# Patient Record
Sex: FEMALE | Race: WHITE | NOT HISPANIC OR LATINO | Employment: FULL TIME | URBAN - NONMETROPOLITAN AREA
[De-identification: names, ages, dates, MRNs, and addresses within clinical notes are randomized per-mention and may not be internally consistent; named-entity substitution may affect disease eponyms.]

---

## 2020-08-10 ENCOUNTER — TRANSCRIBE ORDERS (OUTPATIENT)
Dept: ADMINISTRATIVE | Facility: HOSPITAL | Age: 65
End: 2020-08-10

## 2020-08-10 DIAGNOSIS — Z01.818 PRE-OPERATIVE CLEARANCE: Primary | ICD-10-CM

## 2020-08-12 ENCOUNTER — LAB (OUTPATIENT)
Dept: LAB | Facility: HOSPITAL | Age: 65
End: 2020-08-12

## 2020-08-12 DIAGNOSIS — Z01.818 PRE-OPERATIVE CLEARANCE: ICD-10-CM

## 2020-08-12 PROCEDURE — U0002 COVID-19 LAB TEST NON-CDC: HCPCS

## 2020-08-12 PROCEDURE — C9803 HOPD COVID-19 SPEC COLLECT: HCPCS

## 2020-08-12 PROCEDURE — U0004 COV-19 TEST NON-CDC HGH THRU: HCPCS

## 2020-08-13 LAB
REF LAB TEST METHOD: NORMAL
SARS-COV-2 RNA RESP QL NAA+PROBE: NOT DETECTED

## 2020-08-14 ENCOUNTER — LAB REQUISITION (OUTPATIENT)
Dept: LAB | Facility: HOSPITAL | Age: 65
End: 2020-08-14

## 2020-08-14 DIAGNOSIS — R22.31 LOCALIZED SWELLING, MASS AND LUMP, RIGHT UPPER LIMB: ICD-10-CM

## 2020-08-14 PROCEDURE — 88307 TISSUE EXAM BY PATHOLOGIST: CPT | Performed by: PLASTIC SURGERY

## 2020-08-14 PROCEDURE — 89060 EXAM SYNOVIAL FLUID CRYSTALS: CPT | Performed by: PLASTIC SURGERY

## 2020-08-19 LAB
CYTO UR: NORMAL
LAB AP CASE REPORT: NORMAL
LAB AP CLINICAL INFORMATION: NORMAL
PATH REPORT.FINAL DX SPEC: NORMAL
PATH REPORT.GROSS SPEC: NORMAL

## 2020-09-03 ENCOUNTER — OFFICE VISIT (OUTPATIENT)
Dept: FAMILY MEDICINE CLINIC | Facility: CLINIC | Age: 65
End: 2020-09-03

## 2020-09-03 VITALS
DIASTOLIC BLOOD PRESSURE: 60 MMHG | SYSTOLIC BLOOD PRESSURE: 100 MMHG | WEIGHT: 184 LBS | HEIGHT: 67 IN | TEMPERATURE: 98.7 F | HEART RATE: 53 BPM | BODY MASS INDEX: 28.88 KG/M2 | OXYGEN SATURATION: 97 %

## 2020-09-03 DIAGNOSIS — I10 ESSENTIAL HYPERTENSION: ICD-10-CM

## 2020-09-03 DIAGNOSIS — D22.9 CHANGE IN SKIN MOLE: ICD-10-CM

## 2020-09-03 DIAGNOSIS — N93.9 VAGINAL BLEEDING: ICD-10-CM

## 2020-09-03 DIAGNOSIS — L30.4 INTERTRIGO: ICD-10-CM

## 2020-09-03 DIAGNOSIS — R31.0 GROSS HEMATURIA: ICD-10-CM

## 2020-09-03 DIAGNOSIS — C50.919 MALIGNANT NEOPLASM OF FEMALE BREAST, UNSPECIFIED ESTROGEN RECEPTOR STATUS, UNSPECIFIED LATERALITY, UNSPECIFIED SITE OF BREAST (HCC): ICD-10-CM

## 2020-09-03 DIAGNOSIS — R41.82 ALTERED MENTAL STATUS, UNSPECIFIED ALTERED MENTAL STATUS TYPE: Primary | ICD-10-CM

## 2020-09-03 DIAGNOSIS — G47.09 OTHER INSOMNIA: ICD-10-CM

## 2020-09-03 DIAGNOSIS — E78.2 MIXED HYPERLIPIDEMIA: ICD-10-CM

## 2020-09-03 LAB
BILIRUB BLD-MCNC: NEGATIVE MG/DL
CLARITY, POC: CLEAR
COLOR UR: YELLOW
GLUCOSE UR STRIP-MCNC: NEGATIVE MG/DL
KETONES UR QL: NEGATIVE
LEUKOCYTE EST, POC: NEGATIVE
NITRITE UR-MCNC: NEGATIVE MG/ML
PH UR: 5.5 [PH] (ref 5–8)
PROT UR STRIP-MCNC: ABNORMAL MG/DL
RBC # UR STRIP: ABNORMAL /UL
SP GR UR: 1.03 (ref 1–1.03)
UROBILINOGEN UR QL: NORMAL

## 2020-09-03 PROCEDURE — 81003 URINALYSIS AUTO W/O SCOPE: CPT | Performed by: FAMILY MEDICINE

## 2020-09-03 PROCEDURE — 99214 OFFICE O/P EST MOD 30 MIN: CPT | Performed by: FAMILY MEDICINE

## 2020-09-03 PROCEDURE — 93000 ELECTROCARDIOGRAM COMPLETE: CPT | Performed by: FAMILY MEDICINE

## 2020-09-03 RX ORDER — NYSTATIN 100000 [USP'U]/G
POWDER TOPICAL 4 TIMES DAILY
Qty: 60 G | Refills: 2 | Status: SHIPPED | OUTPATIENT
Start: 2020-09-03 | End: 2020-09-24

## 2020-09-03 RX ORDER — GABAPENTIN 100 MG/1
100 CAPSULE ORAL DAILY PRN
Status: ON HOLD | COMMUNITY
End: 2021-12-12

## 2020-09-03 RX ORDER — FLUTICASONE PROPIONATE 50 MCG
2 SPRAY, SUSPENSION (ML) NASAL DAILY
COMMUNITY
End: 2020-09-24

## 2020-09-03 RX ORDER — ATENOLOL 50 MG/1
50 TABLET ORAL 2 TIMES DAILY
COMMUNITY
End: 2020-09-03 | Stop reason: SDUPTHER

## 2020-09-03 RX ORDER — EXEMESTANE 25 MG/1
25 TABLET ORAL DAILY
COMMUNITY
End: 2020-09-03 | Stop reason: SDUPTHER

## 2020-09-03 RX ORDER — MENTHOL AND ZINC OXIDE .44; 20.625 G/100G; G/100G
OINTMENT TOPICAL AS NEEDED
Qty: 113 G | Refills: 2 | Status: SHIPPED | OUTPATIENT
Start: 2020-09-03 | End: 2020-09-24

## 2020-09-03 RX ORDER — MIRTAZAPINE 15 MG/1
15 TABLET, FILM COATED ORAL NIGHTLY
Qty: 30 TABLET | Refills: 2 | Status: SHIPPED | OUTPATIENT
Start: 2020-09-03 | End: 2020-09-24

## 2020-09-03 RX ORDER — EXEMESTANE 25 MG/1
25 TABLET ORAL DAILY
Qty: 90 TABLET | Refills: 1 | Status: SHIPPED | OUTPATIENT
Start: 2020-09-03 | End: 2020-11-04

## 2020-09-03 RX ORDER — ATENOLOL 50 MG/1
50 TABLET ORAL 2 TIMES DAILY
Qty: 180 TABLET | Refills: 1 | Status: SHIPPED | OUTPATIENT
Start: 2020-09-03 | End: 2021-02-18 | Stop reason: SDUPTHER

## 2020-09-03 RX ORDER — ATORVASTATIN CALCIUM 80 MG/1
80 TABLET, FILM COATED ORAL NIGHTLY
Qty: 90 TABLET | Refills: 1 | Status: SHIPPED | OUTPATIENT
Start: 2020-09-03 | End: 2021-02-18 | Stop reason: SDUPTHER

## 2020-09-03 RX ORDER — BACLOFEN 20 MG/1
20 TABLET ORAL NIGHTLY PRN
COMMUNITY
End: 2020-09-24

## 2020-09-03 RX ORDER — ATORVASTATIN CALCIUM 80 MG/1
80 TABLET, FILM COATED ORAL NIGHTLY
COMMUNITY
Start: 2020-07-22 | End: 2020-09-03 | Stop reason: SDUPTHER

## 2020-09-03 RX ORDER — TRAZODONE HYDROCHLORIDE 50 MG/1
100 TABLET ORAL NIGHTLY
COMMUNITY
End: 2020-12-08

## 2020-09-15 DIAGNOSIS — N95.0 PMB (POSTMENOPAUSAL BLEEDING): Primary | ICD-10-CM

## 2020-09-16 ENCOUNTER — LAB (OUTPATIENT)
Dept: FAMILY MEDICINE CLINIC | Facility: CLINIC | Age: 65
End: 2020-09-16

## 2020-09-16 DIAGNOSIS — R41.82 ALTERED MENTAL STATUS, UNSPECIFIED ALTERED MENTAL STATUS TYPE: ICD-10-CM

## 2020-09-16 DIAGNOSIS — C50.919 MALIGNANT NEOPLASM OF FEMALE BREAST, UNSPECIFIED ESTROGEN RECEPTOR STATUS, UNSPECIFIED LATERALITY, UNSPECIFIED SITE OF BREAST (HCC): ICD-10-CM

## 2020-09-16 DIAGNOSIS — I10 ESSENTIAL HYPERTENSION: ICD-10-CM

## 2020-09-16 DIAGNOSIS — E78.2 MIXED HYPERLIPIDEMIA: ICD-10-CM

## 2020-09-16 PROCEDURE — 85025 COMPLETE CBC W/AUTO DIFF WBC: CPT | Performed by: FAMILY MEDICINE

## 2020-09-16 PROCEDURE — 80053 COMPREHEN METABOLIC PANEL: CPT | Performed by: FAMILY MEDICINE

## 2020-09-16 PROCEDURE — 83735 ASSAY OF MAGNESIUM: CPT | Performed by: FAMILY MEDICINE

## 2020-09-16 PROCEDURE — 84439 ASSAY OF FREE THYROXINE: CPT | Performed by: FAMILY MEDICINE

## 2020-09-16 PROCEDURE — 82607 VITAMIN B-12: CPT | Performed by: FAMILY MEDICINE

## 2020-09-16 PROCEDURE — 84443 ASSAY THYROID STIM HORMONE: CPT | Performed by: FAMILY MEDICINE

## 2020-09-16 PROCEDURE — 80061 LIPID PANEL: CPT | Performed by: FAMILY MEDICINE

## 2020-09-17 LAB
ALBUMIN SERPL-MCNC: 4.4 G/DL (ref 3.5–5.2)
ALBUMIN/GLOB SERPL: 2 G/DL
ALP SERPL-CCNC: 87 U/L (ref 39–117)
ALT SERPL W P-5'-P-CCNC: 70 U/L (ref 1–33)
ANION GAP SERPL CALCULATED.3IONS-SCNC: 12 MMOL/L (ref 5–15)
AST SERPL-CCNC: 47 U/L (ref 1–32)
BASOPHILS # BLD AUTO: 0.06 10*3/MM3 (ref 0–0.2)
BASOPHILS NFR BLD AUTO: 1.1 % (ref 0–1.5)
BILIRUB SERPL-MCNC: 0.7 MG/DL (ref 0–1.2)
BUN SERPL-MCNC: 20 MG/DL (ref 8–23)
BUN/CREAT SERPL: 30.8 (ref 7–25)
CALCIUM SPEC-SCNC: 9.5 MG/DL (ref 8.6–10.5)
CHLORIDE SERPL-SCNC: 104 MMOL/L (ref 98–107)
CHOLEST SERPL-MCNC: 123 MG/DL (ref 0–200)
CO2 SERPL-SCNC: 27 MMOL/L (ref 22–29)
CREAT SERPL-MCNC: 0.65 MG/DL (ref 0.57–1)
DEPRECATED RDW RBC AUTO: 46 FL (ref 37–54)
EOSINOPHIL # BLD AUTO: 0.11 10*3/MM3 (ref 0–0.4)
EOSINOPHIL NFR BLD AUTO: 2 % (ref 0.3–6.2)
ERYTHROCYTE [DISTWIDTH] IN BLOOD BY AUTOMATED COUNT: 13.2 % (ref 12.3–15.4)
GFR SERPL CREATININE-BSD FRML MDRD: 92 ML/MIN/1.73
GLOBULIN UR ELPH-MCNC: 2.2 GM/DL
GLUCOSE SERPL-MCNC: 92 MG/DL (ref 65–99)
HCT VFR BLD AUTO: 43.4 % (ref 34–46.6)
HDLC SERPL-MCNC: 50 MG/DL (ref 40–60)
HGB BLD-MCNC: 13.6 G/DL (ref 12–15.9)
IMM GRANULOCYTES # BLD AUTO: 0 10*3/MM3 (ref 0–0.05)
IMM GRANULOCYTES NFR BLD AUTO: 0 % (ref 0–0.5)
LDLC SERPL CALC-MCNC: 60 MG/DL (ref 0–100)
LDLC/HDLC SERPL: 1.21 {RATIO}
LYMPHOCYTES # BLD AUTO: 2.34 10*3/MM3 (ref 0.7–3.1)
LYMPHOCYTES NFR BLD AUTO: 42.3 % (ref 19.6–45.3)
MAGNESIUM SERPL-MCNC: 2.1 MG/DL (ref 1.6–2.4)
MCH RBC QN AUTO: 29.1 PG (ref 26.6–33)
MCHC RBC AUTO-ENTMCNC: 31.3 G/DL (ref 31.5–35.7)
MCV RBC AUTO: 92.9 FL (ref 79–97)
MONOCYTES # BLD AUTO: 0.45 10*3/MM3 (ref 0.1–0.9)
MONOCYTES NFR BLD AUTO: 8.1 % (ref 5–12)
NEUTROPHILS NFR BLD AUTO: 2.57 10*3/MM3 (ref 1.7–7)
NEUTROPHILS NFR BLD AUTO: 46.5 % (ref 42.7–76)
NRBC BLD AUTO-RTO: 0 /100 WBC (ref 0–0.2)
PLATELET # BLD AUTO: 188 10*3/MM3 (ref 140–450)
PMV BLD AUTO: 11.7 FL (ref 6–12)
POTASSIUM SERPL-SCNC: 4 MMOL/L (ref 3.5–5.2)
PROT SERPL-MCNC: 6.6 G/DL (ref 6–8.5)
RBC # BLD AUTO: 4.67 10*6/MM3 (ref 3.77–5.28)
SODIUM SERPL-SCNC: 143 MMOL/L (ref 136–145)
T4 FREE SERPL-MCNC: 1.31 NG/DL (ref 0.93–1.7)
TRIGL SERPL-MCNC: 63 MG/DL (ref 0–150)
TSH SERPL DL<=0.05 MIU/L-ACNC: 1.9 UIU/ML (ref 0.27–4.2)
VIT B12 BLD-MCNC: 597 PG/ML (ref 211–946)
VLDLC SERPL-MCNC: 12.6 MG/DL (ref 5–40)
WBC # BLD AUTO: 5.53 10*3/MM3 (ref 3.4–10.8)
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
## 2020-09-24 ENCOUNTER — OFFICE VISIT (OUTPATIENT)
Dept: OBSTETRICS AND GYNECOLOGY | Facility: CLINIC | Age: 65
End: 2020-09-24

## 2020-09-24 VITALS
WEIGHT: 180.4 LBS | HEIGHT: 67 IN | BODY MASS INDEX: 28.31 KG/M2 | TEMPERATURE: 97.3 F | SYSTOLIC BLOOD PRESSURE: 138 MMHG | DIASTOLIC BLOOD PRESSURE: 78 MMHG

## 2020-09-24 DIAGNOSIS — Z78.0 MENOPAUSE: ICD-10-CM

## 2020-09-24 DIAGNOSIS — N95.2 VAGINAL ATROPHY: ICD-10-CM

## 2020-09-24 DIAGNOSIS — Z85.3 PERSONAL HISTORY OF BREAST CANCER: Primary | ICD-10-CM

## 2020-09-24 PROCEDURE — 99203 OFFICE O/P NEW LOW 30 MIN: CPT | Performed by: OBSTETRICS & GYNECOLOGY

## 2020-09-24 RX ORDER — ESTRADIOL 10 UG/1
INSERT VAGINAL
Qty: 24 TABLET | Refills: 1 | Status: SHIPPED | OUTPATIENT
Start: 2020-09-24 | End: 2020-11-04

## 2020-09-24 NOTE — PROGRESS NOTES
"   Chief Complaint   Patient presents with   • Vaginal Bleeding     post-menopausal bleeding       Yadira Burgos is a 64 y.o. year old  presenting to be seen for a new Gynecologic visit with me because of a recent history of bright red vaginal spotting.  This patient has been menopausal since 31 years of age.  She has not taken estrogen/progestin therapy for over 35 years.  She does have complaints of vaginal dryness.  She has had a right breast lumpectomy with negative sentinel nodes.  She had postoperative radiation therapy.  She currently takes Aromasin, 25 mg daily and has done so for 5 years.  She has a personal history of a melanoma excised from her back.  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  She states that her last Pap test was negative.  She denies bowel symptoms.  She has a history of microscopic hematuria.    Social History     Substance and Sexual Activity   Sexual Activity Not Currently   • Birth control/protection: Post-menopausal     SCREENING TESTS  No Mammogram results  Year 2012   Age                         PAP         \"Neg\"                HPV high risk                         Mammogram                         ALLIE score                         Breast MRI                         Lipids                         Vitamin D                         Colonoscopy                         DEXA  Frax (hip/any)                         Ovarian Screen                             No Additional Complaints Reported    The following portions of the patient's history were reviewed and updated as appropriate:vital signs and   She  has a past medical history of Back pain, Cancer (CMS/HCC), Elbow fracture, Foot fracture, Headache, Hyperlipidemia, Hypertension, and Sinusitis.  She does not have any pertinent problems on file.  She  has a past " "surgical history that includes Breast surgery; Elbow fusion; Rhinoplasty; and Hand surgery.  Her family history includes Alcohol abuse in her paternal grandmother; Cancer in her paternal aunt; Hypertension in her sister, sister, and sister; Other in her mother and sister; Stroke in her maternal grandmother; Ulcers in her father.  She  reports that she has never smoked. She has never used smokeless tobacco. She reports previous alcohol use. She reports that she does not use drugs.  Current Outpatient Medications   Medication Sig Dispense Refill   • atenolol (TENORMIN) 50 MG tablet Take 1 tablet by mouth 2 (two) times a day. 180 tablet 1   • atorvastatin (LIPITOR) 80 MG tablet Take 1 tablet by mouth Every Night. 90 tablet 1   • estradiol (VAGIFEM) 10 MCG tablet vaginal tablet Insert one tablet intravaginally, twice a week 24 tablet 1   • exemestane (AROMASIN) 25 MG chemo tablet Take 1 tablet by mouth Daily. 90 tablet 1   • gabapentin (NEURONTIN) 100 MG capsule Take 100 mg by mouth Daily As Needed.     • traZODone (DESYREL) 50 MG tablet Take 100 mg by mouth Every Night.       No current facility-administered medications for this visit.      She has No Known Allergies..        Review of Systems  A review of systems was done.  She denies cough, fever, shortness of breath, and loss of her sense of taste or smell.  Constitutional: negative for fever, chills, activity change, appetite change, fatigue and unexpected weight change.  Respiratory: negative  Cardiovascular: negative  Gastrointestinal: negative  Genitourinary:positive for hematuria  Musculoskeletal:negative  Behavioral/Psych: negative          /78   Temp 97.3 °F (36.3 °C)   Ht 170.2 cm (67\")   Wt 81.8 kg (180 lb 6.4 oz)   Breastfeeding No   BMI 28.25 kg/m²     Physical Exam    General:  alert; cooperative; well developed; well nourished   Skin:  No suspicious lesions seen   Thyroid: normal to inspection and palpation   Lungs:  clear to auscultation " bilaterally   Heart:  regular rate and rhythm, S1, S2 normal, no murmur, click, rub or gallop   Breasts:  Not performed.  By request   Abdomen: soft, non-tender; no masses  no umbilical or inguinal hernias are present  no hepato-splenomegaly   Pelvis: Clinical staff was present for exam  External genitalia:  normal appearance of the external genitalia including Bartholin's and [place]'s glands.  Vaginal:  atrophic mucosal changes are present; Severe  Cervix:  normal appearance.  Uterus:  normal size, shape and consistency. anteverted;  Adnexa:  non palpable bilaterally.  Rectal:  anus visually normal appearing. recto-vaginal exam unremarkable and confirms findings;     Lab Review   No data reviewed    Imaging   No data reviewed    Medical counseling was given to patient for the following topics: diagnostic results, instructions for management, risk factor reductions, impressions, risks and benefits of treatment options and importance of treatment compliance . Total time of the encounter was 30 minute(s) and 20 minute(s)  was spent in face to face counseling.  I have reviewed the patient's clinical history of breast cancer, melanoma, and carcinoma in situ the cervix with her.  I have referred artur with her the lack of estrogen/progestin therapy for over 35 years.  I have advised the patient that she has severe vaginal atrophy that has resulted in vaginal and cervical spotting.  There is no evidence of bleeding coming from the uterus.  I have counseled the patient that her pelvic ultrasound reveals a very small anteverted uterus with an endometrial stripe of 2.7 mm.  I have advised her that her ovaries cannot be visualized.  I have counseled her that there is minimal risk of endometrial cancer based on this ultrasound.  I have discussed options of therapy with the patient including the use of Replens, but have advised her that her atrophy is so severe that she will require some form of estrogen therapy locally.  I have  advised her that I would not give her systemic estrogen/progestin therapy due to her history of an estrogen receptor breast cancer.  I have advised her to use low-dose Yuvafem vaginal tablets, 10 mcg intravaginally twice a week.  I have instructed her to contact me should she have any side effects on this medication.  I have instructed her to discuss this with her new oncologist.  I have indicated that without estrogen therapy she is going to continue to have vaginal spotting.          ASSESSMENT  Problems Addressed this Visit        Genitourinary    Menopause    Vaginal atrophy    Relevant Medications    estradiol (VAGIFEM) 10 MCG tablet vaginal tablet      Other Visit Diagnoses     Personal history of breast cancer    -  Primary      Diagnoses       Codes Comments    Personal history of breast cancer    -  Primary ICD-10-CM: Z85.3  ICD-9-CM: V10.3     Menopause     ICD-10-CM: Z78.0  ICD-9-CM: 627.2     Vaginal atrophy     ICD-10-CM: N95.2  ICD-9-CM: 627.3            Substance History:    reports that she has never smoked. She has never used smokeless tobacco.    reports previous alcohol use.    reports no history of drug use.    Substance use counseling is not indicated based on patient history.      PLAN    Medications prescribed this encounter:    New Medications Ordered This Visit   Medications   • estradiol (VAGIFEM) 10 MCG tablet vaginal tablet     Sig: Insert one tablet intravaginally, twice a week     Dispense:  24 tablet     Refill:  1   · Continue monthly self breast assessment and annual breast imaging  · Pelvic ultrasound read  · Oncology follow-up  · Follow up: 3 month(s) to reevaluate the severe vaginal atrophy and spotting  · Calcium, 600 mg/ Vit. D, 400 IU daily     *Please note that portions of this documentation may have been completed with a voice recognition program.  Efforts were made to edit this dictation, but occasional words may have been mistranscribed.     This note was electronically  signed.    PETER Real MD  September 24, 2020  15:04 EDT

## 2020-09-28 ENCOUNTER — TELEPHONE (OUTPATIENT)
Dept: FAMILY MEDICINE CLINIC | Facility: CLINIC | Age: 65
End: 2020-09-28

## 2020-09-28 NOTE — TELEPHONE ENCOUNTER
----- Message from Dilcia Perdo MD sent at 9/27/2020 11:41 PM EDT -----  Please call Yadira and let her know that all labs are okay except for her liver enzymes. They are slightly elevated. Increase fluids, fruits, and vegetables. We will monitor. Thanks.    Patient notified and verbally understands.

## 2020-10-12 ENCOUNTER — CONSULT (OUTPATIENT)
Dept: ONCOLOGY | Facility: CLINIC | Age: 65
End: 2020-10-12

## 2020-10-12 VITALS
DIASTOLIC BLOOD PRESSURE: 81 MMHG | HEART RATE: 57 BPM | WEIGHT: 177.2 LBS | TEMPERATURE: 98.2 F | HEIGHT: 67 IN | BODY MASS INDEX: 27.81 KG/M2 | SYSTOLIC BLOOD PRESSURE: 123 MMHG | OXYGEN SATURATION: 93 %

## 2020-10-12 DIAGNOSIS — Z85.820 HISTORY OF MALIGNANT MELANOMA OF SKIN: ICD-10-CM

## 2020-10-12 DIAGNOSIS — Z85.41 HISTORY OF CERVICAL CANCER: ICD-10-CM

## 2020-10-12 DIAGNOSIS — M81.0 POSTMENOPAUSAL OSTEOPOROSIS: ICD-10-CM

## 2020-10-12 DIAGNOSIS — R79.89 ELEVATED LFTS: ICD-10-CM

## 2020-10-12 DIAGNOSIS — Z17.0 MALIGNANT NEOPLASM OF RIGHT BREAST IN FEMALE, ESTROGEN RECEPTOR POSITIVE, UNSPECIFIED SITE OF BREAST (HCC): Primary | ICD-10-CM

## 2020-10-12 DIAGNOSIS — Z12.11 ENCOUNTER FOR SCREENING COLONOSCOPY: ICD-10-CM

## 2020-10-12 DIAGNOSIS — R10.31 RIGHT LOWER QUADRANT ABDOMINAL PAIN: ICD-10-CM

## 2020-10-12 DIAGNOSIS — C50.911 MALIGNANT NEOPLASM OF RIGHT BREAST IN FEMALE, ESTROGEN RECEPTOR POSITIVE, UNSPECIFIED SITE OF BREAST (HCC): Primary | ICD-10-CM

## 2020-10-12 PROCEDURE — 90471 IMMUNIZATION ADMIN: CPT | Performed by: INTERNAL MEDICINE

## 2020-10-12 PROCEDURE — 99205 OFFICE O/P NEW HI 60 MIN: CPT | Performed by: INTERNAL MEDICINE

## 2020-10-12 PROCEDURE — 90694 VACC AIIV4 NO PRSRV 0.5ML IM: CPT | Performed by: INTERNAL MEDICINE

## 2020-10-12 NOTE — PROGRESS NOTES
"NAME: Yadira Burgos    : 1955    DATE OF CONSULTATION:  10/12/2020    REASON FOR REFERRAL: History of breast cancer    REFERRING PHYSICIAN:  Dilcia Pedro MD    CHIEF COMPLAINT:  Transfer of care for breast cancer.       HISTORY OF PRESENT ILLNESS:   Yadira Burgos is a very pleasant 64 y.o. female who is being seen today at the request of Dilcia Pedro MD for evaluation and treatment of breast cancer. She was living in [place] in 2015 when her dog brought attention to and \"found\" an abnormal lump in her right breast.  She was treated by Dr. Steve Isbell and underwent R lumpectomy with SLNBx on 10-22-15 as below.  She then had what sounds like accelerated partial breast irradiation.  She was then started on Arimidex which was later switched to Exemestane for a better side effect profile.  She took this for about 5 years, stopping a couple of weeks ago.  She stopped taking Exemestane because she developed some vaginal bleeding and hematuria.  This was evaluated by her PCP and gynecologist and felt to be due to vaginal atrophy.  She was prescribed a hormone pill which she hasn't yet started and was referred here to discuss things further.      Aside from bleeding which has been uncomfortable and distressing for her, Ms. Onur Burgos has generally been well.  The last year has been hard on her emotionally with the loss of her  and then her dog, her home and her insurance, but she is coping well and has good family support in [place].  She says she gained weight with her breast cancer treatment and gained weight when her  .  She has been working now to lose weight and has lost 20 lbs over the last 2 months with dieting.  She denies chest pain or shortness of breath.  She complains of some RLQ cramping pain and isn't sure what is causing that.  She denies difficulty moving her bowels.  No blood in her stool that she is aware of.  She has had vaginal bleeding and " hematuria as above.          PAST MEDICAL HISTORY:  Past Medical History:   Diagnosis Date   • Back pain    • Breast cancer (CMS/HCC)        • Cancer (CMS/HCC)     breast, cervical, melanoma   • Cervical cancer (CMS/HCC)     Late teens   • Elbow fracture    • Foot fracture    • Headache    • Hyperlipidemia    • Hypertension    • Melanoma (CMS/HCC)     46 years old   • Osteoarthritis    • Sinusitis    Cervical cancer:  Diagnosed in .  Treated by repeated local intervention and ultimately received cryotherapy at [facility] with resolution.    History of Malignant Melanoma on her back  - treated by Dermatologist Catrachito Amaro in [place], [place] with what sounds like wide local excision.     PAST SURGICAL HISTORY:  Past Surgical History:   Procedure Laterality Date   • BREAST SURGERY      Secondary to cancer   • ELBOW FUSION      left   • FOOT SURGERY      Broken foot   • HAND SURGERY  2020    right   • RHINOPLASTY     Excision of Malignant Melanoma from Back     FAMILY HISTORY:  Family History   Problem Relation Age of Onset   • Other Mother         blood clots   • Hypertension Mother    • Ulcers Father    • Hypertension Sister    • Other Sister         Multiple Sclerosis   • Cancer Paternal Aunt    • Stroke Maternal Grandmother    • Alcohol abuse Paternal Grandmother    • Hypertension Sister    • Multiple sclerosis Sister    • Hypertension Sister    • Coronary artery disease Paternal Grandfather    Many paternal aunts  with malignancy of various types. Many cousins on that side of the family have had cancer as well.  One  of colon cancer in her 30s.  One had a brain tumor.    SOCIAL HISTORY:  Social History     Socioeconomic History   • Marital status:      Spouse name: Not on file   • Number of children: Not on file   • Years of education: Not on file   • Highest education level: Not on file   Tobacco Use   • Smoking status: Never Smoker   • Smokeless tobacco: Never Used      Substance and Sexual Activity   • Alcohol use: Not Currently   • Drug use: Never   • Sexual activity: Not Currently     Birth control/protection: Post-menopausal     Lost her  to pancreatic cancer in 2019.  After this she lost her home, lost her insurance, her dog  and so she returned to [place] where her mother and 3 sisters live.    REVIEW OF SYSTEMS:   A comprehensive 14 point review of systems was performed.  Significant findings as mentioned above.  All other systems reviewed and are negative.      MEDICATIONS:  The current medication list was reviewed in the EMR    Current Outpatient Medications:   •  atenolol (TENORMIN) 50 MG tablet, Take 1 tablet by mouth 2 (two) times a day., Disp: 180 tablet, Rfl: 1  •  atorvastatin (LIPITOR) 80 MG tablet, Take 1 tablet by mouth Every Night., Disp: 90 tablet, Rfl: 1  •  estradiol (VAGIFEM) 10 MCG tablet vaginal tablet, Insert one tablet intravaginally, twice a week, Disp: 24 tablet, Rfl: 1  •  exemestane (AROMASIN) 25 MG chemo tablet, Take 1 tablet by mouth Daily., Disp: 90 tablet, Rfl: 1  •  gabapentin (NEURONTIN) 100 MG capsule, Take 100 mg by mouth Daily As Needed., Disp: , Rfl:   •  traZODone (DESYREL) 50 MG tablet, Take 100 mg by mouth Every Night., Disp: , Rfl:     ALLERGIES:  No Known Allergies    PHYSICAL EXAM:  Vitals:    10/12/20 1310   BP: 123/81   Pulse: 57   Temp: 98.2 °F (36.8 °C)   SpO2: 93%     Pain Score    10/12/20 1310   PainSc: 0-No pain          General:  Awake, alert and oriented, in no distress  HEENT:  Pupils are equal, round and reactive to light and accommodation, Extra-ocular movements full, Oropharyx clear, mucous membranes moist  Neck:  No JVD, thyromegaly or lymphadenopathy  CV:  Regular rate and rhythm, no murmurs, rubs or gallops  Resp:  Lungs are clear to auscultation bilaterally  Breast:  S/p R lumpectomy. There is a somewhat firm area at the site of surgery but there are no palpable masses.  No R axillary LAD.  Left breast is  without mass lesions.  No L axillary adenopathy.  Abd:  Soft, non-tender, non-distended, bowel sounds present, no organomegaly or masses  Ext:  No clubbing, cyanosis or edema  Lymph:  No cervical, supraclavicular, axillary, inguinal or femoral adenopathy  Neuro:  MS as above, CN II-XII intact, grossly non-focal exam  Skin:  S/p excision of melanoma from back remotely.  There are multiple actinic keratoses and seborrheic keratoses over her skin.      PATHOLOGY:  10-22-15            ENDOSCOPY:        IMAGING:  US Non-ob Transvaginal 09/24/2020         RECENT LABS:  Lab Results   Component Value Date    WBC 5.53 09/16/2020    HGB 13.6 09/16/2020    HCT 43.4 09/16/2020    MCV 92.9 09/16/2020    RDW 13.2 09/16/2020     09/16/2020    NEUTRORELPCT 46.5 09/16/2020    LYMPHORELPCT 42.3 09/16/2020    MONORELPCT 8.1 09/16/2020    EOSRELPCT 2.0 09/16/2020    BASORELPCT 1.1 09/16/2020    NEUTROABS 2.57 09/16/2020    LYMPHSABS 2.34 09/16/2020       Lab Results   Component Value Date     09/16/2020    K 4.0 09/16/2020    CO2 27.0 09/16/2020     09/16/2020    BUN 20 09/16/2020    CREATININE 0.65 09/16/2020    EGFRIFNONA 92 09/16/2020    GLUCOSE 92 09/16/2020    CALCIUM 9.5 09/16/2020    ALKPHOS 87 09/16/2020    AST 47 (H) 09/16/2020    ALT 70 (H) 09/16/2020    BILITOT 0.7 09/16/2020    ALBUMIN 4.40 09/16/2020    PROTEINTOT 6.6 09/16/2020    MG 2.1 09/16/2020         ASSESSMENT & PLAN:  Yadira Burgos is a very pleasant 64 y.o. female with a history of cervical cancer, breast cancer and malignant melanoma.    1.  History of Breast Cancer:  -  Ms. Onur Burgos had Stage IA ([id number]) moderately differentiated invasive ductal carcinoma of the right breast diagnosed in October 2015.  -  She underwent R lympectomy and SLNBx performed by Dr. Steve Isbell 10-22-15 and then received adjuvant radiation in [place].  -  After radiation, she was started on Arimidex which was then switched to Exemestane.  She has done  well on Exemestane for almost 5 years now.  -  Unfortunately, she recently developed vaginal bleeding and hematuria related to vaginal atrophy related to hormonal blockade.  -  We discussed the relative risks and benefits of a longer course of hormonal therapy (I.e beyond 5 years).  Given early stage disease and significant side effects, I have recommended she discontinue Exemestane.  -  She reports last mammogram was done in January 2020 at the [facility].  Will obtain report.  Next mammogram will be due in January 2021.      2.  Vaginal Bleeding / Hematuria related to atrophy from hormonal blockade:  -  I suggested that Ms. Onur Burgos discontinue Exemestane but I advised against oral hormone replacement which I believe would be too risky for her.  -  Suggested trial of Hyalogyn or Replens.  If this is ineffective, could try a topical hormonal vaginal suppository.    3.  H/o Colon polyps:  -  Reports last c-scope was about 4 years ago.  Given h/o polyps, RLQ discomfort and family h/o colon cancer, will refer to GI for c-scope.    4.  H/o cervical cancer treated with cryotherapy at [facility] in 1975:  -  Recently saw gynecologistm /, /seymour /gamaliel.  She says she had exam including pap smear and then pelvic U/S which was unrevealing.    5.  H/o malignant Melanoma on her Back:  -  S/p resection performed by Dr. Catrachito Amaro of Dermatology in [place] in 2004.  -  Recommended she see a dermatologist twice a year for full skin exam.  She was seen by the [facility] in [place] last week and has f/u in 3 months.    6.  Elevated LFTs:  -  Could be related to weight chagnes up and down / fatty liver infiltration.  -  Given her h/o multiple malignancies, as well as RLQ discomfort will check CT A/P.      7. ACO / DWAYNE/Other  Quality measures  -  Yadira Burgos did not receive 2020 flu vaccine.  Will give this today.  -  Yadira Burgos reports a pain score of 0.  Given her pain assessment as noted,  treatment options were discussed and the following options were decided upon as a follow-up plan to address the patient's pain: No intervention needed at this time..  -  Current outpatient and discharge medications have been reconciled for the patient.  Reviewed by: Loreto Stone MD    8.  F/u:  -  DEXA scan, CT A/P, GI referral for colonoscopy ordered today  -  Will d/c Exemestane and give trial of Hyalogyn  -  Return to see me after this is done.    This note was scribed for Loerto Stone MD by Alba Felton RN.    I, Loreto Stone MD, personally performed the services described in this documentation as scribed by the above named individual in my presence, and it is both accurate and complete.  10/12/2020       I spent 60 minutes with Yadira Burgos today.  In the office today, more than 50% of this time was spent face-to-face with her  in counseling / coordination of care, reviewing her medical history and counseling on the current treatment plan.  All questions were answered to her satisfaction      Electronically Signed by: Loreto Stone MD      CC:     MD Manav Cotto MD Adalberto Roman Castellanos, MD

## 2020-10-22 ENCOUNTER — TELEPHONE (OUTPATIENT)
Dept: SURGERY | Facility: CLINIC | Age: 65
End: 2020-10-22

## 2020-10-22 ENCOUNTER — TELEPHONE (OUTPATIENT)
Dept: GASTROENTEROLOGY | Facility: CLINIC | Age: 65
End: 2020-10-22

## 2020-10-22 NOTE — TELEPHONE ENCOUNTER
I called Yadira about a referral that  had received from . When Yadira answered the phone I told her who I was and what I was calling about. She proceeds to hang the phone up on me.     I have mailed out a letter for her to contact our office to make an appointment.         DL 10/22/20 12:19pm.

## 2020-10-22 NOTE — TELEPHONE ENCOUNTER
Can you please put a case request in for patient to have a screening colonoscopy? She is scheduled to have it on 12-7-2020. She needs the goyltely prep sent to her as well.    Thank you

## 2020-10-23 ENCOUNTER — HOSPITAL ENCOUNTER (OUTPATIENT)
Facility: HOSPITAL | Age: 65
Setting detail: HOSPITAL OUTPATIENT SURGERY
End: 2020-10-23
Attending: INTERNAL MEDICINE | Admitting: INTERNAL MEDICINE

## 2020-10-23 DIAGNOSIS — Z12.12 ENCOUNTER FOR COLORECTAL CANCER SCREENING: Primary | ICD-10-CM

## 2020-10-23 DIAGNOSIS — Z12.11 ENCOUNTER FOR COLORECTAL CANCER SCREENING: Primary | ICD-10-CM

## 2020-10-26 ENCOUNTER — APPOINTMENT (OUTPATIENT)
Dept: CT IMAGING | Facility: HOSPITAL | Age: 65
End: 2020-10-26

## 2020-11-04 ENCOUNTER — OFFICE VISIT (OUTPATIENT)
Dept: SURGERY | Facility: CLINIC | Age: 65
End: 2020-11-04

## 2020-11-04 VITALS
HEART RATE: 65 BPM | WEIGHT: 180 LBS | SYSTOLIC BLOOD PRESSURE: 132 MMHG | DIASTOLIC BLOOD PRESSURE: 75 MMHG | BODY MASS INDEX: 28.25 KG/M2 | HEIGHT: 67 IN

## 2020-11-04 DIAGNOSIS — Z12.12 ENCOUNTER FOR COLORECTAL CANCER SCREENING: Primary | ICD-10-CM

## 2020-11-04 DIAGNOSIS — Z12.11 ENCOUNTER FOR COLORECTAL CANCER SCREENING: Primary | ICD-10-CM

## 2020-11-04 DIAGNOSIS — Z01.818 PREOP TESTING: Primary | ICD-10-CM

## 2020-11-04 PROCEDURE — S0285 CNSLT BEFORE SCREEN COLONOSC: HCPCS | Performed by: SURGERY

## 2020-11-04 RX ORDER — SODIUM, POTASSIUM,MAG SULFATES 17.5-3.13G
1 SOLUTION, RECONSTITUTED, ORAL ORAL EVERY 12 HOURS
Qty: 1 BOTTLE | Refills: 0 | Status: SHIPPED | OUTPATIENT
Start: 2020-11-04 | End: 2021-09-16

## 2020-11-04 NOTE — PROGRESS NOTES
Subjective   Yadira Burgos is a 64 y.o. female is being seen for consultation today at the request of Dilcia Pedro MD    Yadira Burgos is a 64 y.o. female With a history of breast cancer presenting for screening colonoscopy.  She has a history of polyps with colonoscopy 5 years ago.  The patient has no change in bowel habits or stool caliber but does have occasional fecal incontinence.  The patient has a cousin who  in her late 40s or early 50s of colon cancer.  No unintentional weight loss.      Past Medical History:   Diagnosis Date   • Back pain    • Basal cell carcinoma (BCC) in situ of skin     Dr. Mohr - Derm   • Breast cancer (CMS/HCC)        • Cancer (CMS/HCC)     breast, cervical, melanoma   • Cervical cancer (CMS/HCC)     Late teens   • Elbow fracture    • Foot fracture    • Headache    • Hyperlipidemia    • Hypertension    • Melanoma (CMS/HCC)     46 years old   • Osteoarthritis    • Sinusitis        Family History   Problem Relation Age of Onset   • Other Mother         blood clots   • Hypertension Mother    • Ulcers Father    • Hypertension Sister    • Other Sister         Multiple Sclerosis   • Cancer Paternal Aunt    • Stroke Maternal Grandmother    • Alcohol abuse Paternal Grandmother    • Hypertension Sister    • Multiple sclerosis Sister    • Hypertension Sister    • Coronary artery disease Paternal Grandfather        Social History     Socioeconomic History   • Marital status:      Spouse name: Not on file   • Number of children: Not on file   • Years of education: Not on file   • Highest education level: Not on file   Tobacco Use   • Smoking status: Never Smoker   • Smokeless tobacco: Never Used   Substance and Sexual Activity   • Alcohol use: Not Currently   • Drug use: Never   • Sexual activity: Not Currently     Birth control/protection: Post-menopausal       Past Surgical History:   Procedure Laterality Date   • BREAST SURGERY      Secondary to cancer  "  • ELBOW FUSION      left   • FOOT SURGERY  2014    Broken foot   • HAND SURGERY  08/2020    right   • RHINOPLASTY  2002       Review of Systems   Constitutional: Negative for activity change, appetite change, chills and fever.   HENT: Negative for sore throat and trouble swallowing.    Eyes: Negative for visual disturbance.   Respiratory: Negative for cough and shortness of breath.    Cardiovascular: Negative for chest pain and palpitations.   Gastrointestinal: Negative for abdominal distention, abdominal pain, blood in stool, constipation, diarrhea, nausea and vomiting.   Endocrine: Negative for cold intolerance and heat intolerance.   Genitourinary: Negative for dysuria.   Musculoskeletal: Negative for joint swelling.   Skin: Negative for color change, rash and wound.   Allergic/Immunologic: Negative for immunocompromised state.   Neurological: Negative for dizziness, seizures, weakness and headaches.   Hematological: Negative for adenopathy. Does not bruise/bleed easily.   Psychiatric/Behavioral: Negative for agitation and confusion.         /75   Pulse 65   Ht 170.2 cm (67.01\")   Wt 81.6 kg (180 lb)   BMI 28.19 kg/m²   Objective   Physical Exam  Constitutional:       Appearance: She is well-developed.   HENT:      Head: Normocephalic and atraumatic.   Eyes:      Conjunctiva/sclera: Conjunctivae normal.      Pupils: Pupils are equal, round, and reactive to light.   Neck:      Musculoskeletal: Neck supple.      Thyroid: No thyromegaly.      Vascular: No JVD.      Trachea: No tracheal deviation.   Cardiovascular:      Rate and Rhythm: Normal rate and regular rhythm.      Heart sounds: No murmur. No friction rub. No gallop.    Pulmonary:      Effort: Pulmonary effort is normal.      Breath sounds: Normal breath sounds.   Abdominal:      General: There is no distension.      Palpations: Abdomen is soft. There is no hepatomegaly or splenomegaly.      Tenderness: There is no abdominal tenderness.      " Hernia: No hernia is present.   Musculoskeletal: Normal range of motion.         General: No deformity.   Skin:     General: Skin is warm and dry.   Neurological:      Mental Status: She is alert and oriented to person, place, and time.               Assessment   Diagnoses and all orders for this visit:    1. Encounter for colorectal cancer screening (Primary)  -     Case Request; Standing  -     Case Request    Other orders  -     Follow anesthesia standing orders.  -     Provide NPO Instructions to Patient; Future  -     Chlorhexidine Skin Prep; Future  -     Follow anesthesia standing orders.; Standing  -     Verify NPO Status; Standing  -     Obtain informed consent; Standing  -     SCD (sequential compression device)- to be placed on patient in Pre-op; Standing  -     Verify / Perform Chlorhexidine Skin Prep; Standing  -     Verify / Perform Chlorhexidine Skin Prep if Indicated (If Not Already Completed); Standing  -     sodium-potassium-magnesium sulfates (Suprep Bowel Prep Kit) 17.5-3.13-1.6 GM/177ML solution oral solution; Take 1 bottle by mouth Every 12 (Twelve) Hours.  Dispense: 1 bottle; Refill: 0      Yadira Burgos is a 64 y.o. female with need for screening colonoscopy.  Risk and benefits of been discussed with the patient and she will proceed.    Patient's Body mass index is 28.19 kg/m². BMI is above normal parameters. Recommendations include: educational material.

## 2020-11-13 ENCOUNTER — OFFICE VISIT (OUTPATIENT)
Dept: FAMILY MEDICINE CLINIC | Facility: CLINIC | Age: 65
End: 2020-11-13

## 2020-11-13 VITALS
HEART RATE: 62 BPM | DIASTOLIC BLOOD PRESSURE: 60 MMHG | TEMPERATURE: 97.1 F | WEIGHT: 180.8 LBS | SYSTOLIC BLOOD PRESSURE: 104 MMHG | BODY MASS INDEX: 28.38 KG/M2 | OXYGEN SATURATION: 97 % | HEIGHT: 67 IN

## 2020-11-13 DIAGNOSIS — H69.81 DYSFUNCTION OF EUSTACHIAN TUBE, RIGHT: Primary | ICD-10-CM

## 2020-11-13 DIAGNOSIS — I10 ESSENTIAL HYPERTENSION: ICD-10-CM

## 2020-11-13 DIAGNOSIS — C50.919 MALIGNANT NEOPLASM OF FEMALE BREAST, UNSPECIFIED ESTROGEN RECEPTOR STATUS, UNSPECIFIED LATERALITY, UNSPECIFIED SITE OF BREAST (HCC): ICD-10-CM

## 2020-11-13 PROCEDURE — 99214 OFFICE O/P EST MOD 30 MIN: CPT | Performed by: FAMILY MEDICINE

## 2020-11-13 RX ORDER — PSEUDOEPHEDRINE HCL 120 MG/1
120 TABLET, FILM COATED, EXTENDED RELEASE ORAL EVERY 12 HOURS
Qty: 60 TABLET | Refills: 1 | Status: SHIPPED | OUTPATIENT
Start: 2020-11-13 | End: 2021-03-09

## 2020-11-16 ENCOUNTER — EPISODE CHANGES (OUTPATIENT)
Dept: CASE MANAGEMENT | Facility: OTHER | Age: 65
End: 2020-11-16

## 2020-11-16 ENCOUNTER — PATIENT OUTREACH (OUTPATIENT)
Dept: CASE MANAGEMENT | Facility: OTHER | Age: 65
End: 2020-11-16
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
## 2020-12-02 ENCOUNTER — APPOINTMENT (OUTPATIENT)
Dept: CT IMAGING | Facility: HOSPITAL | Age: 65
End: 2020-12-02

## 2020-12-02 DIAGNOSIS — G47.09 OTHER INSOMNIA: ICD-10-CM

## 2020-12-05 RX ORDER — MIRTAZAPINE 15 MG/1
15 TABLET, FILM COATED ORAL NIGHTLY
Qty: 30 TABLET | Refills: 2 | OUTPATIENT
Start: 2020-12-05

## 2020-12-07 ENCOUNTER — LAB (OUTPATIENT)
Dept: LAB | Facility: HOSPITAL | Age: 65
End: 2020-12-07

## 2020-12-07 DIAGNOSIS — Z01.818 PREOP TESTING: ICD-10-CM

## 2020-12-07 PROCEDURE — U0004 COV-19 TEST NON-CDC HGH THRU: HCPCS

## 2020-12-07 PROCEDURE — C9803 HOPD COVID-19 SPEC COLLECT: HCPCS

## 2020-12-08 LAB — SARS-COV-2 RNA RESP QL NAA+PROBE: NOT DETECTED

## 2020-12-09 ENCOUNTER — HOSPITAL ENCOUNTER (OUTPATIENT)
Facility: HOSPITAL | Age: 65
Setting detail: HOSPITAL OUTPATIENT SURGERY
Discharge: HOME | End: 2020-12-09
Attending: SURGERY | Admitting: SURGERY

## 2020-12-09 ENCOUNTER — ANESTHESIA (OUTPATIENT)
Dept: PERIOP | Facility: HOSPITAL | Age: 65
End: 2020-12-09

## 2020-12-09 ENCOUNTER — ANESTHESIA EVENT (OUTPATIENT)
Dept: PERIOP | Facility: HOSPITAL | Age: 65
End: 2020-12-09

## 2020-12-09 VITALS
HEIGHT: 67 IN | OXYGEN SATURATION: 100 % | TEMPERATURE: 97.2 F | DIASTOLIC BLOOD PRESSURE: 63 MMHG | WEIGHT: 175 LBS | RESPIRATION RATE: 18 BRPM | SYSTOLIC BLOOD PRESSURE: 104 MMHG | BODY MASS INDEX: 27.47 KG/M2 | HEART RATE: 60 BPM

## 2020-12-09 DIAGNOSIS — Z12.11 ENCOUNTER FOR COLORECTAL CANCER SCREENING: ICD-10-CM

## 2020-12-09 DIAGNOSIS — Z12.12 ENCOUNTER FOR COLORECTAL CANCER SCREENING: ICD-10-CM

## 2020-12-09 PROCEDURE — S0260 H&P FOR SURGERY: HCPCS | Performed by: SURGERY

## 2020-12-09 PROCEDURE — 25010000002 PROPOFOL 10 MG/ML EMULSION: Performed by: NURSE ANESTHETIST, CERTIFIED REGISTERED

## 2020-12-09 PROCEDURE — 45380 COLONOSCOPY AND BIOPSY: CPT | Performed by: SURGERY

## 2020-12-09 PROCEDURE — 25010000002 MIDAZOLAM PER 1 MG: Performed by: NURSE ANESTHETIST, CERTIFIED REGISTERED

## 2020-12-09 RX ORDER — ONDANSETRON 2 MG/ML
4 INJECTION INTRAMUSCULAR; INTRAVENOUS AS NEEDED
Status: DISCONTINUED | OUTPATIENT
Start: 2020-12-09 | End: 2020-12-09 | Stop reason: HOSPADM

## 2020-12-09 RX ORDER — SODIUM CHLORIDE 0.9 % (FLUSH) 0.9 %
10 SYRINGE (ML) INJECTION AS NEEDED
Status: DISCONTINUED | OUTPATIENT
Start: 2020-12-09 | End: 2020-12-09 | Stop reason: HOSPADM

## 2020-12-09 RX ORDER — SODIUM CHLORIDE, SODIUM LACTATE, POTASSIUM CHLORIDE, CALCIUM CHLORIDE 600; 310; 30; 20 MG/100ML; MG/100ML; MG/100ML; MG/100ML
100 INJECTION, SOLUTION INTRAVENOUS ONCE AS NEEDED
Status: DISCONTINUED | OUTPATIENT
Start: 2020-12-09 | End: 2020-12-09 | Stop reason: HOSPADM

## 2020-12-09 RX ORDER — GLYCOPYRROLATE 0.2 MG/ML
INJECTION INTRAMUSCULAR; INTRAVENOUS AS NEEDED
Status: DISCONTINUED | OUTPATIENT
Start: 2020-12-09 | End: 2020-12-09 | Stop reason: SURG

## 2020-12-09 RX ORDER — MIDAZOLAM HYDROCHLORIDE 1 MG/ML
INJECTION INTRAMUSCULAR; INTRAVENOUS AS NEEDED
Status: DISCONTINUED | OUTPATIENT
Start: 2020-12-09 | End: 2020-12-09 | Stop reason: SURG

## 2020-12-09 RX ORDER — DROPERIDOL 2.5 MG/ML
0.62 INJECTION, SOLUTION INTRAMUSCULAR; INTRAVENOUS ONCE AS NEEDED
Status: DISCONTINUED | OUTPATIENT
Start: 2020-12-09 | End: 2020-12-09 | Stop reason: HOSPADM

## 2020-12-09 RX ORDER — SODIUM CHLORIDE, SODIUM LACTATE, POTASSIUM CHLORIDE, CALCIUM CHLORIDE 600; 310; 30; 20 MG/100ML; MG/100ML; MG/100ML; MG/100ML
125 INJECTION, SOLUTION INTRAVENOUS ONCE
Status: COMPLETED | OUTPATIENT
Start: 2020-12-09 | End: 2020-12-09

## 2020-12-09 RX ORDER — FENTANYL CITRATE 50 UG/ML
50 INJECTION, SOLUTION INTRAMUSCULAR; INTRAVENOUS
Status: DISCONTINUED | OUTPATIENT
Start: 2020-12-09 | End: 2020-12-09 | Stop reason: HOSPADM

## 2020-12-09 RX ORDER — PROPOFOL 10 MG/ML
VIAL (ML) INTRAVENOUS AS NEEDED
Status: DISCONTINUED | OUTPATIENT
Start: 2020-12-09 | End: 2020-12-09 | Stop reason: SURG

## 2020-12-09 RX ORDER — IPRATROPIUM BROMIDE AND ALBUTEROL SULFATE 2.5; .5 MG/3ML; MG/3ML
3 SOLUTION RESPIRATORY (INHALATION) ONCE AS NEEDED
Status: DISCONTINUED | OUTPATIENT
Start: 2020-12-09 | End: 2020-12-09 | Stop reason: HOSPADM

## 2020-12-09 RX ORDER — KETOROLAC TROMETHAMINE 30 MG/ML
30 INJECTION, SOLUTION INTRAMUSCULAR; INTRAVENOUS EVERY 6 HOURS PRN
Status: DISCONTINUED | OUTPATIENT
Start: 2020-12-09 | End: 2020-12-09 | Stop reason: HOSPADM

## 2020-12-09 RX ORDER — SODIUM CHLORIDE 0.9 % (FLUSH) 0.9 %
10 SYRINGE (ML) INJECTION EVERY 12 HOURS SCHEDULED
Status: DISCONTINUED | OUTPATIENT
Start: 2020-12-09 | End: 2020-12-09 | Stop reason: HOSPADM

## 2020-12-09 RX ORDER — MIDAZOLAM HYDROCHLORIDE 1 MG/ML
1 INJECTION INTRAMUSCULAR; INTRAVENOUS
Status: DISCONTINUED | OUTPATIENT
Start: 2020-12-09 | End: 2020-12-09 | Stop reason: HOSPADM

## 2020-12-09 RX ADMIN — PROPOFOL 100 MG: 10 INJECTION, EMULSION INTRAVENOUS at 09:47

## 2020-12-09 RX ADMIN — PROPOFOL 100 MG: 10 INJECTION, EMULSION INTRAVENOUS at 09:35

## 2020-12-09 RX ADMIN — PROPOFOL 100 MG: 10 INJECTION, EMULSION INTRAVENOUS at 09:41

## 2020-12-09 RX ADMIN — MIDAZOLAM HYDROCHLORIDE 2 MG: 1 INJECTION, SOLUTION INTRAMUSCULAR; INTRAVENOUS at 09:31

## 2020-12-09 RX ADMIN — GLYCOPYRROLATE 0.2 MG: 0.2 INJECTION, SOLUTION INTRAMUSCULAR; INTRAVENOUS at 09:41

## 2020-12-09 RX ADMIN — SODIUM CHLORIDE, POTASSIUM CHLORIDE, SODIUM LACTATE AND CALCIUM CHLORIDE: 600; 310; 30; 20 INJECTION, SOLUTION INTRAVENOUS at 09:31

## 2020-12-09 NOTE — H&P
Subjective      Yadira Burgos is a 64 y.o. female is being seen for consultation today at the request of Dilcia Pedro MD     Yadira Burgos is a 64 y.o. female With a history of breast cancer presenting for screening colonoscopy.  She has a history of polyps with colonoscopy 5 years ago.  The patient has no change in bowel habits or stool caliber but does have occasional fecal incontinence.  The patient has a cousin who  in her late 40s or early 50s of colon cancer.  No unintentional weight loss.        Medical History        Past Medical History:   Diagnosis Date   • Back pain     • Basal cell carcinoma (BCC) in situ of skin      Dr. Mohr - Derm   • Breast cancer (CMS/HCC)          • Cancer (CMS/HCC)       breast, cervical, melanoma   • Cervical cancer (CMS/HCC)      Late teens   • Elbow fracture     • Foot fracture     • Headache     • Hyperlipidemia     • Hypertension     • Melanoma (CMS/HCC)       46 years old   • Osteoarthritis     • Sinusitis                   Family History   Problem Relation Age of Onset   • Other Mother           blood clots   • Hypertension Mother     • Ulcers Father     • Hypertension Sister     • Other Sister           Multiple Sclerosis   • Cancer Paternal Aunt     • Stroke Maternal Grandmother     • Alcohol abuse Paternal Grandmother     • Hypertension Sister     • Multiple sclerosis Sister     • Hypertension Sister     • Coronary artery disease Paternal Grandfather           Social History   Social History            Socioeconomic History   • Marital status:        Spouse name: Not on file   • Number of children: Not on file   • Years of education: Not on file   • Highest education level: Not on file   Tobacco Use   • Smoking status: Never Smoker   • Smokeless tobacco: Never Used   Substance and Sexual Activity   • Alcohol use: Not Currently   • Drug use: Never   • Sexual activity: Not Currently       Birth control/protection: Post-menopausal     "       Surgical History         Past Surgical History:   Procedure Laterality Date   • BREAST SURGERY   2015     Secondary to cancer   • ELBOW FUSION         left   • FOOT SURGERY   2014     Broken foot   • HAND SURGERY   08/2020     right   • RHINOPLASTY   2002           Review of Systems   Constitutional: Negative for activity change, appetite change, chills and fever.   HENT: Negative for sore throat and trouble swallowing.    Eyes: Negative for visual disturbance.   Respiratory: Negative for cough and shortness of breath.    Cardiovascular: Negative for chest pain and palpitations.   Gastrointestinal: Negative for abdominal distention, abdominal pain, blood in stool, constipation, diarrhea, nausea and vomiting.   Endocrine: Negative for cold intolerance and heat intolerance.   Genitourinary: Negative for dysuria.   Musculoskeletal: Negative for joint swelling.   Skin: Negative for color change, rash and wound.   Allergic/Immunologic: Negative for immunocompromised state.   Neurological: Negative for dizziness, seizures, weakness and headaches.   Hematological: Negative for adenopathy. Does not bruise/bleed easily.   Psychiatric/Behavioral: Negative for agitation and confusion.            /75   Pulse 65   Ht 170.2 cm (67.01\")   Wt 81.6 kg (180 lb)   BMI 28.19 kg/m²      Objective      Physical Exam  Constitutional:       Appearance: She is well-developed.   HENT:      Head: Normocephalic and atraumatic.   Eyes:      Conjunctiva/sclera: Conjunctivae normal.      Pupils: Pupils are equal, round, and reactive to light.   Neck:      Musculoskeletal: Neck supple.      Thyroid: No thyromegaly.      Vascular: No JVD.      Trachea: No tracheal deviation.   Cardiovascular:      Rate and Rhythm: Normal rate and regular rhythm.      Heart sounds: No murmur. No friction rub. No gallop.    Pulmonary:      Effort: Pulmonary effort is normal.      Breath sounds: Normal breath sounds.   Abdominal:      General: There " is no distension.      Palpations: Abdomen is soft. There is no hepatomegaly or splenomegaly.      Tenderness: There is no abdominal tenderness.      Hernia: No hernia is present.   Musculoskeletal: Normal range of motion.         General: No deformity.   Skin:     General: Skin is warm and dry.   Neurological:      Mental Status: She is alert and oriented to person, place, and time.                      Assessment      Diagnoses and all orders for this visit:     1. Encounter for colorectal cancer screening (Primary)  -     Case Request; Standing  -     Case Request     Other orders  -     Follow anesthesia standing orders.  -     Provide NPO Instructions to Patient; Future  -     Chlorhexidine Skin Prep; Future  -     Follow anesthesia standing orders.; Standing  -     Verify NPO Status; Standing  -     Obtain informed consent; Standing  -     SCD (sequential compression device)- to be placed on patient in Pre-op; Standing  -     Verify / Perform Chlorhexidine Skin Prep; Standing  -     Verify / Perform Chlorhexidine Skin Prep if Indicated (If Not Already Completed); Standing  -     sodium-potassium-magnesium sulfates (Suprep Bowel Prep Kit) 17.5-3.13-1.6 GM/177ML solution oral solution; Take 1 bottle by mouth Every 12 (Twelve) Hours.  Dispense: 1 bottle; Refill: 0        Yadira Burgos is a 64 y.o. female with need for screening colonoscopy.  Risk and benefits of been discussed with the patient and she will proceed.     Patient's Body mass index is 28.19 kg/m². BMI is above normal parameters. Recommendations include: educational material.

## 2020-12-09 NOTE — ANESTHESIA PREPROCEDURE EVALUATION
Anesthesia Evaluation     no history of anesthetic complications:  NPO Solid Status: > 8 hours  NPO Liquid Status: > 8 hours           Airway   Mallampati: II  TM distance: >3 FB  Neck ROM: full  No difficulty expected  Dental    (+) partials    Pulmonary - normal exam   Cardiovascular - normal exam    (+) hypertension, hyperlipidemia,       Neuro/Psych  (+) headaches,     GI/Hepatic/Renal/Endo      Musculoskeletal     (+) back pain,   Abdominal  - normal exam   Substance History      OB/GYN          Other                        Anesthesia Plan    ASA 2     general     intravenous induction     Anesthetic plan, all risks, benefits, and alternatives have been provided, discussed and informed consent has been obtained with: patient.

## 2020-12-09 NOTE — ANESTHESIA POSTPROCEDURE EVALUATION
Patient: Yadira Burgos    Procedure Summary     Date: 12/09/20 Room / Location: [facility] OR [address] [place] [address] COR OR    Anesthesia Start: 0931 Anesthesia Stop: 0952    Procedure: COLONOSCOPY (N/A ) Diagnosis:       Encounter for colorectal cancer screening      (Encounter for colorectal cancer screening [Z12.11, Z12.12])    Surgeon: Mohsen Maldonado MD Provider: Riccardo King MD    Anesthesia Type: general ASA Status: 2          Anesthesia Type: general    Vitals  Vitals Value Taken Time   /64 12/09/20 1008   Temp 97.2 °F (36.2 °C) 12/09/20 0953   Pulse 71 12/09/20 1008   Resp 18 12/09/20 1008   SpO2 97 % 12/09/20 1008           Post Anesthesia Care and Evaluation    Patient location during evaluation: PHASE II  Patient participation: complete - patient participated  Level of consciousness: awake and alert  Pain score: 0  Pain management: adequate  Airway patency: patent  Anesthetic complications: No anesthetic complications    Cardiovascular status: acceptable  Respiratory status: acceptable  Hydration status: acceptable

## 2020-12-09 NOTE — OP NOTE
COLONOSCOPY  Procedure Note    Yadira Burgos  12/9/2020    Pre-op Diagnosis:   Encounter for colorectal cancer screening [Z12.11, Z12.12]    Post-op Diagnosis:   Multiple hyperplastic rectal polyp    Indications: Colon cancer screening    Procedure(s):  COLONOSCOPY WITH BIOPSY    Anesthesia: Choice    Staff:   Rajat: Maria E Carreon RN; Dee Aguilar RN  Endo Technician: Catrachito Soto    Findings: 8 hyperplastic appearing polyps of the distal rectum, diverticulosis moderately throughout the sigmoid colon    Operative Procedure: The patient was taken to the operating suite and placed in left lateral decubitus position.  Bilateral sequential compression devices were in place and IV anesthesia was administered.  Timeout procedure was performed.  Digital rectal examination was negative.  The colonoscope was inserted and advanced to the cecum as evidenced by the ileocecal valve and appendiceal orifice.  The bowel prep was excellent.  The colonoscope was slowly removed and the entirety of the colonic mucosa was evaluated.  Colonoscopic findings included colon polyps and diverticular disease.  There was moderate to severe sigmoid colonic diverticulosis without diverticulitis.  In the distal rectum there were 8 small hyperplastic polyps but no polyps were noted throughout the remainder of the colon.  Biopsy forceps were used to remove all of the small hyperplastic polyps..  The colonoscope was then removed and the patient was awakened from anesthesia and taken recovery.  They tolerated the procedure well.      Estimated Blood Loss: minimal    Specimens: Rectal polyp                  Drains: none    Grafts or Implants: none    Complications: None    Recommendations: screening colonoscopy in 2 years pending pathology    Mohsen Maldonado MD     Date: 12/9/2020  Time: 10:04 EST

## 2020-12-10 LAB
LAB AP CASE REPORT: NORMAL
PATH REPORT.FINAL DX SPEC: NORMAL

## 2020-12-14 ENCOUNTER — TELEPHONE (OUTPATIENT)
Dept: FAMILY MEDICINE CLINIC | Facility: CLINIC | Age: 65
End: 2020-12-14

## 2020-12-14 NOTE — TELEPHONE ENCOUNTER
----- Message from Dilcia Pedro MD sent at 12/13/2020 11:49 PM EST -----  This was scanned under the wrong person (actually, they made a new chart for her). It's for Yadira Thorne. Please merge charts. Thanks.

## 2020-12-15 ENCOUNTER — HOSPITAL ENCOUNTER (OUTPATIENT)
Dept: CT IMAGING | Facility: HOSPITAL | Age: 65
Discharge: HOME | End: 2020-12-15

## 2020-12-15 DIAGNOSIS — Z17.0 MALIGNANT NEOPLASM OF RIGHT BREAST IN FEMALE, ESTROGEN RECEPTOR POSITIVE, UNSPECIFIED SITE OF BREAST (HCC): ICD-10-CM

## 2020-12-15 DIAGNOSIS — R79.89 ELEVATED LFTS: ICD-10-CM

## 2020-12-15 DIAGNOSIS — C50.911 MALIGNANT NEOPLASM OF RIGHT BREAST IN FEMALE, ESTROGEN RECEPTOR POSITIVE, UNSPECIFIED SITE OF BREAST (HCC): ICD-10-CM

## 2020-12-15 LAB — CREAT BLDA-MCNC: 0.7 MG/DL (ref 0.6–1.3)

## 2020-12-15 PROCEDURE — 71260 CT THORAX DX C+: CPT | Performed by: RADIOLOGY

## 2020-12-15 PROCEDURE — 25010000002 IOPAMIDOL 61 % SOLUTION: Performed by: INTERNAL MEDICINE

## 2020-12-15 PROCEDURE — 74177 CT ABD & PELVIS W/CONTRAST: CPT

## 2020-12-15 PROCEDURE — 82565 ASSAY OF CREATININE: CPT

## 2020-12-15 PROCEDURE — 71260 CT THORAX DX C+: CPT

## 2020-12-15 PROCEDURE — 74177 CT ABD & PELVIS W/CONTRAST: CPT | Performed by: RADIOLOGY

## 2020-12-15 RX ADMIN — IOPAMIDOL 100 ML: 612 INJECTION, SOLUTION INTRAVENOUS at 14:33

## 2020-12-21 NOTE — PROGRESS NOTES
"Yadira Burgos     VITALS: Blood pressure 104/60, pulse 62, temperature 97.1 °F (36.2 °C), temperature source Temporal, height 170.2 cm (67.01\"), weight 82 kg (180 lb 12.8 oz), SpO2 97 %, not currently breastfeeding.    Subjective  Chief Complaint:   Chief Complaint   Patient presents with   • Results        History of Present Illness:  Patient is a 65 y.o.  female with medical conditions significant for hypertension and hyperlipidemia who presents to clinic secondary to medical followup.  Patient is complaining of a 2-week history of right ear pain.  She denies any fevers or chills.  She denies any sinus congestion, rhinorrhea, coughing, shortness of breath.  No one else at home with similar symptoms.  She has not tried anything over-the-counter.    Patient has chronic conditions including hypertension and breast cancer.  These chronic conditions are stable and unchanged.  Medications associated with these chronic conditions are not giving her any side effects.    No complaints about any of the medications.    The following portions of the patient's history were reviewed and updated as appropriate: allergies, current medications, past family history, past medical history, past social history, past surgical history and problem list.    Past Medical History  Past Medical History:   Diagnosis Date   • Back pain    • Basal cell carcinoma (BCC) in situ of skin 2020    Dr. Mohr - Derm   • Breast cancer (CMS/[facility])     2015   • Cancer (CMS/HCC)     breast, cervical, melanoma   • Cervical cancer (CMS/HCC) 1976    Late teens   • Elbow fracture    • Elevated cholesterol    • Foot fracture    • Headache    • Hyperlipidemia    • Hypertension    • Melanoma (CMS/HCC)     46 years old   • Sinusitis        Review of Systems   Respiratory: Negative for shortness of breath and wheezing.    Cardiovascular: Negative for chest pain and palpitations.       Surgical History  Past Surgical History:   Procedure Laterality Date   • BREAST " SURGERY  2015    Secondary to cancer   • COLONOSCOPY N/A 12/9/2020    Procedure: COLONOSCOPY;  Surgeon: Mohsen Maldonado MD;  Location: [facility];  Service: Gastroenterology;  Laterality: N/A;   • ELBOW FUSION      left   • FOOT SURGERY  2014    Broken foot   • HAND SURGERY  08/2020    right   • RHINOPLASTY  2002       Family History  Family History   Problem Relation Age of Onset   • Other Mother         blood clots   • Hypertension Mother    • Ulcers Father    • Hypertension Sister    • Other Sister         Multiple Sclerosis   • Cancer Paternal Aunt    • Stroke Maternal Grandmother    • Alcohol abuse Paternal Grandmother    • Hypertension Sister    • Multiple sclerosis Sister    • Hypertension Sister    • Coronary artery disease Paternal Grandfather        Social History  Social History     Socioeconomic History   • Marital status:      Spouse name: Not on file   • Number of children: Not on file   • Years of education: Not on file   • Highest education level: Not on file   Tobacco Use   • Smoking status: Never Smoker   • Smokeless tobacco: Never Used   Substance and Sexual Activity   • Alcohol use: Not Currently   • Drug use: Never   • Sexual activity: Not Currently     Birth control/protection: Post-menopausal       Objective  Physical Exam    Gen: Patient in NAD. Pleasant and answers appropriately. A&Ox3.    Skin: Warm and dry with normal turgor. No purpura, rashes, or unusual pigmentation noted. Hair is normal in appearance and distribution.    HEENT: NC/AT. No lesions noted. Conjunctiva clear, sclera nonicteric. PERRL. EOMI without nystagmus or strabismus. Fundi appear benign. No hemorrhages or exudates of eyes. Auditory canals are patent bilaterally without lesions.  Left TM intact,  nonerythematous, nonbulging without lesions.  Right TM intact, nonerythematous, slightly bulging without lesions.  Nasal mucosa erythematous, and nonedematous. Frontal and maxillary sinuses are nontender. O/P  erythematous and moist without exudate.    Neck: Supple without lymph nodes palpated. FROM.     Lungs: Slightly decreased B/L without rales, rhonchi, crackles, or wheezes.    Heart: RRR. S1 and S2 normal. No S3 or S4. No MRGT.    Abd: Soft, nontender,nondistended. (+)BSx4 quadrants.     Extrem: No CC.  Trace edema bilateral lower extremities.  Radial pulses 2+/4 and equal B/L. FROMx4. No bone, joint, or muscle tenderness noted.    Neuro: No focal motor/sensory deficits.    Procedures    Assessment/Plan  Yadira Burgos is a 65 y.o. here for medical followup.    Diagnoses and all orders for this visit:    1. Dysfunction of Eustachian tube, right (Primary)  -     pseudoephedrine (SUDAFED) 120 MG 12 hr tablet; Take 1 tablet by mouth Every 12 (Twelve) Hours.  Dispense: 60 tablet; Refill: 1  We will start patient on Sudafed 120 mg orally twice a day.    2. Malignant neoplasm of female breast, unspecified estrogen receptor status, unspecified laterality, unspecified site of breast (CMS/HCC)  -     Ambulatory Referral to Case Management Gaps in Care    3. Essential hypertension  Within normal limits today.  Patient currently on atenolol 50 mg orally twice a day.      Patient's Body mass index is 28.31 kg/m². BMI is above normal parameters. Recommendations include: exercise counseling and nutrition counseling.     Findings and plans discussed with patient who verbalizes understanding and agreement. Will followup with patient once results are in. Patient to followup at clinic PRN or in three months for further medical followup.    Dilcia Pedro MD    EMR Dragon/Transcription Disclaimer:  Much of this encounter note is an electronic transcription/translation of spoken language to printed text.  The electronic translation of spoken language may permit erroneous, or at times, nonsensical words or phrases to be inadvertently transcribed.  Although I have reviewed the note for such errors, some may still exist.

## 2021-01-06 ENCOUNTER — OFFICE VISIT (OUTPATIENT)
Dept: SURGERY | Facility: CLINIC | Age: 66
End: 2021-01-06

## 2021-01-06 VITALS — WEIGHT: 175 LBS | HEIGHT: 67 IN | BODY MASS INDEX: 27.47 KG/M2

## 2021-01-06 DIAGNOSIS — Z12.11 ENCOUNTER FOR COLORECTAL CANCER SCREENING: Primary | ICD-10-CM

## 2021-01-06 DIAGNOSIS — Z12.12 ENCOUNTER FOR COLORECTAL CANCER SCREENING: Primary | ICD-10-CM

## 2021-01-06 PROCEDURE — 99212 OFFICE O/P EST SF 10 MIN: CPT | Performed by: SURGERY

## 2021-01-07 NOTE — PROGRESS NOTES
Subjective   Yadira Burgos is a 65 y.o. female  is here today for follow-up.         Yadira Burgos is a 65 y.o. female here for follow up after colonoscopy.  Patient with 8 hyperplastic polyps.  No other findings.  No complaints.  Exam benign.        Assessment     Diagnoses and all orders for this visit:    1. Encounter for colorectal cancer screening (Primary)      Yadira Burgos is a 65 y.o. female with multiple hyperplastic polyps.  Due to number of polyps she will need 5 year surveillance colonoscopy.

## 2021-01-13 ENCOUNTER — HOSPITAL ENCOUNTER (OUTPATIENT)
Dept: MAMMOGRAPHY | Facility: HOSPITAL | Age: 66
Discharge: HOME | End: 2021-01-13
Admitting: INTERNAL MEDICINE

## 2021-01-13 DIAGNOSIS — C50.911 MALIGNANT NEOPLASM OF RIGHT BREAST IN FEMALE, ESTROGEN RECEPTOR POSITIVE, UNSPECIFIED SITE OF BREAST (HCC): ICD-10-CM

## 2021-01-13 DIAGNOSIS — Z17.0 MALIGNANT NEOPLASM OF RIGHT BREAST IN FEMALE, ESTROGEN RECEPTOR POSITIVE, UNSPECIFIED SITE OF BREAST (HCC): ICD-10-CM

## 2021-01-13 PROCEDURE — 77062 BREAST TOMOSYNTHESIS BI: CPT | Performed by: RADIOLOGY

## 2021-01-13 PROCEDURE — 77066 DX MAMMO INCL CAD BI: CPT | Performed by: RADIOLOGY

## 2021-01-13 PROCEDURE — 77066 DX MAMMO INCL CAD BI: CPT

## 2021-01-13 PROCEDURE — G0279 TOMOSYNTHESIS, MAMMO: HCPCS

## 2021-01-15 ENCOUNTER — LAB (OUTPATIENT)
Dept: ONCOLOGY | Facility: CLINIC | Age: 66
End: 2021-01-15

## 2021-01-15 VITALS
DIASTOLIC BLOOD PRESSURE: 54 MMHG | RESPIRATION RATE: 18 BRPM | SYSTOLIC BLOOD PRESSURE: 104 MMHG | TEMPERATURE: 97.1 F | HEART RATE: 60 BPM | OXYGEN SATURATION: 98 %

## 2021-01-15 DIAGNOSIS — Z17.0 MALIGNANT NEOPLASM OF RIGHT BREAST IN FEMALE, ESTROGEN RECEPTOR POSITIVE, UNSPECIFIED SITE OF BREAST (HCC): Primary | ICD-10-CM

## 2021-01-15 DIAGNOSIS — C50.911 MALIGNANT NEOPLASM OF RIGHT BREAST IN FEMALE, ESTROGEN RECEPTOR POSITIVE, UNSPECIFIED SITE OF BREAST (HCC): Primary | ICD-10-CM

## 2021-01-15 LAB
ALBUMIN SERPL-MCNC: 4.52 G/DL (ref 3.5–5.2)
ALBUMIN/GLOB SERPL: 1.7 G/DL
ALP SERPL-CCNC: 97 U/L (ref 39–117)
ALT SERPL W P-5'-P-CCNC: 58 U/L (ref 1–33)
ANION GAP SERPL CALCULATED.3IONS-SCNC: 10.4 MMOL/L (ref 5–15)
AST SERPL-CCNC: 39 U/L (ref 1–32)
BASOPHILS # BLD AUTO: 0.04 10*3/MM3 (ref 0–0.2)
BASOPHILS NFR BLD AUTO: 0.7 % (ref 0–1.5)
BILIRUB SERPL-MCNC: 0.9 MG/DL (ref 0–1.2)
BUN SERPL-MCNC: 14 MG/DL (ref 8–23)
BUN/CREAT SERPL: 19.4 (ref 7–25)
CALCIUM SPEC-SCNC: 10.1 MG/DL (ref 8.6–10.5)
CHLORIDE SERPL-SCNC: 99 MMOL/L (ref 98–107)
CO2 SERPL-SCNC: 26.6 MMOL/L (ref 22–29)
CREAT SERPL-MCNC: 0.72 MG/DL (ref 0.57–1)
DEPRECATED RDW RBC AUTO: 44.2 FL (ref 37–54)
EOSINOPHIL # BLD AUTO: 0.11 10*3/MM3 (ref 0–0.4)
EOSINOPHIL NFR BLD AUTO: 1.9 % (ref 0.3–6.2)
ERYTHROCYTE [DISTWIDTH] IN BLOOD BY AUTOMATED COUNT: 13 % (ref 12.3–15.4)
GFR SERPL CREATININE-BSD FRML MDRD: 82 ML/MIN/1.73
GLOBULIN UR ELPH-MCNC: 2.7 GM/DL
GLUCOSE SERPL-MCNC: 98 MG/DL (ref 65–99)
HCT VFR BLD AUTO: 47.2 % (ref 34–46.6)
HGB BLD-MCNC: 14.9 G/DL (ref 12–15.9)
IMM GRANULOCYTES # BLD AUTO: 0.01 10*3/MM3 (ref 0–0.05)
IMM GRANULOCYTES NFR BLD AUTO: 0.2 % (ref 0–0.5)
LYMPHOCYTES # BLD AUTO: 2.18 10*3/MM3 (ref 0.7–3.1)
LYMPHOCYTES NFR BLD AUTO: 38.1 % (ref 19.6–45.3)
MCH RBC QN AUTO: 29.4 PG (ref 26.6–33)
MCHC RBC AUTO-ENTMCNC: 31.6 G/DL (ref 31.5–35.7)
MCV RBC AUTO: 93.1 FL (ref 79–97)
MONOCYTES # BLD AUTO: 0.53 10*3/MM3 (ref 0.1–0.9)
MONOCYTES NFR BLD AUTO: 9.3 % (ref 5–12)
NEUTROPHILS NFR BLD AUTO: 2.85 10*3/MM3 (ref 1.7–7)
NEUTROPHILS NFR BLD AUTO: 49.8 % (ref 42.7–76)
NRBC BLD AUTO-RTO: 0 /100 WBC (ref 0–0.2)
PLATELET # BLD AUTO: 193 10*3/MM3 (ref 140–450)
PMV BLD AUTO: 10.4 FL (ref 6–12)
POTASSIUM SERPL-SCNC: 4.1 MMOL/L (ref 3.5–5.2)
PROT SERPL-MCNC: 7.2 G/DL (ref 6–8.5)
RBC # BLD AUTO: 5.07 10*6/MM3 (ref 3.77–5.28)
SODIUM SERPL-SCNC: 136 MMOL/L (ref 136–145)
WBC # BLD AUTO: 5.72 10*3/MM3 (ref 3.4–10.8)

## 2021-01-15 PROCEDURE — 36415 COLL VENOUS BLD VENIPUNCTURE: CPT

## 2021-01-15 PROCEDURE — 80053 COMPREHEN METABOLIC PANEL: CPT | Performed by: INTERNAL MEDICINE

## 2021-01-15 PROCEDURE — 85025 COMPLETE CBC W/AUTO DIFF WBC: CPT | Performed by: INTERNAL MEDICINE

## 2021-01-18 ENCOUNTER — OFFICE VISIT (OUTPATIENT)
Dept: ONCOLOGY | Facility: CLINIC | Age: 66
End: 2021-01-18

## 2021-01-18 VITALS
DIASTOLIC BLOOD PRESSURE: 62 MMHG | RESPIRATION RATE: 18 BRPM | TEMPERATURE: 97.3 F | WEIGHT: 177.4 LBS | SYSTOLIC BLOOD PRESSURE: 97 MMHG | OXYGEN SATURATION: 91 % | HEART RATE: 59 BPM | BODY MASS INDEX: 27.78 KG/M2

## 2021-01-18 DIAGNOSIS — C50.911 MALIGNANT NEOPLASM OF RIGHT BREAST IN FEMALE, ESTROGEN RECEPTOR POSITIVE, UNSPECIFIED SITE OF BREAST (HCC): Primary | ICD-10-CM

## 2021-01-18 DIAGNOSIS — Z85.820 HISTORY OF MALIGNANT MELANOMA OF SKIN: ICD-10-CM

## 2021-01-18 DIAGNOSIS — R79.89 ELEVATED LFTS: ICD-10-CM

## 2021-01-18 DIAGNOSIS — Z17.0 MALIGNANT NEOPLASM OF RIGHT BREAST IN FEMALE, ESTROGEN RECEPTOR POSITIVE, UNSPECIFIED SITE OF BREAST (HCC): Primary | ICD-10-CM

## 2021-01-18 DIAGNOSIS — Z85.41 HISTORY OF CERVICAL CANCER: ICD-10-CM

## 2021-01-18 PROCEDURE — 99214 OFFICE O/P EST MOD 30 MIN: CPT | Performed by: INTERNAL MEDICINE

## 2021-01-18 NOTE — PROGRESS NOTES
"NAME: Yadira Burgos    : 1955    DATE:  2021    DIAGNOSIS:   1.  Stage IA ([id number]) moderately differentiated invasive ductal carcinoma of the right breast diagnosed in 2015.    2.  H/o malignant Melanoma on her Back    3.  H/o cervical cancer treated with cryotherapy at [facility] in     CHIEF COMPLAINT:  Follow up of breast cancer and recent imaging    HISTORY OF PRESENT ILLNESS:   Yadira Burgos is a very pleasant 65 y.o. female who was referred by Dr. Dilcia Pedro for evaluation and treatment of breast cancer. She was living in [place] in 2015 when her dog brought attention to and \"found\" an abnormal lump in her right breast.  She was treated by Dr. Steve Isbell and underwent R lumpectomy with SLNBx on 10-22-15 as below.  She then had what sounds like accelerated partial breast irradiation.  She was then started on Arimidex which was later switched to Exemestane for a better side effect profile.  She took this for about 5 years, stopping a couple of weeks ago.  She stopped taking Exemestane because she developed some vaginal bleeding and hematuria.  This was evaluated by her PCP and gynecologist and felt to be due to vaginal atrophy.  She was prescribed a hormone pill which she hasn't yet started and was referred here to discuss things further.      Aside from bleeding which has been uncomfortable and distressing for her, Ms. Onur Burgos has generally been well.  The last year has been hard on her emotionally with the loss of her  and then her dog, her home and her insurance, but she is coping well and has good family support in [place].  She says she gained weight with her breast cancer treatment and gained weight when her  .  She has been working now to lose weight and has lost 20 lbs over the last 2 months with dieting.  She denies chest pain or shortness of breath.  She complains of some RLQ cramping pain and isn't sure what is causing that.  She " denies difficulty moving her bowels.  No blood in her stool that she is aware of.  She has had vaginal bleeding and hematuria as above.        INTERVAL HISTORY:  Ms. Burgos is here today for follow up of breast cancer.  Since her last visit, she has been well.  She continues to f/u closely with Dermatology for q 3 monthly exams and says she had a non-melanoma skin cancer removed from her R leg at her last visit.  She had Mammogram and CT imaging and is anxious to discuss the results.  She decided to put off the DEXA scan because there were too many tests and she was missing work too much.  She is working to lose weight and has been doing a 21day fruit/vegetable diet and has lost 24 lbs.  She had colonoscopy with discovery of 8 small hyperplastic rectal polyps.       PAST MEDICAL HISTORY:  Past Medical History:   Diagnosis Date   • Back pain    • Basal cell carcinoma (BCC) in situ of skin 2020    Dr. Mohr - Derm   • Breast cancer (CMS/HCC)     2015   • Cancer (CMS/HCC)     breast, cervical, melanoma   • Cervical cancer (CMS/HCC) 1976    Diagnosed in 1975.  Treated by repeated local intervention and ultimately received cryotherapy at [facility] with resolution.   • Elbow fracture    • Elevated cholesterol    • Foot fracture    • Headache    • Hyperlipidemia    • Hypertension    • Melanoma (CMS/HCC)     on her back 2004 - treated by Dermatologist Catrachito Amaro in [place], [place] with what sounds like wide local excision.    • Sinusitis        PAST SURGICAL HISTORY:  Past Surgical History:   Procedure Laterality Date   • BREAST LUMPECTOMY Right 2015   • BREAST SURGERY  2015    Secondary to cancer   • COLONOSCOPY N/A 12/9/2020    Procedure: COLONOSCOPY;  Surgeon: Mohsen Maldonado MD;  Location: [facility];  Service: Gastroenterology;  Laterality: N/A;   • ELBOW FUSION      left   • FOOT SURGERY  2014    Broken foot   • HAND SURGERY  08/2020    right   • RHINOPLASTY  2002   • SKIN CANCER EXCISION      malignant melanoma from  back        FAMILY HISTORY:  Family History   Problem Relation Age of Onset   • Other Mother         blood clots   • Hypertension Mother    • Ulcers Father    • Hypertension Sister    • Other Sister         Multiple Sclerosis   • Cancer Paternal Aunt    • Stroke Maternal Grandmother    • Alcohol abuse Paternal Grandmother    • Hypertension Sister    • Multiple sclerosis Sister    • Hypertension Sister    • Coronary artery disease Paternal Grandfather    • Cancer Maternal Aunt 30        colon   • Breast cancer Neg Hx    Many paternal aunts  with malignancy of various types. Many cousins on that side of the family have had cancer as well.  One  of colon cancer in her 30s.  One had a brain tumor.    SOCIAL HISTORY:  Social History     Socioeconomic History   • Marital status:      Spouse name: Not on file   • Number of children: Not on file   • Years of education: Not on file   • Highest education level: Not on file   Tobacco Use   • Smoking status: Never Smoker   • Smokeless tobacco: Never Used   Substance and Sexual Activity   • Alcohol use: Not Currently   • Drug use: Never   • Sexual activity: Not Currently     Birth control/protection: Post-menopausal   Social History Narrative    Lost her  to pancreatic cancer in 2019.  After this she lost her home, lost her insurance, her dog  and so she returned to [place] where her mother and 3 sisters live.         REVIEW OF SYSTEMS:   A comprehensive 14 point review of systems was performed.  Significant findings as mentioned above.  All other systems reviewed and are negative.      MEDICATIONS:  The current medication list was reviewed in the EMR    Current Outpatient Medications:   •  atenolol (TENORMIN) 50 MG tablet, Take 1 tablet by mouth 2 (two) times a day., Disp: 180 tablet, Rfl: 1  •  atorvastatin (LIPITOR) 80 MG tablet, Take 1 tablet by mouth Every Night., Disp: 90 tablet, Rfl: 1  •  gabapentin (NEURONTIN) 100 MG capsule, Take 100 mg by  mouth Daily As Needed., Disp: , Rfl:   •  pseudoephedrine (SUDAFED) 120 MG 12 hr tablet, Take 1 tablet by mouth Every 12 (Twelve) Hours., Disp: 60 tablet, Rfl: 1  •  sodium-potassium-magnesium sulfates (Suprep Bowel Prep Kit) 17.5-3.13-1.6 GM/177ML solution oral solution, Take 1 bottle by mouth Every 12 (Twelve) Hours., Disp: 1 bottle, Rfl: 0    ALLERGIES:  No Known Allergies    PHYSICAL EXAM:  Vitals:    01/18/21 1601   BP: 97/62   Pulse: 59   Resp: 18   Temp: 97.3 °F (36.3 °C)   SpO2: 91%     Pain Score    01/18/21 1601   PainSc: 0-No pain     ECOG score: 0     General:  Awake, alert and oriented, in no distress  HEENT:  Pupils are equal, round and reactive to light and accommodation, Extra-ocular movements full, Oropharyx clear, mucous membranes moist  Neck:  No JVD, thyromegaly or lymphadenopathy  CV:  Regular rate and rhythm, no murmurs, rubs or gallops  Resp:  Lungs are clear to auscultation bilaterally, no wheezes  Breast:  S/p R lumpectomy. There is a somewhat firm area at the site of surgery but there are no palpable masses.  No R axillary LAD.  Left breast is without mass lesions.  No L axillary adenopathy.  Abd:  Soft, non-tender, non-distended, bowel sounds present, no organomegaly or masses  Ext:  No clubbing, cyanosis or edema  Lymph:  No cervical, supraclavicular, axillary, inguinal or femoral adenopathy  Neuro:  MS as above, CN II-XII intact, grossly non-focal exam          PATHOLOGY:  10-22-15            ENDOSCOPY:  Colonoscopy 12-09-20     Findings: 8 hyperplastic appearing polyps of the distal rectum, diverticulosis moderately throughout the sigmoid colon      IMAGING:  CTCAP 12-15-20  On the lung windows there are several calcified  granulomas in the lungs. No noncalcified pulmonary parenchymal lung  nodules were identified. On the soft tissue windows there were no  enlarged lymph nodes in the supraclavicular or axillary regions. No  mediastinal or hilar lymphadenopathy was seen. The heart was  not  enlarged. There were no pericardial effusions.     IMPRESSION:  No CT findings of metastatic disease in the chest. There are  calcified granulomas in the lungs.     CT FINDINGS: The liver and spleen were normal in size and shape. The  gallbladder contains several stones. The wall was not thickened. The  bile ducts in the liver are not dilated. There is nothing seen to  suggest metastatic disease in the liver. The pancreas shows no evidence  of mass or inflammation. No adrenal or renal lesions are demonstrated.  The aorta is normal in caliber. There were no enlarged lymph nodes in  the retroperitoneum or in the mesentery. The bowel shows no evidence of  obstruction. In the pelvis there were no masses or fluid collections.  There were no ventral hernias.     IMPRESSION:  No CT findings of metastatic disease in the abdomen or  pelvis. This study does demonstrate several stones in the lumen of the  gallbladder.         Bilateral diagnostic mammogram 01-31-21  FINDINGS: There are scattered areas of fibroglandular density.     The bilateral fibroglandular pattern does not appear significantly  changed compared to the exam from 2020. This includes postoperative  changes in the right 12:00 region. Mild skin thickening is noted  involving the right breast which is likely treatment related. A few  bilateral scattered calcifications are noted.     IMPRESSION:  Incomplete examination as comparison with older outside  prior mammograms is needed.        BI-RADS CATEGORY:   0, INCOMPLETE:  Need prior mammograms for comparison        RECOMMENDATION:  We will attempt to obtain older outside prior  mammograms for comparison.    RECENT LABS:  Lab Results   Component Value Date    WBC 5.72 01/15/2021    HGB 14.9 01/15/2021    HCT 47.2 (H) 01/15/2021    MCV 93.1 01/15/2021    RDW 13.0 01/15/2021     01/15/2021    NEUTRORELPCT 49.8 01/15/2021    LYMPHORELPCT 38.1 01/15/2021    MONORELPCT 9.3 01/15/2021    EOSRELPCT 1.9  01/15/2021    BASORELPCT 0.7 01/15/2021    NEUTROABS 2.85 01/15/2021    LYMPHSABS 2.18 01/15/2021       Lab Results   Component Value Date     01/15/2021    K 4.1 01/15/2021    CO2 26.6 01/15/2021    CL 99 01/15/2021    BUN 14 01/15/2021    CREATININE 0.72 01/15/2021    EGFRIFNONA 82 01/15/2021    GLUCOSE 98 01/15/2021    CALCIUM 10.1 01/15/2021    ALKPHOS 97 01/15/2021    AST 39 (H) 01/15/2021    ALT 58 (H) 01/15/2021    BILITOT 0.9 01/15/2021    ALBUMIN 4.52 01/15/2021    PROTEINTOT 7.2 01/15/2021    MG 2.1 09/16/2020     Lab Results   Component Value Date    TSH 1.900 09/16/2020     Lab Results   Component Value Date    [id number] 09/16/2020         ASSESSMENT & PLAN:  Yadira Burgos is a very pleasant 65 y.o. female with a history of cervical cancer, breast cancer and malignant melanoma.    1.  History of Breast Cancer:  -  Ms. Onur Burgos had Stage IA ([id number]) moderately differentiated invasive ductal carcinoma of the right breast diagnosed in October 2015.  -  She underwent R lympectomy and SLNBx performed by Dr. Steve Isbell 10-22-15 and then received adjuvant radiation in [place].  -  After radiation, she was started on Arimidex which was then switched to Exemestane.  She took Exemestane for almost 5 years. Unfortunately, she developed vaginal bleeding and hematuria related to vaginal atrophy related to hormonal blockade.  Given this, stopped Exemestane.  She is doing much better in this regard since stopping hormonal therapy.  -  She had mammogram done which showed no obvious concern, but the breast radiologist is getting older films for comparison prior to formal reading.  -  Exam today is benign.  -  Will repeat mammogram after 1 year.    2.  Vaginal Bleeding / Hematuria related to atrophy from hormonal blockade:  -  I suggested that Ms. Onur Burgos discontinue Exemestane and she has done much better since stopping.  . -  Suggested trial of Hyalogyn or Replens, but she hasn't needed to  use it yet as her symptoms improved.    3.  H/o Colon polyps:  -  Dr. Maldonado performed c-scope 12-09-20 with discovery of 8 hyperplastic polyps in the distal rectum as well as diverticulosis.  He recommended repeat c-scope after 5 years..    4.  H/o cervical cancer treated with cryotherapy at [facility] in [facility]:  -  Recently saw gynecologist Dr. Manav Real.  She says she had exam including pap smear and then pelvic U/S which was unrevealing.    5.  H/o malignant Melanoma on her Back:  -  S/p resection performed by Dr. Catrachito Amaro of Dermatology in [place] in 2004.  -  Recommended she see a dermatologist twice a year for full skin exam.  She is being seen by the group in [place] q 3 months.  She reports resection of non-melanoma skin cancer from her R thigh at her last visit.    6.  Elevated LFTs:  -  Suspected to be related to weight chagnes up and down / fatty liver infiltration.   -  Given her h/o multiple malignancies, as well as RLQ discomfort I ordered CT A/P which showed no cause for concern.  LFTs are somewhat improved.  She has been on a fruit/vegetable diet and has lost 24 lbs.    7. ACO / DWAYNE/Other  Quality measures  -  Yadira Burgos received 2020 flu vaccine.     -  Yadira Burgos reports a pain score of 0.  Given her pain assessment as noted, treatment options were discussed and the following options were decided upon as a follow-up plan to address the patient's pain: No intervention needed at this time..  -  Current outpatient and discharge medications have been reconciled for the patient.  Reviewed by: Loreto Stone MD     8.  F/u:  -  DEXA scan, Mammogram prior to her return in 1 year along with CBCD, CMP, TSH  -  Continued close f/u with Dermatology.    This note was scribed for Loreto Stone MD by Alba Felton RN.    I, Loreto Stone MD, personally performed the services described in this documentation as scribed by the above named individual in my presence, and it is  both accurate and complete.  01/18/2021       I spent 25 minutes with Yadira Burgos today.  In the office today, more than 50% of this time was spent face-to-face with her  in counseling / coordination of care, reviewing her medical history and counseling on the current treatment plan.  All questions were answered to her satisfaction      Electronically Signed by: Loreto Stone MD      CC:     MD Manav Cotto MD Aaron House, MD

## 2021-01-19 DIAGNOSIS — M81.0 POSTMENOPAUSAL OSTEOPOROSIS: Primary | ICD-10-CM

## 2021-02-17 ENCOUNTER — TELEPHONE (OUTPATIENT)
Dept: FAMILY MEDICINE CLINIC | Facility: CLINIC | Age: 66
End: 2021-02-17

## 2021-02-17 NOTE — TELEPHONE ENCOUNTER
DELETE AFTER REVIEWING: Telephone encounter to be sent to the clinical pool.  If patient has less than a 3 day supply left, send the encounter HIGH Priority.    Caller: Onur BurgosYadira    Relationship: Self    Best call back number:     [phone] (H)      Medication needed:      atenolol (TENORMIN) 50 MG tablet  50 mg, 2 times daily     atorvastatin (LIPITOR) 80 MG tablet  80 mg, Nightly     When do you need the refill by:    BY END OF MONTH (February)    What details did the patient provide when requesting the medication:     THESE MEDICATIONS NEED TO BE REORDERED BY PHYSICIAN    Does the patient have less than a 3 day supply:  [] Yes  [x] No    What is the patient's preferred pharmacy: [id number] - [place], [place] - [address] AT [facility] OF [address] - [phone]  - [phone] FX   DR KERR

## 2021-02-18 DIAGNOSIS — I10 ESSENTIAL HYPERTENSION: ICD-10-CM

## 2021-02-18 DIAGNOSIS — E78.2 MIXED HYPERLIPIDEMIA: ICD-10-CM

## 2021-02-18 RX ORDER — ATENOLOL 50 MG/1
50 TABLET ORAL 2 TIMES DAILY
Qty: 180 TABLET | Refills: 1 | Status: SHIPPED | OUTPATIENT
Start: 2021-02-18 | End: 2021-09-16 | Stop reason: SDUPTHER

## 2021-02-18 RX ORDER — ATORVASTATIN CALCIUM 80 MG/1
80 TABLET, FILM COATED ORAL NIGHTLY
Qty: 90 TABLET | Refills: 1 | Status: SHIPPED | OUTPATIENT
Start: 2021-02-18 | End: 2021-09-03

## 2021-02-22 DIAGNOSIS — Z23 IMMUNIZATION DUE: ICD-10-CM

## 2021-03-07 DIAGNOSIS — H69.81 DYSFUNCTION OF EUSTACHIAN TUBE, RIGHT: ICD-10-CM

## 2021-03-09 RX ORDER — PSEUDOEPHEDRINE HYDROCHLORIDE 120 MG/1
TABLET, FILM COATED, EXTENDED RELEASE ORAL
Qty: 60 TABLET | Refills: 0 | Status: ON HOLD | OUTPATIENT
Start: 2021-03-09 | End: 2021-12-12

## 2021-03-12 ENCOUNTER — IMMUNIZATION (OUTPATIENT)
Dept: VACCINE CLINIC | Facility: HOSPITAL | Age: 66
End: 2021-03-12

## 2021-03-12 DIAGNOSIS — Z23 IMMUNIZATION DUE: ICD-10-CM

## 2021-03-12 PROCEDURE — 0001A: CPT | Performed by: INTERNAL MEDICINE

## 2021-03-12 PROCEDURE — 91300 HC SARSCOV02 VAC 30MCG/0.3ML IM: CPT | Performed by: INTERNAL MEDICINE

## 2021-03-16 ENCOUNTER — OFFICE VISIT (OUTPATIENT)
Dept: FAMILY MEDICINE CLINIC | Facility: CLINIC | Age: 66
End: 2021-03-16

## 2021-03-16 VITALS
OXYGEN SATURATION: 97 % | HEIGHT: 67 IN | BODY MASS INDEX: 29.6 KG/M2 | HEART RATE: 86 BPM | TEMPERATURE: 97.7 F | DIASTOLIC BLOOD PRESSURE: 68 MMHG | WEIGHT: 188.6 LBS | SYSTOLIC BLOOD PRESSURE: 112 MMHG

## 2021-03-16 DIAGNOSIS — E78.2 MIXED HYPERLIPIDEMIA: ICD-10-CM

## 2021-03-16 DIAGNOSIS — H69.81 DYSFUNCTION OF EUSTACHIAN TUBE, RIGHT: Primary | ICD-10-CM

## 2021-03-16 DIAGNOSIS — I10 ESSENTIAL HYPERTENSION: ICD-10-CM

## 2021-03-16 DIAGNOSIS — G47.09 OTHER INSOMNIA: ICD-10-CM

## 2021-03-16 PROCEDURE — 99214 OFFICE O/P EST MOD 30 MIN: CPT | Performed by: FAMILY MEDICINE

## 2021-03-16 RX ORDER — TRAZODONE HYDROCHLORIDE 50 MG/1
50 TABLET ORAL NIGHTLY
Status: ON HOLD | COMMUNITY
End: 2021-12-12

## 2021-03-16 RX ORDER — BACLOFEN 20 MG/1
20 TABLET ORAL NIGHTLY
COMMUNITY
End: 2021-09-16 | Stop reason: SDUPTHER

## 2021-03-17 RX ORDER — FLUTICASONE PROPIONATE 50 MCG
2 SPRAY, SUSPENSION (ML) NASAL DAILY
Qty: 16 G | Refills: 5 | Status: ON HOLD | OUTPATIENT
Start: 2021-03-17 | End: 2021-12-12

## 2021-04-02 ENCOUNTER — IMMUNIZATION (OUTPATIENT)
Dept: VACCINE CLINIC | Facility: HOSPITAL | Age: 66
End: 2021-04-02

## 2021-04-02 PROCEDURE — 91300 HC SARSCOV02 VAC 30MCG/0.3ML IM: CPT | Performed by: INTERNAL MEDICINE

## 2021-04-02 PROCEDURE — 0002A: CPT | Performed by: INTERNAL MEDICINE

## 2021-09-03 DIAGNOSIS — E78.2 MIXED HYPERLIPIDEMIA: ICD-10-CM

## 2021-09-03 RX ORDER — ATORVASTATIN CALCIUM 80 MG/1
80 TABLET, FILM COATED ORAL NIGHTLY
Qty: 90 TABLET | Refills: 0 | Status: SHIPPED | OUTPATIENT
Start: 2021-09-03 | End: 2021-09-16 | Stop reason: SDUPTHER

## 2021-09-16 ENCOUNTER — OFFICE VISIT (OUTPATIENT)
Dept: FAMILY MEDICINE CLINIC | Facility: CLINIC | Age: 66
End: 2021-09-16

## 2021-09-16 VITALS
SYSTOLIC BLOOD PRESSURE: 110 MMHG | DIASTOLIC BLOOD PRESSURE: 64 MMHG | TEMPERATURE: 96.9 F | OXYGEN SATURATION: 97 % | HEART RATE: 64 BPM | BODY MASS INDEX: 29.38 KG/M2 | WEIGHT: 187.2 LBS | HEIGHT: 67 IN

## 2021-09-16 DIAGNOSIS — E78.2 MIXED HYPERLIPIDEMIA: ICD-10-CM

## 2021-09-16 DIAGNOSIS — J32.9 SINUSITIS, UNSPECIFIED CHRONICITY, UNSPECIFIED LOCATION: Primary | ICD-10-CM

## 2021-09-16 DIAGNOSIS — I10 ESSENTIAL HYPERTENSION: ICD-10-CM

## 2021-09-16 LAB
ALBUMIN SERPL-MCNC: 4.3 G/DL (ref 3.5–5.2)
ALBUMIN/GLOB SERPL: 1.7 G/DL
ALP SERPL-CCNC: 85 U/L (ref 39–117)
ALT SERPL W P-5'-P-CCNC: 60 U/L (ref 1–33)
ANION GAP SERPL CALCULATED.3IONS-SCNC: 9.9 MMOL/L (ref 5–15)
AST SERPL-CCNC: 46 U/L (ref 1–32)
BASOPHILS # BLD AUTO: 0.05 10*3/MM3 (ref 0–0.2)
BASOPHILS NFR BLD AUTO: 0.8 % (ref 0–1.5)
BILIRUB SERPL-MCNC: 0.6 MG/DL (ref 0–1.2)
BUN SERPL-MCNC: 25 MG/DL (ref 8–23)
BUN/CREAT SERPL: 35.2 (ref 7–25)
CALCIUM SPEC-SCNC: 9.6 MG/DL (ref 8.6–10.5)
CHLORIDE SERPL-SCNC: 104 MMOL/L (ref 98–107)
CO2 SERPL-SCNC: 28.1 MMOL/L (ref 22–29)
CREAT SERPL-MCNC: 0.71 MG/DL (ref 0.57–1)
DEPRECATED RDW RBC AUTO: 46.5 FL (ref 37–54)
EOSINOPHIL # BLD AUTO: 0.1 10*3/MM3 (ref 0–0.4)
EOSINOPHIL NFR BLD AUTO: 1.7 % (ref 0.3–6.2)
ERYTHROCYTE [DISTWIDTH] IN BLOOD BY AUTOMATED COUNT: 13.8 % (ref 12.3–15.4)
GFR SERPL CREATININE-BSD FRML MDRD: 83 ML/MIN/1.73
GLOBULIN UR ELPH-MCNC: 2.5 GM/DL
GLUCOSE SERPL-MCNC: 109 MG/DL (ref 65–99)
HCT VFR BLD AUTO: 44.3 % (ref 34–46.6)
HGB BLD-MCNC: 13.9 G/DL (ref 12–15.9)
IMM GRANULOCYTES # BLD AUTO: 0.01 10*3/MM3 (ref 0–0.05)
IMM GRANULOCYTES NFR BLD AUTO: 0.2 % (ref 0–0.5)
LYMPHOCYTES # BLD AUTO: 2.51 10*3/MM3 (ref 0.7–3.1)
LYMPHOCYTES NFR BLD AUTO: 41.6 % (ref 19.6–45.3)
MCH RBC QN AUTO: 28.7 PG (ref 26.6–33)
MCHC RBC AUTO-ENTMCNC: 31.4 G/DL (ref 31.5–35.7)
MCV RBC AUTO: 91.3 FL (ref 79–97)
MONOCYTES # BLD AUTO: 0.59 10*3/MM3 (ref 0.1–0.9)
MONOCYTES NFR BLD AUTO: 9.8 % (ref 5–12)
NEUTROPHILS NFR BLD AUTO: 2.77 10*3/MM3 (ref 1.7–7)
NEUTROPHILS NFR BLD AUTO: 45.9 % (ref 42.7–76)
NRBC BLD AUTO-RTO: 0 /100 WBC (ref 0–0.2)
PLATELET # BLD AUTO: 192 10*3/MM3 (ref 140–450)
PMV BLD AUTO: 11 FL (ref 6–12)
POTASSIUM SERPL-SCNC: 4.7 MMOL/L (ref 3.5–5.2)
PROT SERPL-MCNC: 6.8 G/DL (ref 6–8.5)
RBC # BLD AUTO: 4.85 10*6/MM3 (ref 3.77–5.28)
SODIUM SERPL-SCNC: 142 MMOL/L (ref 136–145)
WBC # BLD AUTO: 6.03 10*3/MM3 (ref 3.4–10.8)

## 2021-09-16 PROCEDURE — 99214 OFFICE O/P EST MOD 30 MIN: CPT | Performed by: FAMILY MEDICINE

## 2021-09-16 PROCEDURE — 80053 COMPREHEN METABOLIC PANEL: CPT | Performed by: FAMILY MEDICINE

## 2021-09-16 PROCEDURE — 36415 COLL VENOUS BLD VENIPUNCTURE: CPT | Performed by: FAMILY MEDICINE

## 2021-09-16 PROCEDURE — 85025 COMPLETE CBC W/AUTO DIFF WBC: CPT | Performed by: FAMILY MEDICINE

## 2021-09-16 RX ORDER — ATENOLOL 50 MG/1
50 TABLET ORAL 2 TIMES DAILY
Qty: 180 TABLET | Refills: 1 | Status: SHIPPED | OUTPATIENT
Start: 2021-09-16 | End: 2021-12-22 | Stop reason: HOSPADM

## 2021-09-16 RX ORDER — BACLOFEN 20 MG/1
20 TABLET ORAL NIGHTLY
Qty: 30 TABLET | Refills: 5 | Status: ON HOLD | OUTPATIENT
Start: 2021-09-16 | End: 2021-12-12

## 2021-09-16 RX ORDER — ATORVASTATIN CALCIUM 80 MG/1
80 TABLET, FILM COATED ORAL NIGHTLY
Qty: 90 TABLET | Refills: 1 | Status: SHIPPED | OUTPATIENT
Start: 2021-09-16 | End: 2021-12-22 | Stop reason: HOSPADM

## 2021-09-17 PROCEDURE — U0004 COV-19 TEST NON-CDC HGH THRU: HCPCS | Performed by: FAMILY MEDICINE

## 2021-09-18 LAB — SARS-COV-2 RNA PNL SPEC NAA+PROBE: NOT DETECTED

## 2021-09-20 ENCOUNTER — TELEPHONE (OUTPATIENT)
Dept: FAMILY MEDICINE CLINIC | Facility: CLINIC | Age: 66
End: 2021-09-20

## 2021-09-20 NOTE — TELEPHONE ENCOUNTER
----- Message from Dilcia Pedro MD sent at 9/19/2021 11:41 PM EDT -----  Please call Yadira and let her know that COVID testing is negative. If symptoms continue or worsen, need to consider retesting. Thanks.    All other labs stable. Liver enzymes still elevated, but stable. Will continue to monitor.     Patient notified.

## 2021-10-04 NOTE — PROGRESS NOTES
"Yadira Burgos     VITALS: Blood pressure 110/64, pulse 64, temperature 96.9 °F (36.1 °C), height 170.2 cm (67.01\"), weight 84.9 kg (187 lb 3.2 oz), SpO2 97 %, not currently breastfeeding.    Subjective  Chief Complaint  Sinusitis    Subjective          History of Present Illness:  Patient is a 65 y.o.  female with medical conditions significant for allergic rhinitis who presents to clinic secondary to medical followup.  She is having sinus issues.  She complains of a sinus congestion and clear rhinorrhea.  She denies any fevers, chills, ear pain, coughing, shortness of breath.    No complaints about any of the medications.    The following portions of the patient's history were reviewed and updated as appropriate: allergies, current medications, past family history, past medical history, past social history, past surgical history and problem list.    Past Medical History  Past Medical History:   Diagnosis Date   • Back pain    • Basal cell carcinoma (BCC) in situ of skin 2020    Dr. Mohr - Derm   • Breast cancer (CMS/HCC)     2015   • Cancer (CMS/HCC)     breast, cervical, melanoma   • Cervical cancer (CMS/HCC) 1976    Diagnosed in 1975.  Treated by repeated local intervention and ultimately received cryotherapy at [facility] with resolution.   • Elbow fracture    • Elevated cholesterol    • Foot fracture    • Headache    • Hyperlipidemia    • Hypertension    • Melanoma (CMS/HCC)     on her back 2004 - treated by Dermatologist Catrachito Amaro in [place], [place] with what sounds like wide local excision.    • Sinusitis        Surgical History  Past Surgical History:   Procedure Laterality Date   • BREAST LUMPECTOMY Right 2015   • BREAST SURGERY  2015    Secondary to cancer   • COLONOSCOPY N/A 12/9/2020    Procedure: COLONOSCOPY;  Surgeon: Mohsen Maldonado MD;  Location: [facility];  Service: Gastroenterology;  Laterality: N/A;   • ELBOW FUSION      left   • FOOT SURGERY  2014    Broken foot   • HAND SURGERY  08/2020    " "right   • RHINOPLASTY  2002   • SKIN CANCER EXCISION      malignant melanoma from back 2004       Family History  Family History   Problem Relation Age of Onset   • Other Mother         blood clots   • Hypertension Mother    • Ulcers Father    • Hypertension Sister    • Other Sister         Multiple Sclerosis   • Cancer Paternal Aunt    • Stroke Maternal Grandmother    • Alcohol abuse Paternal Grandmother    • Hypertension Sister    • Multiple sclerosis Sister    • Hypertension Sister    • Coronary artery disease Paternal Grandfather    • Cancer Maternal Aunt 30        colon   • Breast cancer Neg Hx        Social History  Social History     Socioeconomic History   • Marital status:      Spouse name: Not on file   • Number of children: Not on file   • Years of education: Not on file   • Highest education level: Not on file   Tobacco Use   • Smoking status: Never Smoker   • Smokeless tobacco: Never Used   Vaping Use   • Vaping Use: Never used   Substance and Sexual Activity   • Alcohol use: Not Currently   • Drug use: Never   • Sexual activity: Not Currently     Birth control/protection: Post-menopausal       Objective   Vital Signs:   /64 (BP Location: Right arm, Patient Position: Sitting, Cuff Size: Adult)   Pulse 64   Temp 96.9 °F (36.1 °C)   Ht 170.2 cm (67.01\")   Wt 84.9 kg (187 lb 3.2 oz)   SpO2 97%   BMI 29.31 kg/m²     Physical Exam     Gen: Patient in NAD. Pleasant and answers appropriately. A&Ox3.    Skin: Warm and dry with normal turgor. No purpura, rashes, or unusual pigmentation noted. Hair is normal in appearance and distribution.    HEENT: NC/AT. No lesions noted. Conjunctiva clear, sclera nonicteric. PERRL. EOMI without nystagmus or strabismus. Fundi appear benign. No hemorrhages or exudates of eyes. Auditory canals are patent bilaterally without lesions. TMs intact,  nonerythematous, nonbulging without lesions. Nasal mucosa erythematous, and nonedematous. Frontal and maxillary " sinuses are nontender. O/P erythematous and moist without exudate.    Neck: Supple without lymph nodes palpated. FROM.     Lungs: CTA B/L without rales, rhonchi, crackles, or wheezes.    Heart: RRR. S1 and S2 normal. No S3 or S4. No MRGT.    Abd: Soft, nontender,nondistended. (+)BSx4 quadrants.     Extrem: No CCE. Radial pulses 2+/4 and equal B/L. FROMx4. No bone, joint, or muscle tenderness noted.    Neuro: No focal motor/sensory deficits.    Procedures       Assessment and Plan    Yadira Burgos is a 65 y.o. here for medical followup.    Problem List Items Addressed This Visit        Cardiac and Vasculature    Hypertension    Relevant Medications    atenolol (TENORMIN) 50 MG tablet    Hyperlipidemia    Relevant Medications    atorvastatin (LIPITOR) 80 MG tablet    Other Relevant Orders    CBC Auto Differential (Completed)    Comprehensive Metabolic Panel (Completed)    Lipid Panel      Other Visit Diagnoses     Sinusitis, unspecified chronicity, unspecified location    -  Primary    Relevant Orders    COVID-19,APTIMA PANTHER(AMAURY),[facility], NP/OP SWAB IN UTM/VTM/SALINE TRANSPORT MEDIA,24 HR TAT - Swab, Nasal Cavity    COVID PRE-OP / PRE-PROCEDURE SCREENING ORDER (NO ISOLATION) - Swab, Nasal Cavity (Completed)            Patient's Body mass index is 29.31 kg/m². indicating that she is overweight (BMI 25-29.9). Obesity-related health conditions include the following: dyslipidemias. Obesity is unchanged. BMI is is above average; BMI management plan is completed. We discussed portion control and increasing exercise..         Follow Up   Return in about 3 months (around 12/16/2021).  Findings and plans discussed with patient who verbalizes understanding and agreement. Will followup with patient once results are in. Patient was given instructions and counseling regarding her condition or for health maintenance advice. Please see specific information pulled into the AVS if appropriate.       Dilcia Pedro MD

## 2021-10-11 ENCOUNTER — LAB (OUTPATIENT)
Dept: FAMILY MEDICINE CLINIC | Facility: CLINIC | Age: 66
End: 2021-10-11

## 2021-10-11 PROCEDURE — 80061 LIPID PANEL: CPT | Performed by: FAMILY MEDICINE

## 2021-10-11 PROCEDURE — 36415 COLL VENOUS BLD VENIPUNCTURE: CPT | Performed by: FAMILY MEDICINE

## 2021-10-12 LAB
CHOLEST SERPL-MCNC: 140 MG/DL (ref 0–200)
HDLC SERPL-MCNC: 62 MG/DL (ref 40–60)
LDLC SERPL CALC-MCNC: 67 MG/DL (ref 0–100)
LDLC/HDLC SERPL: 1.1 {RATIO}
TRIGL SERPL-MCNC: 49 MG/DL (ref 0–150)
VLDLC SERPL-MCNC: 11 MG/DL (ref 5–40)

## 2021-10-18 ENCOUNTER — TELEPHONE (OUTPATIENT)
Dept: FAMILY MEDICINE CLINIC | Facility: CLINIC | Age: 66
End: 2021-10-18

## 2021-10-18 NOTE — TELEPHONE ENCOUNTER
----- Message from Dilcia Pedro MD sent at 10/17/2021 11:16 PM EDT -----  Labs stable. Okay to either call or send letter to patient. Thanks.    Letter mailed.

## 2021-10-20 ENCOUNTER — OFFICE VISIT (OUTPATIENT)
Dept: FAMILY MEDICINE CLINIC | Facility: CLINIC | Age: 66
End: 2021-10-20

## 2021-10-20 VITALS
DIASTOLIC BLOOD PRESSURE: 64 MMHG | WEIGHT: 195.6 LBS | HEIGHT: 67 IN | TEMPERATURE: 97.1 F | HEART RATE: 65 BPM | BODY MASS INDEX: 30.7 KG/M2 | OXYGEN SATURATION: 95 % | SYSTOLIC BLOOD PRESSURE: 114 MMHG

## 2021-10-20 DIAGNOSIS — Z12.4 CERVICAL CANCER SCREENING: ICD-10-CM

## 2021-10-20 DIAGNOSIS — Z01.419 ENCOUNTER FOR GYNECOLOGICAL EXAMINATION: Primary | ICD-10-CM

## 2021-10-20 PROCEDURE — 99396 PREV VISIT EST AGE 40-64: CPT | Performed by: FAMILY MEDICINE

## 2021-10-20 NOTE — PROGRESS NOTES
"Chief Complaint  Gynecologic Exam    Subjective   Yadira Burgos is a 65 y.o. female here for a routine exam.  Current complaints:   Chief Complaint   Patient presents with   • Gynecologic Exam   .  Personal health questionnaire reviewed: not asked.     Gynecologic History  No LMP recorded. Patient is postmenopausal.  Contraception: post menopausal status  Last Pap: . Results were: normal  Last mammogram: . Results were: history of breast cancer.     Obstetric History  OB History    Para Term  AB Living   0 0 0 0 0 0   SAB IAB Ectopic Molar Multiple Live Births   0 0 0 0 0 0       The following portions of the patient's history were reviewed and updated as appropriate: allergies, current medications, past family history, past medical history, past social history, past surgical history and problem list.    Review of Systems  Pertinent items are noted in HPI.    Objective   /64 (BP Location: Right arm, Patient Position: Sitting, Cuff Size: Adult)   Pulse 65   Temp 97.1 °F (36.2 °C)   Ht 170.2 cm (67\")   Wt 88.7 kg (195 lb 9.6 oz)   SpO2 95%   BMI 30.64 kg/m²     General Appearance:    Alert, cooperative, no distress, appears stated age   Head:    Normocephalic, without obvious abnormality, atraumatic   Eyes:    PERRL, conjunctiva/corneas clear, EOM's intact, fundi     benign, both eyes   Ears:    Normal TM's and external ear canals, both ears   Nose:   Nares normal, septum midline, mucosa normal, no drainage    or sinus tenderness   Throat:   Lips, mucosa, and tongue normal; teeth and gums normal   Neck:   Supple, symmetrical, trachea midline, no adenopathy;     thyroid:  no enlargement/tenderness/nodules; no carotid    bruit or JVD   Back:     Symmetric, no curvature, ROM normal, no CVA tenderness   Lungs:     Clear to auscultation bilaterally, respirations unlabored   Chest Wall:    No tenderness or deformity    Heart:    Regular rate and rhythm, S1 and S2 normal, no murmur, rub  " " or gallop   Breast Exam:    No tenderness, masses, or nipple abnormality   Abdomen:     Soft, non-tender, bowel sounds active all four quadrants,     no masses, no organomegaly   Genitalia:    Normal female without lesion, discharge or tenderness   Rectal:    Normal tone, no masses or tenderness; guaiac negative stool   Extremities:   Extremities normal, atraumatic, no cyanosis or edema   Pulses:   2+ and symmetric all extremities   Skin:   Skin color, texture, turgor normal, no rashes or lesions   Lymph nodes:   Cervical, supraclavicular, and axillary nodes normal   Neurologic:   CNII-XII intact, normal strength, sensation and reflexes     throughout       Assessment/Plan   Healthy female exam.                 Subjective          Yadira Burgos presents to [facility]  History of Present Illness    Objective   Vital Signs:   /64 (BP Location: Right arm, Patient Position: Sitting, Cuff Size: Adult)   Pulse 65   Temp 97.1 °F (36.2 °C)   Ht 170.2 cm (67\")   Wt 88.7 kg (195 lb 9.6 oz)   SpO2 95%   BMI 30.64 kg/m²     Physical Exam  Exam conducted with a chaperone present.   Constitutional:       General: She is not in acute distress.     Appearance: Normal appearance. She is well-developed and well-groomed. She is not ill-appearing, toxic-appearing or diaphoretic.   HENT:      Head: Normocephalic.      Nose: Nose normal. No congestion or rhinorrhea.      Mouth/Throat:      Mouth: Mucous membranes are moist.      Pharynx: Oropharynx is clear. No oropharyngeal exudate or posterior oropharyngeal erythema.   Eyes:      General: Lids are normal.         Right eye: No discharge.         Left eye: No discharge.      Extraocular Movements: Extraocular movements intact.      Pupils: Pupils are equal, round, and reactive to light.   Neck:      Vascular: No carotid bruit.   Cardiovascular:      Rate and Rhythm: Normal rate and regular rhythm.      Pulses: Normal pulses.      Heart " sounds: No murmur heard.  No friction rub. No gallop.    Pulmonary:      Effort: Pulmonary effort is normal. No respiratory distress.      Breath sounds: No stridor. No wheezing, rhonchi or rales.   Chest:      Chest wall: No tenderness.   Abdominal:      General: Bowel sounds are normal. There is no distension.      Palpations: Abdomen is soft. There is no mass.      Tenderness: There is no abdominal tenderness. There is no right CVA tenderness, left CVA tenderness, guarding or rebound.      Hernia: No hernia is present.   Genitourinary:     General: Normal vulva.      Exam position: Supine.      Labia:         Left: No rash, tenderness or lesion.       Urethra: No prolapse.      Vagina: No vaginal discharge.      Rectum: Normal.   Musculoskeletal:         General: No swelling or tenderness. Normal range of motion.      Cervical back: Normal range of motion and neck supple. No rigidity or tenderness.      Right lower leg: No edema.      Left lower leg: No edema.   Lymphadenopathy:      Cervical: No cervical adenopathy.   Skin:     General: Skin is warm.      Capillary Refill: Capillary refill takes less than 2 seconds.      Coloration: Skin is not jaundiced.      Findings: No bruising, erythema or rash.   Neurological:      General: No focal deficit present.      Mental Status: She is alert and oriented to person, place, and time.      Motor: Motor function is intact. No weakness.      Coordination: Coordination is intact.      Gait: Gait is intact. Gait normal.   Psychiatric:         Attention and Perception: Attention normal.         Mood and Affect: Mood normal.         Speech: Speech normal.         Behavior: Behavior normal.         Cognition and Memory: Cognition normal.         Judgment: Judgment normal.        Social History     Tobacco Use   • Smoking status: Never Smoker   • Smokeless tobacco: Never Used   Substance Use Topics   • Alcohol use: Not Currently      Past Medical History:   • Back pain   •  Basal cell carcinoma (BCC) in situ of skin    Dr. Mohr - Derm   • Breast cancer (HCC)    2015   • Cancer (HCC)    breast, cervical, melanoma   • Cervical cancer (HCC)    Diagnosed in 1975.  Treated by repeated local intervention and ultimately received cryotherapy at [facility] with resolution.   • Elbow fracture   • Elevated cholesterol   • Foot fracture   • Headache   • Hyperlipidemia   • Hypertension   • Melanoma (HCC)    on her back 2004 - treated by Dermatologist Catrachito Amaro in [place], [place] with what sounds like wide local excision.    • Sinusitis      Current Outpatient Medications on File Prior to Visit   Medication Sig   • atenolol (TENORMIN) 50 MG tablet Take 1 tablet by mouth 2 (two) times a day.   • atorvastatin (LIPITOR) 80 MG tablet Take 1 tablet by mouth Every Night.   • baclofen (LIORESAL) 20 MG tablet Take 1 tablet by mouth Every Night.   • fluticasone (FLONASE) 50 MCG/ACT nasal spray 2 sprays into the nostril(s) as directed by provider Daily.   • gabapentin (NEURONTIN) 100 MG capsule Take 100 mg by mouth Daily As Needed.   • SudoGest 12 Hour 120 MG 12 hr tablet TAKE 1 TABLET BY MOUTH EVERY 12 HOURS   • traZODone (DESYREL) 50 MG tablet Take 50 mg by mouth Every Night.     No current facility-administered medications on file prior to visit.      Result Review :     Common labs    Common Labsle 1/15/21 1/15/21 9/16/21 9/16/21 10/11/21    1623 1623 1630 1630    Glucose  98  109 (A)    BUN  14  25 (A)    Creatinine  0.72  0.71    eGFR Non African Am  82  83    Sodium  136  142    Potassium  4.1  4.7    Chloride  99  104    Calcium  10.1  9.6    Albumin  4.52  4.30    Total Bilirubin  0.9  0.6    Alkaline Phosphatase  97  85    AST (SGOT)  39 (A)  46 (A)    ALT (SGPT)  58 (A)  60 (A)    WBC 5.72  6.03     Hemoglobin 14.9  13.9     Hematocrit 47.2 (A)  44.3     Platelets 193  192     Total Cholesterol     140   Triglycerides     49   HDL Cholesterol     62 (A)   LDL Cholesterol      67   (A) Abnormal value                      Assessment and Plan    Diagnoses and all orders for this visit:    1. Encounter for gynecological examination (Primary)  -     Liquid-based Pap Smear, Screening    2. Cervical cancer screening  -     Cancel: Liquid-based Pap Smear, Screening; Future  -     Liquid-based Pap Smear, Screening; Future  -     Cancel: Liquid-based Pap Smear, Screening  -     Liquid-based Pap Smear, Screening        Follow Up   Return in about 3 months (around 1/20/2022), or if symptoms worsen or fail to improve.  Patient was given instructions and counseling regarding her condition or for health maintenance advice. Please see specific information pulled into the AVS if appropriate.     Yadira Burgos  reports that she has never smoked. She has never used smokeless tobacco.. I have educated her on the risk of diseases from using tobacco products such as cancer, COPD and heart disease.

## 2021-10-26 ENCOUNTER — TELEPHONE (OUTPATIENT)
Dept: FAMILY MEDICINE CLINIC | Facility: CLINIC | Age: 66
End: 2021-10-26

## 2021-10-26 LAB
CONV .: NORMAL
CYTOLOGIST CVX/VAG CYTO: NORMAL
CYTOLOGY CVX/VAG DOC CYTO: NORMAL
CYTOLOGY CVX/VAG DOC THIN PREP: NORMAL
DX ICD CODE: NORMAL
HIV 1 & 2 AB SER-IMP: NORMAL
OTHER STN SPEC: NORMAL
STAT OF ADQ CVX/VAG CYTO-IMP: NORMAL

## 2021-10-26 NOTE — TELEPHONE ENCOUNTER
----- Message from TENNILLE Mclaughlin sent at 10/26/2021  2:30 PM EDT -----  Please send a letter letting the patient know labs are normal. Thank you        Patient notified.

## 2021-12-10 ENCOUNTER — OFFICE VISIT (OUTPATIENT)
Dept: FAMILY MEDICINE CLINIC | Facility: CLINIC | Age: 66
End: 2021-12-10

## 2021-12-10 VITALS
DIASTOLIC BLOOD PRESSURE: 62 MMHG | WEIGHT: 195 LBS | OXYGEN SATURATION: 99 % | TEMPERATURE: 99.3 F | SYSTOLIC BLOOD PRESSURE: 120 MMHG | HEART RATE: 102 BPM | BODY MASS INDEX: 30.54 KG/M2

## 2021-12-10 DIAGNOSIS — R19.7 DIARRHEA, UNSPECIFIED TYPE: ICD-10-CM

## 2021-12-10 DIAGNOSIS — R25.1 SHAKING: Primary | ICD-10-CM

## 2021-12-10 DIAGNOSIS — R73.01 IMPAIRED FASTING GLUCOSE: ICD-10-CM

## 2021-12-10 PROCEDURE — 85025 COMPLETE CBC W/AUTO DIFF WBC: CPT | Performed by: FAMILY MEDICINE

## 2021-12-10 PROCEDURE — 36415 COLL VENOUS BLD VENIPUNCTURE: CPT | Performed by: FAMILY MEDICINE

## 2021-12-10 PROCEDURE — 99214 OFFICE O/P EST MOD 30 MIN: CPT | Performed by: FAMILY MEDICINE

## 2021-12-10 PROCEDURE — 85007 BL SMEAR W/DIFF WBC COUNT: CPT | Performed by: FAMILY MEDICINE

## 2021-12-10 PROCEDURE — 83735 ASSAY OF MAGNESIUM: CPT | Performed by: FAMILY MEDICINE

## 2021-12-10 PROCEDURE — 80053 COMPREHEN METABOLIC PANEL: CPT | Performed by: FAMILY MEDICINE

## 2021-12-10 RX ORDER — L.ACID,PARA/B.BIFIDUM/S.THERM 8B CELL
1 CAPSULE ORAL DAILY
Qty: 30 EACH | Refills: 2 | Status: ON HOLD | OUTPATIENT
Start: 2021-12-10 | End: 2021-12-12

## 2021-12-10 RX ORDER — DIPHENHYDRAMINE HYDROCHLORIDE 25 MG/1
1 CAPSULE, LIQUID FILLED ORAL
Qty: 1 EACH | Refills: 0 | Status: ON HOLD | OUTPATIENT
Start: 2021-12-10 | End: 2021-12-12

## 2021-12-10 RX ORDER — LOPERAMIDE HYDROCHLORIDE 2 MG/1
2 TABLET ORAL 4 TIMES DAILY PRN
Qty: 32 TABLET | Refills: 0 | Status: ON HOLD | OUTPATIENT
Start: 2021-12-10 | End: 2021-12-12

## 2021-12-10 NOTE — PROGRESS NOTES
Chief Complaint  Shaking    Subjective          Yadira Burgos presents to [facility]  Shaking  This is a new problem. The current episode started in the past 7 days (States has been having since tuesday on and off. ). The problem occurs intermittently. The problem has been waxing and waning. Pertinent negatives include no fever or headaches. Associated symptoms comments: States she had a sinus infection recently and she has been on antibiotics. States she has had diarrhea anytimes she eats. States she has not been eating anything the past couple day and has been drinking a lot of water. States she did eat a piece of chicken last night and she had diarrhea afterwards. She has not taken anything for the diarrhea she has just been trying not to eat much of anything. States she has never had a problem with her blood sugar before.   . Treatments tried: Will have her check her blood sugar twice daily for 7 days, as well as when she has a shaking episodes. As well as instructed to eat something with sugar and protein every couple of hours.        Objective   Vital Signs:   /62 (BP Location: Right arm, Patient Position: Sitting, Cuff Size: Adult)   Pulse 102   Temp 99.3 °F (37.4 °C)   Wt 88.5 kg (195 lb)   SpO2 99%   BMI 30.54 kg/m²     Physical Exam  Constitutional:       General: She is not in acute distress.     Appearance: Normal appearance. She is well-developed and well-groomed. She is not ill-appearing, toxic-appearing or diaphoretic.   HENT:      Head: Normocephalic.      Right Ear: Tympanic membrane, ear canal and external ear normal.      Left Ear: Tympanic membrane, ear canal and external ear normal.      Nose: Nose normal. No congestion or rhinorrhea.      Mouth/Throat:      Mouth: Mucous membranes are moist.      Pharynx: Oropharynx is clear. No oropharyngeal exudate or posterior oropharyngeal erythema.   Eyes:      General: Lids are normal.         Right eye: No  discharge.         Left eye: No discharge.      Extraocular Movements: Extraocular movements intact.      Pupils: Pupils are equal, round, and reactive to light.   Neck:      Vascular: No carotid bruit.   Cardiovascular:      Rate and Rhythm: Normal rate and regular rhythm.      Pulses: Normal pulses.      Heart sounds: Normal heart sounds. No murmur heard.  No friction rub. No gallop.    Pulmonary:      Effort: Pulmonary effort is normal. No respiratory distress.      Breath sounds: Normal breath sounds. No stridor. No wheezing, rhonchi or rales.   Chest:      Chest wall: No tenderness.   Abdominal:      General: Bowel sounds are normal. There is no distension.      Palpations: Abdomen is soft. There is no mass.      Tenderness: There is no abdominal tenderness. There is no right CVA tenderness, left CVA tenderness, guarding or rebound.      Hernia: No hernia is present.   Musculoskeletal:         General: No swelling or tenderness. Normal range of motion.      Cervical back: Normal range of motion and neck supple. No rigidity or tenderness.      Right lower leg: No edema.      Left lower leg: No edema.   Lymphadenopathy:      Cervical: No cervical adenopathy.   Skin:     General: Skin is warm.      Capillary Refill: Capillary refill takes less than 2 seconds.      Coloration: Skin is not jaundiced.      Findings: No bruising, erythema or rash.   Neurological:      General: No focal deficit present.      Mental Status: She is alert and oriented to person, place, and time.      Motor: Motor function is intact. No weakness.      Coordination: Coordination is intact.      Gait: Gait is intact. Gait normal.   Psychiatric:         Attention and Perception: Attention normal.         Mood and Affect: Mood normal.         Speech: Speech normal.         Behavior: Behavior normal.         Cognition and Memory: Cognition normal.         Judgment: Judgment normal.        Social History     Tobacco Use   • Smoking status: Never  Smoker   • Smokeless tobacco: Never Used   Substance Use Topics   • Alcohol use: Not Currently      Past Medical History:   • Back pain   • Basal cell carcinoma (BCC) in situ of skin    Dr. Mohr - Derm   • Breast cancer (HCC)    2015   • Cancer (HCC)    breast, cervical, melanoma   • Cervical cancer (HCC)    Diagnosed in 1975.  Treated by repeated local intervention and ultimately received cryotherapy at [facility] with resolution.   • Elbow fracture   • Elevated cholesterol   • Foot fracture   • Headache   • Hyperlipidemia   • Hypertension   • Melanoma (HCC)    on her back 2004 - treated by Dermatologist Catrachito Amaro in [place], [place] with what sounds like wide local excision.    • Sinusitis      Current Outpatient Medications on File Prior to Visit   Medication Sig   • atenolol (TENORMIN) 50 MG tablet Take 1 tablet by mouth 2 (two) times a day.   • atorvastatin (LIPITOR) 80 MG tablet Take 1 tablet by mouth Every Night.   • baclofen (LIORESAL) 20 MG tablet Take 1 tablet by mouth Every Night.   • fluticasone (FLONASE) 50 MCG/ACT nasal spray 2 sprays into the nostril(s) as directed by provider Daily.   • gabapentin (NEURONTIN) 100 MG capsule Take 100 mg by mouth Daily As Needed.   • SudoGest 12 Hour 120 MG 12 hr tablet TAKE 1 TABLET BY MOUTH EVERY 12 HOURS   • traZODone (DESYREL) 50 MG tablet Take 50 mg by mouth Every Night.     No current facility-administered medications on file prior to visit.      Result Review :                 Assessment and Plan    Diagnoses and all orders for this visit:    1. Shaking (Primary)  -     Magnesium; Future  -     Magnesium    2. Impaired fasting glucose  -     CBC Auto Differential; Future  -     Comprehensive Metabolic Panel; Future  -     Blood Glucose Monitoring Suppl (Blood Glucose Monitor System) w/Device kit; 1 each 2 (Two) Times a Day Before Meals for 30 days.  Dispense: 1 each; Refill: 0  -     CBC Auto Differential  -     Comprehensive Metabolic Panel    3. Diarrhea,  unspecified type  -     loperamide (Imodium A-D) 2 MG tablet; Take 1 tablet by mouth 4 (Four) Times a Day As Needed for Diarrhea for up to 7 days.  Dispense: 32 tablet; Refill: 0  -     lactobacillus acidophilus (RISAQUAD) capsule capsule; Take 1 capsule by mouth Daily.  Dispense: 30 each; Refill: 2        Follow Up   Return in 1 week (on 12/17/2021), or labs today.  Patient was given instructions and counseling regarding her condition or for health maintenance advice. Please see specific information pulled into the AVS if appropriate.     Yadira Burgos  reports that she has never smoked. She has never used smokeless tobacco.. I have educated her on the risk of diseases from using tobacco products such as cancer, COPD and heart disease.

## 2021-12-11 LAB
ALBUMIN SERPL-MCNC: 3.3 G/DL (ref 3.5–5.2)
ALBUMIN/GLOB SERPL: 1.3 G/DL
ALP SERPL-CCNC: 154 U/L (ref 39–117)
ALT SERPL W P-5'-P-CCNC: 20 U/L (ref 1–33)
ANION GAP SERPL CALCULATED.3IONS-SCNC: 15.7 MMOL/L (ref 5–15)
AST SERPL-CCNC: 26 U/L (ref 1–32)
BILIRUB SERPL-MCNC: 1.1 MG/DL (ref 0–1.2)
BUN SERPL-MCNC: 20 MG/DL (ref 8–23)
BUN/CREAT SERPL: 24.1 (ref 7–25)
BURR CELLS BLD QL SMEAR: ABNORMAL
CALCIUM SPEC-SCNC: 8.7 MG/DL (ref 8.6–10.5)
CHLORIDE SERPL-SCNC: 101 MMOL/L (ref 98–107)
CO2 SERPL-SCNC: 20.3 MMOL/L (ref 22–29)
CREAT SERPL-MCNC: 0.83 MG/DL (ref 0.57–1)
DEPRECATED RDW RBC AUTO: 39.7 FL (ref 37–54)
ERYTHROCYTE [DISTWIDTH] IN BLOOD BY AUTOMATED COUNT: 12.7 % (ref 12.3–15.4)
GFR SERPL CREATININE-BSD FRML MDRD: 69 ML/MIN/1.73
GLOBULIN UR ELPH-MCNC: 2.5 GM/DL
GLUCOSE SERPL-MCNC: 113 MG/DL (ref 65–99)
HCT VFR BLD AUTO: 38.7 % (ref 34–46.6)
HGB BLD-MCNC: 13.2 G/DL (ref 12–15.9)
LYMPHOCYTES # BLD MANUAL: 0.39 10*3/MM3 (ref 0.7–3.1)
LYMPHOCYTES NFR BLD MANUAL: 8.1 % (ref 5–12)
MAGNESIUM SERPL-MCNC: 1.6 MG/DL (ref 1.6–2.4)
MCH RBC QN AUTO: 29.3 PG (ref 26.6–33)
MCHC RBC AUTO-ENTMCNC: 34.1 G/DL (ref 31.5–35.7)
MCV RBC AUTO: 86 FL (ref 79–97)
MONOCYTES # BLD: 1.05 10*3/MM3 (ref 0.1–0.9)
NEUTROPHILS # BLD AUTO: 11.54 10*3/MM3 (ref 1.7–7)
NEUTROPHILS NFR BLD MANUAL: 88.9 % (ref 42.7–76)
NRBC BLD AUTO-RTO: 0 /100 WBC (ref 0–0.2)
PLAT MORPH BLD: NORMAL
PLATELET # BLD AUTO: 144 10*3/MM3 (ref 140–450)
PMV BLD AUTO: 12 FL (ref 6–12)
POIKILOCYTOSIS BLD QL SMEAR: ABNORMAL
POTASSIUM SERPL-SCNC: 3.9 MMOL/L (ref 3.5–5.2)
PROT SERPL-MCNC: 5.8 G/DL (ref 6–8.5)
RBC # BLD AUTO: 4.5 10*6/MM3 (ref 3.77–5.28)
SODIUM SERPL-SCNC: 137 MMOL/L (ref 136–145)
VARIANT LYMPHS NFR BLD MANUAL: 3 % (ref 19.6–45.3)
WBC MORPH BLD: NORMAL
WBC NRBC COR # BLD: 12.98 10*3/MM3 (ref 3.4–10.8)

## 2021-12-12 ENCOUNTER — APPOINTMENT (OUTPATIENT)
Dept: ULTRASOUND IMAGING | Facility: HOSPITAL | Age: 66
End: 2021-12-12

## 2021-12-12 ENCOUNTER — APPOINTMENT (OUTPATIENT)
Dept: GENERAL RADIOLOGY | Facility: HOSPITAL | Age: 66
End: 2021-12-12

## 2021-12-12 ENCOUNTER — APPOINTMENT (OUTPATIENT)
Dept: CT IMAGING | Facility: HOSPITAL | Age: 66
End: 2021-12-12

## 2021-12-12 ENCOUNTER — HOSPITAL ENCOUNTER (INPATIENT)
Facility: HOSPITAL | Age: 66
LOS: 10 days | Discharge: HOME | End: 2021-12-22
Attending: STUDENT IN AN ORGANIZED HEALTH CARE EDUCATION/TRAINING PROGRAM | Admitting: HOSPITALIST

## 2021-12-12 DIAGNOSIS — Z85.3 HISTORY OF BREAST CANCER: ICD-10-CM

## 2021-12-12 DIAGNOSIS — R16.0 LIVER MASS, RIGHT LOBE: ICD-10-CM

## 2021-12-12 DIAGNOSIS — I95.9 HYPOTENSION, UNSPECIFIED HYPOTENSION TYPE: Primary | ICD-10-CM

## 2021-12-12 DIAGNOSIS — K76.9 LIVER LESION: ICD-10-CM

## 2021-12-12 DIAGNOSIS — J18.9 PNEUMONIA OF RIGHT LOWER LOBE DUE TO INFECTIOUS ORGANISM: ICD-10-CM

## 2021-12-12 PROBLEM — A41.9 SEPSIS DUE TO PNEUMONIA: Status: ACTIVE | Noted: 2021-12-12

## 2021-12-12 LAB
ALBUMIN SERPL-MCNC: 3.04 G/DL (ref 3.5–5.2)
ALBUMIN/GLOB SERPL: 0.9 G/DL
ALP SERPL-CCNC: 152 U/L (ref 39–117)
ALT SERPL W P-5'-P-CCNC: 22 U/L (ref 1–33)
ANION GAP SERPL CALCULATED.3IONS-SCNC: 17.2 MMOL/L (ref 5–15)
AST SERPL-CCNC: 33 U/L (ref 1–32)
BASOPHILS # BLD AUTO: 0.04 10*3/MM3 (ref 0–0.2)
BASOPHILS NFR BLD AUTO: 0.3 % (ref 0–1.5)
BILIRUB SERPL-MCNC: 1.7 MG/DL (ref 0–1.2)
BUN SERPL-MCNC: 18 MG/DL (ref 8–23)
BUN/CREAT SERPL: 22.2 (ref 7–25)
CALCIUM SPEC-SCNC: 9.1 MG/DL (ref 8.6–10.5)
CHLORIDE SERPL-SCNC: 100 MMOL/L (ref 98–107)
CO2 SERPL-SCNC: 18.8 MMOL/L (ref 22–29)
CREAT SERPL-MCNC: 0.81 MG/DL (ref 0.57–1)
CRP SERPL-MCNC: 29.64 MG/DL (ref 0–0.5)
D-LACTATE SERPL-SCNC: 1.2 MMOL/L (ref 0.5–2)
DEPRECATED RDW RBC AUTO: 41.7 FL (ref 37–54)
EOSINOPHIL # BLD AUTO: 0.07 10*3/MM3 (ref 0–0.4)
EOSINOPHIL NFR BLD AUTO: 0.5 % (ref 0.3–6.2)
ERYTHROCYTE [DISTWIDTH] IN BLOOD BY AUTOMATED COUNT: 13.2 % (ref 12.3–15.4)
ERYTHROCYTE [SEDIMENTATION RATE] IN BLOOD: 80 MM/HR (ref 0–30)
FLUAV SUBTYP SPEC NAA+PROBE: NOT DETECTED
FLUBV RNA ISLT QL NAA+PROBE: NOT DETECTED
GFR SERPL CREATININE-BSD FRML MDRD: 71 ML/MIN/1.73
GLOBULIN UR ELPH-MCNC: 3.5 GM/DL
GLUCOSE BLDC GLUCOMTR-MCNC: 135 MG/DL (ref 70–130)
GLUCOSE SERPL-MCNC: 140 MG/DL (ref 65–99)
HBA1C MFR BLD: 6.2 % (ref 4.8–5.6)
HCT VFR BLD AUTO: 38.9 % (ref 34–46.6)
HGB BLD-MCNC: 12.8 G/DL (ref 12–15.9)
HOLD SPECIMEN: NORMAL
HOLD SPECIMEN: NORMAL
IMM GRANULOCYTES # BLD AUTO: 0.14 10*3/MM3 (ref 0–0.05)
IMM GRANULOCYTES NFR BLD AUTO: 0.9 % (ref 0–0.5)
LYMPHOCYTES # BLD AUTO: 0.81 10*3/MM3 (ref 0.7–3.1)
LYMPHOCYTES NFR BLD AUTO: 5.3 % (ref 19.6–45.3)
MCH RBC QN AUTO: 28.1 PG (ref 26.6–33)
MCHC RBC AUTO-ENTMCNC: 32.9 G/DL (ref 31.5–35.7)
MCV RBC AUTO: 85.5 FL (ref 79–97)
MONOCYTES # BLD AUTO: 1.76 10*3/MM3 (ref 0.1–0.9)
MONOCYTES NFR BLD AUTO: 11.5 % (ref 5–12)
NEUTROPHILS NFR BLD AUTO: 12.44 10*3/MM3 (ref 1.7–7)
NEUTROPHILS NFR BLD AUTO: 81.5 % (ref 42.7–76)
NRBC BLD AUTO-RTO: 0 /100 WBC (ref 0–0.2)
PLATELET # BLD AUTO: 130 10*3/MM3 (ref 140–450)
PMV BLD AUTO: 11.5 FL (ref 6–12)
POTASSIUM SERPL-SCNC: 3.5 MMOL/L (ref 3.5–5.2)
PROT SERPL-MCNC: 6.5 G/DL (ref 6–8.5)
QT INTERVAL: 368 MS
QTC INTERVAL: 450 MS
RBC # BLD AUTO: 4.55 10*6/MM3 (ref 3.77–5.28)
SARS-COV-2 RNA PNL SPEC NAA+PROBE: NOT DETECTED
SODIUM SERPL-SCNC: 136 MMOL/L (ref 136–145)
TROPONIN T SERPL-MCNC: <0.01 NG/ML (ref 0–0.03)
WBC NRBC COR # BLD: 15.26 10*3/MM3 (ref 3.4–10.8)
WHOLE BLOOD HOLD SPECIMEN: NORMAL
WHOLE BLOOD HOLD SPECIMEN: NORMAL

## 2021-12-12 PROCEDURE — 99223 1ST HOSP IP/OBS HIGH 75: CPT | Performed by: HOSPITALIST

## 2021-12-12 PROCEDURE — 86140 C-REACTIVE PROTEIN: CPT | Performed by: HOSPITALIST

## 2021-12-12 PROCEDURE — 86140 C-REACTIVE PROTEIN: CPT | Performed by: PHYSICIAN ASSISTANT

## 2021-12-12 PROCEDURE — 36415 COLL VENOUS BLD VENIPUNCTURE: CPT

## 2021-12-12 PROCEDURE — 25010000002 PIPERACILLIN SOD-TAZOBACTAM PER 1 G: Performed by: PHYSICIAN ASSISTANT

## 2021-12-12 PROCEDURE — 80074 ACUTE HEPATITIS PANEL: CPT | Performed by: HOSPITALIST

## 2021-12-12 PROCEDURE — 70450 CT HEAD/BRAIN W/O DYE: CPT

## 2021-12-12 PROCEDURE — 71045 X-RAY EXAM CHEST 1 VIEW: CPT

## 2021-12-12 PROCEDURE — 85610 PROTHROMBIN TIME: CPT | Performed by: HOSPITALIST

## 2021-12-12 PROCEDURE — 74176 CT ABD & PELVIS W/O CONTRAST: CPT

## 2021-12-12 PROCEDURE — 85652 RBC SED RATE AUTOMATED: CPT | Performed by: PHYSICIAN ASSISTANT

## 2021-12-12 PROCEDURE — 83036 HEMOGLOBIN GLYCOSYLATED A1C: CPT | Performed by: HOSPITALIST

## 2021-12-12 PROCEDURE — 93005 ELECTROCARDIOGRAM TRACING: CPT | Performed by: PHYSICIAN ASSISTANT

## 2021-12-12 PROCEDURE — 85730 THROMBOPLASTIN TIME PARTIAL: CPT | Performed by: HOSPITALIST

## 2021-12-12 PROCEDURE — 87150 DNA/RNA AMPLIFIED PROBE: CPT | Performed by: PHYSICIAN ASSISTANT

## 2021-12-12 PROCEDURE — 25010000002 VANCOMYCIN 5 G RECONSTITUTED SOLUTION: Performed by: PHYSICIAN ASSISTANT

## 2021-12-12 PROCEDURE — 80053 COMPREHEN METABOLIC PANEL: CPT | Performed by: HOSPITALIST

## 2021-12-12 PROCEDURE — 87077 CULTURE AEROBIC IDENTIFY: CPT | Performed by: PHYSICIAN ASSISTANT

## 2021-12-12 PROCEDURE — 82962 GLUCOSE BLOOD TEST: CPT

## 2021-12-12 PROCEDURE — 87636 SARSCOV2 & INF A&B AMP PRB: CPT | Performed by: PHYSICIAN ASSISTANT

## 2021-12-12 PROCEDURE — 99284 EMERGENCY DEPT VISIT MOD MDM: CPT

## 2021-12-12 PROCEDURE — 80053 COMPREHEN METABOLIC PANEL: CPT | Performed by: PHYSICIAN ASSISTANT

## 2021-12-12 PROCEDURE — 83605 ASSAY OF LACTIC ACID: CPT | Performed by: PHYSICIAN ASSISTANT

## 2021-12-12 PROCEDURE — 85025 COMPLETE CBC W/AUTO DIFF WBC: CPT | Performed by: HOSPITALIST

## 2021-12-12 PROCEDURE — 25010000002 CEFTRIAXONE PER 250 MG: Performed by: PHYSICIAN ASSISTANT

## 2021-12-12 PROCEDURE — 76700 US EXAM ABDOM COMPLETE: CPT

## 2021-12-12 PROCEDURE — 87186 SC STD MICRODIL/AGAR DIL: CPT | Performed by: PHYSICIAN ASSISTANT

## 2021-12-12 PROCEDURE — 87040 BLOOD CULTURE FOR BACTERIA: CPT | Performed by: PHYSICIAN ASSISTANT

## 2021-12-12 PROCEDURE — 82105 ALPHA-FETOPROTEIN SERUM: CPT | Performed by: INTERNAL MEDICINE

## 2021-12-12 PROCEDURE — 71250 CT THORAX DX C-: CPT

## 2021-12-12 PROCEDURE — 85007 BL SMEAR W/DIFF WBC COUNT: CPT | Performed by: HOSPITALIST

## 2021-12-12 PROCEDURE — 84484 ASSAY OF TROPONIN QUANT: CPT | Performed by: PHYSICIAN ASSISTANT

## 2021-12-12 PROCEDURE — 85025 COMPLETE CBC W/AUTO DIFF WBC: CPT | Performed by: PHYSICIAN ASSISTANT

## 2021-12-12 RX ORDER — NITROGLYCERIN 0.4 MG/1
0.4 TABLET SUBLINGUAL
Status: DISCONTINUED | OUTPATIENT
Start: 2021-12-12 | End: 2021-12-22 | Stop reason: HOSPADM

## 2021-12-12 RX ORDER — HEPARIN SODIUM 5000 [USP'U]/ML
5000 INJECTION, SOLUTION INTRAVENOUS; SUBCUTANEOUS EVERY 12 HOURS SCHEDULED
Status: DISCONTINUED | OUTPATIENT
Start: 2021-12-12 | End: 2021-12-22 | Stop reason: HOSPADM

## 2021-12-12 RX ORDER — SODIUM CHLORIDE 0.9 % (FLUSH) 0.9 %
10 SYRINGE (ML) INJECTION AS NEEDED
Status: DISCONTINUED | OUTPATIENT
Start: 2021-12-12 | End: 2021-12-22 | Stop reason: HOSPADM

## 2021-12-12 RX ORDER — SODIUM CHLORIDE 0.9 % (FLUSH) 0.9 %
10 SYRINGE (ML) INJECTION EVERY 12 HOURS SCHEDULED
Status: DISCONTINUED | OUTPATIENT
Start: 2021-12-12 | End: 2021-12-22 | Stop reason: HOSPADM

## 2021-12-12 RX ORDER — SODIUM CHLORIDE 9 MG/ML
100 INJECTION, SOLUTION INTRAVENOUS CONTINUOUS
Status: DISCONTINUED | OUTPATIENT
Start: 2021-12-12 | End: 2021-12-16

## 2021-12-12 RX ORDER — METRONIDAZOLE 250 MG/1
500 TABLET ORAL EVERY 8 HOURS SCHEDULED
Status: DISCONTINUED | OUTPATIENT
Start: 2021-12-12 | End: 2021-12-22 | Stop reason: HOSPADM

## 2021-12-12 RX ADMIN — CEFTRIAXONE 1 G: 1 INJECTION, POWDER, FOR SOLUTION INTRAMUSCULAR; INTRAVENOUS at 21:27

## 2021-12-12 RX ADMIN — PIPERACILLIN SODIUM AND TAZOBACTAM SODIUM 3.38 G: 3; .375 INJECTION, POWDER, LYOPHILIZED, FOR SOLUTION INTRAVENOUS at 17:45

## 2021-12-12 RX ADMIN — VANCOMYCIN HYDROCHLORIDE 1750 MG: 5 INJECTION, POWDER, LYOPHILIZED, FOR SOLUTION INTRAVENOUS at 18:43

## 2021-12-12 RX ADMIN — SODIUM CHLORIDE 2517 ML: 9 INJECTION, SOLUTION INTRAVENOUS at 17:01

## 2021-12-12 RX ADMIN — SODIUM CHLORIDE 1000 ML: 9 INJECTION, SOLUTION INTRAVENOUS at 19:50

## 2021-12-12 NOTE — ED PROVIDER NOTES
Subjective   66-year-old female who presents to the emergency department chief complaint generalized fatigue, weakness, shortness of breath, chills, body aches, dizziness worsening over the last 4 days.  Patient does states she has had episodic diarrhea over the last 4 days.  She denies any fever, vomiting, chest pain..  Patient does states she has had diffuse body aches.      History provided by:  Patient   used: No    Weakness - Generalized  Severity:  Moderate  Onset quality:  Gradual  Duration:  2 days  Timing:  Intermittent  Progression:  Worsening  Chronicity:  New  Context: not alcohol use, not allergies, not change in medication, not dehydration, not drug use, not increased activity, not pinched nerve, not recent infection, not stress and not urinary tract infection    Relieved by:  Nothing  Worsened by:  Nothing  Ineffective treatments:  None tried  Associated symptoms: nausea    Associated symptoms: no abdominal pain, no anorexia, no aphasia, no arthralgias, no ataxia, no chest pain, no cough, no difficulty walking, no dizziness, no drooling, no dysuria, no falls, no foul-smelling urine, no frequency, no headaches, no hematochezia, no near-syncope, no seizures, no sensory-motor deficit, no shortness of breath, no stroke symptoms, no syncope, no urgency and no vomiting    Risk factors: no anemia, no congestive heart failure, no coronary artery disease, no diabetes, no excessive menstruation, no family hx of stroke, no neurologic disease, no new medications and no recent stressors        Review of Systems   Constitutional: Positive for chills.   HENT: Negative.  Negative for drooling, hearing loss, mouth sores, nosebleeds and postnasal drip.    Eyes: Negative.  Negative for pain and itching.   Respiratory: Negative.  Negative for cough and shortness of breath.    Cardiovascular: Negative.  Negative for chest pain, syncope and near-syncope.   Gastrointestinal: Positive for nausea. Negative  for abdominal distention, abdominal pain, anal bleeding, anorexia, blood in stool, hematochezia and vomiting.   Endocrine: Negative.  Negative for heat intolerance, polydipsia and polyphagia.   Genitourinary: Negative.  Negative for dysuria, enuresis, frequency, genital sores, hematuria and urgency.   Musculoskeletal: Negative.  Negative for arthralgias, back pain, falls, gait problem and joint swelling.   Skin: Negative.  Negative for color change, pallor and wound.   Neurological: Positive for weakness. Negative for dizziness, seizures and headaches.   Hematological: Negative.    Psychiatric/Behavioral: Negative.  Negative for confusion, decreased concentration and hallucinations. The patient is not nervous/anxious and is not hyperactive.    All other systems reviewed and are negative.      Past Medical History:   Diagnosis Date   • Back pain    • Basal cell carcinoma (BCC) in situ of skin 2020    Dr. Mohr - Derm   • Breast cancer (HCC)     2015   • Cancer (HCC)     breast, cervical, melanoma   • Cervical cancer (HCC) 1976    Diagnosed in 1975.  Treated by repeated local intervention and ultimately received cryotherapy at [facility] with resolution.   • Elbow fracture    • Elevated cholesterol    • Foot fracture    • Headache    • Hyperlipidemia    • Hypertension    • Melanoma (HCC)     on her back 2004 - treated by Dermatologist Catrachito Amaro in [place], [place] with what sounds like wide local excision.    • Sinusitis        No Known Allergies    Past Surgical History:   Procedure Laterality Date   • BREAST LUMPECTOMY Right 2015   • BREAST SURGERY  2015    Secondary to cancer   • COLONOSCOPY N/A 12/9/2020    Procedure: COLONOSCOPY;  Surgeon: Mohsen Maldonado MD;  Location: [facility];  Service: Gastroenterology;  Laterality: N/A;   • ELBOW FUSION      left   • FOOT SURGERY  2014    Broken foot   • HAND SURGERY  08/2020    right   • RHINOPLASTY  2002   • SKIN CANCER EXCISION      malignant melanoma from back 2004        Family History   Problem Relation Age of Onset   • Other Mother         blood clots   • Hypertension Mother    • Ulcers Father    • Hypertension Sister    • Other Sister         Multiple Sclerosis   • Cancer Paternal Aunt    • Stroke Maternal Grandmother    • Alcohol abuse Paternal Grandmother    • Hypertension Sister    • Multiple sclerosis Sister    • Hypertension Sister    • Coronary artery disease Paternal Grandfather    • Cancer Maternal Aunt 30        colon   • Breast cancer Neg Hx        Social History     Socioeconomic History   • Marital status:    Tobacco Use   • Smoking status: Never Smoker   • Smokeless tobacco: Never Used   Vaping Use   • Vaping Use: Never used   Substance and Sexual Activity   • Alcohol use: Not Currently   • Drug use: Never   • Sexual activity: Not Currently     Birth control/protection: Post-menopausal           Objective   Physical Exam  Vitals and nursing note reviewed.   Constitutional:       General: She is not in acute distress.     Appearance: Normal appearance. She is normal weight. She is not ill-appearing, toxic-appearing or diaphoretic.   HENT:      Head: Normocephalic and atraumatic.      Right Ear: Tympanic membrane, ear canal and external ear normal. There is no impacted cerumen.      Left Ear: Tympanic membrane, ear canal and external ear normal. There is no impacted cerumen.      Nose: Nose normal. No congestion or rhinorrhea.      Mouth/Throat:      Mouth: Mucous membranes are moist.      Pharynx: Oropharynx is clear. No oropharyngeal exudate or posterior oropharyngeal erythema.   Eyes:      General: No scleral icterus.        Right eye: No discharge.         Left eye: No discharge.      Extraocular Movements: Extraocular movements intact.      Conjunctiva/sclera: Conjunctivae normal.      Pupils: Pupils are equal, round, and reactive to light.   Neck:      Vascular: No carotid bruit.   Cardiovascular:      Rate and Rhythm: Normal rate and regular rhythm.       Pulses: Normal pulses.      Heart sounds: No murmur heard.  No friction rub. No gallop.    Pulmonary:      Effort: Pulmonary effort is normal. No respiratory distress.      Breath sounds: Normal breath sounds. No stridor. No wheezing, rhonchi or rales.   Chest:      Chest wall: No tenderness.   Abdominal:      General: Abdomen is flat. Bowel sounds are normal. There is no distension.      Palpations: Abdomen is soft. There is no mass.      Tenderness: There is no abdominal tenderness. There is no right CVA tenderness, left CVA tenderness, guarding or rebound.      Hernia: No hernia is present.   Musculoskeletal:         General: No swelling, tenderness, deformity or signs of injury. Normal range of motion.      Cervical back: Normal range of motion and neck supple. No rigidity or tenderness.      Right lower leg: No edema.      Left lower leg: No edema.   Lymphadenopathy:      Cervical: No cervical adenopathy.   Skin:     General: Skin is warm and dry.      Capillary Refill: Capillary refill takes less than 2 seconds.      Coloration: Skin is not jaundiced or pale.      Findings: No bruising, erythema, lesion or rash.   Neurological:      General: No focal deficit present.      Mental Status: She is alert and oriented to person, place, and time. Mental status is at baseline.      Cranial Nerves: No cranial nerve deficit.      Sensory: No sensory deficit.      Motor: No weakness.      Coordination: Coordination normal.      Gait: Gait normal.      Deep Tendon Reflexes: Reflexes normal.   Psychiatric:         Mood and Affect: Mood normal.         Behavior: Behavior normal.         Thought Content: Thought content normal.         Judgment: Judgment normal.         Procedures           ED Course  ED Course as of 12/12/21 1945   Sun Dec 12, 2021   1645 EKG at 1643 hrs. NSR 90 bpm, , QRS 86, QTc 450, regular axis, uneven baseline complicating read however no clear ST deviation consistent with acute ischemia.  []   1759   IMPRESSION:  Patchy infiltrates right lung []   1858 Constellation of findings with significant consolidation in the right lower lobe, enlarged right axillary lymph node, indeterminate bilateral lung nodules and ill-defined hepatic lesion concerning for underlying malignancy in this patient .     []   1900 IMPRESSION:  Constellation of findings with significant consolidation in the right lower lobe, enlarged right axillary lymph node, indeterminate bilateral lung nodules and ill-defined hepatic lesion concerning for underlying malignancy in this patient .  []   1911 Call placed to hospitalist for possible admit.  []   1923 IMPRESSION:  No acute intracranial process []   1924 Discussed care with Dr. Nguyen.  Await patient blood pressure before decision.    []   1945 Endorsed to Dr. Dimas.  []      ED Course User Index  [] Fermin Walters PA-C  [] Lul Lamas MD                                                 [facility]    Final diagnoses:   Hypotension, unspecified hypotension type   Pneumonia of right lower lobe due to infectious organism   Liver mass, right lobe       ED Disposition  ED Disposition     ED Disposition Condition Comment    Decision to Admit  Level of Care: Telemetry [5]   Diagnosis: Sepsis due to pneumonia (HCC) [3307857]   Admitting Physician: JAYLON NGUYEN [[id number]]   Attending Physician: JAYLON NGUYEN [[id number]]   Certification: I Certify That Inpatient Hospital Services Are Medically Necessary For Greater Than 2 Midnights            No follow-up provider specified.       Medication List      No changes were made to your prescriptions during this visit.          Fermin Walters PA-C  12/12/21 1945

## 2021-12-12 NOTE — ED NOTES
MEDICAL SCREENING:    Reason for Visit: Generalized weakness    Patient initially seen in triage.  The patient was advised further evaluation and diagnostic testing will be needed, some of the treatment and testing will be initiated in the lobby in order to begin the process.  The patient will be returned to the waiting area for the time being and possibly be re-assessed by a subsequent ED provider.  The patient will be brought back to the treatment area in as timely manner as possible.         Bozena Walters PA-C  12/12/21 4810

## 2021-12-13 ENCOUNTER — TELEPHONE (OUTPATIENT)
Dept: FAMILY MEDICINE CLINIC | Facility: CLINIC | Age: 66
End: 2021-12-13

## 2021-12-13 ENCOUNTER — APPOINTMENT (OUTPATIENT)
Dept: CARDIOLOGY | Facility: HOSPITAL | Age: 66
End: 2021-12-13

## 2021-12-13 LAB
ALBUMIN SERPL-MCNC: 2.57 G/DL (ref 3.5–5.2)
ALBUMIN/GLOB SERPL: 0.8 G/DL
ALP SERPL-CCNC: 129 U/L (ref 39–117)
ALT SERPL W P-5'-P-CCNC: 19 U/L (ref 1–33)
ANION GAP SERPL CALCULATED.3IONS-SCNC: 9.5 MMOL/L (ref 5–15)
APTT PPP: 35.4 SECONDS (ref 25.5–35.4)
AST SERPL-CCNC: 32 U/L (ref 1–32)
BACTERIA BLD CULT: ABNORMAL
BACTERIA UR QL AUTO: ABNORMAL /HPF
BH CV ECHO MEAS - % IVS THICK: 18.5 %
BH CV ECHO MEAS - % LVPW THICK: 1.1 %
BH CV ECHO MEAS - ACS: 2.5 CM
BH CV ECHO MEAS - AO MAX PG: 10.9 MMHG
BH CV ECHO MEAS - AO MEAN PG: 6 MMHG
BH CV ECHO MEAS - AO ROOT AREA (BSA CORRECTED): 1.8
BH CV ECHO MEAS - AO ROOT AREA: 10.2 CM^2
BH CV ECHO MEAS - AO ROOT DIAM: 3.6 CM
BH CV ECHO MEAS - AO V2 MAX: 165 CM/SEC
BH CV ECHO MEAS - AO V2 MEAN: 113 CM/SEC
BH CV ECHO MEAS - AO V2 VTI: 34.5 CM
BH CV ECHO MEAS - BSA(HAYCOCK): 2.1 M^2
BH CV ECHO MEAS - BSA: 2 M^2
BH CV ECHO MEAS - BZI_BMI: 31 KILOGRAMS/M^2
BH CV ECHO MEAS - BZI_METRIC_HEIGHT: 170.2 CM
BH CV ECHO MEAS - BZI_METRIC_WEIGHT: 89.8 KG
BH CV ECHO MEAS - EDV(CUBED): 97 ML
BH CV ECHO MEAS - EDV(MOD-SP4): 89.2 ML
BH CV ECHO MEAS - EDV(TEICH): 97.1 ML
BH CV ECHO MEAS - EF(CUBED): 73.8 %
BH CV ECHO MEAS - EF(MOD-SP4): 63.9 %
BH CV ECHO MEAS - EF(TEICH): 65.7 %
BH CV ECHO MEAS - ESV(CUBED): 25.4 ML
BH CV ECHO MEAS - ESV(MOD-SP4): 32.2 ML
BH CV ECHO MEAS - ESV(TEICH): 33.3 ML
BH CV ECHO MEAS - FS: 36 %
BH CV ECHO MEAS - IVS/LVPW: 1
BH CV ECHO MEAS - IVSD: 1 CM
BH CV ECHO MEAS - IVSS: 1.2 CM
BH CV ECHO MEAS - LA DIMENSION: 2.8 CM
BH CV ECHO MEAS - LA/AO: 0.76
BH CV ECHO MEAS - LV DIASTOLIC VOL/BSA (35-75): 44.3 ML/M^2
BH CV ECHO MEAS - LV MASS(C)D: 157.8 GRAMS
BH CV ECHO MEAS - LV MASS(C)DI: 78.4 GRAMS/M^2
BH CV ECHO MEAS - LV MASS(C)S: 91.7 GRAMS
BH CV ECHO MEAS - LV MASS(C)SI: 45.6 GRAMS/M^2
BH CV ECHO MEAS - LV SYSTOLIC VOL/BSA (12-30): 16 ML/M^2
BH CV ECHO MEAS - LVIDD: 4.6 CM
BH CV ECHO MEAS - LVIDS: 2.9 CM
BH CV ECHO MEAS - LVLD AP4: 7.4 CM
BH CV ECHO MEAS - LVLS AP4: 5.3 CM
BH CV ECHO MEAS - LVOT AREA (M): 3.8 CM^2
BH CV ECHO MEAS - LVOT AREA: 3.8 CM^2
BH CV ECHO MEAS - LVOT DIAM: 2.2 CM
BH CV ECHO MEAS - LVPWD: 0.99 CM
BH CV ECHO MEAS - LVPWS: 1 CM
BH CV ECHO MEAS - MV A MAX VEL: 99.4 CM/SEC
BH CV ECHO MEAS - MV E MAX VEL: 69.4 CM/SEC
BH CV ECHO MEAS - MV E/A: 0.7
BH CV ECHO MEAS - PA ACC TIME: 0.06 SEC
BH CV ECHO MEAS - PA PR(ACCEL): 52 MMHG
BH CV ECHO MEAS - SI(AO): 174.4 ML/M^2
BH CV ECHO MEAS - SI(CUBED): 35.6 ML/M^2
BH CV ECHO MEAS - SI(MOD-SP4): 28.3 ML/M^2
BH CV ECHO MEAS - SI(TEICH): 31.7 ML/M^2
BH CV ECHO MEAS - SV(AO): 351.2 ML
BH CV ECHO MEAS - SV(CUBED): 71.6 ML
BH CV ECHO MEAS - SV(MOD-SP4): 57 ML
BH CV ECHO MEAS - SV(TEICH): 63.8 ML
BILIRUB SERPL-MCNC: 1 MG/DL (ref 0–1.2)
BILIRUB UR QL STRIP: NEGATIVE
BOTTLE TYPE: ABNORMAL
BUN SERPL-MCNC: 16 MG/DL (ref 8–23)
BUN/CREAT SERPL: 25.4 (ref 7–25)
BURR CELLS BLD QL SMEAR: ABNORMAL
CALCIUM SPEC-SCNC: 7.8 MG/DL (ref 8.6–10.5)
CHLORIDE SERPL-SCNC: 109 MMOL/L (ref 98–107)
CLARITY UR: ABNORMAL
CO2 SERPL-SCNC: 19.5 MMOL/L (ref 22–29)
COLOR UR: ABNORMAL
CREAT SERPL-MCNC: 0.63 MG/DL (ref 0.57–1)
CRP SERPL-MCNC: 25.18 MG/DL (ref 0–0.5)
DEPRECATED RDW RBC AUTO: 42.6 FL (ref 37–54)
ERYTHROCYTE [DISTWIDTH] IN BLOOD BY AUTOMATED COUNT: 13.4 % (ref 12.3–15.4)
GFR SERPL CREATININE-BSD FRML MDRD: 95 ML/MIN/1.73
GLOBULIN UR ELPH-MCNC: 3 GM/DL
GLUCOSE SERPL-MCNC: 128 MG/DL (ref 65–99)
GLUCOSE UR STRIP-MCNC: NEGATIVE MG/DL
HAV IGM SERPL QL IA: NORMAL
HBV CORE IGM SERPL QL IA: NORMAL
HBV SURFACE AG SERPL QL IA: NORMAL
HCT VFR BLD AUTO: 34.8 % (ref 34–46.6)
HCV AB SER DONR QL: NORMAL
HGB BLD-MCNC: 11.2 G/DL (ref 12–15.9)
HGB UR QL STRIP.AUTO: NEGATIVE
HYALINE CASTS UR QL AUTO: ABNORMAL /LPF
INR PPP: 1.09 (ref 0.9–1.1)
KETONES UR QL STRIP: ABNORMAL
L PNEUMO1 AG UR QL IA: NEGATIVE
LEUKOCYTE ESTERASE UR QL STRIP.AUTO: ABNORMAL
LYMPHOCYTES # BLD MANUAL: 2.91 10*3/MM3 (ref 0.7–3.1)
LYMPHOCYTES NFR BLD MANUAL: 8 % (ref 5–12)
MAXIMAL PREDICTED HEART RATE: 155 BPM
MCH RBC QN AUTO: 28.1 PG (ref 26.6–33)
MCHC RBC AUTO-ENTMCNC: 32.2 G/DL (ref 31.5–35.7)
MCV RBC AUTO: 87.2 FL (ref 79–97)
MONOCYTES # BLD: 1.11 10*3/MM3 (ref 0.1–0.9)
NEUTROPHILS # BLD AUTO: 9.85 10*3/MM3 (ref 1.7–7)
NEUTROPHILS NFR BLD MANUAL: 71 % (ref 42.7–76)
NITRITE UR QL STRIP: NEGATIVE
PH UR STRIP.AUTO: 6 [PH] (ref 5–8)
PLAT MORPH BLD: NORMAL
PLATELET # BLD AUTO: 118 10*3/MM3 (ref 140–450)
PMV BLD AUTO: 11.3 FL (ref 6–12)
POTASSIUM SERPL-SCNC: 3.4 MMOL/L (ref 3.5–5.2)
PROT SERPL-MCNC: 5.6 G/DL (ref 6–8.5)
PROT UR QL STRIP: ABNORMAL
PROTHROMBIN TIME: 14.5 SECONDS (ref 12.8–14.5)
RBC # BLD AUTO: 3.99 10*6/MM3 (ref 3.77–5.28)
RBC # UR STRIP: ABNORMAL /HPF
REF LAB TEST METHOD: ABNORMAL
SCAN SLIDE: NORMAL
SODIUM SERPL-SCNC: 138 MMOL/L (ref 136–145)
SP GR UR STRIP: 1.02 (ref 1–1.03)
SQUAMOUS #/AREA URNS HPF: ABNORMAL /HPF
STRESS TARGET HR: 132 BPM
UROBILINOGEN UR QL STRIP: ABNORMAL
VARIANT LYMPHS NFR BLD MANUAL: 21 % (ref 19.6–45.3)
WBC # UR STRIP: ABNORMAL /HPF
WBC NRBC COR # BLD: 13.87 10*3/MM3 (ref 3.4–10.8)

## 2021-12-13 PROCEDURE — 87899 AGENT NOS ASSAY W/OPTIC: CPT | Performed by: HOSPITALIST

## 2021-12-13 PROCEDURE — 25010000002 CEFTRIAXONE PER 250 MG: Performed by: INTERNAL MEDICINE

## 2021-12-13 PROCEDURE — 93306 TTE W/DOPPLER COMPLETE: CPT | Performed by: INTERNAL MEDICINE

## 2021-12-13 PROCEDURE — 93306 TTE W/DOPPLER COMPLETE: CPT

## 2021-12-13 PROCEDURE — 81001 URINALYSIS AUTO W/SCOPE: CPT | Performed by: HOSPITALIST

## 2021-12-13 PROCEDURE — 94799 UNLISTED PULMONARY SVC/PX: CPT

## 2021-12-13 PROCEDURE — 99233 SBSQ HOSP IP/OBS HIGH 50: CPT | Performed by: INTERNAL MEDICINE

## 2021-12-13 PROCEDURE — 25010000002 HEPARIN (PORCINE) PER 1000 UNITS: Performed by: HOSPITALIST

## 2021-12-13 RX ORDER — ACETAMINOPHEN 325 MG/1
650 TABLET ORAL EVERY 6 HOURS PRN
Status: DISCONTINUED | OUTPATIENT
Start: 2021-12-13 | End: 2021-12-22 | Stop reason: HOSPADM

## 2021-12-13 RX ORDER — ATORVASTATIN CALCIUM 40 MG/1
80 TABLET, FILM COATED ORAL NIGHTLY
Status: CANCELLED | OUTPATIENT
Start: 2021-12-13

## 2021-12-13 RX ORDER — ATENOLOL 50 MG/1
50 TABLET ORAL 2 TIMES DAILY
Status: CANCELLED | OUTPATIENT
Start: 2021-12-13

## 2021-12-13 RX ADMIN — METRONIDAZOLE 500 MG: 250 TABLET ORAL at 05:29

## 2021-12-13 RX ADMIN — ACETAMINOPHEN 650 MG: 325 TABLET ORAL at 15:03

## 2021-12-13 RX ADMIN — HEPARIN SODIUM 5000 UNITS: 5000 INJECTION INTRAVENOUS; SUBCUTANEOUS at 00:07

## 2021-12-13 RX ADMIN — HEPARIN SODIUM 5000 UNITS: 5000 INJECTION INTRAVENOUS; SUBCUTANEOUS at 21:00

## 2021-12-13 RX ADMIN — METRONIDAZOLE 500 MG: 250 TABLET ORAL at 15:03

## 2021-12-13 RX ADMIN — DOXYCYCLINE 100 MG: 100 INJECTION, POWDER, LYOPHILIZED, FOR SOLUTION INTRAVENOUS at 00:04

## 2021-12-13 RX ADMIN — METRONIDAZOLE 500 MG: 250 TABLET ORAL at 20:59

## 2021-12-13 RX ADMIN — SODIUM CHLORIDE 100 ML/HR: 9 INJECTION, SOLUTION INTRAVENOUS at 00:06

## 2021-12-13 RX ADMIN — HEPARIN SODIUM 5000 UNITS: 5000 INJECTION INTRAVENOUS; SUBCUTANEOUS at 08:26

## 2021-12-13 RX ADMIN — SODIUM CHLORIDE 2 G: 9 INJECTION, SOLUTION INTRAVENOUS at 21:01

## 2021-12-13 RX ADMIN — SODIUM CHLORIDE 100 ML/HR: 9 INJECTION, SOLUTION INTRAVENOUS at 11:50

## 2021-12-13 RX ADMIN — SODIUM CHLORIDE, PRESERVATIVE FREE 10 ML: 5 INJECTION INTRAVENOUS at 08:26

## 2021-12-13 RX ADMIN — SODIUM CHLORIDE, PRESERVATIVE FREE 10 ML: 5 INJECTION INTRAVENOUS at 21:00

## 2021-12-13 RX ADMIN — ACETAMINOPHEN 650 MG: 325 TABLET ORAL at 21:02

## 2021-12-13 RX ADMIN — DOXYCYCLINE 100 MG: 100 INJECTION, POWDER, LYOPHILIZED, FOR SOLUTION INTRAVENOUS at 11:49

## 2021-12-13 NOTE — CASE MANAGEMENT/SOCIAL WORK
Discharge Planning Assessment   [place]     Patient Name: Yadira Burgos  MRN: [id number]  Today's Date: 12/13/2021    Admit Date: 12/12/2021     Discharge Needs Assessment     Row Name 12/13/21 1318       Living Environment    Lives With parent(s); sibling(s)    Name(s) of Who Lives With Patient lives with Mother Silvia & a sister.    Primary Care Provided by self    Family Caregiver if Needed sibling(s)    Family Caregiver Names Uma Han-sister    Quality of Family Relationships supportive    Able to Return to Prior Arrangements yes       Resource/Environmental Concerns    Transportation Concerns car, none       Transition Planning    Transportation Anticipated car, drives self; family or friend will provide       Discharge Needs Assessment    Readmission Within the Last 30 Days no previous admission in last 30 days    Equipment Currently Used at Home none    Concerns to be Addressed no discharge needs identified; denies needs/concerns at this time    Equipment Needed After Discharge none               Discharge Plan     Row Name 12/13/21 1325       Plan    Plan CM spoke with pt via phone. Pt is very kind to speak with. She lives at home with her mother, Silvia & a sister. Pt reports 2 other sisters live nearby if assistance is needed.  She is indep at baseline. Pt is employed fulltime with [employer]-works from home.She follows with Dr. Dilcia Pedro-PCP, Pt has Medicare AB and chris any issues with med copays. She utilizes no DME or HH services and reports she has been fairly healthy. Pt plans to return back home to her family at [place]. [place], to provide transportaton. CM will follow & assist as needed.    Patient/Family in Agreement with Plan yes    Row Name 12/13/21 1309       Plan    Plan Comments Admitted with Severe Sepsis 2/2 Pna, Cst Pulm.- poss. Post-obstructive process ( Mult. pulm.nodules/hepatic lesion) concern for mets.dz. Afeb., sats 97% on ra, Javier/doxy/Flagyl IV, NS@100cc/hr, obtain TTE, Reg  diet, Bun 16/cr. 0.63, CRP 25 (29), wbc 13.8, BC(P)              Continued Care and Services - Admitted Since 12/12/2021    Coordination has not been started for this encounter.          Demographic Summary     Row Name 12/13/21 1315       General Information    Admission Type inpatient    General Information Comments Dilcia Pedro-PCP               Functional Status     Row Name 12/13/21 1316       Functional Status    Functional Status Comments indep at baseline       Functional Status, IADL    IADL Comments indep at baseline       Employment/    Current or Previous Occupation other (see comments)    Employment/ Comments Employed fulltime with [employer]- works from home               Psychosocial    No documentation.                Abuse/Neglect    No documentation.                Legal    No documentation.                Substance Abuse    No documentation.                Patient Forms    No documentation.                   Bozena Hdz RN

## 2021-12-13 NOTE — H&P
"Hospitalist History and Physical        Patient Identification  Name: Yadira Burgos  Age/Sex: 66 y.o. female  :  1955        MRN: [id number]  Visit Number: [id number]  Admit Date: 2021   PCP: Dilcia Pedro MD        Chief complaint generalized weakness, couldn't get out of bed, disoriented    History of Present Illness:  Patient is a 66 y.o. female with history of right breast cancer 2015 s/p lumpectomy and radiation, multiple skin cancers (basal cell, melanoma) s/p resection years ago, cervical cancer in , HLD, HTN, and \"borderline\" diabetes, who presented with complaints of severe generalized weakness. She states that earlier this week around Tuesday she completed a course of amoxicillin for treatment of sinusitis. She felt as though she had recovered from the sinusitis, but the next day she developing uncontrollable shaking which lasted several hours. This recurred the next few days. She tried to get into an urgent treatment center on Thursday and was told she had a fever but that it was too late for her to be seen and that she would need to go to the ED for further evaluation. She went to her PCP on Friday who said her glucose levels were high and she was given instructions on how to get her levels down through diet. Starting Saturday, she developed worsening generalized weakness to the point that she could not get out of bed this morning. She reports being disoriented as well. She was brought to the ED where initial evaluation revealed she was hypotensive. BP improved with IV fluid resuscitation. She states with fluids she became more alert and aware of other symptoms, including shortness of breath. Upon further questioning, she had been coughing some over the last few days and had even coughed up some sputum at one point but did not examine it. She reported some mild generalized abdominal soreness but no specific tenderness. She denies any significant weight loss over the last few " "few months, only the 8 lb that she lost in the last week since being sick. She denies any chest pain; she denies nausea and vomiting; she does report some diarrhea after eating what she thinks was a \"bad piece of chicken\" on Thursday and diarrhea persisted until this morning when she took some anti-diarrheal medication.     Review of Systems  Review of Systems   Constitutional: Positive for activity change, fatigue and fever. Negative for appetite change, chills, diaphoresis and unexpected weight change.   HENT: Negative for congestion, postnasal drip, rhinorrhea, sinus pressure, sinus pain and sore throat.    Eyes: Negative for photophobia, pain, discharge, redness, itching and visual disturbance.   Respiratory: Positive for cough and shortness of breath. Negative for wheezing.    Cardiovascular: Negative for chest pain, palpitations and leg swelling.   Gastrointestinal: Positive for abdominal pain (very minimal generalized discomfort) and diarrhea. Negative for abdominal distention, constipation, nausea and vomiting.   Endocrine: Negative for cold intolerance, heat intolerance, polydipsia, polyphagia and polyuria.   Genitourinary: Negative for difficulty urinating, dysuria, flank pain, frequency and hematuria.   Musculoskeletal: Negative for arthralgias, back pain, joint swelling, myalgias, neck pain and neck stiffness.   Skin: Negative for color change, pallor, rash and wound.   Neurological: Positive for tremors, weakness and light-headedness. Negative for dizziness, seizures, syncope, numbness and headaches.   Hematological: Negative for adenopathy. Does not bruise/bleed easily.   Psychiatric/Behavioral: Positive for confusion. Negative for agitation and behavioral problems. The patient is not nervous/anxious.        History  Past Medical History:   Diagnosis Date   • Back pain    • Basal cell carcinoma (BCC) in situ of skin 2020    Dr. Mohr - Derm   • Breast cancer (HCC)     2015   • Cancer (HCC)     breast, " cervical, melanoma   • Cervical cancer (HCC) 1976    Diagnosed in 1975.  Treated by repeated local intervention and ultimately received cryotherapy at [facility] with resolution.   • Elbow fracture    • Elevated cholesterol    • Foot fracture    • Headache    • Hyperlipidemia    • Hypertension    • Melanoma (HCC)     on her back 2004 - treated by Dermatologist Catrachito Amaro in [place], [place] with what sounds like wide local excision.    • Sinusitis      Past Surgical History:   Procedure Laterality Date   • BREAST LUMPECTOMY Right 2015   • BREAST SURGERY  2015    Secondary to cancer   • COLONOSCOPY N/A 12/9/2020    Procedure: COLONOSCOPY;  Surgeon: Mohsen Maldonado MD;  Location: [facility];  Service: Gastroenterology;  Laterality: N/A;   • ELBOW FUSION      left   • FOOT SURGERY  2014    Broken foot   • HAND SURGERY  08/2020    right   • RHINOPLASTY  2002   • SKIN CANCER EXCISION      malignant melanoma from back 2004     Family History   Problem Relation Age of Onset   • Other Mother         blood clots   • Hypertension Mother    • Ulcers Father    • Hypertension Sister    • Other Sister         Multiple Sclerosis   • Cancer Paternal Aunt    • Stroke Maternal Grandmother    • Alcohol abuse Paternal Grandmother    • Hypertension Sister    • Multiple sclerosis Sister    • Hypertension Sister    • Coronary artery disease Paternal Grandfather    • Cancer Maternal Aunt 30        colon   • Breast cancer Neg Hx      Social History     Tobacco Use   • Smoking status: Never Smoker   • Smokeless tobacco: Never Used   Vaping Use   • Vaping Use: Never used   Substance Use Topics   • Alcohol use: Not Currently   • Drug use: Never     Medications Prior to Admission   Medication Sig Dispense Refill Last Dose   • atenolol (TENORMIN) 50 MG tablet Take 1 tablet by mouth 2 (two) times a day. 180 tablet 1    • atorvastatin (LIPITOR) 80 MG tablet Take 1 tablet by mouth Every Night. 90 tablet 1    • baclofen (LIORESAL) 20 MG tablet Take 1  tablet by mouth Every Night. 30 tablet 5    • Blood Glucose Monitoring Suppl (Blood Glucose Monitor System) w/Device kit 1 each 2 (Two) Times a Day Before Meals for 30 days. 1 each 0    • fluticasone (FLONASE) 50 MCG/ACT nasal spray 2 sprays into the nostril(s) as directed by provider Daily. 16 g 5    • gabapentin (NEURONTIN) 100 MG capsule Take 100 mg by mouth Daily As Needed.      • lactobacillus acidophilus (RISAQUAD) capsule capsule Take 1 capsule by mouth Daily. 30 each 2    • loperamide (Imodium A-D) 2 MG tablet Take 1 tablet by mouth 4 (Four) Times a Day As Needed for Diarrhea for up to 7 days. 32 tablet 0    • SudoGest 12 Hour 120 MG 12 hr tablet TAKE 1 TABLET BY MOUTH EVERY 12 HOURS 60 tablet 0    • traZODone (DESYREL) 50 MG tablet Take 50 mg by mouth Every Night.        Allergies:  Patient has no known allergies.    Objective     Vital Signs  Temp:  [97.1 °F (36.2 °C)-97.5 °F (36.4 °C)] 97.5 °F (36.4 °C)  Heart Rate:  [75-98] 75  Resp:  [18] 18  BP: ()/(50-66) 104/63  Body mass index is 31.09 kg/m².    Physical Exam:  Physical Exam  Constitutional:       General: She is not in acute distress.     Appearance: She is ill-appearing.   HENT:      Head: Normocephalic and atraumatic.      Right Ear: External ear normal.      Left Ear: External ear normal.      Nose: Nose normal.      Mouth/Throat:      Mouth: Mucous membranes are dry.      Pharynx: Oropharynx is clear.   Eyes:      Extraocular Movements: Extraocular movements intact.      Conjunctiva/sclera: Conjunctivae normal.      Pupils: Pupils are equal, round, and reactive to light.   Cardiovascular:      Rate and Rhythm: Normal rate and regular rhythm.      Pulses: Normal pulses.      Heart sounds: No murmur heard.      Pulmonary:      Effort: Pulmonary effort is normal.      Comments: Diminished breath sounds right middle and lower lung zones  Abdominal:      General: Abdomen is flat. Bowel sounds are normal. There is no distension.       Palpations: Abdomen is soft.      Tenderness: There is no abdominal tenderness.   Musculoskeletal:         General: No tenderness. Normal range of motion.      Cervical back: Normal range of motion and neck supple. No tenderness.      Right lower leg: No edema.      Left lower leg: No edema.   Lymphadenopathy:      Cervical: No cervical adenopathy.   Skin:     General: Skin is warm and dry.      Capillary Refill: Capillary refill takes less than 2 seconds.      Coloration: Skin is not jaundiced.      Findings: No bruising.   Neurological:      General: No focal deficit present.      Mental Status: She is oriented to person, place, and time.   Psychiatric:         Mood and Affect: Mood normal.         Behavior: Behavior normal.         Thought Content: Thought content normal.         Judgment: Judgment normal.           Results Review:       Lab Results:  Results from last 7 days   Lab Units 12/12/21  1639 12/10/21  1510   WBC 10*3/mm3 15.26* 12.98*   HEMOGLOBIN g/dL 12.8 13.2   PLATELETS 10*3/mm3 130* 144     Results from last 7 days   Lab Units 12/12/21  1639   CRP mg/dL 29.64*     Results from last 7 days   Lab Units 12/12/21  1639 12/10/21  1510   SODIUM mmol/L 136 137   POTASSIUM mmol/L 3.5 3.9   CHLORIDE mmol/L 100 101   CO2 mmol/L 18.8* 20.3*   BUN mg/dL 18 20   CREATININE mg/dL 0.81 0.83   CALCIUM mg/dL 9.1 8.7   GLUCOSE mg/dL 140* 113*     Results from last 7 days   Lab Units 12/10/21  1510   MAGNESIUM mg/dL 1.6     Hemoglobin A1C   Date Value Ref Range Status   12/12/2021 6.20 (H) 4.80 - 5.60 % Final     Results from last 7 days   Lab Units 12/12/21  1639 12/10/21  1510   BILIRUBIN mg/dL 1.7* 1.1   ALK PHOS U/L 152* 154*   AST (SGOT) U/L 33* 26   ALT (SGPT) U/L 22 20     Results from last 7 days   Lab Units 12/12/21  1639   TROPONIN T ng/mL <0.010                   I have reviewed the patient's laboratory results.    Imaging:  Imaging Results (Last 72 Hours)     Procedure Component Value Units Date/Time     US Abdomen Complete [[id number]] Collected: 12/12/21 2053     Updated: 12/12/21 2055    Narrative:      PROCEDURE: US Abdomen Complete    COMPARISON: CT abdomen and pelvis same day    INDICATIONS: abdominal pain; Hypotension, unspecified; Pneumonia, unspecified organism; Hepatomegaly, not elsewhere classified elevated white count abnormal CT      TECHNIQUE:  Transverse and longitudinal images of the abdomen were obtained using real-time grayscale and color ultrasound.    FINDINGS:  The visualized portions of the pancreas, IVC and aorta appear normal.        The liver is moderately coarsened in echotexture. Ill-defined hypodensity in the liver measuring 3 x 3.2 x 2.6 cm corresponds to recent CT. Unclear if this is a malignant lesion. Abscesses within the differential but prior CT was performed without any  contrast.  The common bile duct is not dilated.. Gallbladder wall is thickened at 3.4 mm with trace pericholecystic fluid and subtle hyperemia. Gallstones and shadowing present.    The kidneys are normal in size and echogenicity. There is no   hydronephrosis or focal solid lesion.  The spleen is normal in size and echotexture.      Impression:      1. Nonspecific gallbladder wall thickening and questionable pericholecystic fluid. Gallstones are better seen on CT than ultrasound.  2.  Heterogeneous liver with lesion in the liver as seen on CT. Ultrasound is not helpful in determining etiology. This could be an abscess but given the other findings on CT,  malignancy is not excluded.         Signer Name: Ximena Delgado MD   Signed: 12/12/2021 8:53 PM   Workstation Name: [facility]    [facility]    CT Abdomen Pelvis Without Contrast [[id number]] Collected: 12/12/21 1844     Updated: 12/12/21 1846    Narrative:      PROCEDURE: CT Abdomen Pelvis WO, CT Chest WO    INDICATIONS: diarrhea    COMPARISON: 12/15/2020      Difficulty eating/elevated wbc/diarrhea      TECHNIQUE: CT of the chest, abdomen  and pelvis are performed without oral or IV contrast. Multiplanar reformations were performed.  No oral contrast utilized    Radiation dose reduction techniques included automated exposure control or exposure modulation based on body size.   Count of known CT and cardiac nuc med studies performed in previous 12 months: 3.         FINDINGS: Lack of IV contrast hinders parenchymal assessment of the mediastinum, liver, spleen, pancreas, adrenal glands and kidneys.       Significant infiltrate seen in the right lower lobe with interstitial component. There are also lung nodules present in the right lung. Index nodule in the right lung base medially is 5 mm maximal diameter. Other smaller nodules seen throughout the right  lung. Tiny 3 mm lingular nodule seen in series 3 image 30. A benign calcified granuloma seen in the left lower lobe but a noncalcified indeterminant  5 mm left lower lobe nodule seen, series 3 image 42.    Nonenlarged mediastinal adenopathy seen. However there is an enlarged right axillary 1.8 x 2.6 cm lymph node partially seen.    Worrisome hepatic metastasis or primary hepatic lesion seen measuring 4.4 x 4.1 cm in liver segment 8. Margins are ill-defined. Abscess could have a similar appearance. Suggestion of subtle gallstones. Nonobstructing 2 mm right renal calculus seen. No  obstructive uropathy. Gaseous distention of the esophagus and hiatal hernia present consistent with GERD. No enteric contrast but no gross evidence of bowel obstruction.   There is no gross free air, free fluid or focal inflammatory change.  Uterus and  adnexa are not enlarged. Osseous structures are grossly intact.      Impression:      Constellation of findings with significant consolidation in the right lower lobe, enlarged right axillary lymph node, indeterminate bilateral lung nodules and ill-defined hepatic lesion concerning for underlying malignancy in this patient .     Signer Name: Ximena Delgado MD   Signed:  12/12/2021 6:44 PM   Workstation Name: [facility]    [facility]    CT Chest Without Contrast Diagnostic [[id number]] Collected: 12/12/21 1844     Updated: 12/12/21 1846    Narrative:      PROCEDURE: CT Abdomen Pelvis WO, CT Chest WO    INDICATIONS: diarrhea    COMPARISON: 12/15/2020      Difficulty eating/elevated wbc/diarrhea      TECHNIQUE: CT of the chest, abdomen and pelvis are performed without oral or IV contrast. Multiplanar reformations were performed.  No oral contrast utilized    Radiation dose reduction techniques included automated exposure control or exposure modulation based on body size.   Count of known CT and cardiac nuc med studies performed in previous 12 months: 3.         FINDINGS: Lack of IV contrast hinders parenchymal assessment of the mediastinum, liver, spleen, pancreas, adrenal glands and kidneys.       Significant infiltrate seen in the right lower lobe with interstitial component. There are also lung nodules present in the right lung. Index nodule in the right lung base medially is 5 mm maximal diameter. Other smaller nodules seen throughout the right  lung. Tiny 3 mm lingular nodule seen in series 3 image 30. A benign calcified granuloma seen in the left lower lobe but a noncalcified indeterminant  5 mm left lower lobe nodule seen, series 3 image 42.    Nonenlarged mediastinal adenopathy seen. However there is an enlarged right axillary 1.8 x 2.6 cm lymph node partially seen.    Worrisome hepatic metastasis or primary hepatic lesion seen measuring 4.4 x 4.1 cm in liver segment 8. Margins are ill-defined. Abscess could have a similar appearance. Suggestion of subtle gallstones. Nonobstructing 2 mm right renal calculus seen. No  obstructive uropathy. Gaseous distention of the esophagus and hiatal hernia present consistent with GERD. No enteric contrast but no gross evidence of bowel obstruction.   There is no gross free air, free fluid or focal inflammatory  change.  Uterus and  adnexa are not enlarged. Osseous structures are grossly intact.      Impression:      Constellation of findings with significant consolidation in the right lower lobe, enlarged right axillary lymph node, indeterminate bilateral lung nodules and ill-defined hepatic lesion concerning for underlying malignancy in this patient .     Signer Name: Ximena Delgado MD   Signed: 12/12/2021 6:44 PM   Workstation Name: [facility]    Radiology [facility]    CT Head Without Contrast [[id number]] Collected: 12/12/21 1807     Updated: 12/12/21 1809    Narrative:      PROCEDURE: CT Head WO    COMPARISON: No relevant comparison or correlation studies available at time of dictation.      INDICATIONS: generalized weakness    Radiation dose reduction techniques included automated exposure control or exposure modulation based on body size.   Count of known CT and cardiac nuc med studies performed in previous 12 months: 2.          FINDINGS:  CT examination of the brain is performed without IV contrast. There is no evidence of acute intracranial hemorrhage, mass effect or midline shift. No intra-axial or extra-axial fluid collections. The ventricular system is normal.  The  calvarium is intact.         Impression:      No acute intracranial process.             Signer Name: Ximena Delgado MD   Signed: 12/12/2021 6:07 PM   [facility]    Radiology [facility]    XR Chest 1 View [[id number]] Collected: 12/12/21 1736     Updated: 12/12/21 1738    Narrative:      PROCEDURE: CR Chest 1 Vw    COMPARISON:  CT chest 12/15/2020     INDICATIONS: Acute emergency room presentation with pain and weakness    TECHNIQUE: Single AP  view of the chest    FINDINGS:  Cardiomediastinal silhouette is within normal limits given projection. Elevated right hemidiaphragm with patchy infiltrates in the right lung. No lobar collapse. Left lung relatively clear. Osseous structures are intact.       Impression:      Patchy infiltrates right lung         Signer Name: Ximena Delgado MD   Signed: 12/12/2021 5:36 PM   Workstation Name: [facility]    [facility] [place]          I have personally reviewed the patient's radiologic imaging.        EKG:   Normal sinus rhythm, HR 90, QTc 450  Nonspecific ST abnormality  Abnormal ECG  No previous ECGs available  Confirmed by Joan Perales ([id number]) on 12/12/2021 7:19:07 PM    I have personally reviewed the patient's EKG.        Assessment/Plan     - Severe sepsis, present on admission with tachycardia HR>90, leukocytosis, significant CRP elevation, and hypotension that responded to sepsis IV fluid bolus, felt to be secondary to right lower lobe pneumonia. Infiltrate is dense and in light of pulmonary nodules and hepatic lesion, this raises concern for possible post-obstructive process. Will treat with IV rocephin and doxycycline and PO flagyl (IV on backorder). Continue to trend WBC, CRP. Follow up blood cultures collected in ED. Send respiratory PCR, urine legionella and urine strep pneumo antigen for further investigation. Will consult pulmonology for consideration of bronchoscopy or guidance regarding further workup in light of multiple lung nodules, axillary lymphadenopathy and liver lesion.  - Constellation of multiple lung nodules with dense RLL consolidation, right axillary lymphadenopathy, and large liver lesion, concerning for malignancy with metastatic disease: again, will discuss with pulmonology regarding best course of action moving forward diagnostically.   - Mild hyperglycemia: at least partially reactive in nature. A1c 6.2 indicative of pre-diabetes. Monitor glucose though daily lab draws for now.   - Mild thrombocytopenia: monitor for now. If platelets drop significantly moving forward, will need to d/c heparin.    - Mild alk phos elevation, LFT elevation, hyperbilirubinemia: no evidence of biliary obstruction on CT or US of abdomen.  Possible subtle gallstones, questionable pericholecystic fluid, but no RUQ pain or tenderness on exam. Suspect these mild lab elevations are related to hepatic lesion noted above. Continue to monitor for now. Check PTT, INR. Screen for viral hepatitis.   - Hypertension: hold home BP meds for now given hypotension at time of presentation. Continue to monitor BP closely for now.    DVT Prophylaxis: SQ heparin    Estimated Length of Stay >2 midnights    I discussed the patient's findings, assessment and plan with the patient and her RN Magdy who was present during my interview and exam on [facility].    * patient is high risk due to severe sepsis, right lower lobe pneumonia, concern for metastatic disease    Gordon Nguyen MD  12/12/21  23:14 EST

## 2021-12-13 NOTE — TELEPHONE ENCOUNTER
----- Message from TENNILLE Mclaughlin sent at 12/13/2021  9:29 AM EST -----  How is she feeling today? Is she still having diarrhea?      Mail box is full at this time.      Still unavailable.    Patient is hospitalized @ present.

## 2021-12-13 NOTE — ED NOTES
Patient alert and verbal. Denies any complaints or needs at this time. Patient updated on plan of care and reason for wait.Family remains at bedside. DUANE. FARHAD.     Yuki Pompa RN  12/12/21 0966

## 2021-12-13 NOTE — PLAN OF CARE
Goal Outcome Evaluation: No change    Pt currently lying quietly in bed with no complaints at this time. Pt has reported generalized weakness intermittently throughout the day. V/S stable with no signs of respiratory distress present. Will continue to monitor pt and follow plan of care.

## 2021-12-13 NOTE — PROGRESS NOTES
[facility] HOSPITALIST PROGRESS NOTE     Patient Identification:  Name:  Yadira Burgos  Age:  66 y.o.  Sex:  female  :  1955  MRN:  [id number]  Visit Number:  [id number]  ROOM: [address] [place] [address]     Primary Care Provider:  Dilcia Pedro MD    Length of stay in inpatient status:  1    Subjective     Chief Compliant:    Chief Complaint   Patient presents with   • Weakness - Generalized   • Chills   • Dizziness   • Nausea       History of Presenting Illness:    Patient denies any new complaints. She reports anxiety, headaches, and diarrhea she attributes to stress being in the hospital. She reports she has headaches at home and takes Advil. Patient was getting echo on my evaluation.     ROS:  Otherwise 10 point ROS negative other than documented above in HPI.     Objective     Current Hospital Meds:cefTRIAXone, 2 g, Intravenous, Q24H  doxycycline, 100 mg, Intravenous, Q12H  heparin (porcine), 5,000 Units, Subcutaneous, Q12H  [START ON 2021] influenza vaccine, 0.5 mL, Intramuscular, Once  metroNIDAZOLE, 500 mg, Oral, Q8H  sodium chloride, 10 mL, Intravenous, Q12H    sodium chloride, 100 mL/hr, Last Rate: 100 mL/hr (21 1150)        Current Antimicrobial Therapy:  Anti-Infectives (From admission, onward)    Ordered     Dose/Rate Route Frequency Start Stop    21 1242  cefTRIAXone (ROCEPHIN) 2 g in sodium chloride 0.9 % 100 mL IVPB-VTB        Ordering Provider: Duc Schwartz MD    2 g  200 mL/hr over 30 Minutes Intravenous Every 24 Hours 21 2100 21 222  doxycycline (VIBRAMYCIN) 100 mg in sodium chloride 0.9 % 100 mL IVPB-VTB        Ordering Provider: Gordon Nguyen MD    100 mg  over 60 Minutes Intravenous Every 12 Hours 21 0000 21 2359    21 213  metroNIDAZOLE (FLAGYL) tablet 500 mg        Ordering Provider: Gordon Nguyen MD    500 mg Oral Every 8 Hours Scheduled 21 2200 21 21521 1900   "cefTRIAXone (ROCEPHIN) 1 g in sodium chloride 0.9 % 100 mL IVPB-VTB        Ordering Provider: Fermin Walters PA-C    1 g  200 mL/hr over 30 Minutes Intravenous Once 12/12/21 1902 12/12/21 2157 12/12/21 1720  piperacillin-tazobactam (ZOSYN) IVPB 3.375 g in 100 mL NS VTB        Ordering Provider: Fermin Walters PA-C    3.375 g  over 4 Hours Intravenous Once 12/12/21 1722 12/12/21 1847 12/12/21 1720  vancomycin 1750 mg/500 mL 0.9% NS IVPB ([facility])        Ordering Provider: Fermin Walters PA-C   \"And\" Linked Group Details    20 mg/kg × 83.9 kg Intravenous Once 12/12/21 1722 12/12/21 2120        Current Diuretic Therapy:  Diuretics (From admission, onward)    None        ----------------------------------------------------------------------------------------------------------------------  Vital Signs:  Temp:  [97.1 °F (36.2 °C)-98 °F (36.7 °C)] 98 °F (36.7 °C)  Heart Rate:  [70-98] 80  Resp:  [18] 18  BP: ()/(50-76) 97/59  SpO2:  [92 %-99 %] 97 %  on   ;   Device (Oxygen Therapy): room air  Body mass index is 31.09 kg/m².    Wt Readings from Last 3 Encounters:   12/13/21 90 kg (198 lb 8 oz)   12/10/21 88.5 kg (195 lb)   10/20/21 88.7 kg (195 lb 9.6 oz)     Intake & Output (last 3 days)       12/10 0701 12/11 0700 12/11 0701 12/12 0700 12/12 0701 12/13 0700 12/13 0701 12/14 0700    P.O.   120 600    IV Piggyback   600     Total Intake(mL/kg)   720 (8) 600 (6.7)    Net   +720 +600            Urine Unmeasured Occurrence    2 x    Stool Unmeasured Occurrence    2 x        Diet Regular  ----------------------------------------------------------------------------------------------------------------------  Physical exam:  Constitutional:  Well-developed and well-nourished.  No respiratory distress.      HENT:  Head:  Normocephalic and atraumatic.  Mouth:  Moist mucous membranes.    Eyes:  Conjunctivae and EOM are normal. No scleral icterus.    Neck:  Neck supple.  No JVD present.  "   Cardiovascular:  Normal rate, regular rhythm and normal heart sounds with no murmur.  Pulmonary/Chest:  No respiratory distress, no wheezes, no crackles, with normal breath sounds and good air movement.  Abdominal:  Soft.  Bowel sounds are normal.  No distension and no tenderness.   Musculoskeletal:  No edema, no tenderness, and no deformity.  No red or swollen joints anywhere.    Neurological:  Alert and oriented to person, place, and time.  No cranial nerve deficit.  No tongue deviation.  No facial droop.  No slurred speech.   Skin:  Skin is warm and dry. No rash noted. No pallor.   Peripheral vascular:  Pulses in all 4 extremities with no clubbing, no cyanosis, no edema.  ----------------------------------------------------------------------------------------------------------------------  Tele:    ----------------------------------------------------------------------------------------------------------------------  Results from last 7 days   Lab Units 12/12/21  2351 12/12/21  1648 12/12/21  1639 12/10/21  1510   CRP mg/dL 25.18*  --  29.64*  --    LACTATE mmol/L  --  1.2  --   --    WBC 10*3/mm3 13.87*  --  15.26* 12.98*   HEMOGLOBIN g/dL 11.2*  --  12.8 13.2   HEMATOCRIT % 34.8  --  38.9 38.7   MCV fL 87.2  --  85.5 86.0   MCHC g/dL 32.2  --  32.9 34.1   PLATELETS 10*3/mm3 118*  --  130* 144   INR  1.09  --   --   --          Results from last 7 days   Lab Units 12/12/21  2351 12/12/21  1639 12/10/21  1510   SODIUM mmol/L 138 136 137   POTASSIUM mmol/L 3.4* 3.5 3.9   MAGNESIUM mg/dL  --   --  1.6   CHLORIDE mmol/L 109* 100 101   CO2 mmol/L 19.5* 18.8* 20.3*   BUN mg/dL 16 18 20   CREATININE mg/dL 0.63 0.81 0.83   EGFR IF NONAFRICN AM mL/min/1.73 95 71 69   CALCIUM mg/dL 7.8* 9.1 8.7   GLUCOSE mg/dL 128* 140* 113*   ALBUMIN g/dL 2.57* 3.04* 3.30*   BILIRUBIN mg/dL 1.0 1.7* 1.1   ALK PHOS U/L 129* 152* 154*   AST (SGOT) U/L 32 33* 26   ALT (SGPT) U/L 19 22 20   Estimated Creatinine Clearance: 80.8 mL/min (by  C-G formula based on SCr of 0.63 mg/dL).  No results found for: AMMONIA  Results from last 7 days   Lab Units 12/12/21  1639   TROPONIN T ng/mL <0.010             Hemoglobin A1C   Date/Time Value Ref Range Status   12/12/2021 1639 6.20 (H) 4.80 - 5.60 % Final     Glucose   Date/Time Value Ref Range Status   12/12/2021 1703 135 (H) 70 - 130 mg/dL Final     Comment:     Meter: [id number] : [id number] Vani Mirza     Lab Results   Component Value Date    TSH 1.900 09/16/2020    FREET4 1.31 09/16/2020     No results found for: PREGTESTUR, PREGSERUM, HCG, HCGQUANT  Pain Management Panel    There is no flowsheet data to display.       Brief Urine Lab Results     None        Blood Culture   Date Value Ref Range Status   12/12/2021 Abnormal Stain (C)  Preliminary     No results found for: URINECX  No results found for: WOUNDCX  No results found for: STOOLCX  No results found for: RESPCX  No results found for: AFBCX  Results from last 7 days   Lab Units 12/12/21  2351 12/12/21  1648 12/12/21  1639   LACTATE mmol/L  --  1.2  --    SED RATE mm/hr  --   --  80*   CRP mg/dL 25.18*  --  29.64*       I have personally looked at the labs and they are summarized above.  ----------------------------------------------------------------------------------------------------------------------  Detailed radiology reports for the last 24 hours:    Imaging Results (Last 24 Hours)     Procedure Component Value Units Date/Time    US Abdomen Complete [[id number]] Collected: 12/12/21 2053     Updated: 12/12/21 2055    Narrative:      PROCEDURE: US Abdomen Complete    COMPARISON: CT abdomen and pelvis same day    INDICATIONS: abdominal pain; Hypotension, unspecified; Pneumonia, unspecified organism; Hepatomegaly, not elsewhere classified elevated white count abnormal CT      TECHNIQUE:  Transverse and longitudinal images of the abdomen were obtained using real-time grayscale and color ultrasound.    FINDINGS:  The visualized portions of the  pancreas, IVC and aorta appear normal.        The liver is moderately coarsened in echotexture. Ill-defined hypodensity in the liver measuring 3 x 3.2 x 2.6 cm corresponds to recent CT. Unclear if this is a malignant lesion. Abscesses within the differential but prior CT was performed without any  contrast.  The common bile duct is not dilated.. Gallbladder wall is thickened at 3.4 mm with trace pericholecystic fluid and subtle hyperemia. Gallstones and shadowing present.    The kidneys are normal in size and echogenicity. There is no   hydronephrosis or focal solid lesion.  The spleen is normal in size and echotexture.      Impression:      1. Nonspecific gallbladder wall thickening and questionable pericholecystic fluid. Gallstones are better seen on CT than ultrasound.  2.  Heterogeneous liver with lesion in the liver as seen on CT. Ultrasound is not helpful in determining etiology. This could be an abscess but given the other findings on CT,  malignancy is not excluded.         Signer Name: Ximena Delgado MD   Signed: 12/12/2021 8:53 PM   Workstation Name: [facility]    [facility]    CT Abdomen Pelvis Without Contrast [[id number]] Collected: 12/12/21 1844     Updated: 12/12/21 1846    Narrative:      PROCEDURE: CT Abdomen Pelvis WO, CT Chest WO    INDICATIONS: diarrhea    COMPARISON: 12/15/2020      Difficulty eating/elevated wbc/diarrhea      TECHNIQUE: CT of the chest, abdomen and pelvis are performed without oral or IV contrast. Multiplanar reformations were performed.  No oral contrast utilized    Radiation dose reduction techniques included automated exposure control or exposure modulation based on body size.   Count of known CT and cardiac nuc med studies performed in previous 12 months: 3.         FINDINGS: Lack of IV contrast hinders parenchymal assessment of the mediastinum, liver, spleen, pancreas, adrenal glands and kidneys.       Significant infiltrate seen in the right  lower lobe with interstitial component. There are also lung nodules present in the right lung. Index nodule in the right lung base medially is 5 mm maximal diameter. Other smaller nodules seen throughout the right  lung. Tiny 3 mm lingular nodule seen in series 3 image 30. A benign calcified granuloma seen in the left lower lobe but a noncalcified indeterminant  5 mm left lower lobe nodule seen, series 3 image 42.    Nonenlarged mediastinal adenopathy seen. However there is an enlarged right axillary 1.8 x 2.6 cm lymph node partially seen.    Worrisome hepatic metastasis or primary hepatic lesion seen measuring 4.4 x 4.1 cm in liver segment 8. Margins are ill-defined. Abscess could have a similar appearance. Suggestion of subtle gallstones. Nonobstructing 2 mm right renal calculus seen. No  obstructive uropathy. Gaseous distention of the esophagus and hiatal hernia present consistent with GERD. No enteric contrast but no gross evidence of bowel obstruction.   There is no gross free air, free fluid or focal inflammatory change.  Uterus and  adnexa are not enlarged. Osseous structures are grossly intact.      Impression:      Constellation of findings with significant consolidation in the right lower lobe, enlarged right axillary lymph node, indeterminate bilateral lung nodules and ill-defined hepatic lesion concerning for underlying malignancy in this patient .     Signer Name: Ximena Delgado MD   Signed: 12/12/2021 6:44 PM   Workstation Name: [facility]    [facility]    CT Chest Without Contrast Diagnostic [[id number]] Collected: 12/12/21 1844     Updated: 12/12/21 1846    Narrative:      PROCEDURE: CT Abdomen Pelvis WO, CT Chest WO    INDICATIONS: diarrhea    COMPARISON: 12/15/2020      Difficulty eating/elevated wbc/diarrhea      TECHNIQUE: CT of the chest, abdomen and pelvis are performed without oral or IV contrast. Multiplanar reformations were performed.  No oral contrast utilized     Radiation dose reduction techniques included automated exposure control or exposure modulation based on body size.   Count of known CT and cardiac nuc med studies performed in previous 12 months: 3.         FINDINGS: Lack of IV contrast hinders parenchymal assessment of the mediastinum, liver, spleen, pancreas, adrenal glands and kidneys.       Significant infiltrate seen in the right lower lobe with interstitial component. There are also lung nodules present in the right lung. Index nodule in the right lung base medially is 5 mm maximal diameter. Other smaller nodules seen throughout the right  lung. Tiny 3 mm lingular nodule seen in series 3 image 30. A benign calcified granuloma seen in the left lower lobe but a noncalcified indeterminant  5 mm left lower lobe nodule seen, series 3 image 42.    Nonenlarged mediastinal adenopathy seen. However there is an enlarged right axillary 1.8 x 2.6 cm lymph node partially seen.    Worrisome hepatic metastasis or primary hepatic lesion seen measuring 4.4 x 4.1 cm in liver segment 8. Margins are ill-defined. Abscess could have a similar appearance. Suggestion of subtle gallstones. Nonobstructing 2 mm right renal calculus seen. No  obstructive uropathy. Gaseous distention of the esophagus and hiatal hernia present consistent with GERD. No enteric contrast but no gross evidence of bowel obstruction.   There is no gross free air, free fluid or focal inflammatory change.  Uterus and  adnexa are not enlarged. Osseous structures are grossly intact.      Impression:      Constellation of findings with significant consolidation in the right lower lobe, enlarged right axillary lymph node, indeterminate bilateral lung nodules and ill-defined hepatic lesion concerning for underlying malignancy in this patient .     Signer Name: Ximena Delgado MD   Signed: 12/12/2021 6:44 PM   Workstation Name: [facility]    [facility]    CT Head Without Contrast [[id number]]  Collected: 12/12/21 1807     Updated: 12/12/21 1809    Narrative:      PROCEDURE: CT Head WO    COMPARISON: No relevant comparison or correlation studies available at time of dictation.      INDICATIONS: generalized weakness    Radiation dose reduction techniques included automated exposure control or exposure modulation based on body size.   Count of known CT and cardiac nuc med studies performed in previous 12 months: 2.          FINDINGS:  CT examination of the brain is performed without IV contrast. There is no evidence of acute intracranial hemorrhage, mass effect or midline shift. No intra-axial or extra-axial fluid collections. The ventricular system is normal.  The  calvarium is intact.         Impression:      No acute intracranial process.             Signer Name: Ximena Delgado MD   Signed: 12/12/2021 6:07 PM   Workstation Name: [employer]    Radiology [facility]    XR Chest 1 View [[id number]] Collected: 12/12/21 1736     Updated: 12/12/21 1738    Narrative:      PROCEDURE: CR Chest 1 Vw    COMPARISON:  CT chest 12/15/2020     INDICATIONS: Acute emergency room presentation with pain and weakness    TECHNIQUE: Single AP  view of the chest    FINDINGS:  Cardiomediastinal silhouette is within normal limits given projection. Elevated right hemidiaphragm with patchy infiltrates in the right lung. No lobar collapse. Left lung relatively clear. Osseous structures are intact.      Impression:      Patchy infiltrates right lung         Signer Name: Ximena Delgado MD   Signed: 12/12/2021 5:36 PM   Workstation Name: [email or web address]    Radiology [facility]        Assessment & Plan    #Sepsis 2/2 RLL pneumonia   #Klebsiella pneumonia bacteremia  - CT chest revealed RLL consolidation   - 1/2 blood cultures on admission revealing Klebsiella pneumonia on BCID, awaiting culture results.    - Will continue ceftriaxone, increase to 2 grams daily at pharmacy recommendation.   - Continue doxy for  now but suspect we will be able to deescalate soon given culture data.   - Will repeat blood cultures in AM    #Lung nodules   #Liver lesion   #R axillary lymphadenopathy   - Concerning for malignancy. Cannot rule out that liver lesion is abscess in setting of bacteremia.   - Pulmonology consulted for potential bronch. If yield is thought to be low, will discuss best area for tissue diagnosis with radiology.   - Will get total AFP level.     #Elevated alk phos  #Nonspecific gallbladder wall thickening and questionable pericholecystic fluid  - Nonspecific findings. Biliribuin and other LFTs wnl.     #HTN  - On hypotensive side, will hold any antihypertensives at this time.     #Hx of cervical cancer  #Hx of breast cancer   #Hx of melanoma   - Has never been tested for BRCA per report   - Patient reports she is in remission     F: PO  E: Replace as needed   N Regular     Code status: Full     Dispo: Pending clinical improvement         VTE Prophylaxis:   Mechanical Order History:     None      Pharmalogical Order History:      Ordered     Dose Route Frequency Stop    12/12/21 4770  heparin (porcine) 5000 UNIT/ML injection 5,000 Units         5,000 Units SC Every 12 Hours Scheduled --                  Duc Schwartz MD  [facility]  12/13/21  15:20 EST

## 2021-12-13 NOTE — PLAN OF CARE
Pt reports she's feeling better and no longer feels dehydrated and groggy. No chest pain but patient does report SOA especially w/ exertion. No other s/s of distress noted att.

## 2021-12-14 LAB
ALPHA-FETOPROTEIN: 4.47 NG/ML (ref 0–8.3)
FERRITIN SERPL-MCNC: 617.3 NG/ML (ref 13–150)
IRON 24H UR-MRATE: 38 MCG/DL (ref 37–145)
IRON SATN MFR SERPL: 25 % (ref 20–50)
S PNEUM AG SPEC QL LA: NEGATIVE
TIBC SERPL-MCNC: 153 MCG/DL (ref 298–536)
TRANSFERRIN SERPL-MCNC: 103 MG/DL (ref 200–360)

## 2021-12-14 PROCEDURE — 86304 IMMUNOASSAY TUMOR CA 125: CPT | Performed by: NURSE PRACTITIONER

## 2021-12-14 PROCEDURE — 82728 ASSAY OF FERRITIN: CPT | Performed by: NURSE PRACTITIONER

## 2021-12-14 PROCEDURE — 82607 VITAMIN B-12: CPT | Performed by: NURSE PRACTITIONER

## 2021-12-14 PROCEDURE — 82378 CARCINOEMBRYONIC ANTIGEN: CPT | Performed by: NURSE PRACTITIONER

## 2021-12-14 PROCEDURE — 25010000002 HEPARIN (PORCINE) PER 1000 UNITS: Performed by: HOSPITALIST

## 2021-12-14 PROCEDURE — 86300 IMMUNOASSAY TUMOR CA 15-3: CPT | Performed by: NURSE PRACTITIONER

## 2021-12-14 PROCEDURE — 82105 ALPHA-FETOPROTEIN SERUM: CPT | Performed by: NURSE PRACTITIONER

## 2021-12-14 PROCEDURE — 82746 ASSAY OF FOLIC ACID SERUM: CPT | Performed by: NURSE PRACTITIONER

## 2021-12-14 PROCEDURE — 84466 ASSAY OF TRANSFERRIN: CPT | Performed by: NURSE PRACTITIONER

## 2021-12-14 PROCEDURE — 25010000002 CEFTRIAXONE PER 250 MG: Performed by: INTERNAL MEDICINE

## 2021-12-14 PROCEDURE — 94799 UNLISTED PULMONARY SVC/PX: CPT

## 2021-12-14 PROCEDURE — 99232 SBSQ HOSP IP/OBS MODERATE 35: CPT | Performed by: INTERNAL MEDICINE

## 2021-12-14 PROCEDURE — 87040 BLOOD CULTURE FOR BACTERIA: CPT | Performed by: INTERNAL MEDICINE

## 2021-12-14 PROCEDURE — 99223 1ST HOSP IP/OBS HIGH 75: CPT | Performed by: NURSE PRACTITIONER

## 2021-12-14 PROCEDURE — 83540 ASSAY OF IRON: CPT | Performed by: NURSE PRACTITIONER

## 2021-12-14 PROCEDURE — 99223 1ST HOSP IP/OBS HIGH 75: CPT | Performed by: INTERNAL MEDICINE

## 2021-12-14 RX ORDER — POTASSIUM CHLORIDE 750 MG/1
40 CAPSULE, EXTENDED RELEASE ORAL AS NEEDED
Status: DISCONTINUED | OUTPATIENT
Start: 2021-12-14 | End: 2021-12-22 | Stop reason: HOSPADM

## 2021-12-14 RX ORDER — POTASSIUM CHLORIDE 1.5 G/1.77G
40 POWDER, FOR SOLUTION ORAL AS NEEDED
Status: DISCONTINUED | OUTPATIENT
Start: 2021-12-14 | End: 2021-12-22 | Stop reason: HOSPADM

## 2021-12-14 RX ORDER — MAGNESIUM SULFATE HEPTAHYDRATE 40 MG/ML
2 INJECTION, SOLUTION INTRAVENOUS AS NEEDED
Status: DISCONTINUED | OUTPATIENT
Start: 2021-12-14 | End: 2021-12-22 | Stop reason: HOSPADM

## 2021-12-14 RX ORDER — MAGNESIUM SULFATE HEPTAHYDRATE 40 MG/ML
4 INJECTION, SOLUTION INTRAVENOUS AS NEEDED
Status: DISCONTINUED | OUTPATIENT
Start: 2021-12-14 | End: 2021-12-22 | Stop reason: HOSPADM

## 2021-12-14 RX ADMIN — METRONIDAZOLE 500 MG: 250 TABLET ORAL at 14:26

## 2021-12-14 RX ADMIN — ACETAMINOPHEN 650 MG: 325 TABLET ORAL at 20:47

## 2021-12-14 RX ADMIN — HEPARIN SODIUM 5000 UNITS: 5000 INJECTION INTRAVENOUS; SUBCUTANEOUS at 09:56

## 2021-12-14 RX ADMIN — DOXYCYCLINE 100 MG: 100 INJECTION, POWDER, LYOPHILIZED, FOR SOLUTION INTRAVENOUS at 00:26

## 2021-12-14 RX ADMIN — SODIUM CHLORIDE, PRESERVATIVE FREE 10 ML: 5 INJECTION INTRAVENOUS at 20:47

## 2021-12-14 RX ADMIN — SODIUM CHLORIDE, PRESERVATIVE FREE 10 ML: 5 INJECTION INTRAVENOUS at 09:56

## 2021-12-14 RX ADMIN — SODIUM CHLORIDE 2 G: 9 INJECTION, SOLUTION INTRAVENOUS at 20:46

## 2021-12-14 RX ADMIN — HEPARIN SODIUM 5000 UNITS: 5000 INJECTION INTRAVENOUS; SUBCUTANEOUS at 20:47

## 2021-12-14 RX ADMIN — METRONIDAZOLE 500 MG: 250 TABLET ORAL at 05:42

## 2021-12-14 RX ADMIN — METRONIDAZOLE 500 MG: 250 TABLET ORAL at 20:47

## 2021-12-14 NOTE — PLAN OF CARE
Goal Outcome Evaluation:   Pt resting in bed at this time with no complaints or requests.  VSS at this time.  Will continue to follow plan of care.

## 2021-12-14 NOTE — CONSULTS
Name:  Yadira Burgos  :  1955    DATE OF ADMISSION  2021    DATE OF CONSULT  2021     REFERRING PHYSICIAN  Dr. Schwartz    PRIMARY CARE PHYSICIAN  Dilcia Pedro MD    REASON FOR CONSULT  Concern for metastatic disease     CHIEF COMPLAINT:  Chief Complaint   Patient presents with   • Weakness - Generalized   • Chills   • Dizziness   • Nausea       HISTORY OF PRESENT ILLNESS:   Yadira Burgos is a 66 y.o. female, known to us, being followed by Dr. Stone, with history of malignant melanoma, breast and cervical cancer,  who is being seen in consultation at the request of Dr. Schwartz given concern for possible metastatic disease. In regards to Ms. Burgos's oncology history, please see Dr. Stone' most recent follow up note from 21 for full details. She has history of cervical cancer treated with cryotherapy in . In , she was diagnosed with malignant melanoma on her back and underwent resection in . In , She was diagnosed with early, stage 1A right breast cancer. She underwent R lumpectomy and SLNBx performed by Dr. Steve Isbell 10-22-15 and then received adjuvant radiation in [place].She was started on Arimidex which was then switched to Exemestane.  She took Exemestane for almost 5 years. Unfortunately, she developed vaginal bleeding and hematuria related to vaginal atrophy related to hormonal blockade and Exemestane was discontinued. She had repeat mammogram in 2021 which was without cause for concern and repeat mammogram was recommended in 2022. She had colonoscopy done in 2020 with Dr. Maldonado and had 8 hyperplastic polyps in the distal rectum as well as diverticulosis.  Repeat c-scope was recommended after 5 years. She has continued to follow with dermatology q6 months and as needed with group in [place].  Patient reports she had been doing overall well until  and started having increased fatigue, weakness and congestion. She  "reports being evaluated by urgent care and treated with course of antibiotics for sinus infection without much improvement. She reports having intermittent episodes of chilling and shivering all over and eventually presented to [facility] ED for further evaluation. She was admitted with severe sepsis secondary to pneumonia. CT imaging was done showing significant consolidation in the RLL, enlarged right axillary LN, bilateral lung nodules and ill defined hepatic lesion concerning for metastatic disease. CT head was unremarkable. At present, patient continues to complain of fatigue and weakness. She reports poor appetite since ~ Thanksgiving and a ten pound weight loss. She has shortness of breath on exertion and dry cough. She reports doing self breast exams and denies any palpable mass/lesions. She says her right breast has been sore since her surgery which is not new. She has continued to follow closely with dermatology every 6 months, most recently ~ two weeks ago and had a lesion removed from her face but says she was not called with results. She reports having several \"wart\" lesions on her chest which started popping up ~2 years ago but her dermatology has not been concerned. She denies any other complaints at this time.     PAST MEDICAL HISTORY  Past Medical History:   Diagnosis Date   • Back pain    • Basal cell carcinoma (BCC) in situ of skin 2020    Dr. Mohr - Derm   • Breast cancer (HCC)     2015   • Cancer (HCC)     breast, cervical, melanoma   • Cervical cancer (HCC) 1976    Diagnosed in 1975.  Treated by repeated local intervention and ultimately received cryotherapy at [facility] with resolution.   • Elbow fracture    • Elevated cholesterol    • Foot fracture    • Headache    • Hyperlipidemia    • Hypertension    • Melanoma (HCC)     on her back 2004 - treated by Dermatologist Catrachito Amaro in [place], [place] with what sounds like wide local excision.    • Sinusitis        PAST SURGICAL HISTORY  Past Surgical " History:   Procedure Laterality Date   • BREAST LUMPECTOMY Right 2015   • BREAST SURGERY  2015    Secondary to cancer   • COLONOSCOPY N/A 12/9/2020    Procedure: COLONOSCOPY;  Surgeon: Mohsen Maldonado MD;  Location: [facility];  Service: Gastroenterology;  Laterality: N/A;   • ELBOW FUSION      left   • FOOT SURGERY  2014    Broken foot   • HAND SURGERY  08/2020    right   • RHINOPLASTY  2002   • SKIN CANCER EXCISION      malignant melanoma from back 2004       SOCIAL HISTORY  Social History     Socioeconomic History   • Marital status:    Tobacco Use   • Smoking status: Never Smoker   • Smokeless tobacco: Never Used   Vaping Use   • Vaping Use: Never used   Substance and Sexual Activity   • Alcohol use: Not Currently   • Drug use: Never   • Sexual activity: Not Currently     Birth control/protection: Post-menopausal       FAMILY HISTORY  Family History   Problem Relation Age of Onset   • Other Mother         blood clots   • Hypertension Mother    • Ulcers Father    • Hypertension Sister    • Other Sister         Multiple Sclerosis   • Cancer Paternal Aunt    • Stroke Maternal Grandmother    • Alcohol abuse Paternal Grandmother    • Hypertension Sister    • Multiple sclerosis Sister    • Hypertension Sister    • Coronary artery disease Paternal Grandfather    • Cancer Maternal Aunt 30        colon   • Breast cancer Neg Hx        ALLERGIES  No Known Allergies    INPATIENT MEDICATIONS  Current Facility-Administered Medications   Medication Dose Route Frequency Provider Last Rate Last Admin   • acetaminophen (TYLENOL) tablet 650 mg  650 mg Oral Q6H PRN Duc Schwartz MD   650 mg at 12/13/21 2102   • cefTRIAXone (ROCEPHIN) 2 g in sodium chloride 0.9 % 100 mL IVPB-VTB  2 g Intravenous Q24H Duc Schwartz  mL/hr at 12/13/21 2101 2 g at 12/13/21 2101   • heparin (porcine) 5000 UNIT/ML injection 5,000 Units  5,000 Units Subcutaneous Q12H Gordon Nguyen MD   5,000 Units at 12/14/21 0956   •  "influenza vac split quad (FLUZONE,FLUARIX,AFLURIA,FLULAVAL) injection 0.5 mL  0.5 mL Intramuscular Once Gordon Nguyen MD       • Magnesium Sulfate 2 gram Bolus, followed by 8 gram infusion (total Mg dose 10 grams)- Mg less than or equal to 1mg/dL  2 g Intravenous PRN Duc Schwartz MD        Or   • Magnesium Sulfate 2 gram / 50mL Infusion (GIVE X 3 BAGS TO EQUAL 6GM TOTAL DOSE) - Mg 1.1 - 1.5 mg/dl  2 g Intravenous PRN Duc Schwartz MD        Or   • Magnesium Sulfate 4 gram infusion- Mg 1.6-1.9 mg/dL  4 g Intravenous PRN Duc Schwartz MD       • metroNIDAZOLE (FLAGYL) tablet 500 mg  500 mg Oral Q8H Gordon Nguyen MD   500 mg at 12/14/21 1426   • nitroglycerin (NITROSTAT) SL tablet 0.4 mg  0.4 mg Sublingual Q5 Min PRN Gordon Nguyen MD       • potassium chloride (KLOR-CON) packet 40 mEq  40 mEq Oral PRN Duc Schwartz MD       • potassium chloride (MICRO-K) CR capsule 40 mEq  40 mEq Oral PRN Duc Schwartz MD       • sodium chloride 0.9 % flush 10 mL  10 mL Intravenous Q12H Gordon Nguyen MD   10 mL at 12/14/21 0956   • sodium chloride 0.9 % flush 10 mL  10 mL Intravenous PRN Gordon Nguyen MD       • sodium chloride 0.9 % infusion  100 mL/hr Intravenous Continuous Gordon Nguyen  mL/hr at 12/13/21 1150 100 mL/hr at 12/13/21 1150       Review of Systems  A comprehensive 14 point review of systems was performed.  Significant findings as mentioned above.  All other systems reviewed and are negative.     Physical Exam   Vital Signs: /63 (BP Location: Right arm, Patient Position: Lying)   Pulse 96   Temp 97.8 °F (36.6 °C) (Oral)   Resp 18   Ht 170.2 cm (67\")   Wt 91.8 kg (202 lb 4.8 oz)   SpO2 98%   BMI 31.68 kg/m²   General:  Awake, alert and oriented, in no distress  HEENT:  Pupils are equal, round and reactive to light and accommodation, Extra-ocular movements full, Oropharyx clear, mucous membranes moist  Neck:  No JVD, thyromegaly or " lymphadenopathy  CV:  Regular rate and rhythm, no murmurs, rubs or gallops  Resp:  Lungs are clear to auscultation bilaterally  Breast:  S/p R lumpectomy. No palpable masses.  No R axillary LAD.  Left breast is without mass lesions.  No L axillary adenopathy.  Abd:  Soft, non-tender, non-distended, bowel sounds present, no organomegaly or masses  Ext:  No clubbing, cyanosis or edema  Neuro:  MS as above,  grossly non-focal exam  Skin: several seborrheic warts noted on chest and abdomen otw no other concerning lesions seen.     IMAGING:  Adult Transthoracic Echo Complete w/ Color, Spectral and Contrast if necessary per protocol    Result Date: 12/13/2021  · Normal left ventricular cavity size and wall thickness noted. All left ventricular wall segments contract normally. · Left ventricular ejection fraction appears to be 61 - 65%. · The aortic valve is structurally normal with no regurgitation or stenosis present. · The mitral valve is structurally normal with no significant stenosis present. Mild mitral valve regurgitation is present. · There is no evidence of pericardial effusion.      CT Abdomen Pelvis Without Contrast    Result Date: 12/12/2021  PROCEDURE: CT Abdomen Pelvis WO, CT Chest WO INDICATIONS: diarrhea COMPARISON: 12/15/2020 Difficulty eating/elevated wbc/diarrhea TECHNIQUE: CT of the chest, abdomen and pelvis are performed without oral or IV contrast. Multiplanar reformations were performed.  No oral contrast utilized  Radiation dose reduction techniques included automated exposure control or exposure modulation based on body size. Count of known CT and cardiac nuc med studies performed in previous 12 months: 3. FINDINGS: Lack of IV contrast hinders parenchymal assessment of the mediastinum, liver, spleen, pancreas, adrenal glands and kidneys. Significant infiltrate seen in the right lower lobe with interstitial component. There are also lung nodules present in the right lung. Index nodule in the right  lung base medially is 5 mm maximal diameter. Other smaller nodules seen throughout the right lung. Tiny 3 mm lingular nodule seen in series 3 image 30. A benign calcified granuloma seen in the left lower lobe but a noncalcified indeterminant  5 mm left lower lobe nodule seen, series 3 image 42. Nonenlarged mediastinal adenopathy seen. However there is an enlarged right axillary 1.8 x 2.6 cm lymph node partially seen. Worrisome hepatic metastasis or primary hepatic lesion seen measuring 4.4 x 4.1 cm in liver segment 8. Margins are ill-defined. Abscess could have a similar appearance. Suggestion of subtle gallstones. Nonobstructing 2 mm right renal calculus seen. No obstructive uropathy. Gaseous distention of the esophagus and hiatal hernia present consistent with GERD. No enteric contrast but no gross evidence of bowel obstruction.   There is no gross free air, free fluid or focal inflammatory change.  Uterus and adnexa are not enlarged. Osseous structures are grossly intact.     Constellation of findings with significant consolidation in the right lower lobe, enlarged right axillary lymph node, indeterminate bilateral lung nodules and ill-defined hepatic lesion concerning for underlying malignancy in this patient . Signer Name: Ximena Delgado MD  Signed: 12/12/2021 6:44 PM  Workstation Name: [facility]  [facility]    CT Head Without Contrast    Result Date: 12/12/2021  PROCEDURE: CT Head WO COMPARISON: No relevant comparison or correlation studies available at time of dictation.  INDICATIONS: generalized weakness Radiation dose reduction techniques included automated exposure control or exposure modulation based on body size.  Count of known CT and cardiac nuc med studies performed in previous 12 months: 2.  FINDINGS:  CT examination of the brain is performed without IV contrast. There is no evidence of acute intracranial hemorrhage, mass effect or midline shift. No intra-axial or  extra-axial fluid collections. The ventricular system is normal.  The calvarium is intact.     No acute intracranial process.    Signer Name: Ximena Delgado MD  Signed: 12/12/2021 6:07 PM  Workstation Name: [facility]-  [facility]    CT Chest Without Contrast Diagnostic    Result Date: 12/12/2021  PROCEDURE: CT Abdomen Pelvis WO, CT Chest WO INDICATIONS: diarrhea COMPARISON: 12/15/2020 Difficulty eating/elevated wbc/diarrhea TECHNIQUE: CT of the chest, abdomen and pelvis are performed without oral or IV contrast. Multiplanar reformations were performed.  No oral contrast utilized  Radiation dose reduction techniques included automated exposure control or exposure modulation based on body size. Count of known CT and cardiac nuc med studies performed in previous 12 months: 3. FINDINGS: Lack of IV contrast hinders parenchymal assessment of the mediastinum, liver, spleen, pancreas, adrenal glands and kidneys. Significant infiltrate seen in the right lower lobe with interstitial component. There are also lung nodules present in the right lung. Index nodule in the right lung base medially is 5 mm maximal diameter. Other smaller nodules seen throughout the right lung. Tiny 3 mm lingular nodule seen in series 3 image 30. A benign calcified granuloma seen in the left lower lobe but a noncalcified indeterminant  5 mm left lower lobe nodule seen, series 3 image 42. Nonenlarged mediastinal adenopathy seen. However there is an enlarged right axillary 1.8 x 2.6 cm lymph node partially seen. Worrisome hepatic metastasis or primary hepatic lesion seen measuring 4.4 x 4.1 cm in liver segment 8. Margins are ill-defined. Abscess could have a similar appearance. Suggestion of subtle gallstones. Nonobstructing 2 mm right renal calculus seen. No obstructive uropathy. Gaseous distention of the esophagus and hiatal hernia present consistent with GERD. No enteric contrast but no gross evidence of bowel obstruction.    There is no gross free air, free fluid or focal inflammatory change.  Uterus and adnexa are not enlarged. Osseous structures are grossly intact.     Constellation of findings with significant consolidation in the right lower lobe, enlarged right axillary lymph node, indeterminate bilateral lung nodules and ill-defined hepatic lesion concerning for underlying malignancy in this patient . Signer Name: Ximena Delgado MD  Signed: 12/12/2021 6:44 PM  Workstation Name: [facility]  [facility]    US Abdomen Complete    Result Date: 12/12/2021  PROCEDURE: US Abdomen Complete COMPARISON: CT abdomen and pelvis same day INDICATIONS: abdominal pain; Hypotension, unspecified; Pneumonia, unspecified organism; Hepatomegaly, not elsewhere classified elevated white count abnormal CT TECHNIQUE:  Transverse and longitudinal images of the abdomen were obtained using real-time grayscale and color ultrasound. FINDINGS:  The visualized portions of the pancreas, IVC and aorta appear normal.    The liver is moderately coarsened in echotexture. Ill-defined hypodensity in the liver measuring 3 x 3.2 x 2.6 cm corresponds to recent CT. Unclear if this is a malignant lesion. Abscesses within the differential but prior CT was performed without any contrast. The common bile duct is not dilated.. Gallbladder wall is thickened at 3.4 mm with trace pericholecystic fluid and subtle hyperemia. Gallstones and shadowing present. The kidneys are normal in size and echogenicity. There is no   hydronephrosis or focal solid lesion. The spleen is normal in size and echotexture.     1. Nonspecific gallbladder wall thickening and questionable pericholecystic fluid. Gallstones are better seen on CT than ultrasound. 2. Heterogeneous liver with lesion in the liver as seen on CT. Ultrasound is not helpful in determining etiology. This could be an abscess but given the other findings on CT,  malignancy is not excluded.  Signer Name: Ximena  Prachi Delgado MD  Signed: 12/12/2021 8:53 PM  Workstation Name: [facility]  [facility]    XR Chest 1 View    Result Date: 12/12/2021  PROCEDURE: CR Chest 1 Vw COMPARISON:  CT chest 12/15/2020 INDICATIONS: Acute emergency room presentation with pain and weakness TECHNIQUE: Single AP  view of the chest FINDINGS:  Cardiomediastinal silhouette is within normal limits given projection. Elevated right hemidiaphragm with patchy infiltrates in the right lung. No lobar collapse. Left lung relatively clear. Osseous structures are intact.     Patchy infiltrates right lung  Signer Name: Ximena Delgado MD  Signed: 12/12/2021 5:36 PM  Workstation Name: [facility]  [facility]      RECENT LABS:  Lab Results   Component Value Date    WBC 13.87 (H) 12/12/2021    HGB 11.2 (L) 12/12/2021    HCT 34.8 12/12/2021    MCV 87.2 12/12/2021    RDW 13.4 12/12/2021     (L) 12/12/2021    NEUTRORELPCT 81.5 (H) 12/12/2021    LYMPHORELPCT 5.3 (L) 12/12/2021    MONORELPCT 11.5 12/12/2021    EOSRELPCT 0.5 12/12/2021    BASORELPCT 0.3 12/12/2021    NEUTROABS 9.85 (H) 12/12/2021    LYMPHSABS 0.81 12/12/2021       Lab Results   Component Value Date     12/12/2021    K 3.4 (L) 12/12/2021    CO2 19.5 (L) 12/12/2021     (H) 12/12/2021    BUN 16 12/12/2021    CREATININE 0.63 12/12/2021    EGFRIFNONA 95 12/12/2021    GLUCOSE 128 (H) 12/12/2021    CALCIUM 7.8 (L) 12/12/2021    ALKPHOS 129 (H) 12/12/2021    AST 32 12/12/2021    ALT 19 12/12/2021    BILITOT 1.0 12/12/2021    ALBUMIN 2.57 (L) 12/12/2021    PROTEINTOT 5.6 (L) 12/12/2021    MG 1.6 12/10/2021       ASSESSMENT & PLAN:  Yadira Burgos is a very pleasant 66 y.o. female with    1.  Bilateral lung nodules, right axillary LN and hepatic lesion concerning for metastatic disease:  -Please see Dr. Stone' most recent follow up note from 1/18/21 and HPI above for full details in regards to her oncology history (cervical, breast and  malignant melanoma).   -CT CAP without contrast was done and shows significant consolidation in the RLL, enlarged right axillary LN, bilateral lung nodules and ill defined hepatic lesion concerning for metastatic disease. CT head was unremarkable.    -She had repeat mammogram in January 2021 which was without cause for concern and repeat mammogram was recommended in January 2022. She had colonoscopy done in December 2020 with Dr. Maldonado and had 8 hyperplastic polyps in the distal rectum as well as diverticulosis.  Repeat c-scope recommended after 5 years. She has continued to follow closely with dermatology every 6 months, most recently ~ two weeks ago and had a lesion removed from her face but says she was not called with results.  -Discussed repeat CT imaging with patient today and she understands we will need to obtain biopsy for definitive diagnosis/further management. Would recommended CT guided liver biopsy.   - In the meantime, will obtain repeat CT CAP to be done with contrast as well as bone scan. Will obtain CA 15-3, CA 27-29,  AFP, CEA, and . She will also need repeat mammogram which will likely have to be done as an outpatient.  Will follow along with you. Based on findings, will discuss further management.     2. Anemia  3. Thrombocytopenia:  -Will obtain B12, folate, Iron panel and ferritin.       The patient was in agreement with the plan and all questions were answered to her satisfaction.     Thank you so much for the consultation and allowing us to participate in the care of Ms. Burgos. Please do not hesitate to contact Hem/Onc with any questions or concerns.       Electronically Signed by: TENNILLE Gallegos , December 14, 2021 14:59 EST

## 2021-12-14 NOTE — CONSULTS
Group: [facility]         CONSULT NOTE    Patient Identification:  Yadira Burgos  66 y.o.  female  1955  [id number]            Requesting physician:  Dr Schwartz    Reason for Consultation: Lung lung nodule    CC: Altered mental status, weakness    History of Present Illness:  66-year-old female who presents with weakness and disorientation.  Apparently a week ago she was treated for sinusitis with antibiotics.  She presented with some shaking chills and some weakness and confusion.  She had gone to her primary care physician on Friday and given instructions to modify her diet due to elevated blood sugar.  Subsequently she became weak and disoriented as well as hypotensive.  She also reported some diarrhea on presentation.  Today she tells me she feels fine.  She does not have any shortness of breath, cough or fever.  We were asked to evaluate her for abnormal CT chest.  She does have a medical history of cervical cancer at the age of 19, followed by a melanoma which was excised with negative margins in her 40s and then breast cancer 6 or 7 years ago for which she had lumpectomy and took oral hormonal treatment.    Review of Systems   Constitutional: Positive for chills, diaphoresis and fatigue. Negative for fever.   HENT: Negative for ear discharge and sore throat.    Eyes: Negative for pain and visual disturbance.   Respiratory: Negative for cough and shortness of breath.    Cardiovascular: Negative for chest pain and leg swelling.   Gastrointestinal: Positive for diarrhea. Negative for abdominal pain.   Endocrine: Negative for cold intolerance and polyuria.   Genitourinary: Negative for dysuria and hematuria.   Musculoskeletal: Negative for joint swelling and myalgias.   Skin: Negative for rash and wound.   Neurological: Positive for weakness. Negative for speech difficulty and numbness.   Hematological: Negative for adenopathy. Does not bruise/bleed easily.   Psychiatric/Behavioral:  Negative for agitation and confusion.       Past Medical History:  Past Medical History:   Diagnosis Date   • Back pain    • Basal cell carcinoma (BCC) in situ of skin 2020    Dr. Mohr - Derm   • Breast cancer (HCC)     2015   • Cancer (HCC)     breast, cervical, melanoma   • Cervical cancer (HCC) 1976    Diagnosed in 1975.  Treated by repeated local intervention and ultimately received cryotherapy at [facility] with resolution.   • Elbow fracture    • Elevated cholesterol    • Foot fracture    • Headache    • Hyperlipidemia    • Hypertension    • Melanoma (HCC)     on her back 2004 - treated by Dermatologist Catrachito Amaro in [place], [place] with what sounds like wide local excision.    • Sinusitis        Past Surgical History:  Past Surgical History:   Procedure Laterality Date   • BREAST LUMPECTOMY Right 2015   • BREAST SURGERY  2015    Secondary to cancer   • COLONOSCOPY N/A 12/9/2020    Procedure: COLONOSCOPY;  Surgeon: Mohsen Maldonado MD;  Location: [facility];  Service: Gastroenterology;  Laterality: N/A;   • ELBOW FUSION      left   • FOOT SURGERY  2014    Broken foot   • HAND SURGERY  08/2020    right   • RHINOPLASTY  2002   • SKIN CANCER EXCISION      malignant melanoma from back 2004        Home Meds:  Medications Prior to Admission   Medication Sig Dispense Refill Last Dose   • atenolol (TENORMIN) 50 MG tablet Take 1 tablet by mouth 2 (two) times a day. 180 tablet 1 12/11/2021 at Unknown time   • atorvastatin (LIPITOR) 80 MG tablet Take 1 tablet by mouth Every Night. 90 tablet 1 12/11/2021 at Unknown time       Allergies:  No Known Allergies    Social History:   Social History     Socioeconomic History   • Marital status:    Tobacco Use   • Smoking status: Never Smoker   • Smokeless tobacco: Never Used   Vaping Use   • Vaping Use: Never used   Substance and Sexual Activity   • Alcohol use: Not Currently   • Drug use: Never   • Sexual activity: Not Currently     Birth control/protection:  "Post-menopausal       Family History:  Family History   Problem Relation Age of Onset   • Other Mother         blood clots   • Hypertension Mother    • Ulcers Father    • Hypertension Sister    • Other Sister         Multiple Sclerosis   • Cancer Paternal Aunt    • Stroke Maternal Grandmother    • Alcohol abuse Paternal Grandmother    • Hypertension Sister    • Multiple sclerosis Sister    • Hypertension Sister    • Coronary artery disease Paternal Grandfather    • Cancer Maternal Aunt 30        colon   • Breast cancer Neg Hx        Physical Exam:  /63 (BP Location: Right arm, Patient Position: Lying)   Pulse 96   Temp 97.8 °F (36.6 °C) (Oral)   Resp 18   Ht 170.2 cm (67\")   Wt 91.8 kg (202 lb 4.8 oz)   SpO2 98%   BMI 31.68 kg/m²  Body mass index is 31.68 kg/m². 98% 91.8 kg (202 lb 4.8 oz)  Physical Exam  Constitutional:       Appearance: Normal appearance.   HENT:      Right Ear: External ear normal.      Left Ear: External ear normal.      Nose: Nose normal.   Eyes:      Conjunctiva/sclera: Conjunctivae normal.      Pupils: Pupils are equal, round, and reactive to light.   Neck:      Thyroid: No thyromegaly.      Vascular: No JVD.      Trachea: No tracheal deviation.   Cardiovascular:      Rate and Rhythm: Normal rate and regular rhythm.      Heart sounds: Normal heart sounds. No murmur heard.      Pulmonary:      Effort: Pulmonary effort is normal.      Comments: Some coarse crackles over the right base.  The rest of the lungs are clear bilaterally.  No use of accessory muscles  Abdominal:      Palpations: Abdomen is soft.      Tenderness: There is no abdominal tenderness. There is no rebound.      Comments: Cannot palpate liver or spleen enlargement   Musculoskeletal:         General: No deformity. Normal range of motion.      Cervical back: Neck supple. No rigidity.   Skin:     General: Skin is warm.      Findings: No rash.      Comments: No palpable nodules   Neurological:      General: No focal " deficit present.      Mental Status: She is alert and oriented to person, place, and time.      Cranial Nerves: No cranial nerve deficit.      Motor: No weakness.   Psychiatric:         Mood and Affect: Mood normal.         Thought Content: Thought content normal.         LABS:  COVID19   Date Value Ref Range Status   12/12/2021 Not Detected Not Detected - Ref. Range Final       Lab Results   Component Value Date    CALCIUM 7.8 (L) 12/12/2021     Results from last 7 days   Lab Units 12/12/21  2351 12/12/21  1639 12/12/21  1639 12/10/21  1510 12/10/21  1510   MAGNESIUM mg/dL  --   --   --   --  1.6   SODIUM mmol/L 138  --  136  --  137   POTASSIUM mmol/L 3.4*  --  3.5  --  3.9   CHLORIDE mmol/L 109*  --  100  --  101   CO2 mmol/L 19.5*  --  18.8*  --  20.3*   BUN mg/dL 16  --  18  --  20   CREATININE mg/dL 0.63  --  0.81  --  0.83   GLUCOSE mg/dL 128*   < > 140*   < > 113*   CALCIUM mg/dL 7.8*  --  9.1  --  8.7   WBC 10*3/mm3 13.87*  --  15.26*  --  12.98*   HEMOGLOBIN g/dL 11.2*  --  12.8  --  13.2   PLATELETS 10*3/mm3 118*  --  130*  --  144   ALT (SGPT) U/L 19  --  22  --  20   AST (SGOT) U/L 32  --  33*  --  26    < > = values in this interval not displayed.     Lab Results   Component Value Date    TROPONINT <0.010 12/12/2021     Results from last 7 days   Lab Units 12/12/21  1639   TROPONIN T ng/mL <0.010     Results from last 7 days   Lab Units 12/12/21  1740 12/12/21  1648   BLOODCX  No growth at 24 hours Gram Negative Bacilli*   BCIDPCR   --  Klebsiella pneumoniae group. Identification by BCID2 PCR.*     Results from last 7 days   Lab Units 12/12/21  1648   LACTATE mmol/L 1.2         Results from last 7 days   Lab Units 12/12/21  1648   INFLUENZA B PCR  Not Detected     Results from last 7 days   Lab Units 12/12/21  2351   INR  1.09     Results from last 7 days   Lab Units 12/12/21  1740 12/12/21  1648   BLOODCX  No growth at 24 hours Gram Negative Bacilli*   BCIDPCR   --  Klebsiella pneumoniae group.  Identification by BCID2 PCR.*     Lab Results   Component Value Date    TSH 1.900 09/16/2020     Estimated Creatinine Clearance: 81.6 mL/min (by C-G formula based on SCr of 0.63 mg/dL).  Results from last 7 days   Lab Units 12/13/21  1833   NITRITE UA  Negative   WBC UA /HPF 31-50*   BACTERIA UA /HPF 1+*   SQUAM EPITHEL UA /HPF 3-6*        Imaging: I personally visualized the images of CT of the chest showing right lower lobe consolidation, right axillary adenopathy, scattered pulmonary nodules that are subcentimeter.  She does have a lesion in the liver.      Assessment:  Right lower lobe pneumonia  Pulmonary nodules  Liver lesion  History of breast cancer and melanoma and cervical cancer      Recommendations:  She has blood culture positive for Klebsiella.  I would recommend IV Rocephin.  I would recommend a total of 7 to 10 days depending on recommendations by infectious diseases.  The lung nodules are too small for biopsy.  She says she has been followed for 2 years by Dr. Stone, her oncologist for these nodules but I cannot find a CT scan that dates back earlier than December 2020.  I reviewed her office note dated January 18, 2021 but there is no mention of lung nodules.  Currently, the most accessible and minimally invasive areas for tissue biopsy would be the right axillary lymph node or the liver lesion.  I do not think bronchoscopy is indicated at this time, and CT-guided lung biopsy would be difficult due to the small nature of her multiple lung nodules.  I will follow along with you.      Chan Leach MD  12/14/2021  14:45 EST      Much of this encounter note is an electronic transcription/translation of spoken language to printed text using Dragon Software.

## 2021-12-14 NOTE — NURSING NOTE
"Pt had flu shot due today and requested that it not be given to her until right before D/C. I held med and called pharmacy; asked them to reschedule medication and they told me to \"leave it hanging out\" and \"administer before D/C\".   "

## 2021-12-14 NOTE — PLAN OF CARE
Goal Outcome Evaluation: Improving    Pt currently lying quietly in bed, watching television. Denies any complaints at this time. V/S stable with no signs of respiratory distress present. Will continue to monitor pt and follow plan of care.

## 2021-12-14 NOTE — CONSULTS
INFECTIOUS DISEASE CONSULTATION REPORT        Patient Identification:  Name:  Yadira Burgos  Age:  66 y.o.  Sex:  female  :  1955  MRN:  [id number]   Visit Number:  [id number]  Primary Care Physician:  Dilcia Pedro MD       LOS: 2 days        Subjective       Subjective     History of present illness:      Thank you Dr. Schwartz for allowing us to participate in the care of your patient.  As you well know, Ms. Yadira Burgos is a 66 y.o. female with past medical history significant for right breast cancer in 2015 status post lumpectomy and radiation, multiple skin cancers basal cell and melanoma status post resection, cervical cancer , hyperlipidemia, hypertension, borderline diabetes, who presented to [facility] Emergency Department on 2021 for generalized weakness.  WBC 13.87.  CRP from 2021 25.18.  Strep pneumo antigen negative.  Legionella negative.  Urinalysis from 2021 +, culture currently in progress.  Blood cultures from 2021 1 out of 2 sets positive for Klebsiella pneumoniae.  Repeat blood cultures from 2021 currently in process.  COVID-19 and influenza PCR negative.  Lactic acid normal.  Chest x-ray from 2021 reports patchy infiltrates in the right lung.  CT of the head 2021 unremarkable.  CT of the chest, abdomen and pelvis reports significant consolidation in the right lower lobe, enlarged right axillary lymph node, indeterminate bilateral lung nodules and ill-defined hepatic lesion concerning for underlying malignancy.  Ultrasound of the abdomen from 2021 reports nonspecific gallbladder wall thickening and questionable pericholecystic fluid,, gallstones, liver lesion that is ill-defined that could represent abscess but malignancy is not excluded.      Infectious Disease consultation was requested for antimicrobial  management.      ---------------------------------------------------------------------------------------------------------------------     Review Of Systems:    Constitutional: no fever, chills and night sweats. No appetite change or unexpected weight change. No fatigue.  Eyes: no eye drainage, itching or redness.  HEENT: no mouth sores, dysphagia or nose bleed.  Respiratory: Positive shortness of breath, cough and production of sputum.  Cardiovascular: no chest pain, no palpitations, no orthopnea.  Gastrointestinal: Positive nausea, vomiting and diarrhea. Positive RUQ abdominal pain, no hematemesis or rectal bleeding.  Genitourinary: no dysuria or polyuria.  Hematologic/lymphatic: no lymph node abnormalities, no easy bruising or easy bleeding.  Musculoskeletal: no muscle or joint pain.  Skin: No rash and no itching.  Neurological: no loss of consciousness, no seizure, no headache.  Behavioral/Psych: no depression or suicidal ideation.  Endocrine: no hot flashes.  Immunologic: negative.    ---------------------------------------------------------------------------------------------------------------------     Past Medical History    Past Medical History:   Diagnosis Date    Back pain     Basal cell carcinoma (BCC) in situ of skin 2020    Dr. Mohr - [place]    Breast cancer (HCC)     2015    Cancer (HCC)     breast, cervical, melanoma    Cervical cancer (HCC) 1976    Diagnosed in 1975.  Treated by repeated local intervention and ultimately received cryotherapy at [facility] with resolution.    Elbow fracture     Elevated cholesterol     Foot fracture     Headache     Hyperlipidemia     Hypertension     Melanoma (HCC)     on her back 2004 - treated by Dermatologist Catrachito Amaro in [place], [place] with what sounds like wide local excision.     Sinusitis        Past Surgical History    Past Surgical History:   Procedure Laterality Date    BREAST LUMPECTOMY Right 2015    BREAST SURGERY  2015    Secondary to cancer    COLONOSCOPY  N/A 12/9/2020    Procedure: COLONOSCOPY;  Surgeon: Mohsen Maldonado MD;  Location: [facility];  Service: Gastroenterology;  Laterality: N/A;    ELBOW FUSION      left    FOOT SURGERY  2014    Broken foot    HAND SURGERY  08/2020    right    RHINOPLASTY  2002    SKIN CANCER EXCISION      malignant melanoma from back 2004       Family History    Family History   Problem Relation Age of Onset    Other Mother         blood clots    Hypertension Mother     Ulcers Father     Hypertension Sister     Other Sister         Multiple Sclerosis    Cancer Paternal Aunt     Stroke Maternal Grandmother     Alcohol abuse Paternal Grandmother     Hypertension Sister     Multiple sclerosis Sister     Hypertension Sister     Coronary artery disease Paternal Grandfather     Cancer Maternal Aunt 30        colon    Breast cancer Neg Hx        Social History    Social History     Tobacco Use    Smoking status: Never Smoker    Smokeless tobacco: Never Used   Vaping Use    Vaping Use: Never used   Substance Use Topics    Alcohol use: Not Currently    Drug use: Never       Allergies    Patient has no known allergies.  ---------------------------------------------------------------------------------------------------------------------     Home Medications:    Prior to Admission Medications       Prescriptions Last Dose Informant Patient Reported? Taking?    atenolol (TENORMIN) 50 MG tablet 12/11/2021 Self No Yes    Take 1 tablet by mouth 2 (two) times a day.    atorvastatin (LIPITOR) 80 MG tablet 12/11/2021 Self No Yes    Take 1 tablet by mouth Every Night.          ---------------------------------------------------------------------------------------------------------------------    Objective       Objective     Hospital Scheduled Meds:  cefTRIAXone, 2 g, Intravenous, Q24H  heparin (porcine), 5,000 Units, Subcutaneous, Q12H  influenza vaccine, 0.5 mL, Intramuscular, Once  metroNIDAZOLE, 500 mg, Oral, Q8H  sodium chloride, 10 mL,  Intravenous, Q12H      sodium chloride, 100 mL/hr, Last Rate: 100 mL/hr (12/13/21 1150)      ---------------------------------------------------------------------------------------------------------------------   Vital Signs:  Temp:  [98 °F (36.7 °C)-98.6 °F (37 °C)] 98.6 °F (37 °C)  Heart Rate:  [89-96] 91  Resp:  [18-20] 18  BP: ()/(55-67) 105/65  Mean Arterial Pressure (Non-Invasive) for the past 24 hrs (Last 3 readings):   Noninvasive MAP (mmHg)   12/14/21 1051 80   12/14/21 0633 83   12/14/21 0238 68     SpO2 Percentage    12/14/21 0238 12/14/21 0633 12/14/21 1051   SpO2: 97% 96% 98%     SpO2:  [96 %-98 %] 98 %  on   ;   Device (Oxygen Therapy): room air    Body mass index is 31.68 kg/m².  Wt Readings from Last 3 Encounters:   12/14/21 91.8 kg (202 lb 4.8 oz)   12/10/21 88.5 kg (195 lb)   10/20/21 88.7 kg (195 lb 9.6 oz)     ---------------------------------------------------------------------------------------------------------------------     Physical Exam:    Constitutional:  Well-developed and well-nourished.  No respiratory distress.      HENT:  Head: Normocephalic and atraumatic.  Mouth:  Moist mucous membranes.    Eyes:  Conjunctivae and EOM are normal.  No scleral icterus.  Neck:  Neck supple.  No JVD present.    Cardiovascular:  Normal rate, regular rhythm and normal heart sounds with no murmur. No edema.  Pulmonary/Chest: Diminished lung sounds in the bases.  Abdominal:  Soft.  Bowel sounds are normal.  Positive distention and tenderness.  Musculoskeletal:  No edema, no tenderness, and no deformity.  No swelling or redness of joints.  Neurological:  Alert and oriented to person, place, and time.  No facial droop.  No slurred speech.   Skin:  Skin is warm and dry.  No rash noted.  No pallor.   Psychiatric:  Normal mood and affect.  Behavior is normal.    ---------------------------------------------------------------------------------------------------------------------    Results from last 7  days   Lab Units 12/12/21  1639   TROPONIN T ng/mL <0.010           Results from last 7 days   Lab Units 12/12/21  2351 12/12/21  1648 12/12/21  1639 12/10/21  1510   CRP mg/dL 25.18*  --  29.64*  --    LACTATE mmol/L  --  1.2  --   --    WBC 10*3/mm3 13.87*  --  15.26* 12.98*   HEMOGLOBIN g/dL 11.2*  --  12.8 13.2   HEMATOCRIT % 34.8  --  38.9 38.7   MCV fL 87.2  --  85.5 86.0   MCHC g/dL 32.2  --  32.9 34.1   PLATELETS 10*3/mm3 118*  --  130* 144   INR  1.09  --   --   --      Results from last 7 days   Lab Units 12/12/21  2351 12/12/21  1639 12/10/21  1510   SODIUM mmol/L 138 136 137   POTASSIUM mmol/L 3.4* 3.5 3.9   MAGNESIUM mg/dL  --   --  1.6   CHLORIDE mmol/L 109* 100 101   CO2 mmol/L 19.5* 18.8* 20.3*   BUN mg/dL 16 18 20   CREATININE mg/dL 0.63 0.81 0.83   EGFR IF NONAFRICN AM mL/min/1.73 95 71 69   CALCIUM mg/dL 7.8* 9.1 8.7   GLUCOSE mg/dL 128* 140* 113*   ALBUMIN g/dL 2.57* 3.04* 3.30*   BILIRUBIN mg/dL 1.0 1.7* 1.1   ALK PHOS U/L 129* 152* 154*   AST (SGOT) U/L 32 33* 26   ALT (SGPT) U/L 19 22 20   Estimated Creatinine Clearance: 81.6 mL/min (by C-G formula based on SCr of 0.63 mg/dL).  No results found for: AMMONIA    Hemoglobin A1C   Date/Time Value Ref Range Status   12/12/2021 1639 6.20 (H) 4.80 - 5.60 % Final     Glucose   Date/Time Value Ref Range Status   12/12/2021 1703 135 (H) 70 - 130 mg/dL Final     Comment:     Meter: [id number] : [id number] Vani Mirza     Lab Results   Component Value Date    HGBA1C 6.20 (H) 12/12/2021     Lab Results   Component Value Date    TSH 1.900 09/16/2020    FREET4 1.31 09/16/2020       Blood Culture   Date Value Ref Range Status   12/12/2021 No growth at 24 hours  Preliminary   12/12/2021 Gram Negative Bacilli (C)  Preliminary     No results found for: URINECX  No results found for: WOUNDCX  No results found for: STOOLCX  No results found for: RESPCX  Pain Management Panel    There is no flowsheet data to display.       I have personally reviewed the  above laboratory results.   ---------------------------------------------------------------------------------------------------------------------  Imaging Results (Last 7 Days)       Procedure Component Value Units Date/Time    US Abdomen Complete [[id number]] Collected: 12/12/21 2053     Updated: 12/12/21 2055    Narrative:      PROCEDURE: US Abdomen Complete    COMPARISON: CT abdomen and pelvis same day    INDICATIONS: abdominal pain; Hypotension, unspecified; Pneumonia, unspecified organism; Hepatomegaly, not elsewhere classified elevated white count abnormal CT      TECHNIQUE:  Transverse and longitudinal images of the abdomen were obtained using real-time grayscale and color ultrasound.    FINDINGS:  The visualized portions of the pancreas, IVC and aorta appear normal.        The liver is moderately coarsened in echotexture. Ill-defined hypodensity in the liver measuring 3 x 3.2 x 2.6 cm corresponds to recent CT. Unclear if this is a malignant lesion. Abscesses within the differential but prior CT was performed without any  contrast.  The common bile duct is not dilated.. Gallbladder wall is thickened at 3.4 mm with trace pericholecystic fluid and subtle hyperemia. Gallstones and shadowing present.    The kidneys are normal in size and echogenicity. There is no   hydronephrosis or focal solid lesion.  The spleen is normal in size and echotexture.      Impression:      1. Nonspecific gallbladder wall thickening and questionable pericholecystic fluid. Gallstones are better seen on CT than ultrasound.  2.  Heterogeneous liver with lesion in the liver as seen on CT. Ultrasound is not helpful in determining etiology. This could be an abscess but given the other findings on CT,  malignancy is not excluded.         Signer Name: Ximena Delgado MD   Signed: 12/12/2021 8:53 PM   Workstation Name: [facility]    [facility]    CT Abdomen Pelvis Without Contrast [[id number]] Collected: 12/12/21 8097      Updated: 12/12/21 1846    Narrative:      PROCEDURE: CT Abdomen Pelvis WO, CT Chest WO    INDICATIONS: diarrhea    COMPARISON: 12/15/2020      Difficulty eating/elevated wbc/diarrhea      TECHNIQUE: CT of the chest, abdomen and pelvis are performed without oral or IV contrast. Multiplanar reformations were performed.  No oral contrast utilized    Radiation dose reduction techniques included automated exposure control or exposure modulation based on body size.   Count of known CT and cardiac nuc med studies performed in previous 12 months: 3.         FINDINGS: Lack of IV contrast hinders parenchymal assessment of the mediastinum, liver, spleen, pancreas, adrenal glands and kidneys.       Significant infiltrate seen in the right lower lobe with interstitial component. There are also lung nodules present in the right lung. Index nodule in the right lung base medially is 5 mm maximal diameter. Other smaller nodules seen throughout the right  lung. Tiny 3 mm lingular nodule seen in series 3 image 30. A benign calcified granuloma seen in the left lower lobe but a noncalcified indeterminant  5 mm left lower lobe nodule seen, series 3 image 42.    Nonenlarged mediastinal adenopathy seen. However there is an enlarged right axillary 1.8 x 2.6 cm lymph node partially seen.    Worrisome hepatic metastasis or primary hepatic lesion seen measuring 4.4 x 4.1 cm in liver segment 8. Margins are ill-defined. Abscess could have a similar appearance. Suggestion of subtle gallstones. Nonobstructing 2 mm right renal calculus seen. No  obstructive uropathy. Gaseous distention of the esophagus and hiatal hernia present consistent with GERD. No enteric contrast but no gross evidence of bowel obstruction.   There is no gross free air, free fluid or focal inflammatory change.  Uterus and  adnexa are not enlarged. Osseous structures are grossly intact.      Impression:      Constellation of findings with significant consolidation in the  right lower lobe, enlarged right axillary lymph node, indeterminate bilateral lung nodules and ill-defined hepatic lesion concerning for underlying malignancy in this patient .     Signer Name: Ximena Delgado MD   Signed: 12/12/2021 6:44 PM   Workstation Name: [email or web address]    [facility]    CT Chest Without Contrast Diagnostic [[id number]] Collected: 12/12/21 1844     Updated: 12/12/21 1846    Narrative:      PROCEDURE: CT Abdomen Pelvis WO, CT Chest WO    INDICATIONS: diarrhea    COMPARISON: 12/15/2020      Difficulty eating/elevated wbc/diarrhea      TECHNIQUE: CT of the chest, abdomen and pelvis are performed without oral or IV contrast. Multiplanar reformations were performed.  No oral contrast utilized    Radiation dose reduction techniques included automated exposure control or exposure modulation based on body size.   Count of known CT and cardiac nuc med studies performed in previous 12 months: 3.         FINDINGS: Lack of IV contrast hinders parenchymal assessment of the mediastinum, liver, spleen, pancreas, adrenal glands and kidneys.       Significant infiltrate seen in the right lower lobe with interstitial component. There are also lung nodules present in the right lung. Index nodule in the right lung base medially is 5 mm maximal diameter. Other smaller nodules seen throughout the right  lung. Tiny 3 mm lingular nodule seen in series 3 image 30. A benign calcified granuloma seen in the left lower lobe but a noncalcified indeterminant  5 mm left lower lobe nodule seen, series 3 image 42.    Nonenlarged mediastinal adenopathy seen. However there is an enlarged right axillary 1.8 x 2.6 cm lymph node partially seen.    Worrisome hepatic metastasis or primary hepatic lesion seen measuring 4.4 x 4.1 cm in liver segment 8. Margins are ill-defined. Abscess could have a similar appearance. Suggestion of subtle gallstones. Nonobstructing 2 mm right renal calculus seen. No  obstructive  uropathy. Gaseous distention of the esophagus and hiatal hernia present consistent with GERD. No enteric contrast but no gross evidence of bowel obstruction.   There is no gross free air, free fluid or focal inflammatory change.  Uterus and  adnexa are not enlarged. Osseous structures are grossly intact.      Impression:      Constellation of findings with significant consolidation in the right lower lobe, enlarged right axillary lymph node, indeterminate bilateral lung nodules and ill-defined hepatic lesion concerning for underlying malignancy in this patient .     Signer Name: Ximena Delgado MD   Signed: 12/12/2021 6:44 PM   Workstation Name: [facility]    CT Head Without Contrast [[id number]] Collected: 12/12/21 1807     Updated: 12/12/21 1809    Narrative:      PROCEDURE: CT Head WO    COMPARISON: No relevant comparison or correlation studies available at time of dictation.      INDICATIONS: generalized weakness    Radiation dose reduction techniques included automated exposure control or exposure modulation based on body size.   Count of known CT and cardiac nuc med studies performed in previous 12 months: 2.          FINDINGS:  CT examination of the brain is performed without IV contrast. There is no evidence of acute intracranial hemorrhage, mass effect or midline shift. No intra-axial or extra-axial fluid collections. The ventricular system is normal.  The  calvarium is intact.         Impression:      No acute intracranial process.             Signer Name: Ximena Delgado MD   Signed: 12/12/2021 6:07 PM   Workstation [facility]: [email or web address]    XR Chest 1 View [[id number]] Collected: 12/12/21 1736     Updated: 12/12/21 1738    Narrative:      PROCEDURE: CR Chest 1 Vw    COMPARISON:  CT chest 12/15/2020     INDICATIONS: Acute emergency room presentation with pain and weakness    TECHNIQUE: Single AP  view of the chest    FINDINGS:   Cardiomediastinal silhouette is within normal limits given projection. Elevated right hemidiaphragm with patchy infiltrates in the right lung. No lobar collapse. Left lung relatively clear. Osseous structures are intact.      Impression:      Patchy infiltrates right lung         Signer Name: Ximena Delgado MD   Signed: 12/12/2021 5:36 PM   Workstation Name: [email or web address]    [facility] [place]          I have personally reviewed the above radiology results.   ---------------------------------------------------------------------------------------------------------------------      Assessment & Plan        Assessment/Plan       ASSESSMENT:    1.  Klebsiella bacteremia  2.  Cholecystitis  3.  Pneumonia versus malignancy    PLAN:    Patient presents with generalized weakness.  WBC 13.87.  CRP from 12/12/2021 25.18.  Strep pneumo antigen negative.  Legionella negative.  Urinalysis from 12/13/2021 +, culture currently in progress.  Blood cultures from 12/12/2021 1 out of 2 sets positive for Klebsiella pneumoniae.  Repeat blood cultures from 12/14/2021 currently in process.  COVID-19 and influenza PCR negative.  Lactic acid normal.  Chest x-ray from 12/12/2021 reports patchy infiltrates in the right lung.  CT of the head 12/12/2021 unremarkable.  CT of the chest, abdomen and pelvis reports significant consolidation in the right lower lobe, enlarged right axillary lymph node, indeterminate bilateral lung nodules and ill-defined hepatic lesion concerning for underlying malignancy.  Ultrasound of the abdomen from 12/12/2021 reports nonspecific gallbladder wall thickening and questionable pericholecystic fluid,, gallstones, liver lesion that is ill-defined that could represent abscess but malignancy is not excluded.    Recommend hematology/oncology evaluation and biopsy of lesions noted on CT scan.    Bacteremia could be secondary to cholecystitis or pneumonia.  For now recommend to continue Rocephin and  Flagyl.  We will continue to follow closely and adjust antibiotic therapy as needed.    Again, thank you Dr. Schwartz for allowing us to participate in the care of your patient and please feel free to call for any questions you may have.        Code Status:     Code Status and Medical Interventions:   Ordered at: 12/12/21 1939     Level Of Support Discussed With:    Patient     Code Status (Patient has no pulse and is not breathing):    CPR (Attempt to Resuscitate)     Medical Interventions (Patient has pulse or is breathing):    Full Support         Sara Schneider, [place]  12/14/21  12:27 EST
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
## 2021-12-15 ENCOUNTER — APPOINTMENT (OUTPATIENT)
Dept: CT IMAGING | Facility: HOSPITAL | Age: 66
End: 2021-12-15

## 2021-12-15 ENCOUNTER — APPOINTMENT (OUTPATIENT)
Dept: NUCLEAR MEDICINE | Facility: HOSPITAL | Age: 66
End: 2021-12-15

## 2021-12-15 LAB
ALPHA-FETOPROTEIN: 3.44 NG/ML (ref 0–8.3)
BACTERIA SPEC AEROBE CULT: ABNORMAL
BACTERIA UR QL AUTO: ABNORMAL /HPF
BILIRUB UR QL STRIP: NEGATIVE
CANCER AG125 SERPL QL: 42.9 U/ML (ref 0–38.1)
CANCER AG15-3 SERPL-ACNC: 14.7 U/ML
CEA SERPL-MCNC: 3.39 NG/ML
CLARITY UR: CLEAR
COLOR UR: YELLOW
FOLATE SERPL-MCNC: 14.4 NG/ML (ref 4.78–24.2)
GLUCOSE UR STRIP-MCNC: NEGATIVE MG/DL
GRAM STN SPEC: ABNORMAL
GRAM STN SPEC: ABNORMAL
HGB UR QL STRIP.AUTO: NEGATIVE
HYALINE CASTS UR QL AUTO: ABNORMAL /LPF
ISOLATED FROM: ABNORMAL
KETONES UR QL STRIP: ABNORMAL
LEUKOCYTE ESTERASE UR QL STRIP.AUTO: ABNORMAL
NITRITE UR QL STRIP: NEGATIVE
PH UR STRIP.AUTO: 6 [PH] (ref 5–8)
PROT UR QL STRIP: NEGATIVE
RBC # UR STRIP: ABNORMAL /HPF
REF LAB TEST METHOD: ABNORMAL
SP GR UR STRIP: 1.01 (ref 1–1.03)
SQUAMOUS #/AREA URNS HPF: ABNORMAL /HPF
UROBILINOGEN UR QL STRIP: ABNORMAL
VIT B12 BLD-MCNC: 1233 PG/ML (ref 211–946)
WBC # UR STRIP: ABNORMAL /HPF

## 2021-12-15 PROCEDURE — 25010000002 HEPARIN (PORCINE) PER 1000 UNITS: Performed by: HOSPITALIST

## 2021-12-15 PROCEDURE — 99232 SBSQ HOSP IP/OBS MODERATE 35: CPT | Performed by: INTERNAL MEDICINE

## 2021-12-15 PROCEDURE — 87086 URINE CULTURE/COLONY COUNT: CPT | Performed by: NURSE PRACTITIONER

## 2021-12-15 PROCEDURE — 0 IOVERSOL 68 % SOLUTION: Performed by: INTERNAL MEDICINE

## 2021-12-15 PROCEDURE — 25010000002 CEFTRIAXONE PER 250 MG: Performed by: INTERNAL MEDICINE

## 2021-12-15 PROCEDURE — 78306 BONE IMAGING WHOLE BODY: CPT | Performed by: RADIOLOGY

## 2021-12-15 PROCEDURE — 0 TECHNETIUM OXIDRONATE KIT: Performed by: INTERNAL MEDICINE

## 2021-12-15 PROCEDURE — 78306 BONE IMAGING WHOLE BODY: CPT

## 2021-12-15 PROCEDURE — 74177 CT ABD & PELVIS W/CONTRAST: CPT

## 2021-12-15 PROCEDURE — 71260 CT THORAX DX C+: CPT

## 2021-12-15 PROCEDURE — 74177 CT ABD & PELVIS W/CONTRAST: CPT | Performed by: RADIOLOGY

## 2021-12-15 PROCEDURE — A9561 TC99M OXIDRONATE: HCPCS | Performed by: INTERNAL MEDICINE

## 2021-12-15 PROCEDURE — 81001 URINALYSIS AUTO W/SCOPE: CPT | Performed by: NURSE PRACTITIONER

## 2021-12-15 PROCEDURE — 71260 CT THORAX DX C+: CPT | Performed by: RADIOLOGY

## 2021-12-15 RX ADMIN — TECHNETIUM TC 99M OXIDRONATE 1 DOSE: 3.15 INJECTION, POWDER, LYOPHILIZED, FOR SOLUTION INTRAVENOUS at 09:43

## 2021-12-15 RX ADMIN — METRONIDAZOLE 500 MG: 250 TABLET ORAL at 05:25

## 2021-12-15 RX ADMIN — SODIUM CHLORIDE 100 ML/HR: 9 INJECTION, SOLUTION INTRAVENOUS at 17:49

## 2021-12-15 RX ADMIN — IOVERSOL 70 ML: 678 INJECTION INTRA-ARTERIAL; INTRAVENOUS at 11:36

## 2021-12-15 RX ADMIN — METRONIDAZOLE 500 MG: 250 TABLET ORAL at 13:29

## 2021-12-15 RX ADMIN — SODIUM CHLORIDE 2 G: 9 INJECTION, SOLUTION INTRAVENOUS at 20:21

## 2021-12-15 RX ADMIN — SODIUM CHLORIDE, PRESERVATIVE FREE 10 ML: 5 INJECTION INTRAVENOUS at 11:55

## 2021-12-15 RX ADMIN — HEPARIN SODIUM 5000 UNITS: 5000 INJECTION INTRAVENOUS; SUBCUTANEOUS at 11:56

## 2021-12-15 RX ADMIN — SODIUM CHLORIDE 100 ML/HR: 9 INJECTION, SOLUTION INTRAVENOUS at 05:38

## 2021-12-15 RX ADMIN — METRONIDAZOLE 500 MG: 250 TABLET ORAL at 20:22

## 2021-12-15 RX ADMIN — SODIUM CHLORIDE, PRESERVATIVE FREE 10 ML: 5 INJECTION INTRAVENOUS at 20:22

## 2021-12-15 NOTE — PLAN OF CARE
Goal Outcome Evaluation:  Patient resting in bed watching television. No complaints. Vital signs stable. Will continue to monitor and follow plan of care.

## 2021-12-15 NOTE — PROGRESS NOTES
"    [facility] HOSPITALIST PROGRESS NOTE     Patient Identification:  Name:  Yadira Burgos  Age:  66 y.o.  Sex:  female  :  1955  MRN:  [id number]  Visit Number:  [id number]  ROOM: [address] [place] [address]     Primary Care Provider:  Dilcia Pedro MD    Length of stay in inpatient status:  3    Subjective     Chief Compliant:    Chief Complaint   Patient presents with   • Weakness - Generalized   • Chills   • Dizziness   • Nausea       History of Presenting Illness:    Patient reports feeling well with no new complaints. Denies any new complaints. She does report for the last week she has had abdominal pain after eating. She reports she feels \"queezy\".     ROS:  Otherwise 10 point ROS negative other than documented above in HPI.     Objective     Current Hospital Meds:cefTRIAXone, 2 g, Intravenous, Q24H  heparin (porcine), 5,000 Units, Subcutaneous, Q12H  influenza vaccine, 0.5 mL, Intramuscular, Once  metroNIDAZOLE, 500 mg, Oral, Q8H  sodium chloride, 10 mL, Intravenous, Q12H    sodium chloride, 100 mL/hr, Last Rate: 100 mL/hr (12/15/21 1749)        Current Antimicrobial Therapy:  Anti-Infectives (From admission, onward)    Ordered     Dose/Rate Route Frequency Start Stop    21 1242  cefTRIAXone (ROCEPHIN) 2 g in sodium chloride 0.9 % 100 mL IVPB-VTB        Ordering Provider: Duc Schwartz MD    2 g  200 mL/hr over 30 Minutes Intravenous Every 24 Hours 21 2100 21 2059    21 2136  metroNIDAZOLE (FLAGYL) tablet 500 mg        Ordering Provider: Gordon Nguyen MD    500 mg Oral Every 8 Hours Scheduled 21 2200 21 2159    21 1900  cefTRIAXone (ROCEPHIN) 1 g in sodium chloride 0.9 % 100 mL IVPB-VTB        Ordering Provider: Fermin Walters PA-C    1 g  200 mL/hr over 30 Minutes Intravenous Once 21 1902 21 2157    21 1720  piperacillin-tazobactam (ZOSYN) IVPB 3.375 g in 100 mL NS VTB        Ordering Provider: Fermin Walters PA-C " "   3.375 g  over 4 Hours Intravenous Once 12/12/21 1722 12/12/21 1847    12/12/21 1720  vancomycin 1750 mg/500 mL 0.9% NS IVPB ([facility])        Ordering Provider: Fermin Walters PA-C   \"And\" Linked Group Details    20 mg/kg × 83.9 kg Intravenous Once 12/12/21 1722 12/12/21 2120        Current Diuretic Therapy:  Diuretics (From admission, onward)    None        ----------------------------------------------------------------------------------------------------------------------  Vital Signs:  Temp:  [97.7 °F (36.5 °C)-99 °F (37.2 °C)] 97.8 °F (36.6 °C)  Heart Rate:  [88-97] 93  Resp:  [20-22] 20  BP: (108-125)/(60-70) 117/67  SpO2:  [96 %-98 %] 96 %  on   ;   Device (Oxygen Therapy): room air  Body mass index is 31.64 kg/m².    Wt Readings from Last 3 Encounters:   12/15/21 91.6 kg (202 lb)   12/10/21 88.5 kg (195 lb)   10/20/21 88.7 kg (195 lb 9.6 oz)     Intake & Output (last 3 days)       12/12 0701 12/13 0700 12/13 0701 12/14 0700 12/14 0701  12/15 0700 12/15 0701 12/16 0700    P.O. 120 1010 510 200    I.V. (mL/kg)  1200 (13.1)      IV Piggyback 600 200      Total Intake(mL/kg) 720 (8) 2410 (26.3) 510 (5.6) 200 (2.2)    Urine (mL/kg/hr)  150 (0.1) 600 (0.3) 700 (0.7)    Stool    0    Total Output  150 600 700    Net +720 +2260 -90 -500            Urine Unmeasured Occurrence  3 x  1 x    Stool Unmeasured Occurrence  2 x  1 x        Diet Regular  ----------------------------------------------------------------------------------------------------------------------  Physical exam:  Constitutional:  Well-developed and well-nourished.  No respiratory distress.      HENT:  Head:  Normocephalic and atraumatic.  Mouth:  Moist mucous membranes.    Eyes:  Conjunctivae and EOM are normal. No scleral icterus.    Neck:  Neck supple.  No JVD present.    Cardiovascular:  Normal rate, regular rhythm and normal heart sounds with no murmur.  Pulmonary/Chest:  No respiratory distress, no wheezes, no crackles, with normal " breath sounds and good air movement.  Abdominal:  Mild tenderness in all quadrants.   Musculoskeletal:  No edema, no tenderness, and no deformity.  No red or swollen joints anywhere.    Neurological:  Alert and oriented to person, place, and time.  No cranial nerve deficit.  No tongue deviation.  No facial droop.  No slurred speech.   Skin:  Skin is warm and dry. No rash noted. No pallor.   Peripheral vascular:  Pulses in all 4 extremities with no clubbing, no cyanosis, no edema.  ----------------------------------------------------------------------------------------------------------------------  Tele:    ----------------------------------------------------------------------------------------------------------------------  Results from last 7 days   Lab Units 12/12/21  2351 12/12/21  1648 12/12/21  1639 12/10/21  1510   CRP mg/dL 25.18*  --  29.64*  --    LACTATE mmol/L  --  1.2  --   --    WBC 10*3/mm3 13.87*  --  15.26* 12.98*   HEMOGLOBIN g/dL 11.2*  --  12.8 13.2   HEMATOCRIT % 34.8  --  38.9 38.7   MCV fL 87.2  --  85.5 86.0   MCHC g/dL 32.2  --  32.9 34.1   PLATELETS 10*3/mm3 118*  --  130* 144   INR  1.09  --   --   --          Results from last 7 days   Lab Units 12/12/21  2351 12/12/21  1639 12/10/21  1510   SODIUM mmol/L 138 136 137   POTASSIUM mmol/L 3.4* 3.5 3.9   MAGNESIUM mg/dL  --   --  1.6   CHLORIDE mmol/L 109* 100 101   CO2 mmol/L 19.5* 18.8* 20.3*   BUN mg/dL 16 18 20   CREATININE mg/dL 0.63 0.81 0.83   EGFR IF NONAFRICN AM mL/min/1.73 95 71 69   CALCIUM mg/dL 7.8* 9.1 8.7   GLUCOSE mg/dL 128* 140* 113*   ALBUMIN g/dL 2.57* 3.04* 3.30*   BILIRUBIN mg/dL 1.0 1.7* 1.1   ALK PHOS U/L 129* 152* 154*   AST (SGOT) U/L 32 33* 26   ALT (SGPT) U/L 19 22 20   Estimated Creatinine Clearance: 81.5 mL/min (by C-G formula based on SCr of 0.63 mg/dL).  No results found for: AMMONIA  Results from last 7 days   Lab Units 12/12/21  1639   TROPONIN T ng/mL <0.010             No results found for: HGBA1C,  POCGLU  Lab Results   Component Value Date    TSH 1.900 09/16/2020    FREET4 1.31 09/16/2020     No results found for: PREGTESTUR, PREGSERUM, HCG, HCGQUANT  Pain Management Panel    There is no flowsheet data to display.       Brief Urine Lab Results  (Last result in the past 365 days)      Color   Clarity   Blood   Leuk Est   Nitrite   Protein   CREAT   Urine HCG        12/15/21 0837 Yellow   Clear   Negative   Small (1+)   Negative   Negative               Blood Culture   Date Value Ref Range Status   12/14/2021 No growth at 24 hours  Preliminary   12/14/2021 No growth at 24 hours  Preliminary   12/12/2021 No growth at 3 days  Preliminary   12/12/2021 Klebsiella pneumoniae ssp pneumoniae (C)  Final     No results found for: URINECX  No results found for: WOUNDCX  No results found for: STOOLCX  No results found for: RESPCX  No results found for: AFBCX  Results from last 7 days   Lab Units 12/12/21  2351 12/12/21  1648 12/12/21  1639   LACTATE mmol/L  --  1.2  --    SED RATE mm/hr  --   --  80*   CRP mg/dL 25.18*  --  29.64*       I have personally looked at the labs and they are summarized above.  ----------------------------------------------------------------------------------------------------------------------  Detailed radiology reports for the last 24 hours:    Imaging Results (Last 24 Hours)     Procedure Component Value Units Date/Time    NM Bone Scan Whole Body [[id number]] Collected: 12/15/21 1437     Updated: 12/15/21 1439    Narrative:      EXAM:    NM Bone Scan     EXAM DATE:    12/15/2021 9:40 AM     CLINICAL HISTORY:    lung nodules, liver lesion-history of breast, cervical cancer,  melanoma; I95.9-Hypotension, unspecified; J18.9-Pneumonia, unspecified  organism; R16.0-Hepatomegaly, not elsewhere classified     TECHNIQUE:    Anterior and posterior images of the whole body were obtained  following the intravenous administration of Tc99m MDP.     COMPARISON:    No relevant prior studies available.      FINDINGS:    SKULL/FACIAL BONES:  No focal increased or decreased uptake.    SPINE:  Unremarkable.  No abnormal increased or decreased uptake.    RIBS:  Unremarkable.  No abnormal uptake.    LONG BONES:  Unremarkable.  No abnormal uptake.    PELVIS:  Unremarkable.  No focal increased or decreased uptake.    JOINTS:  Unremarkable.  No focal increased or decreased uptake.    SOFT TISSUES:  Unremarkable.    RENAL/BLADDER:  Within normal limits.       Impression:        Normal bone scan.     This report was finalized on 12/15/2021 2:37 PM by Dr. Cristian Robison MD.       CT Abdomen Pelvis With Contrast [[id number]] Collected: 12/15/21 1223     Updated: 12/15/21 1227    Narrative:      EXAM:    CT Abdomen and Pelvis With Intravenous Contrast     EXAM DATE:    12/15/2021 11:23 AM     CLINICAL HISTORY:    lung nodules, liver lesion-history of breast, cervical cancer,  melanoma; I95.9-Hypotension, unspecified; J18.9-Pneumonia, unspecified  organism; R16.0-Hepatomegaly, not elsewhere classified     TECHNIQUE:    Axial computed tomography images of the abdomen and pelvis with  intravenous contrast.  Sagittal and coronal reformatted images were  created and reviewed.  This CT exam was performed using one or more of  the following dose reduction techniques:  automated exposure control,  adjustment of the mA and/or kV according to patient size, and/or use of  iterative reconstruction technique.     COMPARISON:    12/12/2021     FINDINGS:    LUNG BASES:  Unremarkable.  No mass.  No consolidation.      ABDOMEN:    LIVER:  Right lobe of liver lesion is again identified now measuring  about 4.7 cm and was about 4.7 cm.    GALLBLADDER AND BILE DUCTS:  Gallstones noted in the gallbladder.  No  ductal dilation.    PANCREAS:  Unremarkable.  No mass.  No ductal dilation.    SPLEEN:  Unremarkable.  No splenomegaly.    ADRENALS:  Unremarkable.  No mass.    KIDNEYS AND URETERS:  Unremarkable.  No solid mass.  No  hydronephrosis.    STOMACH  AND BOWEL:  Unremarkable.  No obstruction.  No mucosal  thickening.      PELVIS:    APPENDIX:  No findings to suggest acute appendicitis.    BLADDER:  Unremarkable.  No mass.    REPRODUCTIVE:  Unremarkable as visualized.      ABDOMEN and PELVIS:    INTRAPERITONEAL SPACE:  Unremarkable.  No free air.  No significant  fluid collection.    BONES/JOINTS:  No acute fracture.  No dislocation.    SOFT TISSUES:  Unremarkable.    VASCULATURE:  Unremarkable.  No abdominal aortic aneurysm.    LYMPH NODES:  Unremarkable.  No enlarged lymph nodes.       Impression:        Right lobe of liver lesion is again identified now measuring about 4.7  cm and was about 4.7 cm.     This report was finalized on 12/15/2021 12:25 PM by Dr. Cristian Robison MD.       CT Chest With Contrast Diagnostic [[id number]] Collected: 12/15/21 1221     Updated: 12/15/21 1225    Narrative:      EXAM:    CT Chest With Intravenous Contrast     EXAM DATE:    12/15/2021 11:22 AM     CLINICAL HISTORY:    lung nodules, liver lesion-history of breast, cervical cancer,  melanoma; I95.9-Hypotension, unspecified; J18.9-Pneumonia, unspecified  organism; R16.0-Hepatomegaly, not elsewhere classified     TECHNIQUE:    Axial computed tomography images of the chest with intravenous  contrast.  Sagittal and coronal reformatted images were created and  reviewed.  This CT exam was performed using one or more of the following  dose reduction techniques:  automated exposure control, adjustment of  the mA and/or kV according to patient size, and/or use of iterative  reconstruction technique.     COMPARISON:    12/12/2021     FINDINGS:    LUNGS:  Increased density of the now right lower lobe mixed  groundglass and more solid-appearing consolidation with internal  cavitary component identified. Tiny right and left lung pulmonary  nodules. Grossly stable.    PLEURAL SPACE:  Small bilateral pleural effusions.  No pneumothorax.    HEART:  Unremarkable.  No cardiomegaly.  No  significant pericardial  effusion.    BONES/JOINTS:  Unremarkable.  No acute fracture.  No dislocation.    SOFT TISSUES:  Unremarkable.    VASCULATURE:  Unremarkable.  No thoracic aortic aneurysm.    LYMPH NODES:  Unremarkable.  No enlarged lymph nodes.       Impression:      1.  Increased density of the now right lower lobe mixed groundglass and  more solid-appearing consolidation with internal cavitary component  identified. Tiny right and left lung pulmonary nodules. Grossly stable.  2.  Small bilateral pleural effusions.     This report was finalized on 12/15/2021 12:23 PM by Dr. Cristian Robison MD.           Assessment & Plan    #Sepsis 2/2 RLL pneumonia   #Klebsiella pneumonia bacteremia  - CT chest revealed RLL consolidation   - 1/2 blood cultures on admission revealing Klebsiella pneumonia on BCID, awaiting culture results.    - Will continue ceftriaxone, increase to 2 grams daily at pharmacy recommendation.   - ID consulted. Appreciate recs. Flagyl added.     #Lung nodules   #Liver lesion   #R axillary lymphadenopathy   - Concerning for malignancy. Cannot rule out that liver lesion is abscess in setting of bacteremia but this is less likely.   - Pulmonology consulted for potential bronch. If yield is thought to be low, will discuss best area for tissue diagnosis with radiology and oncology.   - Will get total AFP level.   - Oncology consulted. Other tumor markers sent. Recommended liver biopsy.   - Radiology contacted and plan on liver lesion biopsy tomorrow. If aspirate appears to be pus, they plan on placing drain.     #HTN  - On hypotensive side, will hold any antihypertensives at this time.     #Elevated alk phos  #Nonspecific gallbladder wall thickening and questionable pericholecystic fluid  - Nonspecific findings. Biliribuin and other LFTs wnl.   - Patient does not some generalized abdominal discomfort that is postprandial for the last week.   - Will get a HIDA scan in AM to further demonstrate  gallbladder function. I think bacteremia is less likely from gallbladder but specific pathogen could be found there.     #Hx of cervical cancer  #Hx of breast cancer   #Hx of melanoma   - Has never been tested for BRCA per her report   - Patient reports she is in remission     #Hypokalemia  - Replacement ordered     F: PO  E: Replace as needed   N Regular     Code status: Full     Dispo: Pending clinical improvement       VTE Prophylaxis:   Mechanical Order History:     None      Pharmalogical Order History:      Ordered     Dose Route Frequency Stop    12/12/21 3911  heparin (porcine) 5000 UNIT/ML injection 5,000 Units         5,000 Units SC Every 12 Hours Scheduled --                  Duc Schwartz MD  [facility]  12/15/21  18:24 EST

## 2021-12-15 NOTE — PROGRESS NOTES
Dr. MIAN Leach    [address]    12/15/2021    Patient ID:  Name:  Yadira Burgos  MRN:  [id number]  1955  66 y.o.  female            CC/Reason for visit: Bacteremia, pneumonia    Interval hx: Patient has no new complaints.  On room air.  Mild cough but no fever    ROS: No chest pain, abdominal pain    Vitals:  Vitals:    12/15/21 0438 12/15/21 0706 12/15/21 1043 12/15/21 1205   BP:  125/70 108/68    BP Location:  Right arm Right arm    Patient Position:  Lying Lying    Pulse:  88 97    Resp:  22 22    Temp:  98.6 °F (37 °C) 99 °F (37.2 °C) 97.7 °F (36.5 °C)   TempSrc:  Axillary Axillary Oral   SpO2:  98% 96%    Weight: 91.6 kg (202 lb)      Height:               Body mass index is 31.64 kg/m².    Intake/Output Summary (Last 24 hours) at 12/15/2021 1433  Last data filed at 12/15/2021 0901  Gross per 24 hour   Intake 390 ml   Output 1300 ml   Net -910 ml       Exam:  GEN:  No distress  Alert, oriented x 3.   LUNGS: Clear breath sounds bilat, no use of accessory muscles  CV:  Normal S1S2, without murmur, no edema  ABD:  Non tender, no enlarged liver or masses      Scheduled meds:  cefTRIAXone, 2 g, Intravenous, Q24H  heparin (porcine), 5,000 Units, Subcutaneous, Q12H  influenza vaccine, 0.5 mL, Intramuscular, Once  metroNIDAZOLE, 500 mg, Oral, Q8H  sodium chloride, 10 mL, Intravenous, Q12H      IV meds:                      sodium chloride, 100 mL/hr, Last Rate: 100 mL/hr (12/15/21 0538)        Data Review:   I reviewed the patient's medications and new clinical results.    COVID19   Date Value Ref Range Status   12/12/2021 Not Detected Not Detected - Ref. Range Final         Lab Results   Component Value Date    CALCIUM 7.8 (L) 12/12/2021    MG 1.6 12/10/2021    MG 2.1 09/16/2020     Results from last 7 days   Lab Units 12/12/21  2351 12/12/21  1639 12/10/21  1510   SODIUM mmol/L 138 136 137   POTASSIUM mmol/L 3.4* 3.5 3.9   CHLORIDE mmol/L 109* 100 101   CO2 mmol/L 19.5* 18.8* 20.3*   BUN  mg/dL 16 18 20   CREATININE mg/dL 0.63 0.81 0.83   CALCIUM mg/dL 7.8* 9.1 8.7   BILIRUBIN mg/dL 1.0 1.7* 1.1   ALK PHOS U/L 129* 152* 154*   ALT (SGPT) U/L 19 22 20   AST (SGOT) U/L 32 33* 26   GLUCOSE mg/dL 128* 140* 113*   WBC 10*3/mm3 13.87* 15.26* 12.98*   HEMOGLOBIN g/dL 11.2* 12.8 13.2   PLATELETS 10*3/mm3 118* 130* 144   INR  1.09  --   --      Results from last 7 days   Lab Units 12/14/21  0341 12/14/21  0340 12/12/21  1740 12/12/21  1648   BLOODCX  No growth at 24 hours No growth at 24 hours No growth at 2 days Klebsiella pneumoniae ssp pneumoniae*   BCIDPCR   --   --   --  Klebsiella pneumoniae group. Identification by BCID2 PCR.*           ASSESSMENT:     Sepsis due to pneumonia (HCC)  Klebsiella pneumonia  Right axillary adenopathy  Liver mass      PLAN:  Patient has no new complaints.  On room air, mild nonproductive cough.  Completed course of IV Rocephin for Klebsiella infection as per infectious disease recommendations.  Repeat CT of the chest in 4 to 6 weeks to ensure clearance of pneumonia.  Proceed with liver biopsy for tissue pathology in this person who has right axillary adenopathy and liver lesion, with a history of breast cancer.        Chan Leach MD  12/15/2021

## 2021-12-15 NOTE — PROGRESS NOTES
PROGRESS NOTE         Patient Identification:  Name:  Yadira Burgos  Age:  66 y.o.  Sex:  female  :  1955  MRN:  [id number]  Visit Number:  [id number]  Primary Care Provider:  Dilcia Pedro MD         LOS: 3 days       ----------------------------------------------------------------------------------------------------------------------  Subjective       Chief Complaints:    Weakness - Generalized, Chills, Dizziness, and Nausea        Interval History:      Patient resting in bed this morning.  On room air with no apparent distress.  Afebrile, continued diarrhea.  Reports mild nausea but no vomiting.  Urinalysis from 12/15/2021 slightly positive with 6-12 WBCs, culture currently in progress.  CA-125 elevated at 42.9.  Blood cultures from 2021 show no growth thus far.    Review of Systems:    Constitutional: no fever, chills and night sweats. No appetite change or unexpected weight change. No fatigue.  Eyes: no eye drainage, itching or redness.  HEENT: no mouth sores, dysphagia or nose bleed.  Respiratory: Positive shortness of breath, cough and production of sputum.  Cardiovascular: no chest pain, no palpitations, no orthopnea.  Gastrointestinal: Positive nausea, no vomiting. Positive diarrhea. Positive RUQ abdominal pain, no hematemesis or rectal bleeding.  Genitourinary: no dysuria or polyuria.  Hematologic/lymphatic: no lymph node abnormalities, no easy bruising or easy bleeding.  Musculoskeletal: no muscle or joint pain.  Skin: No rash and no itching.  Neurological: no loss of consciousness, no seizure, no headache.  Behavioral/Psych: no depression or suicidal ideation.  Endocrine: no hot flashes.  Immunologic: negative.    ----------------------------------------------------------------------------------------------------------------------      Objective       [facility] Meds:  cefTRIAXone, 2 g, Intravenous, Q24H  heparin (porcine), 5,000 Units, Subcutaneous,  Q12H  influenza vaccine, 0.5 mL, Intramuscular, Once  metroNIDAZOLE, 500 mg, Oral, Q8H  sodium chloride, 10 mL, Intravenous, Q12H      sodium chloride, 100 mL/hr, Last Rate: 100 mL/hr (12/15/21 0538)      ----------------------------------------------------------------------------------------------------------------------    Vital Signs:  Temp:  [97.7 °F (36.5 °C)-99 °F (37.2 °C)] 99 °F (37.2 °C)  Heart Rate:  [88-97] 97  Resp:  [18-22] 22  BP: (108-125)/(60-74) 108/68  Mean Arterial Pressure (Non-Invasive) for the past 24 hrs (Last 3 readings):   Noninvasive MAP (mmHg)   12/15/21 1043 97   12/15/21 0706 85   12/15/21 0300 79     SpO2 Percentage    12/15/21 0300 12/15/21 0706 12/15/21 1043   SpO2: 97% 98% 96%     SpO2:  [96 %-98 %] 96 %  on   ;   Device (Oxygen Therapy): room air    Body mass index is 31.64 kg/m².  Wt Readings from Last 3 Encounters:   12/15/21 91.6 kg (202 lb)   12/10/21 88.5 kg (195 lb)   10/20/21 88.7 kg (195 lb 9.6 oz)        Intake/Output Summary (Last 24 hours) at 12/15/2021 1204  Last data filed at 12/15/2021 0901  Gross per 24 hour   Intake 510 ml   Output 1300 ml   Net -790 ml     Diet Regular  ----------------------------------------------------------------------------------------------------------------------      Physical Exam:    Constitutional:  Well-developed and well-nourished.  No respiratory distress.      HENT:  Head: Normocephalic and atraumatic.  Mouth:  Moist mucous membranes.    Eyes:  Conjunctivae and EOM are normal.  No scleral icterus.  Neck:  Neck supple.  No JVD present.    Cardiovascular:  Normal rate, regular rhythm and normal heart sounds with no murmur. No edema.  Pulmonary/Chest: Diminished lung sounds in the bases.  Abdominal:  Soft.  Bowel sounds are normal.  Positive distention and tenderness.  Musculoskeletal:  No edema, no tenderness, and no deformity.  No swelling or redness of joints.  Neurological:  Alert and oriented to person, place, and time.  No facial  droop.  No slurred speech.   Skin:  Skin is warm and dry.  No rash noted.  No pallor.   Psychiatric:  Normal mood and affect.  Behavior is normal.    ----------------------------------------------------------------------------------------------------------------------  Results from last 7 days   Lab Units 12/12/21  1639   TROPONIN T ng/mL <0.010           Results from last 7 days   Lab Units 12/12/21  2351 12/12/21  1648 12/12/21  1639 12/10/21  1510   CRP mg/dL 25.18*  --  29.64*  --    LACTATE mmol/L  --  1.2  --   --    WBC 10*3/mm3 13.87*  --  15.26* 12.98*   HEMOGLOBIN g/dL 11.2*  --  12.8 13.2   HEMATOCRIT % 34.8  --  38.9 38.7   MCV fL 87.2  --  85.5 86.0   MCHC g/dL 32.2  --  32.9 34.1   PLATELETS 10*3/mm3 118*  --  130* 144   INR  1.09  --   --   --      Results from last 7 days   Lab Units 12/12/21 2351 12/12/21  1639 12/10/21  1510   SODIUM mmol/L 138 136 137   POTASSIUM mmol/L 3.4* 3.5 3.9   MAGNESIUM mg/dL  --   --  1.6   CHLORIDE mmol/L 109* 100 101   CO2 mmol/L 19.5* 18.8* 20.3*   BUN mg/dL 16 18 20   CREATININE mg/dL 0.63 0.81 0.83   EGFR IF NONAFRICN AM mL/min/1.73 95 71 69   CALCIUM mg/dL 7.8* 9.1 8.7   GLUCOSE mg/dL 128* 140* 113*   ALBUMIN g/dL 2.57* 3.04* 3.30*   BILIRUBIN mg/dL 1.0 1.7* 1.1   ALK PHOS U/L 129* 152* 154*   AST (SGOT) U/L 32 33* 26   ALT (SGPT) U/L 19 22 20   Estimated Creatinine Clearance: 81.5 mL/min (by C-G formula based on SCr of 0.63 mg/dL).  No results found for: AMMONIA    Hemoglobin A1C   Date/Time Value Ref Range Status   12/12/2021 1639 6.20 (H) 4.80 - 5.60 % Final     Glucose   Date/Time Value Ref Range Status   12/12/2021 1703 135 (H) 70 - 130 mg/dL Final     Comment:     Meter: [id number] : [id number] Vani Mirza     Lab Results   Component Value Date    HGBA1C 6.20 (H) 12/12/2021     Lab Results   Component Value Date    TSH 1.900 09/16/2020    FREET4 1.31 09/16/2020       Blood Culture   Date Value Ref Range Status   12/14/2021 No growth at 24 hours   Preliminary   12/14/2021 No growth at 24 hours  Preliminary   12/12/2021 No growth at 2 days  Preliminary   12/12/2021 Klebsiella pneumoniae ssp pneumoniae (C)  Final     No results found for: URINECX  No results found for: WOUNDCX  No results found for: STOOLCX  No results found for: RESPCX  Pain Management Panel    There is no flowsheet data to display.           ----------------------------------------------------------------------------------------------------------------------  Imaging Results (Last 24 Hours)       Procedure Component Value Units Date/Time    CT Abdomen Pelvis With Contrast [[id number]] Resulted: 12/15/21 1123     Updated: 12/15/21 1136    CT Chest With Contrast Diagnostic [[id number]] Resulted: 12/15/21 1123     Updated: 12/15/21 1136    NM Bone Scan Whole Body [[id number]] Resulted: 12/15/21 0940     Updated: 12/15/21 0940            ----------------------------------------------------------------------------------------------------------------------    Assessment/Plan       Assessment/Plan     ASSESSMENT:    1.  Klebsiella bacteremia  2.  Cholecystitis  3.  Pneumonia versus malignancy    PLAN:    Patient resting in bed this morning.  On room air with no apparent distress.  Afebrile, continued diarrhea.  Reports mild nausea but no vomiting.  Urinalysis from 12/15/2021 slightly positive with 6-12 WBCs, culture currently in progress.  CA-125 elevated at 42.9.  Blood cultures from 12/14/2021 show no growth thus far.    Strep pneumo antigen negative.  Legionella negative.  Blood cultures from 12/12/2021 1 out of 2 sets positive for Klebsiella pneumoniae.  COVID-19 and influenza PCR negative.  Lactic acid normal.      Chest x-ray from 12/12/2021 reports patchy infiltrates in the right lung.  CT of the head 12/12/2021 unremarkable.  CT of the chest, abdomen and pelvis reports significant consolidation in the right lower lobe, enlarged right axillary lymph node, indeterminate bilateral lung nodules and  ill-defined hepatic lesion concerning for underlying malignancy.  Ultrasound of the abdomen from 12/12/2021 reports nonspecific gallbladder wall thickening and questionable pericholecystic fluid,, gallstones, liver lesion that is ill-defined that could represent abscess but malignancy is not excluded.    Bacteremia could be secondary to cholecystitis or pneumonia.  For now recommend to continue Rocephin and Flagyl.  We will continue to follow closely and adjust antibiotic therapy as needed.    Code Status:   Code Status and Medical Interventions:   Ordered at: 12/12/21 1939     Level Of Support Discussed With:    Patient     Code Status (Patient has no pulse and is not breathing):    CPR (Attempt to Resuscitate)     Medical Interventions (Patient has pulse or is breathing):    Full Support       TENNILLE Hassan  12/15/21  12:04 EST

## 2021-12-15 NOTE — PLAN OF CARE
Goal Outcome Evaluation:   Pt resting in bed at this time.  No complaints or requests.  VSS at this time.  Will continue to follow plan of care.

## 2021-12-16 ENCOUNTER — APPOINTMENT (OUTPATIENT)
Dept: CT IMAGING | Facility: HOSPITAL | Age: 66
End: 2021-12-16

## 2021-12-16 LAB
ALBUMIN SERPL-MCNC: 1.89 G/DL (ref 3.5–5.2)
ALBUMIN/GLOB SERPL: 0.7 G/DL
ALP SERPL-CCNC: 90 U/L (ref 39–117)
ALT SERPL W P-5'-P-CCNC: 16 U/L (ref 1–33)
ANION GAP SERPL CALCULATED.3IONS-SCNC: 12.1 MMOL/L (ref 5–15)
APTT PPP: 33 SECONDS (ref 25.5–35.4)
AST SERPL-CCNC: 23 U/L (ref 1–32)
BACTERIA SPEC AEROBE CULT: NO GROWTH
BASOPHILS # BLD AUTO: 0.04 10*3/MM3 (ref 0–0.2)
BASOPHILS NFR BLD AUTO: 0.4 % (ref 0–1.5)
BILIRUB SERPL-MCNC: 0.4 MG/DL (ref 0–1.2)
BUN SERPL-MCNC: 5 MG/DL (ref 8–23)
BUN/CREAT SERPL: 11.9 (ref 7–25)
CALCIUM SPEC-SCNC: 7.9 MG/DL (ref 8.6–10.5)
CANCER AG27-29 SERPL-ACNC: 15.7 U/ML (ref 0–38.6)
CHLORIDE SERPL-SCNC: 109 MMOL/L (ref 98–107)
CO2 SERPL-SCNC: 19.9 MMOL/L (ref 22–29)
CREAT SERPL-MCNC: 0.42 MG/DL (ref 0.57–1)
DEPRECATED RDW RBC AUTO: 43.1 FL (ref 37–54)
EOSINOPHIL # BLD AUTO: 0.04 10*3/MM3 (ref 0–0.4)
EOSINOPHIL NFR BLD AUTO: 0.4 % (ref 0.3–6.2)
ERYTHROCYTE [DISTWIDTH] IN BLOOD BY AUTOMATED COUNT: 13.6 % (ref 12.3–15.4)
GFR SERPL CREATININE-BSD FRML MDRD: >150 ML/MIN/1.73
GLOBULIN UR ELPH-MCNC: 2.7 GM/DL
GLUCOSE SERPL-MCNC: 110 MG/DL (ref 65–99)
HCT VFR BLD AUTO: 31.7 % (ref 34–46.6)
HGB BLD-MCNC: 10.3 G/DL (ref 12–15.9)
IMM GRANULOCYTES # BLD AUTO: 0.07 10*3/MM3 (ref 0–0.05)
IMM GRANULOCYTES NFR BLD AUTO: 0.7 % (ref 0–0.5)
INR PPP: 1.09 (ref 0.9–1.1)
LYMPHOCYTES # BLD AUTO: 1.89 10*3/MM3 (ref 0.7–3.1)
LYMPHOCYTES NFR BLD AUTO: 19 % (ref 19.6–45.3)
MAGNESIUM SERPL-MCNC: 1.5 MG/DL (ref 1.6–2.4)
MCH RBC QN AUTO: 28.1 PG (ref 26.6–33)
MCHC RBC AUTO-ENTMCNC: 32.5 G/DL (ref 31.5–35.7)
MCV RBC AUTO: 86.4 FL (ref 79–97)
MONOCYTES # BLD AUTO: 0.92 10*3/MM3 (ref 0.1–0.9)
MONOCYTES NFR BLD AUTO: 9.2 % (ref 5–12)
NEUTROPHILS NFR BLD AUTO: 7 10*3/MM3 (ref 1.7–7)
NEUTROPHILS NFR BLD AUTO: 70.3 % (ref 42.7–76)
NRBC BLD AUTO-RTO: 0 /100 WBC (ref 0–0.2)
PLATELET # BLD AUTO: 213 10*3/MM3 (ref 140–450)
PMV BLD AUTO: 10.4 FL (ref 6–12)
POTASSIUM SERPL-SCNC: 2.8 MMOL/L (ref 3.5–5.2)
POTASSIUM SERPL-SCNC: 3.8 MMOL/L (ref 3.5–5.2)
PROT SERPL-MCNC: 4.6 G/DL (ref 6–8.5)
PROTHROMBIN TIME: 14.5 SECONDS (ref 12.8–14.5)
RBC # BLD AUTO: 3.67 10*6/MM3 (ref 3.77–5.28)
SODIUM SERPL-SCNC: 141 MMOL/L (ref 136–145)
WBC NRBC COR # BLD: 9.96 10*3/MM3 (ref 3.4–10.8)

## 2021-12-16 PROCEDURE — 85025 COMPLETE CBC W/AUTO DIFF WBC: CPT | Performed by: INTERNAL MEDICINE

## 2021-12-16 PROCEDURE — 85610 PROTHROMBIN TIME: CPT | Performed by: RADIOLOGY

## 2021-12-16 PROCEDURE — 88307 TISSUE EXAM BY PATHOLOGIST: CPT | Performed by: INTERNAL MEDICINE

## 2021-12-16 PROCEDURE — 87205 SMEAR GRAM STAIN: CPT | Performed by: INTERNAL MEDICINE

## 2021-12-16 PROCEDURE — 75989 ABSCESS DRAINAGE UNDER X-RAY: CPT

## 2021-12-16 PROCEDURE — 88173 CYTOPATH EVAL FNA REPORT: CPT | Performed by: INTERNAL MEDICINE

## 2021-12-16 PROCEDURE — 47000 NEEDLE BIOPSY OF LIVER PERQ: CPT | Performed by: RADIOLOGY

## 2021-12-16 PROCEDURE — 49406 IMAGE CATH FLUID PERI/RETRO: CPT

## 2021-12-16 PROCEDURE — 77012 CT SCAN FOR NEEDLE BIOPSY: CPT

## 2021-12-16 PROCEDURE — 0FB13ZX EXCISION OF RIGHT LOBE LIVER, PERCUTANEOUS APPROACH, DIAGNOSTIC: ICD-10-PCS | Performed by: RADIOLOGY

## 2021-12-16 PROCEDURE — 88313 SPECIAL STAINS GROUP 2: CPT | Performed by: INTERNAL MEDICINE

## 2021-12-16 PROCEDURE — 25010000002 MAGNESIUM SULFATE 2 GM/50ML SOLUTION: Performed by: INTERNAL MEDICINE

## 2021-12-16 PROCEDURE — 87077 CULTURE AEROBIC IDENTIFY: CPT | Performed by: INTERNAL MEDICINE

## 2021-12-16 PROCEDURE — 99232 SBSQ HOSP IP/OBS MODERATE 35: CPT | Performed by: INTERNAL MEDICINE

## 2021-12-16 PROCEDURE — 84132 ASSAY OF SERUM POTASSIUM: CPT | Performed by: INTERNAL MEDICINE

## 2021-12-16 PROCEDURE — 88305 TISSUE EXAM BY PATHOLOGIST: CPT | Performed by: INTERNAL MEDICINE

## 2021-12-16 PROCEDURE — 80053 COMPREHEN METABOLIC PANEL: CPT | Performed by: INTERNAL MEDICINE

## 2021-12-16 PROCEDURE — 87070 CULTURE OTHR SPECIMN AEROBIC: CPT | Performed by: INTERNAL MEDICINE

## 2021-12-16 PROCEDURE — 25010000002 HEPARIN (PORCINE) PER 1000 UNITS: Performed by: HOSPITALIST

## 2021-12-16 PROCEDURE — 85730 THROMBOPLASTIN TIME PARTIAL: CPT | Performed by: RADIOLOGY

## 2021-12-16 PROCEDURE — 87075 CULTR BACTERIA EXCEPT BLOOD: CPT | Performed by: INTERNAL MEDICINE

## 2021-12-16 PROCEDURE — 87186 SC STD MICRODIL/AGAR DIL: CPT | Performed by: INTERNAL MEDICINE

## 2021-12-16 PROCEDURE — 83735 ASSAY OF MAGNESIUM: CPT | Performed by: INTERNAL MEDICINE

## 2021-12-16 RX ORDER — OXYCODONE HYDROCHLORIDE 5 MG/1
5 TABLET ORAL ONCE
Status: COMPLETED | OUTPATIENT
Start: 2021-12-16 | End: 2021-12-16

## 2021-12-16 RX ORDER — POTASSIUM CHLORIDE 20 MEQ/1
40 TABLET, EXTENDED RELEASE ORAL EVERY 4 HOURS
Status: COMPLETED | OUTPATIENT
Start: 2021-12-16 | End: 2021-12-16

## 2021-12-16 RX ORDER — CEFDINIR 300 MG/1
300 CAPSULE ORAL EVERY 12 HOURS SCHEDULED
Status: DISCONTINUED | OUTPATIENT
Start: 2021-12-16 | End: 2021-12-21

## 2021-12-16 RX ORDER — MAGNESIUM SULFATE HEPTAHYDRATE 40 MG/ML
2 INJECTION, SOLUTION INTRAVENOUS
Status: COMPLETED | OUTPATIENT
Start: 2021-12-16 | End: 2021-12-16

## 2021-12-16 RX ADMIN — POTASSIUM CHLORIDE 40 MEQ: 20 TABLET, EXTENDED RELEASE ORAL at 14:30

## 2021-12-16 RX ADMIN — METRONIDAZOLE 500 MG: 250 TABLET ORAL at 04:38

## 2021-12-16 RX ADMIN — CEFDINIR 300 MG: 300 CAPSULE ORAL at 20:27

## 2021-12-16 RX ADMIN — MAGNESIUM SULFATE HEPTAHYDRATE 2 G: 40 INJECTION, SOLUTION INTRAVENOUS at 15:09

## 2021-12-16 RX ADMIN — METRONIDAZOLE 500 MG: 250 TABLET ORAL at 20:26

## 2021-12-16 RX ADMIN — SODIUM CHLORIDE 100 ML/HR: 9 INJECTION, SOLUTION INTRAVENOUS at 14:37

## 2021-12-16 RX ADMIN — METRONIDAZOLE 500 MG: 250 TABLET ORAL at 14:30

## 2021-12-16 RX ADMIN — MAGNESIUM SULFATE HEPTAHYDRATE 2 G: 40 INJECTION, SOLUTION INTRAVENOUS at 09:47

## 2021-12-16 RX ADMIN — CEFDINIR 300 MG: 300 CAPSULE ORAL at 14:30

## 2021-12-16 RX ADMIN — SODIUM CHLORIDE 100 ML/HR: 9 INJECTION, SOLUTION INTRAVENOUS at 04:34

## 2021-12-16 RX ADMIN — OXYCODONE 5 MG: 5 TABLET ORAL at 14:30

## 2021-12-16 RX ADMIN — POTASSIUM CHLORIDE 40 MEQ: 20 TABLET, EXTENDED RELEASE ORAL at 06:52

## 2021-12-16 RX ADMIN — SODIUM CHLORIDE, PRESERVATIVE FREE 10 ML: 5 INJECTION INTRAVENOUS at 20:27

## 2021-12-16 RX ADMIN — SODIUM CHLORIDE, PRESERVATIVE FREE 10 ML: 5 INJECTION INTRAVENOUS at 09:48

## 2021-12-16 RX ADMIN — MAGNESIUM SULFATE HEPTAHYDRATE 2 G: 40 INJECTION, SOLUTION INTRAVENOUS at 12:44

## 2021-12-16 RX ADMIN — HEPARIN SODIUM 5000 UNITS: 5000 INJECTION INTRAVENOUS; SUBCUTANEOUS at 20:27

## 2021-12-16 RX ADMIN — POTASSIUM CHLORIDE 40 MEQ: 20 TABLET, EXTENDED RELEASE ORAL at 16:34

## 2021-12-16 NOTE — PROGRESS NOTES
PROGRESS NOTE         Patient Identification:  Name:  Yadira Burgos  Age:  66 y.o.  Sex:  female  :  1955  MRN:  [id number]  Visit Number:  [id number]  Primary Care Provider:  Dilcia Pedro MD         LOS: 4 days       ----------------------------------------------------------------------------------------------------------------------  Subjective       Chief Complaints:    Weakness - Generalized, Chills, Dizziness, and Nausea        Interval History:      The patient is sitting up in bed this morning on room air in no apparent distress.  Reports that she is feeling much more like herself this morning.  Denies any nausea or vomiting but admits to mild diarrhea with 1 episode per day.  Admits to low back pain.  Afebrile.  Leukocytosis is resolved.  Urinalysis on 12/15/2021 was positive with 6-12 WBCs and urine culture is in process.  Nuclear medicine bone scan on 12/15/2021 was normal.  CT abdomen pelvis on 12/15/2021 showed right lobe of liver lesion is again identified now measuring about 4.7 cm and was about 4.7 cm.  CT chest on 12/15/2021 showed increased density at the now right lower lobe mixed groundglass and more solid-appearing consolidation with internal cavitary component identified.  Tiny right and left lung pulmonary nodules.  Grossly stable.  Small bilateral pleural effusions.  Blood cultures on 2021 are so far showing no growth.    Review of Systems:    Constitutional: no fever, chills and night sweats. No appetite change or unexpected weight change. No fatigue.  Eyes: no eye drainage, itching or redness.  HEENT: no mouth sores, dysphagia or nose bleed.  Respiratory: Shortness of breath, cough and production of sputum.  Cardiovascular: no chest pain, no palpitations, no orthopnea.  Gastrointestinal:  No nausea or vomiting.  No abdominal pain, no hematemesis or rectal bleeding.  Diarrhea.  Genitourinary: no dysuria or polyuria.  Hematologic/lymphatic: no lymph node  abnormalities, no easy bruising or easy bleeding.  Musculoskeletal: no muscle or joint pain.  Skin: No rash and no itching.  Neurological: no loss of consciousness, no seizure, no headache.  Behavioral/Psych: no depression or suicidal ideation.  Endocrine: no hot flashes.  Immunologic: negative.    ----------------------------------------------------------------------------------------------------------------------      Objective       [facility] Meds:  cefTRIAXone, 2 g, Intravenous, Q24H  heparin (porcine), 5,000 Units, Subcutaneous, Q12H  influenza vaccine, 0.5 mL, Intramuscular, Once  magnesium sulfate, 2 g, Intravenous, Q2H  metroNIDAZOLE, 500 mg, Oral, Q8H  potassium chloride, 40 mEq, Oral, Q4H  sodium chloride, 10 mL, Intravenous, Q12H      sodium chloride, 100 mL/hr, Last Rate: 100 mL/hr (12/16/21 0434)      ----------------------------------------------------------------------------------------------------------------------    Vital Signs:  Temp:  [97.7 °F (36.5 °C)-99 °F (37.2 °C)] 97.7 °F (36.5 °C)  Heart Rate:  [] 65  Resp:  [18-22] 18  BP: (104-117)/(60-68) 105/61  Mean Arterial Pressure (Non-Invasive) for the past 24 hrs (Last 3 readings):   Noninvasive MAP (mmHg)   12/16/21 0600 74   12/16/21 0241 76   12/15/21 1909 83     SpO2 Percentage    12/15/21 1909 12/16/21 0241 12/16/21 0600   SpO2: 97% 94% 94%     SpO2:  [94 %-97 %] 94 %  on   ;   Device (Oxygen Therapy): room air    Body mass index is 32.11 kg/m².  Wt Readings from Last 3 Encounters:   12/16/21 93 kg (205 lb)   12/10/21 88.5 kg (195 lb)   10/20/21 88.7 kg (195 lb 9.6 oz)        Intake/Output Summary (Last 24 hours) at 12/16/2021 0932  Last data filed at 12/15/2021 1909  Gross per 24 hour   Intake 500 ml   Output --   Net 500 ml     NPO Diet  ----------------------------------------------------------------------------------------------------------------------      Physical Exam:    Constitutional:  Well-developed and  well-nourished.  No respiratory distress.      HENT:  Head: Normocephalic and atraumatic.  Mouth:  Moist mucous membranes.    Eyes:  Conjunctivae and EOM are normal.  No scleral icterus.  Neck:  Neck supple.  No JVD present.    Cardiovascular:  Normal rate, regular rhythm and normal heart sounds with no murmur. No edema.  Pulmonary/Chest: Diminished lung sounds in the bases.  Abdominal:  Soft.  Bowel sounds are normal.  No distention or tenderness.  Musculoskeletal:  No edema, no tenderness, and no deformity.  No swelling or redness of joints.  Neurological:  Alert and oriented to person, place, and time.  No facial droop.  No slurred speech.   Skin:  Skin is warm and dry.  No rash noted.  No pallor.   Psychiatric:  Normal mood and affect.  Behavior is normal.    ----------------------------------------------------------------------------------------------------------------------  Results from last 7 days   Lab Units 12/12/21  1639   TROPONIN T ng/mL <0.010           Results from last 7 days   Lab Units 12/16/21  0430 12/12/21 2351 12/12/21  1648 12/12/21  1639   CRP mg/dL  --  25.18*  --  29.64*   LACTATE mmol/L  --   --  1.2  --    WBC 10*3/mm3 9.96 13.87*  --  15.26*   HEMOGLOBIN g/dL 10.3* 11.2*  --  12.8   HEMATOCRIT % 31.7* 34.8  --  38.9   MCV fL 86.4 87.2  --  85.5   MCHC g/dL 32.5 32.2  --  32.9   PLATELETS 10*3/mm3 213 118*  --  130*   INR   --  1.09  --   --      Results from last 7 days   Lab Units 12/16/21  0430 12/12/21 2351 12/12/21  1639 12/10/21  1510 12/10/21  1510   SODIUM mmol/L 141 138 136   < > 137   POTASSIUM mmol/L 2.8* 3.4* 3.5   < > 3.9   MAGNESIUM mg/dL 1.5*  --   --   --  1.6   CHLORIDE mmol/L 109* 109* 100   < > 101   CO2 mmol/L 19.9* 19.5* 18.8*   < > 20.3*   BUN mg/dL 5* 16 18   < > 20   CREATININE mg/dL 0.42* 0.63 0.81   < > 0.83   EGFR IF NONAFRICN AM mL/min/1.73 >150 95 71   < > 69   CALCIUM mg/dL 7.9* 7.8* 9.1   < > 8.7   GLUCOSE mg/dL 110* 128* 140*   < > 113*   ALBUMIN g/dL  1.89* 2.57* 3.04*   < > 3.30*   BILIRUBIN mg/dL 0.4 1.0 1.7*   < > 1.1   ALK PHOS U/L 90 129* 152*   < > 154*   AST (SGOT) U/L 23 32 33*   < > 26   ALT (SGPT) U/L 16 19 22   < > 20    < > = values in this interval not displayed.   Estimated Creatinine Clearance: 82.1 mL/min (A) (by C-G formula based on SCr of 0.42 mg/dL (L)).  No results found for: AMMONIA    No results found for: HGBA1C, POCGLU  Lab Results   Component Value Date    HGBA1C 6.20 (H) 12/12/2021     Lab Results   Component Value Date    TSH 1.900 09/16/2020    FREET4 1.31 09/16/2020       Blood Culture   Date Value Ref Range Status   12/14/2021 No growth at 24 hours  Preliminary   12/14/2021 No growth at 24 hours  Preliminary   12/12/2021 No growth at 2 days  Preliminary   12/12/2021 Klebsiella pneumoniae ssp pneumoniae (C)  Final     No results found for: URINECX  No results found for: WOUNDCX  No results found for: STOOLCX  No results found for: RESPCX  Pain Management Panel    There is no flowsheet data to display.           ----------------------------------------------------------------------------------------------------------------------  Imaging Results (Last 24 Hours)       Procedure Component Value Units Date/Time    NM Bone Scan Whole Body [[id number]] Collected: 12/15/21 1437     Updated: 12/15/21 1439    Narrative:      EXAM:    NM Bone Scan     EXAM DATE:    12/15/2021 9:40 AM     CLINICAL HISTORY:    lung nodules, liver lesion-history of breast, cervical cancer,  melanoma; I95.9-Hypotension, unspecified; J18.9-Pneumonia, unspecified  organism; R16.0-Hepatomegaly, not elsewhere classified     TECHNIQUE:    Anterior and posterior images of the whole body were obtained  following the intravenous administration of Tc99m MDP.     COMPARISON:    No relevant prior studies available.     FINDINGS:    SKULL/FACIAL BONES:  No focal increased or decreased uptake.    SPINE:  Unremarkable.  No abnormal increased or decreased uptake.    RIBS:   Unremarkable.  No abnormal uptake.    LONG BONES:  Unremarkable.  No abnormal uptake.    PELVIS:  Unremarkable.  No focal increased or decreased uptake.    JOINTS:  Unremarkable.  No focal increased or decreased uptake.    SOFT TISSUES:  Unremarkable.    RENAL/BLADDER:  Within normal limits.       Impression:        Normal bone scan.     This report was finalized on 12/15/2021 2:37 PM by Dr. Cristian Robison MD.       CT Abdomen Pelvis With Contrast [[id number]] Collected: 12/15/21 1223     Updated: 12/15/21 1227    Narrative:      EXAM:    CT Abdomen and Pelvis With Intravenous Contrast     EXAM DATE:    12/15/2021 11:23 AM     CLINICAL HISTORY:    lung nodules, liver lesion-history of breast, cervical cancer,  melanoma; I95.9-Hypotension, unspecified; J18.9-Pneumonia, unspecified  organism; R16.0-Hepatomegaly, not elsewhere classified     TECHNIQUE:    Axial computed tomography images of the abdomen and pelvis with  intravenous contrast.  Sagittal and coronal reformatted images were  created and reviewed.  This CT exam was performed using one or more of  the following dose reduction techniques:  automated exposure control,  adjustment of the mA and/or kV according to patient size, and/or use of  iterative reconstruction technique.     COMPARISON:    12/12/2021     FINDINGS:    LUNG BASES:  Unremarkable.  No mass.  No consolidation.      ABDOMEN:    LIVER:  Right lobe of liver lesion is again identified now measuring  about 4.7 cm and was about 4.7 cm.    GALLBLADDER AND BILE DUCTS:  Gallstones noted in the gallbladder.  No  ductal dilation.    PANCREAS:  Unremarkable.  No mass.  No ductal dilation.    SPLEEN:  Unremarkable.  No splenomegaly.    ADRENALS:  Unremarkable.  No mass.    KIDNEYS AND URETERS:  Unremarkable.  No solid mass.  No  hydronephrosis.    STOMACH AND BOWEL:  Unremarkable.  No obstruction.  No mucosal  thickening.      PELVIS:    APPENDIX:  No findings to suggest acute appendicitis.    BLADDER:   Unremarkable.  No mass.    REPRODUCTIVE:  Unremarkable as visualized.      ABDOMEN and PELVIS:    INTRAPERITONEAL SPACE:  Unremarkable.  No free air.  No significant  fluid collection.    BONES/JOINTS:  No acute fracture.  No dislocation.    SOFT TISSUES:  Unremarkable.    VASCULATURE:  Unremarkable.  No abdominal aortic aneurysm.    LYMPH NODES:  Unremarkable.  No enlarged lymph nodes.       Impression:        Right lobe of liver lesion is again identified now measuring about 4.7  cm and was about 4.7 cm.     This report was finalized on 12/15/2021 12:25 PM by Dr. Cristian Robison MD.       CT Chest With Contrast Diagnostic [[id number]] Collected: 12/15/21 1221     Updated: 12/15/21 1225    Narrative:      EXAM:    CT Chest With Intravenous Contrast     EXAM DATE:    12/15/2021 11:22 AM     CLINICAL HISTORY:    lung nodules, liver lesion-history of breast, cervical cancer,  melanoma; I95.9-Hypotension, unspecified; J18.9-Pneumonia, unspecified  organism; R16.0-Hepatomegaly, not elsewhere classified     TECHNIQUE:    Axial computed tomography images of the chest with intravenous  contrast.  Sagittal and coronal reformatted images were created and  reviewed.  This CT exam was performed using one or more of the following  dose reduction techniques:  automated exposure control, adjustment of  the mA and/or kV according to patient size, and/or use of iterative  reconstruction technique.     COMPARISON:    12/12/2021     FINDINGS:    LUNGS:  Increased density of the now right lower lobe mixed  groundglass and more solid-appearing consolidation with internal  cavitary component identified. Tiny right and left lung pulmonary  nodules. Grossly stable.    PLEURAL SPACE:  Small bilateral pleural effusions.  No pneumothorax.    HEART:  Unremarkable.  No cardiomegaly.  No significant pericardial  effusion.    BONES/JOINTS:  Unremarkable.  No acute fracture.  No dislocation.    SOFT TISSUES:  Unremarkable.    VASCULATURE:   Unremarkable.  No thoracic aortic aneurysm.    LYMPH NODES:  Unremarkable.  No enlarged lymph nodes.       Impression:      1.  Increased density of the now right lower lobe mixed groundglass and  more solid-appearing consolidation with internal cavitary component  identified. Tiny right and left lung pulmonary nodules. Grossly stable.  2.  Small bilateral pleural effusions.     This report was finalized on 12/15/2021 12:23 PM by Dr. Cristian Robison MD.               ----------------------------------------------------------------------------------------------------------------------    Assessment/Plan       Assessment/Plan     ASSESSMENT:    1.  Klebsiella bacteremia  2.  Cholecystitis  3.  Pneumonia versus malignancy    PLAN:    The patient is sitting up in bed this morning on room air in no apparent distress.  Reports that she is feeling much more like herself this morning.  Denies any nausea or vomiting but admits to mild diarrhea with 1 episode per day.  Admits to low back pain.  Afebrile.  Leukocytosis is resolved.  Urinalysis on 12/15/2021 was positive with 6-12 WBCs and urine culture is in process.  Nuclear medicine bone scan on 12/15/2021 was normal.  CT abdomen pelvis on 12/15/2021 showed right lobe of liver lesion is again identified now measuring about 4.7 cm and was about 4.7 cm.  CT chest on 12/15/2021 showed increased density at the now right lower lobe mixed groundglass and more solid-appearing consolidation with internal cavitary component identified.  Tiny right and left lung pulmonary nodules.  Grossly stable.  Small bilateral pleural effusions.  Blood cultures on 12/14/2021 are so far showing no growth.    Strep pneumo antigen negative. Legionella negative. Blood cultures from 12/12/2021 1 out of 2 sets positive for Klebsiella pneumoniae. COVID-19 and influenza PCR negative.     Chest x-ray from 12/12/2021 reports patchy infiltrates in the right lung. CT of the chest, abdomen and pelvis reports  significant consolidation in the right lower lobe, enlarged right axillary lymph node, indeterminate bilateral lung nodules and ill-defined hepatic lesion concerning for underlying malignancy. Ultrasound of the abdomen from 12/12/2021 reports nonspecific gallbladder wall thickening and questionable pericholecystic fluid, gallstones, liver lesion that is ill-defined that could represent abscess but malignancy is not excluded.    Bacteremia could be secondary to cholecystitis or pneumonia. Rocephin was deescalated to Omnicef today. Recommend to continue on both Omnicef and Flagyl through at least 12/22/2021.     Code Status:   Code Status and Medical Interventions:   Ordered at: 12/12/21 1939     Level Of Support Discussed With:    Patient     Code Status (Patient has no pulse and is not breathing):    CPR (Attempt to Resuscitate)     Medical Interventions (Patient has pulse or is breathing):    Full Support       FLORENCE Mota  12/16/21  09:32 EST

## 2021-12-16 NOTE — PLAN OF CARE
Goal Outcome Evaluation:               Pt resting in bed. No s/s acute distress noted. Pt had liver biopsy today. Dressing clean dry and intact. Will monitor.

## 2021-12-16 NOTE — PROGRESS NOTES
We are following along peripherally, please call with any questions.  Awaiting biopsy results of liver mass.  At this time not a candidate for lung biopsy.  We are treating her for Klebsiella pneumonia.  She will need a follow-up CT in 4 to 6 weeks to ensure clearance of the infiltrate in the right lower lobe and right middle lobe as well as decrease in her adenopathy.

## 2021-12-16 NOTE — CASE MANAGEMENT/SOCIAL WORK
Discharge Planning Assessment   [place]     Patient Name: Yadira Burgos  MRN: [id number]  Today's Date: 12/16/2021    Admit Date: 12/12/2021     Discharge Needs Assessment    No documentation.                Discharge Plan     Row Name 12/16/21 1057       Plan    Plan CM spoke with pt via phone. She reports some improvement. She is still undergoing workup.  She lives at home with her mother & sister and plans to return back home at [place]. Pt utilizes no DME or HH services. Family member to provide transportation at [place]. CM will cont. to follow & assist as needed.              Continued Care and Services - Admitted Since 12/12/2021    Coordination has not been started for this encounter.       Expected Discharge Date and Time     Expected Discharge Date Expected Discharge Time    Dec 16, 2021          Demographic Summary    No documentation.                Functional Status    No documentation.                Psychosocial    No documentation.                Abuse/Neglect    No documentation.                Legal    No documentation.                Substance Abuse    No documentation.                Patient Forms    No documentation.                   Bozena Hdz RN

## 2021-12-16 NOTE — NURSING NOTE
Procedure complete and patient tolerated well. Approximately 17ml purulent drainage removed. 4x4 gauze and occlusive dressing applied. Rolled sheet placed under patient at biopsy site to hold pressure.

## 2021-12-16 NOTE — PLAN OF CARE
Problem: Fluid Imbalance (Pneumonia)  Goal: Fluid Balance  Outcome: Ongoing, Progressing     Problem: Infection (Pneumonia)  Goal: Resolution of Infection Signs and Symptoms  Outcome: Ongoing, Progressing     Problem: Respiratory Compromise (Pneumonia)  Goal: Effective Oxygenation and Ventilation  Outcome: Ongoing, Progressing     Problem: Adult Inpatient Plan of Care  Goal: Absence of Hospital-Acquired Illness or Injury  Intervention: Identify and Manage Fall Risk  Recent Flowsheet Documentation  Taken 12/15/2021 2021 by Veena Romano RN  Safety Promotion/Fall Prevention: safety round/check completed  Intervention: Prevent Skin Injury  Recent Flowsheet Documentation  Taken 12/15/2021 2021 by Veena Romano RN  Skin Protection: adhesive use limited  Intervention: Prevent and Manage VTE (venous thromboembolism) Risk  Recent Flowsheet Documentation  Taken 12/15/2021 2021 by Veena Romano RN  VTE Prevention/Management: bleeding risk factor(s) identified  Goal: Optimal Comfort and Wellbeing  Intervention: Provide Person-Centered Care  Recent Flowsheet Documentation  Taken 12/15/2021 2021 by Veena Romano RN  Trust Relationship/Rapport: care explained   Goal Outcome Evaluation:

## 2021-12-17 ENCOUNTER — APPOINTMENT (OUTPATIENT)
Dept: NUCLEAR MEDICINE | Facility: HOSPITAL | Age: 66
End: 2021-12-17

## 2021-12-17 LAB
BACTERIA SPEC AEROBE CULT: NORMAL
BASOPHILS # BLD AUTO: 0.02 10*3/MM3 (ref 0–0.2)
BASOPHILS NFR BLD AUTO: 0.2 % (ref 0–1.5)
DEPRECATED RDW RBC AUTO: 44.7 FL (ref 37–54)
EOSINOPHIL # BLD AUTO: 0.05 10*3/MM3 (ref 0–0.4)
EOSINOPHIL NFR BLD AUTO: 0.6 % (ref 0.3–6.2)
ERYTHROCYTE [DISTWIDTH] IN BLOOD BY AUTOMATED COUNT: 14 % (ref 12.3–15.4)
HCT VFR BLD AUTO: 35 % (ref 34–46.6)
HGB BLD-MCNC: 11.4 G/DL (ref 12–15.9)
IMM GRANULOCYTES # BLD AUTO: 0.06 10*3/MM3 (ref 0–0.05)
IMM GRANULOCYTES NFR BLD AUTO: 0.7 % (ref 0–0.5)
LAB AP CASE REPORT: NORMAL
LAB AP CASE REPORT: NORMAL
LYMPHOCYTES # BLD AUTO: 1.51 10*3/MM3 (ref 0.7–3.1)
LYMPHOCYTES NFR BLD AUTO: 17.7 % (ref 19.6–45.3)
MAGNESIUM SERPL-MCNC: 2 MG/DL (ref 1.6–2.4)
MCH RBC QN AUTO: 28.4 PG (ref 26.6–33)
MCHC RBC AUTO-ENTMCNC: 32.6 G/DL (ref 31.5–35.7)
MCV RBC AUTO: 87.1 FL (ref 79–97)
MONOCYTES # BLD AUTO: 0.68 10*3/MM3 (ref 0.1–0.9)
MONOCYTES NFR BLD AUTO: 8 % (ref 5–12)
NEUTROPHILS NFR BLD AUTO: 6.23 10*3/MM3 (ref 1.7–7)
NEUTROPHILS NFR BLD AUTO: 72.8 % (ref 42.7–76)
NRBC BLD AUTO-RTO: 0 /100 WBC (ref 0–0.2)
PATH REPORT.FINAL DX SPEC: NORMAL
PATH REPORT.FINAL DX SPEC: NORMAL
PLATELET # BLD AUTO: 280 10*3/MM3 (ref 140–450)
PMV BLD AUTO: 9.9 FL (ref 6–12)
RBC # BLD AUTO: 4.02 10*6/MM3 (ref 3.77–5.28)
WBC NRBC COR # BLD: 8.55 10*3/MM3 (ref 3.4–10.8)

## 2021-12-17 PROCEDURE — 99233 SBSQ HOSP IP/OBS HIGH 50: CPT | Performed by: INTERNAL MEDICINE

## 2021-12-17 PROCEDURE — 83735 ASSAY OF MAGNESIUM: CPT | Performed by: INTERNAL MEDICINE

## 2021-12-17 PROCEDURE — 25010000002 MORPHINE PER 10 MG: Performed by: INTERNAL MEDICINE

## 2021-12-17 PROCEDURE — 0 TECHNETIUM TC 99M MEBROFENIN KIT: Performed by: INTERNAL MEDICINE

## 2021-12-17 PROCEDURE — 78226 HEPATOBILIARY SYSTEM IMAGING: CPT

## 2021-12-17 PROCEDURE — A9537 TC99M MEBROFENIN: HCPCS | Performed by: INTERNAL MEDICINE

## 2021-12-17 PROCEDURE — 25010000002 HEPARIN (PORCINE) PER 1000 UNITS: Performed by: HOSPITALIST

## 2021-12-17 PROCEDURE — 85025 COMPLETE CBC W/AUTO DIFF WBC: CPT | Performed by: INTERNAL MEDICINE

## 2021-12-17 RX ORDER — OXYCODONE HYDROCHLORIDE 5 MG/1
5 TABLET ORAL EVERY 8 HOURS PRN
Status: DISCONTINUED | OUTPATIENT
Start: 2021-12-17 | End: 2021-12-22 | Stop reason: HOSPADM

## 2021-12-17 RX ORDER — KIT FOR THE PREPARATION OF TECHNETIUM TC 99M MEBROFENIN 45 MG/10ML
1 INJECTION, POWDER, LYOPHILIZED, FOR SOLUTION INTRAVENOUS
Status: COMPLETED | OUTPATIENT
Start: 2021-12-17 | End: 2021-12-17

## 2021-12-17 RX ORDER — MORPHINE SULFATE 2 MG/ML
2 INJECTION, SOLUTION INTRAMUSCULAR; INTRAVENOUS ONCE
Status: COMPLETED | OUTPATIENT
Start: 2021-12-17 | End: 2021-12-17

## 2021-12-17 RX ADMIN — SODIUM CHLORIDE, PRESERVATIVE FREE 10 ML: 5 INJECTION INTRAVENOUS at 09:15

## 2021-12-17 RX ADMIN — ACETAMINOPHEN 650 MG: 325 TABLET ORAL at 00:34

## 2021-12-17 RX ADMIN — METRONIDAZOLE 500 MG: 250 TABLET ORAL at 19:53

## 2021-12-17 RX ADMIN — SODIUM CHLORIDE, PRESERVATIVE FREE 10 ML: 5 INJECTION INTRAVENOUS at 19:53

## 2021-12-17 RX ADMIN — MEBROFENIN 1 DOSE: 45 INJECTION, POWDER, LYOPHILIZED, FOR SOLUTION INTRAVENOUS at 16:09

## 2021-12-17 RX ADMIN — MEBROFENIN 1 DOSE: 45 INJECTION, POWDER, LYOPHILIZED, FOR SOLUTION INTRAVENOUS at 17:19

## 2021-12-17 RX ADMIN — CEFDINIR 300 MG: 300 CAPSULE ORAL at 19:51

## 2021-12-17 RX ADMIN — MORPHINE SULFATE 2 MG: 2 INJECTION, SOLUTION INTRAMUSCULAR; INTRAVENOUS at 15:57

## 2021-12-17 NOTE — PLAN OF CARE
"Goal Outcome Evaluation: Improving    Pt currently down for HIDA scan. Pt has complained of \"soreness\" to RUQ from yesterday's biopsy. V/S stable with no signs of respiratory distress present. Will continue to monitor pt and follow plan of care.                  "

## 2021-12-17 NOTE — PROGRESS NOTES
PROGRESS NOTE         Patient Identification:  Name:  Yadira Burgos  Age:  66 y.o.  Sex:  female  :  1955  MRN:  [id number]  Visit Number:  [id number]  Primary Care Provider:  Dilcia Pedro MD         LOS: 5 days       ----------------------------------------------------------------------------------------------------------------------  Subjective       Chief Complaints:    Weakness - Generalized, Chills, Dizziness, and Nausea        Interval History:      The patient is sitting up in bed this morning on room air in no apparent distress.  Denies any new complaints at this time.  Afebrile, no diarrhea today.  No new labs this morning.  Urine culture on 12/15/2021 finalized as no growth.  Blood cultures on 2021 are so far showing no growth.    Review of Systems:    Constitutional: no fever, chills and night sweats. No appetite change or unexpected weight change. No fatigue.  Eyes: no eye drainage, itching or redness.  HEENT: no mouth sores, dysphagia or nose bleed.  Respiratory: Shortness of breath, cough and production of sputum.  Cardiovascular: no chest pain, no palpitations, no orthopnea.  Gastrointestinal:  No diarrhea, nausea or vomiting.  No abdominal pain, no hematemesis or rectal bleeding.    Genitourinary: no dysuria or polyuria.  Hematologic/lymphatic: no lymph node abnormalities, no easy bruising or easy bleeding.  Musculoskeletal: no muscle or joint pain.  Skin: No rash and no itching.  Neurological: no loss of consciousness, no seizure, no headache.  Behavioral/Psych: no depression or suicidal ideation.  Endocrine: no hot flashes.  Immunologic: negative.    ----------------------------------------------------------------------------------------------------------------------      Objective       \[facility]\"" Meds:  cefdinir, 300 mg, Oral, Q12H  heparin (porcine), 5,000 Units, Subcutaneous, Q12H  influenza vaccine, 0.5 mL, Intramuscular, Once  metroNIDAZOLE, 500 mg,  Oral, Q8H  psyllium, 1 packet, Oral, Daily  sodium chloride, 10 mL, Intravenous, Q12H         ----------------------------------------------------------------------------------------------------------------------    Vital Signs:  Temp:  [97.8 °F (36.6 °C)-98.5 °F (36.9 °C)] 98.5 °F (36.9 °C)  Heart Rate:  [] 69  Resp:  [16-20] 18  BP: ()/(56-90) 103/57  Mean Arterial Pressure (Non-Invasive) for the past 24 hrs (Last 3 readings):   Noninvasive MAP (mmHg)   12/17/21 0600 69   12/17/21 0255 71   12/16/21 1846 71     SpO2 Percentage    12/16/21 1846 12/17/21 0255 12/17/21 0600   SpO2: 96% 94% 96%     SpO2:  [93 %-97 %] 96 %  on   ;   Device (Oxygen Therapy): room air    Body mass index is 31.64 kg/m².  Wt Readings from Last 3 Encounters:   12/17/21 91.6 kg (202 lb)   12/10/21 88.5 kg (195 lb)   10/20/21 88.7 kg (195 lb 9.6 oz)        Intake/Output Summary (Last 24 hours) at 12/17/2021 1113  Last data filed at 12/17/2021 0904  Gross per 24 hour   Intake 720 ml   Output --   Net 720 ml     NPO Diet  ----------------------------------------------------------------------------------------------------------------------      Physical Exam:    Constitutional:  Well-developed and well-nourished.  No respiratory distress.      HENT:  Head: Normocephalic and atraumatic.  Mouth:  Moist mucous membranes.    Eyes:  Conjunctivae and EOM are normal.  No scleral icterus.  Neck:  Neck supple.  No JVD present.    Cardiovascular:  Normal rate, regular rhythm and normal heart sounds with no murmur. No edema.  Pulmonary/Chest: Diminished lung sounds in the bases.  Abdominal:  Soft.  Bowel sounds are normal.  No distention or tenderness.  Musculoskeletal:  No edema, no tenderness, and no deformity.  No swelling or redness of joints.  Neurological:  Alert and oriented to person, place, and time.  No facial droop.  No slurred speech.   Skin:  Skin is warm and dry.  No rash noted.  No pallor.   Psychiatric:  Normal mood and affect.   Behavior is normal.    ----------------------------------------------------------------------------------------------------------------------  Results from last 7 days   Lab Units 12/12/21  1639   TROPONIN T ng/mL <0.010           Results from last 7 days   Lab Units 12/16/21  0838 12/16/21 0430 12/12/21 2351 12/12/21  1648 12/12/21  1639   CRP mg/dL  --   --  25.18*  --  29.64*   LACTATE mmol/L  --   --   --  1.2  --    WBC 10*3/mm3  --  9.96 13.87*  --  15.26*   HEMOGLOBIN g/dL  --  10.3* 11.2*  --  12.8   HEMATOCRIT %  --  31.7* 34.8  --  38.9   MCV fL  --  86.4 87.2  --  85.5   MCHC g/dL  --  32.5 32.2  --  32.9   PLATELETS 10*3/mm3  --  213 118*  --  130*   INR  1.09  --  1.09  --   --      Results from last 7 days   Lab Units 12/17/21  0457 12/16/21 2213 12/16/21 0430 12/12/21 2351 12/12/21  1639 12/12/21  1639 12/10/21  1510 12/10/21  1510   SODIUM mmol/L  --   --  141 138  --  136   < > 137   POTASSIUM mmol/L  --  3.8 2.8* 3.4*   < > 3.5   < > 3.9   MAGNESIUM mg/dL 2.0  --  1.5*  --   --   --   --  1.6   CHLORIDE mmol/L  --   --  109* 109*  --  100   < > 101   CO2 mmol/L  --   --  19.9* 19.5*  --  18.8*   < > 20.3*   BUN mg/dL  --   --  5* 16  --  18   < > 20   CREATININE mg/dL  --   --  0.42* 0.63  --  0.81   < > 0.83   EGFR IF NONAFRICN AM mL/min/1.73  --   --  >150 95  --  71   < > 69   CALCIUM mg/dL  --   --  7.9* 7.8*  --  9.1   < > 8.7   GLUCOSE mg/dL  --   --  110* 128*  --  140*   < > 113*   ALBUMIN g/dL  --   --  1.89* 2.57*  --  3.04*   < > 3.30*   BILIRUBIN mg/dL  --   --  0.4 1.0  --  1.7*   < > 1.1   ALK PHOS U/L  --   --  90 129*  --  152*   < > 154*   AST (SGOT) U/L  --   --  23 32  --  33*   < > 26   ALT (SGPT) U/L  --   --  16 19  --  22   < > 20    < > = values in this interval not displayed.   Estimated Creatinine Clearance: 81.5 mL/min (A) (by C-G formula based on SCr of 0.42 mg/dL (L)).  No results found for: AMMONIA    No results found for: HGBA1C, POCGLU  Lab Results   Component  Value Date    HGBA1C 6.20 (H) 12/12/2021     Lab Results   Component Value Date    TSH 1.900 09/16/2020    FREET4 1.31 09/16/2020       Blood Culture   Date Value Ref Range Status   12/14/2021 No growth at 24 hours  Preliminary   12/14/2021 No growth at 24 hours  Preliminary   12/12/2021 No growth at 2 days  Preliminary   12/12/2021 Klebsiella pneumoniae ssp pneumoniae (C)  Final     No results found for: URINECX  No results found for: WOUNDCX  No results found for: STOOLCX  No results found for: RESPCX  Pain Management Panel    There is no flowsheet data to display.           ----------------------------------------------------------------------------------------------------------------------  Imaging Results (Last 24 Hours)       Procedure Component Value Units Date/Time    CT Needle Biopsy Liver [[id number]] Collected: 12/16/21 1214    Specimen: Tissue Updated: 12/16/21 1224    Narrative:      EXAM:    IR Lung Biopsy, Computed Tomography-Guided     EXAM DATE:    12/16/2021 11:04 AM     CLINICAL HISTORY:    liver lesion; K76.9-Liver disease, unspecified     TECHNIQUE:    Computed Tomography-guided percutaneous lung biopsy.     COMPARISON:    No relevant prior studies available.     FINDINGS:    CONSENT:  The risks, benefits and alternatives to CT-guided lung  biopsy were explained to the patient who understood and elected to  proceed.  The risks discussed included but were not limited to  pneumothorax, bleeding, hemoptysis, infection and pain.  Written consent  was obtained.    TIME-OUT:  A time-out was performed immediately prior to the procedure  to confirm the patient's identity, procedure and site/side of procedure.    PRE-PROCEDURE:  Patient was prepped and draped in a sterile fashion.    PROCEDURE:  See below.      SPECIMEN:  Using an 18-gauge core biopsy needle, 2 samples were  obtained of the wall of a right lobe of liver 3 cm low-attenuation  lesion. Along with some solid tissue necrotic components were  obtained.  Aspiration attempt yielded about 25 mL of purulent fluid that was sent  for laboratory analysis. The lesion was too small to safely deploy a  locking pigtail drain at this time.    COMPLICATIONS:  None.    POST-PROCEDURE:  Patient was discharged to the post-procedure recovery  area.       Impression:        Using an 18-gauge core biopsy needle, 2 samples were obtained of the  wall of a right lobe of liver 3 cm low-attenuation lesion. Along with  some solid tissue necrotic components were obtained. Aspiration attempt  yielded about 25 mL of purulent fluid that was sent for laboratory  analysis. The lesion was too small to safely deploy a locking pigtail  drain at this time.     This report was finalized on 12/16/2021 12:17 PM by Dr. Cristian Robison MD.       CT Guided Abscess Drain Peritoneal [[id number]] Resulted: 12/16/21 1103     Updated: 12/16/21 1223            ----------------------------------------------------------------------------------------------------------------------    Assessment/Plan       Assessment/Plan     ASSESSMENT:    1.  Klebsiella bacteremia  2.  Cholecystitis  3.  Pneumonia versus malignancy    PLAN:    The patient is sitting up in bed this morning on room air in no apparent distress.  Denies any new complaints at this time.  Afebrile, no diarrhea today.  No new labs this morning.  Urine culture on 12/15/2021 finalized as no growth.  Blood cultures on 12/14/2021 are so far showing no growth.    Nuclear medicine bone scan on 12/15/2021 was normal.  CT abdomen pelvis on 12/15/2021 showed right lobe of liver lesion is again identified now measuring about 4.7 cm and was about 4.7 cm.  CT chest on 12/15/2021 showed increased density at the now right lower lobe mixed groundglass and more solid-appearing consolidation with internal cavitary component identified.  Tiny right and left lung pulmonary nodules.  Grossly stable.  Small bilateral pleural effusions.      Chest x-ray from  12/12/2021 reports patchy infiltrates in the right lung. CT of the chest, abdomen and pelvis reports significant consolidation in the right lower lobe, enlarged right axillary lymph node, indeterminate bilateral lung nodules and ill-defined hepatic lesion concerning for underlying malignancy. Ultrasound of the abdomen from 12/12/2021 reports nonspecific gallbladder wall thickening and questionable pericholecystic fluid, gallstones, liver lesion that is ill-defined that could represent abscess but malignancy is not excluded. Strep pneumo antigen negative. Legionella negative. Blood cultures from 12/12/2021 1 out of 2 sets positive for Klebsiella pneumoniae. COVID-19 and influenza PCR negative.     Bacteremia could be secondary to cholecystitis or pneumonia. Rocephin was deescalated to Omnicef today. Recommend to continue on both Omnicef and Flagyl through at least 12/22/2021.     Code Status:   Code Status and Medical Interventions:   Ordered at: 12/12/21 1939     Level Of Support Discussed With:    Patient     Code Status (Patient has no pulse and is not breathing):    CPR (Attempt to Resuscitate)     Medical Interventions (Patient has pulse or is breathing):    Full Support       FLORENCE Mota  12/17/21  11:13 EST

## 2021-12-17 NOTE — PROGRESS NOTES
Liver biopsy results are pending.  From the pulmonary standpoint, we recommend completing treatment with oral cephalosporins for her Klebsiella pneumonia and follow-up CT with her primary care physician in 4 to 6 weeks to ensure resolution of infiltrates and adenopathy.  We will be available if needed

## 2021-12-17 NOTE — PROGRESS NOTES
[facility] HOSPITALIST PROGRESS NOTE     Patient Identification:  Name:  Yadira Burgos  Age:  66 y.o.  Sex:  female  :  1955  MRN:  [id number]  Visit Number:  [id number]  ROOM: [address] [place] [address]     Primary Care Provider:  Dilcia Pedro MD    Length of stay in inpatient status:  4    Subjective     Chief Compliant:    Chief Complaint   Patient presents with   • Weakness - Generalized   • Chills   • Dizziness   • Nausea       History of Presenting Illness:    Patient reports feeling a little better each day. She reports 2 episodes of diarrhea today and diarrheas started when abx started.     ROS:  Otherwise 10 point ROS negative other than documented above in HPI.     Objective     Current Hospital Meds:cefdinir, 300 mg, Oral, Q12H  heparin (porcine), 5,000 Units, Subcutaneous, Q12H  influenza vaccine, 0.5 mL, Intramuscular, Once  metroNIDAZOLE, 500 mg, Oral, Q8H  psyllium, 1 packet, Oral, Daily  sodium chloride, 10 mL, Intravenous, Q12H         Current Antimicrobial Therapy:  Anti-Infectives (From admission, onward)    Ordered     Dose/Rate Route Frequency Start Stop    21 0959  cefdinir (OMNICEF) capsule 300 mg        Ordering Provider: Lupe Watson MD    300 mg Oral Every 12 Hours Scheduled 21 1100 21 0859    21 2136  metroNIDAZOLE (FLAGYL) tablet 500 mg        Ordering Provider: Lupe Watson MD    500 mg Oral Every 8 Hours Scheduled 21 2200 21 2359    21 1900  cefTRIAXone (ROCEPHIN) 1 g in sodium chloride 0.9 % 100 mL IVPB-VTB        Ordering Provider: Fermin Walters PA-C    1 g  200 mL/hr over 30 Minutes Intravenous Once 21 1902 21 2157    21 1720  piperacillin-tazobactam (ZOSYN) IVPB 3.375 g in 100 mL NS VTB        Ordering Provider: Fermin Walters PA-C    3.375 g  over 4 Hours Intravenous Once 21 1722 21 1847    21 1720  vancomycin 1750 mg/500 mL 0.9% NS IVPB ([facility])        Ordering  "Provider: Fermin Walters PA-C   \"And\" Linked Group Details    20 mg/kg × 83.9 kg Intravenous Once 12/12/21 1722 12/12/21 2120        Current Diuretic Therapy:  Diuretics (From admission, onward)    None        ----------------------------------------------------------------------------------------------------------------------  Vital Signs:  Temp:  [97.7 °F (36.5 °C)-98.3 °F (36.8 °C)] 97.9 °F (36.6 °C)  Heart Rate:  [] 106  Resp:  [16-20] 18  BP: ()/(56-90) 104/60  SpO2:  [93 %-97 %] 96 %  on   ;   Device (Oxygen Therapy): room air  Body mass index is 32.11 kg/m².    Wt Readings from Last 3 Encounters:   12/16/21 93 kg (205 lb)   12/10/21 88.5 kg (195 lb)   10/20/21 88.7 kg (195 lb 9.6 oz)     Intake & Output (last 3 days)       12/14 0701  12/15 0700 12/15 0701 12/16 0700 12/16 0701  12/17 0700    P.O. 510 500 720    I.V. (mL/kg)       IV Piggyback       Total Intake(mL/kg) 510 (5.6) 500 (5.4) 720 (7.7)    Urine (mL/kg/hr) 600 (0.3) 700 (0.3)     Stool  0     Total Output 600 700     Net -90 -200 +720           Urine Unmeasured Occurrence  2 x 3 x    Stool Unmeasured Occurrence  1 x         Diet Regular  NPO Diet  ----------------------------------------------------------------------------------------------------------------------  Physical exam:  Constitutional:  Well-developed and well-nourished.  No respiratory distress.      HENT:  Head:  Normocephalic and atraumatic.  Mouth:  Moist mucous membranes.    Eyes:  Conjunctivae and EOM are normal. No scleral icterus.    Neck:  Neck supple.  No JVD present.    Cardiovascular:  Normal rate, regular rhythm and normal heart sounds with no murmur.  Pulmonary/Chest:  No respiratory distress, no wheezes, no crackles, with normal breath sounds and good air movement.  Abdominal:  Soft.  Bowel sounds are normal.  No distension and no tenderness.   Musculoskeletal:  No edema, no tenderness, and no deformity.  No red or swollen joints anywhere.  "   Neurological:  Alert and oriented to person, place, and time.  No cranial nerve deficit.  No tongue deviation.  No facial droop.  No slurred speech.   Skin:  Skin is warm and dry. No rash noted. No pallor.   Peripheral vascular:  Pulses in all 4 extremities with no clubbing, no cyanosis, no edema.  ----------------------------------------------------------------------------------------------------------------------  Tele:    ----------------------------------------------------------------------------------------------------------------------  Results from last 7 days   Lab Units 12/16/21  0838 12/16/21  0430 12/12/21  2351 12/12/21  1648 12/12/21  1639   CRP mg/dL  --   --  25.18*  --  29.64*   LACTATE mmol/L  --   --   --  1.2  --    WBC 10*3/mm3  --  9.96 13.87*  --  15.26*   HEMOGLOBIN g/dL  --  10.3* 11.2*  --  12.8   HEMATOCRIT %  --  31.7* 34.8  --  38.9   MCV fL  --  86.4 87.2  --  85.5   MCHC g/dL  --  32.5 32.2  --  32.9   PLATELETS 10*3/mm3  --  213 118*  --  130*   INR  1.09  --  1.09  --   --          Results from last 7 days   Lab Units 12/16/21  0430 12/12/21  2351 12/12/21  1639 12/10/21  1510 12/10/21  1510   SODIUM mmol/L 141 138 136   < > 137   POTASSIUM mmol/L 2.8* 3.4* 3.5   < > 3.9   MAGNESIUM mg/dL 1.5*  --   --   --  1.6   CHLORIDE mmol/L 109* 109* 100   < > 101   CO2 mmol/L 19.9* 19.5* 18.8*   < > 20.3*   BUN mg/dL 5* 16 18   < > 20   CREATININE mg/dL 0.42* 0.63 0.81   < > 0.83   EGFR IF NONAFRICN AM mL/min/1.73 >150 95 71   < > 69   CALCIUM mg/dL 7.9* 7.8* 9.1   < > 8.7   GLUCOSE mg/dL 110* 128* 140*   < > 113*   ALBUMIN g/dL 1.89* 2.57* 3.04*   < > 3.30*   BILIRUBIN mg/dL 0.4 1.0 1.7*   < > 1.1   ALK PHOS U/L 90 129* 152*   < > 154*   AST (SGOT) U/L 23 32 33*   < > 26   ALT (SGPT) U/L 16 19 22   < > 20    < > = values in this interval not displayed.   Estimated Creatinine Clearance: 82.1 mL/min (A) (by C-G formula based on SCr of 0.42 mg/dL (L)).  No results found for: AMMONIA  Results  from last 7 days   Lab Units 12/12/21  1639   TROPONIN T ng/mL <0.010             No results found for: HGBA1C, POCGLU  Lab Results   Component Value Date    TSH 1.900 09/16/2020    FREET4 1.31 09/16/2020     No results found for: PREGTESTUR, PREGSERUM, HCG, HCGQUANT  Pain Management Panel    There is no flowsheet data to display.       Brief Urine Lab Results  (Last result in the past 365 days)      Color   Clarity   Blood   Leuk Est   Nitrite   Protein   CREAT   Urine HCG        12/15/21 0837 Yellow   Clear   Negative   Small (1+)   Negative   Negative               Blood Culture   Date Value Ref Range Status   12/14/2021 No growth at 2 days  Preliminary   12/14/2021 No growth at 2 days  Preliminary   12/12/2021 No growth at 4 days  Preliminary   12/12/2021 Klebsiella pneumoniae ssp pneumoniae (C)  Final     Urine Culture   Date Value Ref Range Status   12/15/2021 No growth  Final     No results found for: WOUNDCX  No results found for: STOOLCX  No results found for: RESPCX  No results found for: AFBCX  Results from last 7 days   Lab Units 12/12/21  2351 12/12/21  1648 12/12/21  1639   LACTATE mmol/L  --  1.2  --    SED RATE mm/hr  --   --  80*   CRP mg/dL 25.18*  --  29.64*       I have personally looked at the labs and they are summarized above.  ----------------------------------------------------------------------------------------------------------------------  Detailed radiology reports for the last 24 hours:    Imaging Results (Last 24 Hours)     Procedure Component Value Units Date/Time    CT Needle Biopsy Liver [[id number]] Collected: 12/16/21 1214    Specimen: Tissue Updated: 12/16/21 1224    Narrative:      EXAM:    IR Lung Biopsy, Computed Tomography-Guided     EXAM DATE:    12/16/2021 11:04 AM     CLINICAL HISTORY:    liver lesion; K76.9-Liver disease, unspecified     TECHNIQUE:    Computed Tomography-guided percutaneous lung biopsy.     COMPARISON:    No relevant prior studies available.      FINDINGS:    CONSENT:  The risks, benefits and alternatives to CT-guided lung  biopsy were explained to the patient who understood and elected to  proceed.  The risks discussed included but were not limited to  pneumothorax, bleeding, hemoptysis, infection and pain.  Written consent  was obtained.    TIME-OUT:  A time-out was performed immediately prior to the procedure  to confirm the patient's identity, procedure and site/side of procedure.    PRE-PROCEDURE:  Patient was prepped and draped in a sterile fashion.    PROCEDURE:  See below.      SPECIMEN:  Using an 18-gauge core biopsy needle, 2 samples were  obtained of the wall of a right lobe of liver 3 cm low-attenuation  lesion. Along with some solid tissue necrotic components were obtained.  Aspiration attempt yielded about 25 mL of purulent fluid that was sent  for laboratory analysis. The lesion was too small to safely deploy a  locking pigtail drain at this time.    COMPLICATIONS:  None.    POST-PROCEDURE:  Patient was discharged to the post-procedure recovery  area.       Impression:        Using an 18-gauge core biopsy needle, 2 samples were obtained of the  wall of a right lobe of liver 3 cm low-attenuation lesion. Along with  some solid tissue necrotic components were obtained. Aspiration attempt  yielded about 25 mL of purulent fluid that was sent for laboratory  analysis. The lesion was too small to safely deploy a locking pigtail  drain at this time.     This report was finalized on 12/16/2021 12:17 PM by Dr. Cristian Robison MD.       CT Guided Abscess Drain Peritoneal [[id number]] Resulted: 12/16/21 1103     Updated: 12/16/21 1223        Assessment & Plan    #Sepsis 2/2 RLL pneumonia   #Klebsiella pneumonia bacteremia  - CT chest revealed RLL consolidation   - 1/2 blood cultures on admission revealing Klebsiella pneumonia on BCID, awaiting culture results.     - ID consulted. Appreciate recs. Flagyl added. Deescalated to omnicef through 12/22.      #Lung nodules   #Liver lesion   #R axillary lymphadenopathy   - Concerning for malignancy. Cannot rule out that liver lesion is abscess in setting of bacteremia but this is less likely.   - Pulmonology consulted. Appreciate recs. Will need repeat CT chest in 4-6 weeks.   - Oncology consulted. Tumor markers sent. Recommended liver biopsy. That was obtained on 12/16. Purulent material also obtained and culture sent.     #HTN  - On hypotensive side, will hold any antihypertensives at this time.      #Elevated alk phos  #Nonspecific gallbladder wall thickening and questionable pericholecystic fluid  - Nonspecific findings. Biliribuin and other LFTs wnl.   - Patient does not some generalized abdominal discomfort that is postprandial for the last week.   - Will get a HIDA scan in AM to further demonstrate gallbladder function. I think bacteremia is less likely from gallbladder but specific pathogen could be found there.      #Hx of cervical cancer  #Hx of breast cancer   #Hx of melanoma   - Has never been tested for BRCA per her report   - Patient reports she is in remission      #Hypokalemia  - Replacement ordered      F: PO  E: Replace as needed   N Regular      Code status: Full      Dispo: Pending clinical improvement             VTE Prophylaxis:   Mechanical Order History:     None      Pharmalogical Order History:      Ordered     Dose Route Frequency Stop    12/12/21 0670  heparin (porcine) 5000 UNIT/ML injection 5,000 Units         5,000 Units SC Every 12 Hours Scheduled --                  Duc Schwartz MD  [facility] Hospitalist  12/16/21  19:02 EST

## 2021-12-18 ENCOUNTER — APPOINTMENT (OUTPATIENT)
Dept: CT IMAGING | Facility: HOSPITAL | Age: 66
End: 2021-12-18

## 2021-12-18 LAB
ALBUMIN SERPL-MCNC: 2.05 G/DL (ref 3.5–5.2)
ALBUMIN/GLOB SERPL: 0.7 G/DL
ALP SERPL-CCNC: 90 U/L (ref 39–117)
ALT SERPL W P-5'-P-CCNC: 18 U/L (ref 1–33)
ANION GAP SERPL CALCULATED.3IONS-SCNC: 9.2 MMOL/L (ref 5–15)
AST SERPL-CCNC: 33 U/L (ref 1–32)
BASOPHILS # BLD AUTO: 0.03 10*3/MM3 (ref 0–0.2)
BASOPHILS NFR BLD AUTO: 0.3 % (ref 0–1.5)
BILIRUB SERPL-MCNC: 0.3 MG/DL (ref 0–1.2)
BUN SERPL-MCNC: 8 MG/DL (ref 8–23)
BUN/CREAT SERPL: 19 (ref 7–25)
CALCIUM SPEC-SCNC: 8 MG/DL (ref 8.6–10.5)
CHLORIDE SERPL-SCNC: 109 MMOL/L (ref 98–107)
CO2 SERPL-SCNC: 20.8 MMOL/L (ref 22–29)
CREAT SERPL-MCNC: 0.42 MG/DL (ref 0.57–1)
DEPRECATED RDW RBC AUTO: 44.8 FL (ref 37–54)
EOSINOPHIL # BLD AUTO: 0.06 10*3/MM3 (ref 0–0.4)
EOSINOPHIL NFR BLD AUTO: 0.7 % (ref 0.3–6.2)
ERYTHROCYTE [DISTWIDTH] IN BLOOD BY AUTOMATED COUNT: 14 % (ref 12.3–15.4)
GFR SERPL CREATININE-BSD FRML MDRD: >150 ML/MIN/1.73
GLOBULIN UR ELPH-MCNC: 2.8 GM/DL
GLUCOSE SERPL-MCNC: 93 MG/DL (ref 65–99)
HCT VFR BLD AUTO: 32.1 % (ref 34–46.6)
HGB BLD-MCNC: 10.3 G/DL (ref 12–15.9)
IMM GRANULOCYTES # BLD AUTO: 0.09 10*3/MM3 (ref 0–0.05)
IMM GRANULOCYTES NFR BLD AUTO: 1 % (ref 0–0.5)
LYMPHOCYTES # BLD AUTO: 1.86 10*3/MM3 (ref 0.7–3.1)
LYMPHOCYTES NFR BLD AUTO: 20.7 % (ref 19.6–45.3)
MCH RBC QN AUTO: 28.1 PG (ref 26.6–33)
MCHC RBC AUTO-ENTMCNC: 32.1 G/DL (ref 31.5–35.7)
MCV RBC AUTO: 87.5 FL (ref 79–97)
MONOCYTES # BLD AUTO: 0.81 10*3/MM3 (ref 0.1–0.9)
MONOCYTES NFR BLD AUTO: 9 % (ref 5–12)
NEUTROPHILS NFR BLD AUTO: 6.12 10*3/MM3 (ref 1.7–7)
NEUTROPHILS NFR BLD AUTO: 68.3 % (ref 42.7–76)
NRBC BLD AUTO-RTO: 0 /100 WBC (ref 0–0.2)
PLATELET # BLD AUTO: 259 10*3/MM3 (ref 140–450)
PMV BLD AUTO: 9.8 FL (ref 6–12)
POTASSIUM SERPL-SCNC: 3.5 MMOL/L (ref 3.5–5.2)
POTASSIUM SERPL-SCNC: 3.8 MMOL/L (ref 3.5–5.2)
PROT SERPL-MCNC: 4.8 G/DL (ref 6–8.5)
RBC # BLD AUTO: 3.67 10*6/MM3 (ref 3.77–5.28)
SODIUM SERPL-SCNC: 139 MMOL/L (ref 136–145)
WBC NRBC COR # BLD: 8.97 10*3/MM3 (ref 3.4–10.8)

## 2021-12-18 PROCEDURE — 99232 SBSQ HOSP IP/OBS MODERATE 35: CPT | Performed by: INTERNAL MEDICINE

## 2021-12-18 PROCEDURE — 25010000002 HEPARIN (PORCINE) PER 1000 UNITS: Performed by: HOSPITALIST

## 2021-12-18 PROCEDURE — 80053 COMPREHEN METABOLIC PANEL: CPT | Performed by: INTERNAL MEDICINE

## 2021-12-18 PROCEDURE — 0 IOVERSOL 68 % SOLUTION: Performed by: INTERNAL MEDICINE

## 2021-12-18 PROCEDURE — 84132 ASSAY OF SERUM POTASSIUM: CPT | Performed by: INTERNAL MEDICINE

## 2021-12-18 PROCEDURE — 85025 COMPLETE CBC W/AUTO DIFF WBC: CPT | Performed by: INTERNAL MEDICINE

## 2021-12-18 PROCEDURE — 74160 CT ABDOMEN W/CONTRAST: CPT

## 2021-12-18 RX ORDER — POTASSIUM CHLORIDE 20 MEQ/1
40 TABLET, EXTENDED RELEASE ORAL EVERY 4 HOURS
Status: COMPLETED | OUTPATIENT
Start: 2021-12-18 | End: 2021-12-18

## 2021-12-18 RX ADMIN — POTASSIUM CHLORIDE 40 MEQ: 1500 TABLET, EXTENDED RELEASE ORAL at 04:31

## 2021-12-18 RX ADMIN — CEFDINIR 300 MG: 300 CAPSULE ORAL at 08:57

## 2021-12-18 RX ADMIN — SODIUM CHLORIDE, PRESERVATIVE FREE 10 ML: 5 INJECTION INTRAVENOUS at 20:38

## 2021-12-18 RX ADMIN — POTASSIUM CHLORIDE 40 MEQ: 1500 TABLET, EXTENDED RELEASE ORAL at 08:57

## 2021-12-18 RX ADMIN — SODIUM CHLORIDE, PRESERVATIVE FREE 10 ML: 5 INJECTION INTRAVENOUS at 08:58

## 2021-12-18 RX ADMIN — METRONIDAZOLE 500 MG: 250 TABLET ORAL at 13:01

## 2021-12-18 RX ADMIN — PSYLLIUM HUSK 1 PACKET: 3.4 POWDER ORAL at 08:58

## 2021-12-18 RX ADMIN — HEPARIN SODIUM 5000 UNITS: 5000 INJECTION INTRAVENOUS; SUBCUTANEOUS at 20:37

## 2021-12-18 RX ADMIN — IOVERSOL 70 ML: 678 INJECTION INTRA-ARTERIAL; INTRAVENOUS at 10:51

## 2021-12-18 RX ADMIN — HEPARIN SODIUM 5000 UNITS: 5000 INJECTION INTRAVENOUS; SUBCUTANEOUS at 13:00

## 2021-12-18 RX ADMIN — METRONIDAZOLE 500 MG: 250 TABLET ORAL at 20:37

## 2021-12-18 RX ADMIN — CEFDINIR 300 MG: 300 CAPSULE ORAL at 20:37

## 2021-12-18 RX ADMIN — METRONIDAZOLE 500 MG: 250 TABLET ORAL at 04:31

## 2021-12-18 NOTE — PROGRESS NOTES
PROGRESS NOTE         Patient Identification:  Name:  Yadira Burgos  Age:  66 y.o.  Sex:  female  :  1955  MRN:  [id number]  Visit Number:  [id number]  Primary Care Provider:  Dilcia Pedro MD         LOS: 6 days       ----------------------------------------------------------------------------------------------------------------------  Subjective       Chief Complaints:    Weakness - Generalized, Chills, Dizziness, and Nausea        Interval History:      The patient is sitting up in bed this morning on room air in no apparent distress.  Denies any new complaints at this time.  Underwent HIDA scan and CT abdomen pelvis, but both have yet to be read. Afebrile, no diarrhea today. WBC remains normal. Blood cultures on 2021 are so far showing no growth.  Liver fluid cultures on 2021 are so far showing no organisms.    Review of Systems:    Constitutional: no fever, chills and night sweats. No appetite change or unexpected weight change. No fatigue.  Eyes: no eye drainage, itching or redness.  HEENT: no mouth sores, dysphagia or nose bleed.  Respiratory: Shortness of breath, cough and production of sputum.  Cardiovascular: no chest pain, no palpitations, no orthopnea.  Gastrointestinal:  No diarrhea, nausea or vomiting.  No hematemesis or rectal bleeding.  Right upper quadrant abdominal pain.  Genitourinary: no dysuria or polyuria.  Hematologic/lymphatic: no lymph node abnormalities, no easy bruising or easy bleeding.  Musculoskeletal: no muscle or joint pain.  Skin: No rash and no itching.  Neurological: no loss of consciousness, no seizure, no headache.  Behavioral/Psych: no depression or suicidal ideation.  Endocrine: no hot flashes.  Immunologic: negative.    ----------------------------------------------------------------------------------------------------------------------      Objective       Current Hospital Meds:  barium, 450 mL, Oral, Once in imaging  cefdinir, 300 mg,  Oral, Q12H  heparin (porcine), 5,000 Units, Subcutaneous, Q12H  influenza vaccine, 0.5 mL, Intramuscular, Once  Ioversol, 100 mL, Intravenous, Once in imaging  metroNIDAZOLE, 500 mg, Oral, Q8H  psyllium, 1 packet, Oral, Daily  sodium chloride, 10 mL, Intravenous, Q12H         ----------------------------------------------------------------------------------------------------------------------    Vital Signs:  Temp:  [98 °F (36.7 °C)-98.8 °F (37.1 °C)] 98.2 °F (36.8 °C)  Heart Rate:  [] 102  Resp:  [18-20] 18  BP: (100-118)/(50-77) 108/77  Mean Arterial Pressure (Non-Invasive) for the past 24 hrs (Last 3 readings):   Noninvasive MAP (mmHg)   12/18/21 1028 91   12/18/21 0613 86   12/18/21 0306 78     SpO2 Percentage    12/18/21 0306 12/18/21 0613 12/18/21 1028   SpO2: 96% 96% 98%     SpO2:  [95 %-98 %] 98 %  on   ;   Device (Oxygen Therapy): room air    Body mass index is 31.95 kg/m².  Wt Readings from Last 3 Encounters:   12/18/21 92.5 kg (204 lb)   12/10/21 88.5 kg (195 lb)   10/20/21 88.7 kg (195 lb 9.6 oz)        Intake/Output Summary (Last 24 hours) at 12/18/2021 1052  Last data filed at 12/17/2021 1907  Gross per 24 hour   Intake 120 ml   Output --   Net 120 ml     Diet Regular  ----------------------------------------------------------------------------------------------------------------------      Physical Exam:    Constitutional:  Well-developed and well-nourished.  No respiratory distress.      HENT:  Head: Normocephalic and atraumatic.  Mouth:  Moist mucous membranes.    Eyes:  Conjunctivae and EOM are normal.  No scleral icterus.  Neck:  Neck supple.  No JVD present.    Cardiovascular:  Normal rate, regular rhythm and normal heart sounds with no murmur. No edema.  Pulmonary/Chest:  No respiratory distress, no wheezes, no crackles, with normal breath sounds and good air movement.  Abdominal:  Soft.  Bowel sounds are normal.  No distention or tenderness.  Musculoskeletal:  No edema, no tenderness, and  no deformity.  No swelling or redness of joints.  Neurological:  Alert and oriented to person, place, and time.  No facial droop.  No slurred speech.   Skin:  Skin is warm and dry.  No rash noted.  No pallor.   Psychiatric:  Normal mood and affect.  Behavior is normal.    ----------------------------------------------------------------------------------------------------------------------  Results from last 7 days   Lab Units 12/12/21  1639   TROPONIN T ng/mL <0.010           Results from last 7 days   Lab Units 12/18/21  0137 12/17/21  1409 12/16/21  0838 12/16/21  0430 12/12/21  2351 12/12/21  2351 12/12/21  1648 12/12/21  1639 12/12/21  1639   CRP mg/dL  --   --   --   --   --  25.18*  --   --  29.64*   LACTATE mmol/L  --   --   --   --   --   --  1.2  --   --    WBC 10*3/mm3 8.97 8.55  --  9.96   < > 13.87*  --    < > 15.26*   HEMOGLOBIN g/dL 10.3* 11.4*  --  10.3*   < > 11.2*  --    < > 12.8   HEMATOCRIT % 32.1* 35.0  --  31.7*   < > 34.8  --    < > 38.9   MCV fL 87.5 87.1  --  86.4   < > 87.2  --    < > 85.5   MCHC g/dL 32.1 32.6  --  32.5   < > 32.2  --    < > 32.9   PLATELETS 10*3/mm3 259 280  --  213   < > 118*  --    < > 130*   INR   --   --  1.09  --   --  1.09  --   --   --     < > = values in this interval not displayed.     Results from last 7 days   Lab Units 12/18/21  0137 12/17/21  0457 12/16/21 2213 12/16/21 0430 12/12/21 2351 12/12/21 2351   SODIUM mmol/L 139  --   --  141  --  138   POTASSIUM mmol/L 3.5  --  3.8 2.8*   < > 3.4*   MAGNESIUM mg/dL  --  2.0  --  1.5*  --   --    CHLORIDE mmol/L 109*  --   --  109*  --  109*   CO2 mmol/L 20.8*  --   --  19.9*  --  19.5*   BUN mg/dL 8  --   --  5*  --  16   CREATININE mg/dL 0.42*  --   --  0.42*  --  0.63   EGFR IF NONAFRICN AM mL/min/1.73 >150  --   --  >150  --  95   CALCIUM mg/dL 8.0*  --   --  7.9*  --  7.8*   GLUCOSE mg/dL 93  --   --  110*  --  128*   ALBUMIN g/dL 2.05*  --   --  1.89*  --  2.57*   BILIRUBIN mg/dL 0.3  --   --  0.4  --  1.0    ALK PHOS U/L 90  --   --  90  --  129*   AST (SGOT) U/L 33*  --   --  23  --  32   ALT (SGPT) U/L 18  --   --  16  --  19    < > = values in this interval not displayed.   Estimated Creatinine Clearance: 81.9 mL/min (A) (by C-G formula based on SCr of 0.42 mg/dL (L)).  No results found for: AMMONIA    No results found for: HGBA1C, POCGLU  Lab Results   Component Value Date    HGBA1C 6.20 (H) 12/12/2021     Lab Results   Component Value Date    TSH 1.900 09/16/2020    FREET4 1.31 09/16/2020       Blood Culture   Date Value Ref Range Status   12/14/2021 No growth at 24 hours  Preliminary   12/14/2021 No growth at 24 hours  Preliminary   12/12/2021 No growth at 2 days  Preliminary   12/12/2021 Klebsiella pneumoniae ssp pneumoniae (C)  Final     No results found for: URINECX  No results found for: WOUNDCX  No results found for: STOOLCX  No results found for: RESPCX  Pain Management Panel    There is no flowsheet data to display.           ----------------------------------------------------------------------------------------------------------------------  Imaging Results (Last 24 Hours)       Procedure Component Value Units Date/Time    CT Abdomen With Contrast [[id number]] Resulted: 12/18/21 1034     Updated: 12/18/21 1034    NM Hepatobiliary Without CCK [[id number]] Resulted: 12/17/21 1602     Updated: 12/17/21 7692            ----------------------------------------------------------------------------------------------------------------------    Assessment/Plan       Assessment/Plan     ASSESSMENT:    1.  Klebsiella bacteremia  2.  Cholecystitis  3.  Pneumonia versus malignancy    PLAN:    The patient is sitting up in bed this morning on room air in no apparent distress.  Denies any new complaints at this time.  Underwent HIDA scan and CT abdomen pelvis, but both have yet to be read. Afebrile, no diarrhea today. WBC remains normal. Blood cultures on 12/14/2021 are so far showing no growth.  Liver fluid cultures on  12/6/2021 are so far showing no organisms.    Nuclear medicine bone scan on 12/15/2021 was normal.  CT abdomen pelvis on 12/15/2021 showed right lobe of liver lesion is again identified now measuring about 4.7 cm and was about 4.7 cm.  CT chest on 12/15/2021 showed increased density at the now right lower lobe mixed groundglass and more solid-appearing consolidation with internal cavitary component identified.  Tiny right and left lung pulmonary nodules.  Grossly stable.  Small bilateral pleural effusions. Chest x-ray from 12/12/2021 reports patchy infiltrates in the right lung. CT of the chest, abdomen and pelvis reports significant consolidation in the right lower lobe, enlarged right axillary lymph node, indeterminate bilateral lung nodules and ill-defined hepatic lesion concerning for underlying malignancy. Ultrasound of the abdomen from 12/12/2021 reports nonspecific gallbladder wall thickening and questionable pericholecystic fluid, gallstones, liver lesion that is ill-defined that could represent abscess but malignancy is not excluded. Strep pneumo antigen negative. Legionella negative. Blood cultures from 12/12/2021 1 out of 2 sets positive for Klebsiella pneumoniae. COVID-19 and influenza PCR negative. Urine culture on 12/15/2021 finalized as no growth.      Bacteremia could be secondary to cholecystitis or pneumonia. Recommend to continue on both Omnicef and Flagyl through at least 12/22/2021.  ID will sign off at this time.  Please call back for any questions.    Code Status:   Code Status and Medical Interventions:   Ordered at: 12/12/21 1939     Level Of Support Discussed With:    Patient     Code Status (Patient has no pulse and is not breathing):    CPR (Attempt to Resuscitate)     Medical Interventions (Patient has pulse or is breathing):    Full Support       FLORENCE Mota  12/18/21  10:52 EST

## 2021-12-18 NOTE — PLAN OF CARE
Goal Outcome Evaluation:        Patient is sleeping at this time. Denies any pain. VS stable. No acute distress noted. Will continue to monitor and follow plan of care.

## 2021-12-18 NOTE — PROGRESS NOTES
[facility] HOSPITALIST PROGRESS NOTE     Patient Identification:  Name:  Yadira Burgos  Age:  66 y.o.  Sex:  female  :  1955  MRN:  [id number]  Visit Number:  [id number]  ROOM: [address] [place] [address]     Primary Care Provider:  Dilcia Pedro MD    Length of stay in inpatient status:  5    Subjective     Chief Compliant:    Chief Complaint   Patient presents with   • Weakness - Generalized   • Chills   • Dizziness   • Nausea       History of Presenting Illness:    Patient notes discomfort at biopsy site. Otherwise reporting feeling well. Denies any other complaint. Went for HIDA scan this evening.     ROS:  Otherwise 10 point ROS negative other than documented above in HPI.     Objective     Current Hospital Meds:cefdinir, 300 mg, Oral, Q12H  heparin (porcine), 5,000 Units, Subcutaneous, Q12H  influenza vaccine, 0.5 mL, Intramuscular, Once  metroNIDAZOLE, 500 mg, Oral, Q8H  psyllium, 1 packet, Oral, Daily  sodium chloride, 10 mL, Intravenous, Q12H         Current Antimicrobial Therapy:  Anti-Infectives (From admission, onward)    Ordered     Dose/Rate Route Frequency Start Stop    21 0959  cefdinir (OMNICEF) capsule 300 mg        Ordering Provider: Lupe Watson MD    300 mg Oral Every 12 Hours Scheduled 21 1100 21 0859    21 2136  metroNIDAZOLE (FLAGYL) tablet 500 mg        Ordering Provider: Lupe Watson MD    500 mg Oral Every 8 Hours Scheduled 21 2200 21 2359    21 1900  cefTRIAXone (ROCEPHIN) 1 g in sodium chloride 0.9 % 100 mL IVPB-VTB        Ordering Provider: Fermin Walters PA-C    1 g  200 mL/hr over 30 Minutes Intravenous Once 21 1902 21 2157    21 1720  piperacillin-tazobactam (ZOSYN) IVPB 3.375 g in 100 mL NS VTB        Ordering Provider: Fermin Walters PA-C    3.375 g  over 4 Hours Intravenous Once 21 1722 21 1847    21 1720  vancomycin 1750 mg/500 mL 0.9% NS IVPB ([facility])       "  Ordering Provider: Fermin Walters PA-C   \"And\" Linked Group Details    20 mg/kg × 83.9 kg Intravenous Once 12/12/21 1722 12/12/21 2120        Current Diuretic Therapy:  Diuretics (From admission, onward)    None        ----------------------------------------------------------------------------------------------------------------------  Vital Signs:  Temp:  [98 °F (36.7 °C)-98.8 °F (37.1 °C)] 98.6 °F (37 °C)  Heart Rate:  [69-99] 99  Resp:  [18-20] 20  BP: ()/(50-65) 100/50  SpO2:  [94 %-98 %] 96 %  on   ;   Device (Oxygen Therapy): room air  Body mass index is 31.64 kg/m².    Wt Readings from Last 3 Encounters:   12/17/21 91.6 kg (202 lb)   12/10/21 88.5 kg (195 lb)   10/20/21 88.7 kg (195 lb 9.6 oz)     Intake & Output (last 3 days)       12/15 0701 12/16 0700 12/16 0701 12/17 0700 12/17 0701  12/18 0700    P.O. 500 720 120    Total Intake(mL/kg) 500 (5.4) 720 (7.9) 120 (1.3)    Urine (mL/kg/hr) 700 (0.3)      Stool 0      Total Output 700      Net -200 +720 +120           Urine Unmeasured Occurrence 2 x 4 x     Stool Unmeasured Occurrence 1 x          Diet Regular  ----------------------------------------------------------------------------------------------------------------------  Physical exam:  Constitutional:  Well-developed and well-nourished.  No respiratory distress.      HENT:  Head:  Normocephalic and atraumatic.  Mouth:  Moist mucous membranes.    Eyes:  Conjunctivae and EOM are normal. No scleral icterus.    Neck:  Neck supple.  No JVD present.    Cardiovascular:  Normal rate, regular rhythm and normal heart sounds with no murmur.  Pulmonary/Chest:  No respiratory distress, no wheezes, no crackles, with normal breath sounds and good air movement.  Abdominal:  Soft.  Bowel sounds are normal.  No distension and no tenderness.   Musculoskeletal:  No edema, no tenderness, and no deformity.  No red or swollen joints anywhere.    Neurological:  Alert and oriented to person, place, and time. "  No cranial nerve deficit.  No tongue deviation.  No facial droop.  No slurred speech.   Skin:  Skin is warm and dry. No rash noted. No pallor.   Peripheral vascular:  Pulses in all 4 extremities with no clubbing, no cyanosis, no edema.  ----------------------------------------------------------------------------------------------------------------------  Tele:    ----------------------------------------------------------------------------------------------------------------------  Results from last 7 days   Lab Units 12/17/21  1409 12/16/21  0838 12/16/21  0430 12/12/21  2351 12/12/21  1648 12/12/21  1639 12/12/21  1639   CRP mg/dL  --   --   --  25.18*  --   --  29.64*   LACTATE mmol/L  --   --   --   --  1.2  --   --    WBC 10*3/mm3 8.55  --  9.96 13.87*  --    < > 15.26*   HEMOGLOBIN g/dL 11.4*  --  10.3* 11.2*  --    < > 12.8   HEMATOCRIT % 35.0  --  31.7* 34.8  --    < > 38.9   MCV fL 87.1  --  86.4 87.2  --    < > 85.5   MCHC g/dL 32.6  --  32.5 32.2  --    < > 32.9   PLATELETS 10*3/mm3 280  --  213 118*  --    < > 130*   INR   --  1.09  --  1.09  --   --   --     < > = values in this interval not displayed.         Results from last 7 days   Lab Units 12/17/21  0457 12/16/21  2213 12/16/21  0430 12/12/21  2351 12/12/21  1639 12/12/21  1639   SODIUM mmol/L  --   --  141 138  --  136   POTASSIUM mmol/L  --  3.8 2.8* 3.4*   < > 3.5   MAGNESIUM mg/dL 2.0  --  1.5*  --   --   --    CHLORIDE mmol/L  --   --  109* 109*  --  100   CO2 mmol/L  --   --  19.9* 19.5*  --  18.8*   BUN mg/dL  --   --  5* 16  --  18   CREATININE mg/dL  --   --  0.42* 0.63  --  0.81   EGFR IF NONAFRICN AM mL/min/1.73  --   --  >150 95  --  71   CALCIUM mg/dL  --   --  7.9* 7.8*  --  9.1   GLUCOSE mg/dL  --   --  110* 128*  --  140*   ALBUMIN g/dL  --   --  1.89* 2.57*  --  3.04*   BILIRUBIN mg/dL  --   --  0.4 1.0  --  1.7*   ALK PHOS U/L  --   --  90 129*  --  152*   AST (SGOT) U/L  --   --  23 32  --  33*   ALT (SGPT) U/L  --   --  16 19   --  22    < > = values in this interval not displayed.   Estimated Creatinine Clearance: 81.5 mL/min (A) (by C-G formula based on SCr of 0.42 mg/dL (L)).  No results found for: AMMONIA  Results from last 7 days   Lab Units 12/12/21  1639   TROPONIN T ng/mL <0.010             No results found for: HGBA1C, POCGLU  Lab Results   Component Value Date    TSH 1.900 09/16/2020    FREET4 1.31 09/16/2020     No results found for: PREGTESTUR, PREGSERUM, HCG, HCGQUANT  Pain Management Panel    There is no flowsheet data to display.       Brief Urine Lab Results  (Last result in the past 365 days)      Color   Clarity   Blood   Leuk Est   Nitrite   Protein   CREAT   Urine HCG        12/15/21 0837 Yellow   Clear   Negative   Small (1+)   Negative   Negative               Blood Culture   Date Value Ref Range Status   12/14/2021 No growth at 3 days  Preliminary   12/14/2021 No growth at 3 days  Preliminary   12/12/2021 No growth at 5 days  Final   12/12/2021 Klebsiella pneumoniae ssp pneumoniae (C)  Final     Urine Culture   Date Value Ref Range Status   12/15/2021 No growth  Final     No results found for: WOUNDCX  No results found for: STOOLCX  No results found for: RESPCX  No results found for: AFBCX  Results from last 7 days   Lab Units 12/12/21  2351 12/12/21  1648 12/12/21  1639   LACTATE mmol/L  --  1.2  --    SED RATE mm/hr  --   --  80*   CRP mg/dL 25.18*  --  29.64*       I have personally looked at the labs and they are summarized above.  ----------------------------------------------------------------------------------------------------------------------  Detailed radiology reports for the last 24 hours:    Imaging Results (Last 24 Hours)     Procedure Component Value Units Date/Time    NM Hepatobiliary Without CCK [[id number]] Resulted: 12/17/21 1602     Updated: 12/17/21 1752        Assessment & Plan    #Sepsis 2/2 RLL pneumonia   #Klebsiella pneumonia bacteremia  - CT chest revealed RLL consolidation   - 1/2 blood  cultures on admission revealing Klebsiella pneumonia on BCID, awaiting culture results.     - ID consulted. Appreciate recs. Flagyl added. Deescalated to omnicef through 12/22.     #Lung nodules   #Liver abscess vs. mass  #R axillary lymphadenopathy   - Concerning for malignancy. Cannot rule out that liver lesion is abscess in setting of bacteremia but this is less likely.   - Pulmonology consulted. Appreciate recs. Will need repeat CT chest in 4-6 weeks.   - Oncology consulted. Tumor markers sent and unremarkable. Liver bioipsy obtained on 12/16. Purulent material also obtained and culture sent. Will get CT abdomen/pelvis w/ IV constrast to reevaluate lesion after biopsy and drainage.      #HTN  - On hypotensive side, will hold any antihypertensives at this time.      #Elevated alk phos  #Nonspecific gallbladder wall thickening and questionable pericholecystic fluid  - Nonspecific findings. Biliribuin and other LFTs wnl.   - Patient does not some generalized abdominal discomfort that is postprandial for the last week.   - HIDA scan pending      #Hx of cervical cancer  #Hx of breast cancer   #Hx of melanoma   - Patient reports she is in remission   - Follows with Dr. Stone      #Hypokalemia  - Replacement ordered      F: PO  E: Replace as needed   N Regular      Code status: Full      Dispo: Pending clinical improvement          VTE Prophylaxis:   Mechanical Order History:     None      Pharmalogical Order History:      Ordered     Dose Route Frequency Stop    12/12/21 2807  heparin (porcine) 5000 UNIT/ML injection 5,000 Units         5,000 Units SC Every 12 Hours Scheduled --              Duc Schwartz MD  [facility]  12/17/21  20:41 EST

## 2021-12-18 NOTE — PLAN OF CARE
Goal Outcome Evaluation:               Pt sitting up in chair. No s/s acute distress noted. Will monitor.

## 2021-12-19 LAB
ANION GAP SERPL CALCULATED.3IONS-SCNC: 11.8 MMOL/L (ref 5–15)
BACTERIA FLD CULT: ABNORMAL
BACTERIA SPEC AEROBE CULT: NORMAL
BACTERIA SPEC AEROBE CULT: NORMAL
BASOPHILS # BLD AUTO: 0.04 10*3/MM3 (ref 0–0.2)
BASOPHILS NFR BLD AUTO: 0.4 % (ref 0–1.5)
BUN SERPL-MCNC: 7 MG/DL (ref 8–23)
BUN/CREAT SERPL: 14.6 (ref 7–25)
CALCIUM SPEC-SCNC: 8.2 MG/DL (ref 8.6–10.5)
CHLORIDE SERPL-SCNC: 108 MMOL/L (ref 98–107)
CO2 SERPL-SCNC: 21.2 MMOL/L (ref 22–29)
CREAT SERPL-MCNC: 0.48 MG/DL (ref 0.57–1)
DEPRECATED RDW RBC AUTO: 44.7 FL (ref 37–54)
EOSINOPHIL # BLD AUTO: 0.11 10*3/MM3 (ref 0–0.4)
EOSINOPHIL NFR BLD AUTO: 1.2 % (ref 0.3–6.2)
ERYTHROCYTE [DISTWIDTH] IN BLOOD BY AUTOMATED COUNT: 14.1 % (ref 12.3–15.4)
GFR SERPL CREATININE-BSD FRML MDRD: 130 ML/MIN/1.73
GLUCOSE SERPL-MCNC: 144 MG/DL (ref 65–99)
GRAM STN SPEC: ABNORMAL
GRAM STN SPEC: ABNORMAL
HCT VFR BLD AUTO: 33.5 % (ref 34–46.6)
HGB BLD-MCNC: 10.8 G/DL (ref 12–15.9)
IMM GRANULOCYTES # BLD AUTO: 0.11 10*3/MM3 (ref 0–0.05)
IMM GRANULOCYTES NFR BLD AUTO: 1.2 % (ref 0–0.5)
LYMPHOCYTES # BLD AUTO: 2.21 10*3/MM3 (ref 0.7–3.1)
LYMPHOCYTES NFR BLD AUTO: 23.6 % (ref 19.6–45.3)
MCH RBC QN AUTO: 28.3 PG (ref 26.6–33)
MCHC RBC AUTO-ENTMCNC: 32.2 G/DL (ref 31.5–35.7)
MCV RBC AUTO: 87.7 FL (ref 79–97)
MONOCYTES # BLD AUTO: 0.87 10*3/MM3 (ref 0.1–0.9)
MONOCYTES NFR BLD AUTO: 9.3 % (ref 5–12)
NEUTROPHILS NFR BLD AUTO: 6.04 10*3/MM3 (ref 1.7–7)
NEUTROPHILS NFR BLD AUTO: 64.3 % (ref 42.7–76)
NRBC BLD AUTO-RTO: 0 /100 WBC (ref 0–0.2)
PLATELET # BLD AUTO: 290 10*3/MM3 (ref 140–450)
PMV BLD AUTO: 10.2 FL (ref 6–12)
POTASSIUM SERPL-SCNC: 3.5 MMOL/L (ref 3.5–5.2)
RBC # BLD AUTO: 3.82 10*6/MM3 (ref 3.77–5.28)
SODIUM SERPL-SCNC: 141 MMOL/L (ref 136–145)
WBC NRBC COR # BLD: 9.38 10*3/MM3 (ref 3.4–10.8)

## 2021-12-19 PROCEDURE — 85025 COMPLETE CBC W/AUTO DIFF WBC: CPT | Performed by: INTERNAL MEDICINE

## 2021-12-19 PROCEDURE — 99233 SBSQ HOSP IP/OBS HIGH 50: CPT | Performed by: INTERNAL MEDICINE

## 2021-12-19 PROCEDURE — 80048 BASIC METABOLIC PNL TOTAL CA: CPT | Performed by: INTERNAL MEDICINE

## 2021-12-19 PROCEDURE — 25010000002 HEPARIN (PORCINE) PER 1000 UNITS: Performed by: HOSPITALIST

## 2021-12-19 RX ADMIN — SODIUM CHLORIDE, PRESERVATIVE FREE 10 ML: 5 INJECTION INTRAVENOUS at 20:39

## 2021-12-19 RX ADMIN — METRONIDAZOLE 500 MG: 250 TABLET ORAL at 20:39

## 2021-12-19 RX ADMIN — METRONIDAZOLE 500 MG: 250 TABLET ORAL at 05:50

## 2021-12-19 RX ADMIN — HEPARIN SODIUM 5000 UNITS: 5000 INJECTION INTRAVENOUS; SUBCUTANEOUS at 20:39

## 2021-12-19 RX ADMIN — HEPARIN SODIUM 5000 UNITS: 5000 INJECTION INTRAVENOUS; SUBCUTANEOUS at 09:42

## 2021-12-19 RX ADMIN — PSYLLIUM HUSK 1 PACKET: 3.4 POWDER ORAL at 09:42

## 2021-12-19 RX ADMIN — SODIUM CHLORIDE, PRESERVATIVE FREE 10 ML: 5 INJECTION INTRAVENOUS at 09:42

## 2021-12-19 RX ADMIN — CEFDINIR 300 MG: 300 CAPSULE ORAL at 09:42

## 2021-12-19 RX ADMIN — CEFDINIR 300 MG: 300 CAPSULE ORAL at 20:39

## 2021-12-19 RX ADMIN — METRONIDAZOLE 500 MG: 250 TABLET ORAL at 14:26

## 2021-12-19 NOTE — PROGRESS NOTES
[facility] HOSPITALIST PROGRESS NOTE     Patient Identification:  Name:  Yadira Burgos  Age:  66 y.o.  Sex:  female  :  1955  MRN:  [id number]  Visit Number:  [id number]  ROOM: [address] [place] [address]     Primary Care Provider:  Dilcia Pedro MD    Length of stay in inpatient status:  6    Subjective     Chief Compliant:    Chief Complaint   Patient presents with   • Weakness - Generalized   • Chills   • Dizziness   • Nausea       History of Presenting Illness:    Patient reports still having pain from biopsy spot but improved. Denies any other new complaints. Discussed potential dispositions with patient and she is agreeable to stay until biopsy returns.     ROS:  Otherwise 10 point ROS negative other than documented above in HPI.     Objective     Current Hospital Meds:cefdinir, 300 mg, Oral, Q12H  heparin (porcine), 5,000 Units, Subcutaneous, Q12H  influenza vaccine, 0.5 mL, Intramuscular, Once  metroNIDAZOLE, 500 mg, Oral, Q8H  psyllium, 1 packet, Oral, Daily  sodium chloride, 10 mL, Intravenous, Q12H         Current Antimicrobial Therapy:  Anti-Infectives (From admission, onward)    Ordered     Dose/Rate Route Frequency Start Stop    21 0959  cefdinir (OMNICEF) capsule 300 mg        Ordering Provider: Lupe Watson MD    300 mg Oral Every 12 Hours Scheduled 21 1100 21 0859    21 2136  metroNIDAZOLE (FLAGYL) tablet 500 mg        Ordering Provider: Lupe Watson MD    500 mg Oral Every 8 Hours Scheduled 21 2200 21 2359    21 1900  cefTRIAXone (ROCEPHIN) 1 g in sodium chloride 0.9 % 100 mL IVPB-VTB        Ordering Provider: Fermin Walters PA-C    1 g  200 mL/hr over 30 Minutes Intravenous Once 21 1902 21 2157    21 1720  piperacillin-tazobactam (ZOSYN) IVPB 3.375 g in 100 mL NS VTB        Ordering Provider: Fermin Walters PA-C    3.375 g  over 4 Hours Intravenous Once 21 1722 21 1847    21  "1720  vancomycin 1750 mg/500 mL 0.9% NS IVPB ([facility])        Ordering Provider: Fermin Walters PA-C   \"And\" Linked Group Details    20 mg/kg × 83.9 kg Intravenous Once 12/12/21 1722 12/12/21 2120        Current Diuretic Therapy:  Diuretics (From admission, onward)    None        ----------------------------------------------------------------------------------------------------------------------  Vital Signs:  Temp:  [97.5 °F (36.4 °C)-98.5 °F (36.9 °C)] 97.9 °F (36.6 °C)  Heart Rate:  [] 103  Resp:  [18] 18  BP: (101-118)/(59-77) 113/59  SpO2:  [96 %-98 %] 98 %  on   ;   Device (Oxygen Therapy): room air  Body mass index is 31.95 kg/m².    Wt Readings from Last 3 Encounters:   12/18/21 92.5 kg (204 lb)   12/10/21 88.5 kg (195 lb)   10/20/21 88.7 kg (195 lb 9.6 oz)     Intake & Output (last 3 days)       12/16 0701 12/17 0700 12/17 0701 12/18 0700 12/18 0701  12/19 0700    P.O. 720 120 0    Total Intake(mL/kg) 720 (7.9) 120 (1.3) 0 (0)    Urine (mL/kg/hr)       Stool       Total Output       Net +720 +120 0           Urine Unmeasured Occurrence 4 x 2 x     Stool Unmeasured Occurrence   1 x        Diet Regular  ----------------------------------------------------------------------------------------------------------------------  Physical exam:  Constitutional:  Well-developed and well-nourished.  No respiratory distress.      HENT:  Head:  Normocephalic and atraumatic.  Mouth:  Moist mucous membranes.    Eyes:  Conjunctivae and EOM are normal. No scleral icterus.    Neck:  Neck supple.  No JVD present.    Cardiovascular:  Normal rate, regular rhythm and normal heart sounds with no murmur.  Pulmonary/Chest:  No respiratory distress, no wheezes, no crackles, with normal breath sounds and good air movement.  Abdominal:  Soft.  Bowel sounds are normal.  No distension and no tenderness.   Musculoskeletal:  No edema, no tenderness, and no deformity.  No red or swollen joints anywhere.    Neurological:  Alert " and oriented to person, place, and time.  No cranial nerve deficit.  No tongue deviation.  No facial droop.  No slurred speech.   Skin:  Skin is warm and dry. No rash noted. No pallor. Biopsy site clean an intact with no redness or bruising.   Peripheral vascular:  Pulses in all 4 extremities with no clubbing, no cyanosis, no edema.  ----------------------------------------------------------------------------------------------------------------------  Tele:    ----------------------------------------------------------------------------------------------------------------------  Results from last 7 days   Lab Units 12/18/21  0137 12/17/21  1409 12/16/21  0838 12/16/21  0430 12/12/21  2351 12/12/21  2351 12/12/21  1648 12/12/21  1639 12/12/21  1639   CRP mg/dL  --   --   --   --   --  25.18*  --   --  29.64*   LACTATE mmol/L  --   --   --   --   --   --  1.2  --   --    WBC 10*3/mm3 8.97 8.55  --  9.96   < > 13.87*  --    < > 15.26*   HEMOGLOBIN g/dL 10.3* 11.4*  --  10.3*   < > 11.2*  --    < > 12.8   HEMATOCRIT % 32.1* 35.0  --  31.7*   < > 34.8  --    < > 38.9   MCV fL 87.5 87.1  --  86.4   < > 87.2  --    < > 85.5   MCHC g/dL 32.1 32.6  --  32.5   < > 32.2  --    < > 32.9   PLATELETS 10*3/mm3 259 280  --  213   < > 118*  --    < > 130*   INR   --   --  1.09  --   --  1.09  --   --   --     < > = values in this interval not displayed.         Results from last 7 days   Lab Units 12/18/21  1815 12/18/21  0137 12/17/21  0457 12/16/21  2213 12/16/21  0430 12/16/21  0430 12/12/21  2351 12/12/21  2351   SODIUM mmol/L  --  139  --   --   --  141  --  138   POTASSIUM mmol/L 3.8 3.5  --  3.8   < > 2.8*   < > 3.4*   MAGNESIUM mg/dL  --   --  2.0  --   --  1.5*  --   --    CHLORIDE mmol/L  --  109*  --   --   --  109*  --  109*   CO2 mmol/L  --  20.8*  --   --   --  19.9*  --  19.5*   BUN mg/dL  --  8  --   --   --  5*  --  16   CREATININE mg/dL  --  0.42*  --   --   --  0.42*  --  0.63   EGFR IF NONAFRICN AM mL/min/1.73  --   >150  --   --   --  >150  --  95   CALCIUM mg/dL  --  8.0*  --   --   --  7.9*  --  7.8*   GLUCOSE mg/dL  --  93  --   --   --  110*  --  128*   ALBUMIN g/dL  --  2.05*  --   --   --  1.89*  --  2.57*   BILIRUBIN mg/dL  --  0.3  --   --   --  0.4  --  1.0   ALK PHOS U/L  --  90  --   --   --  90  --  129*   AST (SGOT) U/L  --  33*  --   --   --  23  --  32   ALT (SGPT) U/L  --  18  --   --   --  16  --  19    < > = values in this interval not displayed.   Estimated Creatinine Clearance: 81.9 mL/min (A) (by C-G formula based on SCr of 0.42 mg/dL (L)).  No results found for: AMMONIA  Results from last 7 days   Lab Units 12/12/21  1639   TROPONIN T ng/mL <0.010             No results found for: HGBA1C, POCGLU  Lab Results   Component Value Date    TSH 1.900 09/16/2020    FREET4 1.31 09/16/2020     No results found for: PREGTESTUR, PREGSERUM, HCG, HCGQUANT  Pain Management Panel    There is no flowsheet data to display.       Brief Urine Lab Results  (Last result in the past 365 days)      Color   Clarity   Blood   Leuk Est   Nitrite   Protein   CREAT   Urine HCG        12/15/21 0837 Yellow   Clear   Negative   Small (1+)   Negative   Negative               Blood Culture   Date Value Ref Range Status   12/14/2021 No growth at 4 days  Preliminary   12/14/2021 No growth at 4 days  Preliminary   12/12/2021 No growth at 5 days  Final   12/12/2021 Klebsiella pneumoniae ssp pneumoniae (C)  Final     Urine Culture   Date Value Ref Range Status   12/15/2021 No growth  Final     No results found for: WOUNDCX  No results found for: STOOLCX  No results found for: RESPCX  No results found for: AFBCX  Results from last 7 days   Lab Units 12/12/21  2351 12/12/21  1648 12/12/21  1639   LACTATE mmol/L  --  1.2  --    SED RATE mm/hr  --   --  80*   CRP mg/dL 25.18*  --  29.64*       I have personally looked at the labs and they are summarized  above.  ----------------------------------------------------------------------------------------------------------------------  Detailed radiology reports for the last 24 hours:    Imaging Results (Last 24 Hours)     Procedure Component Value Units Date/Time    NM Hepatobiliary Without CCK [[id number]] Collected: 12/18/21 1529     Updated: 12/18/21 1531    Narrative:      NM Hepatobiliary Duct System    INDICATION:   Abdominal pain with prior liver biopsy 24 hours ago. Patient states infection around liver. Written orders states post cholecystectomy, however the patient indicates that the gallbladder has not been removed.    DOSE:  *  Standard dose mCi Tc99m labeled Choletec.  Gallbladder ejection fraction was calculated after the patient ingested a fatty meal.    COMPARISON:   CT abdomen and pelvis 12/18/2021    FINDINGS:   Or is prompt visualization of tracer in the bile ducts and a distended gallbladder at 20 minutes, and there is increasing gallbladder and small bowel activity at 40 minutes and 60 minutes. There is no evidence of cholecystitis or bile duct obstruction.    Normal gallbladder contraction is demonstrated during CCK infusion. Ejection fraction measures 82% % (normal gallbladder EF measures greater than 35% using this protocol).      Impression:      1. Normal hepatobiliary scan. No evidence of cholecystitis or bile duct obstruction. The gallbladder is distended  2. Normal gallbladder contraction with CCK. Ejection fraction measures 82%%.    Signer Name: Nicolasa Deras MD   Signed: 12/18/2021 3:29 PM   Workstation Name: [id number]    [facility]    CT Abdomen With Contrast [[id number]] Collected: 12/18/21 1206     Updated: 12/18/21 1208    Narrative:      CT Abdomen W    INDICATION:   Abdominal mass. Pneumonia.    TECHNIQUE:   CT of the abdomen with IV contrast. Coronal and sagittal reconstructions were obtained.  Radiation dose reduction techniques included automated  exposure control or exposure modulation based on body size. Count of known CT and cardiac nuc med studies  performed in previous 12 months: 6.     COMPARISON:   CT abdomen pelvis 12/14/2021, 12/12/2021, and 12/15/2020    FINDINGS:  Partially imaged thick-walled cavitary lesion in the right lower lobe with right basilar atelectasis/infiltrate and small right pleural effusion. There is also left basilar atelectasis/infiltrate and trace left pleural effusion. Multiple noncalcified  pulmonary nodules are scattered throughout the visualized lower lung fields, concerning for pulmonary metastatic disease.    Ill-defined right hepatic mass again noted. The spleen, pancreas, and both adrenal glands are within expected limits. Cholelithiasis. Punctate nonobstructing right intrarenal stone. Tiny left renal cyst. Kidneys are otherwise unremarkable without  hydronephrosis. Abdominal aorta normal in course and caliber without dissection. No pathologic retroperitoneal or mesenteric lymphadenopathy by size criteria. Visualized small bowel and colon are unremarkable. No bowel obstruction. No free fluid or free  air.    No aggressive osseous lesions.      Impression:        1. Ill-defined right hepatic mass again noted. This has been previously biopsied and correlation with pathology results is recommended.  2. Cholelithiasis.  3. Punctate nonobstructing right intrarenal stone.  4. No threshold abdominal lymphadenopathy or other suspicious abdominal masses.  5. Partially imaged thick-walled cavitary lesion in the right lower lobe. This may be of infectious or neoplastic etiology and continued follow-up is recommended.  6. Small bilateral pleural effusions with bibasilar atelectasis/infiltrate, worse on the right than left.  7. Numerous small noncalcified pulmonary nodules throughout the visualized lower lung fields, concerning for pulmonary metastatic disease.          Signer Name: Neal Bob MD   Signed: 12/18/2021 12:06 PM    Workstation Name: [id number]    [facility]        Assessment & Plan    #Sepsis 2/2 RLL pneumonia   #Klebsiella pneumonia bacteremia  #Liver Abccess vs. Mass   - CT chest revealed RLL consolidation   - 1/2 blood cultures on admission revealing Klebsiella pneumonia on BCID, awaiting culture results.     - CT abdomen/pelvis with lesion that could be mass or abscess per radiology in setting of purulent material being drained   - Klebsiella pneumonia could have originated in liver abscess (most common bacterial etiology) or pneumonia.   - ID consulted. Appreciate recs. Flagyl added. Deescalated to omnicef through 12/22.   - Will await finalization of liver culture as this will affect length of abx.     #Lung nodules   #Liver abscess vs. mass  #R axillary lymphadenopathy   - Concerning for malignancy. Cannot rule out that liver lesion is klebsiella pneumonia abscess in setting of bacteremia.   - Pulmonology consulted. Appreciate recs. Will need repeat CT chest in 4-6 weeks.   - Oncology consulted. Tumor markers sent and unremarkable. Liver bioipsy obtained on 12/16. Purulent material also obtained and culture sent. Area too small to place drain per radiology.  - CT abdomen/pelvis w/ IV contrast repeat obtained and ill defined liver mas again noted.      #HTN  - On hypotensive side, will hold any antihypertensives at this time.      #Elevated alk phos  #Nonspecific gallbladder wall thickening and questionable pericholecystic fluid  - Nonspecific findings. Biliribuin and other LFTs wnl.   - Patient does not some generalized abdominal discomfort that is postprandial for the last week.   - HIDA scan normal Suspect abdominal pain from above liver lesion.      #Hx of cervical cancer  #Hx of breast cancer   #Hx of melanoma   - Patient reports she is in remission   - Follows with Dr. Stone      #Hypokalemia  - Replacement ordered      F: PO  E: Replace as needed   N Regular      Code status: Full       Dispo: Pending clinical improvement           VTE Prophylaxis:   Mechanical Order History:     None      Pharmalogical Order History:      Ordered     Dose Route Frequency Stop    12/12/21 2085  heparin (porcine) 5000 UNIT/ML injection 5,000 Units         5,000 Units SC Every 12 Hours Scheduled --                  Duc Schwartz MD  [facility]  12/18/21  20:20 EST

## 2021-12-19 NOTE — PLAN OF CARE
Goal Outcome Evaluation:    Pt is resting in bed with no s/s of distress noted. Will continue with plan of care.

## 2021-12-19 NOTE — PLAN OF CARE
Goal Outcome Evaluation:  Pt pleasant, resting in bed. No complaints at this time. Will continue to follow plan of care.

## 2021-12-19 NOTE — PROGRESS NOTES
[facility] HOSPITALIST PROGRESS NOTE     Patient Identification:  Name:  Yadira Burgos  Age:  66 y.o.  Sex:  female  :  1955  MRN:  [id number]  Visit Number:  [id number]  ROOM: [address] [place] [address]     Primary Care Provider:  Dilcia Pedro MD    Length of stay in inpatient status:  7    Subjective     Chief Compliant:    Chief Complaint   Patient presents with   • Weakness - Generalized   • Chills   • Dizziness   • Nausea       History of Presenting Illness:    Patient reports feeling better. Reports biopsy site pain improved. Denies any new complaints. Patient notes she is hopeful to go home in next couple of days.     ROS:  Otherwise 10 point ROS negative other than documented above in HPI.     Objective     Current Hospital Meds:cefdinir, 300 mg, Oral, Q12H  heparin (porcine), 5,000 Units, Subcutaneous, Q12H  influenza vaccine, 0.5 mL, Intramuscular, Once  metroNIDAZOLE, 500 mg, Oral, Q8H  psyllium, 1 packet, Oral, Daily  sodium chloride, 10 mL, Intravenous, Q12H         Current Antimicrobial Therapy:  Anti-Infectives (From admission, onward)    Ordered     Dose/Rate Route Frequency Start Stop    21 0959  cefdinir (OMNICEF) capsule 300 mg        Ordering Provider: Lupe Watson MD    300 mg Oral Every 12 Hours Scheduled 21 1100 21 0859    21 2136  metroNIDAZOLE (FLAGYL) tablet 500 mg        Ordering Provider: Lupe Watson MD    500 mg Oral Every 8 Hours Scheduled 21 2200 21 2359    21 1900  cefTRIAXone (ROCEPHIN) 1 g in sodium chloride 0.9 % 100 mL IVPB-VTB        Ordering Provider: Fermin Walters PA-C    1 g  200 mL/hr over 30 Minutes Intravenous Once 21 1902 21 2157    21 1720  piperacillin-tazobactam (ZOSYN) IVPB 3.375 g in 100 mL NS VTB        Ordering Provider: Fermin Walters PA-C    3.375 g  over 4 Hours Intravenous Once 21 1722 21 1847    21 1720  vancomycin 1750 mg/500 mL 0.9% NS  "IVPB ([facility])        Ordering Provider: Fermin Walters PA-C   \"And\" Linked Group Details    20 mg/kg × 83.9 kg Intravenous Once 12/12/21 1722 12/12/21 2120        Current Diuretic Therapy:  Diuretics (From admission, onward)    None        ----------------------------------------------------------------------------------------------------------------------  Vital Signs:  Temp:  [97.8 °F (36.6 °C)-98.6 °F (37 °C)] 98 °F (36.7 °C)  Heart Rate:  [] 95  Resp:  [18-20] 20  BP: ()/(57-60) 95/60  SpO2:  [95 %-98 %] 96 %  on   ;   Device (Oxygen Therapy): room air  Body mass index is 30.74 kg/m².    Wt Readings from Last 3 Encounters:   12/19/21 89 kg (196 lb 4.8 oz)   12/10/21 88.5 kg (195 lb)   10/20/21 88.7 kg (195 lb 9.6 oz)     Intake & Output (last 3 days)       12/16 0701 12/17 0700 12/17 0701 12/18 0700 12/18 0701 12/19 0700 12/19 0701  12/20 0700    P.O. 720 120 960 100    Total Intake(mL/kg) 720 (7.9) 120 (1.3) 960 (10.8) 100 (1.1)    Urine (mL/kg/hr)        Stool        Total Output        Net +720 +120 +960 +100            Urine Unmeasured Occurrence 4 x 2 x 4 x     Stool Unmeasured Occurrence   4 x         Diet Regular  ----------------------------------------------------------------------------------------------------------------------  Physical exam:  Constitutional:  Well-developed and well-nourished.  No respiratory distress.      HENT:  Head:  Normocephalic and atraumatic.  Mouth:  Moist mucous membranes.    Eyes:  Conjunctivae and EOM are normal. No scleral icterus.    Neck:  Neck supple.  No JVD present.    Cardiovascular:  Normal rate, regular rhythm and normal heart sounds with no murmur.  Pulmonary/Chest:  No respiratory distress, no wheezes, no crackles, with normal breath sounds and good air movement.  Abdominal:  Soft.  Bowel sounds are normal.  No distension and no tenderness.   Musculoskeletal:  No edema, no tenderness, and no deformity.  No red or swollen joints anywhere.  "   Neurological:  Alert and oriented to person, place, and time.  No cranial nerve deficit.  No tongue deviation.  No facial droop.  No slurred speech.   Skin:  Skin is warm and dry. No rash noted. No pallor.   Peripheral vascular:  Pulses in all 4 extremities with no clubbing, no cyanosis, no edema.  ----------------------------------------------------------------------------------------------------------------------  Tele:    ----------------------------------------------------------------------------------------------------------------------  Results from last 7 days   Lab Units 12/19/21  0115 12/18/21  0137 12/17/21  1409 12/16/21  0838 12/16/21  0430 12/12/21  2351 12/12/21  1648 12/12/21  1639 12/12/21  1639   CRP mg/dL  --   --   --   --   --  25.18*  --   --  29.64*   LACTATE mmol/L  --   --   --   --   --   --  1.2  --   --    WBC 10*3/mm3 9.38 8.97 8.55  --    < > 13.87*  --    < > 15.26*   HEMOGLOBIN g/dL 10.8* 10.3* 11.4*  --    < > 11.2*  --    < > 12.8   HEMATOCRIT % 33.5* 32.1* 35.0  --    < > 34.8  --    < > 38.9   MCV fL 87.7 87.5 87.1  --    < > 87.2  --    < > 85.5   MCHC g/dL 32.2 32.1 32.6  --    < > 32.2  --    < > 32.9   PLATELETS 10*3/mm3 290 259 280  --    < > 118*  --    < > 130*   INR   --   --   --  1.09  --  1.09  --   --   --     < > = values in this interval not displayed.         Results from last 7 days   Lab Units 12/19/21  0115 12/18/21  1815 12/18/21  0137 12/17/21  0457 12/16/21  2213 12/16/21  0430 12/12/21  2351 12/12/21  2351   SODIUM mmol/L 141  --  139  --   --  141   < > 138   POTASSIUM mmol/L 3.5 3.8 3.5  --    < > 2.8*   < > 3.4*   MAGNESIUM mg/dL  --   --   --  2.0  --  1.5*  --   --    CHLORIDE mmol/L 108*  --  109*  --   --  109*   < > 109*   CO2 mmol/L 21.2*  --  20.8*  --   --  19.9*   < > 19.5*   BUN mg/dL 7*  --  8  --   --  5*   < > 16   CREATININE mg/dL 0.48*  --  0.42*  --   --  0.42*   < > 0.63   EGFR IF NONAFRICN AM mL/min/1.73 130  --  >150  --   --  >150   <  > 95   CALCIUM mg/dL 8.2*  --  8.0*  --   --  7.9*   < > 7.8*   GLUCOSE mg/dL 144*  --  93  --   --  110*   < > 128*   ALBUMIN g/dL  --   --  2.05*  --   --  1.89*  --  2.57*   BILIRUBIN mg/dL  --   --  0.3  --   --  0.4  --  1.0   ALK PHOS U/L  --   --  90  --   --  90  --  129*   AST (SGOT) U/L  --   --  33*  --   --  23  --  32   ALT (SGPT) U/L  --   --  18  --   --  16  --  19    < > = values in this interval not displayed.   Estimated Creatinine Clearance: 80.4 mL/min (A) (by C-G formula based on SCr of 0.48 mg/dL (L)).  No results found for: AMMONIA  Results from last 7 days   Lab Units 12/12/21  1639   TROPONIN T ng/mL <0.010             No results found for: HGBA1C, POCGLU  Lab Results   Component Value Date    TSH 1.900 09/16/2020    FREET4 1.31 09/16/2020     No results found for: PREGTESTUR, PREGSERUM, HCG, HCGQUANT  Pain Management Panel    There is no flowsheet data to display.       Brief Urine Lab Results  (Last result in the past 365 days)      Color   Clarity   Blood   Leuk Est   Nitrite   Protein   CREAT   Urine HCG        12/15/21 0837 Yellow   Clear   Negative   Small (1+)   Negative   Negative               Blood Culture   Date Value Ref Range Status   12/14/2021 No growth at 5 days  Final   12/14/2021 No growth at 5 days  Final   12/12/2021 No growth at 5 days  Final   12/12/2021 Klebsiella pneumoniae ssp pneumoniae (C)  Final     Urine Culture   Date Value Ref Range Status   12/15/2021 No growth  Final     No results found for: WOUNDCX  No results found for: STOOLCX  No results found for: RESPCX  No results found for: AFBCX  Results from last 7 days   Lab Units 12/12/21  2351 12/12/21  1648 12/12/21  1639   LACTATE mmol/L  --  1.2  --    SED RATE mm/hr  --   --  80*   CRP mg/dL 25.18*  --  29.64*       I have personally looked at the labs and they are summarized  above.  ----------------------------------------------------------------------------------------------------------------------  Detailed radiology reports for the last 24 hours:    Imaging Results (Last 24 Hours)     Procedure Component Value Units Date/Time    NM Hepatobiliary Without CCK [[id number]] Collected: 12/18/21 1529     Updated: 12/18/21 1531    Narrative:      NM Hepatobiliary Duct System    INDICATION:   Abdominal pain with prior liver biopsy 24 hours ago. Patient states infection around liver. Written orders states post cholecystectomy, however the patient indicates that the gallbladder has not been removed.    DOSE:  *  Standard dose mCi Tc99m labeled Choletec.  Gallbladder ejection fraction was calculated after the patient ingested a fatty meal.    COMPARISON:   CT abdomen and pelvis 12/18/2021    FINDINGS:   Or is prompt visualization of tracer in the bile ducts and a distended gallbladder at 20 minutes, and there is increasing gallbladder and small bowel activity at 40 minutes and 60 minutes. There is no evidence of cholecystitis or bile duct obstruction.    Normal gallbladder contraction is demonstrated during CCK infusion. Ejection fraction measures 82% % (normal gallbladder EF measures greater than 35% using this protocol).      Impression:      1. Normal hepatobiliary scan. No evidence of cholecystitis or bile duct obstruction. The gallbladder is distended  2. Normal gallbladder contraction with CCK. Ejection fraction measures 82%%.    Signer Name: Nicolasa Deras MD   Signed: 12/18/2021 3:29 PM   Workstation Name: [id number]    [facility]        Assessment & Plan    #Sepsis 2/2 RLL pneumonia   #Klebsiella pneumonia bacteremia  #Liver Abccess vs. Mass   - CT chest revealed RLL consolidation   - 1/2 blood cultures on admission revealing Klebsiella pneumonia on BCID, awaiting culture results.     - CT abdomen/pelvis with lesion that could be mass or abscess per  radiology in setting of purulent material being drained   - Klebsiella pneumonia could have originated in liver abscess (most common bacterial etiology) or pneumonia.   - ID consulted. Appreciate recs. Flagyl added. Deescalated to omnicef through 12/22 per ID.        #Lung nodules   #Liver abscess vs. mass  #R axillary lymphadenopathy   - Concerning for malignancy. Cannot rule out that liver lesion is klebsiella pneumonia abscess in setting of bacteremia.   - Pulmonology consulted. Appreciate recs. Will need repeat CT chest in 4-6 weeks.   - Oncology consulted. Tumor markers sent and unremarkable. Liver bioipsy obtained on 12/16. Purulent material also obtained and culture sent. Area too small to place drain per radiology.  - CT abdomen/pelvis w/ IV contrast repeat obtained and ill defined liver mas again noted.   - Will await finalization of liver culture as this will affect length of  May need a prolonged (4-6 week) course and repeat CT abdomen/pelvis at that time.  - If liver mass biopsy does not reveal malignancy can likely defer to outpatient oncology if it warranted to pursue further biopsies.      #HTN  - On hypotensive side, will hold any antihypertensives at this time.      #Elevated alk phos  #Nonspecific gallbladder wall thickening and questionable pericholecystic fluid  - Nonspecific findings. Biliribuin and other LFTs wnl.   - Patient does not some generalized abdominal discomfort that is postprandial for the last week.   - HIDA scan normal Suspect abdominal pain from above liver lesion.      #Hx of cervical cancer  #Hx of breast cancer   #Hx of melanoma   - Patient reports she is in remission   - Follows with Dr. Stone      #Hypokalemia  - Replacement ordered      F: PO  E: Replace as needed   N Regular      Code status: Full      Dispo: Pending clinical improvement        VTE Prophylaxis:   Mechanical Order History:     None      Pharmalogical Order History:      Ordered     Dose Route Frequency Stop     12/12/21 2225  heparin (porcine) 5000 UNIT/ML injection 5,000 Units         5,000 Units SC Every 12 Hours Scheduled --                  Duc Schwartz MD  [facility]  12/19/21  14:09 EST

## 2021-12-20 LAB
ANION GAP SERPL CALCULATED.3IONS-SCNC: 9.2 MMOL/L (ref 5–15)
BASOPHILS # BLD AUTO: 0.03 10*3/MM3 (ref 0–0.2)
BASOPHILS NFR BLD AUTO: 0.3 % (ref 0–1.5)
BUN SERPL-MCNC: 6 MG/DL (ref 8–23)
BUN/CREAT SERPL: 12 (ref 7–25)
CALCIUM SPEC-SCNC: 8.5 MG/DL (ref 8.6–10.5)
CHLORIDE SERPL-SCNC: 109 MMOL/L (ref 98–107)
CO2 SERPL-SCNC: 20.8 MMOL/L (ref 22–29)
CREAT SERPL-MCNC: 0.5 MG/DL (ref 0.57–1)
DEPRECATED RDW RBC AUTO: 46.4 FL (ref 37–54)
EOSINOPHIL # BLD AUTO: 0.09 10*3/MM3 (ref 0–0.4)
EOSINOPHIL NFR BLD AUTO: 1 % (ref 0.3–6.2)
ERYTHROCYTE [DISTWIDTH] IN BLOOD BY AUTOMATED COUNT: 14.5 % (ref 12.3–15.4)
GFR SERPL CREATININE-BSD FRML MDRD: 124 ML/MIN/1.73
GLUCOSE SERPL-MCNC: 97 MG/DL (ref 65–99)
HCT VFR BLD AUTO: 34 % (ref 34–46.6)
HGB BLD-MCNC: 10.7 G/DL (ref 12–15.9)
IMM GRANULOCYTES # BLD AUTO: 0.1 10*3/MM3 (ref 0–0.05)
IMM GRANULOCYTES NFR BLD AUTO: 1.2 % (ref 0–0.5)
LYMPHOCYTES # BLD AUTO: 2.32 10*3/MM3 (ref 0.7–3.1)
LYMPHOCYTES NFR BLD AUTO: 27 % (ref 19.6–45.3)
MCH RBC QN AUTO: 28 PG (ref 26.6–33)
MCHC RBC AUTO-ENTMCNC: 31.5 G/DL (ref 31.5–35.7)
MCV RBC AUTO: 89 FL (ref 79–97)
MONOCYTES # BLD AUTO: 0.72 10*3/MM3 (ref 0.1–0.9)
MONOCYTES NFR BLD AUTO: 8.4 % (ref 5–12)
NEUTROPHILS NFR BLD AUTO: 5.32 10*3/MM3 (ref 1.7–7)
NEUTROPHILS NFR BLD AUTO: 62.1 % (ref 42.7–76)
NRBC BLD AUTO-RTO: 0 /100 WBC (ref 0–0.2)
PLATELET # BLD AUTO: 298 10*3/MM3 (ref 140–450)
PMV BLD AUTO: 9.9 FL (ref 6–12)
POTASSIUM SERPL-SCNC: 3.9 MMOL/L (ref 3.5–5.2)
RBC # BLD AUTO: 3.82 10*6/MM3 (ref 3.77–5.28)
SODIUM SERPL-SCNC: 139 MMOL/L (ref 136–145)
WBC NRBC COR # BLD: 8.58 10*3/MM3 (ref 3.4–10.8)

## 2021-12-20 PROCEDURE — 25010000002 HEPARIN (PORCINE) PER 1000 UNITS: Performed by: HOSPITALIST

## 2021-12-20 PROCEDURE — 99232 SBSQ HOSP IP/OBS MODERATE 35: CPT | Performed by: INTERNAL MEDICINE

## 2021-12-20 PROCEDURE — 80048 BASIC METABOLIC PNL TOTAL CA: CPT | Performed by: INTERNAL MEDICINE

## 2021-12-20 PROCEDURE — 99231 SBSQ HOSP IP/OBS SF/LOW 25: CPT | Performed by: NURSE PRACTITIONER

## 2021-12-20 PROCEDURE — 85025 COMPLETE CBC W/AUTO DIFF WBC: CPT | Performed by: INTERNAL MEDICINE

## 2021-12-20 RX ADMIN — SODIUM CHLORIDE, PRESERVATIVE FREE 10 ML: 5 INJECTION INTRAVENOUS at 20:15

## 2021-12-20 RX ADMIN — METRONIDAZOLE 500 MG: 250 TABLET ORAL at 06:19

## 2021-12-20 RX ADMIN — CEFDINIR 300 MG: 300 CAPSULE ORAL at 09:11

## 2021-12-20 RX ADMIN — HEPARIN SODIUM 5000 UNITS: 5000 INJECTION INTRAVENOUS; SUBCUTANEOUS at 09:13

## 2021-12-20 RX ADMIN — METRONIDAZOLE 500 MG: 250 TABLET ORAL at 14:22

## 2021-12-20 RX ADMIN — CEFDINIR 300 MG: 300 CAPSULE ORAL at 20:15

## 2021-12-20 RX ADMIN — HEPARIN SODIUM 5000 UNITS: 5000 INJECTION INTRAVENOUS; SUBCUTANEOUS at 20:15

## 2021-12-20 RX ADMIN — METRONIDAZOLE 500 MG: 250 TABLET ORAL at 20:15

## 2021-12-20 RX ADMIN — SODIUM CHLORIDE, PRESERVATIVE FREE 10 ML: 5 INJECTION INTRAVENOUS at 09:12

## 2021-12-20 NOTE — PROGRESS NOTES
[facility] HOSPITALIST PROGRESS NOTE     Patient Identification:  Name:  Yadira Burgos  Age:  66 y.o.  Sex:  female  :  1955  MRN:  [id number]  Visit Number:  [id number]  ROOM: [address] [place] [address]     Primary Care Provider:  Dilcia Pedro MD    Length of stay in inpatient status:  8    Subjective     Chief Compliant:    Chief Complaint   Patient presents with   • Weakness - Generalized   • Chills   • Dizziness   • Nausea     History of Presenting Illness:    Patient remains ill but stable, reports her abd pain has improved,  some, reports she feels better today, denies any fevers or chills, on PO Abx though liver biopsy results c/w liver abscess now s/p drainage though have re-consulted ID for Abx duration recs now that likely liver abscess.    Objective     Current Hospital Meds:cefdinir, 300 mg, Oral, Q12H  heparin (porcine), 5,000 Units, Subcutaneous, Q12H  influenza vaccine, 0.5 mL, Intramuscular, Once  metroNIDAZOLE, 500 mg, Oral, Q8H  psyllium, 1 packet, Oral, Daily  sodium chloride, 10 mL, Intravenous, Q12H         Current Antimicrobial Therapy:  Anti-Infectives (From admission, onward)    Ordered     Dose/Rate Route Frequency Start Stop    21 0959  cefdinir (OMNICEF) capsule 300 mg        Ordering Provider: Lupe Watson MD    300 mg Oral Every 12 Hours Scheduled 21 1100 21 0859    21 2136  metroNIDAZOLE (FLAGYL) tablet 500 mg        Ordering Provider: Lupe Watson MD    500 mg Oral Every 8 Hours Scheduled 21 2200 21 2359    21 1900  cefTRIAXone (ROCEPHIN) 1 g in sodium chloride 0.9 % 100 mL IVPB-VTB        Ordering Provider: Fermin Walters PA-C    1 g  200 mL/hr over 30 Minutes Intravenous Once 21 1902 21 2157    21 1720  piperacillin-tazobactam (ZOSYN) IVPB 3.375 g in 100 mL NS VTB        Ordering Provider: Fermin Walters PA-C    3.375 g  over 4 Hours Intravenous Once 21 1722  "12/12/21 1847    12/12/21 1720  vancomycin 1750 mg/500 mL 0.9% NS IVPB ([facility])        Ordering Provider: Fermin Walters PA-C   \"And\" Linked Group Details    20 mg/kg × 83.9 kg Intravenous Once 12/12/21 1722 12/12/21 2120        Current Diuretic Therapy:  Diuretics (From admission, onward)    None        ----------------------------------------------------------------------------------------------------------------------  Vital Signs:  Temp:  [97.5 °F (36.4 °C)-98.3 °F (36.8 °C)] 97.5 °F (36.4 °C)  Heart Rate:  [] 100  Resp:  [16-20] 20  BP: (91-98)/(56-61) 91/60  SpO2:  [93 %-96 %] 95 %  on   ;   Device (Oxygen Therapy): room air  Body mass index is 30.74 kg/m².    Wt Readings from Last 3 Encounters:   12/19/21 89 kg (196 lb 4.8 oz)   12/10/21 88.5 kg (195 lb)   10/20/21 88.7 kg (195 lb 9.6 oz)     Intake & Output (last 3 days)       12/17 0701  12/18 0700 12/18 0701  12/19 0700 12/19 0701  12/20 0700 12/20 0701  12/21 0700    P.O. 120 960 870 330    Total Intake(mL/kg) 120 (1.3) 960 (10.8) 870 (9.8) 330 (3.7)    Net +120 +960 +870 +330            Urine Unmeasured Occurrence 2 x 4 x 3 x     Stool Unmeasured Occurrence  4 x 1 x         Diet Regular  ----------------------------------------------------------------------------------------------------------------------  Physical exam:  Constitutional:  Well-developed and well-nourished.  No acute distress.      HENT:  Head:  Normocephalic and atraumatic.  Mouth:  Moist mucous membranes.    Eyes:  Conjunctivae and EOM are normal. No scleral icterus.    Neck:  Neck supple.  No JVD present.    Cardiovascular:  Normal rate, regular rhythm and normal heart sounds with no murmur.  Pulmonary/Chest:  No respiratory distress, no wheezes, on room air  Abdominal:  Soft. No distension and mild tenderness.   Musculoskeletal:  No tenderness, and no deformity.  No red or swollen joints anywhere.    Neurological:  Alert and oriented to person, place, and time.  No cranial " nerve deficit.    Skin:  Skin is warm and dry. No rash noted. No pallor.   Peripheral vascular:  No clubbing, no cyanosis, no edema.  ----------------------------------------------------------------------------------------------------------------------  Results from last 7 days   Lab Units 12/20/21 0143 12/19/21 0115 12/18/21 0137 12/17/21  1409 12/16/21  0838   WBC 10*3/mm3 8.58 9.38 8.97   < >  --    HEMOGLOBIN g/dL 10.7* 10.8* 10.3*   < >  --    HEMATOCRIT % 34.0 33.5* 32.1*   < >  --    MCV fL 89.0 87.7 87.5   < >  --    MCHC g/dL 31.5 32.2 32.1   < >  --    PLATELETS 10*3/mm3 298 290 259   < >  --    INR   --   --   --   --  1.09    < > = values in this interval not displayed.         Results from last 7 days   Lab Units 12/20/21 0143 12/19/21 0115 12/18/21  1815 12/18/21  0137 12/18/21  0137 12/17/21  0457 12/16/21  2213 12/16/21  0430 12/16/21  0430   SODIUM mmol/L 139 141  --   --  139  --   --    < > 141   POTASSIUM mmol/L 3.9 3.5 3.8   < > 3.5  --    < >   < > 2.8*   MAGNESIUM mg/dL  --   --   --   --   --  2.0  --   --  1.5*   CHLORIDE mmol/L 109* 108*  --   --  109*  --   --    < > 109*   CO2 mmol/L 20.8* 21.2*  --   --  20.8*  --   --    < > 19.9*   BUN mg/dL 6* 7*  --   --  8  --   --    < > 5*   CREATININE mg/dL 0.50* 0.48*  --   --  0.42*  --   --    < > 0.42*   EGFR IF NONAFRICN AM mL/min/1.73 124 130  --   --  >150  --   --    < > >150   CALCIUM mg/dL 8.5* 8.2*  --   --  8.0*  --   --    < > 7.9*   GLUCOSE mg/dL 97 144*  --   --  93  --   --    < > 110*   ALBUMIN g/dL  --   --   --   --  2.05*  --   --   --  1.89*   BILIRUBIN mg/dL  --   --   --   --  0.3  --   --   --  0.4   ALK PHOS U/L  --   --   --   --  90  --   --   --  90   AST (SGOT) U/L  --   --   --   --  33*  --   --   --  23   ALT (SGPT) U/L  --   --   --   --  18  --   --   --  16    < > = values in this interval not displayed.   Estimated Creatinine Clearance: 80.4 mL/min (A) (by C-G formula based on SCr of 0.5 mg/dL (L)).  No  results found for: AMMONIA              No results found for: HGBA1C, POCGLU  Lab Results   Component Value Date    TSH 1.900 09/16/2020    FREET4 1.31 09/16/2020     No results found for: PREGTESTUR, PREGSERUM, HCG, HCGQUANT  Pain Management Panel    There is no flowsheet data to display.       Brief Urine Lab Results  (Last result in the past 365 days)      Color   Clarity   Blood   Leuk Est   Nitrite   Protein   CREAT   Urine HCG        12/15/21 0837 Yellow   Clear   Negative   Small (1+)   Negative   Negative               Blood Culture   Date Value Ref Range Status   12/14/2021 No growth at 5 days  Final   12/14/2021 No growth at 5 days  Final     Urine Culture   Date Value Ref Range Status   12/15/2021 No growth  Final     No results found for: WOUNDCX  No results found for: STOOLCX  No results found for: RESPCX  No results found for: AFBCX      I have personally looked at the labs and they are summarized above.  ----------------------------------------------------------------------------------------------------------------------  Detailed radiology reports for the last 24 hours:  Imaging Results (Last 24 Hours)     ** No results found for the last 24 hours. **        Assessment & Plan    #Sepsis 2/2 RLL pneumonia   #Klebsiella pneumonia bacteremia  #Liver Abccess   - CT chest revealed RLL consolidation   - 1/2 blood cultures on admission revealing Klebsiella pneumonia on BCID, repeat 12/14 NGTD  - CT abdomen/pelvis with lesion that could be mass or abscess per radiology in setting of purulent material being drained   - Liver Bx results c/w acute on chronic inflammation and necrotic debris c/w abscess, negative for malignant cells, culture growing Klebsiella sp as well  - ID consulted; Followed though signed off 12/18  - Continue Cefdinir & Flagyl as previously on though re-consulted ID as likely will need longer course of Abx w/ liver abscess     #Lung nodules  #R axillary lymphadenopathy   - Concerning for  malignancy. Pulm consulted and rec'd not a candidate for lung biopsy, rec'd repeat CT chest in 4-6 weeks.   - Oncology consulted. Tumor markers sent and unremarkable.   - CT abdomen/pelvis w/ IV contrast repeat obtained and ill defined liver lesion again noted.      #HTN  - On hypotensive side, will hold any antihypertensives at this time.      #Elevated alk phos  #Nonspecific gallbladder wall thickening and questionable pericholecystic fluid  - Nonspecific findings. Biliribuin and other LFTs wnl.   - Patient does not some generalized abdominal discomfort that is postprandial for the last week.   - HIDA scan normal Suspect abdominal pain from above liver lesion.      #Hx of cervical cancer  #Hx of breast cancer   #Hx of melanoma   - Patient reports she is in remission   - Follows with Dr. Stone      #Hypokalemia  - Replacement ordered      F: PO  E: Replace as needed   N Regular      Code status: Full      Dispo: Pending clinical improvement and plan for Abx.     VTE Prophylaxis:   Mechanical Order History:     None      Pharmalogical Order History:      Ordered     Dose Route Frequency Stop    12/12/21 9427  heparin (porcine) 5000 UNIT/ML injection 5,000 Units         5,000 Units SC Every 12 Hours Scheduled --              Alberto Sterling MD  [facility]  12/20/21  16:47 EST

## 2021-12-20 NOTE — PLAN OF CARE
Goal Outcome Evaluation:         Pt has had a non eventful day. Ambulated in [place] several times today with no complaints. Biopsy result came back that showed abscess and ID was consulted. Will continue to monitor and follow plan of care.

## 2021-12-20 NOTE — CASE MANAGEMENT/SOCIAL WORK
Discharge Planning Assessment  SHAQUILLE Hugo     Patient Name: Yadira Burgos  MRN: [id number]  Today's Date: 12/20/2021    Admit Date: 12/12/2021     Discharge Needs Assessment    No documentation.                Discharge Plan     Row Name 12/20/21 1232       Plan    Plan CM spoke with pt via phone on this date. No changes noted in dc plans. Pt reports she is feeling improvement with each day. She seems in good spirits. Pt lives at home wtih mother & sister and plans to return back home at dc. Pt utilizes no DME or HH services. Pt reports having supportive sisters that live nearby. Family member to provide transportation home at dc. CM will cont. to follow & assist as needed.              Continued Care and Services - Admitted Since 12/12/2021    Coordination has not been started for this encounter.       Expected Discharge Date and Time     Expected Discharge Date Expected Discharge Time    Dec 19, 2021          Demographic Summary    No documentation.                Functional Status    No documentation.                Psychosocial    No documentation.                Abuse/Neglect    No documentation.                Legal    No documentation.                Substance Abuse    No documentation.                Patient Forms    No documentation.                   Bozena Hdz RN

## 2021-12-20 NOTE — PROGRESS NOTES
Date:  12/20/21    CC: Follow up liver biopsy    HPI: Ms. Burgos is sitting up in bedside chair. She reports that she is feeling much better today and is ready to go home. She is no longer having weakness and her fatigue has improved. Denies any fever/chills. Denies any other specific complaints.    Review of Systems  A comprehensive 14 point review of systems was performed.  Significant findings as mentioned above.  All other systems reviewed and are negative.   Objective     Vital Signs  Vitals:    12/20/21 1354   BP: 91/60   Pulse: 100   Resp: 20   Temp: 97.5 °F (36.4 °C)   SpO2: 95%        Physical Exam:  General: Well developed, well nourished, alert and oriented x 3, in no acute distress.   Head: ATNC   Eyes: PERRL, No evidence of conjunctivitis.   Nose: No nasal discharge.   Mouth: Oral mucosal membranes moist. No oral ulceration or hemorrhages.   Neck: Neck supple. No thyromegaly. No JVD.   Lungs: Clear in all fields to A&P without rales, rhonchi or wheezing.   Heart: S1, S2. Regular rate and rhythm. No murmurs, rubs, or gallops.   Abdomen: Soft. Bowel sounds are normoactive. Nontender with palpation. No Hepatosplenomegaly can be appreciated.   Extremities: No clubbing, cyanosis or edema bilaterally.    Integumentary:Warm, dry, intact.  Neurologic: Grossly non-focal exam.     Labs / Studies:  Lab Results   Component Value Date    WBC 8.58 12/20/2021    HGB 10.7 (L) 12/20/2021    HCT 34.0 12/20/2021    MCV 89.0 12/20/2021    RDW 14.5 12/20/2021     12/20/2021    NEUTRORELPCT 62.1 12/20/2021    LYMPHORELPCT 27.0 12/20/2021    MONORELPCT 8.4 12/20/2021    EOSRELPCT 1.0 12/20/2021    BASORELPCT 0.3 12/20/2021    NEUTROABS 5.32 12/20/2021    LYMPHSABS 2.32 12/20/2021       Lab Results   Component Value Date     12/20/2021    K 3.9 12/20/2021    CO2 20.8 (L) 12/20/2021     (H) 12/20/2021    BUN 6 (L) 12/20/2021    CREATININE 0.50 (L) 12/20/2021    EGFRIFNONA 124 12/20/2021    GLUCOSE 97  12/20/2021    CALCIUM 8.5 (L) 12/20/2021    ALKPHOS 90 12/18/2021    AST 33 (H) 12/18/2021    ALT 18 12/18/2021    BILITOT 0.3 12/18/2021    ALBUMIN 2.05 (L) 12/18/2021    PROTEINTOT 4.8 (L) 12/18/2021    MG 2.0 12/17/2021         Lab Results   Component Value Date    FERRITIN 617.30 (H) 12/14/2021    IRON 38 12/14/2021    TIBC 153 (L) 12/14/2021    LABIRON 25 12/14/2021    [id number] 1,233 (H) 12/14/2021    FOLATE 14.40 12/14/2021       Imaging:  Adult Transthoracic Echo Complete w/ Color, Spectral and Contrast if necessary per protocol    Result Date: 12/13/2021  · Normal left ventricular cavity size and wall thickness noted. All left ventricular wall segments contract normally. · Left ventricular ejection fraction appears to be 61 - 65%. · The aortic valve is structurally normal with no regurgitation or stenosis present. · The mitral valve is structurally normal with no significant stenosis present. Mild mitral valve regurgitation is present. · There is no evidence of pericardial effusion.      CT Abdomen Pelvis Without Contrast    Result Date: 12/12/2021  PROCEDURE: CT Abdomen Pelvis WO, CT Chest WO INDICATIONS: diarrhea COMPARISON: 12/15/2020 Difficulty eating/elevated wbc/diarrhea TECHNIQUE: CT of the chest, abdomen and pelvis are performed without oral or IV contrast. Multiplanar reformations were performed.  No oral contrast utilized  Radiation dose reduction techniques included automated exposure control or exposure modulation based on body size. Count of known CT and cardiac nuc med studies performed in previous 12 months: 3. FINDINGS: Lack of IV contrast hinders parenchymal assessment of the mediastinum, liver, spleen, pancreas, adrenal glands and kidneys. Significant infiltrate seen in the right lower lobe with interstitial component. There are also lung nodules present in the right lung. Index nodule in the right lung base medially is 5 mm maximal diameter. Other smaller nodules seen throughout the  right lung. Tiny 3 mm lingular nodule seen in series 3 image 30. A benign calcified granuloma seen in the left lower lobe but a noncalcified indeterminant  5 mm left lower lobe nodule seen, series 3 image 42. Nonenlarged mediastinal adenopathy seen. However there is an enlarged right axillary 1.8 x 2.6 cm lymph node partially seen. Worrisome hepatic metastasis or primary hepatic lesion seen measuring 4.4 x 4.1 cm in liver segment 8. Margins are ill-defined. Abscess could have a similar appearance. Suggestion of subtle gallstones. Nonobstructing 2 mm right renal calculus seen. No obstructive uropathy. Gaseous distention of the esophagus and hiatal hernia present consistent with GERD. No enteric contrast but no gross evidence of bowel obstruction.   There is no gross free air, free fluid or focal inflammatory change.  Uterus and adnexa are not enlarged. Osseous structures are grossly intact.     Constellation of findings with significant consolidation in the right lower lobe, enlarged right axillary lymph node, indeterminate bilateral lung nodules and ill-defined hepatic lesion concerning for underlying malignancy in this patient . Signer Name: Ximena Delgado MD  Signed: 12/12/2021 6:44 PM  Workstation Name: [facility]  [facility]    CT Head Without Contrast    Result Date: 12/12/2021  PROCEDURE: CT Head WO COMPARISON: No relevant comparison or correlation studies available at time of dictation.  INDICATIONS: generalized weakness Radiation dose reduction techniques included automated exposure control or exposure modulation based on body size.  Count of known CT and cardiac nuc med studies performed in previous 12 months: 2.  FINDINGS:  CT examination of the brain is performed without IV contrast. There is no evidence of acute intracranial hemorrhage, mass effect or midline shift. No intra-axial or extra-axial fluid collections. The ventricular system is normal.  The calvarium is intact.      No acute intracranial process.    Signer Name: Ximena Delgado MD  Signed: 12/12/2021 6:07 PM  Workstation Name: [facility]-  [facility]    CT Chest Without Contrast Diagnostic    Result Date: 12/12/2021  PROCEDURE: CT Abdomen Pelvis WO, CT Chest WO INDICATIONS: diarrhea COMPARISON: 12/15/2020 Difficulty eating/elevated wbc/diarrhea TECHNIQUE: CT of the chest, abdomen and pelvis are performed without oral or IV contrast. Multiplanar reformations were performed.  No oral contrast utilized  Radiation dose reduction techniques included automated exposure control or exposure modulation based on body size. Count of known CT and cardiac nuc med studies performed in previous 12 months: 3. FINDINGS: Lack of IV contrast hinders parenchymal assessment of the mediastinum, liver, spleen, pancreas, adrenal glands and kidneys. Significant infiltrate seen in the right lower lobe with interstitial component. There are also lung nodules present in the right lung. Index nodule in the right lung base medially is 5 mm maximal diameter. Other smaller nodules seen throughout the right lung. Tiny 3 mm lingular nodule seen in series 3 image 30. A benign calcified granuloma seen in the left lower lobe but a noncalcified indeterminant  5 mm left lower lobe nodule seen, series 3 image 42. Nonenlarged mediastinal adenopathy seen. However there is an enlarged right axillary 1.8 x 2.6 cm lymph node partially seen. Worrisome hepatic metastasis or primary hepatic lesion seen measuring 4.4 x 4.1 cm in liver segment 8. Margins are ill-defined. Abscess could have a similar appearance. Suggestion of subtle gallstones. Nonobstructing 2 mm right renal calculus seen. No obstructive uropathy. Gaseous distention of the esophagus and hiatal hernia present consistent with GERD. No enteric contrast but no gross evidence of bowel obstruction.   There is no gross free air, free fluid or focal inflammatory change.  Uterus and adnexa  are not enlarged. Osseous structures are grossly intact.     Constellation of findings with significant consolidation in the right lower lobe, enlarged right axillary lymph node, indeterminate bilateral lung nodules and ill-defined hepatic lesion concerning for underlying malignancy in this patient . Signer Name: Ximena Delgado MD  Signed: 12/12/2021 6:44 PM  Workstation Name: [facility]-  [facility]    CT Chest With Contrast Diagnostic    Result Date: 12/15/2021  EXAM:   CT Chest With Intravenous Contrast  EXAM DATE:   12/15/2021 11:22 AM  CLINICAL HISTORY:   lung nodules, liver lesion-history of breast, cervical cancer, melanoma; I95.9-Hypotension, unspecified; J18.9-Pneumonia, unspecified organism; R16.0-Hepatomegaly, not elsewhere classified  TECHNIQUE:   Axial computed tomography images of the chest with intravenous contrast.  Sagittal and coronal reformatted images were created and reviewed.  This CT exam was performed using one or more of the following dose reduction techniques:  automated exposure control, adjustment of the mA and/or kV according to patient size, and/or use of iterative reconstruction technique.  COMPARISON:   12/12/2021  FINDINGS:   LUNGS:  Increased density of the now right lower lobe mixed groundglass and more solid-appearing consolidation with internal cavitary component identified. Tiny right and left lung pulmonary nodules. Grossly stable.   PLEURAL SPACE:  Small bilateral pleural effusions.  No pneumothorax.   HEART:  Unremarkable.  No cardiomegaly.  No significant pericardial effusion.   BONES/JOINTS:  Unremarkable.  No acute fracture.  No dislocation.   SOFT TISSUES:  Unremarkable.   VASCULATURE:  Unremarkable.  No thoracic aortic aneurysm.   LYMPH NODES:  Unremarkable.  No enlarged lymph nodes.      1.  Increased density of the now right lower lobe mixed groundglass and more solid-appearing consolidation with internal cavitary component identified. Tiny  right and left lung pulmonary nodules. Grossly stable. 2.  Small bilateral pleural effusions.  This report was finalized on 12/15/2021 12:23 PM by Dr. Cristian Robison MD.      CT Abdomen With Contrast    Result Date: 12/18/2021  CT Abdomen W INDICATION: Abdominal mass. Pneumonia. TECHNIQUE: CT of the abdomen with IV contrast. Coronal and sagittal reconstructions were obtained.  Radiation dose reduction techniques included automated exposure control or exposure modulation based on body size. Count of known CT and cardiac nuc med studies performed in previous 12 months: 6. COMPARISON: CT abdomen pelvis 12/14/2021, 12/12/2021, and 12/15/2020 FINDINGS: Partially imaged thick-walled cavitary lesion in the right lower lobe with right basilar atelectasis/infiltrate and small right pleural effusion. There is also left basilar atelectasis/infiltrate and trace left pleural effusion. Multiple noncalcified pulmonary nodules are scattered throughout the visualized lower lung fields, concerning for pulmonary metastatic disease. Ill-defined right hepatic mass again noted. The spleen, pancreas, and both adrenal glands are within expected limits. Cholelithiasis. Punctate nonobstructing right intrarenal stone. Tiny left renal cyst. Kidneys are otherwise unremarkable without hydronephrosis. Abdominal aorta normal in course and caliber without dissection. No pathologic retroperitoneal or mesenteric lymphadenopathy by size criteria. Visualized small bowel and colon are unremarkable. No bowel obstruction. No free fluid or free air. No aggressive osseous lesions.     1. Ill-defined right hepatic mass again noted. This has been previously biopsied and correlation with pathology results is recommended. 2. Cholelithiasis. 3. Punctate nonobstructing right intrarenal stone. 4. No threshold abdominal lymphadenopathy or other suspicious abdominal masses. 5. Partially imaged thick-walled cavitary lesion in the right lower lobe. This may be of  infectious or neoplastic etiology and continued follow-up is recommended. 6. Small bilateral pleural effusions with bibasilar atelectasis/infiltrate, worse on the right than left. 7. Numerous small noncalcified pulmonary nodules throughout the visualized lower lung fields, concerning for pulmonary metastatic disease. Signer Name: Neal Bob MD  Signed: 12/18/2021 12:06 PM  Workstation Name: [email or web address]-  [facility] Hepatobiliary Without CCK    Result Date: 12/18/2021  NM Hepatobiliary Duct System INDICATION: Abdominal pain with prior liver biopsy 24 hours ago. Patient states infection around liver. Written orders states post cholecystectomy, however the patient indicates that the gallbladder has not been removed. DOSE: *  Standard dose mCi Tc99m labeled Choletec. Gallbladder ejection fraction was calculated after the patient ingested a fatty meal. COMPARISON: CT abdomen and pelvis 12/18/2021 FINDINGS: Or is prompt visualization of tracer in the bile ducts and a distended gallbladder at 20 minutes, and there is increasing gallbladder and small bowel activity at 40 minutes and 60 minutes. There is no evidence of cholecystitis or bile duct obstruction. Normal gallbladder contraction is demonstrated during CCK infusion. Ejection fraction measures 82% % (normal gallbladder EF measures greater than 35% using this protocol).     1. Normal hepatobiliary scan. No evidence of cholecystitis or bile duct obstruction. The gallbladder is distended 2. Normal gallbladder contraction with CCK. Ejection fraction measures 82%%. Signer Name: Nicolasa Deras MD  Signed: 12/18/2021 3:29 PM  Workstation Name: [id number]  [facility] Bone Scan Whole Body    Result Date: 12/15/2021  EXAM:   NM Bone Scan  EXAM DATE:   12/15/2021 9:40 AM  CLINICAL HISTORY:   lung nodules, liver lesion-history of breast, cervical cancer, melanoma; I95.9-Hypotension, unspecified;  J18.9-Pneumonia, unspecified organism; R16.0-Hepatomegaly, not elsewhere classified  TECHNIQUE:   Anterior and posterior images of the whole body were obtained following the intravenous administration of Tc99m MDP.  COMPARISON:   No relevant prior studies available.  FINDINGS:   SKULL/FACIAL BONES:  No focal increased or decreased uptake.   SPINE:  Unremarkable.  No abnormal increased or decreased uptake.   RIBS:  Unremarkable.  No abnormal uptake.   LONG BONES:  Unremarkable.  No abnormal uptake.   PELVIS:  Unremarkable.  No focal increased or decreased uptake.   JOINTS:  Unremarkable.  No focal increased or decreased uptake.   SOFT TISSUES:  Unremarkable.   RENAL/BLADDER:  Within normal limits.        Normal bone scan.  This report was finalized on 12/15/2021 2:37 PM by Dr. Cristian Robison MD.      US Abdomen Complete    Result Date: 12/12/2021  PROCEDURE: US Abdomen Complete COMPARISON: CT abdomen and pelvis same day INDICATIONS: abdominal pain; Hypotension, unspecified; Pneumonia, unspecified organism; Hepatomegaly, not elsewhere classified elevated white count abnormal CT TECHNIQUE:  Transverse and longitudinal images of the abdomen were obtained using real-time grayscale and color ultrasound. FINDINGS:  The visualized portions of the pancreas, IVC and aorta appear normal.    The liver is moderately coarsened in echotexture. Ill-defined hypodensity in the liver measuring 3 x 3.2 x 2.6 cm corresponds to recent CT. Unclear if this is a malignant lesion. Abscesses within the differential but prior CT was performed without any contrast. The common bile duct is not dilated.. Gallbladder wall is thickened at 3.4 mm with trace pericholecystic fluid and subtle hyperemia. Gallstones and shadowing present. The kidneys are normal in size and echogenicity. There is no   hydronephrosis or focal solid lesion. The spleen is normal in size and echotexture.     1. Nonspecific gallbladder wall thickening and questionable  pericholecystic fluid. Gallstones are better seen on CT than ultrasound. 2. Heterogeneous liver with lesion in the liver as seen on CT. Ultrasound is not helpful in determining etiology. This could be an abscess but given the other findings on CT,  malignancy is not excluded.  Signer Name: Ximena Delgado MD  Signed: 12/12/2021 8:53 PM  Workstation Name: [facility]-  [facility]    CT Abdomen Pelvis With Contrast    Result Date: 12/15/2021  EXAM:   CT Abdomen and Pelvis With Intravenous Contrast  EXAM DATE:   12/15/2021 11:23 AM  CLINICAL HISTORY:   lung nodules, liver lesion-history of breast, cervical cancer, melanoma; I95.9-Hypotension, unspecified; J18.9-Pneumonia, unspecified organism; R16.0-Hepatomegaly, not elsewhere classified  TECHNIQUE:   Axial computed tomography images of the abdomen and pelvis with intravenous contrast.  Sagittal and coronal reformatted images were created and reviewed.  This CT exam was performed using one or more of the following dose reduction techniques:  automated exposure control, adjustment of the mA and/or kV according to patient size, and/or use of iterative reconstruction technique.  COMPARISON:   12/12/2021  FINDINGS:   LUNG BASES:  Unremarkable.  No mass.  No consolidation.   ABDOMEN:   LIVER:  Right lobe of liver lesion is again identified now measuring about 4.7 cm and was about 4.7 cm.   GALLBLADDER AND BILE DUCTS:  Gallstones noted in the gallbladder.  No ductal dilation.   PANCREAS:  Unremarkable.  No mass.  No ductal dilation.   SPLEEN:  Unremarkable.  No splenomegaly.   ADRENALS:  Unremarkable.  No mass.   KIDNEYS AND URETERS:  Unremarkable.  No solid mass.  No hydronephrosis.   STOMACH AND BOWEL:  Unremarkable.  No obstruction.  No mucosal thickening.   PELVIS:   APPENDIX:  No findings to suggest acute appendicitis.   BLADDER:  Unremarkable.  No mass.   REPRODUCTIVE:  Unremarkable as visualized.   ABDOMEN and PELVIS:   INTRAPERITONEAL SPACE:   Unremarkable.  No free air.  No significant fluid collection.   BONES/JOINTS:  No acute fracture.  No dislocation.   SOFT TISSUES:  Unremarkable.   VASCULATURE:  Unremarkable.  No abdominal aortic aneurysm.   LYMPH NODES:  Unremarkable.  No enlarged lymph nodes.        Right lobe of liver lesion is again identified now measuring about 4.7 cm and was about 4.7 cm.  This report was finalized on 12/15/2021 12:25 PM by Dr. Cristian Robison MD.      XR Chest 1 View    Result Date: 12/12/2021  PROCEDURE: CR Chest 1 Vw COMPARISON:  CT chest 12/15/2020 INDICATIONS: Acute emergency room presentation with pain and weakness TECHNIQUE: Single AP  view of the chest FINDINGS:  Cardiomediastinal silhouette is within normal limits given projection. Elevated right hemidiaphragm with patchy infiltrates in the right lung. No lobar collapse. Left lung relatively clear. Osseous structures are intact.     Patchy infiltrates right lung  Signer Name: Ximena Delgado MD  Signed: 12/12/2021 5:36 PM  Workstation Name: [facility]-  [facility] [place]    CT Needle Biopsy Liver    Result Date: 12/16/2021  EXAM:   IR Lung Biopsy, Computed Tomography-Guided  EXAM DATE:   12/16/2021 11:04 AM  CLINICAL HISTORY:   liver lesion; K76.9-Liver disease, unspecified  TECHNIQUE:   Computed Tomography-guided percutaneous lung biopsy.  COMPARISON:   No relevant prior studies available.  FINDINGS:   CONSENT:  The risks, benefits and alternatives to CT-guided lung biopsy were explained to the patient who understood and elected to proceed.  The risks discussed included but were not limited to pneumothorax, bleeding, hemoptysis, infection and pain.  Written consent was obtained.   TIME-OUT:  A time-out was performed immediately prior to the procedure to confirm the patient's identity, procedure and site/side of procedure.   PRE-PROCEDURE:  Patient was prepped and draped in a sterile fashion.   PROCEDURE:  See below.    SPECIMEN:  Using an 18-gauge  core biopsy needle, 2 samples were obtained of the wall of a right lobe of liver 3 cm low-attenuation lesion. Along with some solid tissue necrotic components were obtained. Aspiration attempt yielded about 25 mL of purulent fluid that was sent for laboratory analysis. The lesion was too small to safely deploy a locking pigtail drain at this time.   COMPLICATIONS:  None.   POST-PROCEDURE:  Patient was discharged to the post-procedure recovery area.        Using an 18-gauge core biopsy needle, 2 samples were obtained of the wall of a right lobe of liver 3 cm low-attenuation lesion. Along with some solid tissue necrotic components were obtained. Aspiration attempt yielded about 25 mL of purulent fluid that was sent for laboratory analysis. The lesion was too small to safely deploy a locking pigtail drain at this time.  This report was finalized on 12/16/2021 12:17 PM by Dr. Cristian Robison MD.        Assessment & Plan:  Yadira Burgos is a 66 y.o. year-old female with     1. Bilateral lung nodules, right axillary LN and hepatic lesion concerning for metastatic disease:  - Please see Dr. Stone' most recent follow up note from 1/18/21 and HPI above for full details in regards to her oncology history (cervical, breast and malignant melanoma).  - CT CAP without contrast was done and shows significant consolidation in the RLL, enlarged right axillary LN, bilateral lung nodules and ill defined hepatic lesion concerning for metastatic disease. CT head was unremarkable.   - She had repeat mammogram in January 2021 which was without cause for concern and repeat mammogram is recommended in January 2022. She had colonoscopy done in December 2020 with Dr. Maldonado and had 8 hyperplastic polyps in the distal rectum as well as diverticulosis.  Repeat c-scope recommended after 5 years. She has continued to follow closely with dermatology every 6 months, most recently ~ three weeks ago and had a lesion removed from her face but is not  aware of results.  - Obtained repeat CT CAP with contrast which showed increased density of right lower lobe mixed groundglass and more solid appearing consolidation with internal cavitary component, tiny right and left lung pulmonary nodules which are grossly stable, small bilateral pleural effusions and right lobe liver lesion again identified measuring 4.7 cm. Bone scan was normal.  - Also obtained CA 15-3, CA 27-29,  AFP and CEA which were normal.  was mildly elevated will repeat as an outpatient.   - She underwent CT guided liver biopsy on 12/16/2021. Pathology was negative for malignant cells and was most consistent with abscess. She is currently on Cefdinir and Flagyl and ID has been re-consulted for duration of antibiotics.   - She will also need repeat mammogram which is scheduled as outpatient for 01/13/2021.  - She will need outpatient follow up with Dr. Stone with repeat imaging prior to follow up. However, would allow patient adequate duration of antibiotic therapy before obtaining repeat imaging.      2. Anemia  3. Thrombocytopenia:  - Obtained additional labs including Iron panel and ferritin which is most consistent with anemia of chronic inflammation. B12 and folate were replete.   - Hg ~10.7 today which is stable. Platelets have normalized.         Electronically signed by: TENNILLE Anthony , December 20, 2021 16:14 EST

## 2021-12-21 LAB
BACTERIA SPEC ANAEROBE CULT: NORMAL
SARS-COV-2 RNA RESP QL NAA+PROBE: NOT DETECTED

## 2021-12-21 PROCEDURE — 25010000002 HEPARIN (PORCINE) PER 1000 UNITS: Performed by: HOSPITALIST

## 2021-12-21 PROCEDURE — 99222 1ST HOSP IP/OBS MODERATE 55: CPT | Performed by: INTERNAL MEDICINE

## 2021-12-21 PROCEDURE — U0005 INFEC AGEN DETEC AMPLI PROBE: HCPCS | Performed by: NURSE PRACTITIONER

## 2021-12-21 PROCEDURE — U0003 INFECTIOUS AGENT DETECTION BY NUCLEIC ACID (DNA OR RNA); SEVERE ACUTE RESPIRATORY SYNDROME CORONAVIRUS 2 (SARS-COV-2) (CORONAVIRUS DISEASE [COVID-19]), AMPLIFIED PROBE TECHNIQUE, MAKING USE OF HIGH THROUGHPUT TECHNOLOGIES AS DESCRIBED BY CMS-2020-01-R: HCPCS | Performed by: NURSE PRACTITIONER

## 2021-12-21 PROCEDURE — 25010000002 CEFTRIAXONE PER 250 MG: Performed by: INTERNAL MEDICINE

## 2021-12-21 PROCEDURE — 99233 SBSQ HOSP IP/OBS HIGH 50: CPT | Performed by: INTERNAL MEDICINE

## 2021-12-21 RX ADMIN — SODIUM CHLORIDE 2 G: 9 INJECTION, SOLUTION INTRAVENOUS at 21:46

## 2021-12-21 RX ADMIN — CEFDINIR 300 MG: 300 CAPSULE ORAL at 08:43

## 2021-12-21 RX ADMIN — HEPARIN SODIUM 5000 UNITS: 5000 INJECTION INTRAVENOUS; SUBCUTANEOUS at 21:46

## 2021-12-21 RX ADMIN — SODIUM CHLORIDE 2 G: 9 INJECTION, SOLUTION INTRAVENOUS at 11:56

## 2021-12-21 RX ADMIN — HEPARIN SODIUM 5000 UNITS: 5000 INJECTION INTRAVENOUS; SUBCUTANEOUS at 08:43

## 2021-12-21 RX ADMIN — SODIUM CHLORIDE, PRESERVATIVE FREE 10 ML: 5 INJECTION INTRAVENOUS at 21:47

## 2021-12-21 RX ADMIN — SODIUM CHLORIDE, PRESERVATIVE FREE 10 ML: 5 INJECTION INTRAVENOUS at 08:44

## 2021-12-21 RX ADMIN — METRONIDAZOLE 500 MG: 250 TABLET ORAL at 14:50

## 2021-12-21 RX ADMIN — METRONIDAZOLE 500 MG: 250 TABLET ORAL at 21:46

## 2021-12-21 RX ADMIN — METRONIDAZOLE 500 MG: 250 TABLET ORAL at 05:43

## 2021-12-21 NOTE — CONSULTS
Date of Admit: 12/12/2021  Date of Consult: 12/21/21  Provider, No Known        Sepsis due to pneumonia (HCC)      Assessment      1. Klebsiella pneumonia bacteremia with liver abscess, suspicious for endocarditis.  2. No known valvular disease.  3. Sepsis due to right lower lobe pneumonia, being managed by the hospitalist service.  4. Previous history of cervical cancer, breast cancer and melanoma, patient apparently is in remission.      Recommendations     I have discussed with her about the option of transesophageal echocardiographic study to rule out evidence of endocarditis.  I discussed the procedure, potential risks and benefits and alternatives.  She expressed understanding and is wanting to proceed.  We will schedule this for a.m.    Reason for consultation: Evaluation for JARRED to rule out endocarditis     Subjective       Subjective     History of Present Illness     Yadira Burgos is a 66 year old female with a past medical history significant for breast cancer 2015 s/p lumpectomy and radiation, essential hypertension, hyperlipidemia and history of melanoma. Patient presented to the ED with complaints of severe generalized weakness and chills. She states last week she was working at home when she got sudden onset chills and rigors. Also reports cough and nausea. Denies any chest pain or shortness of breath. On admission she was found to have severe sepsis secondary to right lower lobe pneumonia.  She was also found to have liver abscess. 1/2 blood cultures on admission showing Klebsiella pneumonia.  Echocardiogram showed normal LV function with mild mitral valve regurgitation and no evidence of vegetation.    Cardiac risk factors:hypertension    Last Echo: 12/13/2021  · Normal left ventricular cavity size and wall thickness noted. All left ventricular wall segments contract normally.  · Left ventricular ejection fraction appears to be 61 - 65%.  · The aortic valve is structurally normal with no  regurgitation or stenosis present.  · The mitral valve is structurally normal with no significant stenosis present. Mild mitral valve regurgitation is present.  · There is no evidence of pericardial effusion.     Past Medical History:   Diagnosis Date   • Back pain    • Basal cell carcinoma (BCC) in situ of skin 2020    Dr. Mohr - Derm   • Breast cancer (HCC)     2015   • Cancer (HCC)     breast, cervical, melanoma   • Cervical cancer (HCC) 1976    Diagnosed in 1975.  Treated by repeated local intervention and ultimately received cryotherapy at [facility] with resolution.   • Elbow fracture    • Elevated cholesterol    • Foot fracture    • Headache    • Hyperlipidemia    • Hypertension    • Melanoma (HCC)     on her back 2004 - treated by Dermatologist Catrahcito Amaro in [place], [place] with what sounds like wide local excision.    • Sinusitis      Past Surgical History:   Procedure Laterality Date   • BREAST LUMPECTOMY Right 2015   • BREAST SURGERY  2015    Secondary to cancer   • COLONOSCOPY N/A 12/9/2020    Procedure: COLONOSCOPY;  Surgeon: Mohsen Maldonado MD;  Location: [facility];  Service: Gastroenterology;  Laterality: N/A;   • ELBOW FUSION      left   • FOOT SURGERY  2014    Broken foot   • HAND SURGERY  08/2020    right   • RHINOPLASTY  2002   • SKIN CANCER EXCISION      malignant melanoma from back 2004     Family History   Problem Relation Age of Onset   • Other Mother         blood clots   • Hypertension Mother    • Ulcers Father    • Hypertension Sister    • Other Sister         Multiple Sclerosis   • Cancer Paternal Aunt    • Stroke Maternal Grandmother    • Alcohol abuse Paternal Grandmother    • Hypertension Sister    • Multiple sclerosis Sister    • Hypertension Sister    • Coronary artery disease Paternal Grandfather    • Cancer Maternal Aunt 30        colon   • Breast cancer Neg Hx      Social History     Tobacco Use   • Smoking status: Never Smoker   • Smokeless tobacco: Never Used   Vaping Use   •  Vaping Use: Never used   Substance Use Topics   • Alcohol use: Not Currently   • Drug use: Never     Medications Prior to Admission   Medication Sig Dispense Refill Last Dose   • atenolol (TENORMIN) 50 MG tablet Take 1 tablet by mouth 2 (two) times a day. 180 tablet 1 12/11/2021 at Unknown time   • atorvastatin (LIPITOR) 80 MG tablet Take 1 tablet by mouth Every Night. 90 tablet 1 12/11/2021 at Unknown time     Allergies:  Lactose intolerance (gi)    Review of Systems   Constitutional: Positive for chills and fever.   HENT: Negative for congestion and trouble swallowing.    Eyes: Negative for photophobia and visual disturbance.   Respiratory: Negative for chest tightness, shortness of breath and wheezing.    Cardiovascular: Negative for chest pain, palpitations and leg swelling.   Gastrointestinal: Positive for nausea. Negative for abdominal pain and vomiting.   Endocrine: Negative for polyphagia and polyuria.   Genitourinary: Negative for dysuria and hematuria.   Musculoskeletal: Negative for neck pain and neck stiffness.   Skin: Negative for rash and wound.   Allergic/Immunologic: Negative for food allergies and immunocompromised state.   Neurological: Positive for weakness. Negative for dizziness and syncope.   Hematological: Negative for adenopathy. Does not bruise/bleed easily.   Psychiatric/Behavioral: Negative for confusion. The patient is not nervous/anxious.        Objective       Objective      Vital Signs  Temp:  [97.5 °F (36.4 °C)-98.5 °F (36.9 °C)] 98.3 °F (36.8 °C)  Heart Rate:  [] 94  Resp:  [20] 20  BP: (91-99)/(58-60) 97/59  Vital Signs (last 72 hrs)       12/18 0700  12/19 0659 12/19 0700  12/20 0659 12/20 0700  12/21 0659 12/21 0700  12/21 1023   Most Recent      Temp (°F) 97.5 -  98.6    97.5 -  98.1    97.5 -  98.5       98.3 (36.8) 12/21 0500    Heart Rate 82 -  103    79 -  97    92 -  102       94 12/21 0500    Resp 18 -  20    16 -  20    16 -  20       20 12/21 0500    BP 94/58 -   115/72    94/59 -  98/61    91/60 -  99/59       97/59 12/21 0500    SpO2 (%) 95 -  98    93 -  96    92 -  96       92 12/21 0500        Body mass index is 30.42 kg/m².  Documented weights    12/12/21 1623 12/12/21 2218 12/13/21 0500 12/14/21 0442   Weight: 83.9 kg (185 lb) 90 kg (198 lb 8 oz) 90 kg (198 lb 8 oz) 91.8 kg (202 lb 4.8 oz)    12/15/21 0438 12/16/21 0500 12/17/21 0500 12/18/21 0613   Weight: 91.6 kg (202 lb) 93 kg (205 lb) 91.6 kg (202 lb) 92.5 kg (204 lb)    12/19/21 0500 12/21/21 0452   Weight: 89 kg (196 lb 4.8 oz) 88.1 kg (194 lb 3.2 oz)            Intake/Output Summary (Last 24 hours) at 12/21/2021 1023  Last data filed at 12/21/2021 0245  Gross per 24 hour   Intake 710 ml   Output 500 ml   Net 210 ml     Physical Exam  Constitutional:       General: She is not in acute distress.     Appearance: Normal appearance. She is well-developed and normal weight.   HENT:      Head: Normocephalic and atraumatic.   Eyes:      General: Lids are normal.      Conjunctiva/sclera: Conjunctivae normal.      Pupils: Pupils are equal, round, and reactive to light.   Neck:      Vascular: No carotid bruit or JVD.   Cardiovascular:      Rate and Rhythm: Normal rate and regular rhythm.      Pulses: Normal pulses.      Heart sounds: Normal heart sounds, S1 normal and S2 normal. No murmur heard.      Pulmonary:      Effort: Pulmonary effort is normal. No respiratory distress.      Breath sounds: Normal breath sounds. No wheezing.   Abdominal:      General: Bowel sounds are normal. There is no distension.      Palpations: Abdomen is soft. There is no hepatomegaly or splenomegaly.      Tenderness: There is no abdominal tenderness.   Musculoskeletal:         General: No swelling. Normal range of motion.      Cervical back: Normal range of motion and neck supple.      Right lower leg: No edema.      Left lower leg: No edema.   Skin:     General: Skin is warm and dry.      Coloration: Skin is not jaundiced.      Findings: No  rash.   Neurological:      General: No focal deficit present.      Mental Status: She is alert and oriented to person, place, and time. Mental status is at baseline.   Psychiatric:         Mood and Affect: Mood normal.         Speech: Speech normal.         Behavior: Behavior normal.         Thought Content: Thought content normal.         Judgment: Judgment normal.         Results review     Results Review:    I reviewed the patient's new clinical results.      Results from last 7 days   Lab Units 12/20/21  0143 12/19/21  0115 12/18/21  0137 12/17/21  1409 12/16/21  0430   WBC 10*3/mm3 8.58 9.38 8.97 8.55 9.96   HEMOGLOBIN g/dL 10.7* 10.8* 10.3* 11.4* 10.3*   PLATELETS 10*3/mm3 298 290 259 280 213     Results from last 7 days   Lab Units 12/20/21  0143 12/19/21  0115 12/18/21  1815 12/18/21  0137 12/16/21  2213 12/16/21  0430   SODIUM mmol/L 139 141  --  139  --  141   POTASSIUM mmol/L 3.9 3.5 3.8 3.5 3.8 2.8*   CHLORIDE mmol/L 109* 108*  --  109*  --  109*   CO2 mmol/L 20.8* 21.2*  --  20.8*  --  19.9*   BUN mg/dL 6* 7*  --  8  --  5*   CREATININE mg/dL 0.50* 0.48*  --  0.42*  --  0.42*   CALCIUM mg/dL 8.5* 8.2*  --  8.0*  --  7.9*   GLUCOSE mg/dL 97 144*  --  93  --  110*   ALT (SGPT) U/L  --   --   --  18  --  16   AST (SGOT) U/L  --   --   --  33*  --  23     Lab Results   Component Value Date    INR 1.09 12/16/2021    INR 1.09 12/12/2021     Lab Results   Component Value Date    MG 2.0 12/17/2021    MG 1.5 (L) 12/16/2021    MG 1.6 12/10/2021     Lab Results   Component Value Date    TSH 1.900 09/16/2020    TRIG 49 10/11/2021    HDL 62 (H) 10/11/2021    LDL 67 10/11/2021     ECG         ECG/EMG Results (last 24 hours)     ** No results found for the last 24 hours. **          Imaging Results (Last 72 Hours)     Procedure Component Value Units Date/Time    NM Hepatobiliary Without CCK [[id number]] Collected: 12/18/21 1529     Updated: 12/18/21 1531    Narrative:      NM Hepatobiliary Duct System    INDICATION:    Abdominal pain with prior liver biopsy 24 hours ago. Patient states infection around liver. Written orders states post cholecystectomy, however the patient indicates that the gallbladder has not been removed.    DOSE:  *  Standard dose mCi Tc99m labeled Choletec.  Gallbladder ejection fraction was calculated after the patient ingested a fatty meal.    COMPARISON:   CT abdomen and pelvis 12/18/2021    FINDINGS:   Or is prompt visualization of tracer in the bile ducts and a distended gallbladder at 20 minutes, and there is increasing gallbladder and small bowel activity at 40 minutes and 60 minutes. There is no evidence of cholecystitis or bile duct obstruction.    Normal gallbladder contraction is demonstrated during CCK infusion. Ejection fraction measures 82% % (normal gallbladder EF measures greater than 35% using this protocol).      Impression:      1. Normal hepatobiliary scan. No evidence of cholecystitis or bile duct obstruction. The gallbladder is distended  2. Normal gallbladder contraction with CCK. Ejection fraction measures 82%%.    Signer Name: Nicolasa Deras MD   Signed: 12/18/2021 3:29 PM   Workstation Name: [id number]    [facility]    CT Abdomen With Contrast [[id number]] Collected: 12/18/21 1206     Updated: 12/18/21 1208    Narrative:      CT Abdomen W    INDICATION:   Abdominal mass. Pneumonia.    TECHNIQUE:   CT of the abdomen with IV contrast. Coronal and sagittal reconstructions were obtained.  Radiation dose reduction techniques included automated exposure control or exposure modulation based on body size. Count of known CT and cardiac nuc med studies  performed in previous 12 months: 6.     COMPARISON:   CT abdomen pelvis 12/14/2021, 12/12/2021, and 12/15/2020    FINDINGS:  Partially imaged thick-walled cavitary lesion in the right lower lobe with right basilar atelectasis/infiltrate and small right pleural effusion. There is also left basilar  atelectasis/infiltrate and trace left pleural effusion. Multiple noncalcified  pulmonary nodules are scattered throughout the visualized lower lung fields, concerning for pulmonary metastatic disease.    Ill-defined right hepatic mass again noted. The spleen, pancreas, and both adrenal glands are within expected limits. Cholelithiasis. Punctate nonobstructing right intrarenal stone. Tiny left renal cyst. Kidneys are otherwise unremarkable without  hydronephrosis. Abdominal aorta normal in course and caliber without dissection. No pathologic retroperitoneal or mesenteric lymphadenopathy by size criteria. Visualized small bowel and colon are unremarkable. No bowel obstruction. No free fluid or free  air.    No aggressive osseous lesions.      Impression:        1. Ill-defined right hepatic mass again noted. This has been previously biopsied and correlation with pathology results is recommended.  2. Cholelithiasis.  3. Punctate nonobstructing right intrarenal stone.  4. No threshold abdominal lymphadenopathy or other suspicious abdominal masses.  5. Partially imaged thick-walled cavitary lesion in the right lower lobe. This may be of infectious or neoplastic etiology and continued follow-up is recommended.  6. Small bilateral pleural effusions with bibasilar atelectasis/infiltrate, worse on the right than left.  7. Numerous small noncalcified pulmonary nodules throughout the visualized lower lung fields, concerning for pulmonary metastatic disease.          Signer Name: Neal Bob MD   Signed: 12/18/2021 12:06 PM   Workstation Name: [id number]    [facility]          I have discussed my impression and recommendations with the patient and family.    Thank you very much for asking us to be involved in this patient's care.  We will follow along with you.      Electronically signed by TENNILLE Aden, 12/21/21, 10:23 AM EST.    Electronically signed by Kendall Rao MD, 12/21/21,  11:44 AM EST.      Please note that portions of this note were completed with a voice recognition program.

## 2021-12-21 NOTE — PLAN OF CARE
Goal Outcome Evaluation:   Pt resting in bed at this time. NPO after midnight for JARRED in AM. Spoke with radiology r/t biopsy, Radiology to discuss case with Gurmeet. Will cont to follow plan of care

## 2021-12-21 NOTE — PLAN OF CARE
Goal Outcome Evaluation:  Pt states she is very eager to go home. Pt has been pleasant with no complaints. Will continue to follow plan of care.

## 2021-12-21 NOTE — PROGRESS NOTES
PROGRESS NOTE         Patient Identification:  Name:  Yadira Burgos  Age:  66 y.o.  Sex:  female  :  1955  MRN:  [id number]  Visit Number:  [id number]  Primary Care Provider:  Dilcia Pedro MD         LOS: 9 days       ----------------------------------------------------------------------------------------------------------------------  Subjective       Chief Complaints:    Weakness - Generalized, Chills, Dizziness, and Nausea        Interval History:      Reconsult.  The patient is on room air today in no apparent distress.  Continues to have right upper quadrant pain but denies any diarrhea.  Reports resolution of cough and shortness of breath.  Afebrile, no diarrhea.  WBC on 2021 was normal. Liver fluid culture on 2021 finalized as Klebsiella pneumoniae.  Blood cultures cleared on 2021 and had remained negative since that time.  CT abdomen pelvis on 2021 showed ill-defined right hepatic mass again noted.  This has been previously biopsied and correlation with pathology results is recommended.  Cholelithiasis.  Punctate nonobstructing right intrarenal stone.  No threshold abdominal lymphadenopathy or other suspicious abdominal masses.  Partially imaged thick-walled cavitary lesion in the right lower lobe.  This may be of infectious or neoplastic etiology and continued follow-up is recommended.  Small bilateral pleural effusions with bibasilar atelectasis/infiltrate, worse on the right than left.  Numerous small noncalcified pulmonary nodules throughout the visualized lower lung fields, concerning for pulmonary metastatic disease.  HIDA scan on 2021 showed no evidence of cholecystitis or bile duct obstruction.  The gallbladder is distended.  Normal gallbladder contraction with CCK.    Review of Systems:    Constitutional: no fever, chills and night sweats. No appetite change or unexpected weight change. No fatigue.  Eyes: no eye drainage, itching or  redness.  HEENT: no mouth sores, dysphagia or nose bleed.  Respiratory:  No shortness of breath, cough, or production of sputum.  Cardiovascular: no chest pain, no palpitations, no orthopnea.  Gastrointestinal:  No diarrhea, nausea or vomiting.  No hematemesis or rectal bleeding.  Right upper quadrant abdominal pain.  Genitourinary: no dysuria or polyuria.  Hematologic/lymphatic: no lymph node abnormalities, no easy bruising or easy bleeding.  Musculoskeletal: no muscle or joint pain.  Skin: No rash and no itching.  Neurological: no loss of consciousness, no seizure, no headache.  Behavioral/Psych: no depression or suicidal ideation.  Endocrine: no hot flashes.  Immunologic: negative.    ----------------------------------------------------------------------------------------------------------------------      Objective       [facility] Meds:  cefdinir, 300 mg, Oral, Q12H  heparin (porcine), 5,000 Units, Subcutaneous, Q12H  influenza vaccine, 0.5 mL, Intramuscular, Once  metroNIDAZOLE, 500 mg, Oral, Q8H  psyllium, 1 packet, Oral, Daily  sodium chloride, 10 mL, Intravenous, Q12H         ----------------------------------------------------------------------------------------------------------------------    Vital Signs:  Temp:  [97.5 °F (36.4 °C)-98.5 °F (36.9 °C)] 98.3 °F (36.8 °C)  Heart Rate:  [] 94  Resp:  [16-20] 20  BP: (91-99)/(56-60) 97/59  Mean Arterial Pressure (Non-Invasive) for the past 24 hrs (Last 3 readings):   Noninvasive MAP (mmHg)   12/21/21 0500 75   12/21/21 0245 74   12/20/21 1839 70     SpO2 Percentage    12/20/21 1839 12/21/21 0245 12/21/21 0500   SpO2: 96% 95% 92%     SpO2:  [92 %-96 %] 92 %  on   ;   Device (Oxygen Therapy): room air    Body mass index is 30.42 kg/m².  Wt Readings from Last 3 Encounters:   12/21/21 88.1 kg (194 lb 3.2 oz)   12/10/21 88.5 kg (195 lb)   10/20/21 88.7 kg (195 lb 9.6 oz)        Intake/Output Summary (Last 24 hours) at 12/21/2021 0705  Last data filed at  12/21/2021 0245  Gross per 24 hour   Intake 810 ml   Output 500 ml   Net 310 ml     Diet Regular  ----------------------------------------------------------------------------------------------------------------------      Physical Exam:    Constitutional:  Well-developed and well-nourished.  No respiratory distress.      HENT:  Head: Normocephalic and atraumatic.  Mouth:  Moist mucous membranes.    Eyes:  Conjunctivae and EOM are normal.  No scleral icterus.  Neck:  Neck supple.  No JVD present.    Cardiovascular:  Normal rate, regular rhythm and normal heart sounds with no murmur. No edema.  Pulmonary/Chest:  No respiratory distress, no wheezes, no crackles, with normal breath sounds and good air movement.  Abdominal:  Soft.  Bowel sounds are normal.  No distention.  Tenderness to palpation of the right upper quadrant.  Musculoskeletal:  No edema, no tenderness, and no deformity.  No swelling or redness of joints.  Neurological:  Alert and oriented to person, place, and time.  No facial droop.  No slurred speech.   Skin:  Skin is warm and dry.  No rash noted.  No pallor.   Psychiatric:  Normal mood and affect.  Behavior is normal.    ----------------------------------------------------------------------------------------------------------------------            Results from last 7 days   Lab Units 12/20/21  0143 12/19/21  0115 12/18/21  0137 12/17/21  1409 12/16/21  0838   WBC 10*3/mm3 8.58 9.38 8.97   < >  --    HEMOGLOBIN g/dL 10.7* 10.8* 10.3*   < >  --    HEMATOCRIT % 34.0 33.5* 32.1*   < >  --    MCV fL 89.0 87.7 87.5   < >  --    MCHC g/dL 31.5 32.2 32.1   < >  --    PLATELETS 10*3/mm3 298 290 259   < >  --    INR   --   --   --   --  1.09    < > = values in this interval not displayed.     Results from last 7 days   Lab Units 12/20/21  0143 12/19/21  0115 12/18/21  1815 12/18/21  0137 12/18/21  0137 12/17/21  0457 12/16/21  2213 12/16/21  0430 12/16/21  0430   SODIUM mmol/L 139 141  --   --  139  --   --     < > 141   POTASSIUM mmol/L 3.9 3.5 3.8   < > 3.5  --    < >   < > 2.8*   MAGNESIUM mg/dL  --   --   --   --   --  2.0  --   --  1.5*   CHLORIDE mmol/L 109* 108*  --   --  109*  --   --    < > 109*   CO2 mmol/L 20.8* 21.2*  --   --  20.8*  --   --    < > 19.9*   BUN mg/dL 6* 7*  --   --  8  --   --    < > 5*   CREATININE mg/dL 0.50* 0.48*  --   --  0.42*  --   --    < > 0.42*   EGFR IF NONAFRICN AM mL/min/1.73 124 130  --   --  >150  --   --    < > >150   CALCIUM mg/dL 8.5* 8.2*  --   --  8.0*  --   --    < > 7.9*   GLUCOSE mg/dL 97 144*  --   --  93  --   --    < > 110*   ALBUMIN g/dL  --   --   --   --  2.05*  --   --   --  1.89*   BILIRUBIN mg/dL  --   --   --   --  0.3  --   --   --  0.4   ALK PHOS U/L  --   --   --   --  90  --   --   --  90   AST (SGOT) U/L  --   --   --   --  33*  --   --   --  23   ALT (SGPT) U/L  --   --   --   --  18  --   --   --  16    < > = values in this interval not displayed.   Estimated Creatinine Clearance: 79.9 mL/min (A) (by C-G formula based on SCr of 0.5 mg/dL (L)).  No results found for: AMMONIA    No results found for: HGBA1C, POCGLU  Lab Results   Component Value Date    HGBA1C 6.20 (H) 12/12/2021     Lab Results   Component Value Date    TSH 1.900 09/16/2020    FREET4 1.31 09/16/2020       Blood Culture   Date Value Ref Range Status   12/14/2021 No growth at 24 hours  Preliminary   12/14/2021 No growth at 24 hours  Preliminary   12/12/2021 No growth at 2 days  Preliminary   12/12/2021 Klebsiella pneumoniae ssp pneumoniae (C)  Final     No results found for: URINECX  No results found for: WOUNDCX  No results found for: STOOLCX  No results found for: RESPCX  Pain Management Panel    There is no flowsheet data to display.           ----------------------------------------------------------------------------------------------------------------------  Imaging Results (Last 24 Hours)       ** No results found for the last 24 hours. **             ----------------------------------------------------------------------------------------------------------------------    Assessment/Plan       Assessment/Plan     ASSESSMENT:    1.  Klebsiella bacteremia  2.  Liver abscess  3.  Concern for septic emboli    PLAN:    Reconsult.  The patient is on room air today in no apparent distress.  Continues to have right upper quadrant pain but denies any diarrhea.  Reports resolution of cough and shortness of breath.  Afebrile, no diarrhea.  WBC on 12/20/2021 was normal. Liver fluid culture on 12/16/2021 finalized as Klebsiella pneumoniae.  Blood cultures cleared on 12/14/2021 and had remained negative since that time.  CT abdomen pelvis on 12/18/2021 showed ill-defined right hepatic mass again noted.  This has been previously biopsied and correlation with pathology results is recommended.  Cholelithiasis.  Punctate nonobstructing right intrarenal stone.  No threshold abdominal lymphadenopathy or other suspicious abdominal masses.  Partially imaged thick-walled cavitary lesion in the right lower lobe.  This may be of infectious or neoplastic etiology and continued follow-up is recommended.  Small bilateral pleural effusions with bibasilar atelectasis/infiltrate, worse on the right than left.  Numerous small noncalcified pulmonary nodules throughout the visualized lower lung fields, concerning for pulmonary metastatic disease.  HIDA scan on 12/18/2021 showed no evidence of cholecystitis or bile duct obstruction.  The gallbladder is distended.  Normal gallbladder contraction with CCK.    Nuclear medicine bone scan on 12/15/2021 was normal.  CT abdomen pelvis on 12/15/2021 showed right lobe of liver lesion is again identified now measuring about 4.7 cm and was about 4.7 cm.  CT chest on 12/15/2021 showed increased density at the now right lower lobe mixed groundglass and more solid-appearing consolidation with internal cavitary component identified.  Tiny right and left  lung pulmonary nodules.  Grossly stable.  Small bilateral pleural effusions. Chest x-ray from 12/12/2021 reports patchy infiltrates in the right lung. CT of the chest, abdomen and pelvis reports significant consolidation in the right lower lobe, enlarged right axillary lymph node, indeterminate bilateral lung nodules and ill-defined hepatic lesion concerning for underlying malignancy. Ultrasound of the abdomen from 12/12/2021 reports nonspecific gallbladder wall thickening and questionable pericholecystic fluid, gallstones, liver lesion that is ill-defined that could represent abscess but malignancy is not excluded. Strep pneumo antigen negative. Legionella negative. Blood cultures from 12/12/2021 1 out of 2 sets positive for Klebsiella pneumoniae. COVID-19 and influenza PCR negative. Urine culture on 12/15/2021 finalized as no growth.      Discussed case with Dr. Sterling.  As there is some concern for septic emboli, recommend JARRED at this time.  If negative, Rocephin could be transitioned Omnicef.  For now, coverage was escalated to Rocephin 2 g IV every 12 hours to continue with Flagyl 500 mg p.o. every 8 hours.  Patient should continue on antibiotic course through at least 1/10/2022 with repeat CT chest and abdomen in 1 week.  We will continue to follow up on any repeat imaging and JARRED.    Code Status:   Code Status and Medical Interventions:   Ordered at: 12/12/21 1939     Level Of Support Discussed With:    Patient     Code Status (Patient has no pulse and is not breathing):    CPR (Attempt to Resuscitate)     Medical Interventions (Patient has pulse or is breathing):    Full Support     Scribed for Lupe Watson MD by FLORENCE Mota. 12/21/2021  12:18 EST    FLORENCE Mota  12/21/21  07:05 EST    Physician Attestation:    The documentation recorded by the scribe accurately reflects the service I personally performed and the decisions made by me.    Lupe Watson MD  12/22/21  07:36 EST

## 2021-12-21 NOTE — PROGRESS NOTES
[facility] HOSPITALIST PROGRESS NOTE     Patient Identification:  Name:  Yadira Burgos  Age:  66 y.o.  Sex:  female  :  1955  MRN:  [id number]  Visit Number:  [id number]  ROOM: [address] [place] [address]     Primary Care Provider:  Dilcia Pedro MD    Length of stay in inpatient status:  9    Subjective     Chief Compliant:    Chief Complaint   Patient presents with   • Weakness - Generalized   • Chills   • Dizziness   • Nausea     History of Presenting Illness:    Patient stable though now w/ abscess likely, ID re-consulted, Abx changed, now per our discussion c/f IE w/ cavitary lung lesion and pulm nodules, cards consulted and discussed JARRED w/ Dr. Rao, patient tearful at having to stay longer for further workup, pulm also consulted to re-consider bronch and biopsy though previously was not pursued due to suspected malignant nature, patient denies any fevers or chills.    Objective     Current Hospital Meds:cefTRIAXone, 2 g, Intravenous, Q12H  heparin (porcine), 5,000 Units, Subcutaneous, Q12H  influenza vaccine, 0.5 mL, Intramuscular, Once  metroNIDAZOLE, 500 mg, Oral, Q8H  psyllium, 1 packet, Oral, Daily  sodium chloride, 10 mL, Intravenous, Q12H         Current Antimicrobial Therapy:  Anti-Infectives (From admission, onward)    Ordered     Dose/Rate Route Frequency Start Stop    21 0901  cefTRIAXone (ROCEPHIN) 2 g in sodium chloride 0.9 % 100 mL IVPB-VTB        Ordering Provider: Lupe Watson MD    2 g  200 mL/hr over 30 Minutes Intravenous Every 12 Hours 21 0945 22 0959    21 2136  metroNIDAZOLE (FLAGYL) tablet 500 mg        Ordering Provider: Lupe Watson MD    500 mg Oral Every 8 Hours Scheduled 21 2200 21 2359    21 1900  cefTRIAXone (ROCEPHIN) 1 g in sodium chloride 0.9 % 100 mL IVPB-VTB        Ordering Provider: Fermin Walters PA-C    1 g  200 mL/hr over 30 Minutes Intravenous Once 21 1902 21 2157    21 1720  " piperacillin-tazobactam (ZOSYN) IVPB 3.375 g in 100 mL NS VTB        Ordering Provider: Fermin Walters PA-C    3.375 g  over 4 Hours Intravenous Once 12/12/21 1722 12/12/21 1847    12/12/21 1720  vancomycin 1750 mg/500 mL 0.9% NS IVPB ([facility])        Ordering Provider: Fermin Walters PA-C   \"And\" Linked Group Details    20 mg/kg × 83.9 kg Intravenous Once 12/12/21 1722 12/12/21 2120        Current Diuretic Therapy:  Diuretics (From admission, onward)    None        ----------------------------------------------------------------------------------------------------------------------  Vital Signs:  Temp:  [97.5 °F (36.4 °C)-98.5 °F (36.9 °C)] 97.7 °F (36.5 °C)  Heart Rate:  [] 91  Resp:  [20] 20  BP: (91-99)/(58-66) 98/66  SpO2:  [92 %-96 %] 95 %  on   ;   Device (Oxygen Therapy): room air  Body mass index is 30.42 kg/m².    Wt Readings from Last 3 Encounters:   12/21/21 88.1 kg (194 lb 3.2 oz)   12/10/21 88.5 kg (195 lb)   10/20/21 88.7 kg (195 lb 9.6 oz)     Intake & Output (last 3 days)       12/18 0701 12/19 0700 12/19 0701 12/20 0700 12/20 0701  12/21 0700 12/21 0701  12/22 0700    P.O. 960 870 810 360    Total Intake(mL/kg) 960 (10.8) 870 (9.8) 810 (9.2) 360 (4.1)    Urine (mL/kg/hr)   500 (0.2)     Total Output   500     Net +960 +870 +310 +360            Urine Unmeasured Occurrence 4 x 3 x 1 x     Stool Unmeasured Occurrence 4 x 1 x          Diet Regular  NPO Diet  ----------------------------------------------------------------------------------------------------------------------  Physical exam:  Constitutional:  Well-developed and well-nourished.  No acute distress.      HENT:  Head:  Normocephalic and atraumatic.  Mouth:  Moist mucous membranes.    Eyes:  Conjunctivae and EOM are normal. No scleral icterus.    Neck:  Neck supple.  No JVD present.    Cardiovascular:  Normal rate, regular rhythm and normal heart sounds with no murmur.  Pulmonary/Chest:  No respiratory distress, no " wheezes, on room air  Abdominal:  Soft. No distension and mild tenderness.   Musculoskeletal:  No tenderness, and no deformity.    Neurological:  Alert and oriented to person, place, and time.  No cranial nerve deficit.    Skin:  Skin is warm and dry. No rash noted. No pallor.   Peripheral vascular:  No clubbing, no cyanosis, no edema.  Psych: appropriate mood and affect though emotionally upset at new news  ----------------------------------------------------------------------------------------------------------------------  Results from last 7 days   Lab Units 12/20/21  0143 12/19/21  0115 12/18/21  0137 12/17/21  1409 12/16/21  0838   WBC 10*3/mm3 8.58 9.38 8.97   < >  --    HEMOGLOBIN g/dL 10.7* 10.8* 10.3*   < >  --    HEMATOCRIT % 34.0 33.5* 32.1*   < >  --    MCV fL 89.0 87.7 87.5   < >  --    MCHC g/dL 31.5 32.2 32.1   < >  --    PLATELETS 10*3/mm3 298 290 259   < >  --    INR   --   --   --   --  1.09    < > = values in this interval not displayed.         Results from last 7 days   Lab Units 12/20/21  0143 12/19/21  0115 12/18/21  1815 12/18/21  0137 12/18/21  0137 12/17/21  0457 12/16/21  2213 12/16/21  0430 12/16/21  0430   SODIUM mmol/L 139 141  --   --  139  --   --    < > 141   POTASSIUM mmol/L 3.9 3.5 3.8   < > 3.5  --    < >   < > 2.8*   MAGNESIUM mg/dL  --   --   --   --   --  2.0  --   --  1.5*   CHLORIDE mmol/L 109* 108*  --   --  109*  --   --    < > 109*   CO2 mmol/L 20.8* 21.2*  --   --  20.8*  --   --    < > 19.9*   BUN mg/dL 6* 7*  --   --  8  --   --    < > 5*   CREATININE mg/dL 0.50* 0.48*  --   --  0.42*  --   --    < > 0.42*   EGFR IF NONAFRICN AM mL/min/1.73 124 130  --   --  >150  --   --    < > >150   CALCIUM mg/dL 8.5* 8.2*  --   --  8.0*  --   --    < > 7.9*   GLUCOSE mg/dL 97 144*  --   --  93  --   --    < > 110*   ALBUMIN g/dL  --   --   --   --  2.05*  --   --   --  1.89*   BILIRUBIN mg/dL  --   --   --   --  0.3  --   --   --  0.4   ALK PHOS U/L  --   --   --   --  90  --   --    --  90   AST (SGOT) U/L  --   --   --   --  33*  --   --   --  23   ALT (SGPT) U/L  --   --   --   --  18  --   --   --  16    < > = values in this interval not displayed.   Estimated Creatinine Clearance: 79.9 mL/min (A) (by C-G formula based on SCr of 0.5 mg/dL (L)).  No results found for: AMMONIA              No results found for: HGBA1C, POCGLU  Lab Results   Component Value Date    TSH 1.900 09/16/2020    FREET4 1.31 09/16/2020     No results found for: PREGTESTUR, PREGSERUM, HCG, HCGQUANT  Pain Management Panel    There is no flowsheet data to display.       Brief Urine Lab Results  (Last result in the past 365 days)      Color   Clarity   Blood   Leuk Est   Nitrite   Protein   CREAT   Urine HCG        12/15/21 0837 Yellow   Clear   Negative   Small (1+)   Negative   Negative               No results found for: BLOODCX  Urine Culture   Date Value Ref Range Status   12/15/2021 No growth  Final     No results found for: WOUNDCX  No results found for: STOOLCX  No results found for: RESPCX  No results found for: AFBCX      I have personally looked at the labs and they are summarized above.  ----------------------------------------------------------------------------------------------------------------------  Detailed radiology reports for the last 24 hours:  Imaging Results (Last 24 Hours)     ** No results found for the last 24 hours. **        Assessment & Plan    #Sepsis 2/2 PNA, Bacterial, treating for Klebsiella c/b Liver Abscess, now c/f possible IE and rule out infectious cavity lung lesion  - CT Chest showed increased density RLL w/ mixed GGO's more solid appearing consolidation w/ internal cavitary component, R and L lung pulmonary nodules, small b/l pleural effusions  - CT Abd/Pelvis showed ill-defined hepatic mass, partially imaged thick-walled cavitary lesion RLL, small non-calcified pulm nodules throughout visualized lung fields  - 1/2 blood cultures on admission revealing Klebsiella pneumonia on  BCID, repeat 12/14 NGTD  - Liver Bx results c/w acute on chronic inflammation and necrotic debris c/w abscess, negative for malignant cells, culture growing Klebsiella sp as well  - ID consulted; Followed though signed off 12/18, re-consulted and now w/ c/f IE  - Cards consulted for JARRED  - Continue Flagyl, though now Abx changed to include Ceftraixone and likely will require longer course, likely need PICC line as well but await formal ID recs     #Lung nodules  #R axillary lymphadenopathy   - Concerning for malignancy. Pulm consulted and rec'd not a candidate for lung biopsy, rec'd repeat CT chest in 4-6 weeks though now give c/f infectious etiology above have re-consulted pulm for possible bronch.  - Oncology consulted; Tumor markers sent and unremarkable. Plan for outpatient f/u when infection resolved for repeat imaging.     #HTN  - On hypotensive side, will hold any antihypertensives at this time.      #Elevated alk phos  #Nonspecific gallbladder wall thickening and questionable pericholecystic fluid  - Nonspecific findings. Biliribuin and other LFTs wnl.   - Patient does not some generalized abdominal discomfort that is postprandial for the last week.   - HIDA scan normal Suspect abdominal pain from above liver lesion.      #Hx of cervical cancer  #Hx of breast cancer   #Hx of melanoma   - Patient reports she is in remission   - Follows with Dr. Stone      #Hypokalemia  - Replacement ordered      F: PO  E: Replace as needed   N Regular      Code status: Full      Dispo: Pending clinical improvement and plan for Abx.     VTE Prophylaxis:   Mechanical Order History:     None      Pharmalogical Order History:      Ordered     Dose Route Frequency Stop    12/12/21 8087  heparin (porcine) 5000 UNIT/ML injection 5,000 Units         5,000 Units SC Every 12 Hours Scheduled --              Alberto Sterling MD  [facility]  12/21/21  11:56 EST

## 2021-12-21 NOTE — CONSULTS
Reconsult.  Please refer to our most recent progress note on 12/21/2021.  Thank you.    Estefania Herndon PA-C  1218 EST  12/21/2021

## 2021-12-22 ENCOUNTER — ANESTHESIA (OUTPATIENT)
Dept: CARDIOLOGY | Facility: HOSPITAL | Age: 66
End: 2021-12-22

## 2021-12-22 ENCOUNTER — APPOINTMENT (OUTPATIENT)
Dept: CARDIOLOGY | Facility: HOSPITAL | Age: 66
End: 2021-12-22

## 2021-12-22 ENCOUNTER — READMISSION MANAGEMENT (OUTPATIENT)
Dept: CALL CENTER | Facility: HOSPITAL | Age: 66
End: 2021-12-22

## 2021-12-22 ENCOUNTER — ANESTHESIA EVENT (OUTPATIENT)
Dept: CARDIOLOGY | Facility: HOSPITAL | Age: 66
End: 2021-12-22

## 2021-12-22 VITALS
OXYGEN SATURATION: 97 % | HEIGHT: 67 IN | SYSTOLIC BLOOD PRESSURE: 100 MMHG | HEART RATE: 107 BPM | WEIGHT: 191.4 LBS | RESPIRATION RATE: 20 BRPM | DIASTOLIC BLOOD PRESSURE: 70 MMHG | BODY MASS INDEX: 30.04 KG/M2 | TEMPERATURE: 98.1 F

## 2021-12-22 LAB
ALBUMIN SERPL-MCNC: 2.5 G/DL (ref 3.5–5.2)
ALBUMIN/GLOB SERPL: 0.8 G/DL
ALP SERPL-CCNC: 82 U/L (ref 39–117)
ALT SERPL W P-5'-P-CCNC: 11 U/L (ref 1–33)
ANION GAP SERPL CALCULATED.3IONS-SCNC: 12.3 MMOL/L (ref 5–15)
AST SERPL-CCNC: 20 U/L (ref 1–32)
BASOPHILS # BLD AUTO: 0.06 10*3/MM3 (ref 0–0.2)
BASOPHILS NFR BLD AUTO: 0.8 % (ref 0–1.5)
BILIRUB SERPL-MCNC: 0.3 MG/DL (ref 0–1.2)
BUN SERPL-MCNC: 10 MG/DL (ref 8–23)
BUN/CREAT SERPL: 19.6 (ref 7–25)
BURR CELLS BLD QL SMEAR: NORMAL
CALCIUM SPEC-SCNC: 8.8 MG/DL (ref 8.6–10.5)
CHLORIDE SERPL-SCNC: 112 MMOL/L (ref 98–107)
CLUMPED PLATELETS: PRESENT
CO2 SERPL-SCNC: 17.7 MMOL/L (ref 22–29)
CREAT SERPL-MCNC: 0.51 MG/DL (ref 0.57–1)
DEPRECATED RDW RBC AUTO: 49.4 FL (ref 37–54)
EOSINOPHIL # BLD AUTO: 0.06 10*3/MM3 (ref 0–0.4)
EOSINOPHIL NFR BLD AUTO: 0.8 % (ref 0.3–6.2)
ERYTHROCYTE [DISTWIDTH] IN BLOOD BY AUTOMATED COUNT: 14.9 % (ref 12.3–15.4)
GFR SERPL CREATININE-BSD FRML MDRD: 121 ML/MIN/1.73
GLOBULIN UR ELPH-MCNC: 3.3 GM/DL
GLUCOSE SERPL-MCNC: 99 MG/DL (ref 65–99)
HCT VFR BLD AUTO: 38.1 % (ref 34–46.6)
HGB BLD-MCNC: 11.7 G/DL (ref 12–15.9)
HYPOCHROMIA BLD QL: NORMAL
IMM GRANULOCYTES # BLD AUTO: 0.03 10*3/MM3 (ref 0–0.05)
IMM GRANULOCYTES NFR BLD AUTO: 0.4 % (ref 0–0.5)
LYMPHOCYTES # BLD AUTO: 2.04 10*3/MM3 (ref 0.7–3.1)
LYMPHOCYTES NFR BLD AUTO: 25.9 % (ref 19.6–45.3)
MAXIMAL PREDICTED HEART RATE: 155 BPM
MCH RBC QN AUTO: 28.5 PG (ref 26.6–33)
MCHC RBC AUTO-ENTMCNC: 30.7 G/DL (ref 31.5–35.7)
MCV RBC AUTO: 92.7 FL (ref 79–97)
MONOCYTES # BLD AUTO: 0.6 10*3/MM3 (ref 0.1–0.9)
MONOCYTES NFR BLD AUTO: 7.6 % (ref 5–12)
NEUTROPHILS NFR BLD AUTO: 5.1 10*3/MM3 (ref 1.7–7)
NEUTROPHILS NFR BLD AUTO: 64.5 % (ref 42.7–76)
NRBC BLD AUTO-RTO: 0 /100 WBC (ref 0–0.2)
PLATELET # BLD AUTO: 237 10*3/MM3 (ref 140–450)
PMV BLD AUTO: 10.8 FL (ref 6–12)
POTASSIUM SERPL-SCNC: 4.6 MMOL/L (ref 3.5–5.2)
PROT SERPL-MCNC: 5.8 G/DL (ref 6–8.5)
RBC # BLD AUTO: 4.11 10*6/MM3 (ref 3.77–5.28)
SODIUM SERPL-SCNC: 142 MMOL/L (ref 136–145)
STRESS TARGET HR: 132 BPM
WBC NRBC COR # BLD: 7.89 10*3/MM3 (ref 3.4–10.8)

## 2021-12-22 PROCEDURE — 85025 COMPLETE CBC W/AUTO DIFF WBC: CPT | Performed by: INTERNAL MEDICINE

## 2021-12-22 PROCEDURE — 93312 ECHO TRANSESOPHAGEAL: CPT | Performed by: INTERNAL MEDICINE

## 2021-12-22 PROCEDURE — 93312 ECHO TRANSESOPHAGEAL: CPT

## 2021-12-22 PROCEDURE — 99233 SBSQ HOSP IP/OBS HIGH 50: CPT | Performed by: INTERNAL MEDICINE

## 2021-12-22 PROCEDURE — 85007 BL SMEAR W/DIFF WBC COUNT: CPT | Performed by: INTERNAL MEDICINE

## 2021-12-22 PROCEDURE — 93325 DOPPLER ECHO COLOR FLOW MAPG: CPT | Performed by: INTERNAL MEDICINE

## 2021-12-22 PROCEDURE — 25010000002 HEPARIN (PORCINE) PER 1000 UNITS: Performed by: HOSPITALIST

## 2021-12-22 PROCEDURE — 80053 COMPREHEN METABOLIC PANEL: CPT | Performed by: INTERNAL MEDICINE

## 2021-12-22 PROCEDURE — 99239 HOSP IP/OBS DSCHRG MGMT >30: CPT | Performed by: INTERNAL MEDICINE

## 2021-12-22 PROCEDURE — 25010000002 CEFTRIAXONE PER 250 MG: Performed by: INTERNAL MEDICINE

## 2021-12-22 PROCEDURE — 25010000002 PROPOFOL 10 MG/ML EMULSION: Performed by: NURSE ANESTHETIST, CERTIFIED REGISTERED

## 2021-12-22 PROCEDURE — 93325 DOPPLER ECHO COLOR FLOW MAPG: CPT

## 2021-12-22 RX ORDER — METRONIDAZOLE 500 MG/1
500 TABLET ORAL EVERY 8 HOURS SCHEDULED
Qty: 57 TABLET | Refills: 0 | Status: SHIPPED | OUTPATIENT
Start: 2021-12-22 | End: 2021-12-22

## 2021-12-22 RX ORDER — METRONIDAZOLE 500 MG/1
500 TABLET ORAL EVERY 8 HOURS SCHEDULED
Qty: 57 TABLET | Refills: 0 | Status: SHIPPED | OUTPATIENT
Start: 2021-12-22 | End: 2022-01-10

## 2021-12-22 RX ORDER — PROPOFOL 10 MG/ML
VIAL (ML) INTRAVENOUS AS NEEDED
Status: DISCONTINUED | OUTPATIENT
Start: 2021-12-22 | End: 2021-12-22 | Stop reason: SURG

## 2021-12-22 RX ORDER — CEFDINIR 300 MG/1
300 CAPSULE ORAL 2 TIMES DAILY
Qty: 38 CAPSULE | Refills: 0 | Status: SHIPPED | OUTPATIENT
Start: 2021-12-22 | End: 2022-01-10

## 2021-12-22 RX ORDER — CEFDINIR 300 MG/1
300 CAPSULE ORAL 2 TIMES DAILY
Qty: 38 CAPSULE | Refills: 0 | Status: SHIPPED | OUTPATIENT
Start: 2021-12-22 | End: 2021-12-22 | Stop reason: SDUPTHER

## 2021-12-22 RX ORDER — SODIUM CHLORIDE 9 MG/ML
INJECTION, SOLUTION INTRAVENOUS CONTINUOUS PRN
Status: DISCONTINUED | OUTPATIENT
Start: 2021-12-22 | End: 2021-12-22 | Stop reason: SURG

## 2021-12-22 RX ADMIN — PROPOFOL 50 MG: 10 INJECTION, EMULSION INTRAVENOUS at 09:07

## 2021-12-22 RX ADMIN — SODIUM CHLORIDE: 0.9 INJECTION, SOLUTION INTRAVENOUS at 09:05

## 2021-12-22 RX ADMIN — PROPOFOL 160 MCG/KG/MIN: 10 INJECTION, EMULSION INTRAVENOUS at 09:07

## 2021-12-22 RX ADMIN — SODIUM CHLORIDE 2 G: 9 INJECTION, SOLUTION INTRAVENOUS at 10:41

## 2021-12-22 RX ADMIN — HEPARIN SODIUM 5000 UNITS: 5000 INJECTION INTRAVENOUS; SUBCUTANEOUS at 10:41

## 2021-12-22 RX ADMIN — METRONIDAZOLE 500 MG: 250 TABLET ORAL at 05:15

## 2021-12-22 RX ADMIN — METRONIDAZOLE 500 MG: 250 TABLET ORAL at 15:12

## 2021-12-22 RX ADMIN — SODIUM CHLORIDE, PRESERVATIVE FREE 10 ML: 5 INJECTION INTRAVENOUS at 10:42

## 2021-12-22 NOTE — ANESTHESIA PREPROCEDURE EVALUATION
Anesthesia Evaluation     NPO Solid Status: N/A  NPO Liquid Status: > 8 hours           Airway   Mallampati: I  TM distance: >3 FB  Neck ROM: full  No difficulty expected  Dental - normal exam     Pulmonary - normal exam   (+) pneumonia improving ,   Cardiovascular - normal exam  Exercise tolerance: good (4-7 METS)    NYHA Classification: II  Rhythm: regular  Rate: normal    (+) hypertension well controlled, hyperlipidemia,       Neuro/Psych  GI/Hepatic/Renal/Endo      Musculoskeletal     (+) back pain,   Abdominal  - normal exam    Bowel sounds: normal.   Substance History      OB/GYN          Other      history of cancer remission                    Anesthesia Plan    ASA 3     general       Anesthetic plan, all risks, benefits, and alternatives have been provided, discussed and informed consent has been obtained with: patient.  Use of blood products discussed with patient .

## 2021-12-22 NOTE — CASE MANAGEMENT/SOCIAL WORK
Discharge Planning Assessment  SHAQUILLE Hugo     Patient Name: Yadira Burgos  MRN: [id number]  Today's Date: 12/22/2021    Admit Date: 12/12/2021     Discharge Needs Assessment    No documentation.                Discharge Plan     Row Name 12/22/21 1539       Plan    Final Note Orders to dc home on this date. No needs identified.    Row Name 12/22/21 1538       Plan    Plan Pt to be dc'd home on this date with oral abx. & recs for f/u appts.  She lives at home with her mother & sister and is returning home with family providing transportation. Pt denies any current needs.    Final Discharge Disposition Code 01 - home or self-care    Row Name 12/22/21 7487       Plan    Plan Comments ID on board. Underwent JARRED= neg. For vegetation.cont. Rocephin IV/flagyl po thru 1/10/22. Javier.IV could be transitioned to po Omnicef per ID recs.              Continued Care and Services - Admitted Since 12/12/2021    Coordination has not been started for this encounter.       Expected Discharge Date and Time     Expected Discharge Date Expected Discharge Time    Dec 22, 2021          Demographic Summary    No documentation.                Functional Status    No documentation.                Psychosocial    No documentation.                Abuse/Neglect    No documentation.                Legal    No documentation.                Substance Abuse    No documentation.                Patient Forms    No documentation.                   Bozena Hdz RN

## 2021-12-22 NOTE — PROGRESS NOTES
Yadira Burgos  [id number]  1955 12/22/2021    Referring Provider:   Dr. Sterling    Reason for Followup:   Liver lesion with enlarged axillary lymph node    Patient Care Team:  Dilcia Pedro MD as PCP - General (Family Medicine)  Loreto Stone MD as Consulting Physician (Oncology)  Manav Real MD (Inactive) as Consulting Physician (Gynecology)  Jack Mclaughlin MD as Consulting Physician (Gastroenterology)    Chief Complaint:  RUQ pain at biopsy    Subjective:    Patient reports that she is doing much better since admission. She denies of any significant complaints today and does report some pain at the site of liver biopsy.           Allergies:    Lactose intolerance (gi)        Outpatient Medications:  Medications Prior to Admission   Medication Sig Dispense Refill Last Dose   • atenolol (TENORMIN) 50 MG tablet Take 1 tablet by mouth 2 (two) times a day. 180 tablet 1 12/11/2021 at Unknown time   • atorvastatin (LIPITOR) 80 MG tablet Take 1 tablet by mouth Every Night. 90 tablet 1 12/11/2021 at Unknown time           Inpatient Medications:  Scheduled Meds:  cefTRIAXone, 2 g, Intravenous, Q12H  heparin (porcine), 5,000 Units, Subcutaneous, Q12H  influenza vaccine, 0.5 mL, Intramuscular, Once  metroNIDAZOLE, 500 mg, Oral, Q8H  psyllium, 1 packet, Oral, Daily  sodium chloride, 10 mL, Intravenous, Q12H            Continuous Infusions:       PRN Meds:  •  acetaminophen  •  magnesium sulfate **OR** magnesium sulfate **OR** magnesium sulfate  •  nitroglycerin  •  oxyCODONE  •  potassium chloride  •  potassium chloride  •  sodium chloride          Review of Systems:  Pertinent positives are listed as per history of present of illness, all other systems reviewed and are negative.          Physical Exam:  Vital Signs: These were reviewed and listed as per patient’s electronic medical chart  Vitals:    12/22/21 0652   BP: 94/56   Pulse: 98   Resp: 18   Temp: 97.7 °F (36.5 °C)   SpO2:  94%     General: Awake, alert and oriented, in no distress  HEENT: Head is atraumatic, normocephalic, extraocular movements full, oropharynx clear, no scleral icterus, pink moist mucous membranes  Neck: supple, no jvd, lymphadenopathy or masses  Cardiovascular: regular rate and rhythm without murmurs, rubs or gallops  Pulmonary: non-labored, clear to auscultation bilaterally, no wheezing  Abdomen: soft, non-tender, non-distended, normal active bowel sounds present, no organomegaly  Extremities: No clubbing, cyanosis or edema  Neurologic: Mental status as above, alert, awake and oriented, grossly non-focal exam  Skin: warm, dry, intact  MSK: able to move all extremities            Labs / Studies:  Lab Results (last 72 hours)     Procedure Component Value Units Date/Time    COVID PRE-OP / PRE-PROCEDURE SCREENING ORDER (NO ISOLATION) - Swab, Nasopharynx [[id number]]  (Normal) Collected: 12/21/21 1236    Specimen: Swab from Nasopharynx Updated: 12/21/21 1403    Narrative:      The following orders were created for panel order COVID PRE-OP / PRE-PROCEDURE SCREENING ORDER (NO ISOLATION) - Swab, Nasopharynx.  Procedure                               Abnormality         Status                     ---------                               -----------         ------                     COVID-19,CEPHEID/CHARISSE,CO...[[id number]]  Normal              Final result                 Please view results for these tests on the individual orders.    COVID-19,CEPHEID/CHARISSE,COR/BRAD/PAD/JEFF IN-HOUSE(OR EMERGENT/ADD-ON),NP SWAB IN TRANSPORT MEDIA 3-4 HR TAT, RT-PCR - Swab, Nasopharynx [[id number]]  (Normal) Collected: 12/21/21 1236    Specimen: Swab from Nasopharynx Updated: 12/21/21 1403     COVID19 Not Detected    Narrative:      Fact sheet for providers: https://www.fda.gov/media/577004/download     Fact sheet for patients: https://www.fda.gov/media/539304/download  Fact sheet for providers: https://www.fda.gov/media/908160/download     Fact  sheet for patients: https://www.fda.gov/media/230489/download    Anaerobic Culture - Swab, Liver [[id number]] Collected: 12/16/21 1210    Specimen: Swab from Liver Updated: 12/21/21 0615     Anaerobic Culture No anaerobes isolated at 5 days    Basic Metabolic Panel [[id number]]  (Abnormal) Collected: 12/20/21 0143    Specimen: Blood Updated: 12/20/21 0235     Glucose 97 mg/dL      BUN 6 mg/dL      Creatinine 0.50 mg/dL      Sodium 139 mmol/L      Potassium 3.9 mmol/L      Chloride 109 mmol/L      CO2 20.8 mmol/L      Calcium 8.5 mg/dL      eGFR Non African Amer 124 mL/min/1.73      BUN/Creatinine Ratio 12.0     Anion Gap 9.2 mmol/L     Narrative:      GFR Normal >60  Chronic Kidney Disease <60  Kidney Failure <15      CBC & Differential [[id number]]  (Abnormal) Collected: 12/20/21 0143    Specimen: Blood Updated: 12/20/21 0212    Narrative:      The following orders were created for panel order CBC & Differential.  Procedure                               Abnormality         Status                     ---------                               -----------         ------                     CBC Auto Differential[[id number]]        Abnormal            Final result                 Please view results for these tests on the individual orders.    CBC Auto Differential [[id number]]  (Abnormal) Collected: 12/20/21 0143    Specimen: Blood Updated: 12/20/21 0212     WBC 8.58 10*3/mm3      RBC 3.82 10*6/mm3      Hemoglobin 10.7 g/dL      Hematocrit 34.0 %      MCV 89.0 fL      MCH 28.0 pg      MCHC 31.5 g/dL      RDW 14.5 %      RDW-SD 46.4 fl      MPV 9.9 fL      Platelets 298 10*3/mm3      Neutrophil % 62.1 %      Lymphocyte % 27.0 %      Monocyte % 8.4 %      Eosinophil % 1.0 %      Basophil % 0.3 %      Immature Grans % 1.2 %      Neutrophils, Absolute 5.32 10*3/mm3      Lymphocytes, Absolute 2.32 10*3/mm3      Monocytes, Absolute 0.72 10*3/mm3      Eosinophils, Absolute 0.09 10*3/mm3      Basophils, Absolute 0.03 10*3/mm3       Immature Grans, Absolute 0.10 10*3/mm3      nRBC 0.0 /100 WBC     Body Fluid Culture - Body Fluid, Liver [[id number]]  (Abnormal)  (Susceptibility) Collected: 12/16/21 1210    Specimen: Body Fluid from Liver Updated: 12/19/21 1318     Body Fluid Culture Scant growth (1+) Klebsiella pneumoniae ssp pneumoniae     Gram Stain WBCs seen      No organisms seen    Susceptibility      Klebsiella pneumoniae ssp pneumoniae     KATE     Ampicillin Resistant     Ampicillin + Sulbactam Susceptible     Cefepime Susceptible     Ceftazidime Susceptible     Ceftriaxone Susceptible     Gentamicin Susceptible     Levofloxacin Susceptible     Piperacillin + Tazobactam Susceptible     Trimethoprim + Sulfamethoxazole Susceptible                  Linear View               Susceptibility Comments     Klebsiella pneumoniae ssp pneumoniae    Cefazolin sensitivity will not be reported for Enterobacteriaceae in non-urine isolates. If cefazolin is preferred, please call the microbiology lab to request an E-test.  With the exception of urinary-sourced infections, aminoglycosides should not be used as monotherapy.                     Imaging Results (Last 72 Hours)     ** No results found for the last 72 hours. **                Assessment & Plan:  Yadira Burgos is a 66 y.o. year-old female presenting with:     1. Bilateral lung nodules, right axillary LN and hepatic lesion concerning for metastatic disease:  - Please see Dr. Stone' most recent follow up note from 1/18/21 and HPI above for full details in regards to her oncology history (cervical, breast and malignant melanoma).  - CT CAP without contrast was done and shows significant consolidation in the RLL, enlarged right axillary LN, bilateral lung nodules and ill defined hepatic lesion concerning for metastatic disease. CT head was unremarkable.   - She had repeat mammogram in January 2021 which was without cause for concern and repeat mammogram is recommended in January 2022. She had  colonoscopy done in December 2020 with Dr. Maldonado and had 8 hyperplastic polyps in the distal rectum as well as diverticulosis.  Repeat c-scope recommended after 5 years. She has continued to follow closely with dermatology every 6 months, most recently ~ three weeks ago and had a lesion removed from her face but is not aware of results.  - Obtained repeat CT CAP with contrast which showed increased density of right lower lobe mixed groundglass and more solid appearing consolidation with internal cavitary component, tiny right and left lung pulmonary nodules which are grossly stable, small bilateral pleural effusions and right lobe liver lesion again identified measuring 4.7 cm. Bone scan was normal.  - Also obtained CA 15-3, CA 27-29,  AFP and CEA which were normal.  was mildly elevated will repeat as an outpatient.   - She underwent CT guided liver biopsy on 12/16/2021. Pathology was negative for malignant cells and was most consistent with abscess. She is currently on Cefdinir and Flagyl and ID has been re-consulted for duration of antibiotics.   - She will also need repeat mammogram which is scheduled as outpatient for 01/13/2021.  - She will need outpatient follow up with Dr. Stone with repeat imaging prior to follow up.   - Given her history of breast cancer, I remain concerned about the axillary lymph node. Discussed with radiology and they believe that she would benefit for outpatient referral to our breast center for further imaging and biopsy. She will be on antibiotics at least until 1/11/21. She already has mammogram scheduled for 1/13/21 will place biopsy order to be done after completion of antibiotics and will make a f/u appt with Dr. Stone thereafter with repeat imaging.      2. Anemia  3. Thrombocytopenia:  - Obtained additional labs including Iron panel and ferritin which is most consistent with anemia of chronic inflammation. B12 and folate were replete.   - Platelets have normalized.          Iona Oneal MD  12/22/21  08:18 EST

## 2021-12-22 NOTE — ANESTHESIA POSTPROCEDURE EVALUATION
Patient: Yadira Burgos    Procedure Summary     Date: 12/22/21 Room / Location: [facility] NONINVASIVE LAB    Anesthesia Start: 0905 Anesthesia Stop: 0917    Procedure: ADULT TRANSESOPHAGEAL ECHO (JARRED) W/ CONT IF NECESSARY PER PROTOCOL Diagnosis: (Endocarditis)    Scheduled Providers: Kendall Rao MD Provider: Riccardo King MD    Anesthesia Type: general ASA Status: 3          Anesthesia Type: general    Vitals  Vitals Value Taken Time   /64 12/22/21 1017   Temp 98.1 °F (36.7 °C) 12/22/21 1017   Pulse 84 12/22/21 1017   Resp 16 12/22/21 1017   SpO2 94 % 12/22/21 1017           Post Anesthesia Care and Evaluation    Patient location during evaluation: PHASE II  Patient participation: complete - patient participated  Level of consciousness: awake and alert  Pain score: 0  Pain management: adequate  Airway patency: patent  Anesthetic complications: No anesthetic complications    Cardiovascular status: acceptable  Respiratory status: acceptable  Hydration status: acceptable

## 2021-12-22 NOTE — CONSULTS
"    Referring Provider: dr talbot   Reason for Consultation: abnormal ct chest       Chief complaint sob      History of present illness:  Patient is a 66 y.o. female with history of right breast cancer 2015 s/p lumpectomy and radiation, multiple skin cancers (basal cell, melanoma) s/p resection years ago, cervical cancer in 1975, HLD, HTN, and \"borderline\" diabetes, who presented with complaints of severe generalized weakness. She states that earlier this week around Tuesday she completed a course of amoxicillin for treatment of sinusitis. She felt as though she had recovered from the sinusitis, but the next day she developing uncontrollable shaking which lasted several hours. This recurred the next few days. She tried to get into an urgent treatment center on Thursday and was told she had a fever but that it was too late for her to be seen and that she would need to go to the ED for further evaluation. She went to her PCP on Friday who said her glucose levels were high and she was given instructions on how to get her levels down through diet. Starting Saturday, she developed worsening generalized weakness to the point that she could not get out of bed this morning. She reports being disoriented as well. She was brought to the ED where initial evaluation revealed she was hypotensive. BP improved with IV fluid resuscitation. She states with fluids she became more alert and aware of other symptoms, including shortness of breath. Upon further questioning, she had been coughing some over the last few days and had even coughed up some sputum at one point but did not examine it. She reported some mild generalized abdominal soreness but no specific tenderness.     Pul consulted for abnormal ct chest   Images reviewed  Pathology - from liver biopsy reviewed.    Microbiology reviewed.  Review of Systems  Review of Systems -     Review of Systems - History obtained from chart review and the patient  General ROS: negative for " - chills, fatigue or fever  Psychological ROS: negative for - anxiety or depression  ENT ROS: negative for - headaches, visual changes or vocal changes  Respiratory ROS: positive for - cough and shortness of breath  Cardiovascular ROS: no chest pain or dyspnea on exertion  Gastrointestinal ROS: no abdominal pain, change in bowel habits, or black or bloody stools  Musculoskeletal ROS: negative for - joint pain, joint stiffness or joint swelling  Neurological ROS: no TIA or stroke symptoms  Heme- no bleeding  Skin- no bruises, no rash        History  Past Medical History:   Diagnosis Date   • Back pain    • Basal cell carcinoma (BCC) in situ of skin 2020    Dr. Mohr - Derm   • Breast cancer (HCC)     2015   • Cancer (HCC)     breast, cervical, melanoma   • Cervical cancer (HCC) 1976    Diagnosed in 1975.  Treated by repeated local intervention and ultimately received cryotherapy at [facility] with resolution.   • Elbow fracture    • Elevated cholesterol    • Foot fracture    • Headache    • Hyperlipidemia    • Hypertension    • Melanoma (HCC)     on her back 2004 - treated by Dermatologist Catrachito Amaro in [place], [place] with what sounds like wide local excision.    • Sinusitis    ,   Past Surgical History:   Procedure Laterality Date   • BREAST LUMPECTOMY Right 2015   • BREAST SURGERY  2015    Secondary to cancer   • COLONOSCOPY N/A 12/9/2020    Procedure: COLONOSCOPY;  Surgeon: Mohsen Maldonado MD;  Location: [facility];  Service: Gastroenterology;  Laterality: N/A;   • ELBOW FUSION      left   • FOOT SURGERY  2014    Broken foot   • HAND SURGERY  08/2020    right   • RHINOPLASTY  2002   • SKIN CANCER EXCISION      malignant melanoma from back 2004   ,   Family History   Problem Relation Age of Onset   • Other Mother         blood clots   • Hypertension Mother    • Ulcers Father    • Hypertension Sister    • Other Sister         Multiple Sclerosis   • Cancer Paternal Aunt    • Stroke Maternal Grandmother    • Alcohol  abuse Paternal Grandmother    • Hypertension Sister    • Multiple sclerosis Sister    • Hypertension Sister    • Coronary artery disease Paternal Grandfather    • Cancer Maternal Aunt 30        colon   • Breast cancer Neg Hx    ,   Social History     Tobacco Use   • Smoking status: Never Smoker   • Smokeless tobacco: Never Used   Vaping Use   • Vaping Use: Never used   Substance Use Topics   • Alcohol use: Not Currently   • Drug use: Never   ,   Medications Prior to Admission   Medication Sig Dispense Refill Last Dose   • atenolol (TENORMIN) 50 MG tablet Take 1 tablet by mouth 2 (two) times a day. 180 tablet 1 12/11/2021 at Unknown time   • atorvastatin (LIPITOR) 80 MG tablet Take 1 tablet by mouth Every Night. 90 tablet 1 12/11/2021 at Unknown time   , Scheduled Meds:  cefTRIAXone, 2 g, Intravenous, Q12H  heparin (porcine), 5,000 Units, Subcutaneous, Q12H  influenza vaccine, 0.5 mL, Intramuscular, Once  metroNIDAZOLE, 500 mg, Oral, Q8H  psyllium, 1 packet, Oral, Daily  sodium chloride, 10 mL, Intravenous, Q12H    , Continuous Infusions:    and Allergies:  Lactose intolerance (gi)    Objective     Vital Signs   Temp:  [97.6 °F (36.4 °C)-98.3 °F (36.8 °C)] 97.6 °F (36.4 °C)  Heart Rate:  [90-96] 96  Resp:  [18-20] 18  BP: ()/(57-66) 101/62    Physical Exam:             General- normal in appearance, not in any acute distress    HEENT- pupils equally reactive to light, normal in size, no scleral icterus    Neck-supple    Respiratory-respirations normal-on auscultation no wheezing no crackles,     Cardiovascular-  Normal S1 and S2. No S3, S4 or murmurs. No JVD, no carotid bruit and no edema, pulses normal bilaterally     GI-nontender nondistended bowel sounds positive    CNS-nonfocal    Musculoskeletal -no edema  Extremities- no obvious deformity noticed     Psychiatric-mood good, good eye contact, alert awake oriented  Skin- no visible rash                                                                    Results Review:    LABS:    Lab Results   Component Value Date    GLUCOSE 97 12/20/2021    BUN 6 (L) 12/20/2021    CREATININE 0.50 (L) 12/20/2021    EGFRIFNONA 124 12/20/2021    BCR 12.0 12/20/2021    CO2 20.8 (L) 12/20/2021    CALCIUM 8.5 (L) 12/20/2021    ALBUMIN 2.05 (L) 12/18/2021    AST 33 (H) 12/18/2021    ALT 18 12/18/2021    WBC 8.58 12/20/2021    HGB 10.7 (L) 12/20/2021    HCT 34.0 12/20/2021    MCV 89.0 12/20/2021     12/20/2021     12/20/2021    K 3.9 12/20/2021     (H) 12/20/2021    ANIONGAP 9.2 12/20/2021       Lab Results   Component Value Date    INR 1.09 12/16/2021    INR 1.09 12/12/2021    PROTIME 14.5 12/16/2021    PROTIME 14.5 12/12/2021       Results from last 7 days   Lab Units 12/16/21  0838   INR  1.09   APTT seconds 33.0          I reviewed the patient's new clinical results.  I reviewed the patient's new imaging results and agree with the interpretation.      Assessment/Plan     Abnormal CT chest-continue current treatment.  Repeat CT chest in 4 to 6 weeks if the changes are still present we will do bronchoscopy on the outpatient basis.    FINDINGS:    LUNGS:  Increased density of the now right lower lobe mixed  groundglass and more solid-appearing consolidation with internal  cavitary component identified. Tiny right and left lung pulmonary  nodules. Grossly stable.    PLEURAL SPACE:  Small bilateral pleural effusions.  No pneumothorax.    HEART:  Unremarkable.  No cardiomegaly.  No significant pericardial  effusion.    BONES/JOINTS:  Unremarkable.  No acute fracture.  No dislocation.    SOFT TISSUES:  Unremarkable.    VASCULATURE:  Unremarkable.  No thoracic aortic aneurysm.    LYMPH NODES:  Unremarkable.  No enlarged lymph nodes.     IMPRESSION:  1.  Increased density of the now right lower lobe mixed groundglass and  more solid-appearing consolidation with internal cavitary component  identified. Tiny right and left lung pulmonary nodules. Grossly stable.  2.   Small bilateral pleural effusions.           Echo-    Microbiology Results (last 10 days)     Procedure Component Value - Date/Time    COVID PRE-OP / PRE-PROCEDURE SCREENING ORDER (NO ISOLATION) - Swab, Nasopharynx [[id number]]  (Normal) Collected: 12/21/21 1236    Lab Status: Final result Specimen: Swab from Nasopharynx Updated: 12/21/21 1403    Narrative:      The following orders were created for panel order COVID PRE-OP / PRE-PROCEDURE SCREENING ORDER (NO ISOLATION) - Swab, Nasopharynx.  Procedure                               Abnormality         Status                     ---------                               -----------         ------                     COVID-19,CEPHEID/CHARISSE,CO...[[id number]]  Normal              Final result                 Please view results for these tests on the individual orders.    COVID-19,CEPHEID/CHARISSE,COR/BRAD/PAD/JEFF IN-HOUSE(OR EMERGENT/ADD-ON),NP SWAB IN TRANSPORT MEDIA 3-4 HR TAT, RT-PCR - Swab, Nasopharynx [[id number]]  (Normal) Collected: 12/21/21 1236    Lab Status: Final result Specimen: Swab from Nasopharynx Updated: 12/21/21 1403     COVID19 Not Detected    Narrative:      Fact sheet for providers: https://www.fda.gov/media/256485/download     Fact sheet for patients: https://www.fda.gov/media/523641/download  Fact sheet for providers: https://www.fda.gov/media/931013/download     Fact sheet for patients: https://www.fda.gov/media/355217/download    Body Fluid Culture - Body Fluid, Liver [[id number]]  (Abnormal)  (Susceptibility) Collected: 12/16/21 1210    Lab Status: Final result Specimen: Body Fluid from Liver Updated: 12/19/21 1318     Body Fluid Culture Scant growth (1+) Klebsiella pneumoniae ssp pneumoniae     Gram Stain WBCs seen      No organisms seen    Susceptibility      Klebsiella pneumoniae ssp pneumoniae     KATE     Ampicillin Resistant     Ampicillin + Sulbactam Susceptible     Cefepime Susceptible     Ceftazidime Susceptible     Ceftriaxone Susceptible      Gentamicin Susceptible     Levofloxacin Susceptible     Piperacillin + Tazobactam Susceptible     Trimethoprim + Sulfamethoxazole Susceptible                     Susceptibility Comments     Klebsiella pneumoniae ssp pneumoniae    Cefazolin sensitivity will not be reported for Enterobacteriaceae in non-urine isolates. If cefazolin is preferred, please call the microbiology lab to request an E-test.  With the exception of urinary-sourced infections, aminoglycosides should not be used as monotherapy.             Anaerobic Culture - Swab, Liver [[id number]] Collected: 12/16/21 1210    Lab Status: Final result Specimen: Swab from Liver Updated: 12/21/21 0615     Anaerobic Culture No anaerobes isolated at 5 days    Urine Culture - Urine, Urine, Random Void [[id number]]  (Normal) Collected: 12/15/21 0837    Lab Status: Final result Specimen: Urine, Random Void Updated: 12/16/21 1206     Urine Culture No growth    Blood Culture - Blood, Arm, Right [[id number]]  (Normal) Collected: 12/14/21 0341    Lab Status: Final result Specimen: Blood from Arm, Right Updated: 12/19/21 0400     Blood Culture No growth at 5 days    Blood Culture - Blood, Arm, Right [[id number]]  (Normal) Collected: 12/14/21 0340    Lab Status: Final result Specimen: Blood from Arm, Right Updated: 12/19/21 0400     Blood Culture No growth at 5 days    Legionella Antigen, Urine - Urine, Urine, Clean Catch [[id number]]  (Normal) Collected: 12/13/21 1833    Lab Status: Final result Specimen: Urine, Clean Catch Updated: 12/13/21 1902     LEGIONELLA ANTIGEN, URINE Negative    Narrative:      Presumptive negative for L. pneumophilia serogroup 1 antigen, suggesting no recent or current infection.    S. Pneumo Ag Urine or CSF - Urine, Urine, Clean Catch [[id number]]  (Normal) Collected: 12/13/21 1833    Lab Status: Final result Specimen: Urine, Clean Catch Updated: 12/14/21 0228     Strep Pneumo Ag Negative    Blood Culture - Blood, Arm, Left [[id number]]  (Normal)  Collected: 12/12/21 1740    Lab Status: Final result Specimen: Blood from Arm, Left Updated: 12/17/21 1745     Blood Culture No growth at 5 days    COVID-19 and FLU A/B PCR - Swab, Nasopharynx [[id number]]  (Normal) Collected: 12/12/21 1648    Lab Status: Final result Specimen: Swab from Nasopharynx Updated: 12/12/21 1732     COVID19 Not Detected     Influenza A PCR Not Detected     Influenza B PCR Not Detected    Narrative:      Fact sheet for providers: https://www.fda.gov/media/027473/download    Fact sheet for patients: https://www.fda.gov/media/522764/download    Test performed by PCR.    Blood Culture - Blood, Arm, Left [[id number]]  (Abnormal)  (Susceptibility) Collected: 12/12/21 1648    Lab Status: Final result Specimen: Blood from Arm, Left Updated: 12/15/21 0955     Blood Culture Klebsiella pneumoniae ssp pneumoniae     Isolated from Aerobic and Anaerobic Bottles     Gram Stain Anaerobic Bottle Gram negative bacilli      Aerobic Bottle Gram negative bacilli    Susceptibility      Klebsiella pneumoniae ssp pneumoniae     KATE     Ampicillin Resistant     Ampicillin + Sulbactam Susceptible     Cefepime Susceptible     Ceftazidime Susceptible     Ceftriaxone Susceptible     Gentamicin Susceptible     Levofloxacin Susceptible     Piperacillin + Tazobactam Susceptible     Trimethoprim + Sulfamethoxazole Susceptible                     Susceptibility Comments     Klebsiella pneumoniae ssp pneumoniae    Cefazolin sensitivity will not be reported for Enterobacteriaceae in non-urine isolates. If cefazolin is preferred, please call the microbiology lab to request an E-test.  With the exception of urinary-sourced infections, aminoglycosides should not be used as monotherapy.             Blood Culture ID, PCR - Blood, Arm, Left [[id number]]  (Abnormal) Collected: 12/12/21 1648    Lab Status: Final result Specimen: Blood from Arm, Left Updated: 12/13/21 1147     BCID, PCR Klebsiella pneumoniae group. Identification by  BCID2 PCR.     BOTTLE TYPE Anaerobic Bottle            Sepsis due to pneumonia (HCC)          Chris Her MD  12/21/21  21:24 EST

## 2021-12-22 NOTE — DISCHARGE SUMMARY
[facility] DISCHARGE SUMMARY    Patient Identification:  Name:  Yadira Burgos  Age:  66 y.o.  Sex:  female  :  1955  MRN:  [id number]  Visit Number:  [id number]    Date of Admission: 2021  Date of Discharge:  2021     PCP: Dilcia Pedro MD    DISCHARGE DIAGNOSIS  Sepsis - Resolved   Cavitary PNA, Bacterial, treating for Klebsiella - Improved  Klebsiella Liver Abscess - Improved  Klebsiella Bacteremia - Improved  IE ruled out  Lung nodules   R axillary lymphadenopathy   HTN  HLD  Elevated alk phos  Nonspecific gallbladder wall thickening and questionable pericholecystic fluid - Resolved   Hx of cervical cancer  Hx of breast cancer   Hx of melanoma   Hypokalemia - Resolved     CONSULTS   Infectious Disease  Pulmonary  Interventional Radiology  Cardiology  Hematology/Oncology    PROCEDURES PERFORMED  JARRED  Liver Biopsy    HOSPITAL COURSE  Patient is a 66 y.o. female presented to [facility] complaining of weakness.  Please see the admitting history and physical for further details.      #Sepsis 2/2 Cavitary PNA, Bacterial, treating for Klebsiella c/b Liver Abscess and Bacteremia, IE ruled out  Patient presented w/ hx multiple malignancies, in remission reportedly, noted to have severe generalized weakness, couldn't get out of bed, noted to be disoriented.  Patient afebrile on admission, met sepsis criteria w/ HR > 90, WBC count elevated, Bcx's eventually positive for Klebsiella sp though negative on repeat . CT Chest showed increased density RLL w/ mixed GGO's more solid appearing consolidation w/ internal cavitary component, R and L lung pulmonary nodules, small b/l pleural effusions. CT Abd/Pelvis showed ill-defined hepatic mass, partially imaged thick-walled cavitary lesion RLL, small non-calcified pulm nodules throughout visualized lung fields.  ID consulted and followed.  IR consulted and performed liver biopsy w/ notable acute on chronic  inflammation and necrotic debris c/w abscess, negative for malignant cells, culture growing Klebsiella sp as well.  Previous notes indicated not large enough to leave drain in.  ID c/f IE given nodules and cavitary lesion.  Cardiology consulted for JARRED which did not show any vegetations or valvular abnormalities.  ID has recommended PO Cefdinir and Flagyl through 1/10 and have Rx'd sufficient quantity. They have also recommended repeat Chest, Abd/Pelvis CT in 1 week from 12/21 and will have patient f/u w/ her PCP on Monday-Tuesday who can order.  Have provided patient w/ work excuse from admission through tomorrow.  Today she is ambulatory, feels well, no abdominal pain or tenderness, tolerating PO, no fevers or chills.  Patient is amenable to discharge home today.      #Lung nodules  Noted on CT imaging and given history this is concerning for malignancy. Pulm consulted, formal recs pending per Dr. Her but per our conversation, rec'd repeat CT chest in 4-6 weeks and pending persisting/interval changes will consider bronchoscopy as outpatient.     #R axillary lymphadenopathy   Noted on CT imaging and given hx this is concerning for malignancy.  Oncology consulted.  Checked Tumor markers which were relatively unremakralbe. Per Dr. Oneal plan to refer to Breast clinic for outpatient biopsy and Oncology reportedly to arrange.     #HTN/HLD  Has remained borderline hypotensive but asymptomatic.  Home antihypertensive held during admission, asymptomatic.  F/u PCP for BP check.  Also mild transaminitis and liver abscess, f/u CMP per PCP and consider resuming home statin if appropriate.      #Elevated alk phos  #Nonspecific gallbladder wall thickening and questionable pericholecystic fluid  Nonspecific findings. Biliribuin and other LFTs wnl. Patient noted some generalized abdominal discomfort that is postprandial for the last week.  HIDA scan normal and suspected abdominal pain from above liver lesion.      #Hx of  cervical cancer  #Hx of breast cancer   #Hx of melanoma   Patient reports she is in remission; F/u outpatient Dr. Stone, to be scheduled per Oncology per Dr. Oneal     #Hypokalemia - Resolved   Replaced per ordered     VITAL SIGNS:  Temp:  [97.6 °F (36.4 °C)-98.1 °F (36.7 °C)] 98.1 °F (36.7 °C)  Heart Rate:  [] 107  Resp:  [16-20] 20  BP: ()/(31-79) 100/70  SpO2:  [92 %-100 %] 97 %  on   ;   Device (Oxygen Therapy): room air    Body mass index is 29.98 kg/m².  Wt Readings from Last 3 Encounters:   12/22/21 86.8 kg (191 lb 6.4 oz)   12/10/21 88.5 kg (195 lb)   10/20/21 88.7 kg (195 lb 9.6 oz)     PHYSICAL EXAM:  Constitutional:  Well-developed and well-nourished.  No acute distress.      HENT:  Head:  Normocephalic and atraumatic.  Mouth:  Moist mucous membranes.    Eyes:  Conjunctivae and EOM are normal. No scleral icterus.    Neck:  Neck supple.  No JVD present.    Cardiovascular:  Normal rate, regular rhythm and normal heart sounds with no murmur.  Pulmonary/Chest:  No respiratory distress, no wheezes, on room air  Abdominal:  Soft. No distension and no tenderness.   Musculoskeletal:  No tenderness, and no deformity.    Neurological:  Alert and oriented to person, place.  No gross focal deficits  Skin:  Skin is warm and dry. No rash noted. No pallor.   Peripheral vascular:  No clubbing, no cyanosis, no edema.  Psych: appropriate mood and affect though emotionally upset at new news    DISCHARGE DISPOSITION   Stable    DISCHARGE MEDICATIONS:     Discharge Medications      New Medications      Instructions Start Date   cefdinir 300 MG capsule  Commonly known as: OMNICEF   300 mg, Oral, 2 Times Daily      metroNIDAZOLE 500 MG tablet  Commonly known as: FLAGYL   500 mg, Oral, Every 8 Hours Scheduled         Stop These Medications    atenolol 50 MG tablet  Commonly known as: TENORMIN     atorvastatin 80 MG tablet  Commonly known as: LIPITOR            Additional Instructions for the Follow-ups that You  Need to Schedule     Discharge Follow-up with PCP   As directed       Currently Documented PCP:    Dilcia Pedro MD    PCP Phone Number:    [phone]     Follow Up Details: Monday-Tuesday, needs CMP and BP check, needs repeat CT Chest, Abd/Pelvis per ID at 1 week from 12/21, needs repeat Chest CT 4-6 weeks per Pulm         Discharge Follow-up with Specified Provider: Dr. Her 6 weeks to f/u on CT   As directed      To: Dr. Her 6 weeks to f/u on CT         Discharge Follow-up with Specified Provider: Oncology to arrange outpatient f/u in clinic w/ Dr. Stone   As directed      To: Oncology to arrange outpatient f/u in clinic w/ Dr. Stone         Discharge Follow-up with Specified Provider: Oncology to set up for Axillary Lymph Node Biopsy in Breast Clinic w/ Dr. Fernando   As directed      To: Oncology to set up for Axillary Lymph Node Biopsy in Breast Clinic w/ Dr. Fernando            Follow-up Information     Dilcia Pedro MD .    Specialty: Family Medicine  Why: Monday-Tuesday, needs CMP and BP check, needs repeat CT Chest, Abd/Pelvis per ID at 1 week from 12/21, needs repeat Chest CT 4-6 weeks per Pulm  Contact information:  [address]  [place] [id number]  [phone]                        TEST  RESULTS PENDING AT DISCHARGE  None     CODE STATUS  Code Status and Medical Interventions:   Ordered at: 12/12/21 1939     Level Of Support Discussed With:    Patient     Code Status (Patient has no pulse and is not breathing):    CPR (Attempt to Resuscitate)     Medical Interventions (Patient has pulse or is breathing):    Full Support       Alberto Sterling MD  [facility]  12/22/21  15:49 EST    Please note that this discharge summary required more than 30 minutes to complete.

## 2021-12-22 NOTE — PLAN OF CARE
Pt resting with no complaints. NPO for procedure. No s/s of distress noted. Continue with plan of care.

## 2021-12-22 NOTE — PROGRESS NOTES
PROGRESS NOTE         Patient Identification:  Name:  Yadira Burgos  Age:  66 y.o.  Sex:  female  :  1955  MRN:  [id number]  Visit Number:  [id number]  Primary Care Provider:  Dilcia Pedro MD         LOS: 10 days       ----------------------------------------------------------------------------------------------------------------------  Subjective       Chief Complaints:    Weakness - Generalized, Chills, Dizziness, and Nausea        Interval History:      The patient was out of the room for JARRED this morning.  Afebrile.  No new labs this morning. JARRED on 2021 shows no evidence of vegetation or abscess.    Review of Systems:    Patient out of room for procedure this morning.    ----------------------------------------------------------------------------------------------------------------------      Objective       [facility] Meds:  cefTRIAXone, 2 g, Intravenous, Q12H  heparin (porcine), 5,000 Units, Subcutaneous, Q12H  influenza vaccine, 0.5 mL, Intramuscular, Once  metroNIDAZOLE, 500 mg, Oral, Q8H  psyllium, 1 packet, Oral, Daily  sodium chloride, 10 mL, Intravenous, Q12H         ----------------------------------------------------------------------------------------------------------------------    Vital Signs:  Temp:  [97.6 °F (36.4 °C)-98.1 °F (36.7 °C)] 98.1 °F (36.7 °C)  Heart Rate:  [] 84  Resp:  [16-20] 16  BP: ()/(31-79) 102/64  Mean Arterial Pressure (Non-Invasive) for the past 24 hrs (Last 3 readings):   Noninvasive MAP (mmHg)   21 0300 74   21 1937 76   21 1408 70     SpO2 Percentage    21 0939 21 0943 21 1017   SpO2: 99% 98% 94%     SpO2:  [92 %-100 %] 94 %  on   ;   Device (Oxygen Therapy): room air    Body mass index is 29.98 kg/m².  Wt Readings from Last 3 Encounters:   21 86.8 kg (191 lb 6.4 oz)   12/10/21 88.5 kg (195 lb)   10/20/21 88.7 kg (195 lb 9.6 oz)        Intake/Output Summary (Last 24 hours) at  12/22/2021 1217  Last data filed at 12/22/2021 0914  Gross per 24 hour   Intake 1060 ml   Output --   Net 1060 ml     NPO Diet  ----------------------------------------------------------------------------------------------------------------------      Physical Exam:    Patient out of room for procedure this morning.    ----------------------------------------------------------------------------------------------------------------------            Results from last 7 days   Lab Units 12/20/21 0143 12/19/21 0115 12/18/21 0137 12/17/21  1409 12/16/21  0838   WBC 10*3/mm3 8.58 9.38 8.97   < >  --    HEMOGLOBIN g/dL 10.7* 10.8* 10.3*   < >  --    HEMATOCRIT % 34.0 33.5* 32.1*   < >  --    MCV fL 89.0 87.7 87.5   < >  --    MCHC g/dL 31.5 32.2 32.1   < >  --    PLATELETS 10*3/mm3 298 290 259   < >  --    INR   --   --   --   --  1.09    < > = values in this interval not displayed.     Results from last 7 days   Lab Units 12/20/21 0143 12/19/21 0115 12/18/21  1815 12/18/21 0137 12/18/21 0137 12/17/21  0457 12/16/21  2213 12/16/21  0430 12/16/21 0430   SODIUM mmol/L 139 141  --   --  139  --   --    < > 141   POTASSIUM mmol/L 3.9 3.5 3.8   < > 3.5  --    < >   < > 2.8*   MAGNESIUM mg/dL  --   --   --   --   --  2.0  --   --  1.5*   CHLORIDE mmol/L 109* 108*  --   --  109*  --   --    < > 109*   CO2 mmol/L 20.8* 21.2*  --   --  20.8*  --   --    < > 19.9*   BUN mg/dL 6* 7*  --   --  8  --   --    < > 5*   CREATININE mg/dL 0.50* 0.48*  --   --  0.42*  --   --    < > 0.42*   EGFR IF NONAFRICN AM mL/min/1.73 124 130  --   --  >150  --   --    < > >150   CALCIUM mg/dL 8.5* 8.2*  --   --  8.0*  --   --    < > 7.9*   GLUCOSE mg/dL 97 144*  --   --  93  --   --    < > 110*   ALBUMIN g/dL  --   --   --   --  2.05*  --   --   --  1.89*   BILIRUBIN mg/dL  --   --   --   --  0.3  --   --   --  0.4   ALK PHOS U/L  --   --   --   --  90  --   --   --  90   AST (SGOT) U/L  --   --   --   --  33*  --   --   --  23   ALT (SGPT) U/L   --   --   --   --  18  --   --   --  16    < > = values in this interval not displayed.   Estimated Creatinine Clearance: 79.4 mL/min (A) (by C-G formula based on SCr of 0.5 mg/dL (L)).  No results found for: AMMONIA    No results found for: HGBA1C, POCGLU  Lab Results   Component Value Date    HGBA1C 6.20 (H) 12/12/2021     Lab Results   Component Value Date    TSH 1.900 09/16/2020    FREET4 1.31 09/16/2020       Blood Culture   Date Value Ref Range Status   12/14/2021 No growth at 24 hours  Preliminary   12/14/2021 No growth at 24 hours  Preliminary   12/12/2021 No growth at 2 days  Preliminary   12/12/2021 Klebsiella pneumoniae ssp pneumoniae (C)  Final     No results found for: URINECX  No results found for: WOUNDCX  No results found for: STOOLCX  No results found for: RESPCX  Pain Management Panel    There is no flowsheet data to display.           ----------------------------------------------------------------------------------------------------------------------  Imaging Results (Last 24 Hours)       ** No results found for the last 24 hours. **            ----------------------------------------------------------------------------------------------------------------------    Assessment/Plan       Assessment/Plan     ASSESSMENT:    1.  Klebsiella bacteremia  2.  Liver abscess  3.  Concern for septic emboli    PLAN:    The patient was out of the room for JARRED this morning.  Afebrile.  No new labs this morning. JARRED on 12/22/2021 shows no evidence of vegetation or abscess.    Pertinent labs and imaging after reconsult: Liver fluid culture on 12/16/2021 finalized as Klebsiella pneumoniae.  Blood cultures cleared on 12/14/2021 and had remained negative since that time.  CT abdomen pelvis on 12/18/2021 showed ill-defined right hepatic mass again noted.  This has been previously biopsied and correlation with pathology results is recommended.  Cholelithiasis.  Punctate nonobstructing right intrarenal stone.  No  threshold abdominal lymphadenopathy or other suspicious abdominal masses.  Partially imaged thick-walled cavitary lesion in the right lower lobe.  This may be of infectious or neoplastic etiology and continued follow-up is recommended.  Small bilateral pleural effusions with bibasilar atelectasis/infiltrate, worse on the right than left.  Numerous small noncalcified pulmonary nodules throughout the visualized lower lung fields, concerning for pulmonary metastatic disease.  HIDA scan on 12/18/2021 showed no evidence of cholecystitis or bile duct obstruction.  The gallbladder is distended.  Normal gallbladder contraction with CCK.    Pertinent labs and imaging prior to reconsult: Nuclear medicine bone scan on 12/15/2021 was normal.  CT abdomen pelvis on 12/15/2021 showed right lobe of liver lesion is again identified now measuring about 4.7 cm and was about 4.7 cm.  CT chest on 12/15/2021 showed increased density at the now right lower lobe mixed groundglass and more solid-appearing consolidation with internal cavitary component identified.  Tiny right and left lung pulmonary nodules.  Grossly stable.  Small bilateral pleural effusions. Chest x-ray from 12/12/2021 reports patchy infiltrates in the right lung. CT of the chest, abdomen and pelvis reports significant consolidation in the right lower lobe, enlarged right axillary lymph node, indeterminate bilateral lung nodules and ill-defined hepatic lesion concerning for underlying malignancy. Ultrasound of the abdomen from 12/12/2021 reports nonspecific gallbladder wall thickening and questionable pericholecystic fluid, gallstones, liver lesion that is ill-defined that could represent abscess but malignancy is not excluded. Strep pneumo antigen negative. Legionella negative. Blood cultures from 12/12/2021 1 out of 2 sets positive for Klebsiella pneumoniae. COVID-19 and influenza PCR negative. Urine culture on 12/15/2021 finalized as no growth.      Recommend to  continue on Rocephin 2 g IV every 12 hours and Flagyl 500 mg p.o. every 8 hours.  Patient should continue on antibiotic course through at least 1/10/2022 with repeat CT chest and abdomen in 1 week from 12/21/2021.  As JARRED came back negative, Rocephin could be transitioned to oral Omnicef.    Code Status:   Code Status and Medical Interventions:   Ordered at: 12/12/21 1939     Level Of Support Discussed With:    Patient     Code Status (Patient has no pulse and is not breathing):    CPR (Attempt to Resuscitate)     Medical Interventions (Patient has pulse or is breathing):    Full Support     FLORENCE Mota  12/22/21  12:17 EST

## 2021-12-23 ENCOUNTER — TRANSITIONAL CARE MANAGEMENT TELEPHONE ENCOUNTER (OUTPATIENT)
Dept: CALL CENTER | Facility: HOSPITAL | Age: 66
End: 2021-12-23

## 2021-12-23 NOTE — OUTREACH NOTE
Prep Survey      Responses   [facility] patient discharged from? [place]   Is LACE score < 7 ? No   Emergency Room discharge w/ pulse ox? No   Eligibility [facility]   Date of Admission 12/12/21   Date of Discharge 12/22/21   Discharge Disposition Home or Self Care   Discharge diagnosis Sepsis due to pneumonia    Does the patient have one of the following disease processes/diagnoses(primary or secondary)? Sepsis   Does the patient have Home health ordered? No   Is there a DME ordered? No   Prep survey completed? Yes          Chery Eason RN

## 2021-12-23 NOTE — OUTREACH NOTE
Call Center TCM Note      Responses   [facility] patient discharged from? [place]   Does the patient have one of the following disease processes/diagnoses(primary or secondary)? Sepsis   TCM attempt successful? Yes  [no]   Call start time 0840   Call end time 0842   Discharge diagnosis Sepsis due to pneumonia    Meds reviewed with patient/caregiver? Yes   Is the patient having any side effects they believe may be caused by any medication additions or changes? No   Does the patient have all medications related to this admission filled (includes all antibiotics, inhalers, nebulizers,steroids,etc.) Yes   Is the patient taking all medications as directed (includes completed medication regime)? Yes   Does the patient have a primary care provider?  Yes   Does the patient have an appointment with their PCP within 7 days of discharge? No   What is preventing the patient from scheduling follow up appointments within 7 days of discharge? --  [Pt will call to schedule a PCP hospital d/c f/u appt. Pt wanted to call herself. ]   Nursing Interventions Advised patient to make appointment   Has the patient kept scheduled appointments due by today? N/A   Has home health visited the patient within 72 hours of discharge? N/A   Psychosocial issues? No   What is the patient's perception of their health status since discharge? Same   TCM call completed? Yes   Wrap up additional comments Brief call. Pt was sleeping. States feeling the same as when in hospital but got a great nights sleep.           Luann Esquivel RN    12/23/2021, 08:44 EST

## 2021-12-27 DIAGNOSIS — Z85.820 HISTORY OF MALIGNANT MELANOMA OF SKIN: ICD-10-CM

## 2021-12-27 DIAGNOSIS — Z85.41 HISTORY OF CERVICAL CANCER: ICD-10-CM

## 2021-12-27 DIAGNOSIS — C50.911 MALIGNANT NEOPLASM OF RIGHT BREAST IN FEMALE, ESTROGEN RECEPTOR POSITIVE, UNSPECIFIED SITE OF BREAST (HCC): ICD-10-CM

## 2021-12-27 DIAGNOSIS — R59.0 AXILLARY ADENOPATHY: Primary | ICD-10-CM

## 2021-12-27 DIAGNOSIS — Z17.0 MALIGNANT NEOPLASM OF RIGHT BREAST IN FEMALE, ESTROGEN RECEPTOR POSITIVE, UNSPECIFIED SITE OF BREAST (HCC): ICD-10-CM

## 2021-12-27 DIAGNOSIS — R79.89 ELEVATED LFTS: ICD-10-CM

## 2021-12-30 ENCOUNTER — OFFICE VISIT (OUTPATIENT)
Dept: FAMILY MEDICINE CLINIC | Facility: CLINIC | Age: 66
End: 2021-12-30

## 2021-12-30 VITALS
SYSTOLIC BLOOD PRESSURE: 110 MMHG | BODY MASS INDEX: 29.47 KG/M2 | HEART RATE: 108 BPM | TEMPERATURE: 97.4 F | DIASTOLIC BLOOD PRESSURE: 78 MMHG | HEIGHT: 67 IN | OXYGEN SATURATION: 98 % | WEIGHT: 187.8 LBS

## 2021-12-30 DIAGNOSIS — A41.9 SEPSIS, DUE TO UNSPECIFIED ORGANISM, UNSPECIFIED WHETHER ACUTE ORGAN DYSFUNCTION PRESENT (HCC): Primary | ICD-10-CM

## 2021-12-30 DIAGNOSIS — K75.0 LIVER ABSCESS: ICD-10-CM

## 2021-12-30 DIAGNOSIS — Z85.9 HISTORY OF CANCER: ICD-10-CM

## 2021-12-30 DIAGNOSIS — Z87.01 HISTORY OF PNEUMONIA: ICD-10-CM

## 2021-12-30 DIAGNOSIS — R91.1 LUNG NODULE: ICD-10-CM

## 2021-12-30 PROCEDURE — 36415 COLL VENOUS BLD VENIPUNCTURE: CPT | Performed by: NURSE PRACTITIONER

## 2021-12-30 PROCEDURE — 85025 COMPLETE CBC W/AUTO DIFF WBC: CPT | Performed by: NURSE PRACTITIONER

## 2021-12-30 PROCEDURE — 80053 COMPREHEN METABOLIC PANEL: CPT | Performed by: NURSE PRACTITIONER

## 2021-12-30 PROCEDURE — 99495 TRANSJ CARE MGMT MOD F2F 14D: CPT | Performed by: NURSE PRACTITIONER

## 2021-12-30 PROCEDURE — 1111F DSCHRG MED/CURRENT MED MERGE: CPT | Performed by: NURSE PRACTITIONER

## 2021-12-30 NOTE — PROGRESS NOTES
Transitional Care Follow Up Visit  Subjective     Yadira Burgos is a 66 y.o. female who presents for a transitional care management visit.    Within 48 business hours after discharge our office contacted her via telephone to coordinate her care and needs.      I reviewed and discussed the details of that call along with the discharge summary, hospital problems, inpatient lab results, inpatient diagnostic studies, and consultation reports with Yadira.     Current outpatient and discharge medications have been reconciled for the patient.  Reviewed by: TENNILLE Thurston      Date of TCM Phone Call 12/22/2021   [facility]   Date of Admission 12/12/2021   Date of Discharge 12/22/2021   Discharge Disposition Home or Self Care     Risk for Readmission (LACE) Score: 14 (12/22/2021  6:01 AM)      History of Present Illness   Course During Hospital Stay:      Presented to ER for weakness. Had sepsis, pneumonia, liver abscess.  Was found to have lung nodules and has history of multiple types of cancers reportably in remission.  Discharge note with recommendations to repeat outpatient chest and abdomen/pelvis CT but these have already been ordered.    Patient reports she is doing well and feeling better each day.  Has returned to work and is doing well with that.  Denies any acute concerns or issues at this time.     The following portions of the patient's history were reviewed and updated as appropriate: allergies, current medications, past family history, past medical history, past social history, past surgical history and problem list.    Review of Systems   Constitutional: Negative for fever.   Respiratory: Negative for shortness of breath.    Gastrointestinal: Negative for abdominal pain, nausea and vomiting.       Objective   Physical Exam  Vitals and nursing note reviewed.   Constitutional:       General: She is not in acute distress.     Appearance: Normal appearance. She is not ill-appearing or  toxic-appearing.   HENT:      Head: Normocephalic.   Cardiovascular:      Rate and Rhythm: Normal rate and regular rhythm.      Heart sounds: Normal heart sounds.   Pulmonary:      Effort: Pulmonary effort is normal. No respiratory distress.      Breath sounds: Normal breath sounds. No wheezing, rhonchi or rales.   Abdominal:      General: Bowel sounds are normal. There is no distension.      Palpations: Abdomen is soft.   Musculoskeletal:         General: No swelling.   Skin:     General: Skin is warm and dry.      Coloration: Skin is not pale.   Neurological:      Mental Status: She is alert and oriented to person, place, and time.   Psychiatric:         Mood and Affect: Mood normal.         Behavior: Behavior normal.         Thought Content: Thought content normal.         Judgment: Judgment normal.         Assessment/Plan   Diagnoses and all orders for this visit:    1. Sepsis, due to unspecified organism, unspecified whether acute organ dysfunction present (HCC) (Primary)  -     CBC & Differential  -     Comprehensive Metabolic Panel    2. History of pneumonia    3. Liver abscess    4. Lung nodule    5. History of cancer      Follow-up with specialists as recommended.  Follow-up with any future scheduled appointments.  Follow-up as needed.

## 2021-12-31 LAB
ALBUMIN SERPL-MCNC: 3.5 G/DL (ref 3.5–5.2)
ALBUMIN/GLOB SERPL: 1.3 G/DL
ALP SERPL-CCNC: 62 U/L (ref 39–117)
ALT SERPL W P-5'-P-CCNC: 13 U/L (ref 1–33)
ANION GAP SERPL CALCULATED.3IONS-SCNC: 14.4 MMOL/L (ref 5–15)
AST SERPL-CCNC: 23 U/L (ref 1–32)
BASOPHILS # BLD AUTO: 0.06 10*3/MM3 (ref 0–0.2)
BASOPHILS NFR BLD AUTO: 1.1 % (ref 0–1.5)
BILIRUB SERPL-MCNC: 0.3 MG/DL (ref 0–1.2)
BUN SERPL-MCNC: 11 MG/DL (ref 8–23)
BUN/CREAT SERPL: 28.9 (ref 7–25)
CALCIUM SPEC-SCNC: 9.1 MG/DL (ref 8.6–10.5)
CHLORIDE SERPL-SCNC: 107 MMOL/L (ref 98–107)
CO2 SERPL-SCNC: 21.6 MMOL/L (ref 22–29)
CREAT SERPL-MCNC: 0.38 MG/DL (ref 0.57–1)
DEPRECATED RDW RBC AUTO: 43.8 FL (ref 37–54)
EOSINOPHIL # BLD AUTO: 0.05 10*3/MM3 (ref 0–0.4)
EOSINOPHIL NFR BLD AUTO: 0.9 % (ref 0.3–6.2)
ERYTHROCYTE [DISTWIDTH] IN BLOOD BY AUTOMATED COUNT: 13.8 % (ref 12.3–15.4)
GFR SERPL CREATININE-BSD FRML MDRD: >150 ML/MIN/1.73
GLOBULIN UR ELPH-MCNC: 2.8 GM/DL
GLUCOSE SERPL-MCNC: 86 MG/DL (ref 65–99)
HCT VFR BLD AUTO: 37.9 % (ref 34–46.6)
HGB BLD-MCNC: 12.6 G/DL (ref 12–15.9)
IMM GRANULOCYTES # BLD AUTO: 0.01 10*3/MM3 (ref 0–0.05)
IMM GRANULOCYTES NFR BLD AUTO: 0.2 % (ref 0–0.5)
LYMPHOCYTES # BLD AUTO: 1.9 10*3/MM3 (ref 0.7–3.1)
LYMPHOCYTES NFR BLD AUTO: 35.1 % (ref 19.6–45.3)
MCH RBC QN AUTO: 29 PG (ref 26.6–33)
MCHC RBC AUTO-ENTMCNC: 33.2 G/DL (ref 31.5–35.7)
MCV RBC AUTO: 87.3 FL (ref 79–97)
MONOCYTES # BLD AUTO: 0.5 10*3/MM3 (ref 0.1–0.9)
MONOCYTES NFR BLD AUTO: 9.2 % (ref 5–12)
NEUTROPHILS NFR BLD AUTO: 2.89 10*3/MM3 (ref 1.7–7)
NEUTROPHILS NFR BLD AUTO: 53.5 % (ref 42.7–76)
NRBC BLD AUTO-RTO: 0 /100 WBC (ref 0–0.2)
PLATELET # BLD AUTO: 220 10*3/MM3 (ref 140–450)
PMV BLD AUTO: 10.8 FL (ref 6–12)
POTASSIUM SERPL-SCNC: 3.8 MMOL/L (ref 3.5–5.2)
PROT SERPL-MCNC: 6.3 G/DL (ref 6–8.5)
RBC # BLD AUTO: 4.34 10*6/MM3 (ref 3.77–5.28)
SODIUM SERPL-SCNC: 143 MMOL/L (ref 136–145)
WBC NRBC COR # BLD: 5.41 10*3/MM3 (ref 3.4–10.8)

## 2022-01-04 ENCOUNTER — TELEPHONE (OUTPATIENT)
Dept: FAMILY MEDICINE CLINIC | Facility: CLINIC | Age: 67
End: 2022-01-04

## 2022-01-04 NOTE — TELEPHONE ENCOUNTER
----- Message from TENNILLE Thurston sent at 1/4/2022  7:59 AM EST -----  Please call the patient regarding her abnormal result.    Labs stable.      Ensure adequate hydration.

## 2022-01-06 ENCOUNTER — READMISSION MANAGEMENT (OUTPATIENT)
Dept: CALL CENTER | Facility: HOSPITAL | Age: 67
End: 2022-01-06

## 2022-01-06 NOTE — OUTREACH NOTE
Sepsis Week 3 Survey      Responses   [facility] patient discharged from? [place]   Does the patient have one of the following disease processes/diagnoses(primary or secondary)? Sepsis   Week 3 attempt successful? Yes   Call start time 1638   Call end time 1641   Discharge diagnosis Sepsis due to pneumonia    Meds reviewed with patient/caregiver? Yes   Is the patient having any side effects they believe may be caused by any medication additions or changes? No   Does the patient have all medications related to this admission filled (includes all antibiotics, inhalers, nebulizers,steroids,etc.) Yes   Is the patient taking all medications as directed (includes completed medication regime)? Yes   Does the patient have a primary care provider?  Yes   Does the patient have an appointment with their PCP within 7 days of discharge? Yes   Has the patient kept scheduled appointments due by today? Yes   Psychosocial issues? No   Did the patient receive a copy of their discharge instructions? Yes   Nursing interventions Reviewed instructions with patient   What is the patient's perception of their health status since discharge? Improving   Nursing interventions Nurse provided patient education   Is the patient/caregiver able to teach back Sepsis? S - Shivering,fever or very cold,  E - Extreme pain or generalized discomfort (worst ever,especially abdomen),  S - Short of breath   Nursing interventions Nurse provided reassurance to patient   Is patient/caregiver able to teach back steps to recovery at home? Set small, achievable goals for return to baseline health,  Rest and regain strength,  Make a list of questions for PCP appoinment   Is the patient/caregiver able to teach back signs and symptoms of worsening condition: Fever,  Rapid heart rate (>90),  Shortness of breath/rapid respiratory rate   If the patient is a current smoker, are they able to teach back resources for cessation? Not a smoker   Is the patient/caregiver  able to teach back the hierarchy of who to call/visit for symptoms/problems? PCP, Specialist, Home health nurse, Urgent Care, ED, 911 Yes   Week 3 call completed? Yes          Wanda Singh RN

## 2022-01-13 ENCOUNTER — HOSPITAL ENCOUNTER (OUTPATIENT)
Dept: BONE DENSITY | Facility: HOSPITAL | Age: 67
Discharge: HOME | End: 2022-01-13

## 2022-01-13 ENCOUNTER — HOSPITAL ENCOUNTER (OUTPATIENT)
Dept: ULTRASOUND IMAGING | Facility: HOSPITAL | Age: 67
Discharge: HOME | End: 2022-01-13

## 2022-01-13 ENCOUNTER — HOSPITAL ENCOUNTER (OUTPATIENT)
Dept: MAMMOGRAPHY | Facility: HOSPITAL | Age: 67
Discharge: HOME | End: 2022-01-13

## 2022-01-13 ENCOUNTER — OFFICE VISIT (OUTPATIENT)
Dept: FAMILY MEDICINE CLINIC | Facility: CLINIC | Age: 67
End: 2022-01-13

## 2022-01-13 VITALS
BODY MASS INDEX: 28.53 KG/M2 | SYSTOLIC BLOOD PRESSURE: 118 MMHG | OXYGEN SATURATION: 96 % | HEART RATE: 86 BPM | TEMPERATURE: 97.1 F | DIASTOLIC BLOOD PRESSURE: 68 MMHG | WEIGHT: 181.8 LBS | HEIGHT: 67 IN

## 2022-01-13 DIAGNOSIS — Z17.0 MALIGNANT NEOPLASM OF RIGHT BREAST IN FEMALE, ESTROGEN RECEPTOR POSITIVE, UNSPECIFIED SITE OF BREAST: ICD-10-CM

## 2022-01-13 DIAGNOSIS — Z87.898 HISTORY OF ABDOMINAL ABSCESS: ICD-10-CM

## 2022-01-13 DIAGNOSIS — R30.9 PAINFUL URINATION: Primary | ICD-10-CM

## 2022-01-13 DIAGNOSIS — C50.911 MALIGNANT NEOPLASM OF RIGHT BREAST IN FEMALE, ESTROGEN RECEPTOR POSITIVE, UNSPECIFIED SITE OF BREAST: ICD-10-CM

## 2022-01-13 DIAGNOSIS — R59.0 AXILLARY ADENOPATHY: ICD-10-CM

## 2022-01-13 DIAGNOSIS — R92.8 ABNORMAL MAMMOGRAM: ICD-10-CM

## 2022-01-13 DIAGNOSIS — Z85.9 HISTORY OF CANCER: ICD-10-CM

## 2022-01-13 DIAGNOSIS — R91.1 LUNG NODULE: ICD-10-CM

## 2022-01-13 DIAGNOSIS — M81.0 POSTMENOPAUSAL OSTEOPOROSIS: ICD-10-CM

## 2022-01-13 LAB
BILIRUB BLD-MCNC: NEGATIVE MG/DL
CLARITY, POC: ABNORMAL
COLOR UR: ABNORMAL
EXPIRATION DATE: ABNORMAL
GLUCOSE UR STRIP-MCNC: NEGATIVE MG/DL
KETONES UR QL: NEGATIVE
LEUKOCYTE EST, POC: ABNORMAL
Lab: ABNORMAL
NITRITE UR-MCNC: NEGATIVE MG/ML
PH UR: 6 [PH] (ref 5–8)
PROT UR STRIP-MCNC: ABNORMAL MG/DL
RBC # UR STRIP: NEGATIVE /UL
SP GR UR: 1.02 (ref 1–1.03)
UROBILINOGEN UR QL: NORMAL

## 2022-01-13 PROCEDURE — 77080 DXA BONE DENSITY AXIAL: CPT | Performed by: RADIOLOGY

## 2022-01-13 PROCEDURE — 76882 US LMTD JT/FCL EVL NVASC XTR: CPT

## 2022-01-13 PROCEDURE — 88313 SPECIAL STAINS GROUP 2: CPT | Performed by: RADIOLOGY

## 2022-01-13 PROCEDURE — 87086 URINE CULTURE/COLONY COUNT: CPT | Performed by: FAMILY MEDICINE

## 2022-01-13 PROCEDURE — 77066 DX MAMMO INCL CAD BI: CPT

## 2022-01-13 PROCEDURE — 77080 DXA BONE DENSITY AXIAL: CPT

## 2022-01-13 PROCEDURE — 36415 COLL VENOUS BLD VENIPUNCTURE: CPT | Performed by: FAMILY MEDICINE

## 2022-01-13 PROCEDURE — 99214 OFFICE O/P EST MOD 30 MIN: CPT | Performed by: FAMILY MEDICINE

## 2022-01-13 PROCEDURE — 88305 TISSUE EXAM BY PATHOLOGIST: CPT | Performed by: RADIOLOGY

## 2022-01-13 PROCEDURE — G0279 TOMOSYNTHESIS, MAMMO: HCPCS

## 2022-01-13 PROCEDURE — 81003 URINALYSIS AUTO W/O SCOPE: CPT | Performed by: FAMILY MEDICINE

## 2022-01-13 PROCEDURE — 88342 IMHCHEM/IMCYTCHM 1ST ANTB: CPT | Performed by: RADIOLOGY

## 2022-01-13 PROCEDURE — 38505 NEEDLE BIOPSY LYMPH NODES: CPT | Performed by: RADIOLOGY

## 2022-01-13 PROCEDURE — 76942 ECHO GUIDE FOR BIOPSY: CPT

## 2022-01-13 PROCEDURE — 85025 COMPLETE CBC W/AUTO DIFF WBC: CPT | Performed by: FAMILY MEDICINE

## 2022-01-13 PROCEDURE — G0279 TOMOSYNTHESIS, MAMMO: HCPCS | Performed by: RADIOLOGY

## 2022-01-13 PROCEDURE — 77066 DX MAMMO INCL CAD BI: CPT | Performed by: RADIOLOGY

## 2022-01-13 PROCEDURE — 76882 US LMTD JT/FCL EVL NVASC XTR: CPT | Performed by: RADIOLOGY

## 2022-01-13 RX ORDER — SULFAMETHOXAZOLE AND TRIMETHOPRIM 800; 160 MG/1; MG/1
1 TABLET ORAL 2 TIMES DAILY
Qty: 6 TABLET | Refills: 0 | Status: SHIPPED | OUTPATIENT
Start: 2022-01-13 | End: 2022-02-02

## 2022-01-14 LAB
BACTERIA SPEC AEROBE CULT: NORMAL
BASOPHILS # BLD AUTO: 0.05 10*3/MM3 (ref 0–0.2)
BASOPHILS NFR BLD AUTO: 1 % (ref 0–1.5)
DEPRECATED RDW RBC AUTO: 49.8 FL (ref 37–54)
EOSINOPHIL # BLD AUTO: 0.09 10*3/MM3 (ref 0–0.4)
EOSINOPHIL NFR BLD AUTO: 1.8 % (ref 0.3–6.2)
ERYTHROCYTE [DISTWIDTH] IN BLOOD BY AUTOMATED COUNT: 15.2 % (ref 12.3–15.4)
HCT VFR BLD AUTO: 46.5 % (ref 34–46.6)
HGB BLD-MCNC: 14.9 G/DL (ref 12–15.9)
LYMPHOCYTES # BLD AUTO: 2.11 10*3/MM3 (ref 0.7–3.1)
LYMPHOCYTES NFR BLD AUTO: 42.6 % (ref 19.6–45.3)
MCH RBC QN AUTO: 29 PG (ref 26.6–33)
MCHC RBC AUTO-ENTMCNC: 32 G/DL (ref 31.5–35.7)
MCV RBC AUTO: 90.5 FL (ref 79–97)
MONOCYTES # BLD AUTO: 0.52 10*3/MM3 (ref 0.1–0.9)
MONOCYTES NFR BLD AUTO: 10.5 % (ref 5–12)
NEUTROPHILS NFR BLD AUTO: 2.17 10*3/MM3 (ref 1.7–7)
NEUTROPHILS NFR BLD AUTO: 43.9 % (ref 42.7–76)
PLATELET # BLD AUTO: 211 10*3/MM3 (ref 140–450)
PMV BLD AUTO: 12.6 FL (ref 6–12)
RBC # BLD AUTO: 5.14 10*6/MM3 (ref 3.77–5.28)
WBC NRBC COR # BLD: 4.95 10*3/MM3 (ref 3.4–10.8)

## 2022-01-14 NOTE — PROGRESS NOTES
"Yadira Burgos     VITALS: Blood pressure 118/68, pulse 86, temperature 97.1 °F (36.2 °C), height 170.2 cm (67.01\"), weight 82.5 kg (181 lb 12.8 oz), SpO2 96 %, not currently breastfeeding.    Subjective  Chief Complaint  Abdominal Pain (Sweating) and Difficulty Urinating    Subjective          History of Present Illness:  Patient is a 66 y.o.  female with medical history significant for breast cancer and cervical cancer along with melanoma. She is now with possible Mets to the lung from an unknown primary. She was in her usual state of health, but in 12/2021, she had some episodes of shaking. She was admitted to the hospital from 12/12/2021 to 12/22/2021. She underwent a PET scan in the hospital and it was normal, and she notes she had another PET scan performed today, 01/13/2022. She was hospitalized for sepsis during that time and under CT scan, it was noted that she had liver masses, lung masses, and a right axillary lymph node. The liver turned out to be abscesses that had  improved upon cefdinir. They biopsied her right axillary lymph node today and we are awaiting for results. She just got off of the antibiotics on 01/12/2022, and was on antibiotics for the last 40 days.    The patient was sitting at work yesterday and started to experience a sudden severe pain in her stomach and was excessively sweating, and she felt the pain today at 5:00 AM, this morning, 01/13/2022. She notes losing 20 pounds but has also gained 20 pounds. She recently had a scan of her gallbladder, which appears to be fine.     No complaints about any of the medications.    The following portions of the patient's history were reviewed and updated as appropriate: allergies, current medications, past family history, past medical history, past social history, past surgical history and problem list.    Past Medical History  Past Medical History:   Diagnosis Date   • Back pain    • Basal cell carcinoma (BCC) in situ of skin 2020    Dr." Mohr - Derm   • Breast cancer (HCC)     2015   • Cancer (HCC)     breast, cervical, melanoma   • Cervical cancer (HCC) 1976    Diagnosed in 1975.  Treated by repeated local intervention and ultimately received cryotherapy at [facility] with resolution.   • Elbow fracture    • Elevated cholesterol    • Foot fracture    • Headache    • Hyperlipidemia    • Hypertension    • Melanoma (HCC)     on her back 2004 - treated by Dermatologist Catrachito Amaro in [place], [place] with what sounds like wide local excision.    • Sinusitis        Surgical History  Past Surgical History:   Procedure Laterality Date   • BREAST LUMPECTOMY Right 2015   • BREAST SURGERY  2015    Secondary to cancer   • COLONOSCOPY N/A 12/9/2020    Procedure: COLONOSCOPY;  Surgeon: Mohsen Maldonado MD;  Location: [facility];  Service: Gastroenterology;  Laterality: N/A;   • ELBOW FUSION      left   • FOOT SURGERY  2014    Broken foot   • HAND SURGERY  08/2020    right   • RHINOPLASTY  2002   • SKIN CANCER EXCISION      malignant melanoma from back 2004   • US GUIDED LYMPH NODE BIOPSY  1/13/2022       Family History  Family History   Problem Relation Age of Onset   • Other Mother         blood clots   • Hypertension Mother    • Ulcers Father    • Hypertension Sister    • Other Sister         Multiple Sclerosis   • Cancer Paternal Aunt    • Stroke Maternal Grandmother    • Alcohol abuse Paternal Grandmother    • Hypertension Sister    • Multiple sclerosis Sister    • Hypertension Sister    • Coronary artery disease Paternal Grandfather    • Cancer Maternal Aunt 30        colon   • Breast cancer Neg Hx        Social History  Social History     Socioeconomic History   • Marital status:    Tobacco Use   • Smoking status: Never Smoker   • Smokeless tobacco: Never Used   Vaping Use   • Vaping Use: Never used   Substance and Sexual Activity   • Alcohol use: Not Currently   • Drug use: Never   • Sexual activity: Not Currently     Birth control/protection:  "Post-menopausal       Objective   Vital Signs:   /68 (BP Location: Right arm, Patient Position: Sitting, Cuff Size: Adult)   Pulse 86   Temp 97.1 °F (36.2 °C)   Ht 170.2 cm (67.01\")   Wt 82.5 kg (181 lb 12.8 oz)   SpO2 96%   BMI 28.47 kg/m²     Physical Exam     Gen: Patient in NAD. Pleasant and answers appropriately. A&Ox3.    Skin: Warm and dry with normal turgor. No purpura, rashes, or unusual pigmentation noted. Hair is normal in appearance and distribution.    HEENT: NC/AT. No lesions noted. Conjunctiva clear, sclera nonicteric. PERRL. EOMI without nystagmus or strabismus. Fundi appear benign. No hemorrhages or exudates of eyes. Auditory canals are patent bilaterally without lesions. TMs intact,  nonerythematous, nonbulging without lesions. Nasal mucosa pink, nonerythematous, and nonedematous. Frontal and maxillary sinuses are nontender. O/P nonerythematous and moist without exudate.    Neck: Supple without lymph nodes palpated. FROM.     Lungs: Coarse and decreased B/L without rales, rhonchi, crackles, or wheezes.    Heart: RRR. S1 and S2 normal. No S3 or S4. No MRGT.    Abd: Soft, tender in the right upper quadrant,nondistended. (+)BSx4 quadrants.     Extrem: No CCE. Radial pulses 2+/4 and equal B/L. FROMx4. No bone, joint, or muscle tenderness noted.    Neuro: No focal motor/sensory deficits.    Procedures       Assessment and Plan    Yadira Burgos is a 66 y.o. here for medical followup.    Problem List Items Addressed This Visit     None      Visit Diagnoses     Painful urination    -  Primary  We checked her urine today in office and we will also obtain a urine culture.    Relevant Orders    POCT urinalysis dipstick, automated (Completed)    Urine Culture - Urine, Urine, Clean Catch    CBC Auto Differential    Comprehensive Metabolic Panel    History of abdominal abscess      -  We will obtain an ultrasound and lab work.    Relevant Orders    US Liver    CBC Auto Differential    " Comprehensive Metabolic Panel    Lung nodule        Relevant Orders    CBC Auto Differential    Comprehensive Metabolic Panel    History of cancer        Relevant Orders    CBC Auto Differential    Comprehensive Metabolic Panel            Patient's Body mass index is 28.47 kg/m². indicating that she is overweight (BMI 25-29.9). Obesity-related health conditions include the following: hypertension. Obesity is unchanged. BMI is is above average; BMI management plan is completed. We discussed portion control and increasing exercise..                 Follow Up   Return in about 4 weeks (around 2/10/2022), or LABS.  Findings and plans discussed with patient who verbalizes understanding and agreement. Will followup with patient once results are in. Patient was given instructions and counseling regarding her condition or for health maintenance advice. Please see specific information pulled into the AVS if appropriate.     Transcribed from ambient dictation for Dilcia Pedro MD by Marily George  01/14/22   00:55 EST    Patient verbalized consent to the visit recording.  I have personally performed the services described in this document as transcribed by the above individual, and it is both accurate and complete.  Dilcia Pedro MD  1/31/2022  07:02 EST

## 2022-01-17 ENCOUNTER — HOSPITAL ENCOUNTER (OUTPATIENT)
Dept: CT IMAGING | Facility: HOSPITAL | Age: 67
Discharge: HOME | End: 2022-01-17

## 2022-01-17 ENCOUNTER — APPOINTMENT (OUTPATIENT)
Dept: CT IMAGING | Facility: HOSPITAL | Age: 67
End: 2022-01-17

## 2022-01-17 ENCOUNTER — APPOINTMENT (OUTPATIENT)
Dept: ULTRASOUND IMAGING | Facility: HOSPITAL | Age: 67
End: 2022-01-17

## 2022-01-17 ENCOUNTER — READMISSION MANAGEMENT (OUTPATIENT)
Dept: CALL CENTER | Facility: HOSPITAL | Age: 67
End: 2022-01-17

## 2022-01-17 LAB
LAB AP CASE REPORT: NORMAL
LAB AP CLINICAL INFORMATION: NORMAL
PATH REPORT.FINAL DX SPEC: NORMAL

## 2022-01-17 NOTE — OUTREACH NOTE
Sepsis Week 4 Survey      Responses   [facility] patient discharged from? [facility]   Does the patient have one of the following disease processes/diagnoses(primary or secondary)? Sepsis   Week 4 attempt successful? Yes   Call start time [id number]   Call end time 0841   Discharge diagnosis Sepsis due to pneumonia    Meds reviewed with patient/caregiver? Yes   Is the patient having any side effects they believe may be caused by any medication additions or changes? No   Is the patient taking all medications as directed (includes completed medication regime)? Yes   Has the patient kept scheduled appointments due by today? Yes   Is the patient still receiving Home Health Services? N/A   Psychosocial issues? No   What is the patient's perception of their health status since discharge? Improving   Nursing interventions Nurse provided patient education   Is the patient/caregiver able to teach back Sepsis? S - Shivering,fever or very cold,  E - Extreme pain or generalized discomfort (worst ever,especially abdomen),  S - Sleepy, difficult to arouse,confused,  S - Short of breath   Nursing interventions Nurse provided patient education   Is patient/caregiver able to teach back steps to recovery at home? Set small, achievable goals for return to baseline health,  Rest and regain strength,  Eat a balanced diet   Is the patient/caregiver able to teach back signs and symptoms of worsening condition: Fever,  Rapid heart rate (>90),  Shortness of breath/rapid respiratory rate,  Edema,  Altered mental status(confusion/coma)   Is the patient/caregiver able to teach back the hierarchy of who to call/visit for symptoms/problems? PCP, Specialist, Home health nurse, Urgent Care, ED, 911 Yes   Week 4 call completed? Yes   Would the patient like one additional call? No   Graduated Yes   Is the patient interested in additional calls from an ambulatory ?  NOTE:  applies to high risk patients requiring additional follow-up. No   Did  the patient feel the follow up calls were helpful during their recovery period? Yes   Was the number of calls appropriate? Yes          MATHEUS JONES RN

## 2022-01-18 ENCOUNTER — HOSPITAL ENCOUNTER (OUTPATIENT)
Dept: ULTRASOUND IMAGING | Facility: HOSPITAL | Age: 67
Discharge: HOME | End: 2022-01-18

## 2022-01-18 ENCOUNTER — TELEPHONE (OUTPATIENT)
Dept: ONCOLOGY | Facility: CLINIC | Age: 67
End: 2022-01-18

## 2022-01-18 ENCOUNTER — TELEPHONE (OUTPATIENT)
Dept: FAMILY MEDICINE CLINIC | Facility: CLINIC | Age: 67
End: 2022-01-18

## 2022-01-18 ENCOUNTER — HOSPITAL ENCOUNTER (OUTPATIENT)
Dept: CT IMAGING | Facility: HOSPITAL | Age: 67
Discharge: HOME | End: 2022-01-18

## 2022-01-18 ENCOUNTER — TELEPHONE (OUTPATIENT)
Dept: GENERAL RADIOLOGY | Facility: HOSPITAL | Age: 67
End: 2022-01-18

## 2022-01-18 ENCOUNTER — OFFICE VISIT (OUTPATIENT)
Dept: ONCOLOGY | Facility: CLINIC | Age: 67
End: 2022-01-18

## 2022-01-18 VITALS
SYSTOLIC BLOOD PRESSURE: 128 MMHG | DIASTOLIC BLOOD PRESSURE: 70 MMHG | TEMPERATURE: 96.9 F | BODY MASS INDEX: 27.84 KG/M2 | HEART RATE: 89 BPM | RESPIRATION RATE: 18 BRPM | WEIGHT: 177.4 LBS | HEIGHT: 67 IN | OXYGEN SATURATION: 100 %

## 2022-01-18 DIAGNOSIS — Z85.820 HISTORY OF MALIGNANT MELANOMA OF SKIN: ICD-10-CM

## 2022-01-18 DIAGNOSIS — K76.9 LIVER LESION: ICD-10-CM

## 2022-01-18 DIAGNOSIS — C50.911 MALIGNANT NEOPLASM OF RIGHT BREAST IN FEMALE, ESTROGEN RECEPTOR POSITIVE, UNSPECIFIED SITE OF BREAST: ICD-10-CM

## 2022-01-18 DIAGNOSIS — R59.0 AXILLARY ADENOPATHY: ICD-10-CM

## 2022-01-18 DIAGNOSIS — R91.8 PULMONARY NODULES: ICD-10-CM

## 2022-01-18 DIAGNOSIS — Z17.0 MALIGNANT NEOPLASM OF RIGHT BREAST IN FEMALE, ESTROGEN RECEPTOR POSITIVE, UNSPECIFIED SITE OF BREAST: ICD-10-CM

## 2022-01-18 DIAGNOSIS — R79.89 ELEVATED LFTS: ICD-10-CM

## 2022-01-18 DIAGNOSIS — R59.0 AXILLARY ADENOPATHY: Primary | ICD-10-CM

## 2022-01-18 DIAGNOSIS — R09.89 SUSPECTED PULMONARY EMBOLISM: ICD-10-CM

## 2022-01-18 DIAGNOSIS — Z85.41 HISTORY OF CERVICAL CANCER: ICD-10-CM

## 2022-01-18 DIAGNOSIS — C77.8 SECONDARY AND MALIGNANT NEOPLASM OF LYMPH NODES OF MULTIPLE SITES: ICD-10-CM

## 2022-01-18 DIAGNOSIS — Z87.898 HISTORY OF ABDOMINAL ABSCESS: ICD-10-CM

## 2022-01-18 PROCEDURE — 71260 CT THORAX DX C+: CPT | Performed by: RADIOLOGY

## 2022-01-18 PROCEDURE — 99215 OFFICE O/P EST HI 40 MIN: CPT | Performed by: INTERNAL MEDICINE

## 2022-01-18 PROCEDURE — 71260 CT THORAX DX C+: CPT

## 2022-01-18 PROCEDURE — 74177 CT ABD & PELVIS W/CONTRAST: CPT | Performed by: RADIOLOGY

## 2022-01-18 PROCEDURE — 76705 ECHO EXAM OF ABDOMEN: CPT

## 2022-01-18 PROCEDURE — 74177 CT ABD & PELVIS W/CONTRAST: CPT

## 2022-01-18 PROCEDURE — 76705 ECHO EXAM OF ABDOMEN: CPT | Performed by: RADIOLOGY

## 2022-01-18 PROCEDURE — 25010000002 IOPAMIDOL 61 % SOLUTION: Performed by: INTERNAL MEDICINE

## 2022-01-18 RX ADMIN — IOPAMIDOL 80 ML: 612 INJECTION, SOLUTION INTRAVENOUS at 14:09

## 2022-01-18 NOTE — PROGRESS NOTES
"NAME: Yadira Burgos    : 1955    DATE:  2022    DIAGNOSIS:   1.  Stage IA ([id number]) moderately differentiated invasive ductal carcinoma of the right breast diagnosed in 2015.    2.  H/o malignant Melanoma on her Back    3.  H/o cervical cancer treated with cryotherapy at [facility] in     4.  Recurrent / metastatic malignant Melanoma  - suspected based on biopsy of R axillary LN 22.    -  She has R axillary LN mass (2.47x2.16), Solitary cavitary R lobe liver lesion, cavitary RLL lung mass,  and bilateral parenchymal lung nodules    CHIEF COMPLAINT:  Follow up of breast cancer and recent imaging    HISTORY OF PRESENT ILLNESS:   Yadira Burgos is a very pleasant 66 y.o. female who was referred by Dr. Dilcia Pedro for evaluation and treatment of breast cancer. She was living in [place] in 2015 when her dog brought attention to and \"found\" an abnormal lump in her right breast.  She was treated by Dr. Steve Isbell and underwent R lumpectomy with SLNBx on 10-22-15 as below.  She then had what sounds like accelerated partial breast irradiation.  She was then started on Arimidex which was later switched to Exemestane for a better side effect profile.  She took this for about 5 years, stopping a couple of weeks ago.  She stopped taking Exemestane because she developed some vaginal bleeding and hematuria.  This was evaluated by her PCP and gynecologist and felt to be due to vaginal atrophy.  She was prescribed a hormone pill which she hasn't yet started and was referred here to discuss things further.      Aside from bleeding which has been uncomfortable and distressing for her, Ms. Onur Burgos has generally been well.  The last year has been hard on her emotionally with the loss of her  and then her dog, her home and her insurance, but she is coping well and has good family support in [place].  She says she gained weight with her breast cancer treatment and gained weight " "when her  .  She has been working now to lose weight and has lost 20 lbs over the last 2 months with dieting.  She denies chest pain or shortness of breath.  She complains of some RLQ cramping pain and isn't sure what is causing that.  She denies difficulty moving her bowels.  No blood in her stool that she is aware of.  She has had vaginal bleeding and hematuria as above.        INTERVAL HISTORY:  Ms. Burgos is here today for follow up of malignancy and recent hospitalization.  She was recently admitted with sepsis.  She had Klebsiella bacteremia and a RLL Cavitary pneumonia and R lobe of liver cavitary mass.  Purulent material was drained from the liver with negative cytology and she was treated with antibiotics with improvement in infectious symptoms.  She was discharged and had FNA of R axillary mass which returned as suspicious for metastatic melanoma with recommendation for resection of the whole LN for definitive dx and further testing.  She also had repeat imaging as below.  This showed RLL pulmonary mass, bilateral lung nodules, R axillary mass and R lobe of liver mass.  There were also filling defects in pulmonary arteries bilaterally concerning forpulmonary emboli.  Radiologist recommended CTPE protocol to confirm but felt strongly these were likely to be incidentally imaged pulmonary emboli. Ms. Onur Burgos says since she has been home she feels short of breath with any activity.  She denies chest pain.  She has had some sweats.  She says she also has mild cough which is dry with a \"tickle\" in her throat.  She says she is afraid to eat because last week she had an episode of severe epigastric pain.  She says this hasn't happened since.  She has been seeing Dermatology twice/year.  She hasn't had any obvious recent skin abnormalities.     PAST MEDICAL HISTORY:  Past Medical History:   Diagnosis Date   • Back pain    • Basal cell carcinoma (BCC) in situ of skin     Dr. Mohr - Derm   • Breast " cancer (HCC)        • Cancer (HCC)     breast, cervical, melanoma   • Cervical cancer (HCC) 1976    Diagnosed in .  Treated by repeated local intervention and ultimately received cryotherapy at [facility] with resolution.   • Elbow fracture    • Elevated cholesterol    • Foot fracture    • Headache    • Hyperlipidemia    • Hypertension    • Melanoma (HCC)     on her back  - treated by Dermatologist Catrachito Amaro in [place], [place] with what sounds like wide local excision.    • Sinusitis        PAST SURGICAL HISTORY:  Past Surgical History:   Procedure Laterality Date   • BREAST LUMPECTOMY Right 2015   • BREAST SURGERY  2015    Secondary to cancer   • COLONOSCOPY N/A 2020    Procedure: COLONOSCOPY;  Surgeon: Mohsen Maldonado MD;  Location: [facility];  Service: Gastroenterology;  Laterality: N/A;   • ELBOW FUSION      left   • FOOT SURGERY      Broken foot   • HAND SURGERY  2020    right   • RHINOPLASTY     • SKIN CANCER EXCISION      malignant melanoma from back    • US GUIDED LYMPH NODE BIOPSY  2022       FAMILY HISTORY:  Family History   Problem Relation Age of Onset   • Other Mother         blood clots   • Hypertension Mother    • Ulcers Father    • Hypertension Sister    • Other Sister         Multiple Sclerosis   • Cancer Paternal Aunt    • Stroke Maternal Grandmother    • Alcohol abuse Paternal Grandmother    • Hypertension Sister    • Multiple sclerosis Sister    • Hypertension Sister    • Coronary artery disease Paternal Grandfather    • Cancer Maternal Aunt 30        colon   • Breast cancer Neg Hx    Many paternal aunts  with malignancy of various types. Many cousins on that side of the family have had cancer as well.  One  of colon cancer in her 30s.  One had a brain tumor.    SOCIAL HISTORY:  Social History     Socioeconomic History   • Marital status:    Tobacco Use   • Smoking status: Never Smoker   • Smokeless tobacco: Never Used   Vaping Use   • Vaping  "Use: Never used   Substance and Sexual Activity   • Alcohol use: Not Currently   • Drug use: Never   • Sexual activity: Not Currently     Birth control/protection: Post-menopausal         REVIEW OF SYSTEMS:   A comprehensive 14 point review of systems was performed.  Significant findings as mentioned above.  All other systems reviewed and are negative.      MEDICATIONS:  The current medication list was reviewed in the EMR    Current Outpatient Medications:   •  sulfamethoxazole-trimethoprim (Bactrim DS) 800-160 MG per tablet, Take 1 tablet by mouth 2 (Two) Times a Day., Disp: 6 tablet, Rfl: 0  No current facility-administered medications for this visit.    ALLERGIES:    Allergies   Allergen Reactions   • Lactose Intolerance (Gi) Diarrhea, Other (See Comments) and GI Intolerance     \"pain and cramping\"       PHYSICAL EXAM:  Vitals:    01/18/22 1511   BP: 128/70   Pulse: 89   Resp: 18   Temp: 96.9 °F (36.1 °C)   SpO2: 100%     Pain Score    01/18/22 1511   PainSc: 0-No pain     ECOG score: 0     General:  Awake, alert and oriented, in no distress  HEENT:  Pupils are equal, round and reactive to light and accommodation, Extra-ocular movements full, Oropharyx clear, mucous membranes moist  Neck:  No JVD, thyromegaly or lymphadenopathy  CV:  Regular rate and rhythm, no murmurs, rubs or gallops  Resp:  Lungs are clear to auscultation bilaterally, no rhonchia  Breast:  S/p R lumpectomy. There is a somewhat firm area at the site of surgery but there are no palpable masses.  There is a palpable R axillary LN, approx 3 cm in diameter..  Left breast is without mass lesions.  No L axillary adenopathy.  Abd:  Soft, non-tender, non-distended, bowel sounds present, no organomegaly or masses.  She is s/p biopsy / drainage of R liver abscess.  Ext:  No clubbing, cyanosis or edema. No palpable cords.  Negative Brian's sign.  Lymph:  No cervical, supraclavicular, axillary, inguinal or femoral adenopathy  Neuro:  MS as above, CN II-XII " intact, grossly non-focal exam          PATHOLOGY:  10-22-15            12-16-21          01-13-22          ENDOSCOPY:  Colonoscopy 12-09-20     Findings: 8 hyperplastic appearing polyps of the distal rectum, diverticulosis moderately throughout the sigmoid colon      IMAGING:  CTCAP 12-15-20  On the lung windows there are several calcified  granulomas in the lungs. No noncalcified pulmonary parenchymal lung  nodules were identified. On the soft tissue windows there were no  enlarged lymph nodes in the supraclavicular or axillary regions. No  mediastinal or hilar lymphadenopathy was seen. The heart was not  enlarged. There were no pericardial effusions.     IMPRESSION:  No CT findings of metastatic disease in the chest. There are  calcified granulomas in the lungs.     CT FINDINGS: The liver and spleen were normal in size and shape. The  gallbladder contains several stones. The wall was not thickened. The  bile ducts in the liver are not dilated. There is nothing seen to  suggest metastatic disease in the liver. The pancreas shows no evidence  of mass or inflammation. No adrenal or renal lesions are demonstrated.  The aorta is normal in caliber. There were no enlarged lymph nodes in  the retroperitoneum or in the mesentery. The bowel shows no evidence of  obstruction. In the pelvis there were no masses or fluid collections.  There were no ventral hernias.     IMPRESSION:  No CT findings of metastatic disease in the abdomen or  pelvis. This study does demonstrate several stones in the lumen of the  gallbladder.         Bilateral diagnostic mammogram 01-31-21  FINDINGS: There are scattered areas of fibroglandular density.     The bilateral fibroglandular pattern does not appear significantly  changed compared to the exam from 2020. This includes postoperative  changes in the right 12:00 region. Mild skin thickening is noted  involving the right breast which is likely treatment related. A few  bilateral scattered  calcifications are noted.     IMPRESSION:  Incomplete examination as comparison with older outside  prior mammograms is needed.        BI-RADS CATEGORY:   0, INCOMPLETE:  Need prior mammograms for comparison        RECOMMENDATION:  We will attempt to obtain older outside prior  mammograms for comparison.      CTCAP 12-12-21  FINDINGS: Lack of IV contrast hinders parenchymal assessment of the mediastinum, liver, spleen, pancreas, adrenal glands and kidneys.         Significant infiltrate seen in the right lower lobe with interstitial component. There are also lung nodules present in the right lung. Index nodule in the right lung base medially is 5 mm maximal diameter. Other smaller nodules seen throughout the right  lung. Tiny 3 mm lingular nodule seen in series 3 image 30. A benign calcified granuloma seen in the left lower lobe but a noncalcified indeterminant  5 mm left lower lobe nodule seen, series 3 image 42.     Nonenlarged mediastinal adenopathy seen. However there is an enlarged right axillary 1.8 x 2.6 cm lymph node partially seen.     Worrisome hepatic metastasis or primary hepatic lesion seen measuring 4.4 x 4.1 cm in liver segment 8. Margins are ill-defined. Abscess could have a similar appearance. Suggestion of subtle gallstones. Nonobstructing 2 mm right renal calculus seen. No  obstructive uropathy. Gaseous distention of the esophagus and hiatal hernia present consistent with GERD. No enteric contrast but no gross evidence of bowel obstruction.   There is no gross free air, free fluid or focal inflammatory change.  Uterus and  adnexa are not enlarged. Osseous structures are grossly intact.     IMPRESSION:  Constellation of findings with significant consolidation in the right lower lobe, enlarged right axillary lymph node, indeterminate bilateral lung nodules and ill-defined hepatic lesion concerning for underlying malignancy in this patient .     FINDINGS: Lack of IV contrast hinders parenchymal  assessment of the mediastinum, liver, spleen, pancreas, adrenal glands and kidneys.         Significant infiltrate seen in the right lower lobe with interstitial component. There are also lung nodules present in the right lung. Index nodule in the right lung base medially is 5 mm maximal diameter. Other smaller nodules seen throughout the right  lung. Tiny 3 mm lingular nodule seen in series 3 image 30. A benign calcified granuloma seen in the left lower lobe but a noncalcified indeterminant  5 mm left lower lobe nodule seen, series 3 image 42.     Nonenlarged mediastinal adenopathy seen. However there is an enlarged right axillary 1.8 x 2.6 cm lymph node partially seen.     Worrisome hepatic metastasis or primary hepatic lesion seen measuring 4.4 x 4.1 cm in liver segment 8. Margins are ill-defined. Abscess could have a similar appearance. Suggestion of subtle gallstones. Nonobstructing 2 mm right renal calculus seen. No  obstructive uropathy. Gaseous distention of the esophagus and hiatal hernia present consistent with GERD. No enteric contrast but no gross evidence of bowel obstruction.   There is no gross free air, free fluid or focal inflammatory change.  Uterus and  adnexa are not enlarged. Osseous structures are grossly intact.     IMPRESSION:  Constellation of findings with significant consolidation in the right lower lobe, enlarged right axillary lymph node, indeterminate bilateral lung nodules and ill-defined hepatic lesion concerning for underlying malignancy in this patient .       US Abdomen Complete 12-12-21  FINDINGS:  The visualized portions of the pancreas, IVC and aorta appear normal.        The liver is moderately coarsened in echotexture. Ill-defined hypodensity in the liver measuring 3 x 3.2 x 2.6 cm corresponds to recent CT. Unclear if this is a malignant lesion. Abscesses within the differential but prior CT was performed without any  contrast.  The common bile duct is not dilated..  Gallbladder wall is thickened at 3.4 mm with trace pericholecystic fluid and subtle hyperemia. Gallstones and shadowing present.     The kidneys are normal in size and echogenicity. There is no   hydronephrosis or focal solid lesion.  The spleen is normal in size and echotexture.     IMPRESSION:  1. Nonspecific gallbladder wall thickening and questionable pericholecystic fluid. Gallstones are better seen on CT than ultrasound.  2.  Heterogeneous liver with lesion in the liver as seen on CT. Ultrasound is not helpful in determining etiology. This could be an abscess but given the other findings on CT,  malignancy is not excluded.      CTCAP 12-15-21  FINDINGS:    LUNGS:  Increased density of the now right lower lobe mixed  groundglass and more solid-appearing consolidation with internal  cavitary component identified. Tiny right and left lung pulmonary  nodules. Grossly stable.    PLEURAL SPACE:  Small bilateral pleural effusions.  No pneumothorax.    HEART:  Unremarkable.  No cardiomegaly.  No significant pericardial  effusion.    BONES/JOINTS:  Unremarkable.  No acute fracture.  No dislocation.    SOFT TISSUES:  Unremarkable.    VASCULATURE:  Unremarkable.  No thoracic aortic aneurysm.    LYMPH NODES:  Unremarkable.  No enlarged lymph nodes.     IMPRESSION:  1.  Increased density of the now right lower lobe mixed groundglass and  more solid-appearing consolidation with internal cavitary component  identified. Tiny right and left lung pulmonary nodules. Grossly stable.  2.  Small bilateral pleural effusions.    FINDINGS:    LUNG BASES:  Unremarkable.  No mass.  No consolidation.      ABDOMEN:    LIVER:  Right lobe of liver lesion is again identified now measuring  about 4.7 cm and was about 4.7 cm.    GALLBLADDER AND BILE DUCTS:  Gallstones noted in the gallbladder.  No  ductal dilation.    PANCREAS:  Unremarkable.  No mass.  No ductal dilation.    SPLEEN:  Unremarkable.  No splenomegaly.    ADRENALS:  Unremarkable.   No mass.    KIDNEYS AND URETERS:  Unremarkable.  No solid mass.  No  hydronephrosis.    STOMACH AND BOWEL:  Unremarkable.  No obstruction.  No mucosal  thickening.      PELVIS:    APPENDIX:  No findings to suggest acute appendicitis.    BLADDER:  Unremarkable.  No mass.    REPRODUCTIVE:  Unremarkable as visualized.      ABDOMEN and PELVIS:    INTRAPERITONEAL SPACE:  Unremarkable.  No free air.  No significant  fluid collection.    BONES/JOINTS:  No acute fracture.  No dislocation.    SOFT TISSUES:  Unremarkable.    VASCULATURE:  Unremarkable.  No abdominal aortic aneurysm.    LYMPH NODES:  Unremarkable.  No enlarged lymph nodes.     IMPRESSION:    Right lobe of liver lesion is again identified now measuring about 4.7  cm and was about 4.7 cm.      NM Bone Scan Whole Body 12-15-21  FINDINGS:    SKULL/FACIAL BONES:  No focal increased or decreased uptake.    SPINE:  Unremarkable.  No abnormal increased or decreased uptake.    RIBS:  Unremarkable.  No abnormal uptake.    LONG BONES:  Unremarkable.  No abnormal uptake.    PELVIS:  Unremarkable.  No focal increased or decreased uptake.    JOINTS:  Unremarkable.  No focal increased or decreased uptake.    SOFT TISSUES:  Unremarkable.    RENAL/BLADDER:  Within normal limits.     IMPRESSION:    Normal bone scan.      CT Abdomen With Contrast  12-18-21  FINDINGS:  Partially imaged thick-walled cavitary lesion in the right lower lobe with right basilar atelectasis/infiltrate and small right pleural effusion. There is also left basilar atelectasis/infiltrate and trace left pleural effusion. Multiple noncalcified  pulmonary nodules are scattered throughout the visualized lower lung fields, concerning for pulmonary metastatic disease.     Ill-defined right hepatic mass again noted. The spleen, pancreas, and both adrenal glands are within expected limits. Cholelithiasis. Punctate nonobstructing right intrarenal stone. Tiny left renal cyst. Kidneys are otherwise unremarkable  without  hydronephrosis. Abdominal aorta normal in course and caliber without dissection. No pathologic retroperitoneal or mesenteric lymphadenopathy by size criteria. Visualized small bowel and colon are unremarkable. No bowel obstruction. No free fluid or free  air.     No aggressive osseous lesions.     IMPRESSION:     1. Ill-defined right hepatic mass again noted. This has been previously biopsied and correlation with pathology results is recommended.  2. Cholelithiasis.  3. Punctate nonobstructing right intrarenal stone.  4. No threshold abdominal lymphadenopathy or other suspicious abdominal masses.  5. Partially imaged thick-walled cavitary lesion in the right lower lobe. This may be of infectious or neoplastic etiology and continued follow-up is recommended.  6. Small bilateral pleural effusions with bibasilar atelectasis/infiltrate, worse on the right than left.  7. Numerous small noncalcified pulmonary nodules throughout the visualized lower lung fields, concerning for pulmonary metastatic disease.       Mammo Diagnostic Digital Tomosynthesis Bilateral With CAD w/ US Axilla Right 01-13-22  FINDINGS:  There are scattered areas of fibroglandular density.     Incompletely imaged is a very prominent axillary lymph node. The  bilateral fibroglandular pattern itself is stable in appearance  including postoperative changes in the superior aspect of the right  breast. There are stable bilateral calcifications. Mild skin thickening  is again noted involving the right breast which is likely treatment  related.     ULTRASOUND: Ultrasound evaluation of the right axilla demonstrates 2  markedly enlarged lymph nodes the largest of which measures 3.2 cm in  size and has no demonstrable fatty hilum. Recommend ultrasound-guided  biopsy.      IMPRESSION:  1. Stable mammographic appearance of the left breast with no findings  suspicious for malignancy.        2. Marked right axillary lymphadenopathy. Recommend  ultrasound-guided  biopsy. The fibroglandular pattern of the right breast is however  stable.        BI-RADS CATEGORY:  4, SUSPICIOUS        RECOMMENDATION:  Recommend ultrasound-guided biopsy of the dominant  abnormal appearing right axillary lymph node.      DEXA Bone Density Axial 01-13-22  IMPRESSION:  Impression:  1. According to the World Health Organization definitions of  osteoporosis based on bone density, this patient's bone mineral density  is compatible with osteoporosis and the fracture risk is high.      Mammo Diagnostic Digital Tomosynthesis Bilateral With CAD w/ US Axilla Right 01-13-22  FINDINGS:  There are scattered areas of fibroglandular density.     Incompletely imaged is a very prominent axillary lymph node. The  bilateral fibroglandular pattern itself is stable in appearance  including postoperative changes in the superior aspect of the right  breast. There are stable bilateral calcifications. Mild skin thickening  is again noted involving the right breast which is likely treatment  related.     ULTRASOUND: Ultrasound evaluation of the right axilla demonstrates 2  markedly enlarged lymph nodes the largest of which measures 3.2 cm in  size and has no demonstrable fatty hilum. Recommend ultrasound-guided  biopsy.      IMPRESSION:  1. Stable mammographic appearance of the left breast with no findings  suspicious for malignancy.        2. Marked right axillary lymphadenopathy. Recommend ultrasound-guided  biopsy. The fibroglandular pattern of the right breast is however  stable.        BI-RADS CATEGORY:  4, SUSPICIOUS        RECOMMENDATION:  Recommend ultrasound-guided biopsy of the dominant  abnormal appearing right axillary lymph node.      CT Chest With Contrast Diagnostic 01-18-22  FINDINGS:     LUNGS: Solitary mass in the right lower lobe with cavitation.  Significantly smaller today than on the prior study. 6 mm solid nodule  in the right lung posteriorly on image 30 of the axial series.  Other  parenchymal nodules are seen in both lungs and are similar to the  previous study. These are concerning for metastatic nodules.     HEART: Unremarkable.     MEDIASTINUM: No masses. No enlarged lymph nodes.  No fluid collections.     PLEURA: No pleural effusion. No pleural mass or abnormal calcification.  No pneumothorax.     VASCULATURE: No evidence of aneurysm. Filling defect in the right  pulmonary artery and possibly left lower lobe pulmonary artery. This  study was not performed to evaluate for pulmonary embolus, but the  findings are concerning for bilateral pulmonary emboli.     BONES: No acute bony abnormality.     VISUALIZED UPPER ABDOMEN:Please see the CT report for the abdomen and  pelvis.     Other: Right axillary soft tissue mass is present and shows slight  interval growth. It measures 2.47 x 2.16 cm today and previously at the  same level measured approximately 2.7 x 1.89 cm.     IMPRESSION:  1. Study was not performed as a PE protocol, but there appear to be  filling defects in pulmonary arteries bilaterally concerning for  pulmonary emboli.  2. Interval decrease in size of cavitary right lower lobe mass.  3. Stable parenchymal nodules bilaterally.  4. Very slight interval growth of right axillary soft tissue mass.     Results are being relayed to the referring physician.      RECENT LABS:  Lab Results   Component Value Date    WBC 4.95 01/13/2022    HGB 14.9 01/13/2022    HCT 46.5 01/13/2022    MCV 90.5 01/13/2022    RDW 15.2 01/13/2022     01/13/2022    NEUTRORELPCT 43.9 01/13/2022    LYMPHORELPCT 42.6 01/13/2022    MONORELPCT 10.5 01/13/2022    EOSRELPCT 1.8 01/13/2022    BASORELPCT 1.0 01/13/2022    NEUTROABS 2.17 01/13/2022    LYMPHSABS 2.11 01/13/2022       Lab Results   Component Value Date     12/30/2021    K 3.8 12/30/2021    CO2 21.6 (L) 12/30/2021     12/30/2021    BUN 11 12/30/2021    CREATININE 0.38 (L) 12/30/2021    EGFRIFNONA >150 12/30/2021    GLUCOSE 86  12/30/2021    CALCIUM 9.1 12/30/2021    ALKPHOS 62 12/30/2021    AST 23 12/30/2021    ALT 13 12/30/2021    BILITOT 0.3 12/30/2021    ALBUMIN 3.50 12/30/2021    PROTEINTOT 6.3 12/30/2021    MG 2.0 12/17/2021     Lab Results   Component Value Date    TSH 1.900 09/16/2020     Lab Results   Component Value Date    FERRITIN 617.30 (H) 12/14/2021    IRON 38 12/14/2021    TIBC 153 (L) 12/14/2021    LABIRON 25 12/14/2021    VAMUSXMD14 1,233 (H) 12/14/2021    FOLATE 14.40 12/14/2021         ASSESSMENT & PLAN:  Yadira Burgos is a very pleasant 66 y.o. female with a history of cervical cancer, breast cancer and malignant melanoma.    1.  History of Breast Cancer:  -  Ms. Onur Burgos had Stage IA ([id number]) moderately differentiated invasive ductal carcinoma of the right breast diagnosed in October 2015.  -  She underwent R lympectomy and SLNBx performed by Dr. Steve Isbell 10-22-15 and then received adjuvant radiation in [place].  -  After radiation, she was started on Arimidex which was then switched to Exemestane.  She took Exemestane for almost 5 years. Unfortunately, she developed vaginal bleeding and hematuria related to vaginal atrophy related to hormonal blockade.  Given this, stopped Exemestane.  She is doing much better in this regard since stopping hormonal therapy.  -  Mammogram 1-13-22 without cause for concern aside from R axillary LAD.  No palpable breast masses.    2.  H/o malignant Melanoma on her Back:  -  S/p resection performed by Dr. Catrachito Amaro of Dermatology in [place] in 2004.  -  Recommended she see a dermatologist twice a year for full skin exam.  She was being seen by the group in [place] q 3-6 months but wasn't happy with her last visit there.  She reports resection of non-melanoma skin cancers but no repeat problems w/ melanoma that she is aware of.  Will refer to Dr. Medeiros for full skin exam.  -  Scar from her remote surgery appears benign.      3.  Concern for development of  metastatic malignant melanoma:  -  S/p FNA R axillary LN which was concerning for melanoma.  Will ask for PAC placement at time of LN biopsy.  -  Will refer for excision of LN for confirmation of dx and will send this for CARIS testing.  -  She has suspicious huyen lung nodules including larger RLL mass as well as a cavitary liver lesion.  -  Will order PET-CT to better evaluate systemic disease.  Will also order MRI Brain.  -  Check labs today incluidng CBCD, CMP, TSH, LDH.  -  Refer to Dr. Medeiros as above.    4.  Suspicion for Bilateral pulmonary emboli:  -  CT Chest was not PE protocol but PEs appear to be present.  -  Will check BLE dopplers and CTPE protocol  -  Will start Eliquis 10 mg BID x 7d then 5 mg BID.    5.  H/o Colon polyps:  -  Dr. Maldonado performed c-scope 12-09-20 with discovery of 8 hyperplastic polyps in the distal rectum as well as diverticulosis.  He recommended repeat c-scope after 5 years..    6.  H/o cervical cancer treated with cryotherapy at [facility] in [facility]:  -  Recently saw gynecologist Dr. Manav Real.  She says she had exam including pap smear and then pelvic U/S which was unrevealing.    7. Prophylaxis:  -  Had COVID vaccine x 2 (Pfizer).  Will need booster dose.  Recommended 2021 influenza vaccine.    8  ACO / DWAYNE/Other  Quality measures  -  Yadira Burgos received 2020 flu vaccine.  Recommended 2021 vaccine.  -  Yadira Burgos reports a pain score of 0.  Given her pain assessment as noted, treatment options were discussed and the following options were decided upon as a follow-up plan to address the patient's pain: No intervention needed at this time..  -  Current outpatient and discharge medications have been reconciled for the patient.  Reviewed by: Loreto Stone MD     9.  F/u:  -  Excisional Bx of R axillary LN.  Will send this for CARIS.  -  Refer for PAC placement  -  Check CTPE protocol and BLE dopplers  -  Start Eliquis  -  PET-CT  -  MRI Brain  -  Refer to Dr. Medeiros  for full skin exam  -  F/u with me after the above.    This note was scribed for Loreto Stone MD by Alba Felton RN.    I, Loreto Stone MD, personally performed the services described in this documentation as scribed by the above named individual in my presence, and it is both accurate and complete.  01/18/2022     I spent 45 minutes with Yaidra Burgos today.  In the office today, more than 50% of this time was spent face-to-face with her  in counseling / coordination of care, reviewing her medical history and counseling on the current treatment plan.  All questions were answered to her satisfaction      Electronically Signed by: Loreto Stone MD      CC:     MD Manav Cotto MD Aaron House, MD

## 2022-01-18 NOTE — TELEPHONE ENCOUNTER
Caller: Whitney Oseguera    Relationship: Self      What was the call regarding:     SUPPOSED TO HAVE CT YESTERDAY BUT THEY WERE CLOSED AND IS HAVING CT ORDERED BY DR MONTALVO TODAY NEEDING TO KNOW IF NEED TO R/S OR KEEP APPT IF ENOUGH TIME FOR RESULTS.      I CALLED  AND SPOKE TO SAMARIA WHO ADVISED AS LONG AS WHITNEY IS GETTING THE CT DONE TODAY DR MONTALVO SHOULD HAVE THE RESULTS BACK IN TIME FOR TOMORROWS APPT. SHE WOULD ONLY NEED TO R/S IF COULD NOT COMPLETE CT TODAY     I ADVISED THIS TO WHITNEY AND SHE V/U

## 2022-01-19 ENCOUNTER — TELEPHONE (OUTPATIENT)
Dept: MAMMOGRAPHY | Facility: HOSPITAL | Age: 67
End: 2022-01-19

## 2022-01-19 ENCOUNTER — TELEPHONE (OUTPATIENT)
Dept: FAMILY MEDICINE CLINIC | Facility: CLINIC | Age: 67
End: 2022-01-19

## 2022-01-19 NOTE — TELEPHONE ENCOUNTER
Pt given biopsy results and sx consultation appt    ----- Message from Karen Fernando MD sent at 1/18/2022  9:59 AM EST -----  PATHOLOGY:Necrotic neoplasm with abundant pigment, concerning for metastatic melanoma.The pathology result is concordant with the imaging findings.Recommend surgical consultation and excisional biopsy of this lymph node to obtain viable tumor tissue for definitive diagnosis.The patient will be notified of the results and recommendations by our breast care nurse.

## 2022-01-19 NOTE — TELEPHONE ENCOUNTER
----- Message from Dilcia Pedro MD sent at 1/15/2022  1:56 AM EST -----  Please call Yadira. Let her know that her labs look great. Her urine culture did come back as mixed bette so if she still is having some dysuria, would recommend recollection. Just let me know.      Spoke with patient & she verbalized understanding,no dysuria at present.

## 2022-01-20 ENCOUNTER — TELEPHONE (OUTPATIENT)
Dept: GENERAL RADIOLOGY | Facility: HOSPITAL | Age: 67
End: 2022-01-20

## 2022-01-20 ENCOUNTER — HOSPITAL ENCOUNTER (OUTPATIENT)
Dept: CARDIOLOGY | Facility: HOSPITAL | Age: 67
Discharge: HOME | End: 2022-01-20

## 2022-01-20 ENCOUNTER — LAB (OUTPATIENT)
Dept: LAB | Facility: HOSPITAL | Age: 67
End: 2022-01-20

## 2022-01-20 ENCOUNTER — HOSPITAL ENCOUNTER (OUTPATIENT)
Dept: CT IMAGING | Facility: HOSPITAL | Age: 67
Discharge: HOME | End: 2022-01-20

## 2022-01-20 DIAGNOSIS — Z17.0 MALIGNANT NEOPLASM OF RIGHT BREAST IN FEMALE, ESTROGEN RECEPTOR POSITIVE, UNSPECIFIED SITE OF BREAST: ICD-10-CM

## 2022-01-20 DIAGNOSIS — R59.0 AXILLARY ADENOPATHY: ICD-10-CM

## 2022-01-20 DIAGNOSIS — R09.89 SUSPECTED PULMONARY EMBOLISM: ICD-10-CM

## 2022-01-20 DIAGNOSIS — Z85.820 HISTORY OF MALIGNANT MELANOMA OF SKIN: ICD-10-CM

## 2022-01-20 DIAGNOSIS — C50.911 MALIGNANT NEOPLASM OF RIGHT BREAST IN FEMALE, ESTROGEN RECEPTOR POSITIVE, UNSPECIFIED SITE OF BREAST: ICD-10-CM

## 2022-01-20 LAB
ALBUMIN SERPL-MCNC: 4.11 G/DL (ref 3.5–5.2)
ALBUMIN/GLOB SERPL: 1.7 G/DL
ALP SERPL-CCNC: 106 U/L (ref 39–117)
ALT SERPL W P-5'-P-CCNC: 44 U/L (ref 1–33)
ANION GAP SERPL CALCULATED.3IONS-SCNC: 13.2 MMOL/L (ref 5–15)
AST SERPL-CCNC: 34 U/L (ref 1–32)
BASOPHILS # BLD AUTO: 0.04 10*3/MM3 (ref 0–0.2)
BASOPHILS NFR BLD AUTO: 1.1 % (ref 0–1.5)
BILIRUB SERPL-MCNC: 0.7 MG/DL (ref 0–1.2)
BUN SERPL-MCNC: 10 MG/DL (ref 8–23)
BUN/CREAT SERPL: 14.9 (ref 7–25)
CALCIUM SPEC-SCNC: 9.4 MG/DL (ref 8.6–10.5)
CHLORIDE SERPL-SCNC: 105 MMOL/L (ref 98–107)
CO2 SERPL-SCNC: 21.8 MMOL/L (ref 22–29)
CREAT SERPL-MCNC: 0.67 MG/DL (ref 0.57–1)
DEPRECATED RDW RBC AUTO: 54.4 FL (ref 37–54)
EOSINOPHIL # BLD AUTO: 0.06 10*3/MM3 (ref 0–0.4)
EOSINOPHIL NFR BLD AUTO: 1.6 % (ref 0.3–6.2)
ERYTHROCYTE [DISTWIDTH] IN BLOOD BY AUTOMATED COUNT: 16.3 % (ref 12.3–15.4)
FERRITIN SERPL-MCNC: 169 NG/ML (ref 13–150)
GFR SERPL CREATININE-BSD FRML MDRD: 88 ML/MIN/1.73
GLOBULIN UR ELPH-MCNC: 2.4 GM/DL
GLUCOSE SERPL-MCNC: 104 MG/DL (ref 65–99)
HCT VFR BLD AUTO: 40.9 % (ref 34–46.6)
HGB BLD-MCNC: 13.2 G/DL (ref 12–15.9)
IMM GRANULOCYTES # BLD AUTO: 0.01 10*3/MM3 (ref 0–0.05)
IMM GRANULOCYTES NFR BLD AUTO: 0.3 % (ref 0–0.5)
IRON 24H UR-MRATE: 102 MCG/DL (ref 37–145)
IRON SATN MFR SERPL: 39 % (ref 20–50)
LDH SERPL-CCNC: 267 U/L (ref 135–214)
LYMPHOCYTES # BLD AUTO: 1.72 10*3/MM3 (ref 0.7–3.1)
LYMPHOCYTES NFR BLD AUTO: 46.1 % (ref 19.6–45.3)
MCH RBC QN AUTO: 28.9 PG (ref 26.6–33)
MCHC RBC AUTO-ENTMCNC: 32.3 G/DL (ref 31.5–35.7)
MCV RBC AUTO: 89.7 FL (ref 79–97)
MONOCYTES # BLD AUTO: 0.28 10*3/MM3 (ref 0.1–0.9)
MONOCYTES NFR BLD AUTO: 7.5 % (ref 5–12)
NEUTROPHILS NFR BLD AUTO: 1.62 10*3/MM3 (ref 1.7–7)
NEUTROPHILS NFR BLD AUTO: 43.4 % (ref 42.7–76)
NRBC BLD AUTO-RTO: 0 /100 WBC (ref 0–0.2)
PLATELET # BLD AUTO: 190 10*3/MM3 (ref 140–450)
PMV BLD AUTO: 10.8 FL (ref 6–12)
POTASSIUM SERPL-SCNC: 3.8 MMOL/L (ref 3.5–5.2)
PROT SERPL-MCNC: 6.5 G/DL (ref 6–8.5)
RBC # BLD AUTO: 4.56 10*6/MM3 (ref 3.77–5.28)
SODIUM SERPL-SCNC: 140 MMOL/L (ref 136–145)
TIBC SERPL-MCNC: 264 MCG/DL (ref 298–536)
TRANSFERRIN SERPL-MCNC: 177 MG/DL (ref 200–360)
TSH SERPL DL<=0.05 MIU/L-ACNC: 1.88 UIU/ML (ref 0.27–4.2)
WBC NRBC COR # BLD: 3.73 10*3/MM3 (ref 3.4–10.8)

## 2022-01-20 PROCEDURE — 82728 ASSAY OF FERRITIN: CPT

## 2022-01-20 PROCEDURE — 71275 CT ANGIOGRAPHY CHEST: CPT

## 2022-01-20 PROCEDURE — 83540 ASSAY OF IRON: CPT

## 2022-01-20 PROCEDURE — 71275 CT ANGIOGRAPHY CHEST: CPT | Performed by: RADIOLOGY

## 2022-01-20 PROCEDURE — 93970 EXTREMITY STUDY: CPT | Performed by: RADIOLOGY

## 2022-01-20 PROCEDURE — 25010000002 IOPAMIDOL 61 % SOLUTION: Performed by: INTERNAL MEDICINE

## 2022-01-20 PROCEDURE — 85025 COMPLETE CBC W/AUTO DIFF WBC: CPT

## 2022-01-20 PROCEDURE — 82607 VITAMIN B-12: CPT

## 2022-01-20 PROCEDURE — 83615 LACTATE (LD) (LDH) ENZYME: CPT

## 2022-01-20 PROCEDURE — 93970 EXTREMITY STUDY: CPT

## 2022-01-20 PROCEDURE — 84443 ASSAY THYROID STIM HORMONE: CPT

## 2022-01-20 PROCEDURE — 82746 ASSAY OF FOLIC ACID SERUM: CPT

## 2022-01-20 PROCEDURE — 80053 COMPREHEN METABOLIC PANEL: CPT

## 2022-01-20 PROCEDURE — 84466 ASSAY OF TRANSFERRIN: CPT

## 2022-01-20 RX ADMIN — IOPAMIDOL 80 ML: 612 INJECTION, SOLUTION INTRAVENOUS at 14:01

## 2022-01-21 LAB
FOLATE SERPL-MCNC: 16.4 NG/ML (ref 4.78–24.2)
VIT B12 BLD-MCNC: 437 PG/ML (ref 211–946)

## 2022-01-26 PROBLEM — C77.8: Status: ACTIVE | Noted: 2022-01-26

## 2022-01-28 ENCOUNTER — HOSPITAL ENCOUNTER (OUTPATIENT)
Dept: PET IMAGING | Facility: HOSPITAL | Age: 67
Discharge: HOME | End: 2022-01-28
Admitting: INTERNAL MEDICINE

## 2022-01-28 ENCOUNTER — APPOINTMENT (OUTPATIENT)
Dept: CT IMAGING | Facility: HOSPITAL | Age: 67
End: 2022-01-28

## 2022-01-28 ENCOUNTER — APPOINTMENT (OUTPATIENT)
Dept: CARDIOLOGY | Facility: HOSPITAL | Age: 67
End: 2022-01-28

## 2022-01-28 DIAGNOSIS — R59.0 AXILLARY ADENOPATHY: ICD-10-CM

## 2022-01-28 DIAGNOSIS — Z85.820 HISTORY OF MALIGNANT MELANOMA OF SKIN: ICD-10-CM

## 2022-01-28 DIAGNOSIS — C77.8 SECONDARY AND MALIGNANT NEOPLASM OF LYMPH NODES OF MULTIPLE SITES: ICD-10-CM

## 2022-01-28 PROCEDURE — 78815 PET IMAGE W/CT SKULL-THIGH: CPT

## 2022-01-28 PROCEDURE — 78815 PET IMAGE W/CT SKULL-THIGH: CPT | Performed by: RADIOLOGY

## 2022-01-28 PROCEDURE — 0 FLUDEOXYGLUCOSE F18 SOLUTION: Performed by: INTERNAL MEDICINE

## 2022-01-28 PROCEDURE — A9552 F18 FDG: HCPCS | Performed by: INTERNAL MEDICINE

## 2022-01-28 RX ADMIN — FLUDEOXYGLUCOSE F18 1 DOSE: 300 INJECTION INTRAVENOUS at 12:07

## 2022-01-31 ENCOUNTER — OFFICE VISIT (OUTPATIENT)
Dept: SURGERY | Facility: CLINIC | Age: 67
End: 2022-01-31

## 2022-01-31 VITALS
SYSTOLIC BLOOD PRESSURE: 136 MMHG | WEIGHT: 183 LBS | HEART RATE: 78 BPM | DIASTOLIC BLOOD PRESSURE: 78 MMHG | BODY MASS INDEX: 28.72 KG/M2 | HEIGHT: 67 IN

## 2022-01-31 DIAGNOSIS — C77.8 CANCER OF LYMPH NODES OF MULTIPLE SITES, SECONDARY: ICD-10-CM

## 2022-01-31 DIAGNOSIS — C43.9 MALIGNANT MELANOMA, UNSPECIFIED SITE: Primary | ICD-10-CM

## 2022-01-31 PROCEDURE — 99214 OFFICE O/P EST MOD 30 MIN: CPT | Performed by: SURGERY

## 2022-01-31 RX ORDER — CEFAZOLIN SODIUM 2 G/50ML
2 SOLUTION INTRAVENOUS ONCE
Status: CANCELLED | OUTPATIENT
Start: 2022-01-31 | End: 2022-01-31

## 2022-02-01 DIAGNOSIS — Z01.818 PRE-OP TESTING: Primary | ICD-10-CM

## 2022-02-01 NOTE — PROGRESS NOTES
"NAME: Yadira Burgos    : 1955    DATE:  2022    DIAGNOSIS:   1.  Stage IA ([id number]) moderately differentiated invasive ductal carcinoma of the right breast diagnosed in 2015.    2.  H/o malignant Melanoma on her Back    3.  H/o cervical cancer treated with cryotherapy at [facility] in     4.  Recurrent / metastatic malignant Melanoma  - suspected based on biopsy of R axillary LN 22.    -  She has R axillary LN mass (2.47x2.16), Solitary cavitary R lobe liver lesion, cavitary RLL lung mass,  and bilateral parenchymal lung nodules    CHIEF COMPLAINT:  Follow up of breast cancer and recent imaging    HISTORY OF PRESENT ILLNESS:   Yadira Burgos is a very pleasant 66 y.o. female who was referred by Dr. Dilcia Pedro for evaluation and treatment of breast cancer. She was living in [place] in 2015 when her dog brought attention to and \"found\" an abnormal lump in her right breast.  She was treated by Dr. Steve Isbell and underwent R lumpectomy with SLNBx on 10-22-15 as below.  She then had what sounds like accelerated partial breast irradiation.  She was then started on Arimidex which was later switched to Exemestane for a better side effect profile.  She took this for about 5 years, stopping a couple of weeks ago.  She stopped taking Exemestane because she developed some vaginal bleeding and hematuria.  This was evaluated by her PCP and gynecologist and felt to be due to vaginal atrophy.  She was prescribed a hormone pill which she hasn't yet started and was referred here to discuss things further.      Aside from bleeding which has been uncomfortable and distressing for her, Ms. Onur Burgos has generally been well.  The last year has been hard on her emotionally with the loss of her  and then her dog, her home and her insurance, but she is coping well and has good family support in [place].  She says she gained weight with her breast cancer treatment and gained weight " when her  .  She has been working now to lose weight and has lost 20 lbs over the last 2 months with dieting.  She denies chest pain or shortness of breath.  She complains of some RLQ cramping pain and isn't sure what is causing that.  She denies difficulty moving her bowels.  No blood in her stool that she is aware of.  She has had vaginal bleeding and hematuria as above.        INTERVAL HISTORY:  Ms. Burgos is here today for follow up of of metatastic melanoma. She met with Dr. Medeiros who did a thorough skin examination and did not find anything on her skin that may be a primary source for the metastatic melanoma in the axilla but agreed that late recurrences of melanoma such as the one she had on her back are not uncommon.. She is taking and tolerating the Eliquis well and reports an improvement in her breathing within a few days of starting the medication. She is scheduled for a PAC placement and excisional LN biopsies on Friday with Dr. Cummings.  She had PET-CT as below.         PAST MEDICAL HISTORY:  Past Medical History:   Diagnosis Date   • Back pain    • Basal cell carcinoma (BCC) in situ of skin     Dr. Mohr - Derm   • Breast cancer (HCC)        • Cancer (HCC)     breast, cervical, melanoma   • Cervical cancer (HCC)     Diagnosed in .  Treated by repeated local intervention and ultimately received cryotherapy at [facility] with resolution.   • Elbow fracture    • Elevated cholesterol    • Foot fracture    • Headache    • Hyperlipidemia    • Hypertension    • Melanoma (HCC)     on her back  - treated by Dermatologist Catrachito Amaro in [place], [place] with what sounds like wide local excision.    • Sinusitis        PAST SURGICAL HISTORY:  Past Surgical History:   Procedure Laterality Date   • BREAST LUMPECTOMY Right    • BREAST SURGERY      Secondary to cancer   • COLONOSCOPY N/A 2020    Procedure: COLONOSCOPY;  Surgeon: Mohsen Maldonado MD;  Location: [facility] OR;   Service: Gastroenterology;  Laterality: N/A;   • ELBOW FUSION      left   • FOOT SURGERY      Broken foot   • HAND SURGERY  2020    right   • RHINOPLASTY     • SKIN CANCER EXCISION      malignant melanoma from back    • US GUIDED LYMPH NODE BIOPSY  2022       FAMILY HISTORY:  Family History   Problem Relation Age of Onset   • Other Mother         blood clots   • Hypertension Mother    • Ulcers Father    • Hypertension Sister    • Other Sister         Multiple Sclerosis   • Cancer Paternal Aunt    • Stroke Maternal Grandmother    • Alcohol abuse Paternal Grandmother    • Hypertension Sister    • Multiple sclerosis Sister    • Hypertension Sister    • Coronary artery disease Paternal Grandfather    • Cancer Maternal Aunt 30        colon   • Breast cancer Neg Hx    Many paternal aunts  with malignancy of various types. Many cousins on that side of the family have had cancer as well.  One  of colon cancer in her 30s.  One had a brain tumor.    SOCIAL HISTORY:  Social History     Socioeconomic History   • Marital status:    Tobacco Use   • Smoking status: Never Smoker   • Smokeless tobacco: Never Used   Vaping Use   • Vaping Use: Never used   Substance and Sexual Activity   • Alcohol use: Not Currently   • Drug use: Never   • Sexual activity: Not Currently     Birth control/protection: Post-menopausal         REVIEW OF SYSTEMS:   A comprehensive 14 point review of systems was performed.  Significant findings as mentioned above.  All other systems reviewed and are negative.      MEDICATIONS:  The current medication list was reviewed in the EMR    Current Outpatient Medications:   •  apixaban (ELIQUIS) 5 MG tablet tablet, Take 1 tablet by mouth 2 (Two) Times a Day., Disp: 60 tablet, Rfl: 5  •  sulfamethoxazole-trimethoprim (Bactrim DS) 800-160 MG per tablet, Take 1 tablet by mouth 2 (Two) Times a Day., Disp: 6 tablet, Rfl: 0    ALLERGIES:    Allergies   Allergen Reactions   • Lactose  "Intolerance (Gi) Diarrhea, Other (See Comments) and GI Intolerance     \"pain and cramping\"       PHYSICAL EXAM:  Vitals:    02/02/22 1609   BP: 138/81   Pulse: 82   Resp: 18   Temp: 97.1 °F (36.2 °C)   SpO2: 95%     Pain Score    02/02/22 1609   PainSc: 0-No pain     ECOG score: 0     General:  Awake, alert and oriented, in no distress  HEENT:  Pupils are equal, round and reactive to light and accommodation, Extra-ocular movements full, Oropharyx clear, mucous membranes moist  Neck:  No JVD, thyromegaly or lymphadenopathy  CV:  Regular rate and rhythm, no murmurs, rubs or gallops  Resp:  Lungs are clear to auscultation bilaterally, no wheezing  Breast:  S/p R lumpectomy. There is a somewhat firm area at the site of surgery but there are no palpable masses.  There is a palpable R axillary LN, approx 3 cm in diameter..  Left breast is without mass lesions.  No L axillary adenopathy.  Abd:  Soft, non-tender, non-distended, bowel sounds present, no organomegaly or masses.  She is s/p biopsy / drainage of R liver abscess.  Ext:  No clubbing, cyanosis or edema.    Lymph:  No cervical, supraclavicular, axillary, inguinal or femoral adenopathy  Neuro:  MS as above, CN II-XII intact, grossly non-focal exam          PATHOLOGY:  10-22-15            12-16-21          01-13-22          ENDOSCOPY:  Colonoscopy 12-09-20     Findings: 8 hyperplastic appearing polyps of the distal rectum, diverticulosis moderately throughout the sigmoid colon      IMAGING:  CTCAP 12-15-20  On the lung windows there are several calcified  granulomas in the lungs. No noncalcified pulmonary parenchymal lung  nodules were identified. On the soft tissue windows there were no  enlarged lymph nodes in the supraclavicular or axillary regions. No  mediastinal or hilar lymphadenopathy was seen. The heart was not  enlarged. There were no pericardial effusions.     IMPRESSION:  No CT findings of metastatic disease in the chest. There are  calcified granulomas " in the lungs.     CT FINDINGS: The liver and spleen were normal in size and shape. The  gallbladder contains several stones. The wall was not thickened. The  bile ducts in the liver are not dilated. There is nothing seen to  suggest metastatic disease in the liver. The pancreas shows no evidence  of mass or inflammation. No adrenal or renal lesions are demonstrated.  The aorta is normal in caliber. There were no enlarged lymph nodes in  the retroperitoneum or in the mesentery. The bowel shows no evidence of  obstruction. In the pelvis there were no masses or fluid collections.  There were no ventral hernias.     IMPRESSION:  No CT findings of metastatic disease in the abdomen or  pelvis. This study does demonstrate several stones in the lumen of the  gallbladder.         Bilateral diagnostic mammogram 01-31-21  FINDINGS: There are scattered areas of fibroglandular density.     The bilateral fibroglandular pattern does not appear significantly  changed compared to the exam from 2020. This includes postoperative  changes in the right 12:00 region. Mild skin thickening is noted  involving the right breast which is likely treatment related. A few  bilateral scattered calcifications are noted.     IMPRESSION:  Incomplete examination as comparison with older outside  prior mammograms is needed.        BI-RADS CATEGORY:   0, INCOMPLETE:  Need prior mammograms for comparison        RECOMMENDATION:  We will attempt to obtain older outside prior  mammograms for comparison.      CTCAP 12-12-21  FINDINGS: Lack of IV contrast hinders parenchymal assessment of the mediastinum, liver, spleen, pancreas, adrenal glands and kidneys.         Significant infiltrate seen in the right lower lobe with interstitial component. There are also lung nodules present in the right lung. Index nodule in the right lung base medially is 5 mm maximal diameter. Other smaller nodules seen throughout the right  lung. Tiny 3 mm lingular nodule seen in  series 3 image 30. A benign calcified granuloma seen in the left lower lobe but a noncalcified indeterminant  5 mm left lower lobe nodule seen, series 3 image 42.     Nonenlarged mediastinal adenopathy seen. However there is an enlarged right axillary 1.8 x 2.6 cm lymph node partially seen.     Worrisome hepatic metastasis or primary hepatic lesion seen measuring 4.4 x 4.1 cm in liver segment 8. Margins are ill-defined. Abscess could have a similar appearance. Suggestion of subtle gallstones. Nonobstructing 2 mm right renal calculus seen. No  obstructive uropathy. Gaseous distention of the esophagus and hiatal hernia present consistent with GERD. No enteric contrast but no gross evidence of bowel obstruction.   There is no gross free air, free fluid or focal inflammatory change.  Uterus and  adnexa are not enlarged. Osseous structures are grossly intact.     IMPRESSION:  Constellation of findings with significant consolidation in the right lower lobe, enlarged right axillary lymph node, indeterminate bilateral lung nodules and ill-defined hepatic lesion concerning for underlying malignancy in this patient .     FINDINGS: Lack of IV contrast hinders parenchymal assessment of the mediastinum, liver, spleen, pancreas, adrenal glands and kidneys.         Significant infiltrate seen in the right lower lobe with interstitial component. There are also lung nodules present in the right lung. Index nodule in the right lung base medially is 5 mm maximal diameter. Other smaller nodules seen throughout the right  lung. Tiny 3 mm lingular nodule seen in series 3 image 30. A benign calcified granuloma seen in the left lower lobe but a noncalcified indeterminant  5 mm left lower lobe nodule seen, series 3 image 42.     Nonenlarged mediastinal adenopathy seen. However there is an enlarged right axillary 1.8 x 2.6 cm lymph node partially seen.     Worrisome hepatic metastasis or primary hepatic lesion seen measuring 4.4 x 4.1 cm in  liver segment 8. Margins are ill-defined. Abscess could have a similar appearance. Suggestion of subtle gallstones. Nonobstructing 2 mm right renal calculus seen. No  obstructive uropathy. Gaseous distention of the esophagus and hiatal hernia present consistent with GERD. No enteric contrast but no gross evidence of bowel obstruction.   There is no gross free air, free fluid or focal inflammatory change.  Uterus and  adnexa are not enlarged. Osseous structures are grossly intact.     IMPRESSION:  Constellation of findings with significant consolidation in the right lower lobe, enlarged right axillary lymph node, indeterminate bilateral lung nodules and ill-defined hepatic lesion concerning for underlying malignancy in this patient .       US Abdomen Complete 12-12-21  FINDINGS:  The visualized portions of the pancreas, IVC and aorta appear normal.        The liver is moderately coarsened in echotexture. Ill-defined hypodensity in the liver measuring 3 x 3.2 x 2.6 cm corresponds to recent CT. Unclear if this is a malignant lesion. Abscesses within the differential but prior CT was performed without any  contrast.  The common bile duct is not dilated.. Gallbladder wall is thickened at 3.4 mm with trace pericholecystic fluid and subtle hyperemia. Gallstones and shadowing present.     The kidneys are normal in size and echogenicity. There is no   hydronephrosis or focal solid lesion.  The spleen is normal in size and echotexture.     IMPRESSION:  1. Nonspecific gallbladder wall thickening and questionable pericholecystic fluid. Gallstones are better seen on CT than ultrasound.  2.  Heterogeneous liver with lesion in the liver as seen on CT. Ultrasound is not helpful in determining etiology. This could be an abscess but given the other findings on CT,  malignancy is not excluded.      CTCAP 12-15-21  FINDINGS:    LUNGS:  Increased density of the now right lower lobe mixed  groundglass and more solid-appearing  consolidation with internal  cavitary component identified. Tiny right and left lung pulmonary  nodules. Grossly stable.    PLEURAL SPACE:  Small bilateral pleural effusions.  No pneumothorax.    HEART:  Unremarkable.  No cardiomegaly.  No significant pericardial  effusion.    BONES/JOINTS:  Unremarkable.  No acute fracture.  No dislocation.    SOFT TISSUES:  Unremarkable.    VASCULATURE:  Unremarkable.  No thoracic aortic aneurysm.    LYMPH NODES:  Unremarkable.  No enlarged lymph nodes.     IMPRESSION:  1.  Increased density of the now right lower lobe mixed groundglass and  more solid-appearing consolidation with internal cavitary component  identified. Tiny right and left lung pulmonary nodules. Grossly stable.  2.  Small bilateral pleural effusions.    FINDINGS:    LUNG BASES:  Unremarkable.  No mass.  No consolidation.      ABDOMEN:    LIVER:  Right lobe of liver lesion is again identified now measuring  about 4.7 cm and was about 4.7 cm.    GALLBLADDER AND BILE DUCTS:  Gallstones noted in the gallbladder.  No  ductal dilation.    PANCREAS:  Unremarkable.  No mass.  No ductal dilation.    SPLEEN:  Unremarkable.  No splenomegaly.    ADRENALS:  Unremarkable.  No mass.    KIDNEYS AND URETERS:  Unremarkable.  No solid mass.  No  hydronephrosis.    STOMACH AND BOWEL:  Unremarkable.  No obstruction.  No mucosal  thickening.      PELVIS:    APPENDIX:  No findings to suggest acute appendicitis.    BLADDER:  Unremarkable.  No mass.    REPRODUCTIVE:  Unremarkable as visualized.      ABDOMEN and PELVIS:    INTRAPERITONEAL SPACE:  Unremarkable.  No free air.  No significant  fluid collection.    BONES/JOINTS:  No acute fracture.  No dislocation.    SOFT TISSUES:  Unremarkable.    VASCULATURE:  Unremarkable.  No abdominal aortic aneurysm.    LYMPH NODES:  Unremarkable.  No enlarged lymph nodes.     IMPRESSION:    Right lobe of liver lesion is again identified now measuring about 4.7  cm and was about 4.7 cm.      NM Bone  Scan Whole Body 12-15-21  FINDINGS:    SKULL/FACIAL BONES:  No focal increased or decreased uptake.    SPINE:  Unremarkable.  No abnormal increased or decreased uptake.    RIBS:  Unremarkable.  No abnormal uptake.    LONG BONES:  Unremarkable.  No abnormal uptake.    PELVIS:  Unremarkable.  No focal increased or decreased uptake.    JOINTS:  Unremarkable.  No focal increased or decreased uptake.    SOFT TISSUES:  Unremarkable.    RENAL/BLADDER:  Within normal limits.     IMPRESSION:    Normal bone scan.      CT Abdomen With Contrast  12-18-21  FINDINGS:  Partially imaged thick-walled cavitary lesion in the right lower lobe with right basilar atelectasis/infiltrate and small right pleural effusion. There is also left basilar atelectasis/infiltrate and trace left pleural effusion. Multiple noncalcified  pulmonary nodules are scattered throughout the visualized lower lung fields, concerning for pulmonary metastatic disease.     Ill-defined right hepatic mass again noted. The spleen, pancreas, and both adrenal glands are within expected limits. Cholelithiasis. Punctate nonobstructing right intrarenal stone. Tiny left renal cyst. Kidneys are otherwise unremarkable without  hydronephrosis. Abdominal aorta normal in course and caliber without dissection. No pathologic retroperitoneal or mesenteric lymphadenopathy by size criteria. Visualized small bowel and colon are unremarkable. No bowel obstruction. No free fluid or free  air.     No aggressive osseous lesions.     IMPRESSION:     1. Ill-defined right hepatic mass again noted. This has been previously biopsied and correlation with pathology results is recommended.  2. Cholelithiasis.  3. Punctate nonobstructing right intrarenal stone.  4. No threshold abdominal lymphadenopathy or other suspicious abdominal masses.  5. Partially imaged thick-walled cavitary lesion in the right lower lobe. This may be of infectious or neoplastic etiology and continued follow-up is  recommended.  6. Small bilateral pleural effusions with bibasilar atelectasis/infiltrate, worse on the right than left.  7. Numerous small noncalcified pulmonary nodules throughout the visualized lower lung fields, concerning for pulmonary metastatic disease.       Mammo Diagnostic Digital Tomosynthesis Bilateral With CAD w/ US Axilla Right 01-13-22  FINDINGS:  There are scattered areas of fibroglandular density.     Incompletely imaged is a very prominent axillary lymph node. The  bilateral fibroglandular pattern itself is stable in appearance  including postoperative changes in the superior aspect of the right  breast. There are stable bilateral calcifications. Mild skin thickening  is again noted involving the right breast which is likely treatment  related.     ULTRASOUND: Ultrasound evaluation of the right axilla demonstrates 2  markedly enlarged lymph nodes the largest of which measures 3.2 cm in  size and has no demonstrable fatty hilum. Recommend ultrasound-guided  biopsy.      IMPRESSION:  1. Stable mammographic appearance of the left breast with no findings  suspicious for malignancy.        2. Marked right axillary lymphadenopathy. Recommend ultrasound-guided  biopsy. The fibroglandular pattern of the right breast is however  stable.        BI-RADS CATEGORY:  4, SUSPICIOUS        RECOMMENDATION:  Recommend ultrasound-guided biopsy of the dominant  abnormal appearing right axillary lymph node.      DEXA Bone Density Axial 01-13-22  IMPRESSION:  Impression:  1. According to the World Health Organization definitions of  osteoporosis based on bone density, this patient's bone mineral density  is compatible with osteoporosis and the fracture risk is high.      Mammo Diagnostic Digital Tomosynthesis Bilateral With CAD w/ US Axilla Right 01-13-22  FINDINGS:  There are scattered areas of fibroglandular density.     Incompletely imaged is a very prominent axillary lymph node. The  bilateral fibroglandular pattern  itself is stable in appearance  including postoperative changes in the superior aspect of the right  breast. There are stable bilateral calcifications. Mild skin thickening  is again noted involving the right breast which is likely treatment  related.     ULTRASOUND: Ultrasound evaluation of the right axilla demonstrates 2  markedly enlarged lymph nodes the largest of which measures 3.2 cm in  size and has no demonstrable fatty hilum. Recommend ultrasound-guided  biopsy.      IMPRESSION:  1. Stable mammographic appearance of the left breast with no findings  suspicious for malignancy.        2. Marked right axillary lymphadenopathy. Recommend ultrasound-guided  biopsy. The fibroglandular pattern of the right breast is however  stable.        BI-RADS CATEGORY:  4, SUSPICIOUS        RECOMMENDATION:  Recommend ultrasound-guided biopsy of the dominant  abnormal appearing right axillary lymph node.      CT Chest With Contrast Diagnostic 01-18-22  FINDINGS:     LUNGS: Solitary mass in the right lower lobe with cavitation.  Significantly smaller today than on the prior study. 6 mm solid nodule  in the right lung posteriorly on image 30 of the axial series. Other  parenchymal nodules are seen in both lungs and are similar to the  previous study. These are concerning for metastatic nodules.     HEART: Unremarkable.     MEDIASTINUM: No masses. No enlarged lymph nodes.  No fluid collections.     PLEURA: No pleural effusion. No pleural mass or abnormal calcification.  No pneumothorax.     VASCULATURE: No evidence of aneurysm. Filling defect in the right  pulmonary artery and possibly left lower lobe pulmonary artery. This  study was not performed to evaluate for pulmonary embolus, but the  findings are concerning for bilateral pulmonary emboli.     BONES: No acute bony abnormality.     VISUALIZED UPPER ABDOMEN:Please see the CT report for the abdomen and  pelvis.     Other: Right axillary soft tissue mass is present and shows  slight  interval growth. It measures 2.47 x 2.16 cm today and previously at the  same level measured approximately 2.7 x 1.89 cm.     IMPRESSION:  1. Study was not performed as a PE protocol, but there appear to be  filling defects in pulmonary arteries bilaterally concerning for  pulmonary emboli.  2. Interval decrease in size of cavitary right lower lobe mass.  3. Stable parenchymal nodules bilaterally.  4. Very slight interval growth of right axillary soft tissue mass.     Results are being relayed to the referring physician.      US Liver 01-18-22  FINDINGS: Sonographic imaging of the liver does not show the mass  previously identified in the liver. I would suggest a follow-up CT scan  for better delineation.     Hepatic flow was hepatopedal.     There is shadowing from the gallbladder fossa.     IMPRESSION:  1. Previously identified mass is not seen on this exam in the liver.  Consider follow-up CT.  2. Shadowing from the gallbladder fossa. In the absence of  cholecystectomy, appearance is concerning for cholelithiasis.      US Venous Doppler Lower Extremity Bilateral (duplex) 01-20-22  FINDINGS:   There is patent spontaneous flow from the common femoral vein through  the posterior tibial veins.  There was no internal clot or area of noncompressibility.  Normal augmentation was elicited where applicable.     IMPRESSION:  No DVT in the lower extremities on today's exam.       CT Chest Pulmonary Embolism 01-20-22  FINDINGS: Today's study demonstrates opacification of the central  pulmonary vessels.  There are filling defects in the pulmonary arteries bilaterally. First  order arteries. This is most compatible with bilateral pulmonary  emboli..     Soft tissue mass in the right lower lobe with somewhat cavitary center  is again noted and unchanged..     There is no mediastinal lymph node enlargement     No pericardial or pleural effusion.        IMPRESSION:  1. Bilateral pulmonary emboli.  2. Parenchymal nodules  bilaterally with dominant mass in the right lower  Lobe..      NM PET/CT Skull Base to Mid Thigh 01-28-22  FINDINGS:      HEAD/NECK:  Area of uptake in the posterior right paraspinal space and to lesser  degree the left paraspinal space appears related to muscle uptake.  No FDG hypermetabolic neck adenopathy.  No FDG hypermetabolic masses.     CHEST:   Numerous bilateral pulmonary nodules appear stable from the previous  chest CT and show no FDG hypermetabolism. This is likely due to nodules  being below size threshold for PET sensitivity.  Cavitary lung mass in the right lung localizing to the superior segment  of the lower lobe shows FDG hypermetabolism with maximum SUV: 2.4. This  is at the lower range for malignancy.  Enlarged right lower axillary region lymph node which is 3.0 cm shows  mild FDG hypermetabolism that is approximately with maximum SUV: 2.4.  This is at the lower range for malignancy.  Low-dose CT demonstrating no change in additional scattered pulmonary  nodules from the previous scan. Coronary artery calcifications are  stable.     ABDOMEN/PELVIS:   Faint area of low attenuation involving the right lobe of liver is  poorly evaluated with low-dose noncontrast CT but shows no focal FDG  hypermetabolism.  Physiologic FDG hypermetabolism seen throughout the solid abdominal  organs.  Physiologic FDG hypermetabolism seen throughout the GI tract.  Physiologic FDG hypermetabolism seen throughout the mesentery,  retroperitoneum and pelvis.  Low dose CT redemonstrates nonobstructing kidney stones. Cholelithiasis  again noted.     BONES: Nonspecific FDG tracer activity. No intense areas of tracer  activity noted.     IMPRESSION:     1. Cavitary mass in the right lung that shows very low-grade FDG  hypermetabolism which is at the lower range for malignancy with maximum  SUV: 2.4.  2. Enlarged right axillary region lymph node with low-grade FDG  hypermetabolism also at the lower range for malignancy with  maximum SUV:  2.4.  3. Numerous bilateral pulmonary nodules compatible with metastatic  disease but show no significant FDG hypermetabolism. This may be in part  due to size of nodules being below PET sensitivity threshold.  4. Faint area of low-attenuation right lobe liver poorly evaluated with  noncontrast low-dose CT that shows no obvious intense FDG  hypermetabolism.  5. Other nonacute findings as above on low dose CT.        RECENT LABS:  Lab Results   Component Value Date    WBC 3.73 01/20/2022    HGB 13.2 01/20/2022    HCT 40.9 01/20/2022    MCV 89.7 01/20/2022    RDW 16.3 (H) 01/20/2022     01/20/2022    NEUTRORELPCT 43.4 01/20/2022    LYMPHORELPCT 46.1 (H) 01/20/2022    MONORELPCT 7.5 01/20/2022    EOSRELPCT 1.6 01/20/2022    BASORELPCT 1.1 01/20/2022    NEUTROABS 1.62 (L) 01/20/2022    LYMPHSABS 1.72 01/20/2022       Lab Results   Component Value Date     01/20/2022    K 3.8 01/20/2022    CO2 21.8 (L) 01/20/2022     01/20/2022    BUN 10 01/20/2022    CREATININE 0.67 01/20/2022    EGFRIFNONA 88 01/20/2022    GLUCOSE 104 (H) 01/20/2022    CALCIUM 9.4 01/20/2022    ALKPHOS 106 01/20/2022    AST 34 (H) 01/20/2022    ALT 44 (H) 01/20/2022    BILITOT 0.7 01/20/2022    ALBUMIN 4.11 01/20/2022    PROTEINTOT 6.5 01/20/2022    MG 2.0 12/17/2021     Lab Results   Component Value Date    TSH 1.880 01/20/2022     Lab Results   Component Value Date    FERRITIN 169.00 (H) 01/20/2022    IRON 102 01/20/2022    TIBC 264 (L) 01/20/2022    LABIRON 39 01/20/2022    [id number] 437 01/20/2022    FOLATE 16.40 01/20/2022     Lab Results   Component Value Date     (H) 01/20/2022           ASSESSMENT & PLAN:  Yadira Burgos is a very pleasant 66 y.o. female with a history of cervical cancer, breast cancer and malignant melanoma.    1.  History of Breast Cancer:  -  Ms. Onur Burgos had Stage IA ([id number]) moderately differentiated invasive ductal carcinoma of the right breast diagnosed in October 2015.  -   She underwent R lympectomy and SLNBx performed by Dr. Steve Isbell 10-22-15 and then received adjuvant radiation in [place].  -  After radiation, she was started on Arimidex which was then switched to Exemestane.  She took Exemestane for almost 5 years. Unfortunately, she developed vaginal bleeding and hematuria related to vaginal atrophy related to hormonal blockade.  Given this, stopped Exemestane.  She is doing much better in this regard since stopping hormonal therapy.  -  Mammogram 1-13-22 without cause for concern aside from R axillary LAD.  No palpable breast masses.    2.  H/o malignant Melanoma on her Back:  -  S/p resection performed by Dr. Catrachito Amaro of Dermatology in [place] in 2004.  -  Recommended she see a dermatologist twice a year for full skin exam.  She was being seen by the group in [place] q 3-6 months but wasn't happy with her last visit there.  She reports resection of non-melanoma skin cancers but no repeat problems w/ melanoma that she is aware of. She recently saw Dr. Medeiros for full skin exam and reports Dr. Medeiros didn't notice anything out of the ordinary.  -  Scar from her remote surgery appears benign.  - She knows to follow with a dermatologist at least every 6 months.     3.  Concern for development of metastatic malignant melanoma:  -  S/p FNA R axillary LN which was concerning for melanoma.    -  Dr. Cummings is planning for excision biopsy /  Removal of involved LNS on Friday and will place PAC at that time.  -  Will send Axillary LN for CARIS testing.  -  PET-CT showed no significant FDG uptake but re-demonstrated abnormalities in the R axilla, RLL cavitary mass, Vargas lung nodules and liver.    -  MRI Brain ordered    4.Bilateral pulmonary emboli:  -  CTPE protocol 1-20-22  confirmed bilateral pulmonary emboli  -  BLE dopplers negative for DVT.  -  Continue Eliquis 5 mg BID.    5.  H/o Colon polyps:  -  Dr. Maldonado performed c-scope 12-09-20 with discovery of 8  hyperplastic polyps in the distal rectum as well as diverticulosis.  He recommended repeat c-scope after 5 years..    6.  H/o cervical cancer treated with cryotherapy at [facility] in [facility]:  -  Recently saw gynecologist Dr. Manav Real.  She says she had exam including pap smear and then pelvic U/S which was unrevealing.    7. Prophylaxis:  -  Had COVID vaccine x 2 (Pfizer).  Recommended booster dose.  Recommended 2021 influenza vaccine.    8  ACO / DWAYNE/Other  Quality measures  -  Yadira Burgos did not receive 2021 flu vaccine.  This has been recommended to her.  -  Yadira Burgos reports a pain score of 0.  Given her pain assessment as noted, treatment options were discussed and the following options were decided upon as a follow-up plan to address the patient's pain: No intervention needed at this time..  -  Current outpatient and discharge medications have been reconciled for the patient.  Reviewed by: Loreto Stone MD     9.  F/u:  -  Excisional Bx of R axillary LN planned for Friday.  Will send this for CAR.  -  PAC placmeent planned for Friday  -  Continue Eliquis (aside from perioperative hold).  -  MRI Brain currently scheduled later this month. Will try to get this moved up sooner.  -  Recommended COVID booster and 2021 influenza vaccine.    This note was scribed for Loreto Stone MD by Alba Felton RN.    I, Loreto Stone MD, personally performed the services described in this documentation as scribed by the above named individual in my presence, and it is both accurate and complete.  02/02/2022       I spent 30 minutes with Yadira Burgos today.  In the office today, more than 50% of this time was spent face-to-face with her  in counseling / coordination of care, reviewing her medical history and counseling on the current treatment plan.  All questions were answered to her satisfaction      Electronically Signed by: Loreto Stone MD      CC:     MD Manav Cotto  MD Mohsen Mckeon MD

## 2022-02-02 ENCOUNTER — TELEPHONE (OUTPATIENT)
Dept: SURGERY | Facility: CLINIC | Age: 67
End: 2022-02-02

## 2022-02-02 ENCOUNTER — OFFICE VISIT (OUTPATIENT)
Dept: ONCOLOGY | Facility: CLINIC | Age: 67
End: 2022-02-02

## 2022-02-02 ENCOUNTER — OFFICE VISIT (OUTPATIENT)
Dept: PULMONOLOGY | Facility: CLINIC | Age: 67
End: 2022-02-02

## 2022-02-02 ENCOUNTER — LAB (OUTPATIENT)
Dept: LAB | Facility: HOSPITAL | Age: 67
End: 2022-02-02

## 2022-02-02 ENCOUNTER — PRE-ADMISSION TESTING (OUTPATIENT)
Dept: PREADMISSION TESTING | Facility: HOSPITAL | Age: 67
End: 2022-02-02

## 2022-02-02 VITALS
WEIGHT: 173 LBS | DIASTOLIC BLOOD PRESSURE: 82 MMHG | HEIGHT: 68 IN | SYSTOLIC BLOOD PRESSURE: 130 MMHG | TEMPERATURE: 97.1 F | HEART RATE: 85 BPM | OXYGEN SATURATION: 99 % | BODY MASS INDEX: 26.22 KG/M2

## 2022-02-02 VITALS
WEIGHT: 182.2 LBS | SYSTOLIC BLOOD PRESSURE: 138 MMHG | HEIGHT: 67 IN | DIASTOLIC BLOOD PRESSURE: 81 MMHG | HEART RATE: 82 BPM | RESPIRATION RATE: 18 BRPM | BODY MASS INDEX: 28.6 KG/M2 | TEMPERATURE: 97.1 F | OXYGEN SATURATION: 95 %

## 2022-02-02 DIAGNOSIS — I26.94 MULTIPLE SUBSEGMENTAL PULMONARY EMBOLI WITHOUT ACUTE COR PULMONALE: ICD-10-CM

## 2022-02-02 DIAGNOSIS — C43.9 METASTATIC MELANOMA: ICD-10-CM

## 2022-02-02 DIAGNOSIS — Z01.818 PRE-OP TESTING: ICD-10-CM

## 2022-02-02 DIAGNOSIS — C77.8 SECONDARY AND MALIGNANT NEOPLASM OF LYMPH NODES OF MULTIPLE SITES: Primary | ICD-10-CM

## 2022-02-02 DIAGNOSIS — R59.0 AXILLARY ADENOPATHY: ICD-10-CM

## 2022-02-02 DIAGNOSIS — R91.8 MULTIPLE PULMONARY NODULES: ICD-10-CM

## 2022-02-02 DIAGNOSIS — C50.911 MALIGNANT NEOPLASM OF RIGHT BREAST IN FEMALE, ESTROGEN RECEPTOR POSITIVE, UNSPECIFIED SITE OF BREAST: ICD-10-CM

## 2022-02-02 DIAGNOSIS — J98.4 CAVITARY LESION OF LUNG: ICD-10-CM

## 2022-02-02 DIAGNOSIS — I26.94 MULTIPLE SUBSEGMENTAL PULMONARY EMBOLI WITHOUT ACUTE COR PULMONALE: Primary | ICD-10-CM

## 2022-02-02 DIAGNOSIS — Z17.0 MALIGNANT NEOPLASM OF RIGHT BREAST IN FEMALE, ESTROGEN RECEPTOR POSITIVE, UNSPECIFIED SITE OF BREAST: ICD-10-CM

## 2022-02-02 DIAGNOSIS — Z85.820 HISTORY OF MALIGNANT MELANOMA OF SKIN: ICD-10-CM

## 2022-02-02 DIAGNOSIS — Z85.9 HISTORY OF CANCER: ICD-10-CM

## 2022-02-02 PROCEDURE — 99214 OFFICE O/P EST MOD 30 MIN: CPT | Performed by: PHYSICIAN ASSISTANT

## 2022-02-02 PROCEDURE — 99214 OFFICE O/P EST MOD 30 MIN: CPT | Performed by: INTERNAL MEDICINE

## 2022-02-02 PROCEDURE — U0004 COV-19 TEST NON-CDC HGH THRU: HCPCS | Performed by: SURGERY
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
## 2022-02-02 NOTE — DISCHARGE INSTRUCTIONS
TAKE the following medications the morning of surgery:    All heart or blood pressure medications    HOLD all diabetic medications the morning of surgery as ordered by physician.    Please discontinue all blood thinners and anticoagulants (except aspirin) prior to surgery as per your surgeon and cardiologist instructions.  Aspirin may be continued up to the day prior to surgery.     CHLORHEXIDINE CLOTHS GIVEN WITH INSTRUCTIONS AND FORM TO RETURN TO HOSPITAL, IF APPLICABLE.    2/04/22  Surgery ARRIVAL TIME PER DR Cummings's OFFICE 1245 pm per Dr. Cummings's office.      General Instructions:  · Do not eat or drink after midnight:2/3/22 includes water, mints, or gum. You may brush your teeth.  Dental appliances that are removable must be taken out day of surgery.  · Do not smoke, chew tobacco, or drink alcohol 24 hours prior to surgery.  · Bring medications in original bottles, any inhalers and if applicable your C-PAP/BI-PAP machine.  · Bring any papers given to you in the doctor's office.  · Wear clean comfortable clothes and socks.  · Do not wear contact lenses or make-up. Bring a case for your glasses if applicable.  · Bring crutches or walker if applicable.  · Leave all other valuables and jewelry at home.    If you were given a blood bank ID arm band remember to bring it with you the day of surgery.    Preventing a Surgical Site Infection:  Shower the night before surgery (unless instructed other wise) using a fresh bar of anti-bacterial soap (such as Dial) and clean washcloth. Dry with a clean towel and dress in clean clothing.  For 2 to 3 days before surgery, avoid shaving with a razor near where you will have surgery because the razor can irritate skin and make it easier to develop an infection. Ask your surgeon if you will be receiving antibiotics prior to surgery.  Make sure you, your family, and all healthcare providers clear their hands with soap and water or an alcohol-based hand  before caring for  you or your wound.  If at all possible, quit smoking as many days before surgery as you can.    Day of surgery:  Upon arrival, a Pre-op nurse and Anesthesiologist will review your health history, obtain vital signs, and answer questions you may have. The only belongings needed at this time will be your home medications and if applicable your C-PAP/BI-PAP machine. If you are staying overnight your family can leave the rest of your belongings in the car and bring them to your room later. A Pre-op nurse will start an IV and you may receive medication in preparation for surgery, including something to help you relax. Your family will be able to see you in the Pre-op area. While you are in surgery your family should notify the waiting room  if they leave the waiting room area and provide a contact phone number.    Please be aware that surgery does come with discomfort. We want to make every effort to control your discomfort so please discuss any uncontrolled symptoms with your nurse. Your doctor will most likely have prescribed pain medications.    If you are going home after surgery you will receive individualized written care instructions before being discharged. A responsible adult must drive you to and from the hospital on the day of surgery and stay with you for 24 hours.    If you are staying overnight following surgery, you will be transported to your hospital room following the recovery period.  [facility] has all private rooms.    If you have any questions please call Pre-Admission Testing at [phone].  Deductibles and co-payments are collected on the day of service. Please be prepared to pay the required co-pay, deductible or deposit on the day of service as defined by your plan.    A RESPONSIBLE PERSON MUST REMAIN IN THE WAITING ROOM DURING YOUR PROCEDURE AND A RESPONSIBLE  MUST BE AVAILABLE UPON YOUR DISCHARGE.

## 2022-02-03 LAB — SARS-COV-2 RNA PNL SPEC NAA+PROBE: NOT DETECTED

## 2022-02-04 ENCOUNTER — APPOINTMENT (OUTPATIENT)
Dept: GENERAL RADIOLOGY | Facility: HOSPITAL | Age: 67
End: 2022-02-04

## 2022-02-04 ENCOUNTER — ANESTHESIA (OUTPATIENT)
Dept: PERIOP | Facility: HOSPITAL | Age: 67
End: 2022-02-04

## 2022-02-04 ENCOUNTER — HOSPITAL ENCOUNTER (OUTPATIENT)
Facility: HOSPITAL | Age: 67
Setting detail: HOSPITAL OUTPATIENT SURGERY
Discharge: HOME | End: 2022-02-04
Attending: SURGERY | Admitting: SURGERY

## 2022-02-04 ENCOUNTER — ANESTHESIA EVENT (OUTPATIENT)
Dept: PERIOP | Facility: HOSPITAL | Age: 67
End: 2022-02-04

## 2022-02-04 VITALS
BODY MASS INDEX: 28.35 KG/M2 | DIASTOLIC BLOOD PRESSURE: 83 MMHG | HEART RATE: 65 BPM | SYSTOLIC BLOOD PRESSURE: 148 MMHG | TEMPERATURE: 97.6 F | RESPIRATION RATE: 18 BRPM | HEIGHT: 67 IN | WEIGHT: 180.6 LBS | OXYGEN SATURATION: 97 %

## 2022-02-04 DIAGNOSIS — C43.9 MALIGNANT MELANOMA, UNSPECIFIED SITE: ICD-10-CM

## 2022-02-04 PROCEDURE — 71045 X-RAY EXAM CHEST 1 VIEW: CPT

## 2022-02-04 PROCEDURE — 38525 BIOPSY/REMOVAL LYMPH NODES: CPT | Performed by: SURGERY

## 2022-02-04 PROCEDURE — C1788 PORT, INDWELLING, IMP: HCPCS | Performed by: SURGERY

## 2022-02-04 PROCEDURE — C1889 IMPLANT/INSERT DEVICE, NOC: HCPCS | Performed by: SURGERY

## 2022-02-04 PROCEDURE — 77001 FLUOROGUIDE FOR VEIN DEVICE: CPT | Performed by: SURGERY

## 2022-02-04 PROCEDURE — 25010000002 HEPARIN (PORCINE) PER 1000 UNITS: Performed by: SURGERY

## 2022-02-04 PROCEDURE — 0 LIDOCAINE 1 % SOLUTION: Performed by: SURGERY

## 2022-02-04 PROCEDURE — 25010000002 ONDANSETRON PER 1 MG: Performed by: NURSE ANESTHETIST, CERTIFIED REGISTERED

## 2022-02-04 PROCEDURE — 25010000002 PROPOFOL 10 MG/ML EMULSION: Performed by: NURSE ANESTHETIST, CERTIFIED REGISTERED

## 2022-02-04 PROCEDURE — 76000 FLUOROSCOPY <1 HR PHYS/QHP: CPT

## 2022-02-04 PROCEDURE — 71045 X-RAY EXAM CHEST 1 VIEW: CPT | Performed by: RADIOLOGY

## 2022-02-04 PROCEDURE — 0 CEFAZOLIN SODIUM-DEXTROSE 2-3 GM-%(50ML) RECONSTITUTED SOLUTION: Performed by: SURGERY

## 2022-02-04 PROCEDURE — 36561 INSERT TUNNELED CV CATH: CPT | Performed by: SURGERY

## 2022-02-04 PROCEDURE — 25010000002 MIDAZOLAM PER 1 MG: Performed by: NURSE ANESTHETIST, CERTIFIED REGISTERED

## 2022-02-04 PROCEDURE — 76000 FLUOROSCOPY <1 HR PHYS/QHP: CPT | Performed by: RADIOLOGY

## 2022-02-04 PROCEDURE — 25010000002 FENTANYL CITRATE (PF) 50 MCG/ML SOLUTION: Performed by: NURSE ANESTHETIST, CERTIFIED REGISTERED

## 2022-02-04 DEVICE — LIGACLIP EXTRA LIGATING CLIP CARTRIDGES: 6 TITANIUM CLIPS/ CARTRIDGE (SMALL)
Type: IMPLANTABLE DEVICE | Site: AXILLA | Status: FUNCTIONAL
Brand: LIGACLIP

## 2022-02-04 DEVICE — PRT INTRO VASC/INTERV VORTEX FILL/HL DETACH/POLYURET/CATH 8F: Type: IMPLANTABLE DEVICE | Site: CHEST | Status: FUNCTIONAL

## 2022-02-04 DEVICE — LIGACLIP EXTRA LIGATING CLIP CARTRIDGES: 6 TITANIUM CLIPS/ CARTRIDGE (MEDIUM)
Type: IMPLANTABLE DEVICE | Site: AXILLA | Status: FUNCTIONAL
Brand: LIGACLIP

## 2022-02-04 RX ORDER — PROPOFOL 10 MG/ML
VIAL (ML) INTRAVENOUS AS NEEDED
Status: DISCONTINUED | OUTPATIENT
Start: 2022-02-04 | End: 2022-02-04 | Stop reason: SURG

## 2022-02-04 RX ORDER — SODIUM CHLORIDE 0.9 % (FLUSH) 0.9 %
10 SYRINGE (ML) INJECTION EVERY 12 HOURS SCHEDULED
Status: DISCONTINUED | OUTPATIENT
Start: 2022-02-04 | End: 2022-02-04 | Stop reason: HOSPADM

## 2022-02-04 RX ORDER — KETOROLAC TROMETHAMINE 30 MG/ML
30 INJECTION, SOLUTION INTRAMUSCULAR; INTRAVENOUS EVERY 6 HOURS PRN
Status: DISCONTINUED | OUTPATIENT
Start: 2022-02-04 | End: 2022-02-04 | Stop reason: HOSPADM

## 2022-02-04 RX ORDER — MIDAZOLAM HYDROCHLORIDE 1 MG/ML
0.5 INJECTION INTRAMUSCULAR; INTRAVENOUS
Status: DISCONTINUED | OUTPATIENT
Start: 2022-02-04 | End: 2022-02-04 | Stop reason: HOSPADM

## 2022-02-04 RX ORDER — SODIUM CHLORIDE, SODIUM LACTATE, POTASSIUM CHLORIDE, CALCIUM CHLORIDE 600; 310; 30; 20 MG/100ML; MG/100ML; MG/100ML; MG/100ML
125 INJECTION, SOLUTION INTRAVENOUS ONCE
Status: COMPLETED | OUTPATIENT
Start: 2022-02-04 | End: 2022-02-04

## 2022-02-04 RX ORDER — MEPERIDINE HYDROCHLORIDE 25 MG/ML
12.5 INJECTION INTRAMUSCULAR; INTRAVENOUS; SUBCUTANEOUS
Status: DISCONTINUED | OUTPATIENT
Start: 2022-02-04 | End: 2022-02-04 | Stop reason: HOSPADM

## 2022-02-04 RX ORDER — FAMOTIDINE 10 MG/ML
INJECTION, SOLUTION INTRAVENOUS AS NEEDED
Status: DISCONTINUED | OUTPATIENT
Start: 2022-02-04 | End: 2022-02-04 | Stop reason: SURG

## 2022-02-04 RX ORDER — DROPERIDOL 2.5 MG/ML
0.62 INJECTION, SOLUTION INTRAMUSCULAR; INTRAVENOUS ONCE AS NEEDED
Status: DISCONTINUED | OUTPATIENT
Start: 2022-02-04 | End: 2022-02-04 | Stop reason: HOSPADM

## 2022-02-04 RX ORDER — ONDANSETRON 2 MG/ML
4 INJECTION INTRAMUSCULAR; INTRAVENOUS AS NEEDED
Status: DISCONTINUED | OUTPATIENT
Start: 2022-02-04 | End: 2022-02-04 | Stop reason: HOSPADM

## 2022-02-04 RX ORDER — MAGNESIUM HYDROXIDE 1200 MG/15ML
LIQUID ORAL AS NEEDED
Status: DISCONTINUED | OUTPATIENT
Start: 2022-02-04 | End: 2022-02-04 | Stop reason: HOSPADM

## 2022-02-04 RX ORDER — IPRATROPIUM BROMIDE AND ALBUTEROL SULFATE 2.5; .5 MG/3ML; MG/3ML
3 SOLUTION RESPIRATORY (INHALATION) ONCE AS NEEDED
Status: DISCONTINUED | OUTPATIENT
Start: 2022-02-04 | End: 2022-02-04 | Stop reason: HOSPADM

## 2022-02-04 RX ORDER — OXYCODONE HYDROCHLORIDE AND ACETAMINOPHEN 5; 325 MG/1; MG/1
1 TABLET ORAL ONCE AS NEEDED
Status: DISCONTINUED | OUTPATIENT
Start: 2022-02-04 | End: 2022-02-04 | Stop reason: HOSPADM

## 2022-02-04 RX ORDER — MIDAZOLAM HYDROCHLORIDE 1 MG/ML
1 INJECTION INTRAMUSCULAR; INTRAVENOUS
Status: DISCONTINUED | OUTPATIENT
Start: 2022-02-04 | End: 2022-02-04 | Stop reason: HOSPADM

## 2022-02-04 RX ORDER — HYDROCODONE BITARTRATE AND ACETAMINOPHEN 7.5; 325 MG/1; MG/1
1 TABLET ORAL 4 TIMES DAILY PRN
Qty: 10 TABLET | Refills: 0 | Status: SHIPPED | OUTPATIENT
Start: 2022-02-04 | End: 2022-02-22

## 2022-02-04 RX ORDER — FENTANYL CITRATE 50 UG/ML
INJECTION, SOLUTION INTRAMUSCULAR; INTRAVENOUS AS NEEDED
Status: DISCONTINUED | OUTPATIENT
Start: 2022-02-04 | End: 2022-02-04 | Stop reason: SURG

## 2022-02-04 RX ORDER — MIDAZOLAM HYDROCHLORIDE 1 MG/ML
INJECTION INTRAMUSCULAR; INTRAVENOUS AS NEEDED
Status: DISCONTINUED | OUTPATIENT
Start: 2022-02-04 | End: 2022-02-04 | Stop reason: SURG

## 2022-02-04 RX ORDER — SODIUM CHLORIDE, SODIUM LACTATE, POTASSIUM CHLORIDE, CALCIUM CHLORIDE 600; 310; 30; 20 MG/100ML; MG/100ML; MG/100ML; MG/100ML
100 INJECTION, SOLUTION INTRAVENOUS ONCE AS NEEDED
Status: DISCONTINUED | OUTPATIENT
Start: 2022-02-04 | End: 2022-02-04 | Stop reason: HOSPADM

## 2022-02-04 RX ORDER — SODIUM CHLORIDE 0.9 % (FLUSH) 0.9 %
10 SYRINGE (ML) INJECTION AS NEEDED
Status: DISCONTINUED | OUTPATIENT
Start: 2022-02-04 | End: 2022-02-04 | Stop reason: HOSPADM

## 2022-02-04 RX ORDER — ONDANSETRON 2 MG/ML
INJECTION INTRAMUSCULAR; INTRAVENOUS AS NEEDED
Status: DISCONTINUED | OUTPATIENT
Start: 2022-02-04 | End: 2022-02-04 | Stop reason: SURG

## 2022-02-04 RX ORDER — CEFAZOLIN SODIUM 2 G/50ML
2 SOLUTION INTRAVENOUS ONCE
Status: COMPLETED | OUTPATIENT
Start: 2022-02-04 | End: 2022-02-04

## 2022-02-04 RX ORDER — FENTANYL CITRATE 50 UG/ML
50 INJECTION, SOLUTION INTRAMUSCULAR; INTRAVENOUS
Status: DISCONTINUED | OUTPATIENT
Start: 2022-02-04 | End: 2022-02-04 | Stop reason: HOSPADM

## 2022-02-04 RX ORDER — LIDOCAINE HYDROCHLORIDE 10 MG/ML
INJECTION, SOLUTION INFILTRATION; PERINEURAL AS NEEDED
Status: DISCONTINUED | OUTPATIENT
Start: 2022-02-04 | End: 2022-02-04 | Stop reason: HOSPADM

## 2022-02-04 RX ORDER — LIDOCAINE HYDROCHLORIDE 20 MG/ML
INJECTION, SOLUTION INFILTRATION; PERINEURAL AS NEEDED
Status: DISCONTINUED | OUTPATIENT
Start: 2022-02-04 | End: 2022-02-04 | Stop reason: SURG

## 2022-02-04 RX ADMIN — MIDAZOLAM 2 MG: 1 INJECTION INTRAMUSCULAR; INTRAVENOUS at 13:22

## 2022-02-04 RX ADMIN — SODIUM CHLORIDE, POTASSIUM CHLORIDE, SODIUM LACTATE AND CALCIUM CHLORIDE 125 ML/HR: 600; 310; 30; 20 INJECTION, SOLUTION INTRAVENOUS at 13:05

## 2022-02-04 RX ADMIN — FENTANYL CITRATE 100 MCG: 50 INJECTION INTRAMUSCULAR; INTRAVENOUS at 13:22

## 2022-02-04 RX ADMIN — CEFAZOLIN SODIUM 2 G: 2 SOLUTION INTRAVENOUS at 13:28

## 2022-02-04 RX ADMIN — FAMOTIDINE 20 MG: 10 INJECTION INTRAVENOUS at 13:22

## 2022-02-04 RX ADMIN — LIDOCAINE HYDROCHLORIDE 60 MG: 20 INJECTION, SOLUTION INFILTRATION; PERINEURAL at 13:28

## 2022-02-04 RX ADMIN — SODIUM CHLORIDE, POTASSIUM CHLORIDE, SODIUM LACTATE AND CALCIUM CHLORIDE: 600; 310; 30; 20 INJECTION, SOLUTION INTRAVENOUS at 13:28

## 2022-02-04 RX ADMIN — PROPOFOL 150 MG: 10 INJECTION, EMULSION INTRAVENOUS at 13:28

## 2022-02-04 RX ADMIN — ONDANSETRON 4 MG: 2 INJECTION INTRAMUSCULAR; INTRAVENOUS at 13:46

## 2022-02-04 NOTE — OP NOTE
[id number] insertion    Surgeon:  Dianelys Cummings M.D., TALIB    Assistant: None    Indications: This patient presents for placement of an Oqrsxb-n-[id number] for chemotherapy    Pre-operative Diagnosis: melanoma    Post-operative Diagnosis: Same    Anesthesia: General    Procedure Details:   After obtaining informed consent and receiving preoperative antibiotic patient was taken to the operating room and placed in the supine position.  After administration of anesthesia the chest and neck were prepped and draped in sterile fashion.  The left subclavian vein was cannulated with use of the Seldinger technique.  The guidewire was passed and position was confirmed under fluoroscopy.  Following this, a transverse incision was made inferior to the site of guidewire entry and carried down to the pectoralis fascia.  A pocket large enough to admit the port without tension was created.  The guidewire was brought through the incision.    The port and catheter were attached and flushed with heparinized saline, 50 units per mL.  The catheter was cut to appropriate length.  Under fluoroscopic guidance, the dilator sheath was passed over the guidewire.  The guidewire and dilator were removed and the catheter was passed over the peel-away sheath which was then removed.  There was no resistance to irrigation or aspiration of blood.  Fluoroscopy confirmed the catheter to be in good position without kinking.  The port was then sutured in with 3-0 Prolene sutures.  After ensuring hemostasis, the skin was closed in a 2-layer closure of 3-0 Vicryl sutures to Aline's fascia.  The skin was closed with a 4-0 Vicryl subcuticular stitch.  Dressing was placed.     Patient tolerated the procedure well, was taken to the recovery room in stable condition where chest x-ray pending    Instrument, sponge, and needle counts were correct at closure and at the conclusion of the case.     Findings:  Flouroscopy demonstrated good position of the  port    Estimated Blood Loss: 10 mL or less    Blood administered: None           Drains: None           Specimens: None        Grafts and Implants: Yes         Complications: None           Condition: Stable

## 2022-02-04 NOTE — H&P (VIEW-ONLY)
"Subjective   Yadira Burgos is a 66 y.o. female here today for pathology follow up.    History of Present Illness  Ms. Burgos was seen in the office today to discuss lymph node biopsy and port placement for suspected metastatic melanoma.  Patient had a 10-day hospitalization in December, 2021.  She had a CT chest abdomen pelvis on 12/12/2021 which demonstrated lung nodules, right lower lobe infiltrate, right axillary lymph node and liver lesions.  She ultimately underwent a needle biopsy of the right axillary lymph node which demonstrated findings suggestive of metastatic melanoma.  Patient does have a history of breast cancer with no evidence of current disease.  She also has a history of cervical cancer treated in 1975 and history of colon polyps.  Patient's prior melanoma was diagnosed in 2004.  Recommendation is now for port placement and excisional biopsy of pathologic right axillary lymph node for Caris testing.  Allergies   Allergen Reactions   • Lactose Intolerance (Gi) Diarrhea, Other (See Comments) and GI Intolerance     \"pain and cramping\"     Current Outpatient Medications   Medication Sig Dispense Refill   • apixaban (ELIQUIS) 5 MG tablet tablet Take 1 tablet by mouth 2 (Two) Times a Day. 60 tablet 5   • sulfamethoxazole-trimethoprim (Bactrim DS) 800-160 MG per tablet Take 1 tablet by mouth 2 (Two) Times a Day. 6 tablet 0     No current facility-administered medications for this visit.     Past Medical History:   Diagnosis Date   • Back pain    • Basal cell carcinoma (BCC) in situ of skin 2020    Dr. Mohr - Derm   • Breast cancer (HCC)     2015   • Cancer (HCC)     breast, cervical, melanoma   • Cervical cancer (HCC) 1976    Diagnosed in 1975.  Treated by repeated local intervention and ultimately received cryotherapy at [facility] with resolution.   • Elbow fracture    • Elevated cholesterol    • Foot fracture    • Headache    • Hyperlipidemia    • Hypertension    • Melanoma (HCC)     on her back 2004 - " "treated by Dermatologist Catrachito Amaro in [place], [place] with what sounds like wide local excision.    • Sinusitis      Past Surgical History:   Procedure Laterality Date   • BREAST LUMPECTOMY Right 2015   • BREAST SURGERY  2015    Secondary to cancer   • COLONOSCOPY N/A 12/9/2020    Procedure: COLONOSCOPY;  Surgeon: Mohsen Maldonado MD;  Location: [facility];  Service: Gastroenterology;  Laterality: N/A;   • ELBOW FUSION      left   • FOOT SURGERY  2014    Broken foot   • HAND SURGERY  08/2020    right   • RHINOPLASTY  2002   • SKIN CANCER EXCISION      malignant melanoma from back 2004   • US GUIDED LYMPH NODE BIOPSY  1/13/2022       Pertinent Review of Systems:  Respiratory: no shortness of breath  Cardiovascular: no chest pain  Other pertinent:      Objective   /78 (BP Location: Left arm)   Pulse 78   Ht 170.2 cm (67\")   Wt 83 kg (183 lb)   BMI 28.66 kg/m²   Physical Exam  General:  This is a WD WN female in no acute distress  Lungs:  Respiratory effort normal. Auscultation: Clear, without wheezes, rhonchi, rales  Heart:  Regular rate and rhythm, without murmur, gallop, rub.  No pedal edema  Breasts: Examination of the right breast demonstrates a surgical scar in the upper breast and axilla.  There is at least one large palpable right axillary lymph node.  Examination of the left breast demonstrates no discrete mass, skin change, or axillary adenopathy  Abdomen: Nontender, without palpable mass    Procedures     Results/Data:  Imaging: Imaging from 12/21 was reviewed including CT of chest abdomen pelvis and axillary ultrasound.  Notes: Records from medical oncology from 1/18/2022 were reviewed   Lab: Laboratory studies from 1/13/2022 including CBC and CMP as well as iron profile and TSH were reviewed  Other: Pathology result was reviewed    Assessment/Plan   Pathologic right axillary lymph nodes consistent with metastatic melanoma  Anticoagulation for presumed pulmonary embolus    Plan: Proceed with " port placement and excision of pathologic right axillary lymph nodes  Hold Eliquis         Discussion/Summary    Time spent:     Patient's Body mass index is 28.66 kg/m². indicating that she is overweight (BMI 25-29.9). Obesity-related health conditions include the following: none. Obesity is newly identified. BMI is is above average; no BMI management plan is appropriate. We discussed portion control and increasing exercise..       Future Appointments   Date Time Provider Department Center   2/2/2022  2:00 PM Meera Delgado PA-C [facility]   2/2/2022  4:00 PM DIPIKA NURSE LAB [facility]   2/2/2022  4:30 PM Loreto Stone MD [facility]   2/10/2022  1:15 PM Dilcia Pedro MD [facility]         Please note that portions of this note were completed with a voice recognition program.    This document has been electronically signed by Dianelys SINGH MD on February 4, 2022 09:43 EST

## 2022-02-04 NOTE — ANESTHESIA POSTPROCEDURE EVALUATION
Patient: Yadira Burgos    Procedure Summary     Date: 02/04/22 Room / Location:  [facility] 02 /  [facility]    Anesthesia Start: 1322 Anesthesia Stop: 1439    Procedures:       INSERTION VENOUS ACCESS DEVICE left or right (N/A )      AXILLARY LYMPH NODE BIOPSY/EXCISION (Right ) Diagnosis:       Malignant melanoma, unspecified site (HCC)      (Malignant melanoma, unspecified site (HCC) [C43.9])    Surgeons: Dianelys Cummings MD Provider: Riccardo King MD    Anesthesia Type: general ASA Status: 3          Anesthesia Type: general    Vitals  Vitals Value Taken Time   /85 02/04/22 1511   Temp 97.3 °F (36.3 °C) 02/04/22 1441   Pulse 66 02/04/22 1511   Resp 14 02/04/22 1511   SpO2 95 % 02/04/22 1511           Post Anesthesia Care and Evaluation    Patient location during evaluation: PHASE II  Patient participation: complete - patient participated  Level of consciousness: awake and alert  Pain score: 0  Pain management: adequate  Airway patency: patent  Anesthetic complications: No anesthetic complications    Cardiovascular status: acceptable  Respiratory status: acceptable  Hydration status: acceptable      
Meals and Snack/Medical Food Supplements/Collaboration and Referral of Nutrition Care

## 2022-02-04 NOTE — ANESTHESIA PROCEDURE NOTES
Airway  Urgency: elective    Date/Time: 2/4/2022 1:28 PM  Airway not difficult    General Information and Staff    Patient location during procedure: OR  Anesthesiologist: Riccardo King MD  CRNA: Melissa Villalobos CRNA    Indications and Patient Condition  Indications for airway management: airway protection    Preoxygenated: yes  Mask difficulty assessment: 0 - not attempted    Final Airway Details  Final airway type: supraglottic airway      Successful airway: classic  Size 4    Number of attempts at approach: 1  Assessment: lips, teeth, and gum same as pre-op    Additional Comments  LMA placed with no trauma noted. Patient tolerated well. Good seal. Secured.

## 2022-02-04 NOTE — OP NOTE
Right axillary lymph node biopsy    Pre-op: Probable metastatic melanoma to right axilla    Post-op:  Same    Surgeon:  Dianelys Cummings M.D., F.A.C.S.    Assistant: Brennen    Anesthesia:  general      Indications: Probable metastatic melanoma to right axilla       Procedure Details   After obtaining informed consent and receiving preoperative antibiotics and with venous compression boots in place, the patient was taken to the operating room and placed under anesthesia.  The right axilla was prepped and draped in a sterile fashion.  An incision was made parallel to the lateral aspect of pectoralis major muscle and carried into the axillary tissue.  2 clinically positive black nodes were identified in the low axilla.  There was a fairly marked desmoplastic reaction around them.  There were dissected free from the surrounding tissue and sent for pathology as well as for Caris testing.  Wound was irrigated and hemostasis was obtained.  The incision was closed in a 2 layer closure interrupted 3-0 Vicryl sutures to Aline's fascia, followed by 4-0 Monocryl.  Dressing was placed.  Patient tolerated the procedure well was taken to the recovery room in stable condition    Findings:    Estimated Blood Loss:  Minimal    Blood Administered: none           Drains: none           Specimens:   ID Type Source Tests Collected by Time   A : RIGHT AXILLARY LYMPH NODE Tissue Axilla, Right TISSUE PATHOLOGY EXAM Dianelys Cummings MD 2/4/2022 1411        Grafts and Implants: No       Complications:  none           Disposition: PACU - hemodynamically stable.           Condition: stable

## 2022-02-04 NOTE — ANESTHESIA PREPROCEDURE EVALUATION
Anesthesia Evaluation     no history of anesthetic complications:  NPO Solid Status: > 8 hours  NPO Liquid Status: > 8 hours           Airway   Mallampati: II  TM distance: >3 FB  Neck ROM: full  No difficulty expected  Dental - normal exam     Pulmonary - normal exam   (+) pneumonia improving , pulmonary embolism,   Cardiovascular - normal exam  Exercise tolerance: good (4-7 METS)    NYHA Classification: II  Rhythm: regular  Rate: normal    (+) hypertension well controlled, hyperlipidemia,       Neuro/Psych  (+) headaches,     GI/Hepatic/Renal/Endo      Musculoskeletal     (+) back pain,   Abdominal  - normal exam    Bowel sounds: normal.   Substance History      OB/GYN          Other      history of cancer remission    ROS/Med Hx Other: Melanoma                  Anesthesia Plan    ASA 3     general       Anesthetic plan, all risks, benefits, and alternatives have been provided, discussed and informed consent has been obtained with: patient.  Use of blood products discussed with patient .

## 2022-02-04 NOTE — H&P
"Subjective   Yadira Burgos is a 66 y.o. female here today for pathology follow up.    History of Present Illness  Ms. Burgos was seen in the office today to discuss lymph node biopsy and port placement for suspected metastatic melanoma.  Patient had a 10-day hospitalization in December, 2021.  She had a CT chest abdomen pelvis on 12/12/2021 which demonstrated lung nodules, right lower lobe infiltrate, right axillary lymph node and liver lesions.  She ultimately underwent a needle biopsy of the right axillary lymph node which demonstrated findings suggestive of metastatic melanoma.  Patient does have a history of breast cancer with no evidence of current disease.  She also has a history of cervical cancer treated in 1975 and history of colon polyps.  Patient's prior melanoma was diagnosed in 2004.  Recommendation is now for port placement and excisional biopsy of pathologic right axillary lymph node for Caris testing.  Allergies   Allergen Reactions   • Lactose Intolerance (Gi) Diarrhea, Other (See Comments) and GI Intolerance     \"pain and cramping\"     Current Outpatient Medications   Medication Sig Dispense Refill   • apixaban (ELIQUIS) 5 MG tablet tablet Take 1 tablet by mouth 2 (Two) Times a Day. 60 tablet 5   • sulfamethoxazole-trimethoprim (Bactrim DS) 800-160 MG per tablet Take 1 tablet by mouth 2 (Two) Times a Day. 6 tablet 0     No current facility-administered medications for this visit.     Past Medical History:   Diagnosis Date   • Back pain    • Basal cell carcinoma (BCC) in situ of skin 2020    Dr. Mohr - Derm   • Breast cancer (HCC)     2015   • Cancer (HCC)     breast, cervical, melanoma   • Cervical cancer (HCC) 1976    Diagnosed in 1975.  Treated by repeated local intervention and ultimately received cryotherapy at [facility] with resolution.   • Elbow fracture    • Elevated cholesterol    • Foot fracture    • Headache    • Hyperlipidemia    • Hypertension    • Melanoma (HCC)     on her back 2004 - " "treated by Dermatologist Catrachito Amaro in [place], [place] with what sounds like wide local excision.    • Sinusitis      Past Surgical History:   Procedure Laterality Date   • BREAST LUMPECTOMY Right 2015   • BREAST SURGERY  2015    Secondary to cancer   • COLONOSCOPY N/A 12/9/2020    Procedure: COLONOSCOPY;  Surgeon: Mohsen Maldonado MD;  [facility];  Service: Gastroenterology;  Laterality: N/A;   • ELBOW FUSION      left   • FOOT SURGERY  2014    Broken foot   • HAND SURGERY  08/2020    right   • RHINOPLASTY  2002   • SKIN CANCER EXCISION      malignant melanoma from back 2004   • US GUIDED LYMPH NODE BIOPSY  1/13/2022       Pertinent Review of Systems:  Respiratory: no shortness of breath  Cardiovascular: no chest pain  Other pertinent:      Objective   /78 (BP Location: Left arm)   Pulse 78   Ht 170.2 cm (67\")   Wt 83 kg (183 lb)   BMI 28.66 kg/m²   Physical Exam  General:  This is a WD WN female in no acute distress  Lungs:  Respiratory effort normal. Auscultation: Clear, without wheezes, rhonchi, rales  Heart:  Regular rate and rhythm, without murmur, gallop, rub.  No pedal edema  Breasts: Examination of the right breast demonstrates a surgical scar in the upper breast and axilla.  There is at least one large palpable right axillary lymph node.  Examination of the left breast demonstrates no discrete mass, skin change, or axillary adenopathy  Abdomen: Nontender, without palpable mass    Procedures     Results/Data:  Imaging: Imaging from 12/21 was reviewed including CT of chest abdomen pelvis and axillary ultrasound.  Notes: Records from medical oncology from 1/18/2022 were reviewed   Lab: Laboratory studies from 1/13/2022 including CBC and CMP as well as iron profile and TSH were reviewed  Other: Pathology result was reviewed    Assessment/Plan   Pathologic right axillary lymph nodes consistent with metastatic melanoma  Anticoagulation for presumed pulmonary embolus    Plan: Proceed with " port placement and excision of pathologic right axillary lymph nodes  Hold Eliquis         Discussion/Summary    Time spent:     Patient's Body mass index is 28.66 kg/m². indicating that she is overweight (BMI 25-29.9). Obesity-related health conditions include the following: none. Obesity is newly identified. BMI is is above average; no BMI management plan is appropriate. We discussed portion control and increasing exercise..       Future Appointments   Date Time Provider Department Center   2/2/2022  2:00 PM Meera Delgado PA-C [facility]   2/2/2022  4:00 PM [facility] NURSE [facility]   2/2/2022  4:30 PM Loreto Stone MD [facility]   2/10/2022  1:15 PM Dilcia Pedro MD [facility]         Please note that portions of this note were completed with a voice recognition program.    This document has been electronically signed by Dianelys SINGH MD on February 4, 2022 09:43 EST

## 2022-02-09 ENCOUNTER — TELEPHONE (OUTPATIENT)
Dept: SURGERY | Facility: CLINIC | Age: 67
End: 2022-02-09

## 2022-02-09 PROBLEM — Z85.9 HISTORY OF CANCER: Status: ACTIVE | Noted: 2022-02-09

## 2022-02-09 PROBLEM — J98.4 CAVITARY LESION OF LUNG: Status: ACTIVE | Noted: 2022-02-09

## 2022-02-09 PROBLEM — R91.8 MULTIPLE PULMONARY NODULES: Status: ACTIVE | Noted: 2022-02-09

## 2022-02-09 NOTE — TELEPHONE ENCOUNTER
Informed patient that pathology did come back positive. She has an appointment with Dr. Stone tomorrow to go over results and make a new plan. She is so nice.

## 2022-02-10 ENCOUNTER — TELEPHONE (OUTPATIENT)
Dept: SURGERY | Facility: CLINIC | Age: 67
End: 2022-02-10

## 2022-02-10 ENCOUNTER — TELEPHONE (OUTPATIENT)
Dept: ONCOLOGY | Facility: CLINIC | Age: 67
End: 2022-02-10

## 2022-02-10 ENCOUNTER — OFFICE VISIT (OUTPATIENT)
Dept: ONCOLOGY | Facility: CLINIC | Age: 67
End: 2022-02-10

## 2022-02-10 VITALS
HEART RATE: 87 BPM | DIASTOLIC BLOOD PRESSURE: 79 MMHG | RESPIRATION RATE: 18 BRPM | OXYGEN SATURATION: 98 % | BODY MASS INDEX: 29.03 KG/M2 | WEIGHT: 185 LBS | SYSTOLIC BLOOD PRESSURE: 138 MMHG | TEMPERATURE: 97.1 F | HEIGHT: 67 IN

## 2022-02-10 DIAGNOSIS — C43.9 METASTATIC MELANOMA: Primary | ICD-10-CM

## 2022-02-10 DIAGNOSIS — C77.8 SECONDARY AND MALIGNANT NEOPLASM OF LYMPH NODES OF MULTIPLE SITES: ICD-10-CM

## 2022-02-10 DIAGNOSIS — C78.01 MALIGNANT NEOPLASM METASTATIC TO BOTH LUNGS: ICD-10-CM

## 2022-02-10 DIAGNOSIS — Z85.820 HISTORY OF MALIGNANT MELANOMA OF SKIN: ICD-10-CM

## 2022-02-10 DIAGNOSIS — I26.94 MULTIPLE SUBSEGMENTAL PULMONARY EMBOLI WITHOUT ACUTE COR PULMONALE: ICD-10-CM

## 2022-02-10 DIAGNOSIS — K76.9 LIVER LESION: ICD-10-CM

## 2022-02-10 DIAGNOSIS — C78.02 MALIGNANT NEOPLASM METASTATIC TO BOTH LUNGS: ICD-10-CM

## 2022-02-10 PROBLEM — C78.7 METASTASIS TO LIVER: Status: ACTIVE | Noted: 2022-02-10

## 2022-02-10 PROBLEM — C43.59 MALIGNANT MELANOMA OF TORSO EXCLUDING BREAST: Status: ACTIVE | Noted: 2022-02-10

## 2022-02-10 PROCEDURE — 99215 OFFICE O/P EST HI 40 MIN: CPT | Performed by: INTERNAL MEDICINE

## 2022-02-10 NOTE — TELEPHONE ENCOUNTER
Caller: Yadira Oseguera    Relationship: Self    Best call back number: [phone]    What is the best time to reach you: ANY    Who are you requesting to speak with (clinical staff, provider,  specific staff member): CLINICAL    What was the call regarding: PATIENT WANTED TO KNOW IF SHE IS TO TAKE THE ELIQUIS ONCE A DAY OR TWICE A DAY?     Do you require a callback: YES

## 2022-02-10 NOTE — PROGRESS NOTES
"NAME: Yadira Burgos    : 1955    DATE:  2/10/2022    DIAGNOSIS:   1.  Stage IA ([id number]) moderately differentiated invasive ductal carcinoma of the right breast diagnosed in 2015.    2.  H/o malignant Melanoma on her Back    3.  H/o cervical cancer treated with cryotherapy at [facility] in     4.  Recurrent / metastatic malignant Melanoma  -  R axillary LN bx 22.    -  She has R axillary LN mass (2.47x2.16), Solitary cavitary R lobe liver lesion, cavitary RLL lung mass,  and bilateral parenchymal lung nodules    CHIEF COMPLAINT:  Follow up of metastatic melanoma and recent biopsy    HISTORY OF PRESENT ILLNESS:   Yadira Burgos is a very pleasant 66 y.o. female who was referred by Dr. Dilcia Pedro for evaluation and treatment of breast cancer. She was living in [place] in 2015 when her dog brought attention to and \"found\" an abnormal lump in her right breast.  She was treated by Dr. Steve Isbell and underwent R lumpectomy with SLNBx on 10-22-15 as below.  She then had what sounds like accelerated partial breast irradiation.  She was then started on Arimidex which was later switched to Exemestane for a better side effect profile.  She took this for about 5 years, stopping a couple of weeks ago.  She stopped taking Exemestane because she developed some vaginal bleeding and hematuria.  This was evaluated by her PCP and gynecologist and felt to be due to vaginal atrophy.  She was prescribed a hormone pill which she hasn't yet started and was referred here to discuss things further.      Aside from bleeding which has been uncomfortable and distressing for her, Ms. Onur Burgos has generally been well.  The last year has been hard on her emotionally with the loss of her  and then her dog, her home and her insurance, but she is coping well and has good family support in [place].  She says she gained weight with her breast cancer treatment and gained weight when her  " .  She has been working now to lose weight and has lost 20 lbs over the last 2 months with dieting.  She denies chest pain or shortness of breath.  She complains of some RLQ cramping pain and isn't sure what is causing that.  She denies difficulty moving her bowels.  No blood in her stool that she is aware of.  She has had vaginal bleeding and hematuria as above.        INTERVAL HISTORY:  Ms. Burgos is here today for follow up of metastatic melanoma. She reports soreness around her port where it was recently placed. She says her R axilla is not very painful. She is anxious to discuss biopsy and treatment recommendations.  She is very anxious about the prospect of discussing her condition with her mother and sister.    PAST MEDICAL HISTORY:  Past Medical History:   Diagnosis Date   • Back pain    • Basal cell carcinoma (BCC) in situ of skin     Dr. Onur Steven Derm   • Breast cancer (HCC)        • Cancer (HCC)     breast, cervical, melanoma   • Cervical cancer (HCC)     Diagnosed in .  Treated by repeated local intervention and ultimately received cryotherapy at [facility] with resolution.   • Elbow fracture    • Foot fracture    • Headache    • Melanoma (HCC)     on her back  - treated by Dermatologist Catrachito Amaro in [place], [place] with what sounds like wide local excision.    • Pulmonary embolism (HCC)    • Sinusitis        PAST SURGICAL HISTORY:  Past Surgical History:   Procedure Laterality Date   • AXILLARY LYMPH NODE BIOPSY/EXCISION Right 2022    Procedure: AXILLARY LYMPH NODE BIOPSY/EXCISION;  Surgeon: Dianelys Cummings MD;  Location: [facility];  Service: General;  Laterality: Right;   • BREAST LUMPECTOMY Right    • BREAST SURGERY  2015    Secondary to cancer   • COLONOSCOPY N/A 2020    Procedure: COLONOSCOPY;  Surgeon: Mohsen Maldonado MD;  Location: [facility] OR;  Service: Gastroenterology;  Laterality: N/A;   • ELBOW FUSION      left   • FOOT SURGERY  2014    Broken foot   •  HAND SURGERY  2020    right   • RHINOPLASTY     • SKIN CANCER EXCISION      malignant melanoma from back    • US GUIDED LYMPH NODE BIOPSY  2022   • VENOUS ACCESS DEVICE (PORT) INSERTION N/A 2022    Procedure: INSERTION VENOUS ACCESS DEVICE left or right;  Surgeon: Dianelys Cummings MD;  Location: [facility];  Service: General;  Laterality: N/A;       FAMILY HISTORY:  Family History   Problem Relation Age of Onset   • Other Mother         blood clots   • Hypertension Mother    • Ulcers Father    • Hypertension Sister    • Other Sister         Multiple Sclerosis   • Cancer Paternal Aunt    • Stroke Maternal Grandmother    • Alcohol abuse Paternal Grandmother    • Hypertension Sister    • Multiple sclerosis Sister    • Hypertension Sister    • Coronary artery disease Paternal Grandfather    • Cancer Maternal Aunt 30        colon   • Breast cancer Neg Hx    Many paternal aunts  with malignancy of various types. Many cousins on that side of the family have had cancer as well.  One  of colon cancer in her 30s.  One had a brain tumor.    SOCIAL HISTORY:  Social History     Socioeconomic History   • Marital status:    Tobacco Use   • Smoking status: Never Smoker   • Smokeless tobacco: Never Used   Vaping Use   • Vaping Use: Never used   Substance and Sexual Activity   • Alcohol use: Not Currently   • Drug use: Never   • Sexual activity: Not Currently     Birth control/protection: Post-menopausal         REVIEW OF SYSTEMS:   A comprehensive 14 point review of systems was performed.  Significant findings as mentioned above.  All other systems reviewed and are negative.      MEDICATIONS:  The current medication list was reviewed in the EMR    Current Outpatient Medications:   •  apixaban (ELIQUIS) 5 MG tablet tablet, Take 1 tablet by mouth 2 (Two) Times a Day., Disp: 60 tablet, Rfl: 5  •  HYDROcodone-acetaminophen (Norco) 7.5-325 MG per tablet, Take 1 tablet by mouth 4 (Four) Times a Day As  Needed for Moderate Pain ., Disp: 10 tablet, Rfl: 0    ALLERGIES:    No Known Allergies    PHYSICAL EXAM:  Vitals:    02/10/22 1408   BP: 138/79   Pulse: 87   Resp: 18   Temp: 97.1 °F (36.2 °C)   SpO2: 98%     Pain Score    02/10/22 1408   PainSc: 0-No pain     ECOG score: 0     General:  Awake, alert and oriented, anxious  HEENT:  Pupils are equal, round and reactive to light and accommodation, Extra-ocular movements full, Oropharyx clear, mucous membranes moist  Neck:  No JVD, thyromegaly or lymphadenopathy  CV:  Regular rate and rhythm, no murmurs, rubs or gallops  Resp:  Lungs are clear to auscultation bilaterally, no crackles  Breast:  S/p R lumpectomy. There is a somewhat firm area at the site of surgery but there are no palpable masses.  She is s/p excision of R axillary LN, with some post-surgical swelling.  Left breast is without mass lesions.  No L axillary adenopathy.  Abd:  Soft, non-tender, non-distended, bowel sounds present, no organomegaly or masses.  She is s/p biopsy / drainage of R liver abscess / mass  Ext:  No clubbing, cyanosis or edema.    Lymph:  No cervical, supraclavicular, axillary, inguinal or femoral adenopathy  Neuro:  MS as above, CN II-XII intact, grossly non-focal exam          PATHOLOGY:  10-22-15            12-16-21          01-13-22          ENDOSCOPY:  Colonoscopy 12-09-20     Findings: 8 hyperplastic appearing polyps of the distal rectum, diverticulosis moderately throughout the sigmoid colon      IMAGING:  CTCAP 12-15-20  On the lung windows there are several calcified  granulomas in the lungs. No noncalcified pulmonary parenchymal lung  nodules were identified. On the soft tissue windows there were no  enlarged lymph nodes in the supraclavicular or axillary regions. No  mediastinal or hilar lymphadenopathy was seen. The heart was not  enlarged. There were no pericardial effusions.     IMPRESSION:  No CT findings of metastatic disease in the chest. There are  calcified  granulomas in the lungs.     CT FINDINGS: The liver and spleen were normal in size and shape. The  gallbladder contains several stones. The wall was not thickened. The  bile ducts in the liver are not dilated. There is nothing seen to  suggest metastatic disease in the liver. The pancreas shows no evidence  of mass or inflammation. No adrenal or renal lesions are demonstrated.  The aorta is normal in caliber. There were no enlarged lymph nodes in  the retroperitoneum or in the mesentery. The bowel shows no evidence of  obstruction. In the pelvis there were no masses or fluid collections.  There were no ventral hernias.     IMPRESSION:  No CT findings of metastatic disease in the abdomen or  pelvis. This study does demonstrate several stones in the lumen of the  gallbladder.         Bilateral diagnostic mammogram 01-31-21  FINDINGS: There are scattered areas of fibroglandular density.     The bilateral fibroglandular pattern does not appear significantly  changed compared to the exam from 2020. This includes postoperative  changes in the right 12:00 region. Mild skin thickening is noted  involving the right breast which is likely treatment related. A few  bilateral scattered calcifications are noted.     IMPRESSION:  Incomplete examination as comparison with older outside  prior mammograms is needed.        BI-RADS CATEGORY:   0, INCOMPLETE:  Need prior mammograms for comparison        RECOMMENDATION:  We will attempt to obtain older outside prior  mammograms for comparison.      CTCAP 12-12-21  FINDINGS: Lack of IV contrast hinders parenchymal assessment of the mediastinum, liver, spleen, pancreas, adrenal glands and kidneys.         Significant infiltrate seen in the right lower lobe with interstitial component. There are also lung nodules present in the right lung. Index nodule in the right lung base medially is 5 mm maximal diameter. Other smaller nodules seen throughout the right  lung. Tiny 3 mm lingular nodule  seen in series 3 image 30. A benign calcified granuloma seen in the left lower lobe but a noncalcified indeterminant  5 mm left lower lobe nodule seen, series 3 image 42.     Nonenlarged mediastinal adenopathy seen. However there is an enlarged right axillary 1.8 x 2.6 cm lymph node partially seen.     Worrisome hepatic metastasis or primary hepatic lesion seen measuring 4.4 x 4.1 cm in liver segment 8. Margins are ill-defined. Abscess could have a similar appearance. Suggestion of subtle gallstones. Nonobstructing 2 mm right renal calculus seen. No  obstructive uropathy. Gaseous distention of the esophagus and hiatal hernia present consistent with GERD. No enteric contrast but no gross evidence of bowel obstruction.   There is no gross free air, free fluid or focal inflammatory change.  Uterus and  adnexa are not enlarged. Osseous structures are grossly intact.     IMPRESSION:  Constellation of findings with significant consolidation in the right lower lobe, enlarged right axillary lymph node, indeterminate bilateral lung nodules and ill-defined hepatic lesion concerning for underlying malignancy in this patient .     FINDINGS: Lack of IV contrast hinders parenchymal assessment of the mediastinum, liver, spleen, pancreas, adrenal glands and kidneys.         Significant infiltrate seen in the right lower lobe with interstitial component. There are also lung nodules present in the right lung. Index nodule in the right lung base medially is 5 mm maximal diameter. Other smaller nodules seen throughout the right  lung. Tiny 3 mm lingular nodule seen in series 3 image 30. A benign calcified granuloma seen in the left lower lobe but a noncalcified indeterminant  5 mm left lower lobe nodule seen, series 3 image 42.     Nonenlarged mediastinal adenopathy seen. However there is an enlarged right axillary 1.8 x 2.6 cm lymph node partially seen.     Worrisome hepatic metastasis or primary hepatic lesion seen measuring 4.4 x  4.1 cm in liver segment 8. Margins are ill-defined. Abscess could have a similar appearance. Suggestion of subtle gallstones. Nonobstructing 2 mm right renal calculus seen. No  obstructive uropathy. Gaseous distention of the esophagus and hiatal hernia present consistent with GERD. No enteric contrast but no gross evidence of bowel obstruction.   There is no gross free air, free fluid or focal inflammatory change.  Uterus and  adnexa are not enlarged. Osseous structures are grossly intact.     IMPRESSION:  Constellation of findings with significant consolidation in the right lower lobe, enlarged right axillary lymph node, indeterminate bilateral lung nodules and ill-defined hepatic lesion concerning for underlying malignancy in this patient .       US Abdomen Complete 12-12-21  FINDINGS:  The visualized portions of the pancreas, IVC and aorta appear normal.        The liver is moderately coarsened in echotexture. Ill-defined hypodensity in the liver measuring 3 x 3.2 x 2.6 cm corresponds to recent CT. Unclear if this is a malignant lesion. Abscesses within the differential but prior CT was performed without any  contrast.  The common bile duct is not dilated.. Gallbladder wall is thickened at 3.4 mm with trace pericholecystic fluid and subtle hyperemia. Gallstones and shadowing present.     The kidneys are normal in size and echogenicity. There is no   hydronephrosis or focal solid lesion.  The spleen is normal in size and echotexture.     IMPRESSION:  1. Nonspecific gallbladder wall thickening and questionable pericholecystic fluid. Gallstones are better seen on CT than ultrasound.  2.  Heterogeneous liver with lesion in the liver as seen on CT. Ultrasound is not helpful in determining etiology. This could be an abscess but given the other findings on CT,  malignancy is not excluded.      CTCAP 12-15-21  FINDINGS:    LUNGS:  Increased density of the now right lower lobe mixed  groundglass and more solid-appearing  consolidation with internal  cavitary component identified. Tiny right and left lung pulmonary  nodules. Grossly stable.    PLEURAL SPACE:  Small bilateral pleural effusions.  No pneumothorax.    HEART:  Unremarkable.  No cardiomegaly.  No significant pericardial  effusion.    BONES/JOINTS:  Unremarkable.  No acute fracture.  No dislocation.    SOFT TISSUES:  Unremarkable.    VASCULATURE:  Unremarkable.  No thoracic aortic aneurysm.    LYMPH NODES:  Unremarkable.  No enlarged lymph nodes.     IMPRESSION:  1.  Increased density of the now right lower lobe mixed groundglass and  more solid-appearing consolidation with internal cavitary component  identified. Tiny right and left lung pulmonary nodules. Grossly stable.  2.  Small bilateral pleural effusions.    FINDINGS:    LUNG BASES:  Unremarkable.  No mass.  No consolidation.      ABDOMEN:    LIVER:  Right lobe of liver lesion is again identified now measuring  about 4.7 cm and was about 4.7 cm.    GALLBLADDER AND BILE DUCTS:  Gallstones noted in the gallbladder.  No  ductal dilation.    PANCREAS:  Unremarkable.  No mass.  No ductal dilation.    SPLEEN:  Unremarkable.  No splenomegaly.    ADRENALS:  Unremarkable.  No mass.    KIDNEYS AND URETERS:  Unremarkable.  No solid mass.  No  hydronephrosis.    STOMACH AND BOWEL:  Unremarkable.  No obstruction.  No mucosal  thickening.      PELVIS:    APPENDIX:  No findings to suggest acute appendicitis.    BLADDER:  Unremarkable.  No mass.    REPRODUCTIVE:  Unremarkable as visualized.      ABDOMEN and PELVIS:    INTRAPERITONEAL SPACE:  Unremarkable.  No free air.  No significant  fluid collection.    BONES/JOINTS:  No acute fracture.  No dislocation.    SOFT TISSUES:  Unremarkable.    VASCULATURE:  Unremarkable.  No abdominal aortic aneurysm.    LYMPH NODES:  Unremarkable.  No enlarged lymph nodes.     IMPRESSION:    Right lobe of liver lesion is again identified now measuring about 4.7  cm and was about 4.7 cm.      NM Bone  Scan Whole Body 12-15-21  FINDINGS:    SKULL/FACIAL BONES:  No focal increased or decreased uptake.    SPINE:  Unremarkable.  No abnormal increased or decreased uptake.    RIBS:  Unremarkable.  No abnormal uptake.    LONG BONES:  Unremarkable.  No abnormal uptake.    PELVIS:  Unremarkable.  No focal increased or decreased uptake.    JOINTS:  Unremarkable.  No focal increased or decreased uptake.    SOFT TISSUES:  Unremarkable.    RENAL/BLADDER:  Within normal limits.     IMPRESSION:    Normal bone scan.      CT Abdomen With Contrast  12-18-21  FINDINGS:  Partially imaged thick-walled cavitary lesion in the right lower lobe with right basilar atelectasis/infiltrate and small right pleural effusion. There is also left basilar atelectasis/infiltrate and trace left pleural effusion. Multiple noncalcified  pulmonary nodules are scattered throughout the visualized lower lung fields, concerning for pulmonary metastatic disease.     Ill-defined right hepatic mass again noted. The spleen, pancreas, and both adrenal glands are within expected limits. Cholelithiasis. Punctate nonobstructing right intrarenal stone. Tiny left renal cyst. Kidneys are otherwise unremarkable without  hydronephrosis. Abdominal aorta normal in course and caliber without dissection. No pathologic retroperitoneal or mesenteric lymphadenopathy by size criteria. Visualized small bowel and colon are unremarkable. No bowel obstruction. No free fluid or free  air.     No aggressive osseous lesions.     IMPRESSION:     1. Ill-defined right hepatic mass again noted. This has been previously biopsied and correlation with pathology results is recommended.  2. Cholelithiasis.  3. Punctate nonobstructing right intrarenal stone.  4. No threshold abdominal lymphadenopathy or other suspicious abdominal masses.  5. Partially imaged thick-walled cavitary lesion in the right lower lobe. This may be of infectious or neoplastic etiology and continued follow-up is  recommended.  6. Small bilateral pleural effusions with bibasilar atelectasis/infiltrate, worse on the right than left.  7. Numerous small noncalcified pulmonary nodules throughout the visualized lower lung fields, concerning for pulmonary metastatic disease.       Mammo Diagnostic Digital Tomosynthesis Bilateral With CAD w/ US Axilla Right 01-13-22  FINDINGS:  There are scattered areas of fibroglandular density.     Incompletely imaged is a very prominent axillary lymph node. The  bilateral fibroglandular pattern itself is stable in appearance  including postoperative changes in the superior aspect of the right  breast. There are stable bilateral calcifications. Mild skin thickening  is again noted involving the right breast which is likely treatment  related.     ULTRASOUND: Ultrasound evaluation of the right axilla demonstrates 2  markedly enlarged lymph nodes the largest of which measures 3.2 cm in  size and has no demonstrable fatty hilum. Recommend ultrasound-guided  biopsy.      IMPRESSION:  1. Stable mammographic appearance of the left breast with no findings  suspicious for malignancy.        2. Marked right axillary lymphadenopathy. Recommend ultrasound-guided  biopsy. The fibroglandular pattern of the right breast is however  stable.        BI-RADS CATEGORY:  4, SUSPICIOUS        RECOMMENDATION:  Recommend ultrasound-guided biopsy of the dominant  abnormal appearing right axillary lymph node.      DEXA Bone Density Axial 01-13-22  IMPRESSION:  Impression:  1. According to the World Health Organization definitions of  osteoporosis based on bone density, this patient's bone mineral density  is compatible with osteoporosis and the fracture risk is high.      Mammo Diagnostic Digital Tomosynthesis Bilateral With CAD w/ US Axilla Right 01-13-22  FINDINGS:  There are scattered areas of fibroglandular density.     Incompletely imaged is a very prominent axillary lymph node. The  bilateral fibroglandular pattern  itself is stable in appearance  including postoperative changes in the superior aspect of the right  breast. There are stable bilateral calcifications. Mild skin thickening  is again noted involving the right breast which is likely treatment  related.     ULTRASOUND: Ultrasound evaluation of the right axilla demonstrates 2  markedly enlarged lymph nodes the largest of which measures 3.2 cm in  size and has no demonstrable fatty hilum. Recommend ultrasound-guided  biopsy.      IMPRESSION:  1. Stable mammographic appearance of the left breast with no findings  suspicious for malignancy.        2. Marked right axillary lymphadenopathy. Recommend ultrasound-guided  biopsy. The fibroglandular pattern of the right breast is however  stable.        BI-RADS CATEGORY:  4, SUSPICIOUS        RECOMMENDATION:  Recommend ultrasound-guided biopsy of the dominant  abnormal appearing right axillary lymph node.      CT Chest With Contrast Diagnostic 01-18-22  FINDINGS:     LUNGS: Solitary mass in the right lower lobe with cavitation.  Significantly smaller today than on the prior study. 6 mm solid nodule  in the right lung posteriorly on image 30 of the axial series. Other  parenchymal nodules are seen in both lungs and are similar to the  previous study. These are concerning for metastatic nodules.     HEART: Unremarkable.     MEDIASTINUM: No masses. No enlarged lymph nodes.  No fluid collections.     PLEURA: No pleural effusion. No pleural mass or abnormal calcification.  No pneumothorax.     VASCULATURE: No evidence of aneurysm. Filling defect in the right  pulmonary artery and possibly left lower lobe pulmonary artery. This  study was not performed to evaluate for pulmonary embolus, but the  findings are concerning for bilateral pulmonary emboli.     BONES: No acute bony abnormality.     VISUALIZED UPPER ABDOMEN:Please see the CT report for the abdomen and  pelvis.     Other: Right axillary soft tissue mass is present and shows  slight  interval growth. It measures 2.47 x 2.16 cm today and previously at the  same level measured approximately 2.7 x 1.89 cm.     IMPRESSION:  1. Study was not performed as a PE protocol, but there appear to be  filling defects in pulmonary arteries bilaterally concerning for  pulmonary emboli.  2. Interval decrease in size of cavitary right lower lobe mass.  3. Stable parenchymal nodules bilaterally.  4. Very slight interval growth of right axillary soft tissue mass.     Results are being relayed to the referring physician.      US Liver 01-18-22  FINDINGS: Sonographic imaging of the liver does not show the mass  previously identified in the liver. I would suggest a follow-up CT scan  for better delineation.     Hepatic flow was hepatopedal.     There is shadowing from the gallbladder fossa.     IMPRESSION:  1. Previously identified mass is not seen on this exam in the liver.  Consider follow-up CT.  2. Shadowing from the gallbladder fossa. In the absence of  cholecystectomy, appearance is concerning for cholelithiasis.      US Venous Doppler Lower Extremity Bilateral (duplex) 01-20-22  FINDINGS:   There is patent spontaneous flow from the common femoral vein through  the posterior tibial veins.  There was no internal clot or area of noncompressibility.  Normal augmentation was elicited where applicable.     IMPRESSION:  No DVT in the lower extremities on today's exam.       CT Chest Pulmonary Embolism 01-20-22  FINDINGS: Today's study demonstrates opacification of the central  pulmonary vessels.  There are filling defects in the pulmonary arteries bilaterally. First  order arteries. This is most compatible with bilateral pulmonary  emboli..     Soft tissue mass in the right lower lobe with somewhat cavitary center  is again noted and unchanged..     There is no mediastinal lymph node enlargement     No pericardial or pleural effusion.        IMPRESSION:  1. Bilateral pulmonary emboli.  2. Parenchymal nodules  bilaterally with dominant mass in the right lower  Lobe..      NM PET/CT Skull Base to Mid Thigh 01-28-22  FINDINGS:      HEAD/NECK:  Area of uptake in the posterior right paraspinal space and to lesser  degree the left paraspinal space appears related to muscle uptake.  No FDG hypermetabolic neck adenopathy.  No FDG hypermetabolic masses.     CHEST:   Numerous bilateral pulmonary nodules appear stable from the previous  chest CT and show no FDG hypermetabolism. This is likely due to nodules  being below size threshold for PET sensitivity.  Cavitary lung mass in the right lung localizing to the superior segment  of the lower lobe shows FDG hypermetabolism with maximum SUV: 2.4. This  is at the lower range for malignancy.  Enlarged right lower axillary region lymph node which is 3.0 cm shows  mild FDG hypermetabolism that is approximately with maximum SUV: 2.4.  This is at the lower range for malignancy.  Low-dose CT demonstrating no change in additional scattered pulmonary  nodules from the previous scan. Coronary artery calcifications are  stable.     ABDOMEN/PELVIS:   Faint area of low attenuation involving the right lobe of liver is  poorly evaluated with low-dose noncontrast CT but shows no focal FDG  hypermetabolism.  Physiologic FDG hypermetabolism seen throughout the solid abdominal  organs.  Physiologic FDG hypermetabolism seen throughout the GI tract.  Physiologic FDG hypermetabolism seen throughout the mesentery,  retroperitoneum and pelvis.  Low dose CT redemonstrates nonobstructing kidney stones. Cholelithiasis  again noted.     BONES: Nonspecific FDG tracer activity. No intense areas of tracer  activity noted.     IMPRESSION:     1. Cavitary mass in the right lung that shows very low-grade FDG  hypermetabolism which is at the lower range for malignancy with maximum  SUV: 2.4.  2. Enlarged right axillary region lymph node with low-grade FDG  hypermetabolism also at the lower range for malignancy with  maximum SUV:  2.4.  3. Numerous bilateral pulmonary nodules compatible with metastatic  disease but show no significant FDG hypermetabolism. This may be in part  due to size of nodules being below PET sensitivity threshold.  4. Faint area of low-attenuation right lobe liver poorly evaluated with  noncontrast low-dose CT that shows no obvious intense FDG  hypermetabolism.  5. Other nonacute findings as above on low dose CT.        RECENT LABS:  Lab Results   Component Value Date    WBC 3.73 01/20/2022    HGB 13.2 01/20/2022    HCT 40.9 01/20/2022    MCV 89.7 01/20/2022    RDW 16.3 (H) 01/20/2022     01/20/2022    NEUTRORELPCT 43.4 01/20/2022    LYMPHORELPCT 46.1 (H) 01/20/2022    MONORELPCT 7.5 01/20/2022    EOSRELPCT 1.6 01/20/2022    BASORELPCT 1.1 01/20/2022    NEUTROABS 1.62 (L) 01/20/2022    LYMPHSABS 1.72 01/20/2022       Lab Results   Component Value Date     01/20/2022    K 3.8 01/20/2022    CO2 21.8 (L) 01/20/2022     01/20/2022    BUN 10 01/20/2022    CREATININE 0.67 01/20/2022    EGFRIFNONA 88 01/20/2022    GLUCOSE 104 (H) 01/20/2022    CALCIUM 9.4 01/20/2022    ALKPHOS 106 01/20/2022    AST 34 (H) 01/20/2022    ALT 44 (H) 01/20/2022    BILITOT 0.7 01/20/2022    ALBUMIN 4.11 01/20/2022    PROTEINTOT 6.5 01/20/2022    MG 2.0 12/17/2021     Lab Results   Component Value Date    TSH 1.880 01/20/2022     Lab Results   Component Value Date    FERRITIN 169.00 (H) 01/20/2022    IRON 102 01/20/2022    TIBC 264 (L) 01/20/2022    LABIRON 39 01/20/2022    FRUGVSNU98 437 01/20/2022    FOLATE 16.40 01/20/2022     Lab Results   Component Value Date     (H) 01/20/2022           ASSESSMENT & PLAN:  Yadira Burgos is a very pleasant 66 y.o. female with a history of cervical cancer, breast cancer and malignant melanoma.    1.  History of Breast Cancer:  -  Ms. Onur Burgos had Stage IA ([id number]) moderately differentiated invasive ductal carcinoma of the right breast diagnosed in October 2015.  -   She underwent R lympectomy and SLNBx performed by Dr. Steve Isebll 10-22-15 and then received adjuvant radiation in [place].  -  After radiation, she was started on Arimidex which was then switched to Exemestane.  She took Exemestane for almost 5 years. Unfortunately, she developed vaginal bleeding and hematuria related to vaginal atrophy related to hormonal blockade.  Given this, stopped Exemestane.  She is doing much better in this regard since stopping hormonal therapy.  -  Mammogram 1-13-22 without cause for concern aside from R axillary LAD.  No palpable breast masses.    2.   Metastatic malignant melanoma:  -  S/p resection of a primary lesion on her back performed by Dr. Catrachito Amaro of Dermatology in [place] in 2004.    -  S/p FNA R axillary LN which was concerning for melanoma.  Excisional biopsy confirmed metastatic malignant melanoma.  -  I suspect her lung and liver lesions are also related to her melanoma.  -  She has seen Dr. Her who plans bronchoscopy for further evaluation of the lung lesions.  -  Liver aspiration showed abscess which grew Klebsiella. I suspect this was a secondary infection but this isn't completely certain at this time.    -  Plan to watch all of these areas as we proceed with treatment.  -  Will send recently excised LN tissue for CARIS testing.  -  PET-CT showed no significant FDG uptake but re-demonstrated abnormalities in the R axilla, RLL cavitary mass, Vargas lung nodules and liver.    -  MRI Brain ordered and scheduled for early next week.  -  Assuming no brain lesions, I have recommended initial therapy with immunotherapy.  We discussed different options for immunotherapy including single agent PD-L1 treatment or combination with Yervoy.  I recommended combination with Yervoy for better RR, PFS and OS even with increased risk of immune-mediated side effects.  We discussed the initially approved FDA dosing v. Altered dosing with lower dose IPI/higher dosed Nivo.   The latter has been shown to have fewer side effects and is thought to likely have similar efficacy (though trials weren't really powered to definitively show that).  After a lengthy discussion, we deiced to proceed with 4 cycles of Ipi 1 mg/kg / Nivo 3 mg/kg q 21d followed by Nivolumab single agent.  We discussed potential risks, benefits and side effects extensively and she would like to proceed.    4.Bilateral pulmonary emboli:  -  CTPE protocol 1-20-22  confirmed bilateral pulmonary emboli  -  BLE dopplers negative for DVT.  -  Continue Eliquis 5 mg BID.  This will need to be held for bronchoscopy if Dr. Her decides to proceed.    5.  H/o Colon polyps:  -  Dr. Maldonado performed c-scope 12-09-20 with discovery of 8 hyperplastic polyps in the distal rectum as well as diverticulosis.  He recommended repeat c-scope after 5 years..    6.  H/o cervical cancer treated with cryotherapy at [facility] in [facility]:  -  Followed up with Dr. Manav Real of gynecology.  She says she had exam including pap smear and then pelvic U/S which was unrevealing.    7. Prophylaxis:  -  Had COVID vaccine x 2 (Pfizer).  Recommended booster dose.  Recommended 2021 influenza vaccine.    8  ACO / DWAYNE/Other  Quality measures  -  Yadira Burgos did not receive 2021 flu vaccine.  This has been recommended to her.  -  Yadira Burgos reports a pain score of 0.  Given her pain assessment as noted, treatment options were discussed and the following options were decided upon as a follow-up plan to address the patient's pain: No intervention needed at this time..  -  Current outpatient and discharge medications have been reconciled for the patient.  Reviewed by: Loreto Stone MD     9.  F/u:  -  Will send R axillary LN specimen for CARIS.  -  Continue Eliquis (aside from perioperative hold).  -  MRI Brain scheduled for early next week  -  Will work on approvals for Ipilumumab/Nivolumab therapy..  -  Recommended COVID booster and 2021 influenza  vaccine.    This note was scribed for Loreto Stone MD by Alba Felton RN.    I, Loreto Stone MD, personally performed the services described in this documentation as scribed by the above named individual in my presence, and it is both accurate and complete.  02/10/2022     I spent 50 minutes with Yadira Burgos today.  In the office today, more than 50% of this time was spent face-to-face with her  in counseling / coordination of care, reviewing her medical history and counseling on the current treatment plan.  All questions were answered to her satisfaction      Electronically Signed by: Loreto Stone MD      CC:     MD Manav Cotto MD Aaron House, MD

## 2022-02-10 NOTE — TELEPHONE ENCOUNTER
Caller: Yadira Oseguera    Relationship: Self    Best call back number: [phone]    What is the best time to reach you: BEFORE 1:30PM TODAY IF POSSIBLE    What was the call regarding: PATIENT HAD SURGERY WITH DR. SINGH ON 02/04/22 AND WANTS TO KNOW WHEN SHE CAN TAKE THE BANDAGES OFF, DID NOT SEE ANY DIRECTIONS FOR THAT IN HER PAPERWORK. REQUEST TO BE CALLED BEFORE 1:30PM TODAY IF POSSIBLE BECAUSE SHE HAS AN APPT TODAY WITH DR. MONTALVO AND MAY REQUEST HER HELP IF NEEDED.     Do you require a callback: YES

## 2022-02-11 ENCOUNTER — TELEPHONE (OUTPATIENT)
Dept: PULMONOLOGY | Facility: CLINIC | Age: 67
End: 2022-02-11

## 2022-02-11 DIAGNOSIS — J98.4 CAVITARY LESION OF LUNG: Primary | ICD-10-CM

## 2022-02-11 DIAGNOSIS — R91.8 MULTIPLE PULMONARY NODULES: ICD-10-CM

## 2022-02-11 NOTE — TELEPHONE ENCOUNTER
Called and discussed the recommendation for bronchoscopy by Dr. Her, after this was discussed this morning between Dr. Her and Dr. Stone.     Bronchoscopy will be used in order to rule out any further types of infection or alternate types of malignancy.   Dr. Stone intends to initiate her on immunotherapy soon.    Discussed the process of the bronchoscopy.  Discussed this Dr. Her advised to be off Eliquis for 2 days prior to the procedure, as recent CT was notable for bilateral PE in January.  She wishes to proceed with the evaluation.        Right cavitary lesion with multiple small pulmonary nodules:   · Case request ordered for February 23  · COVID-19 testing ordered for February 21  · Advised to hold Eliquis on February 21

## 2022-02-15 ENCOUNTER — HOSPITAL ENCOUNTER (OUTPATIENT)
Dept: MRI IMAGING | Facility: HOSPITAL | Age: 67
Discharge: HOME | End: 2022-02-15
Admitting: INTERNAL MEDICINE

## 2022-02-15 DIAGNOSIS — C43.9 METASTATIC MELANOMA: ICD-10-CM

## 2022-02-15 PROCEDURE — 70553 MRI BRAIN STEM W/O & W/DYE: CPT

## 2022-02-15 PROCEDURE — A9577 INJ MULTIHANCE: HCPCS | Performed by: INTERNAL MEDICINE

## 2022-02-15 PROCEDURE — A9577 INJ MULTIHANCE: HCPCS

## 2022-02-15 PROCEDURE — 0 GADOBENATE DIMEGLUMINE 529 MG/ML SOLUTION

## 2022-02-15 PROCEDURE — 70553 MRI BRAIN STEM W/O & W/DYE: CPT | Performed by: RADIOLOGY

## 2022-02-15 PROCEDURE — 0 GADOBENATE DIMEGLUMINE 529 MG/ML SOLUTION: Performed by: INTERNAL MEDICINE

## 2022-02-15 RX ADMIN — GADOBENATE DIMEGLUMINE 15 ML: 529 INJECTION, SOLUTION INTRAVENOUS at 15:55

## 2022-02-16 ENCOUNTER — OFFICE VISIT (OUTPATIENT)
Dept: ONCOLOGY | Facility: CLINIC | Age: 67
End: 2022-02-16

## 2022-02-16 VITALS
HEIGHT: 67 IN | BODY MASS INDEX: 28.69 KG/M2 | TEMPERATURE: 97.3 F | DIASTOLIC BLOOD PRESSURE: 80 MMHG | RESPIRATION RATE: 18 BRPM | WEIGHT: 182.8 LBS | HEART RATE: 80 BPM | SYSTOLIC BLOOD PRESSURE: 132 MMHG | OXYGEN SATURATION: 97 %

## 2022-02-16 DIAGNOSIS — C43.9 METASTATIC MELANOMA: Primary | ICD-10-CM

## 2022-02-16 PROCEDURE — 99215 OFFICE O/P EST HI 40 MIN: CPT | Performed by: NURSE PRACTITIONER

## 2022-02-16 RX ORDER — LIDOCAINE AND PRILOCAINE 25; 25 MG/G; MG/G
CREAM TOPICAL
Qty: 30 G | Refills: 3 | Status: SHIPPED | OUTPATIENT
Start: 2022-02-16 | End: 2022-10-06 | Stop reason: SDUPTHER

## 2022-02-16 RX ORDER — PROCHLORPERAZINE MALEATE 10 MG
10 TABLET ORAL EVERY 6 HOURS PRN
Qty: 60 TABLET | Refills: 3 | Status: SHIPPED | OUTPATIENT
Start: 2022-02-16 | End: 2022-04-04

## 2022-02-16 RX ORDER — ONDANSETRON 8 MG/1
8 TABLET, ORALLY DISINTEGRATING ORAL EVERY 8 HOURS PRN
Qty: 30 TABLET | Refills: 5 | Status: SHIPPED | OUTPATIENT
Start: 2022-02-16

## 2022-02-16 NOTE — PROGRESS NOTES
IMMUNOTHERAPY PREPARATION    Yadira Burgos  [id number]  1955 02/16/2022     Chief Complaint: Immunotherapy education     History of present illness:  Yadira Burgos is a 66 y.o.  female who is here today for immunotherapy preparation and needs assessment. The patient has been diagnosed with metastatic malignant melanoma and palliative treatment with immunotherapy, 4 cycles of Ipi 1 mg/kg / Nivo 3 mg/kg q 21d followed by Nivolumab single agent was recommended. At present, she has no specific complaints.     Oncology History:    Oncology/Hematology History   Malignant melanoma of torso excluding breast (HCC)   2/10/2022 Initial Diagnosis    Malignant melanoma of torso excluding breast (HCC)     2/10/2022 -  Chemotherapy    OP MELANOMA Nivolumab 1 mg/kg / Ipilimumab 3 mg/kg / Nivolumab 240 mg      Malignant neoplasm metastatic to both lungs (HCC)   2/10/2022 Initial Diagnosis    Malignant neoplasm metastatic to both lungs (HCC)     2/10/2022 -  Chemotherapy    OP MELANOMA Nivolumab 1 mg/kg / Ipilimumab 3 mg/kg / Nivolumab 240 mg      Metastasis to liver (HCC)   2/10/2022 Initial Diagnosis    Metastasis to liver (HCC)     2/10/2022 -  Chemotherapy    OP MELANOMA Nivolumab 1 mg/kg / Ipilimumab 3 mg/kg / Nivolumab 240 mg          Past Medical History:   Diagnosis Date   • Back pain    • Basal cell carcinoma (BCC) in situ of skin 2020    Dr. Mohr - Derm   • Breast cancer (HCC)     2015   • Cancer (HCC)     breast, cervical, melanoma   • Cervical cancer (HCC) 1976    Diagnosed in 1975.  Treated by repeated local intervention and ultimately received cryotherapy at [facility] with resolution.   • Elbow fracture    • Foot fracture    • Headache    • Melanoma (HCC)     on her back 2004 - treated by Dermatologist Catrachito Amaro in [place], [place] with what sounds like wide local excision.    • Pulmonary embolism (HCC)    • Sinusitis        Past Surgical History:   Procedure Laterality Date   • AXILLARY LYMPH NODE  BIOPSY/EXCISION Right 2/4/2022    Procedure: AXILLARY LYMPH NODE BIOPSY/EXCISION;  Surgeon: Dianelys Cummings MD;  Location: [facility] OR;  Service: General;  Laterality: Right;   • BREAST LUMPECTOMY Right 2015   • BREAST SURGERY  2015    Secondary to cancer   • COLONOSCOPY N/A 12/9/2020    Procedure: COLONOSCOPY;  Surgeon: Mohsen Maldonado MD;  Location: [facility] OR;  Service: Gastroenterology;  Laterality: N/A;   • ELBOW FUSION      left   • FOOT SURGERY  2014    Broken foot   • HAND SURGERY  08/2020    right   • RHINOPLASTY  2002   • SKIN CANCER EXCISION      malignant melanoma from back 2004   • US GUIDED LYMPH NODE BIOPSY  1/13/2022   • VENOUS ACCESS DEVICE (PORT) INSERTION N/A 2/4/2022    Procedure: INSERTION VENOUS ACCESS DEVICE left or right;  Surgeon: Dianelys Cummings MD;  Location:  [facility] OR;  Service: General;  Laterality: N/A;       Family History   Problem Relation Age of Onset   • Other Mother         blood clots   • Hypertension Mother    • Ulcers Father    • Hypertension Sister    • Other Sister         Multiple Sclerosis   • Cancer Paternal Aunt    • Stroke Maternal Grandmother    • Alcohol abuse Paternal Grandmother    • Hypertension Sister    • Multiple sclerosis Sister    • Hypertension Sister    • Coronary artery disease Paternal Grandfather    • Cancer Maternal Aunt 30        colon   • Breast cancer Neg Hx        Medications: The current medication list was reviewed and reconciled.     Allergies:  has No Known Allergies.    Review of Systems:   A comprehensive 14 point review of systems was performed.  Significant findings as mentioned above.  All other systems reviewed and are negative.      Review of Systems    Physical Exam:    Vitals:    02/16/22 1543   BP: 132/80   Pulse: 80   Resp: 18   Temp: 97.3 °F (36.3 °C)   SpO2: 97%     Vitals:    02/16/22 1543   PainSc: 0-No pain        ECOG: (0) Fully Active - Able to Carry On All Pre-disease Performance Without Restriction  General:  Awake,  "alert and oriented, in no acute distress.   HEENT:  Pupils are equal, round and reactive to light and accommodation, Extra-ocular movements full, Oropharyx clear, mucous membranes moist  Neck:  No JVD, thyromegaly or lymphadenopathy  CV:  Regular rate and rhythm, no murmurs, rubs or gallops  Resp:  Lungs are clear to auscultation bilaterally  Abd:  Soft, non-tender, non-distended, bowel sounds present, no organomegaly or masses.   Ext:  No clubbing, cyanosis or edema.    Neuro:  MS as above,  grossly non-focal exam        NEEDS ASSESSMENTS    Genetics  The patient's new diagnosis and family history have been reviewed for genetic counseling needs. A genetic referral is not recommended.     Barriers to care   The patient was given the name and card for our Oncology Social Worker, Pili Echevarria who will meet with her on first treatment day.     VAD Assessment  The patient and I discussed planned intervenous chemotherapy as well as other IV treatments that are often needed throughout the course of treatment. These may include, but are not limited to blood transfusions, antibiotics, and IV hydration. The vasculature does not appear to be adequate for multiple peripheral IVs throughout their treatment course. Discussed risks and benefits of VADs. The patient would like to pursue Port-A-Cath insertion prior to initiation of treatment. This has been placed.      Advanced Care Planning  The patient and I discussed advanced care planning, \"Conversations that Matter\".   This service was offered, free of charge, for development of advance directives with a certified ACP facilitator.  The patient does not have an up-to-date advanced directive. This document is not on file with our office. The patient is not interested in an appointment with one of our facilitators to create or update their advanced directives.      Palliative Care  The patient and I discussed palliative care services. Palliative care is not the same as Hospice " care. This is specialized medical care for people living with serious illness with the goal of improving quality of life for the patient and their family. [facility] has partnered with [facility] to offer our patients outpatient palliative care early along with their treatment to assist in coordination of care, symptom management, pain management, and medical decision making.  Oncology criteria for palliative care referral is not met at this time. The patient is not interested in a palliative care consultation.     Additional Referral needs  none      CHEMOTHERAPY EDUCATION    Booklets Given: Chemotherapy and You [x]  Eating Hints [x]    Sexuality/Fertility Books []      Chemotherapy/Biotherapy Education Sheets: (list all that apply)  nausea management, acid reflux management, diarrhea management, Cancer resourse contacts information, skin and mouth care, vaccination information and Treatment Calendar                                                                                                                                                                 Chemotherapy Regimen:   Treatment Plans     Name Type Plan Dates Plan Provider         Active    OP MELANOMA Nivolumab 1 mg/kg / Ipilimumab 3 mg/kg / Nivolumab 240 mg  ONCOLOGY TREATMENT  2/9/2022 - Present Loreto Stone MD                    TOPICS EDUCATION PROVIDED COMMENTS   ANEMIA:  role of RBC, cause, s/s, ways to manage, role of transfusion [x]    THROMBOCYTOPENIA:  role of platelet, cause, s/s, ways to prevent bleeding, things to avoid, when to seek help [x]    NEUTROPENIA:  role of WBC, cause, infection precautions, s/s of infection, when to call MD [x]    NUTRITION & APPETITE CHANGES:  importance of maintaining healthy diet & weight, ways to manage to improve intake, dietary consult, exercise regimen [x]    DIARRHEA:  causes, s/s of dehydration, ways to manage, dietary changes, when to call MD [x]    CONSTIPATION:  causes, ways to  manage, dietary changes, when to call MD [x]    NAUSEA & VOMITING:  cause, use of antiemetics, dietary changes, when to call MD [x]    MOUTH SORES:  causes, oral care, ways to manage [x]    ALOPECIA:  cause, ways to manage, resources [x]    INFERTILITY & SEXUALITY:  causes, fertility preservation options, sexuality changes, ways to manage, importance of birth control [x]    NERVOUS SYSTEM CHANGES:  causes, s/s, neuropathies, cognitive changes, ways to manage [x]    PAIN:  causes, ways to manage [x]    SKIN & NAIL CHANGES:  cause, s/s, ways to manage [x]    ORGAN TOXICITIES:  cause, s/s, need for diagnostic tests, labs, when to notify MD [x]    SURVIVORSHIP:  distress, distress assessment, secondary malignancies, early/late effects, follow-up, social issues, social support [x]    HOME CARE:  use of spill kits, storing of PO chemo, how to manage bodily fluids [x]    MISCELLANEOUS:  drug interactions, administration, vesicant, et [x]        Assessment and Plan:    Diagnoses and all orders for this visit:    1. Metastatic melanoma (HCC) (Primary)    Other orders  -     ondansetron ODT (Zofran ODT) 8 MG disintegrating tablet; Place 1 tablet on the tongue Every 8 (Eight) Hours As Needed for Nausea or Vomiting.  Dispense: 30 tablet; Refill: 5  -     prochlorperazine (COMPAZINE) 10 MG tablet; Take 1 tablet by mouth Every 6 (Six) Hours As Needed for Nausea.  Dispense: 60 tablet; Refill: 3  -     lidocaine-prilocaine (EMLA) 2.5-2.5 % cream; Apply to port a cath ~30 min -1 hour prior to chemotherapy  Dispense: 30 g; Refill: 3      The patient and I have reviewed their new cancer diagnosis and scheduled treatment plan. Needs assessment was completed including genetics,  barriers to care, VAD evaluation, advanced care planning, and palliative care services. Referrals have been ordered as appropriate based upon our evaluation and patient desires.     Immunotherapy teaching was also completed today as documented above. Adequate  time was given to answer all questions to her satisfaction. Patient is aware of their care team members and contact information if they have questions or problems throughout the treatment course. Needs assessments and education has been completed. The patient is adequately prepared to begin treatment as scheduled.     Reviewed with patient education regarding EMLA cream, Zofran and Compazine prescriptions sent to pharmacy.       I advised the patient of taking Tylenol or Ibuprofen as needed for aches/pains related to cancer/treatment. I also advised of using Senakot or Miralax as needed for constipation or Imodium as needed for diarrhea.       I reviewed with the patient the care team members. I also reviewed the option of the acute care visits provided through our oncology office for evaluation and management of symptoms related to treatment. Patient was provided with phone number to call during regular office hours [phone] press #1 and for treatment related questions call [phone] to speak to a nurse. If after hours or on the weekend call [phone] and ask to page the MD on call for [facility] Oncology services.       I spent 50 minutes with Yadira Onur Burgos today.  In the office today, more than 50% of this time was spent face-to-face with her  in counseling / coordination of care, reviewing her interim medical history and counseling on the current treatment plan.  All questions were answered to her satisfaction.        Electronically Signed by: TENNILLE Gallegos , February 16, 2022 15:38 EST

## 2022-02-21 ENCOUNTER — LAB (OUTPATIENT)
Dept: LAB | Facility: HOSPITAL | Age: 67
End: 2022-02-21

## 2022-02-21 ENCOUNTER — TELEPHONE (OUTPATIENT)
Dept: ONCOLOGY | Facility: HOSPITAL | Age: 67
End: 2022-02-21

## 2022-02-21 ENCOUNTER — APPOINTMENT (OUTPATIENT)
Dept: ONCOLOGY | Facility: HOSPITAL | Age: 67
End: 2022-02-21

## 2022-02-21 DIAGNOSIS — R91.8 MULTIPLE PULMONARY NODULES: ICD-10-CM

## 2022-02-21 DIAGNOSIS — J98.4 CAVITARY LESION OF LUNG: ICD-10-CM

## 2022-02-21 LAB — SARS-COV-2 RNA PNL SPEC NAA+PROBE: NOT DETECTED

## 2022-02-21 PROCEDURE — U0005 INFEC AGEN DETEC AMPLI PROBE: HCPCS | Performed by: PHYSICIAN ASSISTANT

## 2022-02-21 PROCEDURE — U0004 COV-19 TEST NON-CDC HGH THRU: HCPCS | Performed by: PHYSICIAN ASSISTANT

## 2022-02-21 NOTE — TELEPHONE ENCOUNTER
Called patient regarding appointment today. I apologized to her as I  informed her that pharmacy did not have her drug in stock at this time and it will be unavailable until tomorrow afternoon. Pt rescheduled to Thursday, 02/24/22 at 1100 per her request due to her work schedule. Pt informed of new appointment date time, pt verbalized understanding at this time.

## 2022-02-23 ENCOUNTER — ANESTHESIA EVENT (OUTPATIENT)
Dept: PERIOP | Facility: HOSPITAL | Age: 67
End: 2022-02-23

## 2022-02-23 ENCOUNTER — ANESTHESIA (OUTPATIENT)
Dept: PERIOP | Facility: HOSPITAL | Age: 67
End: 2022-02-23

## 2022-02-23 ENCOUNTER — HOSPITAL ENCOUNTER (OUTPATIENT)
Facility: HOSPITAL | Age: 67
Setting detail: HOSPITAL OUTPATIENT SURGERY
Discharge: HOME | End: 2022-02-23
Attending: INTERNAL MEDICINE | Admitting: INTERNAL MEDICINE

## 2022-02-23 VITALS
TEMPERATURE: 98.2 F | WEIGHT: 182 LBS | SYSTOLIC BLOOD PRESSURE: 127 MMHG | BODY MASS INDEX: 28.56 KG/M2 | DIASTOLIC BLOOD PRESSURE: 74 MMHG | HEART RATE: 99 BPM | OXYGEN SATURATION: 97 % | RESPIRATION RATE: 20 BRPM | HEIGHT: 67 IN

## 2022-02-23 DIAGNOSIS — R91.8 MULTIPLE PULMONARY NODULES: ICD-10-CM

## 2022-02-23 DIAGNOSIS — J98.4 CAVITARY LESION OF LUNG: ICD-10-CM

## 2022-02-23 LAB
ANION GAP SERPL CALCULATED.3IONS-SCNC: 10.1 MMOL/L (ref 5–15)
BASOPHILS # BLD AUTO: 0.03 10*3/MM3 (ref 0–0.2)
BASOPHILS NFR BLD AUTO: 0.7 % (ref 0–1.5)
BUN SERPL-MCNC: 14 MG/DL (ref 8–23)
BUN/CREAT SERPL: 21.5 (ref 7–25)
CALCIUM SPEC-SCNC: 9.7 MG/DL (ref 8.6–10.5)
CHLORIDE SERPL-SCNC: 108 MMOL/L (ref 98–107)
CO2 SERPL-SCNC: 24.9 MMOL/L (ref 22–29)
CREAT SERPL-MCNC: 0.65 MG/DL (ref 0.57–1)
DEPRECATED RDW RBC AUTO: 48.3 FL (ref 37–54)
EOSINOPHIL # BLD AUTO: 0.17 10*3/MM3 (ref 0–0.4)
EOSINOPHIL NFR BLD AUTO: 3.8 % (ref 0.3–6.2)
ERYTHROCYTE [DISTWIDTH] IN BLOOD BY AUTOMATED COUNT: 14.6 % (ref 12.3–15.4)
GFR SERPL CREATININE-BSD FRML MDRD: 91 ML/MIN/1.73
GLUCOSE SERPL-MCNC: 113 MG/DL (ref 65–99)
HCT VFR BLD AUTO: 44.1 % (ref 34–46.6)
HGB BLD-MCNC: 14 G/DL (ref 12–15.9)
IMM GRANULOCYTES # BLD AUTO: 0.01 10*3/MM3 (ref 0–0.05)
IMM GRANULOCYTES NFR BLD AUTO: 0.2 % (ref 0–0.5)
INR PPP: 0.86 (ref 0.9–1.1)
LYMPHOCYTES # BLD AUTO: 1.62 10*3/MM3 (ref 0.7–3.1)
LYMPHOCYTES NFR BLD AUTO: 35.8 % (ref 19.6–45.3)
MCH RBC QN AUTO: 28.7 PG (ref 26.6–33)
MCHC RBC AUTO-ENTMCNC: 31.7 G/DL (ref 31.5–35.7)
MCV RBC AUTO: 90.4 FL (ref 79–97)
MONOCYTES # BLD AUTO: 0.33 10*3/MM3 (ref 0.1–0.9)
MONOCYTES NFR BLD AUTO: 7.3 % (ref 5–12)
NEUTROPHILS NFR BLD AUTO: 2.36 10*3/MM3 (ref 1.7–7)
NEUTROPHILS NFR BLD AUTO: 52.2 % (ref 42.7–76)
NRBC BLD AUTO-RTO: 0 /100 WBC (ref 0–0.2)
PLATELET # BLD AUTO: 194 10*3/MM3 (ref 140–450)
PMV BLD AUTO: 10.2 FL (ref 6–12)
POTASSIUM SERPL-SCNC: 4.1 MMOL/L (ref 3.5–5.2)
PROTHROMBIN TIME: 12.1 SECONDS (ref 12.8–14.5)
RBC # BLD AUTO: 4.88 10*6/MM3 (ref 3.77–5.28)
SODIUM SERPL-SCNC: 143 MMOL/L (ref 136–145)
WBC NRBC COR # BLD: 4.52 10*3/MM3 (ref 3.4–10.8)

## 2022-02-23 PROCEDURE — 80048 BASIC METABOLIC PNL TOTAL CA: CPT | Performed by: INTERNAL MEDICINE

## 2022-02-23 PROCEDURE — 25010000002 PROPOFOL 10 MG/ML EMULSION: Performed by: NURSE ANESTHETIST, CERTIFIED REGISTERED

## 2022-02-23 PROCEDURE — 31624 DX BRONCHOSCOPE/LAVAGE: CPT | Performed by: INTERNAL MEDICINE

## 2022-02-23 PROCEDURE — 25010000002 FENTANYL CITRATE (PF) 50 MCG/ML SOLUTION: Performed by: NURSE ANESTHETIST, CERTIFIED REGISTERED

## 2022-02-23 PROCEDURE — 25010000002 ONDANSETRON PER 1 MG: Performed by: NURSE ANESTHETIST, CERTIFIED REGISTERED

## 2022-02-23 PROCEDURE — 87071 CULTURE AEROBIC QUANT OTHER: CPT | Performed by: INTERNAL MEDICINE

## 2022-02-23 PROCEDURE — 85025 COMPLETE CBC W/AUTO DIFF WBC: CPT | Performed by: INTERNAL MEDICINE

## 2022-02-23 PROCEDURE — 94799 UNLISTED PULMONARY SVC/PX: CPT

## 2022-02-23 PROCEDURE — 85610 PROTHROMBIN TIME: CPT | Performed by: INTERNAL MEDICINE

## 2022-02-23 PROCEDURE — 87206 SMEAR FLUORESCENT/ACID STAI: CPT | Performed by: INTERNAL MEDICINE

## 2022-02-23 PROCEDURE — 94640 AIRWAY INHALATION TREATMENT: CPT

## 2022-02-23 PROCEDURE — 31623 DX BRONCHOSCOPE/BRUSH: CPT | Performed by: INTERNAL MEDICINE

## 2022-02-23 PROCEDURE — 31652 BRONCH EBUS SAMPLNG 1/2 NODE: CPT | Performed by: INTERNAL MEDICINE

## 2022-02-23 PROCEDURE — 87205 SMEAR GRAM STAIN: CPT | Performed by: INTERNAL MEDICINE

## 2022-02-23 PROCEDURE — 87116 MYCOBACTERIA CULTURE: CPT | Performed by: INTERNAL MEDICINE

## 2022-02-23 PROCEDURE — 87102 FUNGUS ISOLATION CULTURE: CPT | Performed by: INTERNAL MEDICINE

## 2022-02-23 RX ORDER — DROPERIDOL 2.5 MG/ML
0.62 INJECTION, SOLUTION INTRAMUSCULAR; INTRAVENOUS ONCE AS NEEDED
Status: DISCONTINUED | OUTPATIENT
Start: 2022-02-23 | End: 2022-02-23 | Stop reason: HOSPADM

## 2022-02-23 RX ORDER — ONDANSETRON 2 MG/ML
4 INJECTION INTRAMUSCULAR; INTRAVENOUS AS NEEDED
Status: DISCONTINUED | OUTPATIENT
Start: 2022-02-23 | End: 2022-02-23 | Stop reason: HOSPADM

## 2022-02-23 RX ORDER — LIDOCAINE HYDROCHLORIDE 20 MG/ML
INJECTION, SOLUTION INFILTRATION; PERINEURAL AS NEEDED
Status: DISCONTINUED | OUTPATIENT
Start: 2022-02-23 | End: 2022-02-23 | Stop reason: SURG

## 2022-02-23 RX ORDER — MEPERIDINE HYDROCHLORIDE 25 MG/ML
12.5 INJECTION INTRAMUSCULAR; INTRAVENOUS; SUBCUTANEOUS
Status: DISCONTINUED | OUTPATIENT
Start: 2022-02-23 | End: 2022-02-23 | Stop reason: HOSPADM

## 2022-02-23 RX ORDER — FENTANYL CITRATE 50 UG/ML
50 INJECTION, SOLUTION INTRAMUSCULAR; INTRAVENOUS
Status: DISCONTINUED | OUTPATIENT
Start: 2022-02-23 | End: 2022-02-23 | Stop reason: HOSPADM

## 2022-02-23 RX ORDER — LIDOCAINE HYDROCHLORIDE AND EPINEPHRINE 10; 10 MG/ML; UG/ML
INJECTION, SOLUTION INFILTRATION; PERINEURAL AS NEEDED
Status: DISCONTINUED | OUTPATIENT
Start: 2022-02-23 | End: 2022-02-23 | Stop reason: HOSPADM

## 2022-02-23 RX ORDER — FENTANYL CITRATE 50 UG/ML
INJECTION, SOLUTION INTRAMUSCULAR; INTRAVENOUS AS NEEDED
Status: DISCONTINUED | OUTPATIENT
Start: 2022-02-23 | End: 2022-02-23 | Stop reason: SURG

## 2022-02-23 RX ORDER — ONDANSETRON 2 MG/ML
INJECTION INTRAMUSCULAR; INTRAVENOUS AS NEEDED
Status: DISCONTINUED | OUTPATIENT
Start: 2022-02-23 | End: 2022-02-23 | Stop reason: SURG

## 2022-02-23 RX ORDER — KETOROLAC TROMETHAMINE 30 MG/ML
30 INJECTION, SOLUTION INTRAMUSCULAR; INTRAVENOUS EVERY 6 HOURS PRN
Status: DISCONTINUED | OUTPATIENT
Start: 2022-02-23 | End: 2022-02-23 | Stop reason: HOSPADM

## 2022-02-23 RX ORDER — SODIUM CHLORIDE 0.9 % (FLUSH) 0.9 %
10 SYRINGE (ML) INJECTION EVERY 12 HOURS SCHEDULED
Status: DISCONTINUED | OUTPATIENT
Start: 2022-02-23 | End: 2022-02-23 | Stop reason: HOSPADM

## 2022-02-23 RX ORDER — MIDAZOLAM HYDROCHLORIDE 1 MG/ML
1 INJECTION INTRAMUSCULAR; INTRAVENOUS
Status: DISCONTINUED | OUTPATIENT
Start: 2022-02-23 | End: 2022-02-23 | Stop reason: HOSPADM

## 2022-02-23 RX ORDER — LIDOCAINE HYDROCHLORIDE 10 MG/ML
INJECTION, SOLUTION EPIDURAL; INFILTRATION; INTRACAUDAL; PERINEURAL AS NEEDED
Status: DISCONTINUED | OUTPATIENT
Start: 2022-02-23 | End: 2022-02-23 | Stop reason: HOSPADM

## 2022-02-23 RX ORDER — MIDAZOLAM HYDROCHLORIDE 1 MG/ML
0.5 INJECTION INTRAMUSCULAR; INTRAVENOUS
Status: DISCONTINUED | OUTPATIENT
Start: 2022-02-23 | End: 2022-02-23 | Stop reason: HOSPADM

## 2022-02-23 RX ORDER — SODIUM CHLORIDE, SODIUM LACTATE, POTASSIUM CHLORIDE, CALCIUM CHLORIDE 600; 310; 30; 20 MG/100ML; MG/100ML; MG/100ML; MG/100ML
100 INJECTION, SOLUTION INTRAVENOUS ONCE AS NEEDED
Status: DISCONTINUED | OUTPATIENT
Start: 2022-02-23 | End: 2022-02-23 | Stop reason: HOSPADM

## 2022-02-23 RX ORDER — SODIUM CHLORIDE 0.9 % (FLUSH) 0.9 %
10 SYRINGE (ML) INJECTION AS NEEDED
Status: DISCONTINUED | OUTPATIENT
Start: 2022-02-23 | End: 2022-02-23 | Stop reason: HOSPADM

## 2022-02-23 RX ORDER — FAMOTIDINE 10 MG/ML
INJECTION, SOLUTION INTRAVENOUS AS NEEDED
Status: DISCONTINUED | OUTPATIENT
Start: 2022-02-23 | End: 2022-02-23 | Stop reason: SURG

## 2022-02-23 RX ORDER — SODIUM CHLORIDE 9 MG/ML
INJECTION, SOLUTION INTRAVENOUS AS NEEDED
Status: DISCONTINUED | OUTPATIENT
Start: 2022-02-23 | End: 2022-02-23 | Stop reason: HOSPADM

## 2022-02-23 RX ORDER — SODIUM CHLORIDE, SODIUM LACTATE, POTASSIUM CHLORIDE, CALCIUM CHLORIDE 600; 310; 30; 20 MG/100ML; MG/100ML; MG/100ML; MG/100ML
INJECTION, SOLUTION INTRAVENOUS CONTINUOUS PRN
Status: DISCONTINUED | OUTPATIENT
Start: 2022-02-23 | End: 2022-02-23 | Stop reason: SURG

## 2022-02-23 RX ORDER — LIDOCAINE HYDROCHLORIDE 40 MG/ML
3 INJECTION, SOLUTION RETROBULBAR; TOPICAL ONCE
Status: COMPLETED | OUTPATIENT
Start: 2022-02-23 | End: 2022-02-23

## 2022-02-23 RX ORDER — SODIUM CHLORIDE, SODIUM LACTATE, POTASSIUM CHLORIDE, CALCIUM CHLORIDE 600; 310; 30; 20 MG/100ML; MG/100ML; MG/100ML; MG/100ML
125 INJECTION, SOLUTION INTRAVENOUS ONCE
Status: DISCONTINUED | OUTPATIENT
Start: 2022-02-23 | End: 2022-02-23 | Stop reason: HOSPADM

## 2022-02-23 RX ORDER — ALBUTEROL SULFATE 2.5 MG/3ML
2.5 SOLUTION RESPIRATORY (INHALATION) ONCE
Status: COMPLETED | OUTPATIENT
Start: 2022-02-23 | End: 2022-02-23

## 2022-02-23 RX ORDER — OXYCODONE HYDROCHLORIDE AND ACETAMINOPHEN 5; 325 MG/1; MG/1
1 TABLET ORAL ONCE AS NEEDED
Status: DISCONTINUED | OUTPATIENT
Start: 2022-02-23 | End: 2022-02-23 | Stop reason: HOSPADM

## 2022-02-23 RX ORDER — IPRATROPIUM BROMIDE AND ALBUTEROL SULFATE 2.5; .5 MG/3ML; MG/3ML
3 SOLUTION RESPIRATORY (INHALATION) ONCE AS NEEDED
Status: DISCONTINUED | OUTPATIENT
Start: 2022-02-23 | End: 2022-02-23 | Stop reason: HOSPADM

## 2022-02-23 RX ORDER — PROPOFOL 10 MG/ML
VIAL (ML) INTRAVENOUS AS NEEDED
Status: DISCONTINUED | OUTPATIENT
Start: 2022-02-23 | End: 2022-02-23 | Stop reason: SURG

## 2022-02-23 RX ADMIN — SODIUM CHLORIDE, POTASSIUM CHLORIDE, SODIUM LACTATE AND CALCIUM CHLORIDE: 600; 310; 30; 20 INJECTION, SOLUTION INTRAVENOUS at 11:37

## 2022-02-23 RX ADMIN — LIDOCAINE HYDROCHLORIDE 60 MG: 20 INJECTION, SOLUTION INFILTRATION; PERINEURAL at 11:44

## 2022-02-23 RX ADMIN — ONDANSETRON 4 MG: 2 INJECTION INTRAMUSCULAR; INTRAVENOUS at 11:44

## 2022-02-23 RX ADMIN — FENTANYL CITRATE 100 MCG: 50 INJECTION INTRAMUSCULAR; INTRAVENOUS at 11:37

## 2022-02-23 RX ADMIN — ALBUTEROL SULFATE 2.5 MG: 2.5 SOLUTION RESPIRATORY (INHALATION) at 11:21

## 2022-02-23 RX ADMIN — FAMOTIDINE 20 MG: 10 INJECTION INTRAVENOUS at 11:44

## 2022-02-23 RX ADMIN — LIDOCAINE HYDROCHLORIDE 3 ML: 40 INJECTION, SOLUTION RETROBULBAR; TOPICAL at 11:26

## 2022-02-23 RX ADMIN — PROPOFOL 150 MG: 10 INJECTION, EMULSION INTRAVENOUS at 11:44

## 2022-02-23 NOTE — ANESTHESIA PROCEDURE NOTES
Airway  Urgency: elective    Date/Time: 2/23/2022 11:44 AM  Airway not difficult    General Information and Staff    Patient location during procedure: OR  Anesthesiologist: Riccardo King MD  CRNA: Jeffery Gee CRNA    Indications and Patient Condition  Indications for airway management: airway protection    Preoxygenated: yes  MILS maintained throughout  Mask difficulty assessment: 0 - not attempted    Final Airway Details  Final airway type: supraglottic airway      Successful airway: unique  Size 4    Number of attempts at approach: 1  Assessment: lips, teeth, and gum same as pre-op and atraumatic intubation    Additional Comments  Dentition & lips unchanged from preop

## 2022-02-23 NOTE — H&P
Chief Complaint:  Here for a bronchoscopy for abnormal imaging - ct chest    Ct chest reviewed     Subjective     Interval History: no changes since the last time since was seen in office        Review of Systems:    Review of system system is negative for shortness of breath chest pain lightheadedness dizziness any numbness in any area of the body belly pain nausea vomiting diarrhea shortness of breath cough          Vital Signs  Temp:  [98.7 °F (37.1 °C)] 98.7 °F (37.1 °C)  Heart Rate:  [75-78] 78  Resp:  [20] 20  BP: (111)/(69) 111/69  Body mass index is 28.51 kg/m².  No intake or output data in the 24 hours ending 02/23/22 1131  No intake/output data recorded.    Physical Exam:   General- normal in appearance, not in any acute distress    HEENT- pupils equally reactive to light, normal in size, no scleral icterus    Neck-supple    Chest-respirations normal-on auscultation no wheezing no crackles    S1 and S2 normal    Abdomen-nontender nondistended bowel sounds positive    CNS-nonfocal    Extremities-no edema    Psychiatric-mood good, good eye contact, alert awake oriented     Results Review:     I reviewed the patient's new clinical results.  Results from last 7 days   Lab Units 02/23/22  0953   WBC 10*3/mm3 4.52   HEMOGLOBIN g/dL 14.0   PLATELETS 10*3/mm3 194     Results from last 7 days   Lab Units 02/23/22  0953   SODIUM mmol/L 143   POTASSIUM mmol/L 4.1   CHLORIDE mmol/L 108*   CO2 mmol/L 24.9   BUN mg/dL 14   CREATININE mg/dL 0.65   CALCIUM mg/dL 9.7   GLUCOSE mg/dL 113*     Lab Results   Component Value Date    INR 0.86 (L) 02/23/2022    INR 1.09 12/16/2021    INR 1.09 12/12/2021    PROTIME 12.1 (L) 02/23/2022    PROTIME 14.5 12/16/2021    PROTIME 14.5 12/12/2021           Invalid input(s): PROT, LABALBU      Imaging Results (Last 24 Hours)     ** No results found for the last 24 hours. **               lactated ringers, 125 mL/hr, Intravenous, Once  sodium chloride, 10 mL, Intravenous, Q12H            Medication Review:     Assessment/Plan     Abnormal ct chest- all the medications history physical labs are reviewed.  We will do a bronchoscopy with bronchoalveolar lavage his bronchial brushings.  We'll send it for cytology and microbiology.    Informed consent taken.    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

## 2022-02-23 NOTE — DISCHARGE INSTRUCTIONS
Flexible Bronchoscopy, Care After  This sheet gives you information about how to care for yourself after your test. Your doctor may also give you more specific instructions. If you have problems or questions, contact your doctor.  Follow these instructions at home:  Eating and drinking  · Do not eat or drink anything (not even water) for 2 hours after your test, or until your numbing medicine (local anesthetic) wears off.  · When your numbness is gone and your cough and gag reflexes have come back, you may:  ? Eat only soft foods.  ? Slowly drink liquids.  · The day after the test, go back to your normal diet.  Driving  · Do not drive for 24 hours if you were given a medicine to help you relax (sedative).  · Do not drive or use heavy machinery while taking prescription pain medicine.  General instructions    · Take over-the-counter and prescription medicines only as told by your doctor.  · Return to your normal activities as told. Ask what activities are safe for you.  · Do not use any products that have nicotine or tobacco in them. This includes cigarettes and e-cigarettes. If you need help quitting, ask your doctor.  · Keep all follow-up visits as told by your doctor. This is important. It is very important if you had a tissue sample (biopsy) taken.    Get help right away if:  · You have shortness of breath that gets worse.  · You get light-headed.  · You feel like you are going to pass out (faint).  · You have chest pain.  · You cough up:  ? More than a little blood.  ? More blood than before.  Summary  · Do not eat or drink anything (not even water) for 2 hours after your test, or until your numbing medicine wears off.  · Do not use cigarettes. Do not use e-cigarettes.  · Get help right away if you have chest pain.  This information is not intended to replace advice given to you by your health care provider. Make sure you discuss any questions you have with your health care provider.  Document Revised:  11/30/2018 Document Reviewed: 01/05/2018  [employer] Patient Education © 2021 [employer]  General Anesthesia, Adult, Care After  This sheet gives you information about how to care for yourself after your procedure. Your health care provider may also give you more specific instructions. If you have problems or questions, contact your health care provider.  What can I expect after the procedure?  After the procedure, the following side effects are common:  · Pain or discomfort at the IV site.  · Nausea.  · Vomiting.  · Sore throat.  · Trouble concentrating.  · Feeling cold or chills.  · Feeling weak or tired.  · Sleepiness and fatigue.  · Soreness and body aches. These side effects can affect parts of the body that were not involved in surgery.  Follow these instructions at home:  For the time period you were told by your health care provider:    · Rest.  · Do not participate in activities where you could fall or become injured.  · Do not drive or use machinery.  · Do not drink alcohol.  · Do not take sleeping pills or medicines that cause drowsiness.  · Do not make important decisions or sign legal documents.  · Do not take care of children on your own.    Eating and drinking  · Follow any instructions from your health care provider about eating or drinking restrictions.  · When you feel hungry, start by eating small amounts of foods that are soft and easy to digest (bland), such as toast. Gradually return to your regular diet.  · Drink enough fluid to keep your urine pale yellow.  · If you vomit, rehydrate by drinking water, juice, or clear broth.  General instructions  · If you have sleep apnea, surgery and certain medicines can increase your risk for breathing problems. Follow instructions from your health care provider about wearing your sleep device:  ? Anytime you are sleeping, including during daytime naps.  ? While taking prescription pain medicines, sleeping medicines, or medicines that make you  drowsy.  · Have a responsible adult stay with you for the time you are told. It is important to have someone help care for you until you are awake and alert.  · Return to your normal activities as told by your health care provider. Ask your health care provider what activities are safe for you.  · Take over-the-counter and prescription medicines only as told by your health care provider.  · If you smoke, do not smoke without supervision.  · Keep all follow-up visits as told by your health care provider. This is important.  Contact a health care provider if:  · You have nausea or vomiting that does not get better with medicine.  · You cannot eat or drink without vomiting.  · You have pain that does not get better with medicine.  · You are unable to pass urine.  · You develop a skin rash.  · You have a fever.  · You have redness around your IV site that gets worse.  Get help right away if:  · You have difficulty breathing.  · You have chest pain.  · You have blood in your urine or stool, or you vomit blood.  Summary  · After the procedure, it is common to have a sore throat or nausea. It is also common to feel tired.  · Have a responsible adult stay with you for the time you are told. It is important to have someone help care for you until you are awake and alert.  · When you feel hungry, start by eating small amounts of foods that are soft and easy to digest (bland), such as toast. Gradually return to your regular diet.  · Drink enough fluid to keep your urine pale yellow.  · Return to your normal activities as told by your health care provider. Ask your health care provider what activities are safe for you.  This information is not intended to replace advice given to you by your health care provider. Make sure you discuss any questions you have with your health care provider.  Document Revised: 06/02/2021 Document Reviewed: 04/01/2021  [employer] Patient Education © 2021 [employer]

## 2022-02-23 NOTE — ANESTHESIA POSTPROCEDURE EVALUATION
Patient: Yadira Burgos    Procedure Summary     Date: 02/23/22 Room / Location:  [facility]  /  [facility]    Anesthesia Start: 1137 Anesthesia Stop: 1203    Procedure: BRONCHOSCOPY WITH ENDOBRONCHIAL ULTRASOUND (Right Bronchus) Diagnosis:       Cavitary lesion of lung      Multiple pulmonary nodules      (Cavitary lesion of lung [J98.4])      (Multiple pulmonary nodules [R91.8])    Surgeons: Chris Her MD Provider: Riccardo King MD    Anesthesia Type: general ASA Status: 3          Anesthesia Type: general    Vitals  Vitals Value Taken Time   /74 02/23/22 1232   Temp 98.2 °F (36.8 °C) 02/23/22 1202   Pulse 99 02/23/22 1232   Resp 20 02/23/22 1232   SpO2 97 % 02/23/22 1232           Post Anesthesia Care and Evaluation    Patient location during evaluation: PHASE II  Patient participation: complete - patient participated  Level of consciousness: confused  Pain score: 1  Pain management: adequate  Airway patency: patent  Anesthetic complications: No anesthetic complications  PONV Status: controlled  Cardiovascular status: acceptable  Respiratory status: acceptable  Hydration status: acceptable

## 2022-02-24 ENCOUNTER — INFUSION (OUTPATIENT)
Dept: ONCOLOGY | Facility: HOSPITAL | Age: 67
End: 2022-02-24

## 2022-02-24 VITALS
TEMPERATURE: 98.4 F | OXYGEN SATURATION: 96 % | DIASTOLIC BLOOD PRESSURE: 87 MMHG | SYSTOLIC BLOOD PRESSURE: 150 MMHG | RESPIRATION RATE: 18 BRPM | HEART RATE: 121 BPM | WEIGHT: 183.5 LBS | BODY MASS INDEX: 28.74 KG/M2

## 2022-02-24 DIAGNOSIS — C78.7 METASTASIS TO LIVER: ICD-10-CM

## 2022-02-24 DIAGNOSIS — C43.9 METASTATIC MELANOMA: Primary | ICD-10-CM

## 2022-02-24 DIAGNOSIS — Z95.828 PORT-A-CATH IN PLACE: ICD-10-CM

## 2022-02-24 DIAGNOSIS — C78.01 MALIGNANT NEOPLASM METASTATIC TO BOTH LUNGS: ICD-10-CM

## 2022-02-24 DIAGNOSIS — C43.59 MALIGNANT MELANOMA OF TORSO EXCLUDING BREAST: ICD-10-CM

## 2022-02-24 DIAGNOSIS — C78.02 MALIGNANT NEOPLASM METASTATIC TO BOTH LUNGS: ICD-10-CM

## 2022-02-24 LAB
ALBUMIN SERPL-MCNC: 4.05 G/DL (ref 3.5–5.2)
ALBUMIN/GLOB SERPL: 1.7 G/DL
ALP SERPL-CCNC: 101 U/L (ref 39–117)
ALT SERPL W P-5'-P-CCNC: 42 U/L (ref 1–33)
ANION GAP SERPL CALCULATED.3IONS-SCNC: 8.7 MMOL/L (ref 5–15)
AST SERPL-CCNC: 32 U/L (ref 1–32)
BASOPHILS # BLD AUTO: 0.05 10*3/MM3 (ref 0–0.2)
BASOPHILS NFR BLD AUTO: 1 % (ref 0–1.5)
BILIRUB SERPL-MCNC: 0.7 MG/DL (ref 0–1.2)
BUN SERPL-MCNC: 10 MG/DL (ref 8–23)
BUN/CREAT SERPL: 14.7 (ref 7–25)
CALCIUM SPEC-SCNC: 9.3 MG/DL (ref 8.6–10.5)
CHLORIDE SERPL-SCNC: 104 MMOL/L (ref 98–107)
CO2 SERPL-SCNC: 25.3 MMOL/L (ref 22–29)
CREAT SERPL-MCNC: 0.68 MG/DL (ref 0.57–1)
DEPRECATED RDW RBC AUTO: 47.8 FL (ref 37–54)
EOSINOPHIL # BLD AUTO: 0.1 10*3/MM3 (ref 0–0.4)
EOSINOPHIL NFR BLD AUTO: 1.9 % (ref 0.3–6.2)
ERYTHROCYTE [DISTWIDTH] IN BLOOD BY AUTOMATED COUNT: 14.4 % (ref 12.3–15.4)
GFR SERPL CREATININE-BSD FRML MDRD: 87 ML/MIN/1.73
GLOBULIN UR ELPH-MCNC: 2.5 GM/DL
GLUCOSE SERPL-MCNC: 116 MG/DL (ref 65–99)
HCT VFR BLD AUTO: 40.8 % (ref 34–46.6)
HGB BLD-MCNC: 13 G/DL (ref 12–15.9)
IMM GRANULOCYTES # BLD AUTO: 0.01 10*3/MM3 (ref 0–0.05)
IMM GRANULOCYTES NFR BLD AUTO: 0.2 % (ref 0–0.5)
LYMPHOCYTES # BLD AUTO: 1.71 10*3/MM3 (ref 0.7–3.1)
LYMPHOCYTES NFR BLD AUTO: 32.8 % (ref 19.6–45.3)
MCH RBC QN AUTO: 28.8 PG (ref 26.6–33)
MCHC RBC AUTO-ENTMCNC: 31.9 G/DL (ref 31.5–35.7)
MCV RBC AUTO: 90.5 FL (ref 79–97)
MONOCYTES # BLD AUTO: 0.45 10*3/MM3 (ref 0.1–0.9)
MONOCYTES NFR BLD AUTO: 8.6 % (ref 5–12)
NEUTROPHILS NFR BLD AUTO: 2.89 10*3/MM3 (ref 1.7–7)
NEUTROPHILS NFR BLD AUTO: 55.5 % (ref 42.7–76)
NRBC BLD AUTO-RTO: 0 /100 WBC (ref 0–0.2)
PLATELET # BLD AUTO: 174 10*3/MM3 (ref 140–450)
PMV BLD AUTO: 9.9 FL (ref 6–12)
POTASSIUM SERPL-SCNC: 4 MMOL/L (ref 3.5–5.2)
PROT SERPL-MCNC: 6.5 G/DL (ref 6–8.5)
RBC # BLD AUTO: 4.51 10*6/MM3 (ref 3.77–5.28)
SODIUM SERPL-SCNC: 138 MMOL/L (ref 136–145)
WBC NRBC COR # BLD: 5.21 10*3/MM3 (ref 3.4–10.8)

## 2022-02-24 PROCEDURE — 25010000002 NIVOLUMAB 40 MG/4ML SOLUTION 4 ML VIAL: Performed by: NURSE PRACTITIONER

## 2022-02-24 PROCEDURE — 25010000002 IPILIMUMAB 50 MG/10ML SOLUTION 10 ML VIAL: Performed by: NURSE PRACTITIONER

## 2022-02-24 PROCEDURE — 96413 CHEMO IV INFUSION 1 HR: CPT

## 2022-02-24 PROCEDURE — 25010000002 HEPARIN LOCK FLUSH PER 10 UNITS: Performed by: INTERNAL MEDICINE

## 2022-02-24 PROCEDURE — 85025 COMPLETE CBC W/AUTO DIFF WBC: CPT

## 2022-02-24 PROCEDURE — 96417 CHEMO IV INFUS EACH ADDL SEQ: CPT

## 2022-02-24 PROCEDURE — 25010000002 NIVOLUMAB 100 MG/10ML SOLUTION 10 ML VIAL: Performed by: NURSE PRACTITIONER

## 2022-02-24 PROCEDURE — 80053 COMPREHEN METABOLIC PANEL: CPT

## 2022-02-24 RX ORDER — SODIUM CHLORIDE 0.9 % (FLUSH) 0.9 %
20 SYRINGE (ML) INJECTION AS NEEDED
Status: CANCELLED | OUTPATIENT
Start: 2022-02-24

## 2022-02-24 RX ORDER — SODIUM CHLORIDE 9 MG/ML
250 INJECTION, SOLUTION INTRAVENOUS ONCE
Status: DISCONTINUED | OUTPATIENT
Start: 2022-02-24 | End: 2022-02-24

## 2022-02-24 RX ORDER — HEPARIN SODIUM (PORCINE) LOCK FLUSH IV SOLN 100 UNIT/ML 100 UNIT/ML
300 SOLUTION INTRAVENOUS ONCE
Status: CANCELLED | OUTPATIENT
Start: 2022-02-24

## 2022-02-24 RX ORDER — HEPARIN SODIUM (PORCINE) LOCK FLUSH IV SOLN 100 UNIT/ML 100 UNIT/ML
500 SOLUTION INTRAVENOUS AS NEEDED
Status: CANCELLED | OUTPATIENT
Start: 2022-03-17

## 2022-02-24 RX ORDER — SODIUM CHLORIDE 0.9 % (FLUSH) 0.9 %
10 SYRINGE (ML) INJECTION AS NEEDED
Status: DISCONTINUED | OUTPATIENT
Start: 2022-02-24 | End: 2022-02-24 | Stop reason: HOSPADM

## 2022-02-24 RX ORDER — SODIUM CHLORIDE 9 MG/ML
250 INJECTION, SOLUTION INTRAVENOUS ONCE
Status: COMPLETED | OUTPATIENT
Start: 2022-02-24 | End: 2022-02-24

## 2022-02-24 RX ORDER — SODIUM CHLORIDE 0.9 % (FLUSH) 0.9 %
10 SYRINGE (ML) INJECTION AS NEEDED
Status: CANCELLED | OUTPATIENT
Start: 2022-03-17

## 2022-02-24 RX ORDER — HEPARIN SODIUM (PORCINE) LOCK FLUSH IV SOLN 100 UNIT/ML 100 UNIT/ML
500 SOLUTION INTRAVENOUS AS NEEDED
Status: DISCONTINUED | OUTPATIENT
Start: 2022-02-24 | End: 2022-02-24 | Stop reason: HOSPADM

## 2022-02-24 RX ADMIN — SODIUM CHLORIDE 250 MG: 9 INJECTION, SOLUTION INTRAVENOUS at 13:33

## 2022-02-24 RX ADMIN — SODIUM CHLORIDE 80 MG: 9 INJECTION, SOLUTION INTRAVENOUS at 14:07

## 2022-02-24 RX ADMIN — SODIUM CHLORIDE 250 ML: 9 INJECTION, SOLUTION INTRAVENOUS at 13:33

## 2022-02-24 RX ADMIN — Medication 500 UNITS: at 14:59

## 2022-02-24 RX ADMIN — Medication 10 ML: at 14:58

## 2022-02-25 ENCOUNTER — TELEPHONE (OUTPATIENT)
Dept: ONCOLOGY | Facility: HOSPITAL | Age: 67
End: 2022-02-25

## 2022-02-25 LAB
BACTERIA SPEC AEROBE CULT: NORMAL
BACTERIA SPEC AEROBE CULT: NORMAL
GRAM STN SPEC: NORMAL
LAB AP CASE REPORT: NORMAL
LAB AP CASE REPORT: NORMAL
PATH REPORT.FINAL DX SPEC: NORMAL
PATH REPORT.FINAL DX SPEC: NORMAL

## 2022-02-25 NOTE — TELEPHONE ENCOUNTER
Patient received first dose chemotherapy yesterday. Called patient to check on her today. Patient reports she is feeling good with no complaints. She reports no nausea, vomiting, or diarrhea. Patient has no complaints today. Instructed her to call us with any issues and/or questions or concerns.

## 2022-03-01 LAB
LAB AP CASE REPORT: NORMAL
LAB AP CLINICAL INFORMATION: NORMAL
PATH REPORT.FINAL DX SPEC: NORMAL

## 2022-03-03 ENCOUNTER — LAB (OUTPATIENT)
Dept: ONCOLOGY | Facility: CLINIC | Age: 67
End: 2022-03-03

## 2022-03-03 ENCOUNTER — OFFICE VISIT (OUTPATIENT)
Dept: ONCOLOGY | Facility: CLINIC | Age: 67
End: 2022-03-03

## 2022-03-03 VITALS
RESPIRATION RATE: 18 BRPM | SYSTOLIC BLOOD PRESSURE: 135 MMHG | WEIGHT: 189.2 LBS | BODY MASS INDEX: 29.63 KG/M2 | DIASTOLIC BLOOD PRESSURE: 80 MMHG | TEMPERATURE: 97.3 F | HEART RATE: 101 BPM | OXYGEN SATURATION: 99 %

## 2022-03-03 DIAGNOSIS — Z17.0 MALIGNANT NEOPLASM OF RIGHT BREAST IN FEMALE, ESTROGEN RECEPTOR POSITIVE, UNSPECIFIED SITE OF BREAST: ICD-10-CM

## 2022-03-03 DIAGNOSIS — K76.9 LIVER LESION: ICD-10-CM

## 2022-03-03 DIAGNOSIS — C43.59 MALIGNANT MELANOMA OF TORSO EXCLUDING BREAST: ICD-10-CM

## 2022-03-03 DIAGNOSIS — C78.7 METASTASIS TO LIVER: ICD-10-CM

## 2022-03-03 DIAGNOSIS — C78.01 MALIGNANT NEOPLASM METASTATIC TO BOTH LUNGS: ICD-10-CM

## 2022-03-03 DIAGNOSIS — C78.02 MALIGNANT NEOPLASM METASTATIC TO BOTH LUNGS: ICD-10-CM

## 2022-03-03 DIAGNOSIS — C50.911 MALIGNANT NEOPLASM OF RIGHT BREAST IN FEMALE, ESTROGEN RECEPTOR POSITIVE, UNSPECIFIED SITE OF BREAST: ICD-10-CM

## 2022-03-03 DIAGNOSIS — R91.8 PULMONARY NODULES: ICD-10-CM

## 2022-03-03 DIAGNOSIS — C43.9 METASTATIC MELANOMA: Primary | ICD-10-CM

## 2022-03-03 DIAGNOSIS — R59.0 AXILLARY ADENOPATHY: ICD-10-CM

## 2022-03-03 LAB
ALBUMIN SERPL-MCNC: 4.13 G/DL (ref 3.5–5.2)
ALBUMIN/GLOB SERPL: 1.5 G/DL
ALP SERPL-CCNC: 98 U/L (ref 39–117)
ALT SERPL W P-5'-P-CCNC: 36 U/L (ref 1–33)
ANION GAP SERPL CALCULATED.3IONS-SCNC: 10.9 MMOL/L (ref 5–15)
AST SERPL-CCNC: 29 U/L (ref 1–32)
BASOPHILS # BLD AUTO: 0.05 10*3/MM3 (ref 0–0.2)
BASOPHILS NFR BLD AUTO: 1 % (ref 0–1.5)
BILIRUB SERPL-MCNC: 0.3 MG/DL (ref 0–1.2)
BUN SERPL-MCNC: 17 MG/DL (ref 8–23)
BUN/CREAT SERPL: 21 (ref 7–25)
CALCIUM SPEC-SCNC: 9.1 MG/DL (ref 8.6–10.5)
CHLORIDE SERPL-SCNC: 105 MMOL/L (ref 98–107)
CO2 SERPL-SCNC: 24.1 MMOL/L (ref 22–29)
CREAT SERPL-MCNC: 0.81 MG/DL (ref 0.57–1)
DEPRECATED RDW RBC AUTO: 46.8 FL (ref 37–54)
EGFRCR SERPLBLD CKD-EPI 2021: 80.7 ML/MIN/1.73
EOSINOPHIL # BLD AUTO: 0.34 10*3/MM3 (ref 0–0.4)
EOSINOPHIL NFR BLD AUTO: 6.6 % (ref 0.3–6.2)
ERYTHROCYTE [DISTWIDTH] IN BLOOD BY AUTOMATED COUNT: 14 % (ref 12.3–15.4)
GLOBULIN UR ELPH-MCNC: 2.7 GM/DL
GLUCOSE SERPL-MCNC: 86 MG/DL (ref 65–99)
HCT VFR BLD AUTO: 42 % (ref 34–46.6)
HGB BLD-MCNC: 13.4 G/DL (ref 12–15.9)
IMM GRANULOCYTES # BLD AUTO: 0 10*3/MM3 (ref 0–0.05)
IMM GRANULOCYTES NFR BLD AUTO: 0 % (ref 0–0.5)
LYMPHOCYTES # BLD AUTO: 1.99 10*3/MM3 (ref 0.7–3.1)
LYMPHOCYTES NFR BLD AUTO: 38.6 % (ref 19.6–45.3)
MCH RBC QN AUTO: 29.1 PG (ref 26.6–33)
MCHC RBC AUTO-ENTMCNC: 31.9 G/DL (ref 31.5–35.7)
MCV RBC AUTO: 91.1 FL (ref 79–97)
MONOCYTES # BLD AUTO: 0.47 10*3/MM3 (ref 0.1–0.9)
MONOCYTES NFR BLD AUTO: 9.1 % (ref 5–12)
NEUTROPHILS NFR BLD AUTO: 2.31 10*3/MM3 (ref 1.7–7)
NEUTROPHILS NFR BLD AUTO: 44.7 % (ref 42.7–76)
NRBC BLD AUTO-RTO: 0 /100 WBC (ref 0–0.2)
PLATELET # BLD AUTO: 191 10*3/MM3 (ref 140–450)
PMV BLD AUTO: 10.3 FL (ref 6–12)
POTASSIUM SERPL-SCNC: 4.2 MMOL/L (ref 3.5–5.2)
PROT SERPL-MCNC: 6.8 G/DL (ref 6–8.5)
RBC # BLD AUTO: 4.61 10*6/MM3 (ref 3.77–5.28)
SODIUM SERPL-SCNC: 140 MMOL/L (ref 136–145)
T4 FREE SERPL-MCNC: 1.19 NG/DL (ref 0.93–1.7)
TSH SERPL DL<=0.05 MIU/L-ACNC: 2.52 UIU/ML (ref 0.27–4.2)
WBC NRBC COR # BLD: 5.16 10*3/MM3 (ref 3.4–10.8)

## 2022-03-03 PROCEDURE — 84443 ASSAY THYROID STIM HORMONE: CPT | Performed by: NURSE PRACTITIONER

## 2022-03-03 PROCEDURE — 99214 OFFICE O/P EST MOD 30 MIN: CPT | Performed by: NURSE PRACTITIONER

## 2022-03-03 PROCEDURE — 80053 COMPREHEN METABOLIC PANEL: CPT | Performed by: NURSE PRACTITIONER

## 2022-03-03 PROCEDURE — 84439 ASSAY OF FREE THYROXINE: CPT | Performed by: NURSE PRACTITIONER

## 2022-03-03 PROCEDURE — 85025 COMPLETE CBC W/AUTO DIFF WBC: CPT | Performed by: NURSE PRACTITIONER

## 2022-03-03 NOTE — PROGRESS NOTES
"NAME: Yadira Burgos    : 1955    DATE:  3/3/2022    DIAGNOSIS:   1.  Stage IA ([id number]) moderately differentiated invasive ductal carcinoma of the right breast diagnosed in 2015.    2.  H/o malignant Melanoma on her Back    3.  H/o cervical cancer treated with cryotherapy at [facility] in     4.  Recurrent / metastatic malignant Melanoma  -  R axillary LN bx 22.    -  She has R axillary LN mass (2.47x2.16), Solitary cavitary R lobe liver lesion, cavitary RLL lung mass,  and bilateral parenchymal lung nodules    CHIEF COMPLAINT:  Follow up of metastatic melanoma/Toxicity check    HISTORY OF PRESENT ILLNESS:   Yadira Burgos is a very pleasant 66 y.o. female who was referred by Dr. Dilcia Pedro for evaluation and treatment of breast cancer. She was living in [place] in 2015 when her dog brought attention to and \"found\" an abnormal lump in her right breast.  She was treated by Dr. Steve Isbell and underwent R lumpectomy with SLNBx on 10-22-15 as below.  She then had what sounds like accelerated partial breast irradiation.  She was then started on Arimidex which was later switched to Exemestane for a better side effect profile.  She took this for about 5 years, stopping a couple of weeks ago.  She stopped taking Exemestane because she developed some vaginal bleeding and hematuria.  This was evaluated by her PCP and gynecologist and felt to be due to vaginal atrophy.  She was prescribed a hormone pill which she hasn't yet started and was referred here to discuss things further.      Aside from bleeding which has been uncomfortable and distressing for her, Ms. Onur Burgos has generally been well.  The last year has been hard on her emotionally with the loss of her  and then her dog, her home and her insurance, but she is coping well and has good family support in [place].  She says she gained weight with her breast cancer treatment and gained weight when her  .  " She has been working now to lose weight and has lost 20 lbs over the last 2 months with dieting.  She denies chest pain or shortness of breath.  She complains of some RLQ cramping pain and isn't sure what is causing that.  She denies difficulty moving her bowels.  No blood in her stool that she is aware of.  She has had vaginal bleeding and hematuria as above.        INTERVAL HISTORY:  Ms. Burgos presents today for follow up of metastatic melanoma and toxicity check. She received her first cycle of Yervoy/Opdivo on 02/24/2022 and tolerated this well without any noticeable side effects. She reports a good appetite and hydrating well. Denies nausea/vomiting or constipation/diarrhea. Denies any rash. Otherwise, she is well and denies any specific complaints today.      PAST MEDICAL HISTORY:  Past Medical History:   Diagnosis Date   • Back pain    • Basal cell carcinoma (BCC) in situ of skin 2020    Dr. Mohr - Derm   • Breast cancer (HCC)     2015   • Cancer (HCC)     breast, cervical, melanoma   • Cervical cancer (HCC) 1976    Diagnosed in 1975.  Treated by repeated local intervention and ultimately received cryotherapy at [facility] with resolution.   • Elbow fracture    • Foot fracture    • Headache    • Hypertension     TAKEN OFF MEDS   • Melanoma (HCC)     on her back 2004 - treated by Dermatologist Catrachito Amaro in [place], [place] with what sounds like wide local excision.    • Pulmonary embolism (HCC)    • Sinusitis        PAST SURGICAL HISTORY:  Past Surgical History:   Procedure Laterality Date   • AXILLARY LYMPH NODE BIOPSY/EXCISION Right 2/4/2022    Procedure: AXILLARY LYMPH NODE BIOPSY/EXCISION;  Surgeon: Dianelys Cummings MD;  Location: [facility];  Service: General;  Laterality: Right;   • BREAST LUMPECTOMY Right 2015   • BREAST SURGERY  2015    Secondary to cancer   • BRONCHOSCOPY Right 2/23/2022    Procedure: BRONCHOSCOPY WITH ENDOBRONCHIAL ULTRASOUND;  Surgeon: Chris Her MD;  Location: [facility] OR;   Service: Pulmonary;  Laterality: Right;   • COLONOSCOPY N/A 2020    Procedure: COLONOSCOPY;  Surgeon: Mohsen Maldonado MD;  Location:  [facility] OR;  Service: Gastroenterology;  Laterality: N/A;   • ELBOW FUSION      left   • FOOT SURGERY Right     Broken foot   • HAND SURGERY  2020    right   • RHINOPLASTY     • SKIN CANCER EXCISION      malignant melanoma from back    • US GUIDED LYMPH NODE BIOPSY  2022   • VENOUS ACCESS DEVICE (PORT) INSERTION N/A 2022    Procedure: INSERTION VENOUS ACCESS DEVICE left or right;  Surgeon: Dianelys Cummings MD;  Location:  [facility] OR;  Service: General;  Laterality: N/A;       FAMILY HISTORY:  Family History   Problem Relation Age of Onset   • Other Mother         blood clots   • Hypertension Mother    • Ulcers Father    • Hypertension Sister    • Other Sister         Multiple Sclerosis   • Cancer Paternal Aunt    • Stroke Maternal Grandmother    • Alcohol abuse Paternal Grandmother    • Hypertension Sister    • Multiple sclerosis Sister    • Hypertension Sister    • Coronary artery disease Paternal Grandfather    • Cancer Maternal Aunt 30        colon   • Breast cancer Neg Hx    Many paternal aunts  with malignancy of various types. Many cousins on that side of the family have had cancer as well.  One  of colon cancer in her 30s.  One had a brain tumor.    SOCIAL HISTORY:  Social History     Socioeconomic History   • Marital status:    Tobacco Use   • Smoking status: Never Smoker   • Smokeless tobacco: Never Used   Vaping Use   • Vaping Use: Never used   Substance and Sexual Activity   • Alcohol use: Not Currently   • Drug use: Never   • Sexual activity: Not Currently     Birth control/protection: Post-menopausal         REVIEW OF SYSTEMS:   A comprehensive 14 point review of systems was performed.  Significant findings as mentioned above.  All other systems reviewed and are negative.      MEDICATIONS:  The current medication list was  reviewed in the EMR    Current Outpatient Medications:   •  apixaban (ELIQUIS) 5 MG tablet tablet, Take 1 tablet by mouth 2 (Two) Times a Day., Disp: 60 tablet, Rfl: 5  •  lidocaine-prilocaine (EMLA) 2.5-2.5 % cream, Apply to port a cath ~30 min -1 hour prior to chemotherapy, Disp: 30 g, Rfl: 3  •  ondansetron ODT (Zofran ODT) 8 MG disintegrating tablet, Place 1 tablet on the tongue Every 8 (Eight) Hours As Needed for Nausea or Vomiting., Disp: 30 tablet, Rfl: 5  •  prochlorperazine (COMPAZINE) 10 MG tablet, Take 1 tablet by mouth Every 6 (Six) Hours As Needed for Nausea., Disp: 60 tablet, Rfl: 3    ALLERGIES:    No Known Allergies    PHYSICAL EXAM:  Vitals:    03/03/22 1558   BP: 135/80   Pulse: 101   Resp: 18   Temp: 97.3 °F (36.3 °C)   SpO2: 99%     Pain Score    03/03/22 1558   PainSc: 0-No pain     ECOG score: 0   General:  Awake, alert and oriented, appears well. In no acute distress.  HEENT:  Pupils are equal, round and reactive to light and accommodation, Extra-ocular movements full, Oropharyx clear, mucous membranes moist  Neck:  No JVD, thyromegaly or lymphadenopathy  CV:  Regular rate and rhythm, no murmurs, rubs or gallops  Resp:  Lungs are clear to auscultation bilaterally, no crackles  Breast: Deferred today. Previously: S/p R lumpectomy. There is a somewhat firm area at the site of surgery but there are no palpable masses.  She is s/p excision of R axillary LN, with some post-surgical swelling.  Left breast is without mass lesions.  No L axillary adenopathy.  Abd:  Soft, non-tender, non-distended, bowel sounds present, no organomegaly or masses.    Ext:  No clubbing, cyanosis or edema.    Lymph:  No cervical, supraclavicular, axillary, inguinal or femoral adenopathy  Neuro:  MS as above, CN II-XII intact, grossly non-focal exam          PATHOLOGY:  10-22-15            12-16-21          01-13-22          ENDOSCOPY:  Colonoscopy 12-09-20     Findings: 8 hyperplastic appearing polyps of the distal  rectum, diverticulosis moderately throughout the sigmoid colon      IMAGING:  CTCAP 12-15-20  On the lung windows there are several calcified  granulomas in the lungs. No noncalcified pulmonary parenchymal lung  nodules were identified. On the soft tissue windows there were no  enlarged lymph nodes in the supraclavicular or axillary regions. No  mediastinal or hilar lymphadenopathy was seen. The heart was not  enlarged. There were no pericardial effusions.     IMPRESSION:  No CT findings of metastatic disease in the chest. There are  calcified granulomas in the lungs.     CT FINDINGS: The liver and spleen were normal in size and shape. The  gallbladder contains several stones. The wall was not thickened. The  bile ducts in the liver are not dilated. There is nothing seen to  suggest metastatic disease in the liver. The pancreas shows no evidence  of mass or inflammation. No adrenal or renal lesions are demonstrated.  The aorta is normal in caliber. There were no enlarged lymph nodes in  the retroperitoneum or in the mesentery. The bowel shows no evidence of  obstruction. In the pelvis there were no masses or fluid collections.  There were no ventral hernias.     IMPRESSION:  No CT findings of metastatic disease in the abdomen or  pelvis. This study does demonstrate several stones in the lumen of the  gallbladder.         Bilateral diagnostic mammogram 01-31-21  FINDINGS: There are scattered areas of fibroglandular density.     The bilateral fibroglandular pattern does not appear significantly  changed compared to the exam from 2020. This includes postoperative  changes in the right 12:00 region. Mild skin thickening is noted  involving the right breast which is likely treatment related. A few  bilateral scattered calcifications are noted.     IMPRESSION:  Incomplete examination as comparison with older outside  prior mammograms is needed.        BI-RADS CATEGORY:   0, INCOMPLETE:  Need prior mammograms for  comparison        RECOMMENDATION:  We will attempt to obtain older outside prior  mammograms for comparison.      CTCAP 12-12-21  FINDINGS: Lack of IV contrast hinders parenchymal assessment of the mediastinum, liver, spleen, pancreas, adrenal glands and kidneys.         Significant infiltrate seen in the right lower lobe with interstitial component. There are also lung nodules present in the right lung. Index nodule in the right lung base medially is 5 mm maximal diameter. Other smaller nodules seen throughout the right  lung. Tiny 3 mm lingular nodule seen in series 3 image 30. A benign calcified granuloma seen in the left lower lobe but a noncalcified indeterminant  5 mm left lower lobe nodule seen, series 3 image 42.     Nonenlarged mediastinal adenopathy seen. However there is an enlarged right axillary 1.8 x 2.6 cm lymph node partially seen.     Worrisome hepatic metastasis or primary hepatic lesion seen measuring 4.4 x 4.1 cm in liver segment 8. Margins are ill-defined. Abscess could have a similar appearance. Suggestion of subtle gallstones. Nonobstructing 2 mm right renal calculus seen. No  obstructive uropathy. Gaseous distention of the esophagus and hiatal hernia present consistent with GERD. No enteric contrast but no gross evidence of bowel obstruction.   There is no gross free air, free fluid or focal inflammatory change.  Uterus and  adnexa are not enlarged. Osseous structures are grossly intact.     IMPRESSION:  Constellation of findings with significant consolidation in the right lower lobe, enlarged right axillary lymph node, indeterminate bilateral lung nodules and ill-defined hepatic lesion concerning for underlying malignancy in this patient .     FINDINGS: Lack of IV contrast hinders parenchymal assessment of the mediastinum, liver, spleen, pancreas, adrenal glands and kidneys.         Significant infiltrate seen in the right lower lobe with interstitial component. There are also lung nodules  present in the right lung. Index nodule in the right lung base medially is 5 mm maximal diameter. Other smaller nodules seen throughout the right  lung. Tiny 3 mm lingular nodule seen in series 3 image 30. A benign calcified granuloma seen in the left lower lobe but a noncalcified indeterminant  5 mm left lower lobe nodule seen, series 3 image 42.     Nonenlarged mediastinal adenopathy seen. However there is an enlarged right axillary 1.8 x 2.6 cm lymph node partially seen.     Worrisome hepatic metastasis or primary hepatic lesion seen measuring 4.4 x 4.1 cm in liver segment 8. Margins are ill-defined. Abscess could have a similar appearance. Suggestion of subtle gallstones. Nonobstructing 2 mm right renal calculus seen. No  obstructive uropathy. Gaseous distention of the esophagus and hiatal hernia present consistent with GERD. No enteric contrast but no gross evidence of bowel obstruction.   There is no gross free air, free fluid or focal inflammatory change.  Uterus and  adnexa are not enlarged. Osseous structures are grossly intact.     IMPRESSION:  Constellation of findings with significant consolidation in the right lower lobe, enlarged right axillary lymph node, indeterminate bilateral lung nodules and ill-defined hepatic lesion concerning for underlying malignancy in this patient .       US Abdomen Complete 12-12-21  FINDINGS:  The visualized portions of the pancreas, IVC and aorta appear normal.        The liver is moderately coarsened in echotexture. Ill-defined hypodensity in the liver measuring 3 x 3.2 x 2.6 cm corresponds to recent CT. Unclear if this is a malignant lesion. Abscesses within the differential but prior CT was performed without any  contrast.  The common bile duct is not dilated.. Gallbladder wall is thickened at 3.4 mm with trace pericholecystic fluid and subtle hyperemia. Gallstones and shadowing present.     The kidneys are normal in size and echogenicity. There is no   hydronephrosis  or focal solid lesion.  The spleen is normal in size and echotexture.     IMPRESSION:  1. Nonspecific gallbladder wall thickening and questionable pericholecystic fluid. Gallstones are better seen on CT than ultrasound.  2.  Heterogeneous liver with lesion in the liver as seen on CT. Ultrasound is not helpful in determining etiology. This could be an abscess but given the other findings on CT,  malignancy is not excluded.      CTCAP 12-15-21  FINDINGS:    LUNGS:  Increased density of the now right lower lobe mixed  groundglass and more solid-appearing consolidation with internal  cavitary component identified. Tiny right and left lung pulmonary  nodules. Grossly stable.    PLEURAL SPACE:  Small bilateral pleural effusions.  No pneumothorax.    HEART:  Unremarkable.  No cardiomegaly.  No significant pericardial  effusion.    BONES/JOINTS:  Unremarkable.  No acute fracture.  No dislocation.    SOFT TISSUES:  Unremarkable.    VASCULATURE:  Unremarkable.  No thoracic aortic aneurysm.    LYMPH NODES:  Unremarkable.  No enlarged lymph nodes.     IMPRESSION:  1.  Increased density of the now right lower lobe mixed groundglass and  more solid-appearing consolidation with internal cavitary component  identified. Tiny right and left lung pulmonary nodules. Grossly stable.  2.  Small bilateral pleural effusions.    FINDINGS:    LUNG BASES:  Unremarkable.  No mass.  No consolidation.      ABDOMEN:    LIVER:  Right lobe of liver lesion is again identified now measuring  about 4.7 cm and was about 4.7 cm.    GALLBLADDER AND BILE DUCTS:  Gallstones noted in the gallbladder.  No  ductal dilation.    PANCREAS:  Unremarkable.  No mass.  No ductal dilation.    SPLEEN:  Unremarkable.  No splenomegaly.    ADRENALS:  Unremarkable.  No mass.    KIDNEYS AND URETERS:  Unremarkable.  No solid mass.  No  hydronephrosis.    STOMACH AND BOWEL:  Unremarkable.  No obstruction.  No mucosal  thickening.      PELVIS:    APPENDIX:  No findings to  suggest acute appendicitis.    BLADDER:  Unremarkable.  No mass.    REPRODUCTIVE:  Unremarkable as visualized.      ABDOMEN and PELVIS:    INTRAPERITONEAL SPACE:  Unremarkable.  No free air.  No significant  fluid collection.    BONES/JOINTS:  No acute fracture.  No dislocation.    SOFT TISSUES:  Unremarkable.    VASCULATURE:  Unremarkable.  No abdominal aortic aneurysm.    LYMPH NODES:  Unremarkable.  No enlarged lymph nodes.     IMPRESSION:    Right lobe of liver lesion is again identified now measuring about 4.7  cm and was about 4.7 cm.      NM Bone Scan Whole Body 12-15-21  FINDINGS:    SKULL/FACIAL BONES:  No focal increased or decreased uptake.    SPINE:  Unremarkable.  No abnormal increased or decreased uptake.    RIBS:  Unremarkable.  No abnormal uptake.    LONG BONES:  Unremarkable.  No abnormal uptake.    PELVIS:  Unremarkable.  No focal increased or decreased uptake.    JOINTS:  Unremarkable.  No focal increased or decreased uptake.    SOFT TISSUES:  Unremarkable.    RENAL/BLADDER:  Within normal limits.     IMPRESSION:    Normal bone scan.      CT Abdomen With Contrast  12-18-21  FINDINGS:  Partially imaged thick-walled cavitary lesion in the right lower lobe with right basilar atelectasis/infiltrate and small right pleural effusion. There is also left basilar atelectasis/infiltrate and trace left pleural effusion. Multiple noncalcified  pulmonary nodules are scattered throughout the visualized lower lung fields, concerning for pulmonary metastatic disease.     Ill-defined right hepatic mass again noted. The spleen, pancreas, and both adrenal glands are within expected limits. Cholelithiasis. Punctate nonobstructing right intrarenal stone. Tiny left renal cyst. Kidneys are otherwise unremarkable without  hydronephrosis. Abdominal aorta normal in course and caliber without dissection. No pathologic retroperitoneal or mesenteric lymphadenopathy by size criteria. Visualized small bowel and colon are  unremarkable. No bowel obstruction. No free fluid or free  air.     No aggressive osseous lesions.     IMPRESSION:     1. Ill-defined right hepatic mass again noted. This has been previously biopsied and correlation with pathology results is recommended.  2. Cholelithiasis.  3. Punctate nonobstructing right intrarenal stone.  4. No threshold abdominal lymphadenopathy or other suspicious abdominal masses.  5. Partially imaged thick-walled cavitary lesion in the right lower lobe. This may be of infectious or neoplastic etiology and continued follow-up is recommended.  6. Small bilateral pleural effusions with bibasilar atelectasis/infiltrate, worse on the right than left.  7. Numerous small noncalcified pulmonary nodules throughout the visualized lower lung fields, concerning for pulmonary metastatic disease.       Mammo Diagnostic Digital Tomosynthesis Bilateral With CAD w/ US Axilla Right 01-13-22  FINDINGS:  There are scattered areas of fibroglandular density.     Incompletely imaged is a very prominent axillary lymph node. The  bilateral fibroglandular pattern itself is stable in appearance  including postoperative changes in the superior aspect of the right  breast. There are stable bilateral calcifications. Mild skin thickening  is again noted involving the right breast which is likely treatment  related.     ULTRASOUND: Ultrasound evaluation of the right axilla demonstrates 2  markedly enlarged lymph nodes the largest of which measures 3.2 cm in  size and has no demonstrable fatty hilum. Recommend ultrasound-guided  biopsy.      IMPRESSION:  1. Stable mammographic appearance of the left breast with no findings  suspicious for malignancy.        2. Marked right axillary lymphadenopathy. Recommend ultrasound-guided  biopsy. The fibroglandular pattern of the right breast is however  stable.        BI-RADS CATEGORY:  4, SUSPICIOUS        RECOMMENDATION:  Recommend ultrasound-guided biopsy of the dominant  abnormal  appearing right axillary lymph node.      DEXA Bone Density Axial 01-13-22  IMPRESSION:  Impression:  1. According to the World Health Organization definitions of  osteoporosis based on bone density, this patient's bone mineral density  is compatible with osteoporosis and the fracture risk is high.      Mammo Diagnostic Digital Tomosynthesis Bilateral With CAD w/ US Axilla Right 01-13-22  FINDINGS:  There are scattered areas of fibroglandular density.     Incompletely imaged is a very prominent axillary lymph node. The  bilateral fibroglandular pattern itself is stable in appearance  including postoperative changes in the superior aspect of the right  breast. There are stable bilateral calcifications. Mild skin thickening  is again noted involving the right breast which is likely treatment  related.     ULTRASOUND: Ultrasound evaluation of the right axilla demonstrates 2  markedly enlarged lymph nodes the largest of which measures 3.2 cm in  size and has no demonstrable fatty hilum. Recommend ultrasound-guided  biopsy.      IMPRESSION:  1. Stable mammographic appearance of the left breast with no findings  suspicious for malignancy.        2. Marked right axillary lymphadenopathy. Recommend ultrasound-guided  biopsy. The fibroglandular pattern of the right breast is however  stable.        BI-RADS CATEGORY:  4, SUSPICIOUS        RECOMMENDATION:  Recommend ultrasound-guided biopsy of the dominant  abnormal appearing right axillary lymph node.      CT Chest With Contrast Diagnostic 01-18-22  FINDINGS:     LUNGS: Solitary mass in the right lower lobe with cavitation.  Significantly smaller today than on the prior study. 6 mm solid nodule  in the right lung posteriorly on image 30 of the axial series. Other  parenchymal nodules are seen in both lungs and are similar to the  previous study. These are concerning for metastatic nodules.     HEART: Unremarkable.     MEDIASTINUM: No masses. No enlarged lymph nodes.  No fluid  collections.     PLEURA: No pleural effusion. No pleural mass or abnormal calcification.  No pneumothorax.     VASCULATURE: No evidence of aneurysm. Filling defect in the right  pulmonary artery and possibly left lower lobe pulmonary artery. This  study was not performed to evaluate for pulmonary embolus, but the  findings are concerning for bilateral pulmonary emboli.     BONES: No acute bony abnormality.     VISUALIZED UPPER ABDOMEN:Please see the CT report for the abdomen and  pelvis.     Other: Right axillary soft tissue mass is present and shows slight  interval growth. It measures 2.47 x 2.16 cm today and previously at the  same level measured approximately 2.7 x 1.89 cm.     IMPRESSION:  1. Study was not performed as a PE protocol, but there appear to be  filling defects in pulmonary arteries bilaterally concerning for  pulmonary emboli.  2. Interval decrease in size of cavitary right lower lobe mass.  3. Stable parenchymal nodules bilaterally.  4. Very slight interval growth of right axillary soft tissue mass.     Results are being relayed to the referring physician.      US Liver 01-18-22  FINDINGS: Sonographic imaging of the liver does not show the mass  previously identified in the liver. I would suggest a follow-up CT scan  for better delineation.     Hepatic flow was hepatopedal.     There is shadowing from the gallbladder fossa.     IMPRESSION:  1. Previously identified mass is not seen on this exam in the liver.  Consider follow-up CT.  2. Shadowing from the gallbladder fossa. In the absence of  cholecystectomy, appearance is concerning for cholelithiasis.      US Venous Doppler Lower Extremity Bilateral (duplex) 01-20-22  FINDINGS:   There is patent spontaneous flow from the common femoral vein through  the posterior tibial veins.  There was no internal clot or area of noncompressibility.  Normal augmentation was elicited where applicable.     IMPRESSION:  No DVT in the lower extremities on today's  exam.       CT Chest Pulmonary Embolism 01-20-22  FINDINGS: Today's study demonstrates opacification of the central  pulmonary vessels.  There are filling defects in the pulmonary arteries bilaterally. First  order arteries. This is most compatible with bilateral pulmonary  emboli..     Soft tissue mass in the right lower lobe with somewhat cavitary center  is again noted and unchanged..     There is no mediastinal lymph node enlargement     No pericardial or pleural effusion.        IMPRESSION:  1. Bilateral pulmonary emboli.  2. Parenchymal nodules bilaterally with dominant mass in the right lower  Lobe..      NM PET/CT Skull Base to Mid Thigh 01-28-22  FINDINGS:      HEAD/NECK:  Area of uptake in the posterior right paraspinal space and to lesser  degree the left paraspinal space appears related to muscle uptake.  No FDG hypermetabolic neck adenopathy.  No FDG hypermetabolic masses.     CHEST:   Numerous bilateral pulmonary nodules appear stable from the previous  chest CT and show no FDG hypermetabolism. This is likely due to nodules  being below size threshold for PET sensitivity.  Cavitary lung mass in the right lung localizing to the superior segment  of the lower lobe shows FDG hypermetabolism with maximum SUV: 2.4. This  is at the lower range for malignancy.  Enlarged right lower axillary region lymph node which is 3.0 cm shows  mild FDG hypermetabolism that is approximately with maximum SUV: 2.4.  This is at the lower range for malignancy.  Low-dose CT demonstrating no change in additional scattered pulmonary  nodules from the previous scan. Coronary artery calcifications are  stable.     ABDOMEN/PELVIS:   Faint area of low attenuation involving the right lobe of liver is  poorly evaluated with low-dose noncontrast CT but shows no focal FDG  hypermetabolism.  Physiologic FDG hypermetabolism seen throughout the solid abdominal  organs.  Physiologic FDG hypermetabolism seen throughout the GI  tract.  Physiologic FDG hypermetabolism seen throughout the mesentery,  retroperitoneum and pelvis.  Low dose CT redemonstrates nonobstructing kidney stones. Cholelithiasis  again noted.     BONES: Nonspecific FDG tracer activity. No intense areas of tracer  activity noted.     IMPRESSION:     1. Cavitary mass in the right lung that shows very low-grade FDG  hypermetabolism which is at the lower range for malignancy with maximum  SUV: 2.4.  2. Enlarged right axillary region lymph node with low-grade FDG  hypermetabolism also at the lower range for malignancy with maximum SUV:  2.4.  3. Numerous bilateral pulmonary nodules compatible with metastatic  disease but show no significant FDG hypermetabolism. This may be in part  due to size of nodules being below PET sensitivity threshold.  4. Faint area of low-attenuation right lobe liver poorly evaluated with  noncontrast low-dose CT that shows no obvious intense FDG  hypermetabolism.  5. Other nonacute findings as above on low dose CT.        RECENT LABS:  Lab Results   Component Value Date    WBC 5.16 03/03/2022    HGB 13.4 03/03/2022    HCT 42.0 03/03/2022    MCV 91.1 03/03/2022    RDW 14.0 03/03/2022     03/03/2022    NEUTRORELPCT 44.7 03/03/2022    LYMPHORELPCT 38.6 03/03/2022    MONORELPCT 9.1 03/03/2022    EOSRELPCT 6.6 (H) 03/03/2022    BASORELPCT 1.0 03/03/2022    NEUTROABS 2.31 03/03/2022    LYMPHSABS 1.99 03/03/2022       Lab Results   Component Value Date     03/03/2022    K 4.2 03/03/2022    CO2 24.1 03/03/2022     03/03/2022    BUN 17 03/03/2022    CREATININE 0.81 03/03/2022    EGFRIFNONA 87 02/24/2022    GLUCOSE 86 03/03/2022    CALCIUM 9.1 03/03/2022    ALKPHOS 98 03/03/2022    AST 29 03/03/2022    ALT 36 (H) 03/03/2022    BILITOT 0.3 03/03/2022    ALBUMIN 4.13 03/03/2022    PROTEINTOT 6.8 03/03/2022    MG 2.0 12/17/2021     Lab Results   Component Value Date    TSH 2.520 03/03/2022     Lab Results   Component Value Date    FERRITIN  169.00 (H) 01/20/2022    IRON 102 01/20/2022    TIBC 264 (L) 01/20/2022    LABIRON 39 01/20/2022    [id number] 437 01/20/2022    FOLATE 16.40 01/20/2022     Lab Results   Component Value Date     (H) 01/20/2022           ASSESSMENT & PLAN:  Yadira Burgos is a very pleasant 66 y.o. female with a history of cervical cancer, breast cancer and malignant melanoma.    1. History of Breast Cancer:  - Ms. Onur Burgos had Stage IA ([id number]) moderately differentiated invasive ductal carcinoma of the right breast diagnosed in October 2015.  - She underwent R lympectomy and SLNBx performed by Dr. Steve Isbell 10-22-15 and then received adjuvant radiation in [place].  - After radiation, she was started on Arimidex which was then switched to Exemestane.  She took Exemestane for almost 5 years. Unfortunately, she developed vaginal bleeding and hematuria related to vaginal atrophy related to hormonal blockade.  Given this, stopped Exemestane.  She is doing much better in this regard since stopping hormonal therapy.  - Mammogram 1-13-22 without cause for concern aside from R axillary LAD.  No palpable breast masses.    2.  Metastatic malignant melanoma:  -  S/p resection of a primary lesion on her back performed by Dr. Catrachito Amaro of Dermatology in [place] in 2004.  -  S/p FNA R axillary LN which was concerning for melanoma.  Excisional biopsy confirmed metastatic malignant melanoma.  - Dr. Stone suspects her lung and liver lesions are also related to her melanoma.  - She has seen Dr. Her and he performed bronchoscopy on 02/23/2022. Pathology was negative for malignant cells.  - Liver aspiration showed abscess which grew Klebsiella, suspect this was a secondary infection but this isn't completely certain at this time.    -  Plan to watch all of these areas as we proceed with treatment.  -  Will send recently excised LN tissue for CARIS testing.  -  PET-CT showed no significant FDG uptake but  re-demonstrated abnormalities in the R axilla, RLL cavitary mass, Vargas lung nodules and liver.    -  MRI Brain showed no acute findings in the head/brain.  -  Recommended initial therapy with immunotherapy.  We discussed different options for immunotherapy including single agent PD-L1 treatment or combination with Yervoy.  Dr. Stone recommended combination with Yervoy for better RR, PFS and OS even with increased risk of immune-mediated side effects.  We discussed the initially approved FDA dosing v. Altered dosing with lower dose IPI/higher dosed Nivo.  The latter has been shown to have fewer side effects and is thought to likely have similar efficacy (though trials weren't really powered to definitively show that).  After a lengthy discussion, we decided to proceed with 4 cycles of Ipi 1 mg/kg / Nivo 3 mg/kg q 21d followed by Nivolumab single agent.  We discussed potential risks, benefits and side effects extensively and she would like to proceed.  - She received her initial cycle of Yervoy/Opdivo on 02/24/2022 and overall tolerated this very well without any noticeable side effects.  - Exam and labs from today without cause for concern.     4.Bilateral pulmonary emboli:  -  CTPE protocol 1-20-22  confirmed bilateral pulmonary emboli  -  BLE dopplers negative for DVT.  -  Continue Eliquis 5 mg BID.      5.  H/o Colon polyps:  -  Dr. Maldonado performed c-scope 12-09-20 with discovery of 8 hyperplastic polyps in the distal rectum as well as diverticulosis.  He recommended repeat c-scope after 5 years.    6.  H/o cervical cancer treated with cryotherapy at [facility] in 1975:  -  Followed up with Dr. Manav Real of gynecology.  She says she had exam including pap smear and then pelvic U/S which was unrevealing.    7. Prophylaxis:  -  Had COVID vaccine x 2 (Pfizer).  Recommended booster dose.  Recommended 2021 influenza vaccine.    8. Follow up:  - Continue Eliquis.  - Continue Yervoy/Opdivo as scheduled.   - With NP as  scheduled for toxicity on day of cycle #2 Yervoy/Opdivo.  - With MD as scheduled on day of cycle # 3 Yervoy/Opdivo.    ACO / DWAYNE/Other  Quality measures  -  Yadira Burgos did not receive 2021 flu vaccine. This was recommended, she declined.  -  Yadira Burgos reports a pain score of 0.    -  Current outpatient and discharge medications have been reconciled for the patient.  Reviewed by: TENNILLE Monzon      I spent 30 minutes with Yadira Burgos today.  In the office today, more than 50% of this time was spent face-to-face with her  in counseling / coordination of care, reviewing her medical history and counseling on the current treatment plan.  All questions were answered to her satisfaction      Electronically Signed by:  TENNILLE Monzon      CC:     MD Manav Cotto MD Aaron House, MD

## 2022-03-07 ENCOUNTER — APPOINTMENT (OUTPATIENT)
Dept: ONCOLOGY | Facility: HOSPITAL | Age: 67
End: 2022-03-07

## 2022-03-13 DIAGNOSIS — C78.7 METASTASIS TO LIVER: ICD-10-CM

## 2022-03-13 DIAGNOSIS — C78.02 MALIGNANT NEOPLASM METASTATIC TO BOTH LUNGS: ICD-10-CM

## 2022-03-13 DIAGNOSIS — C78.01 MALIGNANT NEOPLASM METASTATIC TO BOTH LUNGS: ICD-10-CM

## 2022-03-13 DIAGNOSIS — C43.59 MALIGNANT MELANOMA OF TORSO EXCLUDING BREAST: Primary | ICD-10-CM

## 2022-03-13 RX ORDER — SODIUM CHLORIDE 9 MG/ML
250 INJECTION, SOLUTION INTRAVENOUS ONCE
Status: CANCELLED | OUTPATIENT
Start: 2022-03-17

## 2022-03-17 ENCOUNTER — INFUSION (OUTPATIENT)
Dept: ONCOLOGY | Facility: HOSPITAL | Age: 67
End: 2022-03-17

## 2022-03-17 ENCOUNTER — LAB (OUTPATIENT)
Dept: ONCOLOGY | Facility: CLINIC | Age: 67
End: 2022-03-17

## 2022-03-17 ENCOUNTER — OFFICE VISIT (OUTPATIENT)
Dept: ONCOLOGY | Facility: CLINIC | Age: 67
End: 2022-03-17

## 2022-03-17 VITALS
HEART RATE: 90 BPM | TEMPERATURE: 97.5 F | OXYGEN SATURATION: 95 % | WEIGHT: 190.9 LBS | SYSTOLIC BLOOD PRESSURE: 152 MMHG | BODY MASS INDEX: 29.9 KG/M2 | RESPIRATION RATE: 18 BRPM | DIASTOLIC BLOOD PRESSURE: 79 MMHG

## 2022-03-17 VITALS
BODY MASS INDEX: 29.9 KG/M2 | RESPIRATION RATE: 18 BRPM | WEIGHT: 190.9 LBS | OXYGEN SATURATION: 95 % | HEART RATE: 90 BPM | DIASTOLIC BLOOD PRESSURE: 79 MMHG | SYSTOLIC BLOOD PRESSURE: 152 MMHG | TEMPERATURE: 97.5 F

## 2022-03-17 DIAGNOSIS — Z17.0 MALIGNANT NEOPLASM OF RIGHT BREAST IN FEMALE, ESTROGEN RECEPTOR POSITIVE, UNSPECIFIED SITE OF BREAST: ICD-10-CM

## 2022-03-17 DIAGNOSIS — C78.7 METASTASIS TO LIVER: ICD-10-CM

## 2022-03-17 DIAGNOSIS — C78.02 MALIGNANT NEOPLASM METASTATIC TO BOTH LUNGS: ICD-10-CM

## 2022-03-17 DIAGNOSIS — C43.59 MALIGNANT MELANOMA OF TORSO EXCLUDING BREAST: Primary | ICD-10-CM

## 2022-03-17 DIAGNOSIS — C43.9 METASTATIC MELANOMA: Primary | ICD-10-CM

## 2022-03-17 DIAGNOSIS — Z79.899 LONG-TERM USE OF HIGH-RISK MEDICATION: ICD-10-CM

## 2022-03-17 DIAGNOSIS — Z95.828 PORT-A-CATH IN PLACE: ICD-10-CM

## 2022-03-17 DIAGNOSIS — L29.9 PRURITUS: ICD-10-CM

## 2022-03-17 DIAGNOSIS — C78.01 MALIGNANT NEOPLASM METASTATIC TO BOTH LUNGS: ICD-10-CM

## 2022-03-17 DIAGNOSIS — L53.9 REDNESS OF SKIN: ICD-10-CM

## 2022-03-17 DIAGNOSIS — C50.911 MALIGNANT NEOPLASM OF RIGHT BREAST IN FEMALE, ESTROGEN RECEPTOR POSITIVE, UNSPECIFIED SITE OF BREAST: ICD-10-CM

## 2022-03-17 DIAGNOSIS — K59.00 CONSTIPATION, UNSPECIFIED CONSTIPATION TYPE: ICD-10-CM

## 2022-03-17 DIAGNOSIS — I26.94 MULTIPLE SUBSEGMENTAL PULMONARY EMBOLI WITHOUT ACUTE COR PULMONALE: ICD-10-CM

## 2022-03-17 LAB
ALBUMIN SERPL-MCNC: 3.91 G/DL (ref 3.5–5.2)
ALBUMIN/GLOB SERPL: 1.5 G/DL
ALP SERPL-CCNC: 103 U/L (ref 39–117)
ALT SERPL W P-5'-P-CCNC: 50 U/L (ref 1–33)
ANION GAP SERPL CALCULATED.3IONS-SCNC: 11.5 MMOL/L (ref 5–15)
AST SERPL-CCNC: 33 U/L (ref 1–32)
BASOPHILS # BLD AUTO: 0.06 10*3/MM3 (ref 0–0.2)
BASOPHILS NFR BLD AUTO: 1.1 % (ref 0–1.5)
BILIRUB SERPL-MCNC: 0.4 MG/DL (ref 0–1.2)
BUN SERPL-MCNC: 13 MG/DL (ref 8–23)
BUN/CREAT SERPL: 19.7 (ref 7–25)
CALCIUM SPEC-SCNC: 9.2 MG/DL (ref 8.6–10.5)
CHLORIDE SERPL-SCNC: 105 MMOL/L (ref 98–107)
CO2 SERPL-SCNC: 24.5 MMOL/L (ref 22–29)
CREAT SERPL-MCNC: 0.66 MG/DL (ref 0.57–1)
DEPRECATED RDW RBC AUTO: 43.4 FL (ref 37–54)
EGFRCR SERPLBLD CKD-EPI 2021: 97.5 ML/MIN/1.73
EOSINOPHIL # BLD AUTO: 0.43 10*3/MM3 (ref 0–0.4)
EOSINOPHIL NFR BLD AUTO: 7.8 % (ref 0.3–6.2)
ERYTHROCYTE [DISTWIDTH] IN BLOOD BY AUTOMATED COUNT: 13.4 % (ref 12.3–15.4)
GLOBULIN UR ELPH-MCNC: 2.6 GM/DL
GLUCOSE SERPL-MCNC: 131 MG/DL (ref 65–99)
HCT VFR BLD AUTO: 40.6 % (ref 34–46.6)
HGB BLD-MCNC: 13.5 G/DL (ref 12–15.9)
IMM GRANULOCYTES # BLD AUTO: 0.01 10*3/MM3 (ref 0–0.05)
IMM GRANULOCYTES NFR BLD AUTO: 0.2 % (ref 0–0.5)
LYMPHOCYTES # BLD AUTO: 2.02 10*3/MM3 (ref 0.7–3.1)
LYMPHOCYTES NFR BLD AUTO: 36.5 % (ref 19.6–45.3)
MCH RBC QN AUTO: 29.5 PG (ref 26.6–33)
MCHC RBC AUTO-ENTMCNC: 33.3 G/DL (ref 31.5–35.7)
MCV RBC AUTO: 88.6 FL (ref 79–97)
MONOCYTES # BLD AUTO: 0.41 10*3/MM3 (ref 0.1–0.9)
MONOCYTES NFR BLD AUTO: 7.4 % (ref 5–12)
NEUTROPHILS NFR BLD AUTO: 2.61 10*3/MM3 (ref 1.7–7)
NEUTROPHILS NFR BLD AUTO: 47 % (ref 42.7–76)
NRBC BLD AUTO-RTO: 0 /100 WBC (ref 0–0.2)
PLATELET # BLD AUTO: 199 10*3/MM3 (ref 140–450)
PMV BLD AUTO: 9.7 FL (ref 6–12)
POTASSIUM SERPL-SCNC: 3.7 MMOL/L (ref 3.5–5.2)
PROT SERPL-MCNC: 6.5 G/DL (ref 6–8.5)
RBC # BLD AUTO: 4.58 10*6/MM3 (ref 3.77–5.28)
SODIUM SERPL-SCNC: 141 MMOL/L (ref 136–145)
T4 FREE SERPL-MCNC: 1.15 NG/DL (ref 0.93–1.7)
TSH SERPL DL<=0.05 MIU/L-ACNC: 2.25 UIU/ML (ref 0.27–4.2)
WBC NRBC COR # BLD: 5.54 10*3/MM3 (ref 3.4–10.8)

## 2022-03-17 PROCEDURE — 25010000002 NIVOLUMAB 40 MG/4ML SOLUTION 4 ML VIAL: Performed by: INTERNAL MEDICINE

## 2022-03-17 PROCEDURE — 25010000002 HEPARIN LOCK FLUSH PER 10 UNITS: Performed by: INTERNAL MEDICINE

## 2022-03-17 PROCEDURE — 25010000002 IPILIMUMAB 200 MG/40ML SOLUTION 40 ML VIAL: Performed by: INTERNAL MEDICINE

## 2022-03-17 PROCEDURE — 84439 ASSAY OF FREE THYROXINE: CPT | Performed by: INTERNAL MEDICINE

## 2022-03-17 PROCEDURE — 96417 CHEMO IV INFUS EACH ADDL SEQ: CPT

## 2022-03-17 PROCEDURE — 80053 COMPREHEN METABOLIC PANEL: CPT | Performed by: INTERNAL MEDICINE

## 2022-03-17 PROCEDURE — 99214 OFFICE O/P EST MOD 30 MIN: CPT | Performed by: NURSE PRACTITIONER

## 2022-03-17 PROCEDURE — 25010000002 NIVOLUMAB 100 MG/10ML SOLUTION 10 ML VIAL: Performed by: INTERNAL MEDICINE

## 2022-03-17 PROCEDURE — 96413 CHEMO IV INFUSION 1 HR: CPT

## 2022-03-17 PROCEDURE — 84443 ASSAY THYROID STIM HORMONE: CPT | Performed by: INTERNAL MEDICINE

## 2022-03-17 PROCEDURE — 85025 COMPLETE CBC W/AUTO DIFF WBC: CPT | Performed by: INTERNAL MEDICINE

## 2022-03-17 RX ORDER — SODIUM CHLORIDE 9 MG/ML
250 INJECTION, SOLUTION INTRAVENOUS ONCE
Status: COMPLETED | OUTPATIENT
Start: 2022-03-17 | End: 2022-03-17

## 2022-03-17 RX ORDER — AMOXICILLIN 250 MG
2 CAPSULE ORAL 2 TIMES DAILY
Qty: 120 TABLET | Refills: 2 | Status: SHIPPED | OUTPATIENT
Start: 2022-03-17

## 2022-03-17 RX ORDER — HEPARIN SODIUM (PORCINE) LOCK FLUSH IV SOLN 100 UNIT/ML 100 UNIT/ML
500 SOLUTION INTRAVENOUS AS NEEDED
Status: DISCONTINUED | OUTPATIENT
Start: 2022-03-17 | End: 2022-03-17 | Stop reason: HOSPADM

## 2022-03-17 RX ORDER — HEPARIN SODIUM (PORCINE) LOCK FLUSH IV SOLN 100 UNIT/ML 100 UNIT/ML
500 SOLUTION INTRAVENOUS AS NEEDED
Status: CANCELLED | OUTPATIENT
Start: 2022-04-07

## 2022-03-17 RX ORDER — SODIUM CHLORIDE 0.9 % (FLUSH) 0.9 %
20 SYRINGE (ML) INJECTION AS NEEDED
Status: CANCELLED | OUTPATIENT
Start: 2022-03-17

## 2022-03-17 RX ORDER — LEVOTHYROXINE SODIUM 0.07 MG/1
75 TABLET ORAL DAILY
Qty: 30 TABLET | Refills: 2 | Status: SHIPPED | OUTPATIENT
Start: 2022-03-17 | End: 2022-03-17

## 2022-03-17 RX ORDER — HEPARIN SODIUM (PORCINE) LOCK FLUSH IV SOLN 100 UNIT/ML 100 UNIT/ML
300 SOLUTION INTRAVENOUS ONCE
Status: CANCELLED | OUTPATIENT
Start: 2022-03-17

## 2022-03-17 RX ORDER — AMOXICILLIN 250 MG
2 CAPSULE ORAL 2 TIMES DAILY
Qty: 120 TABLET | Refills: 2 | Status: SHIPPED | OUTPATIENT
Start: 2022-03-17 | End: 2022-03-17

## 2022-03-17 RX ORDER — SODIUM CHLORIDE 0.9 % (FLUSH) 0.9 %
10 SYRINGE (ML) INJECTION AS NEEDED
Status: CANCELLED | OUTPATIENT
Start: 2022-04-07

## 2022-03-17 RX ORDER — SODIUM CHLORIDE 0.9 % (FLUSH) 0.9 %
10 SYRINGE (ML) INJECTION AS NEEDED
Status: DISCONTINUED | OUTPATIENT
Start: 2022-03-17 | End: 2022-03-17 | Stop reason: HOSPADM

## 2022-03-17 RX ADMIN — SODIUM CHLORIDE 260 MG: 9 INJECTION, SOLUTION INTRAVENOUS at 14:54

## 2022-03-17 RX ADMIN — SODIUM CHLORIDE 250 ML: 9 INJECTION, SOLUTION INTRAVENOUS at 14:54

## 2022-03-17 RX ADMIN — SODIUM CHLORIDE 85 MG: 9 INJECTION, SOLUTION INTRAVENOUS at 15:30

## 2022-03-17 RX ADMIN — HEPARIN 500 UNITS: 100 SYRINGE at 16:15

## 2022-03-17 RX ADMIN — Medication 10 ML: at 16:15

## 2022-03-17 NOTE — PROGRESS NOTES
"NAME: Yadira Burgos    : 1955    DATE:  3/17/2022    DIAGNOSIS:   1.  Stage IA ([id number]) moderately differentiated invasive ductal carcinoma of the right breast diagnosed in 2015.    2.  H/o malignant Melanoma on her Back    3.  H/o cervical cancer treated with cryotherapy at [facility] in     4.  Recurrent / metastatic malignant Melanoma  -  R axillary LN bx 22.    -  She has R axillary LN mass (2.47x2.16), Solitary cavitary R lobe liver lesion, cavitary RLL lung mass,  and bilateral parenchymal lung nodules    CHIEF COMPLAINT:  Follow up of metastatic melanoma/Toxicity check    HISTORY OF PRESENT ILLNESS:   Yadira Burgos is a very pleasant 66 y.o. female who was referred by Dr. Dilcia Pedro for evaluation and treatment of breast cancer. She was living in [place] in 2015 when her dog brought attention to and \"found\" an abnormal lump in her right breast.  She was treated by Dr. Steve Isbell and underwent R lumpectomy with SLNBx on 10-22-15 as below.  She then had what sounds like accelerated partial breast irradiation.  She was then started on Arimidex which was later switched to Exemestane for a better side effect profile.  She took this for about 5 years, stopping a couple of weeks ago.  She stopped taking Exemestane because she developed some vaginal bleeding and hematuria.  This was evaluated by her PCP and gynecologist and felt to be due to vaginal atrophy.  She was prescribed a hormone pill which she hasn't yet started and was referred here to discuss things further.      Aside from bleeding which has been uncomfortable and distressing for her, Ms. Onur Burgos has generally been well.  The last year has been hard on her emotionally with the loss of her  and then her dog, her home and her insurance, but she is coping well and has good family support in [place].  She says she gained weight with her breast cancer treatment and gained weight when her  .  " She has been working now to lose weight and has lost 20 lbs over the last 2 months with dieting.  She denies chest pain or shortness of breath.  She complains of some RLQ cramping pain and isn't sure what is causing that.  She denies difficulty moving her bowels.  No blood in her stool that she is aware of.  She has had vaginal bleeding and hematuria as above.        INTERVAL HISTORY:  Ms. Burgos presents today for follow up of metastatic melanoma and toxicity check. She received her first cycle of Yervoy/Opdivo on 02/24/2022 and tolerated this well without any noticeable side effects. She reports a good appetite and hydrating well. Denies nausea/vomiting or constipation/diarrhea. She reports today a mild redness over chest and bilateral arms, no obvious rash. She reports that the areas itch. Denies any pain or tenderness. Denies any fever/chills. She denies any other specific complaints today.      PAST MEDICAL HISTORY:  Past Medical History:   Diagnosis Date   • Back pain    • Basal cell carcinoma (BCC) in situ of skin 2020    Dr. Mohr - Derm   • Breast cancer (HCC)     2015   • Cancer (HCC)     breast, cervical, melanoma   • Cervical cancer (HCC) 1976    Diagnosed in 1975.  Treated by repeated local intervention and ultimately received cryotherapy at [facility] with resolution.   • Elbow fracture    • Foot fracture    • Headache    • Hypertension     TAKEN OFF MEDS   • Melanoma (HCC)     on her back 2004 - treated by Dermatologist Catrachito Amaro in [place], [place] with what sounds like wide local excision.    • Pulmonary embolism (HCC)    • Sinusitis        PAST SURGICAL HISTORY:  Past Surgical History:   Procedure Laterality Date   • AXILLARY LYMPH NODE BIOPSY/EXCISION Right 2/4/2022    Procedure: AXILLARY LYMPH NODE BIOPSY/EXCISION;  Surgeon: Dianelys Cummings MD;  Location: [facility];  Service: General;  Laterality: Right;   • BREAST LUMPECTOMY Right 2015   • BREAST SURGERY  2015    Secondary to cancer   • BRONCHOSCOPY  Right 2022    Procedure: BRONCHOSCOPY WITH ENDOBRONCHIAL ULTRASOUND;  Surgeon: Chris Her MD;  Location:  [facility];  Service: Pulmonary;  Laterality: Right;   • COLONOSCOPY N/A 2020    Procedure: COLONOSCOPY;  Surgeon: Mohsen Maldonado MD;  Location:  [facility];  Service: Gastroenterology;  Laterality: N/A;   • ELBOW FUSION      left   • FOOT SURGERY Right     Broken foot   • HAND SURGERY  2020    right   • RHINOPLASTY     • SKIN CANCER EXCISION      malignant melanoma from back    • US GUIDED LYMPH NODE BIOPSY  2022   • VENOUS ACCESS DEVICE (PORT) INSERTION N/A 2022    Procedure: INSERTION VENOUS ACCESS DEVICE left or right;  Surgeon: Dianelys Cummings MD;  Location:  [facility];  Service: General;  Laterality: N/A;       FAMILY HISTORY:  Family History   Problem Relation Age of Onset   • Other Mother         blood clots   • Hypertension Mother    • Ulcers Father    • Hypertension Sister    • Other Sister         Multiple Sclerosis   • Cancer Paternal Aunt    • Stroke Maternal Grandmother    • Alcohol abuse Paternal Grandmother    • Hypertension Sister    • Multiple sclerosis Sister    • Hypertension Sister    • Coronary artery disease Paternal Grandfather    • Cancer Maternal Aunt 30        colon   • Breast cancer Neg Hx    Many paternal aunts  with malignancy of various types. Many cousins on that side of the family have had cancer as well.  One  of colon cancer in her 30s.  One had a brain tumor.    SOCIAL HISTORY:  Social History     Socioeconomic History   • Marital status:    Tobacco Use   • Smoking status: Never Smoker   • Smokeless tobacco: Never Used   Vaping Use   • Vaping Use: Never used   Substance and Sexual Activity   • Alcohol use: Not Currently   • Drug use: Never   • Sexual activity: Not Currently     Birth control/protection: Post-menopausal         REVIEW OF SYSTEMS:   A comprehensive 14 point review of systems was performed.  Significant  findings as mentioned above.  All other systems reviewed and are negative.      MEDICATIONS:  The current medication list was reviewed in the EMR    Current Outpatient Medications:   •  apixaban (ELIQUIS) 5 MG tablet tablet, Take 1 tablet by mouth 2 (Two) Times a Day., Disp: 60 tablet, Rfl: 5  •  lidocaine-prilocaine (EMLA) 2.5-2.5 % cream, Apply to port a cath ~30 min -1 hour prior to chemotherapy, Disp: 30 g, Rfl: 3  •  ondansetron ODT (Zofran ODT) 8 MG disintegrating tablet, Place 1 tablet on the tongue Every 8 (Eight) Hours As Needed for Nausea or Vomiting., Disp: 30 tablet, Rfl: 5  •  prochlorperazine (COMPAZINE) 10 MG tablet, Take 1 tablet by mouth Every 6 (Six) Hours As Needed for Nausea., Disp: 60 tablet, Rfl: 3  •  levothyroxine (SYNTHROID, LEVOTHROID) 75 MCG tablet, Take 1 tablet by mouth Daily., Disp: 30 tablet, Rfl: 2  •  sennosides-docusate (senna-docusate sodium) 8.6-50 MG per tablet, Take 2 tablets by mouth 2 (Two) Times a Day., Disp: 120 tablet, Rfl: 2  No current facility-administered medications for this visit.    Facility-Administered Medications Ordered in Other Visits:   •  heparin injection 500 Units, 500 Units, Intravenous, PRN, Loreto Stone MD  •  ipilimumab (YERVOY) 1 mg/kg = 85 mg in sodium chloride 0.9 % 67 mL chemo IVPB, 1 mg/kg (Treatment Plan Recorded), Intravenous, Once, Loreto Stone MD, 85 mg at 03/17/22 1530  •  sodium chloride 0.9 % flush 10 mL, 10 mL, Intravenous, PRN, Loreto Stone MD    ALLERGIES:    No Known Allergies    PHYSICAL EXAM:  Vitals:    03/17/22 1311   BP: 152/79   Pulse: 90   Resp: 18   Temp: 97.5 °F (36.4 °C)   SpO2: 95%     Pain Score    03/17/22 1311   PainSc: 0-No pain     ECOG score: 1   General:  Awake, alert and oriented, appears well. In no acute distress.  HEENT:  Pupils are equal, round and reactive to light and accommodation, Extra-ocular movements full, Oropharyx clear, mucous membranes moist  Neck:  No JVD, thyromegaly or  lymphadenopathy  CV:  Regular rate and rhythm, no murmurs, rubs or gallops  Resp:  Lungs are clear to auscultation bilaterally, no crackles  Breast: Deferred today. Previously: S/p R lumpectomy. There is a somewhat firm area at the site of surgery but there are no palpable masses.  She is s/p excision of R axillary LN, with some post-surgical swelling.  Left breast is without mass lesions.  No L axillary adenopathy.  Abd:  Soft, non-tender, non-distended, bowel sounds present, no organomegaly or masses.    Skin: Mild redness covering bilateral arms and chest with pruritus. No visible pimple or pus bumps.   Ext:  No clubbing, cyanosis or edema.    Lymph:  No cervical, supraclavicular, axillary adenopathy  Neuro:  MS as above, CN II-XII intact, grossly non-focal exam          PATHOLOGY:  10-22-15            12-16-21          01-13-22          ENDOSCOPY:  Colonoscopy 12-09-20     Findings: 8 hyperplastic appearing polyps of the distal rectum, diverticulosis moderately throughout the sigmoid colon      IMAGING:  CTCAP 12-15-20  On the lung windows there are several calcified  granulomas in the lungs. No noncalcified pulmonary parenchymal lung  nodules were identified. On the soft tissue windows there were no  enlarged lymph nodes in the supraclavicular or axillary regions. No  mediastinal or hilar lymphadenopathy was seen. The heart was not  enlarged. There were no pericardial effusions.     IMPRESSION:  No CT findings of metastatic disease in the chest. There are  calcified granulomas in the lungs.     CT FINDINGS: The liver and spleen were normal in size and shape. The  gallbladder contains several stones. The wall was not thickened. The  bile ducts in the liver are not dilated. There is nothing seen to  suggest metastatic disease in the liver. The pancreas shows no evidence  of mass or inflammation. No adrenal or renal lesions are demonstrated.  The aorta is normal in caliber. There were no enlarged lymph nodes  in  the retroperitoneum or in the mesentery. The bowel shows no evidence of  obstruction. In the pelvis there were no masses or fluid collections.  There were no ventral hernias.     IMPRESSION:  No CT findings of metastatic disease in the abdomen or  pelvis. This study does demonstrate several stones in the lumen of the  gallbladder.         Bilateral diagnostic mammogram 01-31-21  FINDINGS: There are scattered areas of fibroglandular density.     The bilateral fibroglandular pattern does not appear significantly  changed compared to the exam from 2020. This includes postoperative  changes in the right 12:00 region. Mild skin thickening is noted  involving the right breast which is likely treatment related. A few  bilateral scattered calcifications are noted.     IMPRESSION:  Incomplete examination as comparison with older outside  prior mammograms is needed.        BI-RADS CATEGORY:   0, INCOMPLETE:  Need prior mammograms for comparison        RECOMMENDATION:  We will attempt to obtain older outside prior  mammograms for comparison.      CTCAP 12-12-21  FINDINGS: Lack of IV contrast hinders parenchymal assessment of the mediastinum, liver, spleen, pancreas, adrenal glands and kidneys.         Significant infiltrate seen in the right lower lobe with interstitial component. There are also lung nodules present in the right lung. Index nodule in the right lung base medially is 5 mm maximal diameter. Other smaller nodules seen throughout the right  lung. Tiny 3 mm lingular nodule seen in series 3 image 30. A benign calcified granuloma seen in the left lower lobe but a noncalcified indeterminant  5 mm left lower lobe nodule seen, series 3 image 42.     Nonenlarged mediastinal adenopathy seen. However there is an enlarged right axillary 1.8 x 2.6 cm lymph node partially seen.     Worrisome hepatic metastasis or primary hepatic lesion seen measuring 4.4 x 4.1 cm in liver segment 8. Margins are ill-defined. Abscess could  have a similar appearance. Suggestion of subtle gallstones. Nonobstructing 2 mm right renal calculus seen. No  obstructive uropathy. Gaseous distention of the esophagus and hiatal hernia present consistent with GERD. No enteric contrast but no gross evidence of bowel obstruction.   There is no gross free air, free fluid or focal inflammatory change.  Uterus and  adnexa are not enlarged. Osseous structures are grossly intact.     IMPRESSION:  Constellation of findings with significant consolidation in the right lower lobe, enlarged right axillary lymph node, indeterminate bilateral lung nodules and ill-defined hepatic lesion concerning for underlying malignancy in this patient .     FINDINGS: Lack of IV contrast hinders parenchymal assessment of the mediastinum, liver, spleen, pancreas, adrenal glands and kidneys.         Significant infiltrate seen in the right lower lobe with interstitial component. There are also lung nodules present in the right lung. Index nodule in the right lung base medially is 5 mm maximal diameter. Other smaller nodules seen throughout the right  lung. Tiny 3 mm lingular nodule seen in series 3 image 30. A benign calcified granuloma seen in the left lower lobe but a noncalcified indeterminant  5 mm left lower lobe nodule seen, series 3 image 42.     Nonenlarged mediastinal adenopathy seen. However there is an enlarged right axillary 1.8 x 2.6 cm lymph node partially seen.     Worrisome hepatic metastasis or primary hepatic lesion seen measuring 4.4 x 4.1 cm in liver segment 8. Margins are ill-defined. Abscess could have a similar appearance. Suggestion of subtle gallstones. Nonobstructing 2 mm right renal calculus seen. No  obstructive uropathy. Gaseous distention of the esophagus and hiatal hernia present consistent with GERD. No enteric contrast but no gross evidence of bowel obstruction.   There is no gross free air, free fluid or focal inflammatory change.  Uterus and  adnexa are not  enlarged. Osseous structures are grossly intact.     IMPRESSION:  Constellation of findings with significant consolidation in the right lower lobe, enlarged right axillary lymph node, indeterminate bilateral lung nodules and ill-defined hepatic lesion concerning for underlying malignancy in this patient .       US Abdomen Complete 12-12-21  FINDINGS:  The visualized portions of the pancreas, IVC and aorta appear normal.        The liver is moderately coarsened in echotexture. Ill-defined hypodensity in the liver measuring 3 x 3.2 x 2.6 cm corresponds to recent CT. Unclear if this is a malignant lesion. Abscesses within the differential but prior CT was performed without any  contrast.  The common bile duct is not dilated.. Gallbladder wall is thickened at 3.4 mm with trace pericholecystic fluid and subtle hyperemia. Gallstones and shadowing present.     The kidneys are normal in size and echogenicity. There is no   hydronephrosis or focal solid lesion.  The spleen is normal in size and echotexture.     IMPRESSION:  1. Nonspecific gallbladder wall thickening and questionable pericholecystic fluid. Gallstones are better seen on CT than ultrasound.  2.  Heterogeneous liver with lesion in the liver as seen on CT. Ultrasound is not helpful in determining etiology. This could be an abscess but given the other findings on CT,  malignancy is not excluded.      CTCAP 12-15-21  FINDINGS:    LUNGS:  Increased density of the now right lower lobe mixed  groundglass and more solid-appearing consolidation with internal  cavitary component identified. Tiny right and left lung pulmonary  nodules. Grossly stable.    PLEURAL SPACE:  Small bilateral pleural effusions.  No pneumothorax.    HEART:  Unremarkable.  No cardiomegaly.  No significant pericardial  effusion.    BONES/JOINTS:  Unremarkable.  No acute fracture.  No dislocation.    SOFT TISSUES:  Unremarkable.    VASCULATURE:  Unremarkable.  No thoracic aortic aneurysm.    LYMPH  NODES:  Unremarkable.  No enlarged lymph nodes.     IMPRESSION:  1.  Increased density of the now right lower lobe mixed groundglass and  more solid-appearing consolidation with internal cavitary component  identified. Tiny right and left lung pulmonary nodules. Grossly stable.  2.  Small bilateral pleural effusions.    FINDINGS:    LUNG BASES:  Unremarkable.  No mass.  No consolidation.      ABDOMEN:    LIVER:  Right lobe of liver lesion is again identified now measuring  about 4.7 cm and was about 4.7 cm.    GALLBLADDER AND BILE DUCTS:  Gallstones noted in the gallbladder.  No  ductal dilation.    PANCREAS:  Unremarkable.  No mass.  No ductal dilation.    SPLEEN:  Unremarkable.  No splenomegaly.    ADRENALS:  Unremarkable.  No mass.    KIDNEYS AND URETERS:  Unremarkable.  No solid mass.  No  hydronephrosis.    STOMACH AND BOWEL:  Unremarkable.  No obstruction.  No mucosal  thickening.      PELVIS:    APPENDIX:  No findings to suggest acute appendicitis.    BLADDER:  Unremarkable.  No mass.    REPRODUCTIVE:  Unremarkable as visualized.      ABDOMEN and PELVIS:    INTRAPERITONEAL SPACE:  Unremarkable.  No free air.  No significant  fluid collection.    BONES/JOINTS:  No acute fracture.  No dislocation.    SOFT TISSUES:  Unremarkable.    VASCULATURE:  Unremarkable.  No abdominal aortic aneurysm.    LYMPH NODES:  Unremarkable.  No enlarged lymph nodes.     IMPRESSION:    Right lobe of liver lesion is again identified now measuring about 4.7  cm and was about 4.7 cm.      NM Bone Scan Whole Body 12-15-21  FINDINGS:    SKULL/FACIAL BONES:  No focal increased or decreased uptake.    SPINE:  Unremarkable.  No abnormal increased or decreased uptake.    RIBS:  Unremarkable.  No abnormal uptake.    LONG BONES:  Unremarkable.  No abnormal uptake.    PELVIS:  Unremarkable.  No focal increased or decreased uptake.    JOINTS:  Unremarkable.  No focal increased or decreased uptake.    SOFT TISSUES:  Unremarkable.     RENAL/BLADDER:  Within normal limits.     IMPRESSION:    Normal bone scan.      CT Abdomen With Contrast  12-18-21  FINDINGS:  Partially imaged thick-walled cavitary lesion in the right lower lobe with right basilar atelectasis/infiltrate and small right pleural effusion. There is also left basilar atelectasis/infiltrate and trace left pleural effusion. Multiple noncalcified  pulmonary nodules are scattered throughout the visualized lower lung fields, concerning for pulmonary metastatic disease.     Ill-defined right hepatic mass again noted. The spleen, pancreas, and both adrenal glands are within expected limits. Cholelithiasis. Punctate nonobstructing right intrarenal stone. Tiny left renal cyst. Kidneys are otherwise unremarkable without  hydronephrosis. Abdominal aorta normal in course and caliber without dissection. No pathologic retroperitoneal or mesenteric lymphadenopathy by size criteria. Visualized small bowel and colon are unremarkable. No bowel obstruction. No free fluid or free  air.     No aggressive osseous lesions.     IMPRESSION:     1. Ill-defined right hepatic mass again noted. This has been previously biopsied and correlation with pathology results is recommended.  2. Cholelithiasis.  3. Punctate nonobstructing right intrarenal stone.  4. No threshold abdominal lymphadenopathy or other suspicious abdominal masses.  5. Partially imaged thick-walled cavitary lesion in the right lower lobe. This may be of infectious or neoplastic etiology and continued follow-up is recommended.  6. Small bilateral pleural effusions with bibasilar atelectasis/infiltrate, worse on the right than left.  7. Numerous small noncalcified pulmonary nodules throughout the visualized lower lung fields, concerning for pulmonary metastatic disease.       Mammo Diagnostic Digital Tomosynthesis Bilateral With CAD w/ US Axilla Right 01-13-22  FINDINGS:  There are scattered areas of fibroglandular density.     Incompletely imaged  is a very prominent axillary lymph node. The  bilateral fibroglandular pattern itself is stable in appearance  including postoperative changes in the superior aspect of the right  breast. There are stable bilateral calcifications. Mild skin thickening  is again noted involving the right breast which is likely treatment  related.     ULTRASOUND: Ultrasound evaluation of the right axilla demonstrates 2  markedly enlarged lymph nodes the largest of which measures 3.2 cm in  size and has no demonstrable fatty hilum. Recommend ultrasound-guided  biopsy.      IMPRESSION:  1. Stable mammographic appearance of the left breast with no findings  suspicious for malignancy.        2. Marked right axillary lymphadenopathy. Recommend ultrasound-guided  biopsy. The fibroglandular pattern of the right breast is however  stable.        BI-RADS CATEGORY:  4, SUSPICIOUS        RECOMMENDATION:  Recommend ultrasound-guided biopsy of the dominant  abnormal appearing right axillary lymph node.      DEXA Bone Density Axial 01-13-22  IMPRESSION:  Impression:  1. According to the World Health Organization definitions of  osteoporosis based on bone density, this patient's bone mineral density  is compatible with osteoporosis and the fracture risk is high.      Mammo Diagnostic Digital Tomosynthesis Bilateral With CAD w/ US Axilla Right 01-13-22  FINDINGS:  There are scattered areas of fibroglandular density.     Incompletely imaged is a very prominent axillary lymph node. The  bilateral fibroglandular pattern itself is stable in appearance  including postoperative changes in the superior aspect of the right  breast. There are stable bilateral calcifications. Mild skin thickening  is again noted involving the right breast which is likely treatment  related.     ULTRASOUND: Ultrasound evaluation of the right axilla demonstrates 2  markedly enlarged lymph nodes the largest of which measures 3.2 cm in  size and has no demonstrable fatty hilum.  Recommend ultrasound-guided  biopsy.      IMPRESSION:  1. Stable mammographic appearance of the left breast with no findings  suspicious for malignancy.        2. Marked right axillary lymphadenopathy. Recommend ultrasound-guided  biopsy. The fibroglandular pattern of the right breast is however  stable.        BI-RADS CATEGORY:  4, SUSPICIOUS        RECOMMENDATION:  Recommend ultrasound-guided biopsy of the dominant  abnormal appearing right axillary lymph node.      CT Chest With Contrast Diagnostic 01-18-22  FINDINGS:     LUNGS: Solitary mass in the right lower lobe with cavitation.  Significantly smaller today than on the prior study. 6 mm solid nodule  in the right lung posteriorly on image 30 of the axial series. Other  parenchymal nodules are seen in both lungs and are similar to the  previous study. These are concerning for metastatic nodules.     HEART: Unremarkable.     MEDIASTINUM: No masses. No enlarged lymph nodes.  No fluid collections.     PLEURA: No pleural effusion. No pleural mass or abnormal calcification.  No pneumothorax.     VASCULATURE: No evidence of aneurysm. Filling defect in the right  pulmonary artery and possibly left lower lobe pulmonary artery. This  study was not performed to evaluate for pulmonary embolus, but the  findings are concerning for bilateral pulmonary emboli.     BONES: No acute bony abnormality.     VISUALIZED UPPER ABDOMEN:Please see the CT report for the abdomen and  pelvis.     Other: Right axillary soft tissue mass is present and shows slight  interval growth. It measures 2.47 x 2.16 cm today and previously at the  same level measured approximately 2.7 x 1.89 cm.     IMPRESSION:  1. Study was not performed as a PE protocol, but there appear to be  filling defects in pulmonary arteries bilaterally concerning for  pulmonary emboli.  2. Interval decrease in size of cavitary right lower lobe mass.  3. Stable parenchymal nodules bilaterally.  4. Very slight interval growth  of right axillary soft tissue mass.     Results are being relayed to the referring physician.      US Liver 01-18-22  FINDINGS: Sonographic imaging of the liver does not show the mass  previously identified in the liver. I would suggest a follow-up CT scan  for better delineation.     Hepatic flow was hepatopedal.     There is shadowing from the gallbladder fossa.     IMPRESSION:  1. Previously identified mass is not seen on this exam in the liver.  Consider follow-up CT.  2. Shadowing from the gallbladder fossa. In the absence of  cholecystectomy, appearance is concerning for cholelithiasis.      US Venous Doppler Lower Extremity Bilateral (duplex) 01-20-22  FINDINGS:   There is patent spontaneous flow from the common femoral vein through  the posterior tibial veins.  There was no internal clot or area of noncompressibility.  Normal augmentation was elicited where applicable.     IMPRESSION:  No DVT in the lower extremities on today's exam.       CT Chest Pulmonary Embolism 01-20-22  FINDINGS: Today's study demonstrates opacification of the central  pulmonary vessels.  There are filling defects in the pulmonary arteries bilaterally. First  order arteries. This is most compatible with bilateral pulmonary  emboli..     Soft tissue mass in the right lower lobe with somewhat cavitary center  is again noted and unchanged..     There is no mediastinal lymph node enlargement     No pericardial or pleural effusion.        IMPRESSION:  1. Bilateral pulmonary emboli.  2. Parenchymal nodules bilaterally with dominant mass in the right lower  Lobe..      NM PET/CT Skull Base to Mid Thigh 01-28-22  FINDINGS:      HEAD/NECK:  Area of uptake in the posterior right paraspinal space and to lesser  degree the left paraspinal space appears related to muscle uptake.  No FDG hypermetabolic neck adenopathy.  No FDG hypermetabolic masses.     CHEST:   Numerous bilateral pulmonary nodules appear stable from the previous  chest CT and show  no FDG hypermetabolism. This is likely due to nodules  being below size threshold for PET sensitivity.  Cavitary lung mass in the right lung localizing to the superior segment  of the lower lobe shows FDG hypermetabolism with maximum SUV: 2.4. This  is at the lower range for malignancy.  Enlarged right lower axillary region lymph node which is 3.0 cm shows  mild FDG hypermetabolism that is approximately with maximum SUV: 2.4.  This is at the lower range for malignancy.  Low-dose CT demonstrating no change in additional scattered pulmonary  nodules from the previous scan. Coronary artery calcifications are  stable.     ABDOMEN/PELVIS:   Faint area of low attenuation involving the right lobe of liver is  poorly evaluated with low-dose noncontrast CT but shows no focal FDG  hypermetabolism.  Physiologic FDG hypermetabolism seen throughout the solid abdominal  organs.  Physiologic FDG hypermetabolism seen throughout the GI tract.  Physiologic FDG hypermetabolism seen throughout the mesentery,  retroperitoneum and pelvis.  Low dose CT redemonstrates nonobstructing kidney stones. Cholelithiasis  again noted.     BONES: Nonspecific FDG tracer activity. No intense areas of tracer  activity noted.     IMPRESSION:     1. Cavitary mass in the right lung that shows very low-grade FDG  hypermetabolism which is at the lower range for malignancy with maximum  SUV: 2.4.  2. Enlarged right axillary region lymph node with low-grade FDG  hypermetabolism also at the lower range for malignancy with maximum SUV:  2.4.  3. Numerous bilateral pulmonary nodules compatible with metastatic  disease but show no significant FDG hypermetabolism. This may be in part  due to size of nodules being below PET sensitivity threshold.  4. Faint area of low-attenuation right lobe liver poorly evaluated with  noncontrast low-dose CT that shows no obvious intense FDG  hypermetabolism.  5. Other nonacute findings as above on low dose CT.        RECENT  LABS:  Lab Results   Component Value Date    WBC 5.54 03/17/2022    HGB 13.5 03/17/2022    HCT 40.6 03/17/2022    MCV 88.6 03/17/2022    RDW 13.4 03/17/2022     03/17/2022    NEUTRORELPCT 47.0 03/17/2022    LYMPHORELPCT 36.5 03/17/2022    MONORELPCT 7.4 03/17/2022    EOSRELPCT 7.8 (H) 03/17/2022    BASORELPCT 1.1 03/17/2022    NEUTROABS 2.61 03/17/2022    LYMPHSABS 2.02 03/17/2022       Lab Results   Component Value Date     03/17/2022    K 3.7 03/17/2022    CO2 24.5 03/17/2022     03/17/2022    BUN 13 03/17/2022    CREATININE 0.66 03/17/2022    EGFRIFNONA 87 02/24/2022    GLUCOSE 131 (H) 03/17/2022    CALCIUM 9.2 03/17/2022    ALKPHOS 103 03/17/2022    AST 33 (H) 03/17/2022    ALT 50 (H) 03/17/2022    BILITOT 0.4 03/17/2022    ALBUMIN 3.91 03/17/2022    PROTEINTOT 6.5 03/17/2022    MG 2.0 12/17/2021     Lab Results   Component Value Date    TSH 2.250 03/17/2022     Lab Results   Component Value Date    FERRITIN 169.00 (H) 01/20/2022    IRON 102 01/20/2022    TIBC 264 (L) 01/20/2022    LABIRON 39 01/20/2022    CYOASYYG08 437 01/20/2022    FOLATE 16.40 01/20/2022     Lab Results   Component Value Date     (H) 01/20/2022           ASSESSMENT & PLAN:  Yadira Burgos is a very pleasant 66 y.o. female with a history of cervical cancer, breast cancer and malignant melanoma.    1. History of Breast Cancer:  - Ms. Onur Burgos had Stage IA ([id number]) moderately differentiated invasive ductal carcinoma of the right breast diagnosed in October 2015.  - She underwent R lympectomy and SLNBx performed by Dr. Steve Isbell 10-22-15 and then received adjuvant radiation in [place].  - After radiation, she was started on Arimidex which was then switched to Exemestane.  She took Exemestane for almost 5 years. Unfortunately, she developed vaginal bleeding and hematuria related to vaginal atrophy related to hormonal blockade.  Given this, stopped Exemestane.  She is doing much better in this regard since  stopping hormonal therapy.  - Mammogram 1-13-22 without cause for concern aside from R axillary LAD.  No palpable breast masses.    2.  Metastatic malignant melanoma:  -  S/p resection of a primary lesion on her back performed by Dr. Catrachito Amaro of Dermatology in [place] in 2004.  -  S/p FNA R axillary LN which was concerning for melanoma.  Excisional biopsy confirmed metastatic malignant melanoma.  - Dr. Stone suspects her lung and liver lesions are also related to her melanoma.  - She has seen Dr. Her and he performed bronchoscopy on 02/23/2022. Pathology was negative for malignant cells.  - Liver aspiration showed abscess which grew Klebsiella, suspect this was a secondary infection but this isn't completely certain at this time.    -  Plan to watch all of these areas as we proceed with treatment.  -  Will send recently excised LN tissue for CARIS testing.  -  PET-CT showed no significant FDG uptake but re-demonstrated abnormalities in the R axilla, RLL cavitary mass, Vargas lung nodules and liver.    -  MRI Brain showed no acute findings in the head/brain.  -  Recommended initial therapy with immunotherapy.  We discussed different options for immunotherapy including single agent PD-L1 treatment or combination with Yervoy.  Dr. Stone recommended combination with Yervoy for better RR, PFS and OS even with increased risk of immune-mediated side effects.  We discussed the initially approved FDA dosing v. Altered dosing with lower dose IPI/higher dosed Nivo.  The latter has been shown to have fewer side effects and is thought to likely have similar efficacy (though trials weren't really powered to definitively show that).  After a lengthy discussion, we decided to proceed with 4 cycles of Ipi 1 mg/kg / Nivo 3 mg/kg q 21d followed by Nivolumab single agent.  We discussed potential risks, benefits and side effects extensively and she would like to proceed.  - She received her initial cycle of Yervoy/Opdivo  on 02/24/2022 and overall tolerated this very well without any noticeable side effects. She presents with mild redness of chest and bilateral arms today with pruritus (see below).   - Exam and labs from today without cause for concern. Will proceed with cycle #2 Yervoy/Opdivo today as planned.     3. Mild Redness/Pruritus of Chest and Bilateral Arms  - Advised patient to apply moisturizing barrier such as Udder cream or Cerave Itch Relief 2-3 x daily. Also recommended anti-histamine daily such as Claritin or Allegra. Advised to call with any worsening symptoms. Will continue to monitor.    4. Constipation  - RX provided today for Senna 2 tabs BID. Also advised to use Miralax as needed.     5. Bilateral pulmonary emboli:  -  CTPE protocol 1-20-22  confirmed bilateral pulmonary emboli  -  BLE dopplers negative for DVT.  -  Continue Eliquis 5 mg BID.      6.  H/o Colon polyps:  -  Dr. Maldonado performed c-scope 12-09-20 with discovery of 8 hyperplastic polyps in the distal rectum as well as diverticulosis.  He recommended repeat c-scope after 5 years.    7.  H/o cervical cancer treated with cryotherapy at [facility] in 1975:  -  Followed up with Dr. Manav Real of gynecology.  She says she had exam including pap smear and then pelvic U/S which was unrevealing.    8. Prophylaxis:  -  Had COVID vaccine x 2 (Pfizer).  Recommended booster dose.  Recommended 2021 influenza vaccine.    9. Follow up:  - Continue Eliquis.  - Proceed with cycle #2 Yervoy/Opdivo today as planned.   - With MD as scheduled on day of cycle # 3 Yervoy/Opdivo.    ACO / DWAYNE/Other  Quality measures  -  Yadira Burgos did not receive 2021 flu vaccine. This was recommended, she declined.  -  Yadira Burgos reports a pain score of 0.    -  Current outpatient and discharge medications have been reconciled for the patient.  Reviewed by: TENNILLE Monzon            I spent 30 minutes with Yadira Burgos today.  In the office today, more than 50%  of this time was spent face-to-face with her  in counseling / coordination of care, reviewing her medical history and counseling on the current treatment plan.  All questions were answered to her satisfaction      Electronically Signed by:  TENNILLE Monzon      CC:     MD Manav Cotto MD Aaron House, MD

## 2022-03-24 LAB
FUNGUS SPEC CULT: NORMAL
FUNGUS SPEC FUNGUS STN: NORMAL
FUNGUS SPEC FUNGUS STN: NORMAL

## 2022-04-02 DIAGNOSIS — H69.81 DYSFUNCTION OF EUSTACHIAN TUBE, RIGHT: ICD-10-CM

## 2022-04-02 DIAGNOSIS — I10 ESSENTIAL HYPERTENSION: ICD-10-CM

## 2022-04-03 DIAGNOSIS — C78.02 MALIGNANT NEOPLASM METASTATIC TO BOTH LUNGS: ICD-10-CM

## 2022-04-03 DIAGNOSIS — C43.59 MALIGNANT MELANOMA OF TORSO EXCLUDING BREAST: Primary | ICD-10-CM

## 2022-04-03 DIAGNOSIS — C78.01 MALIGNANT NEOPLASM METASTATIC TO BOTH LUNGS: ICD-10-CM

## 2022-04-03 DIAGNOSIS — C78.7 METASTASIS TO LIVER: ICD-10-CM

## 2022-04-03 RX ORDER — SODIUM CHLORIDE 9 MG/ML
250 INJECTION, SOLUTION INTRAVENOUS ONCE
Status: CANCELLED | OUTPATIENT
Start: 2022-04-07

## 2022-04-04 RX ORDER — PROCHLORPERAZINE MALEATE 10 MG
TABLET ORAL
Qty: 360 TABLET | Refills: 3 | Status: SHIPPED | OUTPATIENT
Start: 2022-04-04

## 2022-04-04 RX ORDER — FLUTICASONE PROPIONATE 50 MCG
SPRAY, SUSPENSION (ML) NASAL
Qty: 16 G | Refills: 5 | OUTPATIENT
Start: 2022-04-04

## 2022-04-04 RX ORDER — ATENOLOL 50 MG/1
TABLET ORAL
Qty: 180 TABLET | Refills: 1 | OUTPATIENT
Start: 2022-04-04

## 2022-04-06 NOTE — PROGRESS NOTES
"NAME: Yadira Flowers    : 1955    DATE:  2022    DIAGNOSIS:   1.  Stage IA ([id number]) moderately differentiated invasive ductal carcinoma of the right breast diagnosed in 2015.    2.  H/o malignant Melanoma on her Back    3.  H/o cervical cancer treated with cryotherapy at [facility] in     4.  Recurrent / metastatic malignant Melanoma  -  R axillary LN bx 22.    -  She has R axillary LN mass (2.47x2.16), Solitary cavitary R lobe liver lesion, cavitary RLL lung mass,  and bilateral parenchymal lung nodules      TREATMENT HISTORY:   1.           CHIEF COMPLAINT:  Follow up of metastatic melanoma    HISTORY OF PRESENT ILLNESS:   Yadira Flowers is a very pleasant 66 y.o. female who was referred by Dr. Dilcia Pedro for evaluation and treatment of breast cancer. She was living in [place] in 2015 when her dog brought attention to and \"found\" an abnormal lump in her right breast.  She was treated by Dr. Steve Isbell and underwent R lumpectomy with SLNBx on 10-22-15 as below.  She then had what sounds like accelerated partial breast irradiation.  She was then started on Arimidex which was later switched to Exemestane for a better side effect profile.  She took this for about 5 years, stopping a couple of weeks ago.  She stopped taking Exemestane because she developed some vaginal bleeding and hematuria.  This was evaluated by her PCP and gynecologist and felt to be due to vaginal atrophy.  She was prescribed a hormone pill which she hasn't yet started and was referred here to discuss things further.      Aside from bleeding which has been uncomfortable and distressing for her, Ms. Onur Burgos has generally been well.  The last year has been hard on her emotionally with the loss of her  and then her dog, her home and her insurance, but she is coping well and has good family support in [place].  She says she gained weight with her breast cancer treatment and gained weight " when her  .  She has been working now to lose weight and has lost 20 lbs over the last 2 months with dieting.  She denies chest pain or shortness of breath.  She complains of some RLQ cramping pain and isn't sure what is causing that.  She denies difficulty moving her bowels.  No blood in her stool that she is aware of.  She has had vaginal bleeding and hematuria as above.        INTERVAL HISTORY:  Ms. Burgos presents today for follow up of metastatic melanoma. She is scheduled for C3 of Ipi/Nivo today. She reports she is feeling well and is tolerating the treatment well. She reports fatigue for 2-3 days following each treatment as well as itching. She is using Cerave lotion which has been helpful.  Aside from this her only complaint is flare of her allergic rhinitis.    PAST MEDICAL HISTORY:  Past Medical History:   Diagnosis Date   • Back pain    • Basal cell carcinoma (BCC) in situ of skin     Dr. Mohr - Derm   • Breast cancer (HCC)        • Cancer (HCC)     breast, cervical, melanoma   • Cervical cancer (HCC)     Diagnosed in .  Treated by repeated local intervention and ultimately received cryotherapy at [facility] with resolution.   • Elbow fracture    • Foot fracture    • Headache    • Hypertension     TAKEN OFF MEDS   • Melanoma (HCC)     on her back  - treated by Dermatologist Catrachito Amaro in [place], [place] with what sounds like wide local excision.    • Pulmonary embolism (HCC)    • Sinusitis        PAST SURGICAL HISTORY:  Past Surgical History:   Procedure Laterality Date   • AXILLARY LYMPH NODE BIOPSY/EXCISION Right 2022    Procedure: AXILLARY LYMPH NODE BIOPSY/EXCISION;  Surgeon: Dianelys Cummings MD;  Location: [facility];  Service: General;  Laterality: Right;   • BREAST LUMPECTOMY Right    • BREAST SURGERY      Secondary to cancer   • BRONCHOSCOPY Right 2022    Procedure: BRONCHOSCOPY WITH ENDOBRONCHIAL ULTRASOUND;  Surgeon: Chris Her MD;  Location:   [facility];  Service: Pulmonary;  Laterality: Right;   • COLONOSCOPY N/A 2020    Procedure: COLONOSCOPY;  Surgeon: Mohsen Maldonado MD;  Location:  [facility];  Service: Gastroenterology;  Laterality: N/A;   • ELBOW FUSION      left   • FOOT SURGERY Right     Broken foot   • HAND SURGERY  2020    right   • RHINOPLASTY     • SKIN CANCER EXCISION      malignant melanoma from back    • US GUIDED LYMPH NODE BIOPSY  2022   • VENOUS ACCESS DEVICE (PORT) INSERTION N/A 2022    Procedure: INSERTION VENOUS ACCESS DEVICE left or right;  Surgeon: Dianelys Cummings MD;  Location:  [facility];  Service: General;  Laterality: N/A;       FAMILY HISTORY:  Family History   Problem Relation Age of Onset   • Other Mother         blood clots   • Hypertension Mother    • Ulcers Father    • Hypertension Sister    • Other Sister         Multiple Sclerosis   • Cancer Paternal Aunt    • Stroke Maternal Grandmother    • Alcohol abuse Paternal Grandmother    • Hypertension Sister    • Multiple sclerosis Sister    • Hypertension Sister    • Coronary artery disease Paternal Grandfather    • Cancer Maternal Aunt 30        colon   • Breast cancer Neg Hx    Many paternal aunts  with malignancy of various types. Many cousins on that side of the family have had cancer as well.  One  of colon cancer in her 30s.  One had a brain tumor.    SOCIAL HISTORY:  Social History     Socioeconomic History   • Marital status:    Tobacco Use   • Smoking status: Never Smoker   • Smokeless tobacco: Never Used   Vaping Use   • Vaping Use: Never used   Substance and Sexual Activity   • Alcohol use: Not Currently   • Drug use: Never   • Sexual activity: Not Currently     Birth control/protection: Post-menopausal         REVIEW OF SYSTEMS:   A comprehensive 14 point review of systems was performed.  Significant findings as mentioned above.  All other systems reviewed and are negative.      MEDICATIONS:  The current medication  list was reviewed in the EMR    Current Outpatient Medications:   •  apixaban (ELIQUIS) 5 MG tablet tablet, Take 1 tablet by mouth 2 (Two) Times a Day., Disp: 60 tablet, Rfl: 5  •  lidocaine-prilocaine (EMLA) 2.5-2.5 % cream, Apply to port a cath ~30 min -1 hour prior to chemotherapy, Disp: 30 g, Rfl: 3  •  ondansetron ODT (Zofran ODT) 8 MG disintegrating tablet, Place 1 tablet on the tongue Every 8 (Eight) Hours As Needed for Nausea or Vomiting., Disp: 30 tablet, Rfl: 5  •  prochlorperazine (COMPAZINE) 10 MG tablet, TAKE 1 TABLET BY MOUTH EVERY 6 HOURS AS NEEDED FOR NAUSEA, Disp: 360 tablet, Rfl: 3  •  sennosides-docusate (senna-docusate sodium) 8.6-50 MG per tablet, Take 2 tablets by mouth 2 (Two) Times a Day., Disp: 120 tablet, Rfl: 2    ALLERGIES:    No Known Allergies    PHYSICAL EXAM:  Vitals:    04/07/22 1312   BP: 137/85   Pulse: 89   Resp: 18   Temp: 97.5 °F (36.4 °C)   SpO2: 98%     Pain Score    04/07/22 1312   PainSc: 0-No pain     ECOG score: 0   General:  Awake, alert and oriented, appears well. In no acute distress.  HEENT:  Pupils are equal, round and reactive to light and accommodation, Extra-ocular movements full, Oropharyx clear, mucous membranes moist  Neck:  No JVD, thyromegaly or lymphadenopathy  CV:  Regular rate and rhythm, no murmurs, rubs or gallops  Resp:  Lungs are clear to auscultation bilaterally, no wheezing  Breast: Deferred today. Previously: S/p R lumpectomy. There is a somewhat firm area at the site of surgery but there are no palpable masses.  She is s/p excision of R axillary LN, with some post-surgical swelling.  Left breast is without mass lesions.  No L axillary adenopathy.  Abd:  Soft, non-tender, non-distended, bowel sounds present, no organomegaly or masses.    Ext:  No clubbing, cyanosis or edema.    Lymph:  No cervical, supraclavicular, axillary adenopathy  Neuro:  MS as above, CN II-XII intact, grossly non-focal  exam          PATHOLOGY:  10-22-15            12-16-21          01-13-22          ENDOSCOPY:  Colonoscopy 12-09-20     Findings: 8 hyperplastic appearing polyps of the distal rectum, diverticulosis moderately throughout the sigmoid colon      IMAGING:  CTCAP 12-15-20  On the lung windows there are several calcified  granulomas in the lungs. No noncalcified pulmonary parenchymal lung  nodules were identified. On the soft tissue windows there were no  enlarged lymph nodes in the supraclavicular or axillary regions. No  mediastinal or hilar lymphadenopathy was seen. The heart was not  enlarged. There were no pericardial effusions.     IMPRESSION:  No CT findings of metastatic disease in the chest. There are  calcified granulomas in the lungs.     CT FINDINGS: The liver and spleen were normal in size and shape. The  gallbladder contains several stones. The wall was not thickened. The  bile ducts in the liver are not dilated. There is nothing seen to  suggest metastatic disease in the liver. The pancreas shows no evidence  of mass or inflammation. No adrenal or renal lesions are demonstrated.  The aorta is normal in caliber. There were no enlarged lymph nodes in  the retroperitoneum or in the mesentery. The bowel shows no evidence of  obstruction. In the pelvis there were no masses or fluid collections.  There were no ventral hernias.     IMPRESSION:  No CT findings of metastatic disease in the abdomen or  pelvis. This study does demonstrate several stones in the lumen of the  gallbladder.         Bilateral diagnostic mammogram 01-31-21  FINDINGS: There are scattered areas of fibroglandular density.     The bilateral fibroglandular pattern does not appear significantly  changed compared to the exam from 2020. This includes postoperative  changes in the right 12:00 region. Mild skin thickening is noted  involving the right breast which is likely treatment related. A few  bilateral scattered calcifications are noted.      IMPRESSION:  Incomplete examination as comparison with older outside  prior mammograms is needed.        BI-RADS CATEGORY:   0, INCOMPLETE:  Need prior mammograms for comparison        RECOMMENDATION:  We will attempt to obtain older outside prior  mammograms for comparison.      CTCAP 12-12-21  FINDINGS: Lack of IV contrast hinders parenchymal assessment of the mediastinum, liver, spleen, pancreas, adrenal glands and kidneys.         Significant infiltrate seen in the right lower lobe with interstitial component. There are also lung nodules present in the right lung. Index nodule in the right lung base medially is 5 mm maximal diameter. Other smaller nodules seen throughout the right  lung. Tiny 3 mm lingular nodule seen in series 3 image 30. A benign calcified granuloma seen in the left lower lobe but a noncalcified indeterminant  5 mm left lower lobe nodule seen, series 3 image 42.     Nonenlarged mediastinal adenopathy seen. However there is an enlarged right axillary 1.8 x 2.6 cm lymph node partially seen.     Worrisome hepatic metastasis or primary hepatic lesion seen measuring 4.4 x 4.1 cm in liver segment 8. Margins are ill-defined. Abscess could have a similar appearance. Suggestion of subtle gallstones. Nonobstructing 2 mm right renal calculus seen. No  obstructive uropathy. Gaseous distention of the esophagus and hiatal hernia present consistent with GERD. No enteric contrast but no gross evidence of bowel obstruction.   There is no gross free air, free fluid or focal inflammatory change.  Uterus and  adnexa are not enlarged. Osseous structures are grossly intact.     IMPRESSION:  Constellation of findings with significant consolidation in the right lower lobe, enlarged right axillary lymph node, indeterminate bilateral lung nodules and ill-defined hepatic lesion concerning for underlying malignancy in this patient .     FINDINGS: Lack of IV contrast hinders parenchymal assessment of the mediastinum,  liver, spleen, pancreas, adrenal glands and kidneys.         Significant infiltrate seen in the right lower lobe with interstitial component. There are also lung nodules present in the right lung. Index nodule in the right lung base medially is 5 mm maximal diameter. Other smaller nodules seen throughout the right  lung. Tiny 3 mm lingular nodule seen in series 3 image 30. A benign calcified granuloma seen in the left lower lobe but a noncalcified indeterminant  5 mm left lower lobe nodule seen, series 3 image 42.     Nonenlarged mediastinal adenopathy seen. However there is an enlarged right axillary 1.8 x 2.6 cm lymph node partially seen.     Worrisome hepatic metastasis or primary hepatic lesion seen measuring 4.4 x 4.1 cm in liver segment 8. Margins are ill-defined. Abscess could have a similar appearance. Suggestion of subtle gallstones. Nonobstructing 2 mm right renal calculus seen. No  obstructive uropathy. Gaseous distention of the esophagus and hiatal hernia present consistent with GERD. No enteric contrast but no gross evidence of bowel obstruction.   There is no gross free air, free fluid or focal inflammatory change.  Uterus and  adnexa are not enlarged. Osseous structures are grossly intact.     IMPRESSION:  Constellation of findings with significant consolidation in the right lower lobe, enlarged right axillary lymph node, indeterminate bilateral lung nodules and ill-defined hepatic lesion concerning for underlying malignancy in this patient .       US Abdomen Complete 12-12-21  FINDINGS:  The visualized portions of the pancreas, IVC and aorta appear normal.        The liver is moderately coarsened in echotexture. Ill-defined hypodensity in the liver measuring 3 x 3.2 x 2.6 cm corresponds to recent CT. Unclear if this is a malignant lesion. Abscesses within the differential but prior CT was performed without any  contrast.  The common bile duct is not dilated.. Gallbladder wall is thickened at 3.4 mm  with trace pericholecystic fluid and subtle hyperemia. Gallstones and shadowing present.     The kidneys are normal in size and echogenicity. There is no   hydronephrosis or focal solid lesion.  The spleen is normal in size and echotexture.     IMPRESSION:  1. Nonspecific gallbladder wall thickening and questionable pericholecystic fluid. Gallstones are better seen on CT than ultrasound.  2.  Heterogeneous liver with lesion in the liver as seen on CT. Ultrasound is not helpful in determining etiology. This could be an abscess but given the other findings on CT,  malignancy is not excluded.      CTCAP 12-15-21  FINDINGS:    LUNGS:  Increased density of the now right lower lobe mixed  groundglass and more solid-appearing consolidation with internal  cavitary component identified. Tiny right and left lung pulmonary  nodules. Grossly stable.    PLEURAL SPACE:  Small bilateral pleural effusions.  No pneumothorax.    HEART:  Unremarkable.  No cardiomegaly.  No significant pericardial  effusion.    BONES/JOINTS:  Unremarkable.  No acute fracture.  No dislocation.    SOFT TISSUES:  Unremarkable.    VASCULATURE:  Unremarkable.  No thoracic aortic aneurysm.    LYMPH NODES:  Unremarkable.  No enlarged lymph nodes.     IMPRESSION:  1.  Increased density of the now right lower lobe mixed groundglass and  more solid-appearing consolidation with internal cavitary component  identified. Tiny right and left lung pulmonary nodules. Grossly stable.  2.  Small bilateral pleural effusions.    FINDINGS:    LUNG BASES:  Unremarkable.  No mass.  No consolidation.      ABDOMEN:    LIVER:  Right lobe of liver lesion is again identified now measuring  about 4.7 cm and was about 4.7 cm.    GALLBLADDER AND BILE DUCTS:  Gallstones noted in the gallbladder.  No  ductal dilation.    PANCREAS:  Unremarkable.  No mass.  No ductal dilation.    SPLEEN:  Unremarkable.  No splenomegaly.    ADRENALS:  Unremarkable.  No mass.    KIDNEYS AND URETERS:   Unremarkable.  No solid mass.  No  hydronephrosis.    STOMACH AND BOWEL:  Unremarkable.  No obstruction.  No mucosal  thickening.      PELVIS:    APPENDIX:  No findings to suggest acute appendicitis.    BLADDER:  Unremarkable.  No mass.    REPRODUCTIVE:  Unremarkable as visualized.      ABDOMEN and PELVIS:    INTRAPERITONEAL SPACE:  Unremarkable.  No free air.  No significant  fluid collection.    BONES/JOINTS:  No acute fracture.  No dislocation.    SOFT TISSUES:  Unremarkable.    VASCULATURE:  Unremarkable.  No abdominal aortic aneurysm.    LYMPH NODES:  Unremarkable.  No enlarged lymph nodes.     IMPRESSION:    Right lobe of liver lesion is again identified now measuring about 4.7  cm and was about 4.7 cm.      NM Bone Scan Whole Body 12-15-21  FINDINGS:    SKULL/FACIAL BONES:  No focal increased or decreased uptake.    SPINE:  Unremarkable.  No abnormal increased or decreased uptake.    RIBS:  Unremarkable.  No abnormal uptake.    LONG BONES:  Unremarkable.  No abnormal uptake.    PELVIS:  Unremarkable.  No focal increased or decreased uptake.    JOINTS:  Unremarkable.  No focal increased or decreased uptake.    SOFT TISSUES:  Unremarkable.    RENAL/BLADDER:  Within normal limits.     IMPRESSION:    Normal bone scan.      CT Abdomen With Contrast  12-18-21  FINDINGS:  Partially imaged thick-walled cavitary lesion in the right lower lobe with right basilar atelectasis/infiltrate and small right pleural effusion. There is also left basilar atelectasis/infiltrate and trace left pleural effusion. Multiple noncalcified  pulmonary nodules are scattered throughout the visualized lower lung fields, concerning for pulmonary metastatic disease.     Ill-defined right hepatic mass again noted. The spleen, pancreas, and both adrenal glands are within expected limits. Cholelithiasis. Punctate nonobstructing right intrarenal stone. Tiny left renal cyst. Kidneys are otherwise unremarkable without  hydronephrosis. Abdominal  aorta normal in course and caliber without dissection. No pathologic retroperitoneal or mesenteric lymphadenopathy by size criteria. Visualized small bowel and colon are unremarkable. No bowel obstruction. No free fluid or free  air.     No aggressive osseous lesions.     IMPRESSION:     1. Ill-defined right hepatic mass again noted. This has been previously biopsied and correlation with pathology results is recommended.  2. Cholelithiasis.  3. Punctate nonobstructing right intrarenal stone.  4. No threshold abdominal lymphadenopathy or other suspicious abdominal masses.  5. Partially imaged thick-walled cavitary lesion in the right lower lobe. This may be of infectious or neoplastic etiology and continued follow-up is recommended.  6. Small bilateral pleural effusions with bibasilar atelectasis/infiltrate, worse on the right than left.  7. Numerous small noncalcified pulmonary nodules throughout the visualized lower lung fields, concerning for pulmonary metastatic disease.       Mammo Diagnostic Digital Tomosynthesis Bilateral With CAD w/ US Axilla Right 01-13-22  FINDINGS:  There are scattered areas of fibroglandular density.     Incompletely imaged is a very prominent axillary lymph node. The  bilateral fibroglandular pattern itself is stable in appearance  including postoperative changes in the superior aspect of the right  breast. There are stable bilateral calcifications. Mild skin thickening  is again noted involving the right breast which is likely treatment  related.     ULTRASOUND: Ultrasound evaluation of the right axilla demonstrates 2  markedly enlarged lymph nodes the largest of which measures 3.2 cm in  size and has no demonstrable fatty hilum. Recommend ultrasound-guided  biopsy.      IMPRESSION:  1. Stable mammographic appearance of the left breast with no findings  suspicious for malignancy.        2. Marked right axillary lymphadenopathy. Recommend ultrasound-guided  biopsy. The fibroglandular  pattern of the right breast is however  stable.        BI-RADS CATEGORY:  4, SUSPICIOUS        RECOMMENDATION:  Recommend ultrasound-guided biopsy of the dominant  abnormal appearing right axillary lymph node.      DEXA Bone Density Axial 01-13-22  IMPRESSION:  Impression:  1. According to the World Health Organization definitions of  osteoporosis based on bone density, this patient's bone mineral density  is compatible with osteoporosis and the fracture risk is high.      Mammo Diagnostic Digital Tomosynthesis Bilateral With CAD w/ US Axilla Right 01-13-22  FINDINGS:  There are scattered areas of fibroglandular density.     Incompletely imaged is a very prominent axillary lymph node. The  bilateral fibroglandular pattern itself is stable in appearance  including postoperative changes in the superior aspect of the right  breast. There are stable bilateral calcifications. Mild skin thickening  is again noted involving the right breast which is likely treatment  related.     ULTRASOUND: Ultrasound evaluation of the right axilla demonstrates 2  markedly enlarged lymph nodes the largest of which measures 3.2 cm in  size and has no demonstrable fatty hilum. Recommend ultrasound-guided  biopsy.      IMPRESSION:  1. Stable mammographic appearance of the left breast with no findings  suspicious for malignancy.        2. Marked right axillary lymphadenopathy. Recommend ultrasound-guided  biopsy. The fibroglandular pattern of the right breast is however  stable.        BI-RADS CATEGORY:  4, SUSPICIOUS        RECOMMENDATION:  Recommend ultrasound-guided biopsy of the dominant  abnormal appearing right axillary lymph node.      CT Chest With Contrast Diagnostic 01-18-22  FINDINGS:     LUNGS: Solitary mass in the right lower lobe with cavitation.  Significantly smaller today than on the prior study. 6 mm solid nodule  in the right lung posteriorly on image 30 of the axial series. Other  parenchymal nodules are seen in both lungs  and are similar to the  previous study. These are concerning for metastatic nodules.     HEART: Unremarkable.     MEDIASTINUM: No masses. No enlarged lymph nodes.  No fluid collections.     PLEURA: No pleural effusion. No pleural mass or abnormal calcification.  No pneumothorax.     VASCULATURE: No evidence of aneurysm. Filling defect in the right  pulmonary artery and possibly left lower lobe pulmonary artery. This  study was not performed to evaluate for pulmonary embolus, but the  findings are concerning for bilateral pulmonary emboli.     BONES: No acute bony abnormality.     VISUALIZED UPPER ABDOMEN:Please see the CT report for the abdomen and  pelvis.     Other: Right axillary soft tissue mass is present and shows slight  interval growth. It measures 2.47 x 2.16 cm today and previously at the  same level measured approximately 2.7 x 1.89 cm.     IMPRESSION:  1. Study was not performed as a PE protocol, but there appear to be  filling defects in pulmonary arteries bilaterally concerning for  pulmonary emboli.  2. Interval decrease in size of cavitary right lower lobe mass.  3. Stable parenchymal nodules bilaterally.  4. Very slight interval growth of right axillary soft tissue mass.     Results are being relayed to the referring physician.      US Liver 01-18-22  FINDINGS: Sonographic imaging of the liver does not show the mass  previously identified in the liver. I would suggest a follow-up CT scan  for better delineation.     Hepatic flow was hepatopedal.     There is shadowing from the gallbladder fossa.     IMPRESSION:  1. Previously identified mass is not seen on this exam in the liver.  Consider follow-up CT.  2. Shadowing from the gallbladder fossa. In the absence of  cholecystectomy, appearance is concerning for cholelithiasis.      US Venous Doppler Lower Extremity Bilateral (duplex) 01-20-22  FINDINGS:   There is patent spontaneous flow from the common femoral vein through  the posterior tibial  veins.  There was no internal clot or area of noncompressibility.  Normal augmentation was elicited where applicable.     IMPRESSION:  No DVT in the lower extremities on today's exam.       CT Chest Pulmonary Embolism 01-20-22  FINDINGS: Today's study demonstrates opacification of the central  pulmonary vessels.  There are filling defects in the pulmonary arteries bilaterally. First  order arteries. This is most compatible with bilateral pulmonary  emboli..     Soft tissue mass in the right lower lobe with somewhat cavitary center  is again noted and unchanged..     There is no mediastinal lymph node enlargement     No pericardial or pleural effusion.        IMPRESSION:  1. Bilateral pulmonary emboli.  2. Parenchymal nodules bilaterally with dominant mass in the right lower  Lobe..      NM PET/CT Skull Base to Mid Thigh 01-28-22  FINDINGS:      HEAD/NECK:  Area of uptake in the posterior right paraspinal space and to lesser  degree the left paraspinal space appears related to muscle uptake.  No FDG hypermetabolic neck adenopathy.  No FDG hypermetabolic masses.     CHEST:   Numerous bilateral pulmonary nodules appear stable from the previous  chest CT and show no FDG hypermetabolism. This is likely due to nodules  being below size threshold for PET sensitivity.  Cavitary lung mass in the right lung localizing to the superior segment  of the lower lobe shows FDG hypermetabolism with maximum SUV: 2.4. This  is at the lower range for malignancy.  Enlarged right lower axillary region lymph node which is 3.0 cm shows  mild FDG hypermetabolism that is approximately with maximum SUV: 2.4.  This is at the lower range for malignancy.  Low-dose CT demonstrating no change in additional scattered pulmonary  nodules from the previous scan. Coronary artery calcifications are  stable.     ABDOMEN/PELVIS:   Faint area of low attenuation involving the right lobe of liver is  poorly evaluated with low-dose noncontrast CT but shows no  focal FDG  hypermetabolism.  Physiologic FDG hypermetabolism seen throughout the solid abdominal  organs.  Physiologic FDG hypermetabolism seen throughout the GI tract.  Physiologic FDG hypermetabolism seen throughout the mesentery,  retroperitoneum and pelvis.  Low dose CT redemonstrates nonobstructing kidney stones. Cholelithiasis  again noted.     BONES: Nonspecific FDG tracer activity. No intense areas of tracer  activity noted.     IMPRESSION:     1. Cavitary mass in the right lung that shows very low-grade FDG  hypermetabolism which is at the lower range for malignancy with maximum  SUV: 2.4.  2. Enlarged right axillary region lymph node with low-grade FDG  hypermetabolism also at the lower range for malignancy with maximum SUV:  2.4.  3. Numerous bilateral pulmonary nodules compatible with metastatic  disease but show no significant FDG hypermetabolism. This may be in part  due to size of nodules being below PET sensitivity threshold.  4. Faint area of low-attenuation right lobe liver poorly evaluated with  noncontrast low-dose CT that shows no obvious intense FDG  hypermetabolism.  5. Other nonacute findings as above on low dose CT.        RECENT LABS:  Lab Results   Component Value Date    WBC 7.55 04/07/2022    HGB 14.8 04/07/2022    HCT 45.6 04/07/2022    MCV 88.9 04/07/2022    RDW 12.9 04/07/2022     04/07/2022    NEUTRORELPCT 35.4 (L) 04/07/2022    LYMPHORELPCT 33.4 04/07/2022    MONORELPCT 6.9 04/07/2022    EOSRELPCT 22.9 (H) 04/07/2022    BASORELPCT 1.1 04/07/2022    NEUTROABS 2.68 04/07/2022    LYMPHSABS 2.52 04/07/2022       Lab Results   Component Value Date     03/17/2022    K 3.7 03/17/2022    CO2 24.5 03/17/2022     03/17/2022    BUN 13 03/17/2022    CREATININE 0.66 03/17/2022    EGFRIFNONA 87 02/24/2022    GLUCOSE 131 (H) 03/17/2022    CALCIUM 9.2 03/17/2022    ALKPHOS 103 03/17/2022    AST 33 (H) 03/17/2022    ALT 50 (H) 03/17/2022    BILITOT 0.4 03/17/2022    ALBUMIN 3.91  03/17/2022    PROTEINTOT 6.5 03/17/2022    MG 2.0 12/17/2021     Lab Results   Component Value Date    TSH 2.250 03/17/2022     Lab Results   Component Value Date    FERRITIN 169.00 (H) 01/20/2022    IRON 102 01/20/2022    TIBC 264 (L) 01/20/2022    LABIRON 39 01/20/2022    SPZTRKFQ07 437 01/20/2022    FOLATE 16.40 01/20/2022     Lab Results   Component Value Date     (H) 01/20/2022           ASSESSMENT & PLAN:  Yadira Flowers is a very pleasant 66 y.o. female with a history of cervical cancer, breast cancer and malignant melanoma.    1. History of Breast Cancer:  - Ms. Onur Burgos had Stage IA ([id number]) moderately differentiated invasive ductal carcinoma of the right breast diagnosed in October 2015.  - She underwent R lympectomy and SLNBx performed by Dr. Steve Isbell 10-22-15 and then received adjuvant radiation in [place].  - After radiation, she was started on Arimidex which was then switched to Exemestane.  She took Exemestane for almost 5 years. Unfortunately, she developed vaginal bleeding and hematuria related to vaginal atrophy related to hormonal blockade.  Given this, stopped Exemestane.  She is doing much better in this regard since stopping hormonal therapy.  - Mammogram 1-13-22 without cause for concern aside from R axillary LAD.  No palpable breast masses.    2.  Metastatic malignant melanoma:  -  S/p resection of a primary lesion on her back performed by Dr. Catrachito Amaro of Dermatology in [place] in 2004.  -  S/p FNA R axillary LN which was concerning for melanoma.  Excisional biopsy confirmed metastatic malignant melanoma.  - Suspect her lung and liver lesions are also related to her melanoma.  - She has seen Dr. Her and he performed bronchoscopy on 02/23/2022. Pathology was negative for malignant cells.  - Liver aspiration showed abscess which grew Klebsiella, suspect this was a secondary infection but this isn't completely certain at this time.    -  Plan to watch all of  these areas as we proceed with treatment.  -  Sent  excised LN tissue for CARIS testing.  -  PET-CT showed no significant FDG uptake but re-demonstrated abnormalities in the R axilla, RLL cavitary mass, Vargas lung nodules and liver.    -  MRI Brain showed no acute findings in the head/brain.  -  Recommended initial therapy with immunotherapy.  We discussed different options for immunotherapy including single agent PD-L1 treatment or combination with Yervoy.  Dr. Stone recommended combination with Yervoy for better RR, PFS and OS even with increased risk of immune-mediated side effects.  We discussed the initially approved FDA dosing v. Altered dosing with lower dose IPI/higher dosed Nivo.  The latter has been shown to have fewer side effects and is thought to likely have similar efficacy (though trials weren't really powered to definitively show that).  After a lengthy discussion, we decided to proceed with 4 cycles of Ipi 1 mg/kg / Nivo 3 mg/kg q 21d followed by Nivolumab single agent.  We discussed potential risks, benefits and side effects extensively and she would like to proceed.  - She started Yervoy/Opdivo on 02/24/2022 and overall tolerated this very well without any noticeable side effects aside from mild pruritic rash over  chest and bilateral arms.  -  Will continue current therapy.  -  Plan to reimage w/ CT  CAP p C4.    3. Mild Redness/Pruritus of Chest and Bilateral Arms  - Advised patient to apply moisturizing barrier such as Udder cream or Cerave Itch Relief 2-3 x daily.   -  Trial Zyrtec which may also help with her allergic rhiitis. Will also refill Flonase.    Will continue to monitor.    4. Constipation  -  Continue Senna 2 tabs BID. Also advised to use Miralax as needed.     5. Bilateral pulmonary emboli:  -  CTPE protocol 1-20-22  confirmed bilateral pulmonary emboli  -  BLE dopplers negative for DVT.  -  Continue Eliquis 5 mg BID.      6.  H/o Colon polyps:  -  Dr. Maldonado performed c-scope  12-09-20 with discovery of 8 hyperplastic polyps in the distal rectum as well as diverticulosis.  He recommended repeat c-scope after 5 years.    7.  H/o cervical cancer treated with cryotherapy at [facility] in [facility]:  -  Followed up with Dr. Manav Real of gynecology.  She says she had exam including pap smear and then pelvic U/S which was unrevealing.    8. Prophylaxis:  -  Had COVID vaccine x 2 (Pfizer).  Recommended booster dose p done w/ combined immunotherapy. Recommended Evallyn and she is interested.  Will arrange..  Recommended 2021 influenza vaccine.    9. Follow up:  - Proceed with C3 Ipi/Nivo today as planned  - Plan to repeat CT imaging after C4 and follow up with me after.   - Continue use of Cerave lotion for itching  - Trial Zyrtec for pruritis   - Will order Evusheld  - Continue Eliquis  - Refill Flonase      ACO / DWAYNE/Other  Quality measures  -  Yadira Flowers did not receive 2021 flu vaccine. This was recommended, she declined.  -  Yadira Flowers reports a pain score of 0.    -  Current outpatient and discharge medications have been reconciled for the patient.  Reviewed by: Loreto Stone MD         This note was scribed for Loreto Stone MD by Alba Felton RN.    I, Loreto Stone MD, personally performed the services described in this documentation as scribed by the above named individual in my presence, and it is both accurate and complete.  04/07/2022       I spent 30 minutes with Yadira Flowers today.  In the office today, more than 50% of this time was spent face-to-face with her  in counseling / coordination of care, reviewing her medical history and counseling on the current treatment plan.  All questions were answered to her satisfaction      Electronically Signed by:  Loreto Stone MD       CC:     MD Manav Cotto MD Aaron House, MD

## 2022-04-07 ENCOUNTER — LAB (OUTPATIENT)
Dept: ONCOLOGY | Facility: CLINIC | Age: 67
End: 2022-04-07

## 2022-04-07 ENCOUNTER — OFFICE VISIT (OUTPATIENT)
Dept: ONCOLOGY | Facility: CLINIC | Age: 67
End: 2022-04-07

## 2022-04-07 ENCOUNTER — INFUSION (OUTPATIENT)
Dept: ONCOLOGY | Facility: HOSPITAL | Age: 67
End: 2022-04-07

## 2022-04-07 VITALS
DIASTOLIC BLOOD PRESSURE: 85 MMHG | HEART RATE: 89 BPM | BODY MASS INDEX: 30.45 KG/M2 | WEIGHT: 194 LBS | TEMPERATURE: 97.5 F | OXYGEN SATURATION: 98 % | RESPIRATION RATE: 18 BRPM | SYSTOLIC BLOOD PRESSURE: 137 MMHG | HEIGHT: 67 IN

## 2022-04-07 VITALS
OXYGEN SATURATION: 98 % | HEIGHT: 67 IN | SYSTOLIC BLOOD PRESSURE: 137 MMHG | BODY MASS INDEX: 30.45 KG/M2 | TEMPERATURE: 97.5 F | RESPIRATION RATE: 18 BRPM | HEART RATE: 89 BPM | DIASTOLIC BLOOD PRESSURE: 85 MMHG | WEIGHT: 194 LBS

## 2022-04-07 DIAGNOSIS — C78.02 MALIGNANT NEOPLASM METASTATIC TO BOTH LUNGS: ICD-10-CM

## 2022-04-07 DIAGNOSIS — C78.7 METASTASIS TO LIVER: ICD-10-CM

## 2022-04-07 DIAGNOSIS — Z17.0 MALIGNANT NEOPLASM OF RIGHT BREAST IN FEMALE, ESTROGEN RECEPTOR POSITIVE, UNSPECIFIED SITE OF BREAST: ICD-10-CM

## 2022-04-07 DIAGNOSIS — C43.9 METASTATIC MELANOMA: ICD-10-CM

## 2022-04-07 DIAGNOSIS — C78.01 MALIGNANT NEOPLASM METASTATIC TO BOTH LUNGS: ICD-10-CM

## 2022-04-07 DIAGNOSIS — C78.7 METASTASIS TO LIVER: Primary | ICD-10-CM

## 2022-04-07 DIAGNOSIS — C43.59 MALIGNANT MELANOMA OF TORSO EXCLUDING BREAST: ICD-10-CM

## 2022-04-07 DIAGNOSIS — C43.59 MALIGNANT MELANOMA OF TORSO EXCLUDING BREAST: Primary | ICD-10-CM

## 2022-04-07 DIAGNOSIS — Z95.828 PORT-A-CATH IN PLACE: ICD-10-CM

## 2022-04-07 DIAGNOSIS — C50.911 MALIGNANT NEOPLASM OF RIGHT BREAST IN FEMALE, ESTROGEN RECEPTOR POSITIVE, UNSPECIFIED SITE OF BREAST: ICD-10-CM

## 2022-04-07 DIAGNOSIS — D84.9 IMMUNODEFICIENCY: ICD-10-CM

## 2022-04-07 LAB
ALBUMIN SERPL-MCNC: 4.26 G/DL (ref 3.5–5.2)
ALBUMIN/GLOB SERPL: 1.5 G/DL
ALP SERPL-CCNC: 111 U/L (ref 39–117)
ALT SERPL W P-5'-P-CCNC: 28 U/L (ref 1–33)
ANION GAP SERPL CALCULATED.3IONS-SCNC: 14.1 MMOL/L (ref 5–15)
AST SERPL-CCNC: 25 U/L (ref 1–32)
BASOPHILS # BLD AUTO: 0.08 10*3/MM3 (ref 0–0.2)
BASOPHILS NFR BLD AUTO: 1.1 % (ref 0–1.5)
BILIRUB SERPL-MCNC: 0.3 MG/DL (ref 0–1.2)
BUN SERPL-MCNC: 13 MG/DL (ref 8–23)
BUN/CREAT SERPL: 18.8 (ref 7–25)
CALCIUM SPEC-SCNC: 9.7 MG/DL (ref 8.6–10.5)
CHLORIDE SERPL-SCNC: 102 MMOL/L (ref 98–107)
CO2 SERPL-SCNC: 19.9 MMOL/L (ref 22–29)
CREAT SERPL-MCNC: 0.69 MG/DL (ref 0.57–1)
DEPRECATED RDW RBC AUTO: 41.6 FL (ref 37–54)
EGFRCR SERPLBLD CKD-EPI 2021: 96.5 ML/MIN/1.73
EOSINOPHIL # BLD AUTO: 1.73 10*3/MM3 (ref 0–0.4)
EOSINOPHIL NFR BLD AUTO: 22.9 % (ref 0.3–6.2)
ERYTHROCYTE [DISTWIDTH] IN BLOOD BY AUTOMATED COUNT: 12.9 % (ref 12.3–15.4)
GLOBULIN UR ELPH-MCNC: 2.8 GM/DL
GLUCOSE SERPL-MCNC: 76 MG/DL (ref 65–99)
HCT VFR BLD AUTO: 45.6 % (ref 34–46.6)
HGB BLD-MCNC: 14.8 G/DL (ref 12–15.9)
IMM GRANULOCYTES # BLD AUTO: 0.02 10*3/MM3 (ref 0–0.05)
IMM GRANULOCYTES NFR BLD AUTO: 0.3 % (ref 0–0.5)
LYMPHOCYTES # BLD AUTO: 2.52 10*3/MM3 (ref 0.7–3.1)
LYMPHOCYTES NFR BLD AUTO: 33.4 % (ref 19.6–45.3)
MCH RBC QN AUTO: 28.8 PG (ref 26.6–33)
MCHC RBC AUTO-ENTMCNC: 32.5 G/DL (ref 31.5–35.7)
MCV RBC AUTO: 88.9 FL (ref 79–97)
MONOCYTES # BLD AUTO: 0.52 10*3/MM3 (ref 0.1–0.9)
MONOCYTES NFR BLD AUTO: 6.9 % (ref 5–12)
NEUTROPHILS NFR BLD AUTO: 2.68 10*3/MM3 (ref 1.7–7)
NEUTROPHILS NFR BLD AUTO: 35.4 % (ref 42.7–76)
NRBC BLD AUTO-RTO: 0 /100 WBC (ref 0–0.2)
PLATELET # BLD AUTO: 201 10*3/MM3 (ref 140–450)
PMV BLD AUTO: 10.1 FL (ref 6–12)
POTASSIUM SERPL-SCNC: 3.7 MMOL/L (ref 3.5–5.2)
PROT SERPL-MCNC: 7.1 G/DL (ref 6–8.5)
RBC # BLD AUTO: 5.13 10*6/MM3 (ref 3.77–5.28)
SODIUM SERPL-SCNC: 136 MMOL/L (ref 136–145)
T4 FREE SERPL-MCNC: 1.26 NG/DL (ref 0.93–1.7)
TSH SERPL DL<=0.05 MIU/L-ACNC: 2.29 UIU/ML (ref 0.27–4.2)
WBC NRBC COR # BLD: 7.55 10*3/MM3 (ref 3.4–10.8)

## 2022-04-07 PROCEDURE — M0220 HC INJECTION, TIXAGEVIMAB AND CILGAVIMAB: HCPCS | Performed by: INTERNAL MEDICINE

## 2022-04-07 PROCEDURE — 25010000002 NIVOLUMAB 240 MG/24ML SOLUTION 24 ML VIAL: Performed by: INTERNAL MEDICINE

## 2022-04-07 PROCEDURE — 84443 ASSAY THYROID STIM HORMONE: CPT | Performed by: INTERNAL MEDICINE

## 2022-04-07 PROCEDURE — 96413 CHEMO IV INFUSION 1 HR: CPT

## 2022-04-07 PROCEDURE — 25010000002 INJECTION, TIXAGEVIMAB AND CILGAVIMAB,: Performed by: INTERNAL MEDICINE

## 2022-04-07 PROCEDURE — 80053 COMPREHEN METABOLIC PANEL: CPT | Performed by: INTERNAL MEDICINE

## 2022-04-07 PROCEDURE — 85025 COMPLETE CBC W/AUTO DIFF WBC: CPT | Performed by: INTERNAL MEDICINE

## 2022-04-07 PROCEDURE — 84439 ASSAY OF FREE THYROXINE: CPT | Performed by: INTERNAL MEDICINE

## 2022-04-07 PROCEDURE — 96417 CHEMO IV INFUS EACH ADDL SEQ: CPT

## 2022-04-07 PROCEDURE — 82024 ASSAY OF ACTH: CPT | Performed by: INTERNAL MEDICINE

## 2022-04-07 PROCEDURE — 99214 OFFICE O/P EST MOD 30 MIN: CPT | Performed by: INTERNAL MEDICINE

## 2022-04-07 PROCEDURE — 25010000002 HEPARIN LOCK FLUSH PER 10 UNITS: Performed by: INTERNAL MEDICINE

## 2022-04-07 PROCEDURE — 25010000002 IPILIMUMAB 50 MG/10ML SOLUTION 10 ML VIAL: Performed by: INTERNAL MEDICINE

## 2022-04-07 PROCEDURE — 96372 THER/PROPH/DIAG INJ SC/IM: CPT

## 2022-04-07 PROCEDURE — 25010000002 NIVOLUMAB 40 MG/4ML SOLUTION 4 ML VIAL: Performed by: INTERNAL MEDICINE

## 2022-04-07 RX ORDER — FLUTICASONE PROPIONATE 50 MCG
2 SPRAY, SUSPENSION (ML) NASAL DAILY
Qty: 18.2 ML | Refills: 1 | Status: SHIPPED | OUTPATIENT
Start: 2022-04-07 | End: 2022-07-01 | Stop reason: SDUPTHER

## 2022-04-07 RX ORDER — HEPARIN SODIUM (PORCINE) LOCK FLUSH IV SOLN 100 UNIT/ML 100 UNIT/ML
500 SOLUTION INTRAVENOUS AS NEEDED
Status: CANCELLED | OUTPATIENT
Start: 2022-04-28

## 2022-04-07 RX ORDER — SODIUM CHLORIDE 0.9 % (FLUSH) 0.9 %
10 SYRINGE (ML) INJECTION AS NEEDED
Status: DISCONTINUED | OUTPATIENT
Start: 2022-04-07 | End: 2022-04-07 | Stop reason: HOSPADM

## 2022-04-07 RX ORDER — SODIUM CHLORIDE 0.9 % (FLUSH) 0.9 %
20 SYRINGE (ML) INJECTION AS NEEDED
Status: CANCELLED | OUTPATIENT
Start: 2022-04-07

## 2022-04-07 RX ORDER — SODIUM CHLORIDE 9 MG/ML
250 INJECTION, SOLUTION INTRAVENOUS ONCE
Status: COMPLETED | OUTPATIENT
Start: 2022-04-07 | End: 2022-04-07

## 2022-04-07 RX ORDER — HEPARIN SODIUM (PORCINE) LOCK FLUSH IV SOLN 100 UNIT/ML 100 UNIT/ML
300 SOLUTION INTRAVENOUS ONCE
Status: CANCELLED | OUTPATIENT
Start: 2022-04-07

## 2022-04-07 RX ORDER — SODIUM CHLORIDE 0.9 % (FLUSH) 0.9 %
10 SYRINGE (ML) INJECTION AS NEEDED
Status: CANCELLED | OUTPATIENT
Start: 2022-04-28

## 2022-04-07 RX ORDER — HEPARIN SODIUM (PORCINE) LOCK FLUSH IV SOLN 100 UNIT/ML 100 UNIT/ML
500 SOLUTION INTRAVENOUS AS NEEDED
Status: DISCONTINUED | OUTPATIENT
Start: 2022-04-07 | End: 2022-04-07 | Stop reason: HOSPADM

## 2022-04-07 RX ADMIN — Medication 500 UNITS: at 16:25

## 2022-04-07 RX ADMIN — SODIUM CHLORIDE 260 MG: 9 INJECTION, SOLUTION INTRAVENOUS at 15:07

## 2022-04-07 RX ADMIN — SODIUM CHLORIDE 250 ML: 9 INJECTION, SOLUTION INTRAVENOUS at 15:06

## 2022-04-07 RX ADMIN — SODIUM CHLORIDE 85 MG: 9 INJECTION, SOLUTION INTRAVENOUS at 15:38

## 2022-04-07 RX ADMIN — AZD7442 300 MG: KIT at 14:37

## 2022-04-07 RX ADMIN — Medication 10 ML: at 16:24

## 2022-04-08 LAB — ACTH PLAS-MCNC: 9.6 PG/ML (ref 7.2–63.3)

## 2022-04-09 LAB
ACID FAST STN SPEC: NEGATIVE
ACID FAST STN SPEC: NEGATIVE
MYCOBACTERIUM SPEC QL CULT: NEGATIVE
MYCOBACTERIUM SPEC QL CULT: NEGATIVE
SPECIMEN PREPARATION: NORMAL
SPECIMEN PREPARATION: NORMAL

## 2022-04-24 DIAGNOSIS — C43.59 MALIGNANT MELANOMA OF TORSO EXCLUDING BREAST: Primary | ICD-10-CM

## 2022-04-24 DIAGNOSIS — C78.7 METASTASIS TO LIVER: ICD-10-CM

## 2022-04-24 DIAGNOSIS — C78.02 MALIGNANT NEOPLASM METASTATIC TO BOTH LUNGS: ICD-10-CM

## 2022-04-24 DIAGNOSIS — C78.01 MALIGNANT NEOPLASM METASTATIC TO BOTH LUNGS: ICD-10-CM

## 2022-04-24 RX ORDER — SODIUM CHLORIDE 9 MG/ML
250 INJECTION, SOLUTION INTRAVENOUS ONCE
Status: CANCELLED | OUTPATIENT
Start: 2022-04-28

## 2022-04-25 ENCOUNTER — DOCUMENTATION (OUTPATIENT)
Dept: ONCOLOGY | Facility: HOSPITAL | Age: 67
End: 2022-04-25
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
## 2022-04-28 ENCOUNTER — PROCEDURE VISIT (OUTPATIENT)
Dept: ONCOLOGY | Facility: HOSPITAL | Age: 67
End: 2022-04-28

## 2022-04-28 ENCOUNTER — INFUSION (OUTPATIENT)
Dept: ONCOLOGY | Facility: HOSPITAL | Age: 67
End: 2022-04-28

## 2022-04-28 ENCOUNTER — DOCUMENTATION (OUTPATIENT)
Dept: RADIATION ONCOLOGY | Facility: HOSPITAL | Age: 67
End: 2022-04-28

## 2022-04-28 VITALS
HEART RATE: 73 BPM | RESPIRATION RATE: 18 BRPM | WEIGHT: 194 LBS | SYSTOLIC BLOOD PRESSURE: 132 MMHG | OXYGEN SATURATION: 96 % | TEMPERATURE: 98 F | DIASTOLIC BLOOD PRESSURE: 82 MMHG | BODY MASS INDEX: 30.38 KG/M2

## 2022-04-28 DIAGNOSIS — C78.7 METASTASIS TO LIVER: ICD-10-CM

## 2022-04-28 DIAGNOSIS — C78.01 MALIGNANT NEOPLASM METASTATIC TO BOTH LUNGS: ICD-10-CM

## 2022-04-28 DIAGNOSIS — C43.59 MALIGNANT MELANOMA OF TORSO EXCLUDING BREAST: Primary | ICD-10-CM

## 2022-04-28 DIAGNOSIS — Z95.828 PORT-A-CATH IN PLACE: ICD-10-CM

## 2022-04-28 DIAGNOSIS — Z79.899 LONG-TERM USE OF HIGH-RISK MEDICATION: ICD-10-CM

## 2022-04-28 DIAGNOSIS — C78.02 MALIGNANT NEOPLASM METASTATIC TO BOTH LUNGS: ICD-10-CM

## 2022-04-28 LAB
ALBUMIN SERPL-MCNC: 3.93 G/DL (ref 3.5–5.2)
ALBUMIN/GLOB SERPL: 1.5 G/DL
ALP SERPL-CCNC: 121 U/L (ref 39–117)
ALT SERPL W P-5'-P-CCNC: 23 U/L (ref 1–33)
ANION GAP SERPL CALCULATED.3IONS-SCNC: 9.6 MMOL/L (ref 5–15)
AST SERPL-CCNC: 21 U/L (ref 1–32)
BILIRUB SERPL-MCNC: 0.3 MG/DL (ref 0–1.2)
BUN SERPL-MCNC: 11 MG/DL (ref 8–23)
BUN/CREAT SERPL: 16.7 (ref 7–25)
CALCIUM SPEC-SCNC: 9.5 MG/DL (ref 8.6–10.5)
CHLORIDE SERPL-SCNC: 108 MMOL/L (ref 98–107)
CO2 SERPL-SCNC: 25.4 MMOL/L (ref 22–29)
CREAT SERPL-MCNC: 0.66 MG/DL (ref 0.57–1)
DEPRECATED RDW RBC AUTO: 42.6 FL (ref 37–54)
EGFRCR SERPLBLD CKD-EPI 2021: 97.5 ML/MIN/1.73
EOSINOPHIL # BLD MANUAL: 3.3 10*3/MM3 (ref 0–0.4)
EOSINOPHIL NFR BLD MANUAL: 40 % (ref 0.3–6.2)
ERYTHROCYTE [DISTWIDTH] IN BLOOD BY AUTOMATED COUNT: 13.2 % (ref 12.3–15.4)
GLOBULIN UR ELPH-MCNC: 2.6 GM/DL
GLUCOSE SERPL-MCNC: 81 MG/DL (ref 65–99)
HCT VFR BLD AUTO: 40.1 % (ref 34–46.6)
HGB BLD-MCNC: 13.1 G/DL (ref 12–15.9)
LYMPHOCYTES # BLD MANUAL: 2.23 10*3/MM3 (ref 0.7–3.1)
LYMPHOCYTES NFR BLD MANUAL: 4 % (ref 5–12)
MCH RBC QN AUTO: 28.9 PG (ref 26.6–33)
MCHC RBC AUTO-ENTMCNC: 32.7 G/DL (ref 31.5–35.7)
MCV RBC AUTO: 88.3 FL (ref 79–97)
MONOCYTES # BLD: 0.33 10*3/MM3 (ref 0.1–0.9)
NEUTROPHILS # BLD AUTO: 2.39 10*3/MM3 (ref 1.7–7)
NEUTROPHILS NFR BLD MANUAL: 29 % (ref 42.7–76)
PLAT MORPH BLD: NORMAL
PLATELET # BLD AUTO: 188 10*3/MM3 (ref 140–450)
PMV BLD AUTO: 10.4 FL (ref 6–12)
POTASSIUM SERPL-SCNC: 3.8 MMOL/L (ref 3.5–5.2)
PROT SERPL-MCNC: 6.5 G/DL (ref 6–8.5)
RBC # BLD AUTO: 4.54 10*6/MM3 (ref 3.77–5.28)
RBC MORPH BLD: NORMAL
SCAN SLIDE: NORMAL
SODIUM SERPL-SCNC: 143 MMOL/L (ref 136–145)
T4 FREE SERPL-MCNC: 1.22 NG/DL (ref 0.93–1.7)
TSH SERPL DL<=0.05 MIU/L-ACNC: 1.99 UIU/ML (ref 0.27–4.2)
VARIANT LYMPHS NFR BLD MANUAL: 27 % (ref 19.6–45.3)
WBC NRBC COR # BLD: 8.25 10*3/MM3 (ref 3.4–10.8)

## 2022-04-28 PROCEDURE — 85007 BL SMEAR W/DIFF WBC COUNT: CPT

## 2022-04-28 PROCEDURE — 96417 CHEMO IV INFUS EACH ADDL SEQ: CPT

## 2022-04-28 PROCEDURE — 25010000002 HEPARIN LOCK FLUSH PER 10 UNITS: Performed by: INTERNAL MEDICINE

## 2022-04-28 PROCEDURE — 25010000002 NIVOLUMAB 40 MG/4ML SOLUTION 4 ML VIAL: Performed by: INTERNAL MEDICINE

## 2022-04-28 PROCEDURE — 82024 ASSAY OF ACTH: CPT

## 2022-04-28 PROCEDURE — 85025 COMPLETE CBC W/AUTO DIFF WBC: CPT

## 2022-04-28 PROCEDURE — 84439 ASSAY OF FREE THYROXINE: CPT

## 2022-04-28 PROCEDURE — 80053 COMPREHEN METABOLIC PANEL: CPT

## 2022-04-28 PROCEDURE — 96413 CHEMO IV INFUSION 1 HR: CPT

## 2022-04-28 PROCEDURE — 84443 ASSAY THYROID STIM HORMONE: CPT

## 2022-04-28 PROCEDURE — 25010000002 IPILIMUMAB 50 MG/10ML SOLUTION 10 ML VIAL: Performed by: INTERNAL MEDICINE

## 2022-04-28 PROCEDURE — 25010000002 NIVOLUMAB 240 MG/24ML SOLUTION 24 ML VIAL: Performed by: INTERNAL MEDICINE

## 2022-04-28 RX ORDER — HEPARIN SODIUM (PORCINE) LOCK FLUSH IV SOLN 100 UNIT/ML 100 UNIT/ML
300 SOLUTION INTRAVENOUS ONCE
Status: CANCELLED | OUTPATIENT
Start: 2022-05-19

## 2022-04-28 RX ORDER — HEPARIN SODIUM (PORCINE) LOCK FLUSH IV SOLN 100 UNIT/ML 100 UNIT/ML
500 SOLUTION INTRAVENOUS AS NEEDED
Status: CANCELLED | OUTPATIENT
Start: 2022-05-19

## 2022-04-28 RX ORDER — HEPARIN SODIUM (PORCINE) LOCK FLUSH IV SOLN 100 UNIT/ML 100 UNIT/ML
500 SOLUTION INTRAVENOUS AS NEEDED
Status: DISCONTINUED | OUTPATIENT
Start: 2022-04-28 | End: 2022-04-28 | Stop reason: HOSPADM

## 2022-04-28 RX ORDER — SODIUM CHLORIDE 0.9 % (FLUSH) 0.9 %
10 SYRINGE (ML) INJECTION AS NEEDED
Status: CANCELLED | OUTPATIENT
Start: 2022-05-19

## 2022-04-28 RX ORDER — SODIUM CHLORIDE 9 MG/ML
250 INJECTION, SOLUTION INTRAVENOUS ONCE
Status: COMPLETED | OUTPATIENT
Start: 2022-04-28 | End: 2022-04-28

## 2022-04-28 RX ORDER — SODIUM CHLORIDE 0.9 % (FLUSH) 0.9 %
10 SYRINGE (ML) INJECTION AS NEEDED
Status: DISCONTINUED | OUTPATIENT
Start: 2022-04-28 | End: 2022-04-28 | Stop reason: HOSPADM

## 2022-04-28 RX ORDER — SODIUM CHLORIDE 0.9 % (FLUSH) 0.9 %
20 SYRINGE (ML) INJECTION AS NEEDED
Status: CANCELLED | OUTPATIENT
Start: 2022-05-19

## 2022-04-28 RX ADMIN — SODIUM CHLORIDE 85 MG: 9 INJECTION, SOLUTION INTRAVENOUS at 15:49

## 2022-04-28 RX ADMIN — SODIUM CHLORIDE 250 ML: 9 INJECTION, SOLUTION INTRAVENOUS at 15:16

## 2022-04-28 RX ADMIN — Medication 500 UNITS: at 16:35

## 2022-04-28 RX ADMIN — Medication 10 ML: at 16:35

## 2022-04-28 RX ADMIN — SODIUM CHLORIDE 260 MG: 9 INJECTION, SOLUTION INTRAVENOUS at 15:16

## 2022-04-29 LAB — ACTH PLAS-MCNC: 23.6 PG/ML (ref 7.2–63.3)

## 2022-04-29 NOTE — PROGRESS NOTES
SS met with patient this date in chemo suite.  SS provided patient with phone number for [employer] [phone] per Bernadette Vallecillo to contact regarding virtual support group.  SS printed calendar for May events.  [email or web address] states that support groups are every Tuesday.  SS will follow.

## 2022-05-03 ENCOUNTER — HOSPITAL ENCOUNTER (OUTPATIENT)
Dept: CT IMAGING | Facility: HOSPITAL | Age: 67
Discharge: HOME | End: 2022-05-03

## 2022-05-03 DIAGNOSIS — C78.7 METASTASIS TO LIVER: ICD-10-CM

## 2022-05-03 DIAGNOSIS — Z17.0 MALIGNANT NEOPLASM OF RIGHT BREAST IN FEMALE, ESTROGEN RECEPTOR POSITIVE, UNSPECIFIED SITE OF BREAST: ICD-10-CM

## 2022-05-03 DIAGNOSIS — C78.02 MALIGNANT NEOPLASM METASTATIC TO BOTH LUNGS: ICD-10-CM

## 2022-05-03 DIAGNOSIS — C43.59 MALIGNANT MELANOMA OF TORSO EXCLUDING BREAST: ICD-10-CM

## 2022-05-03 DIAGNOSIS — C43.9 METASTATIC MELANOMA: ICD-10-CM

## 2022-05-03 DIAGNOSIS — C78.01 MALIGNANT NEOPLASM METASTATIC TO BOTH LUNGS: ICD-10-CM

## 2022-05-03 DIAGNOSIS — C50.911 MALIGNANT NEOPLASM OF RIGHT BREAST IN FEMALE, ESTROGEN RECEPTOR POSITIVE, UNSPECIFIED SITE OF BREAST: ICD-10-CM

## 2022-05-03 PROCEDURE — 25010000002 IOPAMIDOL 61 % SOLUTION: Performed by: INTERNAL MEDICINE

## 2022-05-03 PROCEDURE — 71260 CT THORAX DX C+: CPT | Performed by: RADIOLOGY

## 2022-05-03 PROCEDURE — 74177 CT ABD & PELVIS W/CONTRAST: CPT

## 2022-05-03 PROCEDURE — 74177 CT ABD & PELVIS W/CONTRAST: CPT | Performed by: RADIOLOGY

## 2022-05-03 PROCEDURE — 71260 CT THORAX DX C+: CPT

## 2022-05-03 RX ADMIN — IOPAMIDOL 82 ML: 612 INJECTION, SOLUTION INTRAVENOUS at 13:26

## 2022-05-09 ENCOUNTER — OFFICE VISIT (OUTPATIENT)
Dept: PULMONOLOGY | Facility: CLINIC | Age: 67
End: 2022-05-09

## 2022-05-09 VITALS
SYSTOLIC BLOOD PRESSURE: 136 MMHG | BODY MASS INDEX: 30.13 KG/M2 | DIASTOLIC BLOOD PRESSURE: 88 MMHG | HEIGHT: 67 IN | HEART RATE: 78 BPM | OXYGEN SATURATION: 99 % | TEMPERATURE: 97.2 F | WEIGHT: 192 LBS

## 2022-05-09 DIAGNOSIS — J98.4 CAVITARY LESION OF LUNG: ICD-10-CM

## 2022-05-09 DIAGNOSIS — I26.94 MULTIPLE SUBSEGMENTAL PULMONARY EMBOLI WITHOUT ACUTE COR PULMONALE: Primary | ICD-10-CM

## 2022-05-09 DIAGNOSIS — C80.1 CANCER: ICD-10-CM

## 2022-05-09 DIAGNOSIS — R91.8 MULTIPLE PULMONARY NODULES: ICD-10-CM

## 2022-05-09 PROCEDURE — 99214 OFFICE O/P EST MOD 30 MIN: CPT | Performed by: PHYSICIAN ASSISTANT

## 2022-05-09 NOTE — PROGRESS NOTES
"NAME: Yadira Flowers    : 1955    DATE:  5/10/2022    DIAGNOSIS:   1.  Stage IA ([id number]) moderately differentiated invasive ductal carcinoma of the right breast diagnosed in 2015.    2.  H/o malignant Melanoma on her Back    3.  H/o cervical cancer treated with cryotherapy at [facility] in     4.  Recurrent / metastatic malignant Melanoma  -  R axillary LN bx 22.    -  She has R axillary LN mass (2.47x2.16), Solitary cavitary R lobe liver lesion, cavitary RLL lung mass,  and bilateral parenchymal lung nodules      TREATMENT HISTORY:   1.           CHIEF COMPLAINT:  Follow up of metastatic melanoma    HISTORY OF PRESENT ILLNESS:   Yadira Flowers is a very pleasant 66 y.o. female who was referred by Dr. Dilcia Pedro for evaluation and treatment of breast cancer. She was living in [place] in 2015 when her dog brought attention to and \"found\" an abnormal lump in her right breast.  She was treated by Dr. Steve Isbell and underwent R lumpectomy with SLNBx on 10-22-15 as below.  She then had what sounds like accelerated partial breast irradiation.  She was then started on Arimidex which was later switched to Exemestane for a better side effect profile.  She took this for about 5 years, stopping a couple of weeks ago.  She stopped taking Exemestane because she developed some vaginal bleeding and hematuria.  This was evaluated by her PCP and gynecologist and felt to be due to vaginal atrophy.  She was prescribed a hormone pill which she hasn't yet started and was referred here to discuss things further.      Aside from bleeding which has been uncomfortable and distressing for her, Ms. Onur Burgos has generally been well.  The last year has been hard on her emotionally with the loss of her  and then her dog, her home and her insurance, but she is coping well and has good family support in [place].  She says she gained weight with her breast cancer treatment and gained weight " when her  .  She has been working now to lose weight and has lost 20 lbs over the last 2 months with dieting.  She denies chest pain or shortness of breath.  She complains of some RLQ cramping pain and isn't sure what is causing that.  She denies difficulty moving her bowels.  No blood in her stool that she is aware of.  She has had vaginal bleeding and hematuria as above.        INTERVAL HISTORY:  Ms. Burgos presents today for follow up of metastatic melanoma. She completed 4 Cycles of Ipi/Nivo which she has tolerated quite well.  She had f/u imaging and is here today to discuss results and plan for treatment.        PAST MEDICAL HISTORY:  Past Medical History:   Diagnosis Date   • Back pain    • Basal cell carcinoma (BCC) in situ of skin     Dr. Onur Steven Derm   • Breast cancer (HCC)        • Cancer (HCC)     breast, cervical, melanoma   • Cervical cancer (HCC)     Diagnosed in .  Treated by repeated local intervention and ultimately received cryotherapy at [facility] with resolution.   • Elbow fracture    • Foot fracture    • Headache    • Hypertension     TAKEN OFF MEDS   • Melanoma (HCC)     on her back  - treated by Dermatologist Catrachito Amaro in [place], [place] with what sounds like wide local excision.    • Pulmonary embolism (HCC)    • Sinusitis        PAST SURGICAL HISTORY:  Past Surgical History:   Procedure Laterality Date   • AXILLARY LYMPH NODE BIOPSY/EXCISION Right 2022    Procedure: AXILLARY LYMPH NODE BIOPSY/EXCISION;  Surgeon: Dianelys Cummings MD;  Location: [facility] OR;  Service: General;  Laterality: Right;   • BREAST LUMPECTOMY Right    • BREAST SURGERY      Secondary to cancer   • BRONCHOSCOPY Right 2022    Procedure: BRONCHOSCOPY WITH ENDOBRONCHIAL ULTRASOUND;  Surgeon: Chris Her MD;  Location: [facility] OR;  Service: Pulmonary;  Laterality: Right;   • COLONOSCOPY N/A 2020    Procedure: COLONOSCOPY;  Surgeon: Mohsen Maldonado MD;  Location:   [facility] OR;  Service: Gastroenterology;  Laterality: N/A;   • ELBOW FUSION      left   • FOOT SURGERY Right 2014    Broken foot   • HAND SURGERY  2020    right   • RHINOPLASTY     • SKIN CANCER EXCISION      malignant melanoma from back    • US GUIDED LYMPH NODE BIOPSY  2022   • VENOUS ACCESS DEVICE (PORT) INSERTION N/A 2022    Procedure: INSERTION VENOUS ACCESS DEVICE left or right;  Surgeon: Dianelys Cummings MD;  Location:  [facility] OR;  Service: General;  Laterality: N/A;       FAMILY HISTORY:  Family History   Problem Relation Age of Onset   • Other Mother         blood clots   • Hypertension Mother    • Ulcers Father    • Hypertension Sister    • Other Sister         Multiple Sclerosis   • Cancer Paternal Aunt    • Stroke Maternal Grandmother    • Alcohol abuse Paternal Grandmother    • Hypertension Sister    • Multiple sclerosis Sister    • Hypertension Sister    • Coronary artery disease Paternal Grandfather    • Cancer Maternal Aunt 30        colon   • Breast cancer Neg Hx    Many paternal aunts  with malignancy of various types. Many cousins on that side of the family have had cancer as well.  One  of colon cancer in her 30s.  One had a brain tumor.    SOCIAL HISTORY:  Social History     Socioeconomic History   • Marital status:    Tobacco Use   • Smoking status: Never Smoker   • Smokeless tobacco: Never Used   Vaping Use   • Vaping Use: Never used   Substance and Sexual Activity   • Alcohol use: Not Currently   • Drug use: Never   • Sexual activity: Not Currently     Birth control/protection: Post-menopausal         REVIEW OF SYSTEMS:   A comprehensive 14 point review of systems was performed.  Significant findings as mentioned above.  All other systems reviewed and are negative.      MEDICATIONS:  The current medication list was reviewed in the EMR    Current Outpatient Medications:   •  apixaban (ELIQUIS) 5 MG tablet tablet, Take 1 tablet by mouth 2 (Two) Times a Day., Disp:  60 tablet, Rfl: 5  •  fluticasone (Flonase) 50 MCG/ACT nasal spray, 2 sprays into the nostril(s) as directed by provider Daily., Disp: 18.2 mL, Rfl: 1  •  lidocaine-prilocaine (EMLA) 2.5-2.5 % cream, Apply to port a cath ~30 min -1 hour prior to chemotherapy, Disp: 30 g, Rfl: 3  •  ondansetron ODT (Zofran ODT) 8 MG disintegrating tablet, Place 1 tablet on the tongue Every 8 (Eight) Hours As Needed for Nausea or Vomiting., Disp: 30 tablet, Rfl: 5  •  prochlorperazine (COMPAZINE) 10 MG tablet, TAKE 1 TABLET BY MOUTH EVERY 6 HOURS AS NEEDED FOR NAUSEA, Disp: 360 tablet, Rfl: 3  •  sennosides-docusate (senna-docusate sodium) 8.6-50 MG per tablet, Take 2 tablets by mouth 2 (Two) Times a Day., Disp: 120 tablet, Rfl: 2    ALLERGIES:    No Known Allergies    PHYSICAL EXAM:  Vitals:    05/10/22 1501   BP: 131/74   Pulse: 89   Resp: 18   Temp: 97.5 °F (36.4 °C)   SpO2: 98%     Pain Score    05/10/22 1501   PainSc: 0-No pain     ECOG score: 0   General:  Awake, alert and oriented, appears well. In good spirits  HEENT:  Pupils are equal, round and reactive to light and accommodation, Extra-ocular movements full, Oropharyx clear, mucous membranes moist  Neck:  No JVD, thyromegaly or lymphadenopathy  CV:  Regular rate and rhythm, no murmurs, rubs or gallops  Resp:  Lungs are clear to auscultation bilaterally, no crackles  Breast: Deferred today. Previously: S/p R lumpectomy. There is a somewhat firm area at the site of surgery but there are no palpable masses.  She is s/p excision of R axillary LN, with some post-surgical swelling.  Left breast is without mass lesions.  No L axillary adenopathy.  Abd:  Soft, non-tender, non-distended, bowel sounds present, no organomegaly or masses.    Ext:  No clubbing, cyanosis or edema.    Lymph:  No cervical, supraclavicular, axillary adenopathy  Neuro:  MS as above, CN II-XII intact, grossly non-focal  exam          PATHOLOGY:  10-22-15            12-16-21          01-13-22          ENDOSCOPY:  Colonoscopy 12-09-20     Findings: 8 hyperplastic appearing polyps of the distal rectum, diverticulosis moderately throughout the sigmoid colon      IMAGING:  CTCAP 12-15-20  On the lung windows there are several calcified  granulomas in the lungs. No noncalcified pulmonary parenchymal lung  nodules were identified. On the soft tissue windows there were no  enlarged lymph nodes in the supraclavicular or axillary regions. No  mediastinal or hilar lymphadenopathy was seen. The heart was not  enlarged. There were no pericardial effusions.     IMPRESSION:  No CT findings of metastatic disease in the chest. There are  calcified granulomas in the lungs.     CT FINDINGS: The liver and spleen were normal in size and shape. The  gallbladder contains several stones. The wall was not thickened. The  bile ducts in the liver are not dilated. There is nothing seen to  suggest metastatic disease in the liver. The pancreas shows no evidence  of mass or inflammation. No adrenal or renal lesions are demonstrated.  The aorta is normal in caliber. There were no enlarged lymph nodes in  the retroperitoneum or in the mesentery. The bowel shows no evidence of  obstruction. In the pelvis there were no masses or fluid collections.  There were no ventral hernias.     IMPRESSION:  No CT findings of metastatic disease in the abdomen or  pelvis. This study does demonstrate several stones in the lumen of the  gallbladder.         Bilateral diagnostic mammogram 01-31-21  FINDINGS: There are scattered areas of fibroglandular density.     The bilateral fibroglandular pattern does not appear significantly  changed compared to the exam from 2020. This includes postoperative  changes in the right 12:00 region. Mild skin thickening is noted  involving the right breast which is likely treatment related. A few  bilateral scattered calcifications are noted.      IMPRESSION:  Incomplete examination as comparison with older outside  prior mammograms is needed.        BI-RADS CATEGORY:   0, INCOMPLETE:  Need prior mammograms for comparison        RECOMMENDATION:  We will attempt to obtain older outside prior  mammograms for comparison.      CTCAP 12-12-21  FINDINGS: Lack of IV contrast hinders parenchymal assessment of the mediastinum, liver, spleen, pancreas, adrenal glands and kidneys.         Significant infiltrate seen in the right lower lobe with interstitial component. There are also lung nodules present in the right lung. Index nodule in the right lung base medially is 5 mm maximal diameter. Other smaller nodules seen throughout the right  lung. Tiny 3 mm lingular nodule seen in series 3 image 30. A benign calcified granuloma seen in the left lower lobe but a noncalcified indeterminant  5 mm left lower lobe nodule seen, series 3 image 42.     Nonenlarged mediastinal adenopathy seen. However there is an enlarged right axillary 1.8 x 2.6 cm lymph node partially seen.     Worrisome hepatic metastasis or primary hepatic lesion seen measuring 4.4 x 4.1 cm in liver segment 8. Margins are ill-defined. Abscess could have a similar appearance. Suggestion of subtle gallstones. Nonobstructing 2 mm right renal calculus seen. No  obstructive uropathy. Gaseous distention of the esophagus and hiatal hernia present consistent with GERD. No enteric contrast but no gross evidence of bowel obstruction.   There is no gross free air, free fluid or focal inflammatory change.  Uterus and  adnexa are not enlarged. Osseous structures are grossly intact.     IMPRESSION:  Constellation of findings with significant consolidation in the right lower lobe, enlarged right axillary lymph node, indeterminate bilateral lung nodules and ill-defined hepatic lesion concerning for underlying malignancy in this patient .     FINDINGS: Lack of IV contrast hinders parenchymal assessment of the mediastinum,  liver, spleen, pancreas, adrenal glands and kidneys.         Significant infiltrate seen in the right lower lobe with interstitial component. There are also lung nodules present in the right lung. Index nodule in the right lung base medially is 5 mm maximal diameter. Other smaller nodules seen throughout the right  lung. Tiny 3 mm lingular nodule seen in series 3 image 30. A benign calcified granuloma seen in the left lower lobe but a noncalcified indeterminant  5 mm left lower lobe nodule seen, series 3 image 42.     Nonenlarged mediastinal adenopathy seen. However there is an enlarged right axillary 1.8 x 2.6 cm lymph node partially seen.     Worrisome hepatic metastasis or primary hepatic lesion seen measuring 4.4 x 4.1 cm in liver segment 8. Margins are ill-defined. Abscess could have a similar appearance. Suggestion of subtle gallstones. Nonobstructing 2 mm right renal calculus seen. No  obstructive uropathy. Gaseous distention of the esophagus and hiatal hernia present consistent with GERD. No enteric contrast but no gross evidence of bowel obstruction.   There is no gross free air, free fluid or focal inflammatory change.  Uterus and  adnexa are not enlarged. Osseous structures are grossly intact.     IMPRESSION:  Constellation of findings with significant consolidation in the right lower lobe, enlarged right axillary lymph node, indeterminate bilateral lung nodules and ill-defined hepatic lesion concerning for underlying malignancy in this patient .       US Abdomen Complete 12-12-21  FINDINGS:  The visualized portions of the pancreas, IVC and aorta appear normal.        The liver is moderately coarsened in echotexture. Ill-defined hypodensity in the liver measuring 3 x 3.2 x 2.6 cm corresponds to recent CT. Unclear if this is a malignant lesion. Abscesses within the differential but prior CT was performed without any  contrast.  The common bile duct is not dilated.. Gallbladder wall is thickened at 3.4 mm  with trace pericholecystic fluid and subtle hyperemia. Gallstones and shadowing present.     The kidneys are normal in size and echogenicity. There is no   hydronephrosis or focal solid lesion.  The spleen is normal in size and echotexture.     IMPRESSION:  1. Nonspecific gallbladder wall thickening and questionable pericholecystic fluid. Gallstones are better seen on CT than ultrasound.  2.  Heterogeneous liver with lesion in the liver as seen on CT. Ultrasound is not helpful in determining etiology. This could be an abscess but given the other findings on CT,  malignancy is not excluded.      CTCAP 12-15-21  FINDINGS:    LUNGS:  Increased density of the now right lower lobe mixed  groundglass and more solid-appearing consolidation with internal  cavitary component identified. Tiny right and left lung pulmonary  nodules. Grossly stable.    PLEURAL SPACE:  Small bilateral pleural effusions.  No pneumothorax.    HEART:  Unremarkable.  No cardiomegaly.  No significant pericardial  effusion.    BONES/JOINTS:  Unremarkable.  No acute fracture.  No dislocation.    SOFT TISSUES:  Unremarkable.    VASCULATURE:  Unremarkable.  No thoracic aortic aneurysm.    LYMPH NODES:  Unremarkable.  No enlarged lymph nodes.     IMPRESSION:  1.  Increased density of the now right lower lobe mixed groundglass and  more solid-appearing consolidation with internal cavitary component  identified. Tiny right and left lung pulmonary nodules. Grossly stable.  2.  Small bilateral pleural effusions.    FINDINGS:    LUNG BASES:  Unremarkable.  No mass.  No consolidation.      ABDOMEN:    LIVER:  Right lobe of liver lesion is again identified now measuring  about 4.7 cm and was about 4.7 cm.    GALLBLADDER AND BILE DUCTS:  Gallstones noted in the gallbladder.  No  ductal dilation.    PANCREAS:  Unremarkable.  No mass.  No ductal dilation.    SPLEEN:  Unremarkable.  No splenomegaly.    ADRENALS:  Unremarkable.  No mass.    KIDNEYS AND URETERS:   Unremarkable.  No solid mass.  No  hydronephrosis.    STOMACH AND BOWEL:  Unremarkable.  No obstruction.  No mucosal  thickening.      PELVIS:    APPENDIX:  No findings to suggest acute appendicitis.    BLADDER:  Unremarkable.  No mass.    REPRODUCTIVE:  Unremarkable as visualized.      ABDOMEN and PELVIS:    INTRAPERITONEAL SPACE:  Unremarkable.  No free air.  No significant  fluid collection.    BONES/JOINTS:  No acute fracture.  No dislocation.    SOFT TISSUES:  Unremarkable.    VASCULATURE:  Unremarkable.  No abdominal aortic aneurysm.    LYMPH NODES:  Unremarkable.  No enlarged lymph nodes.     IMPRESSION:    Right lobe of liver lesion is again identified now measuring about 4.7  cm and was about 4.7 cm.      NM Bone Scan Whole Body 12-15-21  FINDINGS:    SKULL/FACIAL BONES:  No focal increased or decreased uptake.    SPINE:  Unremarkable.  No abnormal increased or decreased uptake.    RIBS:  Unremarkable.  No abnormal uptake.    LONG BONES:  Unremarkable.  No abnormal uptake.    PELVIS:  Unremarkable.  No focal increased or decreased uptake.    JOINTS:  Unremarkable.  No focal increased or decreased uptake.    SOFT TISSUES:  Unremarkable.    RENAL/BLADDER:  Within normal limits.     IMPRESSION:    Normal bone scan.      CT Abdomen With Contrast  12-18-21  FINDINGS:  Partially imaged thick-walled cavitary lesion in the right lower lobe with right basilar atelectasis/infiltrate and small right pleural effusion. There is also left basilar atelectasis/infiltrate and trace left pleural effusion. Multiple noncalcified  pulmonary nodules are scattered throughout the visualized lower lung fields, concerning for pulmonary metastatic disease.     Ill-defined right hepatic mass again noted. The spleen, pancreas, and both adrenal glands are within expected limits. Cholelithiasis. Punctate nonobstructing right intrarenal stone. Tiny left renal cyst. Kidneys are otherwise unremarkable without  hydronephrosis. Abdominal  aorta normal in course and caliber without dissection. No pathologic retroperitoneal or mesenteric lymphadenopathy by size criteria. Visualized small bowel and colon are unremarkable. No bowel obstruction. No free fluid or free  air.     No aggressive osseous lesions.     IMPRESSION:     1. Ill-defined right hepatic mass again noted. This has been previously biopsied and correlation with pathology results is recommended.  2. Cholelithiasis.  3. Punctate nonobstructing right intrarenal stone.  4. No threshold abdominal lymphadenopathy or other suspicious abdominal masses.  5. Partially imaged thick-walled cavitary lesion in the right lower lobe. This may be of infectious or neoplastic etiology and continued follow-up is recommended.  6. Small bilateral pleural effusions with bibasilar atelectasis/infiltrate, worse on the right than left.  7. Numerous small noncalcified pulmonary nodules throughout the visualized lower lung fields, concerning for pulmonary metastatic disease.       Mammo Diagnostic Digital Tomosynthesis Bilateral With CAD w/ US Axilla Right 01-13-22  FINDINGS:  There are scattered areas of fibroglandular density.     Incompletely imaged is a very prominent axillary lymph node. The  bilateral fibroglandular pattern itself is stable in appearance  including postoperative changes in the superior aspect of the right  breast. There are stable bilateral calcifications. Mild skin thickening  is again noted involving the right breast which is likely treatment  related.     ULTRASOUND: Ultrasound evaluation of the right axilla demonstrates 2  markedly enlarged lymph nodes the largest of which measures 3.2 cm in  size and has no demonstrable fatty hilum. Recommend ultrasound-guided  biopsy.      IMPRESSION:  1. Stable mammographic appearance of the left breast with no findings  suspicious for malignancy.        2. Marked right axillary lymphadenopathy. Recommend ultrasound-guided  biopsy. The fibroglandular  pattern of the right breast is however  stable.        BI-RADS CATEGORY:  4, SUSPICIOUS        RECOMMENDATION:  Recommend ultrasound-guided biopsy of the dominant  abnormal appearing right axillary lymph node.      DEXA Bone Density Axial 01-13-22  IMPRESSION:  Impression:  1. According to the World Health Organization definitions of  osteoporosis based on bone density, this patient's bone mineral density  is compatible with osteoporosis and the fracture risk is high.      Mammo Diagnostic Digital Tomosynthesis Bilateral With CAD w/ US Axilla Right 01-13-22  FINDINGS:  There are scattered areas of fibroglandular density.     Incompletely imaged is a very prominent axillary lymph node. The  bilateral fibroglandular pattern itself is stable in appearance  including postoperative changes in the superior aspect of the right  breast. There are stable bilateral calcifications. Mild skin thickening  is again noted involving the right breast which is likely treatment  related.     ULTRASOUND: Ultrasound evaluation of the right axilla demonstrates 2  markedly enlarged lymph nodes the largest of which measures 3.2 cm in  size and has no demonstrable fatty hilum. Recommend ultrasound-guided  biopsy.      IMPRESSION:  1. Stable mammographic appearance of the left breast with no findings  suspicious for malignancy.        2. Marked right axillary lymphadenopathy. Recommend ultrasound-guided  biopsy. The fibroglandular pattern of the right breast is however  stable.        BI-RADS CATEGORY:  4, SUSPICIOUS        RECOMMENDATION:  Recommend ultrasound-guided biopsy of the dominant  abnormal appearing right axillary lymph node.      CT Chest With Contrast Diagnostic 01-18-22  FINDINGS:     LUNGS: Solitary mass in the right lower lobe with cavitation.  Significantly smaller today than on the prior study. 6 mm solid nodule  in the right lung posteriorly on image 30 of the axial series. Other  parenchymal nodules are seen in both lungs  and are similar to the  previous study. These are concerning for metastatic nodules.     HEART: Unremarkable.     MEDIASTINUM: No masses. No enlarged lymph nodes.  No fluid collections.     PLEURA: No pleural effusion. No pleural mass or abnormal calcification.  No pneumothorax.     VASCULATURE: No evidence of aneurysm. Filling defect in the right  pulmonary artery and possibly left lower lobe pulmonary artery. This  study was not performed to evaluate for pulmonary embolus, but the  findings are concerning for bilateral pulmonary emboli.     BONES: No acute bony abnormality.     VISUALIZED UPPER ABDOMEN:Please see the CT report for the abdomen and  pelvis.     Other: Right axillary soft tissue mass is present and shows slight  interval growth. It measures 2.47 x 2.16 cm today and previously at the  same level measured approximately 2.7 x 1.89 cm.     IMPRESSION:  1. Study was not performed as a PE protocol, but there appear to be  filling defects in pulmonary arteries bilaterally concerning for  pulmonary emboli.  2. Interval decrease in size of cavitary right lower lobe mass.  3. Stable parenchymal nodules bilaterally.  4. Very slight interval growth of right axillary soft tissue mass.     Results are being relayed to the referring physician.      US Liver 01-18-22  FINDINGS: Sonographic imaging of the liver does not show the mass  previously identified in the liver. I would suggest a follow-up CT scan  for better delineation.     Hepatic flow was hepatopedal.     There is shadowing from the gallbladder fossa.     IMPRESSION:  1. Previously identified mass is not seen on this exam in the liver.  Consider follow-up CT.  2. Shadowing from the gallbladder fossa. In the absence of  cholecystectomy, appearance is concerning for cholelithiasis.      US Venous Doppler Lower Extremity Bilateral (duplex) 01-20-22  FINDINGS:   There is patent spontaneous flow from the common femoral vein through  the posterior tibial  veins.  There was no internal clot or area of noncompressibility.  Normal augmentation was elicited where applicable.     IMPRESSION:  No DVT in the lower extremities on today's exam.       CT Chest Pulmonary Embolism 01-20-22  FINDINGS: Today's study demonstrates opacification of the central  pulmonary vessels.  There are filling defects in the pulmonary arteries bilaterally. First  order arteries. This is most compatible with bilateral pulmonary  emboli..     Soft tissue mass in the right lower lobe with somewhat cavitary center  is again noted and unchanged..     There is no mediastinal lymph node enlargement     No pericardial or pleural effusion.        IMPRESSION:  1. Bilateral pulmonary emboli.  2. Parenchymal nodules bilaterally with dominant mass in the right lower  Lobe..      NM PET/CT Skull Base to Mid Thigh 01-28-22  FINDINGS:      HEAD/NECK:  Area of uptake in the posterior right paraspinal space and to lesser  degree the left paraspinal space appears related to muscle uptake.  No FDG hypermetabolic neck adenopathy.  No FDG hypermetabolic masses.     CHEST:   Numerous bilateral pulmonary nodules appear stable from the previous  chest CT and show no FDG hypermetabolism. This is likely due to nodules  being below size threshold for PET sensitivity.  Cavitary lung mass in the right lung localizing to the superior segment  of the lower lobe shows FDG hypermetabolism with maximum SUV: 2.4. This  is at the lower range for malignancy.  Enlarged right lower axillary region lymph node which is 3.0 cm shows  mild FDG hypermetabolism that is approximately with maximum SUV: 2.4.  This is at the lower range for malignancy.  Low-dose CT demonstrating no change in additional scattered pulmonary  nodules from the previous scan. Coronary artery calcifications are  stable.     ABDOMEN/PELVIS:   Faint area of low attenuation involving the right lobe of liver is  poorly evaluated with low-dose noncontrast CT but shows no  focal FDG  hypermetabolism.  Physiologic FDG hypermetabolism seen throughout the solid abdominal  organs.  Physiologic FDG hypermetabolism seen throughout the GI tract.  Physiologic FDG hypermetabolism seen throughout the mesentery,  retroperitoneum and pelvis.  Low dose CT redemonstrates nonobstructing kidney stones. Cholelithiasis  again noted.     BONES: Nonspecific FDG tracer activity. No intense areas of tracer  activity noted.     IMPRESSION:     1. Cavitary mass in the right lung that shows very low-grade FDG  hypermetabolism which is at the lower range for malignancy with maximum  SUV: 2.4.  2. Enlarged right axillary region lymph node with low-grade FDG  hypermetabolism also at the lower range for malignancy with maximum SUV:  2.4.  3. Numerous bilateral pulmonary nodules compatible with metastatic  disease but show no significant FDG hypermetabolism. This may be in part  due to size of nodules being below PET sensitivity threshold.  4. Faint area of low-attenuation right lobe liver poorly evaluated with  noncontrast low-dose CT that shows no obvious intense FDG  hypermetabolism.  5. Other nonacute findings as above on low dose CT.        CTCAP 05-03-22  FINDINGS:    Lungs:  Cavitary mass in the right lower lobe has nearly resolved.  A  right lower lobe pulmonary nodule is 7.4 mm and was previously 7.5 mm.  No change. Image #[id number].  A left lower lobe pulmonary nodule, image #52, is  9.8 mm and was previously 9.8 mm. No change.    Pleural space:  Unremarkable.  No pneumothorax.  No significant  effusion.    Heart:  Unremarkable.  No cardiomegaly.  No significant pericardial  effusion.  No significant coronary artery calcifications.    Bones/joints:  The bony structures appear stable. No acute bony  findings identified.  No dislocation.    Soft tissues:  Unremarkable.    Vasculature:  Please note, pulmonary emboli noted on the prior exam  are not well evaluated. The previously described filling defects of  the  main pulmonary arteries are not evident.  No thoracic aortic aneurysm.    Lymph nodes:  No mediastinal adenopathy by CT size criteria.    Gallbladder and bile ducts:  Cholelithiasis noted.    Other findings:  Other smaller nodules appear stable in size and  configuration.  No new nodules identified.     IMPRESSION:  1.  Near-complete resolution of previously described cavitary mass in  the right lower lobe periphery now only with linear scarring noted.  2.  Index nodules of both lungs are stable from previous exam with no  size increase identified. No new nodules are noted.  3.  Cholelithiasis.  4.  Due to timing of contrast on this study, assessment of pulmonary  emboli not performed. There appears to be significant improvement in  clot burden however in the more central vessels.    FINDINGS:    Lung bases:  Pulmonary nodules of the lung bases.    Mediastinum:  Small hiatal hernia.      ABDOMEN:    Liver:  Low attenuation lesions of the right lobe liver appear of  decreased prominence from the previous exam. Segment 8 liver lesion is  1.1 cm. Segment 7 liver lesion is approximately 0.8 cm.    Gallbladder and bile ducts:  Cholelithiasis.  No ductal dilation.    Pancreas:  Unremarkable.  No mass.  No ductal dilation.    Spleen:  Unremarkable.  No splenomegaly.    Adrenals:  No adrenal masses identified.    Kidneys and ureters:  Unremarkable.  No solid mass.  No  hydronephrosis.    Stomach and bowel:  Sigmoid diverticulosis without evidence of  diverticulitis.  No obstruction.      PELVIS:    Appendix:  Normal appendix.    Bladder:  Unremarkable.  No mass.    Reproductive:  Unremarkable as visualized.      ABDOMEN and PELVIS:    Intraperitoneal space:  Unremarkable.  No free air.  No significant  fluid collection.    Bones/joints:  No acute fracture.  No dislocation.    Soft tissues:  Unremarkable.    Vasculature:  Unremarkable.  No abdominal aortic aneurysm.    Lymph nodes:  No iliac or retroperitoneal  adenopathy.     IMPRESSION:  1.  Small low-attenuation lesions of the right lobe liver appears  decreased prominence of the previous exam. No new no focal liver lesions  identified  2.  Cholelithiasis.  3.  Otherwise stable appearance of the abdomen and pelvis.      RECENT LABS:  Lab Results   Component Value Date    WBC 8.25 04/28/2022    HGB 13.1 04/28/2022    HCT 40.1 04/28/2022    MCV 88.3 04/28/2022    RDW 13.2 04/28/2022     04/28/2022    NEUTRORELPCT 35.4 (L) 04/07/2022    LYMPHORELPCT 33.4 04/07/2022    MONORELPCT 6.9 04/07/2022    EOSRELPCT 22.9 (H) 04/07/2022    BASORELPCT 1.1 04/07/2022    NEUTROABS 2.39 04/28/2022    LYMPHSABS 2.52 04/07/2022       Lab Results   Component Value Date     04/28/2022    K 3.8 04/28/2022    CO2 25.4 04/28/2022     (H) 04/28/2022    BUN 11 04/28/2022    CREATININE 0.66 04/28/2022    EGFRIFNONA 87 02/24/2022    GLUCOSE 81 04/28/2022    CALCIUM 9.5 04/28/2022    ALKPHOS 121 (H) 04/28/2022    AST 21 04/28/2022    ALT 23 04/28/2022    BILITOT 0.3 04/28/2022    ALBUMIN 3.93 04/28/2022    PROTEINTOT 6.5 04/28/2022    MG 2.0 12/17/2021     Lab Results   Component Value Date    TSH 1.990 04/28/2022     Lab Results   Component Value Date    FERRITIN 169.00 (H) 01/20/2022    IRON 102 01/20/2022    TIBC 264 (L) 01/20/2022    LABIRON 39 01/20/2022    CBFUYOHU44 437 01/20/2022    FOLATE 16.40 01/20/2022     Lab Results   Component Value Date     (H) 01/20/2022           ASSESSMENT & PLAN:  Yadira Flowers is a very pleasant 66 y.o. female with a history of cervical cancer, breast cancer and malignant melanoma.    1. History of Breast Cancer:  - Ms. Onur Burgos had Stage IA ([id number]) moderately differentiated invasive ductal carcinoma of the right breast diagnosed in October 2015.  - She underwent R lympectomy and SLNBx performed by Dr. Steve Isbell 10-22-15 and then received adjuvant radiation in [place].  - After radiation, she was started on Arimidex which  was then switched to Exemestane.  She took Exemestane for almost 5 years. Unfortunately, she developed vaginal bleeding and hematuria related to vaginal atrophy related to hormonal blockade.  Given this, stopped Exemestane.  She is doing much better in this regard since stopping hormonal therapy.  - Mammogram 1-13-22 without cause for concern aside from R axillary LAD.  No palpable breast masses.    2.  Metastatic malignant melanoma:  -  S/p resection of a primary lesion on her back performed by Dr. Catrachito Amaro of Dermatology in [place] in 2004.  -  S/p FNA R axillary LN which was concerning for melanoma.  Excisional biopsy confirmed metastatic malignant melanoma.  - Suspect her lung and liver lesions are also related to her melanoma.  - She has seen Dr. Her and he performed bronchoscopy on 02/23/2022. Pathology was negative for malignant cells.  - Liver aspiration showed abscess which grew Klebsiella, suspect this was a secondary infection but this isn't completely certain at this time.    -  Plan to watch all of these areas as we proceed with treatment.  -  Sent  excised LN tissue for CARIS testing.  -  PET-CT showed no significant FDG uptake but re-demonstrated abnormalities in the R axilla, RLL cavitary mass, Vargas lung nodules and liver.    -  MRI Brain showed no acute findings in the head/brain.  -  Recommended initial therapy with immunotherapy.  We discussed different options for immunotherapy including single agent PD-L1 treatment or combination with Yervoy.  We recommended combination with Yervoy for better RR, PFS and OS even with increased risk of immune-mediated side effects.  We discussed the initially approved FDA dosing v. Altered dosing with lower dose IPI/higher dosed Nivo.  The latter has been shown to have fewer side effects and is thought to likely have similar efficacy (though trials weren't really powered to definitively show that).  After a lengthy discussion, we decided to  proceed with 4 cycles of Ipi 1 mg/kg / Nivo 3 mg/kg q 21d followed by Nivolumab single agent.  We discussed potential risks, benefits and side effects extensively and she decided to proceed.  -  She completed 4 cycles of Ipi / Nivo and has tolerated treatment very well.  -  Repeat CT imaging done 5-3-22 after 4 cycles Ipi/Nivo shows an excellent partial response to treatment.  We discussed results and we reviewed her pre-treatment and recent CT images together today.  She was pleased to hear the good news.  - Will now continue with treatment with Nivolumab single agent as planned.    -  Will see her back with C6 with CBCD, CMP, TSH.    3. Mild Redness/Pruritus of Chest and Bilateral Arms  - Advised patient to apply moisturizing barrier such as Udder cream or Cerave Itch Relief 2-3 x daily.   -  Tried Zyrtec but this was too sedating.  Has been using Flonase.    Will continue to monitor.    4. Constipation  -  Continue Senna 2 tabs BID. Also advised to use Miralax as needed.     5. Bilateral pulmonary emboli:  -  CTPE protocol 1-20-22  confirmed bilateral pulmonary emboli  -  BLE dopplers negative for DVT.  -  Continue Eliquis 5 mg BID.    -  We discussed length of therapy today.  She watched her  die with PE and her mother has h/o PEs as well.  She denies any side effect of the Eliquis.  Presumably the cancer was the risk factor for development of PE and since her cancer is incurable, I have recommended she continue Eliquis indefinately as long as she continues without significant side effects.     6.  H/o Colon polyps:  -  Dr. Maldonado performed c-scope 12-09-20 with discovery of 8 hyperplastic polyps in the distal rectum as well as diverticulosis.  He recommended repeat c-scope after 5 years.    7.  H/o cervical cancer treated with cryotherapy at [facility] in [facility]:  -  Followed up with Dr. Manav Real of gynecology.  She says she had exam including pap smear and then pelvic U/S which was unrevealing.    8.  Prophylaxis:  -  Had COVID vaccine x 2 (Pfizer).  She received Evusheld on 4-7-22..  Recommended 2021 influenza vaccine.    9. ACO / DWAYNE/Other  Quality measures  -  Yadira Flowers did not receive 2021 flu vaccine. This was recommended, she declined.  -  Yadira Flowers reports a pain score of 0.    -  Current outpatient and discharge medications have been reconciled for the patient.  Reviewed by: Loreto Stone MD     10.  Follow up:  - Proceed with C5 treatment with Nivo single agent as planned  - Continue Eliquis  - Continue Flonase  -  Return to see me with C6 treatment.     This note was scribed for Loreto Stone MD by Alba Felton RN.    I, Loreto Stone MD, personally performed the services described in this documentation as scribed by the above named individual in my presence, and it is both accurate and complete.  05/10/2022       I spent 30 minutes with Yadira Flowers today.  In the office today, more than 50% of this time was spent face-to-face with her  in counseling / coordination of care, reviewing her medical history and counseling on the current treatment plan.  All questions were answered to her satisfaction      Electronically Signed by:  Loreto Stone MD       CC:     MD Manav Cotto MD Aaron House, MD

## 2022-05-09 NOTE — PROGRESS NOTES
"Chief Complaint  Multiple subsegmental pulmonary emboli without acute cor pu    Subjective          Yadira Flowers presents to [facility]  History of Present Illness     Mrs. Flowers presents today for follow up of history of PE, metastatic melanoma. History also notable for previous black mold exposure, history of pneumonia, history of breast cancer/cervical cancer.   She initially had a lesion removed from her back in 2004. Then notable for development of a swollen lymph node of the right axilla. Imaging was completed, also concerning for multiple small nodules (calcified and non-calcified), and a large cavitary opacity of the right lung. Bronchoscopy was completed, but overall non-significant.   Liver (also notable for nodules) aspiration grew klebsiella. She was initiated on immunotherapy combination. She has noticed some side effects following therapy, but otherwise has tolerated them well overall.   No acute shortness of breath, hemoptysis, or other productive cough.   Recently completed repeat CT imaging.   She also continues on Eliquis due to development of bilateral PE in 2022.       Objective   Vital Signs:  /88   Pulse 78   Temp 97.2 °F (36.2 °C) (Temporal)   Ht 170.2 cm (67\")   Wt 87.1 kg (192 lb)   SpO2 99%   BMI 30.07 kg/m²         Physical Exam  Vitals reviewed.   Constitutional:       General: She is not in acute distress.     Appearance: She is well-developed. She is not diaphoretic.   HENT:      Head: Normocephalic and atraumatic.   Cardiovascular:      Rate and Rhythm: Normal rate and regular rhythm.      Heart sounds: Normal heart sounds, S1 normal and S2 normal.   Pulmonary:      Effort: Pulmonary effort is normal.      Breath sounds: No wheezing, rhonchi or rales.   Neurological:      Mental Status: She is alert and oriented to person, place, and time.   Psychiatric:         Behavior: Behavior normal.        Result Review : "   The following data was reviewed by: Meera Delgado PA-C on 05/09/2022:    Reviewed previous cultures, pathology/cytology reports from 2022.     Reviewed the recent CT chest from May 2022.         Reviewed the CT chest from January 2022.       Reviewed PET CT from January 2022.       Reviewed the CT abdomen from May 2022.       Assessment and Plan    Diagnoses and all orders for this visit:    1. Multiple subsegmental pulmonary emboli without acute cor pulmonale (HCC) (Primary)    2. Multiple pulmonary nodules    3. Cavitary lesion of lung    4. Cancer (HCC)        Multiple pulmonary nodules with right cavitary nodule (improving), history of multiple malignancy sites:  Previous malignancy includes melanoma, cervical cancer, breast cancer. Notable for previous excision of the malignant areas without further treatment required.    Upon initial imaging in December 2021, diagnosed pneumonia with blood cultures positive for Klebsiella. Also notable for new development of multiple ground glass lung nodules (not previously evident in December 2020.  Only few calcified nodules were noted at that time). Calcified nodules may be related to previous mold exposure.  Previously noted calcified nodules appear to be stable.      Also notable for development of cavitary 2 cm lesion of the right lung.  On interval CT imaging from December to January, this appeared to reduce in size and developed a small central cavitary portion.  December CT (left) versus January CT (right).          On most recent imaging from May 2022, there is again improvement of this area.   Other small ground glass nodules appear to be present but stable.   Calcified nodules also appear to be present but stable.   Per report and no visible changes, no lymphadenopathy per radiology guidelines.   CT chest May 2022 (left) versus January 2021 (right)          · Currently undergoing combination immunotherapy (following with oncology)  · Imaging recommendations  as per cardiology.   · Will have Dr. Her review for other recommendations at this time.     Addendum - reviewed by Dr. Her. Nothing to biopsy at this time. No further referrals indicated at this time. Updated the patient.       Pulmonary embolism:   Not appropriately assessed by recent CT.   · Continue eliquis 5 mg BID.           Follow Up   Return in about 4 months (around 9/9/2022), or if symptoms worsen or fail to improve, for Next scheduled follow up.  Patient was given instructions and counseling regarding her condition or for health maintenance advice. Please see specific information pulled into the AVS if appropriate.

## 2022-05-10 ENCOUNTER — OFFICE VISIT (OUTPATIENT)
Dept: ONCOLOGY | Facility: CLINIC | Age: 67
End: 2022-05-10

## 2022-05-10 VITALS
OXYGEN SATURATION: 98 % | RESPIRATION RATE: 18 BRPM | HEART RATE: 89 BPM | WEIGHT: 191.8 LBS | TEMPERATURE: 97.5 F | DIASTOLIC BLOOD PRESSURE: 74 MMHG | SYSTOLIC BLOOD PRESSURE: 131 MMHG | BODY MASS INDEX: 30.04 KG/M2

## 2022-05-10 DIAGNOSIS — I27.82 OTHER CHRONIC PULMONARY EMBOLISM WITHOUT ACUTE COR PULMONALE: ICD-10-CM

## 2022-05-10 DIAGNOSIS — C78.01 MALIGNANT NEOPLASM METASTATIC TO BOTH LUNGS: ICD-10-CM

## 2022-05-10 DIAGNOSIS — C78.7 METASTASIS TO LIVER: ICD-10-CM

## 2022-05-10 DIAGNOSIS — C43.59 MALIGNANT MELANOMA OF TORSO EXCLUDING BREAST: Primary | ICD-10-CM

## 2022-05-10 DIAGNOSIS — C78.02 MALIGNANT NEOPLASM METASTATIC TO BOTH LUNGS: ICD-10-CM

## 2022-05-10 PROCEDURE — 99214 OFFICE O/P EST MOD 30 MIN: CPT | Performed by: INTERNAL MEDICINE

## 2022-05-15 DIAGNOSIS — C43.59 MALIGNANT MELANOMA OF TORSO EXCLUDING BREAST: Primary | ICD-10-CM

## 2022-05-15 DIAGNOSIS — C78.01 MALIGNANT NEOPLASM METASTATIC TO BOTH LUNGS: ICD-10-CM

## 2022-05-15 DIAGNOSIS — C78.7 METASTASIS TO LIVER: ICD-10-CM

## 2022-05-15 DIAGNOSIS — C78.02 MALIGNANT NEOPLASM METASTATIC TO BOTH LUNGS: ICD-10-CM

## 2022-05-15 RX ORDER — SODIUM CHLORIDE 9 MG/ML
250 INJECTION, SOLUTION INTRAVENOUS ONCE
Status: CANCELLED | OUTPATIENT
Start: 2022-06-16

## 2022-05-15 RX ORDER — SODIUM CHLORIDE 9 MG/ML
250 INJECTION, SOLUTION INTRAVENOUS ONCE
Status: CANCELLED | OUTPATIENT
Start: 2022-05-19

## 2022-05-15 RX ORDER — SODIUM CHLORIDE 9 MG/ML
250 INJECTION, SOLUTION INTRAVENOUS ONCE
Status: CANCELLED | OUTPATIENT
Start: 2022-07-14

## 2022-05-18 ENCOUNTER — DOCUMENTATION (OUTPATIENT)
Dept: ONCOLOGY | Facility: CLINIC | Age: 67
End: 2022-05-18

## 2022-05-18 NOTE — PROGRESS NOTES
SS spoke with Jannette [employer] who states that patient stopped by the shop by oncology requesting bras.    SS provided [employer] with MD order and dictation note.    SS will follow.

## 2022-05-19 ENCOUNTER — INFUSION (OUTPATIENT)
Dept: ONCOLOGY | Facility: HOSPITAL | Age: 67
End: 2022-05-19

## 2022-05-19 ENCOUNTER — APPOINTMENT (OUTPATIENT)
Dept: ONCOLOGY | Facility: HOSPITAL | Age: 67
End: 2022-05-19

## 2022-05-19 VITALS
HEART RATE: 87 BPM | SYSTOLIC BLOOD PRESSURE: 146 MMHG | OXYGEN SATURATION: 96 % | TEMPERATURE: 98.2 F | BODY MASS INDEX: 30.1 KG/M2 | DIASTOLIC BLOOD PRESSURE: 85 MMHG | WEIGHT: 192.2 LBS | RESPIRATION RATE: 18 BRPM

## 2022-05-19 DIAGNOSIS — Z79.899 LONG-TERM USE OF HIGH-RISK MEDICATION: Primary | ICD-10-CM

## 2022-05-19 DIAGNOSIS — C78.02 MALIGNANT NEOPLASM METASTATIC TO BOTH LUNGS: ICD-10-CM

## 2022-05-19 DIAGNOSIS — Z95.828 PORT-A-CATH IN PLACE: ICD-10-CM

## 2022-05-19 DIAGNOSIS — C78.7 METASTASIS TO LIVER: ICD-10-CM

## 2022-05-19 DIAGNOSIS — C78.01 MALIGNANT NEOPLASM METASTATIC TO BOTH LUNGS: ICD-10-CM

## 2022-05-19 DIAGNOSIS — C43.59 MALIGNANT MELANOMA OF TORSO EXCLUDING BREAST: ICD-10-CM

## 2022-05-19 LAB
ALBUMIN SERPL-MCNC: 3.82 G/DL (ref 3.5–5.2)
ALBUMIN/GLOB SERPL: 1.8 G/DL
ALP SERPL-CCNC: 93 U/L (ref 39–117)
ALT SERPL W P-5'-P-CCNC: 15 U/L (ref 1–33)
ANION GAP SERPL CALCULATED.3IONS-SCNC: 9.4 MMOL/L (ref 5–15)
AST SERPL-CCNC: 18 U/L (ref 1–32)
BASOPHILS # BLD AUTO: 0.07 10*3/MM3 (ref 0–0.2)
BASOPHILS NFR BLD AUTO: 0.7 % (ref 0–1.5)
BILIRUB SERPL-MCNC: 0.5 MG/DL (ref 0–1.2)
BUN SERPL-MCNC: 14 MG/DL (ref 8–23)
BUN/CREAT SERPL: 19.4 (ref 7–25)
CALCIUM SPEC-SCNC: 9.1 MG/DL (ref 8.6–10.5)
CHLORIDE SERPL-SCNC: 108 MMOL/L (ref 98–107)
CO2 SERPL-SCNC: 23.6 MMOL/L (ref 22–29)
CREAT SERPL-MCNC: 0.72 MG/DL (ref 0.57–1)
DEPRECATED RDW RBC AUTO: 43.8 FL (ref 37–54)
EGFRCR SERPLBLD CKD-EPI 2021: 92.9 ML/MIN/1.73
EOSINOPHIL # BLD AUTO: 3.97 10*3/MM3 (ref 0–0.4)
EOSINOPHIL NFR BLD AUTO: 41.5 % (ref 0.3–6.2)
ERYTHROCYTE [DISTWIDTH] IN BLOOD BY AUTOMATED COUNT: 13.9 % (ref 12.3–15.4)
GLOBULIN UR ELPH-MCNC: 2.1 GM/DL
GLUCOSE SERPL-MCNC: 79 MG/DL (ref 65–99)
HCT VFR BLD AUTO: 37 % (ref 34–46.6)
HGB BLD-MCNC: 12.1 G/DL (ref 12–15.9)
HYPOCHROMIA BLD QL: NORMAL
IMM GRANULOCYTES # BLD AUTO: 0.01 10*3/MM3 (ref 0–0.05)
IMM GRANULOCYTES NFR BLD AUTO: 0.1 % (ref 0–0.5)
LARGE PLATELETS: NORMAL
LYMPHOCYTES # BLD AUTO: 2.33 10*3/MM3 (ref 0.7–3.1)
LYMPHOCYTES NFR BLD AUTO: 24.3 % (ref 19.6–45.3)
MCH RBC QN AUTO: 28.4 PG (ref 26.6–33)
MCHC RBC AUTO-ENTMCNC: 32.7 G/DL (ref 31.5–35.7)
MCV RBC AUTO: 86.9 FL (ref 79–97)
MONOCYTES # BLD AUTO: 0.58 10*3/MM3 (ref 0.1–0.9)
MONOCYTES NFR BLD AUTO: 6.1 % (ref 5–12)
NEUTROPHILS NFR BLD AUTO: 2.61 10*3/MM3 (ref 1.7–7)
NEUTROPHILS NFR BLD AUTO: 27.3 % (ref 42.7–76)
NRBC BLD AUTO-RTO: 0 /100 WBC (ref 0–0.2)
PLATELET # BLD AUTO: 180 10*3/MM3 (ref 140–450)
PMV BLD AUTO: 10.2 FL (ref 6–12)
POTASSIUM SERPL-SCNC: 3.6 MMOL/L (ref 3.5–5.2)
PROT SERPL-MCNC: 5.9 G/DL (ref 6–8.5)
RBC # BLD AUTO: 4.26 10*6/MM3 (ref 3.77–5.28)
SODIUM SERPL-SCNC: 141 MMOL/L (ref 136–145)
T4 FREE SERPL-MCNC: 1.12 NG/DL (ref 0.93–1.7)
TSH SERPL DL<=0.05 MIU/L-ACNC: 1.43 UIU/ML (ref 0.27–4.2)
WBC NRBC COR # BLD: 9.57 10*3/MM3 (ref 3.4–10.8)

## 2022-05-19 PROCEDURE — 85007 BL SMEAR W/DIFF WBC COUNT: CPT

## 2022-05-19 PROCEDURE — 96413 CHEMO IV INFUSION 1 HR: CPT

## 2022-05-19 PROCEDURE — 25010000002 NIVOLUMAB 240 MG/24ML SOLUTION 24 ML VIAL: Performed by: INTERNAL MEDICINE

## 2022-05-19 PROCEDURE — 84439 ASSAY OF FREE THYROXINE: CPT

## 2022-05-19 PROCEDURE — 25010000002 HEPARIN LOCK FLUSH PER 10 UNITS: Performed by: INTERNAL MEDICINE

## 2022-05-19 PROCEDURE — 80050 GENERAL HEALTH PANEL: CPT

## 2022-05-19 RX ORDER — SODIUM CHLORIDE 9 MG/ML
250 INJECTION, SOLUTION INTRAVENOUS ONCE
Status: COMPLETED | OUTPATIENT
Start: 2022-05-19 | End: 2022-05-19

## 2022-05-19 RX ORDER — HEPARIN SODIUM (PORCINE) LOCK FLUSH IV SOLN 100 UNIT/ML 100 UNIT/ML
300 SOLUTION INTRAVENOUS ONCE
Status: CANCELLED | OUTPATIENT
Start: 2022-05-19

## 2022-05-19 RX ORDER — SODIUM CHLORIDE 0.9 % (FLUSH) 0.9 %
10 SYRINGE (ML) INJECTION AS NEEDED
Status: CANCELLED | OUTPATIENT
Start: 2022-06-16

## 2022-05-19 RX ORDER — SODIUM CHLORIDE 0.9 % (FLUSH) 0.9 %
20 SYRINGE (ML) INJECTION AS NEEDED
Status: CANCELLED | OUTPATIENT
Start: 2022-05-19

## 2022-05-19 RX ORDER — HEPARIN SODIUM (PORCINE) LOCK FLUSH IV SOLN 100 UNIT/ML 100 UNIT/ML
500 SOLUTION INTRAVENOUS AS NEEDED
Status: DISCONTINUED | OUTPATIENT
Start: 2022-05-19 | End: 2022-05-19 | Stop reason: HOSPADM

## 2022-05-19 RX ORDER — SODIUM CHLORIDE 0.9 % (FLUSH) 0.9 %
10 SYRINGE (ML) INJECTION AS NEEDED
Status: DISCONTINUED | OUTPATIENT
Start: 2022-05-19 | End: 2022-05-19 | Stop reason: HOSPADM

## 2022-05-19 RX ORDER — HEPARIN SODIUM (PORCINE) LOCK FLUSH IV SOLN 100 UNIT/ML 100 UNIT/ML
500 SOLUTION INTRAVENOUS AS NEEDED
Status: CANCELLED | OUTPATIENT
Start: 2022-06-16

## 2022-05-19 RX ADMIN — SODIUM CHLORIDE 480 MG: 9 INJECTION, SOLUTION INTRAVENOUS at 13:54

## 2022-05-19 RX ADMIN — Medication 500 UNITS: at 14:40

## 2022-05-19 RX ADMIN — Medication 10 ML: at 14:40

## 2022-05-19 RX ADMIN — SODIUM CHLORIDE 250 ML: 9 INJECTION, SOLUTION INTRAVENOUS at 13:54

## 2022-06-15 NOTE — PROGRESS NOTES
"NAME: Yadira Flowers    : 1955    DATE:  2022    DIAGNOSIS:   1.  Stage IA ([id number]) moderately differentiated invasive ductal carcinoma of the right breast diagnosed in 2015.    2.  H/o malignant Melanoma on her Back    3.  H/o cervical cancer treated with cryotherapy at [facility] in     4.  Recurrent / metastatic malignant Melanoma  -  R axillary LN bx 22.    -  She has R axillary LN mass (2.47x2.16), Solitary cavitary R lobe liver lesion, cavitary RLL lung mass,  and bilateral parenchymal lung nodules      TREATMENT HISTORY:   1.           CHIEF COMPLAINT:  Follow up of metastatic melanoma    HISTORY OF PRESENT ILLNESS:   Yadira Flowers is a very pleasant 66 y.o. female who was referred by Dr. Dilcia Pedro for evaluation and treatment of breast cancer. She was living in [place] in 2015 when her dog brought attention to and \"found\" an abnormal lump in her right breast.  She was treated by Dr. Steve Isbell and underwent R lumpectomy with SLNBx on 10-22-15 as below.  She then had what sounds like accelerated partial breast irradiation.  She was then started on Arimidex which was later switched to Exemestane for a better side effect profile.  She took this for about 5 years, stopping a couple of weeks ago.  She stopped taking Exemestane because she developed some vaginal bleeding and hematuria.  This was evaluated by her PCP and gynecologist and felt to be due to vaginal atrophy.  She was prescribed a hormone pill which she hasn't yet started and was referred here to discuss things further.      Aside from bleeding which has been uncomfortable and distressing for her, Ms. Onur Burgos has generally been well.  The last year has been hard on her emotionally with the loss of her  and then her dog, her home and her insurance, but she is coping well and has good family support in [place].  She says she gained weight with her breast cancer treatment and gained weight " when her  .  She has been working now to lose weight and has lost 20 lbs over the last 2 months with dieting.  She denies chest pain or shortness of breath.  She complains of some RLQ cramping pain and isn't sure what is causing that.  She denies difficulty moving her bowels.  No blood in her stool that she is aware of.  She has had vaginal bleeding and hematuria as above.        INTERVAL HISTORY:  Ms. Burgos presents today for follow up of metastatic melanoma. She continues to do very well with her treatment. She does continue with a kind of irritating rash which is pruritic at times, but she says it is livable.  The dermatologist she used to see in [place] left.  She had an appt to see someone in [place] but this somehow got cancelled. She plans to call and reschedule to see them.  She says she gets a little tired for about 3 days following her treatments, but otherwise denies side effects.       PAST MEDICAL HISTORY:  Past Medical History:   Diagnosis Date   • Back pain    • Basal cell carcinoma (BCC) in situ of skin     Dr. Mohr - Derm   • Breast cancer (HCC)        • Cancer (HCC)     breast, cervical, melanoma   • Cervical cancer (HCC)     Diagnosed in .  Treated by repeated local intervention and ultimately received cryotherapy at [facility] with resolution.   • Elbow fracture    • Foot fracture    • Headache    • Hypertension     TAKEN OFF MEDS   • Melanoma (HCC)     on her back  - treated by Dermatologist Catrachito Amaro in [place], [place] with what sounds like wide local excision.    • Pulmonary embolism (HCC)    • Sinusitis        PAST SURGICAL HISTORY:  Past Surgical History:   Procedure Laterality Date   • AXILLARY LYMPH NODE BIOPSY/EXCISION Right 2022    Procedure: AXILLARY LYMPH NODE BIOPSY/EXCISION;  Surgeon: Dianelys Cummings MD;  Location: [facility];  Service: General;  Laterality: Right;   • BREAST LUMPECTOMY Right    • BREAST SURGERY      Secondary to cancer   •  BRONCHOSCOPY Right 2022    Procedure: BRONCHOSCOPY WITH ENDOBRONCHIAL ULTRASOUND;  Surgeon: Chris Her MD;  Location:  [facility];  Service: Pulmonary;  Laterality: Right;   • COLONOSCOPY N/A 2020    Procedure: COLONOSCOPY;  Surgeon: Mohsen Maldonado MD;  Location:  [facility];  Service: Gastroenterology;  Laterality: N/A;   • ELBOW FUSION      left   • FOOT SURGERY Right     Broken foot   • HAND SURGERY  2020    right   • RHINOPLASTY     • SKIN CANCER EXCISION      malignant melanoma from back    • US GUIDED LYMPH NODE BIOPSY  2022   • VENOUS ACCESS DEVICE (PORT) INSERTION N/A 2022    Procedure: INSERTION VENOUS ACCESS DEVICE left or right;  Surgeon: Dianelys Cummings MD;  Location:  [facility];  Service: General;  Laterality: N/A;       FAMILY HISTORY:  Family History   Problem Relation Age of Onset   • Other Mother         blood clots   • Hypertension Mother    • Ulcers Father    • Hypertension Sister    • Other Sister         Multiple Sclerosis   • Cancer Paternal Aunt    • Stroke Maternal Grandmother    • Alcohol abuse Paternal Grandmother    • Hypertension Sister    • Multiple sclerosis Sister    • Hypertension Sister    • Coronary artery disease Paternal Grandfather    • Cancer Maternal Aunt 30        colon   • Breast cancer Neg Hx    Many paternal aunts  with malignancy of various types. Many cousins on that side of the family have had cancer as well.  One  of colon cancer in her 30s.  One had a brain tumor.    SOCIAL HISTORY:  Social History     Socioeconomic History   • Marital status:    Tobacco Use   • Smoking status: Never Smoker   • Smokeless tobacco: Never Used   Vaping Use   • Vaping Use: Never used   Substance and Sexual Activity   • Alcohol use: Not Currently   • Drug use: Never   • Sexual activity: Not Currently     Birth control/protection: Post-menopausal         REVIEW OF SYSTEMS:   A comprehensive 14 point review of systems was performed.   Significant findings as mentioned above.  All other systems reviewed and are negative.      MEDICATIONS:  The current medication list was reviewed in the EMR    Current Outpatient Medications:   •  apixaban (ELIQUIS) 5 MG tablet tablet, Take 1 tablet by mouth 2 (Two) Times a Day., Disp: 60 tablet, Rfl: 5  •  fluticasone (Flonase) 50 MCG/ACT nasal spray, 2 sprays into the nostril(s) as directed by provider Daily., Disp: 18.2 mL, Rfl: 1  •  lidocaine-prilocaine (EMLA) 2.5-2.5 % cream, Apply to port a cath ~30 min -1 hour prior to chemotherapy, Disp: 30 g, Rfl: 3  •  ondansetron ODT (Zofran ODT) 8 MG disintegrating tablet, Place 1 tablet on the tongue Every 8 (Eight) Hours As Needed for Nausea or Vomiting., Disp: 30 tablet, Rfl: 5  •  prochlorperazine (COMPAZINE) 10 MG tablet, TAKE 1 TABLET BY MOUTH EVERY 6 HOURS AS NEEDED FOR NAUSEA, Disp: 360 tablet, Rfl: 3  •  sennosides-docusate (senna-docusate sodium) 8.6-50 MG per tablet, Take 2 tablets by mouth 2 (Two) Times a Day., Disp: 120 tablet, Rfl: 2    ALLERGIES:    No Known Allergies    PHYSICAL EXAM:  There were no vitals filed for this visit.  There were no vitals filed for this visit.  ECOG score: 0   General:  Awake, alert and oriented, appears well. In good spirits  HEENT:  Pupils are equal, round and reactive to light and accommodation, Extra-ocular movements full, Oropharyx clear, mucous membranes moist  Neck:  No JVD, thyromegaly or lymphadenopathy  CV:  Regular rate and rhythm, no murmurs, rubs or gallops  Resp:  Lungs are clear to auscultation bilaterally, no wheezing  Breast: Deferred today. Previously: S/p R lumpectomy. There is a somewhat firm area at the site of surgery but there are no palpable masses.  She is s/p excision of R axillary LN, with some post-surgical swelling.  Left breast is without mass lesions.  No L axillary adenopathy.  Abd:  Soft, non-tender, non-distended, bowel sounds present, no organomegaly or masses.    Ext:  No clubbing, cyanosis  or edema.    Lymph:  No cervical, supraclavicular, axillary adenopathy  Skin:  There is a vaguely erythematous dry appearing rash over her torso and arms.    Neuro:  MS as above, CN II-XII intact, grossly non-focal exam          PATHOLOGY:  10-22-15            12-16-21          01-13-22          ENDOSCOPY:  Colonoscopy 12-09-20     Findings: 8 hyperplastic appearing polyps of the distal rectum, diverticulosis moderately throughout the sigmoid colon      IMAGING:  CTCAP 12-15-20  On the lung windows there are several calcified  granulomas in the lungs. No noncalcified pulmonary parenchymal lung  nodules were identified. On the soft tissue windows there were no  enlarged lymph nodes in the supraclavicular or axillary regions. No  mediastinal or hilar lymphadenopathy was seen. The heart was not  enlarged. There were no pericardial effusions.     IMPRESSION:  No CT findings of metastatic disease in the chest. There are  calcified granulomas in the lungs.     CT FINDINGS: The liver and spleen were normal in size and shape. The  gallbladder contains several stones. The wall was not thickened. The  bile ducts in the liver are not dilated. There is nothing seen to  suggest metastatic disease in the liver. The pancreas shows no evidence  of mass or inflammation. No adrenal or renal lesions are demonstrated.  The aorta is normal in caliber. There were no enlarged lymph nodes in  the retroperitoneum or in the mesentery. The bowel shows no evidence of  obstruction. In the pelvis there were no masses or fluid collections.  There were no ventral hernias.     IMPRESSION:  No CT findings of metastatic disease in the abdomen or  pelvis. This study does demonstrate several stones in the lumen of the  gallbladder.         Bilateral diagnostic mammogram 01-31-21  FINDINGS: There are scattered areas of fibroglandular density.     The bilateral fibroglandular pattern does not appear significantly  changed compared to the exam from 2020.  This includes postoperative  changes in the right 12:00 region. Mild skin thickening is noted  involving the right breast which is likely treatment related. A few  bilateral scattered calcifications are noted.     IMPRESSION:  Incomplete examination as comparison with older outside  prior mammograms is needed.        BI-RADS CATEGORY:   0, INCOMPLETE:  Need prior mammograms for comparison        RECOMMENDATION:  We will attempt to obtain older outside prior  mammograms for comparison.      CTCAP 12-12-21  FINDINGS: Lack of IV contrast hinders parenchymal assessment of the mediastinum, liver, spleen, pancreas, adrenal glands and kidneys.         Significant infiltrate seen in the right lower lobe with interstitial component. There are also lung nodules present in the right lung. Index nodule in the right lung base medially is 5 mm maximal diameter. Other smaller nodules seen throughout the right  lung. Tiny 3 mm lingular nodule seen in series 3 image 30. A benign calcified granuloma seen in the left lower lobe but a noncalcified indeterminant  5 mm left lower lobe nodule seen, series 3 image 42.     Nonenlarged mediastinal adenopathy seen. However there is an enlarged right axillary 1.8 x 2.6 cm lymph node partially seen.     Worrisome hepatic metastasis or primary hepatic lesion seen measuring 4.4 x 4.1 cm in liver segment 8. Margins are ill-defined. Abscess could have a similar appearance. Suggestion of subtle gallstones. Nonobstructing 2 mm right renal calculus seen. No  obstructive uropathy. Gaseous distention of the esophagus and hiatal hernia present consistent with GERD. No enteric contrast but no gross evidence of bowel obstruction.   There is no gross free air, free fluid or focal inflammatory change.  Uterus and  adnexa are not enlarged. Osseous structures are grossly intact.     IMPRESSION:  Constellation of findings with significant consolidation in the right lower lobe, enlarged right axillary lymph  node, indeterminate bilateral lung nodules and ill-defined hepatic lesion concerning for underlying malignancy in this patient .     FINDINGS: Lack of IV contrast hinders parenchymal assessment of the mediastinum, liver, spleen, pancreas, adrenal glands and kidneys.         Significant infiltrate seen in the right lower lobe with interstitial component. There are also lung nodules present in the right lung. Index nodule in the right lung base medially is 5 mm maximal diameter. Other smaller nodules seen throughout the right  lung. Tiny 3 mm lingular nodule seen in series 3 image 30. A benign calcified granuloma seen in the left lower lobe but a noncalcified indeterminant  5 mm left lower lobe nodule seen, series 3 image 42.     Nonenlarged mediastinal adenopathy seen. However there is an enlarged right axillary 1.8 x 2.6 cm lymph node partially seen.     Worrisome hepatic metastasis or primary hepatic lesion seen measuring 4.4 x 4.1 cm in liver segment 8. Margins are ill-defined. Abscess could have a similar appearance. Suggestion of subtle gallstones. Nonobstructing 2 mm right renal calculus seen. No  obstructive uropathy. Gaseous distention of the esophagus and hiatal hernia present consistent with GERD. No enteric contrast but no gross evidence of bowel obstruction.   There is no gross free air, free fluid or focal inflammatory change.  Uterus and  adnexa are not enlarged. Osseous structures are grossly intact.     IMPRESSION:  Constellation of findings with significant consolidation in the right lower lobe, enlarged right axillary lymph node, indeterminate bilateral lung nodules and ill-defined hepatic lesion concerning for underlying malignancy in this patient .       US Abdomen Complete 12-12-21  FINDINGS:  The visualized portions of the pancreas, IVC and aorta appear normal.        The liver is moderately coarsened in echotexture. Ill-defined hypodensity in the liver measuring 3 x 3.2 x 2.6 cm corresponds to  recent CT. Unclear if this is a malignant lesion. Abscesses within the differential but prior CT was performed without any  contrast.  The common bile duct is not dilated.. Gallbladder wall is thickened at 3.4 mm with trace pericholecystic fluid and subtle hyperemia. Gallstones and shadowing present.     The kidneys are normal in size and echogenicity. There is no   hydronephrosis or focal solid lesion.  The spleen is normal in size and echotexture.     IMPRESSION:  1. Nonspecific gallbladder wall thickening and questionable pericholecystic fluid. Gallstones are better seen on CT than ultrasound.  2.  Heterogeneous liver with lesion in the liver as seen on CT. Ultrasound is not helpful in determining etiology. This could be an abscess but given the other findings on CT,  malignancy is not excluded.      CTCAP 12-15-21  FINDINGS:    LUNGS:  Increased density of the now right lower lobe mixed  groundglass and more solid-appearing consolidation with internal  cavitary component identified. Tiny right and left lung pulmonary  nodules. Grossly stable.    PLEURAL SPACE:  Small bilateral pleural effusions.  No pneumothorax.    HEART:  Unremarkable.  No cardiomegaly.  No significant pericardial  effusion.    BONES/JOINTS:  Unremarkable.  No acute fracture.  No dislocation.    SOFT TISSUES:  Unremarkable.    VASCULATURE:  Unremarkable.  No thoracic aortic aneurysm.    LYMPH NODES:  Unremarkable.  No enlarged lymph nodes.     IMPRESSION:  1.  Increased density of the now right lower lobe mixed groundglass and  more solid-appearing consolidation with internal cavitary component  identified. Tiny right and left lung pulmonary nodules. Grossly stable.  2.  Small bilateral pleural effusions.    FINDINGS:    LUNG BASES:  Unremarkable.  No mass.  No consolidation.      ABDOMEN:    LIVER:  Right lobe of liver lesion is again identified now measuring  about 4.7 cm and was about 4.7 cm.    GALLBLADDER AND BILE DUCTS:  Gallstones noted  in the gallbladder.  No  ductal dilation.    PANCREAS:  Unremarkable.  No mass.  No ductal dilation.    SPLEEN:  Unremarkable.  No splenomegaly.    ADRENALS:  Unremarkable.  No mass.    KIDNEYS AND URETERS:  Unremarkable.  No solid mass.  No  hydronephrosis.    STOMACH AND BOWEL:  Unremarkable.  No obstruction.  No mucosal  thickening.      PELVIS:    APPENDIX:  No findings to suggest acute appendicitis.    BLADDER:  Unremarkable.  No mass.    REPRODUCTIVE:  Unremarkable as visualized.      ABDOMEN and PELVIS:    INTRAPERITONEAL SPACE:  Unremarkable.  No free air.  No significant  fluid collection.    BONES/JOINTS:  No acute fracture.  No dislocation.    SOFT TISSUES:  Unremarkable.    VASCULATURE:  Unremarkable.  No abdominal aortic aneurysm.    LYMPH NODES:  Unremarkable.  No enlarged lymph nodes.     IMPRESSION:    Right lobe of liver lesion is again identified now measuring about 4.7  cm and was about 4.7 cm.      NM Bone Scan Whole Body 12-15-21  FINDINGS:    SKULL/FACIAL BONES:  No focal increased or decreased uptake.    SPINE:  Unremarkable.  No abnormal increased or decreased uptake.    RIBS:  Unremarkable.  No abnormal uptake.    LONG BONES:  Unremarkable.  No abnormal uptake.    PELVIS:  Unremarkable.  No focal increased or decreased uptake.    JOINTS:  Unremarkable.  No focal increased or decreased uptake.    SOFT TISSUES:  Unremarkable.    RENAL/BLADDER:  Within normal limits.     IMPRESSION:    Normal bone scan.      CT Abdomen With Contrast  12-18-21  FINDINGS:  Partially imaged thick-walled cavitary lesion in the right lower lobe with right basilar atelectasis/infiltrate and small right pleural effusion. There is also left basilar atelectasis/infiltrate and trace left pleural effusion. Multiple noncalcified  pulmonary nodules are scattered throughout the visualized lower lung fields, concerning for pulmonary metastatic disease.     Ill-defined right hepatic mass again noted. The spleen, pancreas, and  both adrenal glands are within expected limits. Cholelithiasis. Punctate nonobstructing right intrarenal stone. Tiny left renal cyst. Kidneys are otherwise unremarkable without  hydronephrosis. Abdominal aorta normal in course and caliber without dissection. No pathologic retroperitoneal or mesenteric lymphadenopathy by size criteria. Visualized small bowel and colon are unremarkable. No bowel obstruction. No free fluid or free  air.     No aggressive osseous lesions.     IMPRESSION:     1. Ill-defined right hepatic mass again noted. This has been previously biopsied and correlation with pathology results is recommended.  2. Cholelithiasis.  3. Punctate nonobstructing right intrarenal stone.  4. No threshold abdominal lymphadenopathy or other suspicious abdominal masses.  5. Partially imaged thick-walled cavitary lesion in the right lower lobe. This may be of infectious or neoplastic etiology and continued follow-up is recommended.  6. Small bilateral pleural effusions with bibasilar atelectasis/infiltrate, worse on the right than left.  7. Numerous small noncalcified pulmonary nodules throughout the visualized lower lung fields, concerning for pulmonary metastatic disease.       Mammo Diagnostic Digital Tomosynthesis Bilateral With CAD w/ US Axilla Right 01-13-22  FINDINGS:  There are scattered areas of fibroglandular density.     Incompletely imaged is a very prominent axillary lymph node. The  bilateral fibroglandular pattern itself is stable in appearance  including postoperative changes in the superior aspect of the right  breast. There are stable bilateral calcifications. Mild skin thickening  is again noted involving the right breast which is likely treatment  related.     ULTRASOUND: Ultrasound evaluation of the right axilla demonstrates 2  markedly enlarged lymph nodes the largest of which measures 3.2 cm in  size and has no demonstrable fatty hilum. Recommend ultrasound-guided  biopsy.      IMPRESSION:  1.  Stable mammographic appearance of the left breast with no findings  suspicious for malignancy.        2. Marked right axillary lymphadenopathy. Recommend ultrasound-guided  biopsy. The fibroglandular pattern of the right breast is however  stable.        BI-RADS CATEGORY:  4, SUSPICIOUS        RECOMMENDATION:  Recommend ultrasound-guided biopsy of the dominant  abnormal appearing right axillary lymph node.      DEXA Bone Density Axial 01-13-22  IMPRESSION:  Impression:  1. According to the World Health Organization definitions of  osteoporosis based on bone density, this patient's bone mineral density  is compatible with osteoporosis and the fracture risk is high.      Mammo Diagnostic Digital Tomosynthesis Bilateral With CAD w/ US Axilla Right 01-13-22  FINDINGS:  There are scattered areas of fibroglandular density.     Incompletely imaged is a very prominent axillary lymph node. The  bilateral fibroglandular pattern itself is stable in appearance  including postoperative changes in the superior aspect of the right  breast. There are stable bilateral calcifications. Mild skin thickening  is again noted involving the right breast which is likely treatment  related.     ULTRASOUND: Ultrasound evaluation of the right axilla demonstrates 2  markedly enlarged lymph nodes the largest of which measures 3.2 cm in  size and has no demonstrable fatty hilum. Recommend ultrasound-guided  biopsy.      IMPRESSION:  1. Stable mammographic appearance of the left breast with no findings  suspicious for malignancy.        2. Marked right axillary lymphadenopathy. Recommend ultrasound-guided  biopsy. The fibroglandular pattern of the right breast is however  stable.        BI-RADS CATEGORY:  4, SUSPICIOUS        RECOMMENDATION:  Recommend ultrasound-guided biopsy of the dominant  abnormal appearing right axillary lymph node.      CT Chest With Contrast Diagnostic 01-18-22  FINDINGS:     LUNGS: Solitary mass in the right lower lobe  with cavitation.  Significantly smaller today than on the prior study. 6 mm solid nodule  in the right lung posteriorly on image 30 of the axial series. Other  parenchymal nodules are seen in both lungs and are similar to the  previous study. These are concerning for metastatic nodules.     HEART: Unremarkable.     MEDIASTINUM: No masses. No enlarged lymph nodes.  No fluid collections.     PLEURA: No pleural effusion. No pleural mass or abnormal calcification.  No pneumothorax.     VASCULATURE: No evidence of aneurysm. Filling defect in the right  pulmonary artery and possibly left lower lobe pulmonary artery. This  study was not performed to evaluate for pulmonary embolus, but the  findings are concerning for bilateral pulmonary emboli.     BONES: No acute bony abnormality.     VISUALIZED UPPER ABDOMEN:Please see the CT report for the abdomen and  pelvis.     Other: Right axillary soft tissue mass is present and shows slight  interval growth. It measures 2.47 x 2.16 cm today and previously at the  same level measured approximately 2.7 x 1.89 cm.     IMPRESSION:  1. Study was not performed as a PE protocol, but there appear to be  filling defects in pulmonary arteries bilaterally concerning for  pulmonary emboli.  2. Interval decrease in size of cavitary right lower lobe mass.  3. Stable parenchymal nodules bilaterally.  4. Very slight interval growth of right axillary soft tissue mass.     Results are being relayed to the referring physician.      US Liver 01-18-22  FINDINGS: Sonographic imaging of the liver does not show the mass  previously identified in the liver. I would suggest a follow-up CT scan  for better delineation.     Hepatic flow was hepatopedal.     There is shadowing from the gallbladder fossa.     IMPRESSION:  1. Previously identified mass is not seen on this exam in the liver.  Consider follow-up CT.  2. Shadowing from the gallbladder fossa. In the absence of  cholecystectomy, appearance is  concerning for cholelithiasis.      US Venous Doppler Lower Extremity Bilateral (duplex) 01-20-22  FINDINGS:   There is patent spontaneous flow from the common femoral vein through  the posterior tibial veins.  There was no internal clot or area of noncompressibility.  Normal augmentation was elicited where applicable.     IMPRESSION:  No DVT in the lower extremities on today's exam.       CT Chest Pulmonary Embolism 01-20-22  FINDINGS: Today's study demonstrates opacification of the central  pulmonary vessels.  There are filling defects in the pulmonary arteries bilaterally. First  order arteries. This is most compatible with bilateral pulmonary  emboli..     Soft tissue mass in the right lower lobe with somewhat cavitary center  is again noted and unchanged..     There is no mediastinal lymph node enlargement     No pericardial or pleural effusion.        IMPRESSION:  1. Bilateral pulmonary emboli.  2. Parenchymal nodules bilaterally with dominant mass in the right lower  Lobe..      NM PET/CT Skull Base to Mid Thigh 01-28-22  FINDINGS:      HEAD/NECK:  Area of uptake in the posterior right paraspinal space and to lesser  degree the left paraspinal space appears related to muscle uptake.  No FDG hypermetabolic neck adenopathy.  No FDG hypermetabolic masses.     CHEST:   Numerous bilateral pulmonary nodules appear stable from the previous  chest CT and show no FDG hypermetabolism. This is likely due to nodules  being below size threshold for PET sensitivity.  Cavitary lung mass in the right lung localizing to the superior segment  of the lower lobe shows FDG hypermetabolism with maximum SUV: 2.4. This  is at the lower range for malignancy.  Enlarged right lower axillary region lymph node which is 3.0 cm shows  mild FDG hypermetabolism that is approximately with maximum SUV: 2.4.  This is at the lower range for malignancy.  Low-dose CT demonstrating no change in additional scattered pulmonary  nodules from the  previous scan. Coronary artery calcifications are  stable.     ABDOMEN/PELVIS:   Faint area of low attenuation involving the right lobe of liver is  poorly evaluated with low-dose noncontrast CT but shows no focal FDG  hypermetabolism.  Physiologic FDG hypermetabolism seen throughout the solid abdominal  organs.  Physiologic FDG hypermetabolism seen throughout the GI tract.  Physiologic FDG hypermetabolism seen throughout the mesentery,  retroperitoneum and pelvis.  Low dose CT redemonstrates nonobstructing kidney stones. Cholelithiasis  again noted.     BONES: Nonspecific FDG tracer activity. No intense areas of tracer  activity noted.     IMPRESSION:     1. Cavitary mass in the right lung that shows very low-grade FDG  hypermetabolism which is at the lower range for malignancy with maximum  SUV: 2.4.  2. Enlarged right axillary region lymph node with low-grade FDG  hypermetabolism also at the lower range for malignancy with maximum SUV:  2.4.  3. Numerous bilateral pulmonary nodules compatible with metastatic  disease but show no significant FDG hypermetabolism. This may be in part  due to size of nodules being below PET sensitivity threshold.  4. Faint area of low-attenuation right lobe liver poorly evaluated with  noncontrast low-dose CT that shows no obvious intense FDG  hypermetabolism.  5. Other nonacute findings as above on low dose CT.        CTCAP 05-03-22  FINDINGS:    Lungs:  Cavitary mass in the right lower lobe has nearly resolved.  A  right lower lobe pulmonary nodule is 7.4 mm and was previously 7.5 mm.  No change. Image #[id number].  A left lower lobe pulmonary nodule, image #52, is  9.8 mm and was previously 9.8 mm. No change.    Pleural space:  Unremarkable.  No pneumothorax.  No significant  effusion.    Heart:  Unremarkable.  No cardiomegaly.  No significant pericardial  effusion.  No significant coronary artery calcifications.    Bones/joints:  The bony structures appear stable. No acute bony  findings  identified.  No dislocation.    Soft tissues:  Unremarkable.    Vasculature:  Please note, pulmonary emboli noted on the prior exam  are not well evaluated. The previously described filling defects of the  main pulmonary arteries are not evident.  No thoracic aortic aneurysm.    Lymph nodes:  No mediastinal adenopathy by CT size criteria.    Gallbladder and bile ducts:  Cholelithiasis noted.    Other findings:  Other smaller nodules appear stable in size and  configuration.  No new nodules identified.     IMPRESSION:  1.  Near-complete resolution of previously described cavitary mass in  the right lower lobe periphery now only with linear scarring noted.  2.  Index nodules of both lungs are stable from previous exam with no  size increase identified. No new nodules are noted.  3.  Cholelithiasis.  4.  Due to timing of contrast on this study, assessment of pulmonary  emboli not performed. There appears to be significant improvement in  clot burden however in the more central vessels.    FINDINGS:    Lung bases:  Pulmonary nodules of the lung bases.    Mediastinum:  Small hiatal hernia.      ABDOMEN:    Liver:  Low attenuation lesions of the right lobe liver appear of  decreased prominence from the previous exam. Segment 8 liver lesion is  1.1 cm. Segment 7 liver lesion is approximately 0.8 cm.    Gallbladder and bile ducts:  Cholelithiasis.  No ductal dilation.    Pancreas:  Unremarkable.  No mass.  No ductal dilation.    Spleen:  Unremarkable.  No splenomegaly.    Adrenals:  No adrenal masses identified.    Kidneys and ureters:  Unremarkable.  No solid mass.  No  hydronephrosis.    Stomach and bowel:  Sigmoid diverticulosis without evidence of  diverticulitis.  No obstruction.      PELVIS:    Appendix:  Normal appendix.    Bladder:  Unremarkable.  No mass.    Reproductive:  Unremarkable as visualized.      ABDOMEN and PELVIS:    Intraperitoneal space:  Unremarkable.  No free air.  No significant  fluid collection.     Bones/joints:  No acute fracture.  No dislocation.    Soft tissues:  Unremarkable.    Vasculature:  Unremarkable.  No abdominal aortic aneurysm.    Lymph nodes:  No iliac or retroperitoneal adenopathy.     IMPRESSION:  1.  Small low-attenuation lesions of the right lobe liver appears  decreased prominence of the previous exam. No new no focal liver lesions  identified  2.  Cholelithiasis.  3.  Otherwise stable appearance of the abdomen and pelvis.      RECENT LABS:  Lab Results   Component Value Date    WBC 7.69 06/16/2022    HGB 12.1 06/16/2022    HCT 37.5 06/16/2022    MCV 88.7 06/16/2022    RDW 14.1 06/16/2022     06/16/2022    NEUTRORELPCT 28.3 (L) 06/16/2022    LYMPHORELPCT 31.5 06/16/2022    MONORELPCT 6.0 06/16/2022    EOSRELPCT 33.3 (H) 06/16/2022    BASORELPCT 0.8 06/16/2022    NEUTROABS 2.18 06/16/2022    LYMPHSABS 2.42 06/16/2022       Lab Results   Component Value Date     06/16/2022    K 3.9 06/16/2022    CO2 20.9 (L) 06/16/2022     (H) 06/16/2022    BUN 11 06/16/2022    CREATININE 0.73 06/16/2022    EGFRIFNONA 87 02/24/2022    GLUCOSE 90 06/16/2022    CALCIUM 8.8 06/16/2022    ALKPHOS 89 06/16/2022    AST 18 06/16/2022    ALT 16 06/16/2022    BILITOT 0.5 06/16/2022    ALBUMIN 3.79 06/16/2022    PROTEINTOT 6.2 06/16/2022    MG 2.0 12/17/2021     Lab Results   Component Value Date    TSH 1.070 06/16/2022     Lab Results   Component Value Date    FERRITIN 169.00 (H) 01/20/2022    IRON 102 01/20/2022    TIBC 264 (L) 01/20/2022    LABIRON 39 01/20/2022    GHALTYZP82 437 01/20/2022    FOLATE 16.40 01/20/2022     Lab Results   Component Value Date     (H) 01/20/2022           ASSESSMENT & PLAN:  Yadira Flowers is a very pleasant 66 y.o. female with a history of cervical cancer, breast cancer and malignant melanoma.    1. History of Breast Cancer:  - Ms. Onur Burgos had Stage IA ([id number]) moderately differentiated invasive ductal carcinoma of the right breast diagnosed in October  2015.  - She underwent R lympectomy and SLNBx performed by Dr. Steve Isbell 10-22-15 and then received adjuvant radiation in [place].  - After radiation, she was started on Arimidex which was then switched to Exemestane.  She took Exemestane for almost 5 years. Unfortunately, she developed vaginal bleeding and hematuria related to vaginal atrophy related to hormonal blockade.  Given this, stopped Exemestane.  She is doing much better in this regard since stopping hormonal therapy.  - Mammogram 1-13-22 without cause for concern aside from R axillary LAD.  No palpable breast masses.  Repeat exam will be due in January 2023.    2.  Metastatic malignant melanoma:  -  S/p resection of a primary lesion on her back performed by Dr. Catrachito Amaro of Dermatology in [place] in 2004.  -  S/p FNA R axillary LN which was concerning for melanoma.  Excisional biopsy confirmed metastatic malignant melanoma.  - Suspect her lung and liver lesions are also related to her melanoma.  - She has seen Dr. Her and he performed bronchoscopy on 02/23/2022. Pathology was negative for malignant cells.  - Liver aspiration showed abscess which grew Klebsiella, suspect this was a secondary infection but this isn't completely certain at this time.    -  Plan to watch all of these areas as we proceed with treatment.  -  Sent  excised LN tissue for CARIS testing.  -  PET-CT showed no significant FDG uptake but re-demonstrated abnormalities in the R axilla, RLL cavitary mass, Vargas lung nodules and liver.    -  MRI Brain showed no acute findings in the head/brain.  -  Recommended initial therapy with immunotherapy.  We discussed different options for immunotherapy including single agent PD-L1 treatment or combination with Yervoy.  We recommended combination with Yervoy for better RR, PFS and OS even with increased risk of immune-mediated side effects.  We discussed the initially approved FDA dosing v. Altered dosing with lower dose  IPI/higher dosed Nivo.  The latter has been shown to have fewer side effects and is thought to likely have similar efficacy (though trials weren't really powered to definitively show that).  After a lengthy discussion, we decided to proceed with 4 cycles of Ipi 1 mg/kg / Nivo 3 mg/kg q 21d followed by Nivolumab single agent.  We discussed potential risks, benefits and side effects extensively and she decided to proceed.  -  She completed 4 cycles of Ipi / Nivo and has tolerated treatment very well.  -  Repeat CT imaging done 5-3-22 after 4 cycles Ipi/Nivo showed an excellent partial response to treatment.  Have continued with single agent Nivolumab as planned.  Aside from rash (which may be related to her treatment), she denies side effects.  Will continue.  -  Encouraged her to f/u with her new dermatologist as she has been planning.   She says she will call to reschedule.  -  Plan to have her return with CT CAP prior to 8-11 treatment.    3. Mild Erythema/Pruritus of Chest and Bilateral Arms  - Advised patient to apply moisturizing barrier such as Udder cream or Cerave Itch Relief 2-3 x daily.   -  Tried Zyrtec but this was too sedating.     -  Encouraged her to see her new dermatologist as she is planning.     Will continue to monitor.    4. Constipation  -  Continue Senna 2 tabs BID. Also advised to use Miralax as needed.     5. Bilateral pulmonary emboli:  -  CTPE protocol 1-20-22  confirmed bilateral pulmonary emboli  -  BLE dopplers negative for DVT.  -  Continue Eliquis 5 mg BID.    -  We previously discussed length of therapy.  She watched her  die with PE and her mother has h/o PEs as well.  She denies any side effect of the Eliquis.  Presumably the cancer was the risk factor for development of PE and since her cancer is incurable, I have recommended she continue Eliquis indefinately as long as she continues without significant side effects.     6.  H/o Colon polyps:  -  Dr. Maldonado performed c-scope  12-09-20 with discovery of 8 hyperplastic polyps in the distal rectum as well as diverticulosis.  He recommended repeat c-scope after 5 years.    7.  H/o cervical cancer treated with cryotherapy at [facility] in [facility]:  -  Followed up with Dr. Manav Real of gynecology.  She says she had exam including pap smear and then pelvic U/S which was unrevealing.    8. Prophylaxis:  -  Had COVID vaccine x 2 (Pfizer).  She received Evusheld on 4-7-22..  Recommended 2021 influenza vaccine.    9. ACO / DWAYNE/Other  Quality measures  -  Yadira Flowers did not receive 2021 flu vaccine. This was recommended, she declined.  -  Yadira Flowers reports a pain score of 0.    -  Current outpatient and discharge medications have been reconciled for the patient.  Reviewed by: Loreto Stone MD     10.  Follow up:  - Continue Nivolumab as planned  - Continue Eliquis  - Continue Cerave lotion  -  Call to reschedule appt with dermatology as planned  -  Repeat CT CAP prior to treatment on 8-11 as planned. I will see her back p imaging with CBCD, CMP, TSH.    This note was scribed for Loreto Stone MD by Alba Felton RN.    I, Loreto Stone MD, personally performed the services described in this documentation as scribed by the above named individual in my presence, and it is both accurate and complete.  06/16/2022      I spent 30 minutes with Yadira Flowers today.  In the office today, more than 50% of this time was spent face-to-face with her  in counseling / coordination of care, reviewing her medical history and counseling on the current treatment plan.  All questions were answered to her satisfaction      Electronically Signed by:  Loreto Stone MD       CC:     MD Manav Cotto MD Aaron House, MD

## 2022-06-16 ENCOUNTER — OFFICE VISIT (OUTPATIENT)
Dept: ONCOLOGY | Facility: CLINIC | Age: 67
End: 2022-06-16

## 2022-06-16 ENCOUNTER — LAB (OUTPATIENT)
Dept: ONCOLOGY | Facility: CLINIC | Age: 67
End: 2022-06-16

## 2022-06-16 ENCOUNTER — INFUSION (OUTPATIENT)
Dept: ONCOLOGY | Facility: HOSPITAL | Age: 67
End: 2022-06-16

## 2022-06-16 VITALS
WEIGHT: 186 LBS | DIASTOLIC BLOOD PRESSURE: 81 MMHG | HEART RATE: 80 BPM | TEMPERATURE: 97.8 F | OXYGEN SATURATION: 97 % | BODY MASS INDEX: 29.19 KG/M2 | RESPIRATION RATE: 18 BRPM | SYSTOLIC BLOOD PRESSURE: 146 MMHG | HEIGHT: 67 IN

## 2022-06-16 DIAGNOSIS — C78.01 MALIGNANT NEOPLASM METASTATIC TO BOTH LUNGS: ICD-10-CM

## 2022-06-16 DIAGNOSIS — T82.868A THROMBOSIS INVOLVING VASCULAR DEVICE, INITIAL ENCOUNTER: Primary | ICD-10-CM

## 2022-06-16 DIAGNOSIS — C43.59 MALIGNANT MELANOMA OF TORSO EXCLUDING BREAST: ICD-10-CM

## 2022-06-16 DIAGNOSIS — Z95.828 PORT-A-CATH IN PLACE: ICD-10-CM

## 2022-06-16 DIAGNOSIS — Z79.899 LONG-TERM USE OF HIGH-RISK MEDICATION: ICD-10-CM

## 2022-06-16 DIAGNOSIS — C78.02 MALIGNANT NEOPLASM METASTATIC TO BOTH LUNGS: ICD-10-CM

## 2022-06-16 DIAGNOSIS — I27.82 OTHER CHRONIC PULMONARY EMBOLISM WITHOUT ACUTE COR PULMONALE: ICD-10-CM

## 2022-06-16 DIAGNOSIS — C78.7 METASTASIS TO LIVER: Primary | ICD-10-CM

## 2022-06-16 DIAGNOSIS — C78.7 METASTASIS TO LIVER: ICD-10-CM

## 2022-06-16 LAB
ALBUMIN SERPL-MCNC: 3.79 G/DL (ref 3.5–5.2)
ALBUMIN/GLOB SERPL: 1.6 G/DL
ALP SERPL-CCNC: 89 U/L (ref 39–117)
ALT SERPL W P-5'-P-CCNC: 16 U/L (ref 1–33)
ANION GAP SERPL CALCULATED.3IONS-SCNC: 11.1 MMOL/L (ref 5–15)
AST SERPL-CCNC: 18 U/L (ref 1–32)
BASOPHILS # BLD AUTO: 0.06 10*3/MM3 (ref 0–0.2)
BASOPHILS NFR BLD AUTO: 0.8 % (ref 0–1.5)
BILIRUB SERPL-MCNC: 0.5 MG/DL (ref 0–1.2)
BUN SERPL-MCNC: 11 MG/DL (ref 8–23)
BUN/CREAT SERPL: 15.1 (ref 7–25)
CALCIUM SPEC-SCNC: 8.8 MG/DL (ref 8.6–10.5)
CHLORIDE SERPL-SCNC: 108 MMOL/L (ref 98–107)
CO2 SERPL-SCNC: 20.9 MMOL/L (ref 22–29)
CREAT SERPL-MCNC: 0.73 MG/DL (ref 0.57–1)
DEPRECATED RDW RBC AUTO: 45.4 FL (ref 37–54)
EGFRCR SERPLBLD CKD-EPI 2021: 91.4 ML/MIN/1.73
EOSINOPHIL # BLD AUTO: 2.56 10*3/MM3 (ref 0–0.4)
EOSINOPHIL NFR BLD AUTO: 33.3 % (ref 0.3–6.2)
ERYTHROCYTE [DISTWIDTH] IN BLOOD BY AUTOMATED COUNT: 14.1 % (ref 12.3–15.4)
GLOBULIN UR ELPH-MCNC: 2.4 GM/DL
GLUCOSE SERPL-MCNC: 90 MG/DL (ref 65–99)
HCT VFR BLD AUTO: 37.5 % (ref 34–46.6)
HGB BLD-MCNC: 12.1 G/DL (ref 12–15.9)
IMM GRANULOCYTES # BLD AUTO: 0.01 10*3/MM3 (ref 0–0.05)
IMM GRANULOCYTES NFR BLD AUTO: 0.1 % (ref 0–0.5)
LYMPHOCYTES # BLD AUTO: 2.42 10*3/MM3 (ref 0.7–3.1)
LYMPHOCYTES NFR BLD AUTO: 31.5 % (ref 19.6–45.3)
MCH RBC QN AUTO: 28.6 PG (ref 26.6–33)
MCHC RBC AUTO-ENTMCNC: 32.3 G/DL (ref 31.5–35.7)
MCV RBC AUTO: 88.7 FL (ref 79–97)
MONOCYTES # BLD AUTO: 0.46 10*3/MM3 (ref 0.1–0.9)
MONOCYTES NFR BLD AUTO: 6 % (ref 5–12)
NEUTROPHILS NFR BLD AUTO: 2.18 10*3/MM3 (ref 1.7–7)
NEUTROPHILS NFR BLD AUTO: 28.3 % (ref 42.7–76)
NRBC BLD AUTO-RTO: 0 /100 WBC (ref 0–0.2)
PLAT MORPH BLD: NORMAL
PLATELET # BLD AUTO: 188 10*3/MM3 (ref 140–450)
PMV BLD AUTO: 10 FL (ref 6–12)
POTASSIUM SERPL-SCNC: 3.9 MMOL/L (ref 3.5–5.2)
PROT SERPL-MCNC: 6.2 G/DL (ref 6–8.5)
RBC # BLD AUTO: 4.23 10*6/MM3 (ref 3.77–5.28)
RBC MORPH BLD: NORMAL
SODIUM SERPL-SCNC: 140 MMOL/L (ref 136–145)
T4 FREE SERPL-MCNC: 1.06 NG/DL (ref 0.93–1.7)
TSH SERPL DL<=0.05 MIU/L-ACNC: 1.07 UIU/ML (ref 0.27–4.2)
WBC NRBC COR # BLD: 7.69 10*3/MM3 (ref 3.4–10.8)

## 2022-06-16 PROCEDURE — 36593 DECLOT VASCULAR DEVICE: CPT

## 2022-06-16 PROCEDURE — 25010000002 NIVOLUMAB 240 MG/24ML SOLUTION 24 ML VIAL: Performed by: INTERNAL MEDICINE

## 2022-06-16 PROCEDURE — 25010000002 HEPARIN LOCK FLUSH PER 10 UNITS: Performed by: INTERNAL MEDICINE

## 2022-06-16 PROCEDURE — 84443 ASSAY THYROID STIM HORMONE: CPT | Performed by: INTERNAL MEDICINE

## 2022-06-16 PROCEDURE — 84439 ASSAY OF FREE THYROXINE: CPT | Performed by: INTERNAL MEDICINE

## 2022-06-16 PROCEDURE — 85025 COMPLETE CBC W/AUTO DIFF WBC: CPT | Performed by: INTERNAL MEDICINE

## 2022-06-16 PROCEDURE — 96413 CHEMO IV INFUSION 1 HR: CPT

## 2022-06-16 PROCEDURE — 85007 BL SMEAR W/DIFF WBC COUNT: CPT | Performed by: INTERNAL MEDICINE

## 2022-06-16 PROCEDURE — 25010000002 ALTEPLASE 2 MG RECONSTITUTED SOLUTION: Performed by: NURSE PRACTITIONER

## 2022-06-16 PROCEDURE — 99214 OFFICE O/P EST MOD 30 MIN: CPT | Performed by: INTERNAL MEDICINE

## 2022-06-16 PROCEDURE — 80053 COMPREHEN METABOLIC PANEL: CPT | Performed by: INTERNAL MEDICINE

## 2022-06-16 RX ORDER — HEPARIN SODIUM (PORCINE) LOCK FLUSH IV SOLN 100 UNIT/ML 100 UNIT/ML
300 SOLUTION INTRAVENOUS ONCE
Status: CANCELLED | OUTPATIENT
Start: 2022-06-16

## 2022-06-16 RX ORDER — HEPARIN SODIUM (PORCINE) LOCK FLUSH IV SOLN 100 UNIT/ML 100 UNIT/ML
500 SOLUTION INTRAVENOUS AS NEEDED
Status: DISCONTINUED | OUTPATIENT
Start: 2022-06-16 | End: 2022-06-16 | Stop reason: HOSPADM

## 2022-06-16 RX ORDER — SODIUM CHLORIDE 0.9 % (FLUSH) 0.9 %
10 SYRINGE (ML) INJECTION AS NEEDED
Status: CANCELLED | OUTPATIENT
Start: 2022-07-14

## 2022-06-16 RX ORDER — HEPARIN SODIUM (PORCINE) LOCK FLUSH IV SOLN 100 UNIT/ML 100 UNIT/ML
500 SOLUTION INTRAVENOUS AS NEEDED
Status: CANCELLED | OUTPATIENT
Start: 2022-07-14

## 2022-06-16 RX ORDER — SODIUM CHLORIDE 9 MG/ML
250 INJECTION, SOLUTION INTRAVENOUS ONCE
Status: COMPLETED | OUTPATIENT
Start: 2022-06-16 | End: 2022-06-16

## 2022-06-16 RX ORDER — SODIUM CHLORIDE 0.9 % (FLUSH) 0.9 %
10 SYRINGE (ML) INJECTION AS NEEDED
Status: DISCONTINUED | OUTPATIENT
Start: 2022-06-16 | End: 2022-06-16 | Stop reason: HOSPADM

## 2022-06-16 RX ORDER — SODIUM CHLORIDE 0.9 % (FLUSH) 0.9 %
20 SYRINGE (ML) INJECTION AS NEEDED
Status: CANCELLED | OUTPATIENT
Start: 2022-06-16

## 2022-06-16 RX ADMIN — SODIUM CHLORIDE 480 MG: 9 INJECTION, SOLUTION INTRAVENOUS at 16:20

## 2022-06-16 RX ADMIN — Medication 10 ML: at 16:56

## 2022-06-16 RX ADMIN — ALTEPLASE: 2.2 INJECTION, POWDER, LYOPHILIZED, FOR SOLUTION INTRAVENOUS at 15:15

## 2022-06-16 RX ADMIN — SODIUM CHLORIDE 250 ML: 9 INJECTION, SOLUTION INTRAVENOUS at 15:50

## 2022-06-16 RX ADMIN — Medication 500 UNITS: at 16:56

## 2022-07-01 RX ORDER — FLUTICASONE PROPIONATE 50 MCG
2 SPRAY, SUSPENSION (ML) NASAL DAILY
Qty: 18.2 ML | Refills: 3 | Status: SHIPPED | OUTPATIENT
Start: 2022-07-01 | End: 2023-02-02 | Stop reason: SDUPTHER

## 2022-07-14 ENCOUNTER — APPOINTMENT (OUTPATIENT)
Dept: ONCOLOGY | Facility: HOSPITAL | Age: 67
End: 2022-07-14

## 2022-07-14 ENCOUNTER — HOSPITAL ENCOUNTER (OUTPATIENT)
Dept: GENERAL RADIOLOGY | Facility: HOSPITAL | Age: 67
Discharge: HOME | End: 2022-07-14
Admitting: NURSE PRACTITIONER

## 2022-07-14 ENCOUNTER — INFUSION (OUTPATIENT)
Dept: ONCOLOGY | Facility: HOSPITAL | Age: 67
End: 2022-07-14

## 2022-07-14 VITALS
RESPIRATION RATE: 18 BRPM | DIASTOLIC BLOOD PRESSURE: 80 MMHG | OXYGEN SATURATION: 95 % | BODY MASS INDEX: 30.12 KG/M2 | HEART RATE: 76 BPM | WEIGHT: 192.3 LBS | SYSTOLIC BLOOD PRESSURE: 141 MMHG | TEMPERATURE: 97.1 F

## 2022-07-14 DIAGNOSIS — Z95.828 PORT-A-CATH IN PLACE: ICD-10-CM

## 2022-07-14 DIAGNOSIS — C78.02 MALIGNANT NEOPLASM METASTATIC TO BOTH LUNGS: ICD-10-CM

## 2022-07-14 DIAGNOSIS — C78.7 METASTASIS TO LIVER: Primary | ICD-10-CM

## 2022-07-14 DIAGNOSIS — Z79.899 LONG-TERM USE OF HIGH-RISK MEDICATION: Primary | ICD-10-CM

## 2022-07-14 DIAGNOSIS — T82.868A THROMBOSIS INVOLVING VASCULAR DEVICE, INITIAL ENCOUNTER: ICD-10-CM

## 2022-07-14 DIAGNOSIS — C43.59 MALIGNANT MELANOMA OF TORSO EXCLUDING BREAST: ICD-10-CM

## 2022-07-14 DIAGNOSIS — Z79.899 LONG-TERM USE OF HIGH-RISK MEDICATION: ICD-10-CM

## 2022-07-14 DIAGNOSIS — C78.01 MALIGNANT NEOPLASM METASTATIC TO BOTH LUNGS: ICD-10-CM

## 2022-07-14 LAB
ALBUMIN SERPL-MCNC: 4.34 G/DL (ref 3.5–5.2)
ALBUMIN/GLOB SERPL: 2.1 G/DL
ALP SERPL-CCNC: 92 U/L (ref 39–117)
ALT SERPL W P-5'-P-CCNC: 19 U/L (ref 1–33)
ANION GAP SERPL CALCULATED.3IONS-SCNC: 13 MMOL/L (ref 5–15)
AST SERPL-CCNC: 20 U/L (ref 1–32)
BASOPHILS # BLD AUTO: 0.08 10*3/MM3 (ref 0–0.2)
BASOPHILS NFR BLD AUTO: 1 % (ref 0–1.5)
BILIRUB SERPL-MCNC: 0.3 MG/DL (ref 0–1.2)
BUN SERPL-MCNC: 12 MG/DL (ref 8–23)
BUN/CREAT SERPL: 15.4 (ref 7–25)
CALCIUM SPEC-SCNC: 9.6 MG/DL (ref 8.6–10.5)
CHLORIDE SERPL-SCNC: 107 MMOL/L (ref 98–107)
CO2 SERPL-SCNC: 23 MMOL/L (ref 22–29)
CREAT SERPL-MCNC: 0.78 MG/DL (ref 0.57–1)
DEPRECATED RDW RBC AUTO: 47.8 FL (ref 37–54)
EGFRCR SERPLBLD CKD-EPI 2021: 84.4 ML/MIN/1.73
EOSINOPHIL # BLD AUTO: 2.8 10*3/MM3 (ref 0–0.4)
EOSINOPHIL NFR BLD AUTO: 33.6 % (ref 0.3–6.2)
ERYTHROCYTE [DISTWIDTH] IN BLOOD BY AUTOMATED COUNT: 14.2 % (ref 12.3–15.4)
GLOBULIN UR ELPH-MCNC: 2.1 GM/DL
GLUCOSE SERPL-MCNC: 78 MG/DL (ref 65–99)
HCT VFR BLD AUTO: 40.1 % (ref 34–46.6)
HGB BLD-MCNC: 13 G/DL (ref 12–15.9)
IMM GRANULOCYTES # BLD AUTO: 0.01 10*3/MM3 (ref 0–0.05)
IMM GRANULOCYTES NFR BLD AUTO: 0.1 % (ref 0–0.5)
LYMPHOCYTES # BLD AUTO: 2.37 10*3/MM3 (ref 0.7–3.1)
LYMPHOCYTES NFR BLD AUTO: 28.4 % (ref 19.6–45.3)
MCH RBC QN AUTO: 29.6 PG (ref 26.6–33)
MCHC RBC AUTO-ENTMCNC: 32.4 G/DL (ref 31.5–35.7)
MCV RBC AUTO: 91.3 FL (ref 79–97)
MONOCYTES # BLD AUTO: 0.47 10*3/MM3 (ref 0.1–0.9)
MONOCYTES NFR BLD AUTO: 5.6 % (ref 5–12)
NEUTROPHILS NFR BLD AUTO: 2.61 10*3/MM3 (ref 1.7–7)
NEUTROPHILS NFR BLD AUTO: 31.3 % (ref 42.7–76)
NRBC BLD AUTO-RTO: 0 /100 WBC (ref 0–0.2)
PLAT MORPH BLD: NORMAL
PLATELET # BLD AUTO: 208 10*3/MM3 (ref 140–450)
PMV BLD AUTO: 10.1 FL (ref 6–12)
POTASSIUM SERPL-SCNC: 3.7 MMOL/L (ref 3.5–5.2)
PROT SERPL-MCNC: 6.4 G/DL (ref 6–8.5)
RBC # BLD AUTO: 4.39 10*6/MM3 (ref 3.77–5.28)
RBC MORPH BLD: NORMAL
SODIUM SERPL-SCNC: 143 MMOL/L (ref 136–145)
T4 FREE SERPL-MCNC: 1.2 NG/DL (ref 0.93–1.7)
TSH SERPL DL<=0.05 MIU/L-ACNC: 1.84 UIU/ML (ref 0.27–4.2)
WBC NRBC COR # BLD: 8.34 10*3/MM3 (ref 3.4–10.8)

## 2022-07-14 PROCEDURE — 85025 COMPLETE CBC W/AUTO DIFF WBC: CPT

## 2022-07-14 PROCEDURE — 96413 CHEMO IV INFUSION 1 HR: CPT

## 2022-07-14 PROCEDURE — 80053 COMPREHEN METABOLIC PANEL: CPT

## 2022-07-14 PROCEDURE — 36598 INJ W/FLUOR EVAL CV DEVICE: CPT | Performed by: RADIOLOGY

## 2022-07-14 PROCEDURE — 84443 ASSAY THYROID STIM HORMONE: CPT

## 2022-07-14 PROCEDURE — 85007 BL SMEAR W/DIFF WBC COUNT: CPT

## 2022-07-14 PROCEDURE — 25010000002 HEPARIN LOCK FLUSH PER 10 UNITS: Performed by: INTERNAL MEDICINE

## 2022-07-14 PROCEDURE — 84439 ASSAY OF FREE THYROXINE: CPT

## 2022-07-14 PROCEDURE — 25010000002 NIVOLUMAB 240 MG/24ML SOLUTION 24 ML VIAL: Performed by: INTERNAL MEDICINE

## 2022-07-14 PROCEDURE — 36598 INJ W/FLUOR EVAL CV DEVICE: CPT

## 2022-07-14 RX ORDER — SODIUM CHLORIDE 9 MG/ML
250 INJECTION, SOLUTION INTRAVENOUS ONCE
Status: COMPLETED | OUTPATIENT
Start: 2022-07-14 | End: 2022-07-14

## 2022-07-14 RX ORDER — HEPARIN SODIUM (PORCINE) LOCK FLUSH IV SOLN 100 UNIT/ML 100 UNIT/ML
500 SOLUTION INTRAVENOUS AS NEEDED
Status: DISCONTINUED | OUTPATIENT
Start: 2022-07-14 | End: 2022-07-14 | Stop reason: HOSPADM

## 2022-07-14 RX ORDER — HEPARIN SODIUM (PORCINE) LOCK FLUSH IV SOLN 100 UNIT/ML 100 UNIT/ML
500 SOLUTION INTRAVENOUS AS NEEDED
Status: CANCELLED | OUTPATIENT
Start: 2022-08-11

## 2022-07-14 RX ORDER — HEPARIN SODIUM (PORCINE) LOCK FLUSH IV SOLN 100 UNIT/ML 100 UNIT/ML
300 SOLUTION INTRAVENOUS ONCE
Status: CANCELLED | OUTPATIENT
Start: 2022-08-11

## 2022-07-14 RX ORDER — SODIUM CHLORIDE 0.9 % (FLUSH) 0.9 %
20 SYRINGE (ML) INJECTION AS NEEDED
Status: CANCELLED | OUTPATIENT
Start: 2022-08-11

## 2022-07-14 RX ORDER — SODIUM CHLORIDE 0.9 % (FLUSH) 0.9 %
10 SYRINGE (ML) INJECTION AS NEEDED
Status: DISCONTINUED | OUTPATIENT
Start: 2022-07-14 | End: 2022-07-14 | Stop reason: HOSPADM

## 2022-07-14 RX ORDER — SODIUM CHLORIDE 0.9 % (FLUSH) 0.9 %
10 SYRINGE (ML) INJECTION AS NEEDED
Status: CANCELLED | OUTPATIENT
Start: 2022-08-11

## 2022-07-14 RX ADMIN — SODIUM CHLORIDE 480 MG: 9 INJECTION, SOLUTION INTRAVENOUS at 15:38

## 2022-07-14 RX ADMIN — Medication 500 UNITS: at 16:25

## 2022-07-14 RX ADMIN — SODIUM CHLORIDE 250 ML: 9 INJECTION, SOLUTION INTRAVENOUS at 15:37

## 2022-07-14 RX ADMIN — Medication 10 ML: at 16:24

## 2022-08-04 DIAGNOSIS — C78.01 MALIGNANT NEOPLASM METASTATIC TO BOTH LUNGS: ICD-10-CM

## 2022-08-04 DIAGNOSIS — C78.02 MALIGNANT NEOPLASM METASTATIC TO BOTH LUNGS: ICD-10-CM

## 2022-08-04 DIAGNOSIS — C43.59 MALIGNANT MELANOMA OF TORSO EXCLUDING BREAST: Primary | ICD-10-CM

## 2022-08-04 DIAGNOSIS — C78.7 METASTASIS TO LIVER: ICD-10-CM

## 2022-08-04 RX ORDER — SODIUM CHLORIDE 9 MG/ML
250 INJECTION, SOLUTION INTRAVENOUS ONCE
Status: CANCELLED | OUTPATIENT
Start: 2022-08-11

## 2022-08-08 ENCOUNTER — HOSPITAL ENCOUNTER (OUTPATIENT)
Dept: CT IMAGING | Facility: HOSPITAL | Age: 67
Discharge: HOME | End: 2022-08-08

## 2022-08-08 DIAGNOSIS — C78.02 MALIGNANT NEOPLASM METASTATIC TO BOTH LUNGS: ICD-10-CM

## 2022-08-08 DIAGNOSIS — C78.7 METASTASIS TO LIVER: ICD-10-CM

## 2022-08-08 DIAGNOSIS — C78.01 MALIGNANT NEOPLASM METASTATIC TO BOTH LUNGS: ICD-10-CM

## 2022-08-08 DIAGNOSIS — C43.59 MALIGNANT MELANOMA OF TORSO EXCLUDING BREAST: ICD-10-CM

## 2022-08-08 PROCEDURE — 74177 CT ABD & PELVIS W/CONTRAST: CPT | Performed by: RADIOLOGY

## 2022-08-08 PROCEDURE — 71260 CT THORAX DX C+: CPT | Performed by: RADIOLOGY

## 2022-08-08 PROCEDURE — 71260 CT THORAX DX C+: CPT

## 2022-08-08 PROCEDURE — 74177 CT ABD & PELVIS W/CONTRAST: CPT

## 2022-08-08 PROCEDURE — 25010000002 IOPAMIDOL 61 % SOLUTION: Performed by: INTERNAL MEDICINE

## 2022-08-08 RX ADMIN — IOPAMIDOL 80 ML: 612 INJECTION, SOLUTION INTRAVENOUS at 14:36

## 2022-08-09 NOTE — PROGRESS NOTES
"NAME: Yadira Flowers    : 1955    DATE:  2022    DIAGNOSIS:   1.  Stage IA ([id number]) moderately differentiated invasive ductal carcinoma of the right breast diagnosed in 2015.    2.  H/o malignant Melanoma on her Back    3.  H/o cervical cancer treated with cryotherapy at [facility] in     4.  Recurrent / metastatic malignant Melanoma  -  R axillary LN bx 22.    -  She has R axillary LN mass (2.47x2.16), Solitary cavitary R lobe liver lesion, cavitary RLL lung mass,  and bilateral parenchymal lung nodules      TREATMENT HISTORY:   1.           CHIEF COMPLAINT:  Follow up of metastatic melanoma    HISTORY OF PRESENT ILLNESS:   Yadira Flowers is a very pleasant 66 y.o. female who was referred by Dr. Dilcia Pedro for evaluation and treatment of breast cancer. She was living in [place] in 2015 when her dog brought attention to and \"found\" an abnormal lump in her right breast.  She was treated by Dr. Steve Isbell and underwent R lumpectomy with SLNBx on 10-22-15 as below.  She then had what sounds like accelerated partial breast irradiation.  She was then started on Arimidex which was later switched to Exemestane for a better side effect profile.  She took this for about 5 years, stopping a couple of weeks ago.  She stopped taking Exemestane because she developed some vaginal bleeding and hematuria.  This was evaluated by her PCP and gynecologist and felt to be due to vaginal atrophy.  She was prescribed a hormone pill which she hasn't yet started and was referred here to discuss things further.      Aside from bleeding which has been uncomfortable and distressing for her, Ms. Onur Burgos has generally been well.  The last year has been hard on her emotionally with the loss of her  and then her dog, her home and her insurance, but she is coping well and has good family support in [place].  She says she gained weight with her breast cancer treatment and gained weight " when her  .  She has been working now to lose weight and has lost 20 lbs over the last 2 months with dieting.  She denies chest pain or shortness of breath.  She complains of some RLQ cramping pain and isn't sure what is causing that.  She denies difficulty moving her bowels.  No blood in her stool that she is aware of.  She has had vaginal bleeding and hematuria as above.        INTERVAL HISTORY:  Ms. Burgos presents today for follow up of metastatic melanoma.  She continues to tolerate treatment very well.  She had been having a somewhat pruritic rash, but this is resolved completely.  She has had a lot of stress with her family (sister recently tried to commit suicide, lost her brother-in-law to esophageal cancer and another brother-in-law is currently being treated for esophageal cancer), but she is otherwise doing well.  She is excited to discuss her imaging today.  She does complain that on the Eliquis she frequently has easy bleeding and wonders if her dose could be reduced.      PAST MEDICAL HISTORY:  Past Medical History:   Diagnosis Date   • Back pain    • Basal cell carcinoma (BCC) in situ of skin     Dr. Onur Steven Derm   • Breast cancer (HCC)        • Cancer (HCC)     breast, cervical, melanoma   • Cervical cancer (HCC)     Diagnosed in .  Treated by repeated local intervention and ultimately received cryotherapy at [facility] with resolution.   • Elbow fracture    • Foot fracture    • Headache    • Hypertension     TAKEN OFF MEDS   • Melanoma (HCC)     on her back  - treated by Dermatologist Catrachito Amaro in [place], [place] with what sounds like wide local excision.    • Pulmonary embolism (HCC)    • Sinusitis        PAST SURGICAL HISTORY:  Past Surgical History:   Procedure Laterality Date   • AXILLARY LYMPH NODE BIOPSY/EXCISION Right 2022    Procedure: AXILLARY LYMPH NODE BIOPSY/EXCISION;  Surgeon: Dianelys Cummings MD;  Location: [facility];  Service: General;  Laterality:  Right;   • BREAST LUMPECTOMY Right 2015   • BREAST SURGERY  2015    Secondary to cancer   • BRONCHOSCOPY Right 2022    Procedure: BRONCHOSCOPY WITH ENDOBRONCHIAL ULTRASOUND;  Surgeon: Chris Her MD;  Location:  [facility];  Service: Pulmonary;  Laterality: Right;   • COLONOSCOPY N/A 2020    Procedure: COLONOSCOPY;  Surgeon: Mohsen Maldonado MD;  Location:  [facility];  Service: Gastroenterology;  Laterality: N/A;   • ELBOW FUSION      left   • FOOT SURGERY Right 2014    Broken foot   • HAND SURGERY  2020    right   • RHINOPLASTY     • SKIN CANCER EXCISION      malignant melanoma from back    • US GUIDED LYMPH NODE BIOPSY  2022   • VENOUS ACCESS DEVICE (PORT) INSERTION N/A 2022    Procedure: INSERTION VENOUS ACCESS DEVICE left or right;  Surgeon: Dianelys Cummings MD;  Location:  [facility];  Service: General;  Laterality: N/A;       FAMILY HISTORY:  Family History   Problem Relation Age of Onset   • Other Mother         blood clots   • Hypertension Mother    • Ulcers Father    • Hypertension Sister    • Other Sister         Multiple Sclerosis   • Cancer Paternal Aunt    • Stroke Maternal Grandmother    • Alcohol abuse Paternal Grandmother    • Hypertension Sister    • Multiple sclerosis Sister    • Hypertension Sister    • Coronary artery disease Paternal Grandfather    • Cancer Maternal Aunt 30        colon   • Breast cancer Neg Hx    Many paternal aunts  with malignancy of various types. Many cousins on that side of the family have had cancer as well.  One  of colon cancer in her 30s.  One had a brain tumor.    SOCIAL HISTORY:  Social History     Socioeconomic History   • Marital status:    Tobacco Use   • Smoking status: Never Smoker   • Smokeless tobacco: Never Used   Vaping Use   • Vaping Use: Never used   Substance and Sexual Activity   • Alcohol use: Not Currently   • Drug use: Never   • Sexual activity: Not Currently     Birth control/protection:  Post-menopausal         REVIEW OF SYSTEMS:   A comprehensive 14 point review of systems was performed.  Significant findings as mentioned above.  All other systems reviewed and are negative.      MEDICATIONS:  The current medication list was reviewed in the EMR    Current Outpatient Medications:   •  apixaban (ELIQUIS) 5 MG tablet tablet, Take 1 tablet by mouth 2 (Two) Times a Day., Disp: 60 tablet, Rfl: 5  •  fluticasone (Flonase) 50 MCG/ACT nasal spray, 2 sprays into the nostril(s) as directed by provider Daily., Disp: 18.2 mL, Rfl: 3  •  lidocaine-prilocaine (EMLA) 2.5-2.5 % cream, Apply to port a cath ~30 min -1 hour prior to chemotherapy, Disp: 30 g, Rfl: 3  •  ondansetron ODT (Zofran ODT) 8 MG disintegrating tablet, Place 1 tablet on the tongue Every 8 (Eight) Hours As Needed for Nausea or Vomiting., Disp: 30 tablet, Rfl: 5  •  prochlorperazine (COMPAZINE) 10 MG tablet, TAKE 1 TABLET BY MOUTH EVERY 6 HOURS AS NEEDED FOR NAUSEA, Disp: 360 tablet, Rfl: 3  •  sennosides-docusate (senna-docusate sodium) 8.6-50 MG per tablet, Take 2 tablets by mouth 2 (Two) Times a Day., Disp: 120 tablet, Rfl: 2    ALLERGIES:    No Known Allergies    PHYSICAL EXAM:  Vitals:    08/11/22 0953   BP: 133/83   Pulse: 82   Resp: 18   Temp: 97.3 °F (36.3 °C)   SpO2: 96%     Pain Score    08/11/22 0953   PainSc:   3   PainLoc: Back     ECOG score: 0   General:  Awake, alert and oriented, appears well.  HEENT:  Pupils are equal, round and reactive to light and accommodation, Extra-ocular movements full, Oropharyx clear, mucous membranes moist  Neck:  No JVD, thyromegaly or lymphadenopathy  CV:  Regular rate and rhythm, no murmurs, rubs or gallops  Resp:  Lungs are clear to auscultation bilaterally, no crackles  Breast: Deferred today. Previously: S/p R lumpectomy. There is a somewhat firm area at the site of surgery but there are no palpable masses.  She is s/p excision of R axillary LN, with some post-surgical swelling.  Left breast is  without mass lesions.  No L axillary adenopathy.  Abd:  Soft, non-tender, non-distended, bowel sounds present, no organomegaly or masses.    Ext:  No clubbing, cyanosis or edema.    Lymph:  No cervical, supraclavicular, axillary adenopathy  Skin: Previously had a vaguely erythematous dry appearing rash over her torso and arms.  This has resolved completely.  Neuro:  MS as above, CN II-XII intact, grossly non-focal exam          PATHOLOGY:  10-22-15            12-16-21          01-13-22          ENDOSCOPY:  Colonoscopy 12-09-20     Findings: 8 hyperplastic appearing polyps of the distal rectum, diverticulosis moderately throughout the sigmoid colon      IMAGING:  CTCAP 12-15-20  On the lung windows there are several calcified  granulomas in the lungs. No noncalcified pulmonary parenchymal lung  nodules were identified. On the soft tissue windows there were no  enlarged lymph nodes in the supraclavicular or axillary regions. No  mediastinal or hilar lymphadenopathy was seen. The heart was not  enlarged. There were no pericardial effusions.     IMPRESSION:  No CT findings of metastatic disease in the chest. There are  calcified granulomas in the lungs.     CT FINDINGS: The liver and spleen were normal in size and shape. The  gallbladder contains several stones. The wall was not thickened. The  bile ducts in the liver are not dilated. There is nothing seen to  suggest metastatic disease in the liver. The pancreas shows no evidence  of mass or inflammation. No adrenal or renal lesions are demonstrated.  The aorta is normal in caliber. There were no enlarged lymph nodes in  the retroperitoneum or in the mesentery. The bowel shows no evidence of  obstruction. In the pelvis there were no masses or fluid collections.  There were no ventral hernias.     IMPRESSION:  No CT findings of metastatic disease in the abdomen or  pelvis. This study does demonstrate several stones in the lumen of the  gallbladder.         Bilateral  diagnostic mammogram 01-31-21  FINDINGS: There are scattered areas of fibroglandular density.     The bilateral fibroglandular pattern does not appear significantly  changed compared to the exam from 2020. This includes postoperative  changes in the right 12:00 region. Mild skin thickening is noted  involving the right breast which is likely treatment related. A few  bilateral scattered calcifications are noted.     IMPRESSION:  Incomplete examination as comparison with older outside  prior mammograms is needed.        BI-RADS CATEGORY:   0, INCOMPLETE:  Need prior mammograms for comparison        RECOMMENDATION:  We will attempt to obtain older outside prior  mammograms for comparison.      CTCAP 12-12-21  FINDINGS: Lack of IV contrast hinders parenchymal assessment of the mediastinum, liver, spleen, pancreas, adrenal glands and kidneys.         Significant infiltrate seen in the right lower lobe with interstitial component. There are also lung nodules present in the right lung. Index nodule in the right lung base medially is 5 mm maximal diameter. Other smaller nodules seen throughout the right  lung. Tiny 3 mm lingular nodule seen in series 3 image 30. A benign calcified granuloma seen in the left lower lobe but a noncalcified indeterminant  5 mm left lower lobe nodule seen, series 3 image 42.     Nonenlarged mediastinal adenopathy seen. However there is an enlarged right axillary 1.8 x 2.6 cm lymph node partially seen.     Worrisome hepatic metastasis or primary hepatic lesion seen measuring 4.4 x 4.1 cm in liver segment 8. Margins are ill-defined. Abscess could have a similar appearance. Suggestion of subtle gallstones. Nonobstructing 2 mm right renal calculus seen. No  obstructive uropathy. Gaseous distention of the esophagus and hiatal hernia present consistent with GERD. No enteric contrast but no gross evidence of bowel obstruction.   There is no gross free air, free fluid or focal inflammatory change.   Uterus and  adnexa are not enlarged. Osseous structures are grossly intact.     IMPRESSION:  Constellation of findings with significant consolidation in the right lower lobe, enlarged right axillary lymph node, indeterminate bilateral lung nodules and ill-defined hepatic lesion concerning for underlying malignancy in this patient .     FINDINGS: Lack of IV contrast hinders parenchymal assessment of the mediastinum, liver, spleen, pancreas, adrenal glands and kidneys.         Significant infiltrate seen in the right lower lobe with interstitial component. There are also lung nodules present in the right lung. Index nodule in the right lung base medially is 5 mm maximal diameter. Other smaller nodules seen throughout the right  lung. Tiny 3 mm lingular nodule seen in series 3 image 30. A benign calcified granuloma seen in the left lower lobe but a noncalcified indeterminant  5 mm left lower lobe nodule seen, series 3 image 42.     Nonenlarged mediastinal adenopathy seen. However there is an enlarged right axillary 1.8 x 2.6 cm lymph node partially seen.     Worrisome hepatic metastasis or primary hepatic lesion seen measuring 4.4 x 4.1 cm in liver segment 8. Margins are ill-defined. Abscess could have a similar appearance. Suggestion of subtle gallstones. Nonobstructing 2 mm right renal calculus seen. No  obstructive uropathy. Gaseous distention of the esophagus and hiatal hernia present consistent with GERD. No enteric contrast but no gross evidence of bowel obstruction.   There is no gross free air, free fluid or focal inflammatory change.  Uterus and  adnexa are not enlarged. Osseous structures are grossly intact.     IMPRESSION:  Constellation of findings with significant consolidation in the right lower lobe, enlarged right axillary lymph node, indeterminate bilateral lung nodules and ill-defined hepatic lesion concerning for underlying malignancy in this patient .       US Abdomen Complete 12-12-21  FINDINGS:   The visualized portions of the pancreas, IVC and aorta appear normal.        The liver is moderately coarsened in echotexture. Ill-defined hypodensity in the liver measuring 3 x 3.2 x 2.6 cm corresponds to recent CT. Unclear if this is a malignant lesion. Abscesses within the differential but prior CT was performed without any  contrast.  The common bile duct is not dilated.. Gallbladder wall is thickened at 3.4 mm with trace pericholecystic fluid and subtle hyperemia. Gallstones and shadowing present.     The kidneys are normal in size and echogenicity. There is no   hydronephrosis or focal solid lesion.  The spleen is normal in size and echotexture.     IMPRESSION:  1. Nonspecific gallbladder wall thickening and questionable pericholecystic fluid. Gallstones are better seen on CT than ultrasound.  2.  Heterogeneous liver with lesion in the liver as seen on CT. Ultrasound is not helpful in determining etiology. This could be an abscess but given the other findings on CT,  malignancy is not excluded.      CTCAP 12-15-21  FINDINGS:    LUNGS:  Increased density of the now right lower lobe mixed  groundglass and more solid-appearing consolidation with internal  cavitary component identified. Tiny right and left lung pulmonary  nodules. Grossly stable.    PLEURAL SPACE:  Small bilateral pleural effusions.  No pneumothorax.    HEART:  Unremarkable.  No cardiomegaly.  No significant pericardial  effusion.    BONES/JOINTS:  Unremarkable.  No acute fracture.  No dislocation.    SOFT TISSUES:  Unremarkable.    VASCULATURE:  Unremarkable.  No thoracic aortic aneurysm.    LYMPH NODES:  Unremarkable.  No enlarged lymph nodes.     IMPRESSION:  1.  Increased density of the now right lower lobe mixed groundglass and  more solid-appearing consolidation with internal cavitary component  identified. Tiny right and left lung pulmonary nodules. Grossly stable.  2.  Small bilateral pleural effusions.    FINDINGS:    LUNG BASES:   Unremarkable.  No mass.  No consolidation.      ABDOMEN:    LIVER:  Right lobe of liver lesion is again identified now measuring  about 4.7 cm and was about 4.7 cm.    GALLBLADDER AND BILE DUCTS:  Gallstones noted in the gallbladder.  No  ductal dilation.    PANCREAS:  Unremarkable.  No mass.  No ductal dilation.    SPLEEN:  Unremarkable.  No splenomegaly.    ADRENALS:  Unremarkable.  No mass.    KIDNEYS AND URETERS:  Unremarkable.  No solid mass.  No  hydronephrosis.    STOMACH AND BOWEL:  Unremarkable.  No obstruction.  No mucosal  thickening.      PELVIS:    APPENDIX:  No findings to suggest acute appendicitis.    BLADDER:  Unremarkable.  No mass.    REPRODUCTIVE:  Unremarkable as visualized.      ABDOMEN and PELVIS:    INTRAPERITONEAL SPACE:  Unremarkable.  No free air.  No significant  fluid collection.    BONES/JOINTS:  No acute fracture.  No dislocation.    SOFT TISSUES:  Unremarkable.    VASCULATURE:  Unremarkable.  No abdominal aortic aneurysm.    LYMPH NODES:  Unremarkable.  No enlarged lymph nodes.     IMPRESSION:    Right lobe of liver lesion is again identified now measuring about 4.7  cm and was about 4.7 cm.      NM Bone Scan Whole Body 12-15-21  FINDINGS:    SKULL/FACIAL BONES:  No focal increased or decreased uptake.    SPINE:  Unremarkable.  No abnormal increased or decreased uptake.    RIBS:  Unremarkable.  No abnormal uptake.    LONG BONES:  Unremarkable.  No abnormal uptake.    PELVIS:  Unremarkable.  No focal increased or decreased uptake.    JOINTS:  Unremarkable.  No focal increased or decreased uptake.    SOFT TISSUES:  Unremarkable.    RENAL/BLADDER:  Within normal limits.     IMPRESSION:    Normal bone scan.      CT Abdomen With Contrast  12-18-21  FINDINGS:  Partially imaged thick-walled cavitary lesion in the right lower lobe with right basilar atelectasis/infiltrate and small right pleural effusion. There is also left basilar atelectasis/infiltrate and trace left pleural effusion.  Multiple noncalcified  pulmonary nodules are scattered throughout the visualized lower lung fields, concerning for pulmonary metastatic disease.     Ill-defined right hepatic mass again noted. The spleen, pancreas, and both adrenal glands are within expected limits. Cholelithiasis. Punctate nonobstructing right intrarenal stone. Tiny left renal cyst. Kidneys are otherwise unremarkable without  hydronephrosis. Abdominal aorta normal in course and caliber without dissection. No pathologic retroperitoneal or mesenteric lymphadenopathy by size criteria. Visualized small bowel and colon are unremarkable. No bowel obstruction. No free fluid or free  air.     No aggressive osseous lesions.     IMPRESSION:     1. Ill-defined right hepatic mass again noted. This has been previously biopsied and correlation with pathology results is recommended.  2. Cholelithiasis.  3. Punctate nonobstructing right intrarenal stone.  4. No threshold abdominal lymphadenopathy or other suspicious abdominal masses.  5. Partially imaged thick-walled cavitary lesion in the right lower lobe. This may be of infectious or neoplastic etiology and continued follow-up is recommended.  6. Small bilateral pleural effusions with bibasilar atelectasis/infiltrate, worse on the right than left.  7. Numerous small noncalcified pulmonary nodules throughout the visualized lower lung fields, concerning for pulmonary metastatic disease.       Mammo Diagnostic Digital Tomosynthesis Bilateral With CAD w/ US Axilla Right 01-13-22  FINDINGS:  There are scattered areas of fibroglandular density.     Incompletely imaged is a very prominent axillary lymph node. The  bilateral fibroglandular pattern itself is stable in appearance  including postoperative changes in the superior aspect of the right  breast. There are stable bilateral calcifications. Mild skin thickening  is again noted involving the right breast which is likely treatment  related.     ULTRASOUND:  Ultrasound evaluation of the right axilla demonstrates 2  markedly enlarged lymph nodes the largest of which measures 3.2 cm in  size and has no demonstrable fatty hilum. Recommend ultrasound-guided  biopsy.      IMPRESSION:  1. Stable mammographic appearance of the left breast with no findings  suspicious for malignancy.        2. Marked right axillary lymphadenopathy. Recommend ultrasound-guided  biopsy. The fibroglandular pattern of the right breast is however  stable.        BI-RADS CATEGORY:  4, SUSPICIOUS        RECOMMENDATION:  Recommend ultrasound-guided biopsy of the dominant  abnormal appearing right axillary lymph node.      DEXA Bone Density Axial 01-13-22  IMPRESSION:  Impression:  1. According to the World Health Organization definitions of  osteoporosis based on bone density, this patient's bone mineral density  is compatible with osteoporosis and the fracture risk is high.      Mammo Diagnostic Digital Tomosynthesis Bilateral With CAD w/ US Axilla Right 01-13-22  FINDINGS:  There are scattered areas of fibroglandular density.     Incompletely imaged is a very prominent axillary lymph node. The  bilateral fibroglandular pattern itself is stable in appearance  including postoperative changes in the superior aspect of the right  breast. There are stable bilateral calcifications. Mild skin thickening  is again noted involving the right breast which is likely treatment  related.     ULTRASOUND: Ultrasound evaluation of the right axilla demonstrates 2  markedly enlarged lymph nodes the largest of which measures 3.2 cm in  size and has no demonstrable fatty hilum. Recommend ultrasound-guided  biopsy.      IMPRESSION:  1. Stable mammographic appearance of the left breast with no findings  suspicious for malignancy.        2. Marked right axillary lymphadenopathy. Recommend ultrasound-guided  biopsy. The fibroglandular pattern of the right breast is however  stable.        BI-RADS CATEGORY:  4, SUSPICIOUS         RECOMMENDATION:  Recommend ultrasound-guided biopsy of the dominant  abnormal appearing right axillary lymph node.      CT Chest With Contrast Diagnostic 01-18-22  FINDINGS:     LUNGS: Solitary mass in the right lower lobe with cavitation.  Significantly smaller today than on the prior study. 6 mm solid nodule  in the right lung posteriorly on image 30 of the axial series. Other  parenchymal nodules are seen in both lungs and are similar to the  previous study. These are concerning for metastatic nodules.     HEART: Unremarkable.     MEDIASTINUM: No masses. No enlarged lymph nodes.  No fluid collections.     PLEURA: No pleural effusion. No pleural mass or abnormal calcification.  No pneumothorax.     VASCULATURE: No evidence of aneurysm. Filling defect in the right  pulmonary artery and possibly left lower lobe pulmonary artery. This  study was not performed to evaluate for pulmonary embolus, but the  findings are concerning for bilateral pulmonary emboli.     BONES: No acute bony abnormality.     VISUALIZED UPPER ABDOMEN:Please see the CT report for the abdomen and  pelvis.     Other: Right axillary soft tissue mass is present and shows slight  interval growth. It measures 2.47 x 2.16 cm today and previously at the  same level measured approximately 2.7 x 1.89 cm.     IMPRESSION:  1. Study was not performed as a PE protocol, but there appear to be  filling defects in pulmonary arteries bilaterally concerning for  pulmonary emboli.  2. Interval decrease in size of cavitary right lower lobe mass.  3. Stable parenchymal nodules bilaterally.  4. Very slight interval growth of right axillary soft tissue mass.     Results are being relayed to the referring physician.      US Liver 01-18-22  FINDINGS: Sonographic imaging of the liver does not show the mass  previously identified in the liver. I would suggest a follow-up CT scan  for better delineation.     Hepatic flow was hepatopedal.     There is shadowing from the  gallbladder fossa.     IMPRESSION:  1. Previously identified mass is not seen on this exam in the liver.  Consider follow-up CT.  2. Shadowing from the gallbladder fossa. In the absence of  cholecystectomy, appearance is concerning for cholelithiasis.      US Venous Doppler Lower Extremity Bilateral (duplex) 01-20-22  FINDINGS:   There is patent spontaneous flow from the common femoral vein through  the posterior tibial veins.  There was no internal clot or area of noncompressibility.  Normal augmentation was elicited where applicable.     IMPRESSION:  No DVT in the lower extremities on today's exam.       CT Chest Pulmonary Embolism 01-20-22  FINDINGS: Today's study demonstrates opacification of the central  pulmonary vessels.  There are filling defects in the pulmonary arteries bilaterally. First  order arteries. This is most compatible with bilateral pulmonary  emboli..     Soft tissue mass in the right lower lobe with somewhat cavitary center  is again noted and unchanged..     There is no mediastinal lymph node enlargement     No pericardial or pleural effusion.        IMPRESSION:  1. Bilateral pulmonary emboli.  2. Parenchymal nodules bilaterally with dominant mass in the right lower  Lobe..      NM PET/CT Skull Base to Mid Thigh 01-28-22  FINDINGS:      HEAD/NECK:  Area of uptake in the posterior right paraspinal space and to lesser  degree the left paraspinal space appears related to muscle uptake.  No FDG hypermetabolic neck adenopathy.  No FDG hypermetabolic masses.     CHEST:   Numerous bilateral pulmonary nodules appear stable from the previous  chest CT and show no FDG hypermetabolism. This is likely due to nodules  being below size threshold for PET sensitivity.  Cavitary lung mass in the right lung localizing to the superior segment  of the lower lobe shows FDG hypermetabolism with maximum SUV: 2.4. This  is at the lower range for malignancy.  Enlarged right lower axillary region lymph node which is  3.0 cm shows  mild FDG hypermetabolism that is approximately with maximum SUV: 2.4.  This is at the lower range for malignancy.  Low-dose CT demonstrating no change in additional scattered pulmonary  nodules from the previous scan. Coronary artery calcifications are  stable.     ABDOMEN/PELVIS:   Faint area of low attenuation involving the right lobe of liver is  poorly evaluated with low-dose noncontrast CT but shows no focal FDG  hypermetabolism.  Physiologic FDG hypermetabolism seen throughout the solid abdominal  organs.  Physiologic FDG hypermetabolism seen throughout the GI tract.  Physiologic FDG hypermetabolism seen throughout the mesentery,  retroperitoneum and pelvis.  Low dose CT redemonstrates nonobstructing kidney stones. Cholelithiasis  again noted.     BONES: Nonspecific FDG tracer activity. No intense areas of tracer  activity noted.     IMPRESSION:     1. Cavitary mass in the right lung that shows very low-grade FDG  hypermetabolism which is at the lower range for malignancy with maximum  SUV: 2.4.  2. Enlarged right axillary region lymph node with low-grade FDG  hypermetabolism also at the lower range for malignancy with maximum SUV:  2.4.  3. Numerous bilateral pulmonary nodules compatible with metastatic  disease but show no significant FDG hypermetabolism. This may be in part  due to size of nodules being below PET sensitivity threshold.  4. Faint area of low-attenuation right lobe liver poorly evaluated with  noncontrast low-dose CT that shows no obvious intense FDG  hypermetabolism.  5. Other nonacute findings as above on low dose CT.        CTCAP 05-03-22  FINDINGS:    Lungs:  Cavitary mass in the right lower lobe has nearly resolved.  A  right lower lobe pulmonary nodule is 7.4 mm and was previously 7.5 mm.  No change. Image #[id number].  A left lower lobe pulmonary nodule, image #52, is  9.8 mm and was previously 9.8 mm. No change.    Pleural space:  Unremarkable.  No pneumothorax.  No  significant  effusion.    Heart:  Unremarkable.  No cardiomegaly.  No significant pericardial  effusion.  No significant coronary artery calcifications.    Bones/joints:  The bony structures appear stable. No acute bony  findings identified.  No dislocation.    Soft tissues:  Unremarkable.    Vasculature:  Please note, pulmonary emboli noted on the prior exam  are not well evaluated. The previously described filling defects of the  main pulmonary arteries are not evident.  No thoracic aortic aneurysm.    Lymph nodes:  No mediastinal adenopathy by CT size criteria.    Gallbladder and bile ducts:  Cholelithiasis noted.    Other findings:  Other smaller nodules appear stable in size and  configuration.  No new nodules identified.     IMPRESSION:  1.  Near-complete resolution of previously described cavitary mass in  the right lower lobe periphery now only with linear scarring noted.  2.  Index nodules of both lungs are stable from previous exam with no  size increase identified. No new nodules are noted.  3.  Cholelithiasis.  4.  Due to timing of contrast on this study, assessment of pulmonary  emboli not performed. There appears to be significant improvement in  clot burden however in the more central vessels.    FINDINGS:    Lung bases:  Pulmonary nodules of the lung bases.    Mediastinum:  Small hiatal hernia.      ABDOMEN:    Liver:  Low attenuation lesions of the right lobe liver appear of  decreased prominence from the previous exam. Segment 8 liver lesion is  1.1 cm. Segment 7 liver lesion is approximately 0.8 cm.    Gallbladder and bile ducts:  Cholelithiasis.  No ductal dilation.    Pancreas:  Unremarkable.  No mass.  No ductal dilation.    Spleen:  Unremarkable.  No splenomegaly.    Adrenals:  No adrenal masses identified.    Kidneys and ureters:  Unremarkable.  No solid mass.  No  hydronephrosis.    Stomach and bowel:  Sigmoid diverticulosis without evidence of  diverticulitis.  No obstruction.       PELVIS:    Appendix:  Normal appendix.    Bladder:  Unremarkable.  No mass.    Reproductive:  Unremarkable as visualized.      ABDOMEN and PELVIS:    Intraperitoneal space:  Unremarkable.  No free air.  No significant  fluid collection.    Bones/joints:  No acute fracture.  No dislocation.    Soft tissues:  Unremarkable.    Vasculature:  Unremarkable.  No abdominal aortic aneurysm.    Lymph nodes:  No iliac or retroperitoneal adenopathy.     IMPRESSION:  1.  Small low-attenuation lesions of the right lobe liver appears  decreased prominence of the previous exam. No new no focal liver lesions  identified  2.  Cholelithiasis.  3.  Otherwise stable appearance of the abdomen and pelvis.    CT Abdomen Pelvis With Contrast (08/08/2022 14:36)  FINDINGS:    LUNG BASES:  Unremarkable.  No mass.  No consolidation.      ABDOMEN:    LIVER:  Stable 1 cm right lobe of liver lesion and more posterior 7 mm  lesion.    GALLBLADDER AND BILE DUCTS:  Gallstones.  Gallbladder otherwise  unremarkable.  No ductal dilation.    PANCREAS:  Unremarkable.  No mass.  No ductal dilation.    SPLEEN:  Unremarkable.  No splenomegaly.    ADRENALS:  Unremarkable.  No mass.    KIDNEYS AND URETERS:  Unremarkable.  No solid mass.  No  hydronephrosis.    STOMACH AND BOWEL:  Unremarkable.  No obstruction.  No mucosal  thickening.      PELVIS:    APPENDIX:  No findings to suggest acute appendicitis.    BLADDER:  Unremarkable.  No mass.    REPRODUCTIVE:  Unremarkable as visualized.      ABDOMEN and PELVIS:    INTRAPERITONEAL SPACE:  Unremarkable.  No free air.  No significant  fluid collection.    BONES/JOINTS:  No acute fracture.  No dislocation.    SOFT TISSUES:  Unremarkable.    VASCULATURE:  Unremarkable.  No abdominal aortic aneurysm.    LYMPH NODES:  Unremarkable.  No enlarged lymph nodes.     IMPRESSION:  1.  Gallstones.  Gallbladder otherwise unremarkable.  2.  Stable 1 cm right lobe of liver lesion and more posterior 7 mm  Lesion.    CT Chest With  Contrast Diagnostic (08/08/2022 14:40)  FINDINGS:    LUNGS:  Multiple pulmonary nodules again noted including a right lower  lobe pulmonary nodule measuring 5 mm that was 5 mm. Another right lower  lobe pulmonary nodule is 7 mm and was 7 mm. A left lower lobe pulmonary  nodule is 5 mm and was 5 mm. Other nodules appear stable.    PLEURAL SPACE:  Unremarkable.  No pneumothorax.  No significant  effusion.    HEART:  Unremarkable.  No cardiomegaly.  No significant pericardial  effusion.  No significant coronary artery calcifications.    BONES/JOINTS:  Unremarkable.  No acute fracture.  No dislocation.    SOFT TISSUES:  Unremarkable.    VASCULATURE:  Unremarkable.  No thoracic aortic aneurysm.    LYMPH NODES:  Unremarkable.  No enlarged lymph nodes.     IMPRESSION:    Multiple pulmonary nodules again noted including a right lower lobe  pulmonary nodule measuring 5 mm that was 5 mm. Another right lower lobe  pulmonary nodule is 7 mm and was 7 mm. A left lower lobe pulmonary  nodule is 5 mm and was 5 mm. Other nodules appear stable.      RECENT LABS:  Lab Results   Component Value Date    WBC 6.15 08/11/2022    HGB 13.2 08/11/2022    HCT 40.9 08/11/2022    MCV 90.3 08/11/2022    RDW 13.7 08/11/2022     08/11/2022    NEUTRORELPCT 40.2 (L) 08/11/2022    LYMPHORELPCT 31.9 08/11/2022    MONORELPCT 6.7 08/11/2022    EOSRELPCT 20.0 (H) 08/11/2022    BASORELPCT 1.0 08/11/2022    NEUTROABS 2.48 08/11/2022    LYMPHSABS 1.96 08/11/2022       Lab Results   Component Value Date     08/11/2022    K 4.4 08/11/2022    CO2 22.3 08/11/2022     08/11/2022    BUN 14 08/11/2022    CREATININE 0.74 08/11/2022    EGFRIFNONA 87 02/24/2022    GLUCOSE 95 08/11/2022    CALCIUM 9.9 08/11/2022    ALKPHOS 77 08/11/2022    AST 18 08/11/2022    ALT 16 08/11/2022    BILITOT 0.3 08/11/2022    ALBUMIN 4.23 08/11/2022    PROTEINTOT 6.4 08/11/2022    MG 2.0 12/17/2021     Lab Results   Component Value Date    TSH 2.510 08/11/2022     Lab  Results   Component Value Date    FERRITIN 169.00 (H) 01/20/2022    IRON 102 01/20/2022    TIBC 264 (L) 01/20/2022    LABIRON 39 01/20/2022    VYCFBMRP46 437 01/20/2022    FOLATE 16.40 01/20/2022     Lab Results   Component Value Date     (H) 01/20/2022           ASSESSMENT & PLAN:  Yadira Flowers is a very pleasant 66 y.o. female with a history of cervical cancer, breast cancer and malignant melanoma.    1. History of Breast Cancer:  - Ms. Onur Burgos had Stage IA ([id number]) moderately differentiated invasive ductal carcinoma of the right breast diagnosed in October 2015.  - She underwent R lympectomy and SLNBx performed by Dr. Steve Isbell 10-22-15 and then received adjuvant radiation in [place].  - After radiation, she was started on Arimidex which was then switched to Exemestane.  She took Exemestane for almost 5 years. Unfortunately, she developed vaginal bleeding and hematuria related to vaginal atrophy related to hormonal blockade.  Given this, stopped Exemestane.  She is doing much better in this regard since stopping hormonal therapy.  - Mammogram 1-13-22 without cause for concern aside from R axillary LAD.  No palpable breast masses.  Repeat exam will be due in January 2023.    2.  Metastatic malignant melanoma:  -  S/p resection of a primary lesion on her back performed by Dr. Catrachito Amaro of Dermatology in [place] in 2004.  -  S/p FNA R axillary LN which was concerning for melanoma.  Excisional biopsy confirmed metastatic malignant melanoma.  - Suspect her lung and liver lesions are also related to her melanoma.  - She has seen Dr. Her and he performed bronchoscopy on 02/23/2022. Pathology was negative for malignant cells.  - Liver aspiration showed abscess which grew Klebsiella, suspect this was a secondary infection but this isn't completely certain at this time.    -  Plan to watch all of these areas as we proceed with treatment.  -  Sent  excised LN tissue for BRI  testing.  -  PET-CT showed no significant FDG uptake but re-demonstrated abnormalities in the R axilla, RLL cavitary mass, Vargas lung nodules and liver.    -  MRI Brain showed no acute findings in the head/brain.  -  Recommended initial therapy with immunotherapy.  We discussed different options for immunotherapy including single agent PD-L1 treatment or combination with Yervoy.  We recommended combination with Yervoy for better RR, PFS and OS even with increased risk of immune-mediated side effects.  We discussed the initially approved FDA dosing v. Altered dosing with lower dose IPI/higher dosed Nivo.  The latter has been shown to have fewer side effects and is thought to likely have similar efficacy (though trials weren't really powered to definitively show that).  After a lengthy discussion, we decided to proceed with 4 cycles of Ipi 1 mg/kg / Nivo 3 mg/kg q 21d followed by Nivolumab single agent.  We discussed potential risks, benefits and side effects extensively and she decided to proceed.  -  She completed 4 cycles of Ipi / Nivo and tolerated treatment very well.  -  Repeat CT imaging done 5-3-22 after 4 cycles Ipi/Nivo showed an excellent partial response to treatment.  Have continued with single agent Nivolumab as planned.  Aside from rash (which may well have been related to combination immunotherapy), which has now resolved, she denies side effects.  Will continue.  -  Encouraged her to f/u with her new dermatologist as she has been planning.   She says she will call to reschedule.  -  Plan to have her return with CT CAP prior to cycle 11 of immunotherapy (due 11/3/2022).  I will plan to see her after imaging.    3. Mild Erythema/Pruritus of Chest and Bilateral Arms  - Suspect this may have been a side effect of dual immunotherapy.  - This has now completely resolved.    4. Constipation  -  Continue Senna 2 tabs BID. Also advised to use Miralax as needed.     5. Bilateral pulmonary emboli:  -  CTPE  protocol 1-20-22  confirmed bilateral pulmonary emboli  -  BLE dopplers negative for DVT.  -  Continue Eliquis 5 mg BID.    -  We previously discussed length of therapy.  She watched her  die with PE and her mother has h/o PEs as well.  Presumably the cancer was the risk factor for development of PE and since her cancer is incurable, I have recommended she continue Eliquis indefinately as long as she continues without significant side effects.  She is now complaining of difficulty with easy bleeding with minor trauma.  It has been over 6 months that she has had full anticoagulation.  Given worsening side effects, will continue Eliquis, but will reduce dose to 2.5 mg twice a day.    6.  H/o Colon polyps:  -  Dr. Maldonado performed c-scope 12-09-20 with discovery of 8 hyperplastic polyps in the distal rectum as well as diverticulosis.  He recommended repeat c-scope after 5 years.    7.  H/o cervical cancer treated with cryotherapy at [facility] in [facility]:  -  Followed up with Dr. Manav Real of gynecology.  She says she had exam including pap smear and then pelvic U/S which was unrevealing.    8. Prophylaxis:  -  Had COVID vaccine x 2 (Pfizer).  She received Evusheld on 4-7-22..  Recommended 2021 influenza vaccine.    9. ACO / DWAYNE/Other  Quality measures  -  Yadira Flowers did not receive 2021 flu vaccine.  Will recommend 2022 vaccine as soon as it is available.-  Yadira Flowers reports a pain score of 3.  Given her pain assessment as noted, treatment options were discussed and the following options were decided upon as a follow-up plan to address the patient's pain: No intervention needed at this time.  -  Current outpatient and discharge medications have been reconciled for the patient.  Reviewed by: Loreto Stone MD      10.  Follow up:  - Continue Nivolumab as planned  - Continue Eliquis, but reduce dose to 2.5 mg twice a day  -  Repeat CT CAP prior to cycle 11 immunotherapy (due 11/3/2022). I will see her  back p imaging with CBCD, CMP, TSH.    I spent 30 minutes with Yadira Lionel today.  In the office today, more than 50% of this time was spent face-to-face with her  in counseling / coordination of care, reviewing her medical history and counseling on the current treatment plan.  All questions were answered to her satisfaction      Electronically Signed by:  Loreto Stone MD       CC:     MD Manav Cotto MD Aaron House, MD

## 2022-08-11 ENCOUNTER — INFUSION (OUTPATIENT)
Dept: ONCOLOGY | Facility: HOSPITAL | Age: 67
End: 2022-08-11

## 2022-08-11 ENCOUNTER — LAB (OUTPATIENT)
Dept: ONCOLOGY | Facility: CLINIC | Age: 67
End: 2022-08-11

## 2022-08-11 ENCOUNTER — OFFICE VISIT (OUTPATIENT)
Dept: ONCOLOGY | Facility: CLINIC | Age: 67
End: 2022-08-11

## 2022-08-11 VITALS
OXYGEN SATURATION: 96 % | HEART RATE: 82 BPM | TEMPERATURE: 97.3 F | BODY MASS INDEX: 44.85 KG/M2 | HEIGHT: 55 IN | DIASTOLIC BLOOD PRESSURE: 83 MMHG | RESPIRATION RATE: 18 BRPM | SYSTOLIC BLOOD PRESSURE: 133 MMHG | WEIGHT: 193.8 LBS

## 2022-08-11 VITALS
RESPIRATION RATE: 18 BRPM | DIASTOLIC BLOOD PRESSURE: 83 MMHG | TEMPERATURE: 97.3 F | WEIGHT: 193.8 LBS | HEIGHT: 67 IN | OXYGEN SATURATION: 96 % | SYSTOLIC BLOOD PRESSURE: 133 MMHG | HEART RATE: 82 BPM | BODY MASS INDEX: 30.42 KG/M2

## 2022-08-11 DIAGNOSIS — C43.59 MALIGNANT MELANOMA OF TORSO EXCLUDING BREAST: ICD-10-CM

## 2022-08-11 DIAGNOSIS — C78.02 MALIGNANT NEOPLASM METASTATIC TO BOTH LUNGS: ICD-10-CM

## 2022-08-11 DIAGNOSIS — C43.9 METASTATIC MELANOMA: ICD-10-CM

## 2022-08-11 DIAGNOSIS — Z17.0 MALIGNANT NEOPLASM OF RIGHT BREAST IN FEMALE, ESTROGEN RECEPTOR POSITIVE, UNSPECIFIED SITE OF BREAST: ICD-10-CM

## 2022-08-11 DIAGNOSIS — C78.7 METASTASIS TO LIVER: Primary | ICD-10-CM

## 2022-08-11 DIAGNOSIS — R53.83 FATIGUE, UNSPECIFIED TYPE: ICD-10-CM

## 2022-08-11 DIAGNOSIS — C78.01 MALIGNANT NEOPLASM METASTATIC TO BOTH LUNGS: ICD-10-CM

## 2022-08-11 DIAGNOSIS — Z79.899 LONG-TERM USE OF HIGH-RISK MEDICATION: Primary | ICD-10-CM

## 2022-08-11 DIAGNOSIS — C50.911 MALIGNANT NEOPLASM OF RIGHT BREAST IN FEMALE, ESTROGEN RECEPTOR POSITIVE, UNSPECIFIED SITE OF BREAST: ICD-10-CM

## 2022-08-11 DIAGNOSIS — Z95.828 PORT-A-CATH IN PLACE: ICD-10-CM

## 2022-08-11 DIAGNOSIS — C78.7 METASTASIS TO LIVER: ICD-10-CM

## 2022-08-11 DIAGNOSIS — C43.59 MALIGNANT MELANOMA OF TORSO EXCLUDING BREAST: Primary | ICD-10-CM

## 2022-08-11 DIAGNOSIS — Z79.899 LONG-TERM USE OF HIGH-RISK MEDICATION: ICD-10-CM

## 2022-08-11 LAB
ALBUMIN SERPL-MCNC: 4.23 G/DL (ref 3.5–5.2)
ALBUMIN/GLOB SERPL: 1.9 G/DL
ALP SERPL-CCNC: 77 U/L (ref 39–117)
ALT SERPL W P-5'-P-CCNC: 16 U/L (ref 1–33)
ANION GAP SERPL CALCULATED.3IONS-SCNC: 11.7 MMOL/L (ref 5–15)
AST SERPL-CCNC: 18 U/L (ref 1–32)
BASOPHILS # BLD AUTO: 0.06 10*3/MM3 (ref 0–0.2)
BASOPHILS NFR BLD AUTO: 1 % (ref 0–1.5)
BILIRUB SERPL-MCNC: 0.3 MG/DL (ref 0–1.2)
BUN SERPL-MCNC: 14 MG/DL (ref 8–23)
BUN/CREAT SERPL: 18.9 (ref 7–25)
CALCIUM SPEC-SCNC: 9.9 MG/DL (ref 8.6–10.5)
CHLORIDE SERPL-SCNC: 107 MMOL/L (ref 98–107)
CO2 SERPL-SCNC: 22.3 MMOL/L (ref 22–29)
CREAT SERPL-MCNC: 0.74 MG/DL (ref 0.57–1)
DEPRECATED RDW RBC AUTO: 45.1 FL (ref 37–54)
EGFRCR SERPLBLD CKD-EPI 2021: 89.9 ML/MIN/1.73
EOSINOPHIL # BLD AUTO: 1.23 10*3/MM3 (ref 0–0.4)
EOSINOPHIL NFR BLD AUTO: 20 % (ref 0.3–6.2)
ERYTHROCYTE [DISTWIDTH] IN BLOOD BY AUTOMATED COUNT: 13.7 % (ref 12.3–15.4)
GLOBULIN UR ELPH-MCNC: 2.2 GM/DL
GLUCOSE SERPL-MCNC: 95 MG/DL (ref 65–99)
HCT VFR BLD AUTO: 40.9 % (ref 34–46.6)
HGB BLD-MCNC: 13.2 G/DL (ref 12–15.9)
IMM GRANULOCYTES # BLD AUTO: 0.01 10*3/MM3 (ref 0–0.05)
IMM GRANULOCYTES NFR BLD AUTO: 0.2 % (ref 0–0.5)
LYMPHOCYTES # BLD AUTO: 1.96 10*3/MM3 (ref 0.7–3.1)
LYMPHOCYTES NFR BLD AUTO: 31.9 % (ref 19.6–45.3)
MCH RBC QN AUTO: 29.1 PG (ref 26.6–33)
MCHC RBC AUTO-ENTMCNC: 32.3 G/DL (ref 31.5–35.7)
MCV RBC AUTO: 90.3 FL (ref 79–97)
MONOCYTES # BLD AUTO: 0.41 10*3/MM3 (ref 0.1–0.9)
MONOCYTES NFR BLD AUTO: 6.7 % (ref 5–12)
NEUTROPHILS NFR BLD AUTO: 2.48 10*3/MM3 (ref 1.7–7)
NEUTROPHILS NFR BLD AUTO: 40.2 % (ref 42.7–76)
NRBC BLD AUTO-RTO: 0 /100 WBC (ref 0–0.2)
PLATELET # BLD AUTO: 189 10*3/MM3 (ref 140–450)
PMV BLD AUTO: 10 FL (ref 6–12)
POTASSIUM SERPL-SCNC: 4.4 MMOL/L (ref 3.5–5.2)
PROT SERPL-MCNC: 6.4 G/DL (ref 6–8.5)
RBC # BLD AUTO: 4.53 10*6/MM3 (ref 3.77–5.28)
SODIUM SERPL-SCNC: 141 MMOL/L (ref 136–145)
T4 FREE SERPL-MCNC: 1.08 NG/DL (ref 0.93–1.7)
TSH SERPL DL<=0.05 MIU/L-ACNC: 2.51 UIU/ML (ref 0.27–4.2)
WBC NRBC COR # BLD: 6.15 10*3/MM3 (ref 3.4–10.8)

## 2022-08-11 PROCEDURE — 25010000002 NIVOLUMAB 240 MG/24ML SOLUTION 24 ML VIAL: Performed by: INTERNAL MEDICINE

## 2022-08-11 PROCEDURE — 84443 ASSAY THYROID STIM HORMONE: CPT | Performed by: INTERNAL MEDICINE

## 2022-08-11 PROCEDURE — 99214 OFFICE O/P EST MOD 30 MIN: CPT | Performed by: INTERNAL MEDICINE

## 2022-08-11 PROCEDURE — 84439 ASSAY OF FREE THYROXINE: CPT | Performed by: INTERNAL MEDICINE

## 2022-08-11 PROCEDURE — 96413 CHEMO IV INFUSION 1 HR: CPT

## 2022-08-11 PROCEDURE — 85025 COMPLETE CBC W/AUTO DIFF WBC: CPT | Performed by: INTERNAL MEDICINE

## 2022-08-11 PROCEDURE — 80053 COMPREHEN METABOLIC PANEL: CPT | Performed by: INTERNAL MEDICINE

## 2022-08-11 PROCEDURE — 25010000002 HEPARIN LOCK FLUSH PER 10 UNITS: Performed by: INTERNAL MEDICINE

## 2022-08-11 RX ORDER — SODIUM CHLORIDE 0.9 % (FLUSH) 0.9 %
10 SYRINGE (ML) INJECTION AS NEEDED
Status: CANCELLED | OUTPATIENT
Start: 2022-09-08

## 2022-08-11 RX ORDER — SODIUM CHLORIDE 9 MG/ML
250 INJECTION, SOLUTION INTRAVENOUS ONCE
Status: COMPLETED | OUTPATIENT
Start: 2022-08-11 | End: 2022-08-11

## 2022-08-11 RX ORDER — HEPARIN SODIUM (PORCINE) LOCK FLUSH IV SOLN 100 UNIT/ML 100 UNIT/ML
500 SOLUTION INTRAVENOUS AS NEEDED
Status: DISCONTINUED | OUTPATIENT
Start: 2022-08-11 | End: 2022-08-11 | Stop reason: HOSPADM

## 2022-08-11 RX ORDER — SODIUM CHLORIDE 0.9 % (FLUSH) 0.9 %
20 SYRINGE (ML) INJECTION AS NEEDED
Status: CANCELLED | OUTPATIENT
Start: 2022-08-11

## 2022-08-11 RX ORDER — HEPARIN SODIUM (PORCINE) LOCK FLUSH IV SOLN 100 UNIT/ML 100 UNIT/ML
300 SOLUTION INTRAVENOUS ONCE
Status: CANCELLED | OUTPATIENT
Start: 2022-09-08

## 2022-08-11 RX ORDER — SODIUM CHLORIDE 0.9 % (FLUSH) 0.9 %
10 SYRINGE (ML) INJECTION AS NEEDED
Status: DISCONTINUED | OUTPATIENT
Start: 2022-08-11 | End: 2022-08-11 | Stop reason: HOSPADM

## 2022-08-11 RX ORDER — HEPARIN SODIUM (PORCINE) LOCK FLUSH IV SOLN 100 UNIT/ML 100 UNIT/ML
500 SOLUTION INTRAVENOUS AS NEEDED
Status: CANCELLED | OUTPATIENT
Start: 2022-09-08

## 2022-08-11 RX ADMIN — SODIUM CHLORIDE 480 MG: 9 INJECTION, SOLUTION INTRAVENOUS at 12:06

## 2022-08-11 RX ADMIN — HEPARIN SODIUM (PORCINE) LOCK FLUSH IV SOLN 100 UNIT/ML 500 UNITS: 100 SOLUTION at 13:00

## 2022-08-11 RX ADMIN — SODIUM CHLORIDE 250 ML: 9 INJECTION, SOLUTION INTRAVENOUS at 12:05

## 2022-08-11 RX ADMIN — Medication 10 ML: at 13:00

## 2022-09-05 DIAGNOSIS — C43.59 MALIGNANT MELANOMA OF TORSO EXCLUDING BREAST: Primary | ICD-10-CM

## 2022-09-05 DIAGNOSIS — C78.01 MALIGNANT NEOPLASM METASTATIC TO BOTH LUNGS: ICD-10-CM

## 2022-09-05 DIAGNOSIS — C78.7 METASTASIS TO LIVER: ICD-10-CM

## 2022-09-05 DIAGNOSIS — C78.02 MALIGNANT NEOPLASM METASTATIC TO BOTH LUNGS: ICD-10-CM

## 2022-09-05 RX ORDER — SODIUM CHLORIDE 9 MG/ML
250 INJECTION, SOLUTION INTRAVENOUS ONCE
Status: CANCELLED | OUTPATIENT
Start: 2022-10-06

## 2022-09-05 RX ORDER — SODIUM CHLORIDE 9 MG/ML
250 INJECTION, SOLUTION INTRAVENOUS ONCE
Status: CANCELLED | OUTPATIENT
Start: 2022-09-08

## 2022-09-08 ENCOUNTER — LAB (OUTPATIENT)
Dept: ONCOLOGY | Facility: HOSPITAL | Age: 67
End: 2022-09-08

## 2022-09-08 ENCOUNTER — INFUSION (OUTPATIENT)
Dept: ONCOLOGY | Facility: HOSPITAL | Age: 67
End: 2022-09-08

## 2022-09-08 VITALS
SYSTOLIC BLOOD PRESSURE: 138 MMHG | HEART RATE: 56 BPM | BODY MASS INDEX: 189.36 KG/M2 | TEMPERATURE: 97.7 F | OXYGEN SATURATION: 98 % | WEIGHT: 187.4 LBS | DIASTOLIC BLOOD PRESSURE: 83 MMHG | RESPIRATION RATE: 20 BRPM

## 2022-09-08 DIAGNOSIS — C78.01 MALIGNANT NEOPLASM METASTATIC TO BOTH LUNGS: ICD-10-CM

## 2022-09-08 DIAGNOSIS — C43.59 MALIGNANT MELANOMA OF TORSO EXCLUDING BREAST: Primary | ICD-10-CM

## 2022-09-08 DIAGNOSIS — Z95.828 PORT-A-CATH IN PLACE: ICD-10-CM

## 2022-09-08 DIAGNOSIS — C78.7 METASTASIS TO LIVER: ICD-10-CM

## 2022-09-08 DIAGNOSIS — C78.02 MALIGNANT NEOPLASM METASTATIC TO BOTH LUNGS: ICD-10-CM

## 2022-09-08 DIAGNOSIS — C43.59 MALIGNANT MELANOMA OF TORSO EXCLUDING BREAST: ICD-10-CM

## 2022-09-08 LAB
ALBUMIN SERPL-MCNC: 4.38 G/DL (ref 3.5–5.2)
ALBUMIN/GLOB SERPL: 1.9 G/DL
ALP SERPL-CCNC: 66 U/L (ref 39–117)
ALT SERPL W P-5'-P-CCNC: 22 U/L (ref 1–33)
ANION GAP SERPL CALCULATED.3IONS-SCNC: 11.8 MMOL/L (ref 5–15)
AST SERPL-CCNC: 24 U/L (ref 1–32)
BASOPHILS # BLD AUTO: 0.06 10*3/MM3 (ref 0–0.2)
BASOPHILS NFR BLD AUTO: 0.9 % (ref 0–1.5)
BILIRUB SERPL-MCNC: 0.5 MG/DL (ref 0–1.2)
BUN SERPL-MCNC: 15 MG/DL (ref 8–23)
BUN/CREAT SERPL: 19.2 (ref 7–25)
CALCIUM SPEC-SCNC: 9.8 MG/DL (ref 8.6–10.5)
CHLORIDE SERPL-SCNC: 104 MMOL/L (ref 98–107)
CO2 SERPL-SCNC: 24.2 MMOL/L (ref 22–29)
CREAT SERPL-MCNC: 0.78 MG/DL (ref 0.57–1)
DEPRECATED RDW RBC AUTO: 44.9 FL (ref 37–54)
EGFRCR SERPLBLD CKD-EPI 2021: 84.4 ML/MIN/1.73
EOSINOPHIL # BLD AUTO: 0.39 10*3/MM3 (ref 0–0.4)
EOSINOPHIL NFR BLD AUTO: 6 % (ref 0.3–6.2)
ERYTHROCYTE [DISTWIDTH] IN BLOOD BY AUTOMATED COUNT: 13.6 % (ref 12.3–15.4)
GLOBULIN UR ELPH-MCNC: 2.3 GM/DL
GLUCOSE SERPL-MCNC: 89 MG/DL (ref 65–99)
HCT VFR BLD AUTO: 42 % (ref 34–46.6)
HGB BLD-MCNC: 13.7 G/DL (ref 12–15.9)
IMM GRANULOCYTES # BLD AUTO: 0.01 10*3/MM3 (ref 0–0.05)
IMM GRANULOCYTES NFR BLD AUTO: 0.2 % (ref 0–0.5)
LYMPHOCYTES # BLD AUTO: 2.03 10*3/MM3 (ref 0.7–3.1)
LYMPHOCYTES NFR BLD AUTO: 31.1 % (ref 19.6–45.3)
MCH RBC QN AUTO: 29.3 PG (ref 26.6–33)
MCHC RBC AUTO-ENTMCNC: 32.6 G/DL (ref 31.5–35.7)
MCV RBC AUTO: 89.9 FL (ref 79–97)
MONOCYTES # BLD AUTO: 0.39 10*3/MM3 (ref 0.1–0.9)
MONOCYTES NFR BLD AUTO: 6 % (ref 5–12)
NEUTROPHILS NFR BLD AUTO: 3.64 10*3/MM3 (ref 1.7–7)
NEUTROPHILS NFR BLD AUTO: 55.8 % (ref 42.7–76)
NRBC BLD AUTO-RTO: 0 /100 WBC (ref 0–0.2)
PLATELET # BLD AUTO: 180 10*3/MM3 (ref 140–450)
PMV BLD AUTO: 10.6 FL (ref 6–12)
POTASSIUM SERPL-SCNC: 3.9 MMOL/L (ref 3.5–5.2)
PROT SERPL-MCNC: 6.7 G/DL (ref 6–8.5)
RBC # BLD AUTO: 4.67 10*6/MM3 (ref 3.77–5.28)
SODIUM SERPL-SCNC: 140 MMOL/L (ref 136–145)
T4 FREE SERPL-MCNC: 1.25 NG/DL (ref 0.93–1.7)
TSH SERPL DL<=0.05 MIU/L-ACNC: 1.92 UIU/ML (ref 0.27–4.2)
WBC NRBC COR # BLD: 6.52 10*3/MM3 (ref 3.4–10.8)

## 2022-09-08 PROCEDURE — 84439 ASSAY OF FREE THYROXINE: CPT

## 2022-09-08 PROCEDURE — 96413 CHEMO IV INFUSION 1 HR: CPT

## 2022-09-08 PROCEDURE — 80050 GENERAL HEALTH PANEL: CPT

## 2022-09-08 PROCEDURE — 25010000002 HEPARIN LOCK FLUSH PER 10 UNITS: Performed by: INTERNAL MEDICINE

## 2022-09-08 PROCEDURE — 25010000002 NIVOLUMAB 240 MG/24ML SOLUTION 24 ML VIAL: Performed by: INTERNAL MEDICINE

## 2022-09-08 RX ORDER — HEPARIN SODIUM (PORCINE) LOCK FLUSH IV SOLN 100 UNIT/ML 100 UNIT/ML
500 SOLUTION INTRAVENOUS AS NEEDED
Status: CANCELLED | OUTPATIENT
Start: 2022-10-06

## 2022-09-08 RX ORDER — SODIUM CHLORIDE 0.9 % (FLUSH) 0.9 %
20 SYRINGE (ML) INJECTION AS NEEDED
Status: CANCELLED | OUTPATIENT
Start: 2022-09-08

## 2022-09-08 RX ORDER — HEPARIN SODIUM (PORCINE) LOCK FLUSH IV SOLN 100 UNIT/ML 100 UNIT/ML
500 SOLUTION INTRAVENOUS AS NEEDED
Status: DISCONTINUED | OUTPATIENT
Start: 2022-09-08 | End: 2022-09-08 | Stop reason: HOSPADM

## 2022-09-08 RX ORDER — SODIUM CHLORIDE 0.9 % (FLUSH) 0.9 %
10 SYRINGE (ML) INJECTION AS NEEDED
Status: CANCELLED | OUTPATIENT
Start: 2022-10-06

## 2022-09-08 RX ORDER — SODIUM CHLORIDE 0.9 % (FLUSH) 0.9 %
10 SYRINGE (ML) INJECTION AS NEEDED
Status: DISCONTINUED | OUTPATIENT
Start: 2022-09-08 | End: 2022-09-08 | Stop reason: HOSPADM

## 2022-09-08 RX ORDER — SODIUM CHLORIDE 9 MG/ML
250 INJECTION, SOLUTION INTRAVENOUS ONCE
Status: COMPLETED | OUTPATIENT
Start: 2022-09-08 | End: 2022-09-08

## 2022-09-08 RX ORDER — HEPARIN SODIUM (PORCINE) LOCK FLUSH IV SOLN 100 UNIT/ML 100 UNIT/ML
300 SOLUTION INTRAVENOUS ONCE
Status: CANCELLED | OUTPATIENT
Start: 2022-09-08

## 2022-09-08 RX ADMIN — Medication 500 UNITS: at 15:58

## 2022-09-08 RX ADMIN — SODIUM CHLORIDE 480 MG: 9 INJECTION, SOLUTION INTRAVENOUS at 15:09

## 2022-09-08 RX ADMIN — SODIUM CHLORIDE 250 ML: 9 INJECTION, SOLUTION INTRAVENOUS at 15:06

## 2022-09-08 RX ADMIN — Medication 10 ML: at 15:58

## 2022-10-06 ENCOUNTER — LAB (OUTPATIENT)
Dept: ONCOLOGY | Facility: HOSPITAL | Age: 67
End: 2022-10-06

## 2022-10-06 ENCOUNTER — INFUSION (OUTPATIENT)
Dept: ONCOLOGY | Facility: HOSPITAL | Age: 67
End: 2022-10-06

## 2022-10-06 VITALS
DIASTOLIC BLOOD PRESSURE: 71 MMHG | BODY MASS INDEX: 188.55 KG/M2 | HEART RATE: 70 BPM | WEIGHT: 186.6 LBS | OXYGEN SATURATION: 97 % | SYSTOLIC BLOOD PRESSURE: 135 MMHG | TEMPERATURE: 97.7 F | RESPIRATION RATE: 18 BRPM

## 2022-10-06 DIAGNOSIS — Z95.828 PORT-A-CATH IN PLACE: ICD-10-CM

## 2022-10-06 DIAGNOSIS — C78.01 MALIGNANT NEOPLASM METASTATIC TO BOTH LUNGS: ICD-10-CM

## 2022-10-06 DIAGNOSIS — C78.02 MALIGNANT NEOPLASM METASTATIC TO BOTH LUNGS: ICD-10-CM

## 2022-10-06 DIAGNOSIS — C78.7 METASTASIS TO LIVER: ICD-10-CM

## 2022-10-06 DIAGNOSIS — R53.83 FATIGUE, UNSPECIFIED TYPE: ICD-10-CM

## 2022-10-06 DIAGNOSIS — C43.59 MALIGNANT MELANOMA OF TORSO EXCLUDING BREAST: Primary | ICD-10-CM

## 2022-10-06 DIAGNOSIS — C43.59 MALIGNANT MELANOMA OF TORSO EXCLUDING BREAST: ICD-10-CM

## 2022-10-06 LAB
ALBUMIN SERPL-MCNC: 4.05 G/DL (ref 3.5–5.2)
ALBUMIN/GLOB SERPL: 1.8 G/DL
ALP SERPL-CCNC: 63 U/L (ref 39–117)
ALT SERPL W P-5'-P-CCNC: 18 U/L (ref 1–33)
ANION GAP SERPL CALCULATED.3IONS-SCNC: 10 MMOL/L (ref 5–15)
AST SERPL-CCNC: 21 U/L (ref 1–32)
BASOPHILS # BLD AUTO: 0.06 10*3/MM3 (ref 0–0.2)
BASOPHILS NFR BLD AUTO: 1 % (ref 0–1.5)
BILIRUB SERPL-MCNC: 0.4 MG/DL (ref 0–1.2)
BUN SERPL-MCNC: 18 MG/DL (ref 8–23)
BUN/CREAT SERPL: 29 (ref 7–25)
CALCIUM SPEC-SCNC: 9.5 MG/DL (ref 8.6–10.5)
CHLORIDE SERPL-SCNC: 108 MMOL/L (ref 98–107)
CO2 SERPL-SCNC: 23 MMOL/L (ref 22–29)
CREAT SERPL-MCNC: 0.62 MG/DL (ref 0.57–1)
DEPRECATED RDW RBC AUTO: 44.7 FL (ref 37–54)
EGFRCR SERPLBLD CKD-EPI 2021: 99 ML/MIN/1.73
EOSINOPHIL # BLD AUTO: 0.36 10*3/MM3 (ref 0–0.4)
EOSINOPHIL NFR BLD AUTO: 6.1 % (ref 0.3–6.2)
ERYTHROCYTE [DISTWIDTH] IN BLOOD BY AUTOMATED COUNT: 13.5 % (ref 12.3–15.4)
GLOBULIN UR ELPH-MCNC: 2.3 GM/DL
GLUCOSE SERPL-MCNC: 92 MG/DL (ref 65–99)
HCT VFR BLD AUTO: 38.9 % (ref 34–46.6)
HGB BLD-MCNC: 12.7 G/DL (ref 12–15.9)
IMM GRANULOCYTES # BLD AUTO: 0.01 10*3/MM3 (ref 0–0.05)
IMM GRANULOCYTES NFR BLD AUTO: 0.2 % (ref 0–0.5)
LYMPHOCYTES # BLD AUTO: 1.87 10*3/MM3 (ref 0.7–3.1)
LYMPHOCYTES NFR BLD AUTO: 31.7 % (ref 19.6–45.3)
MCH RBC QN AUTO: 29.3 PG (ref 26.6–33)
MCHC RBC AUTO-ENTMCNC: 32.6 G/DL (ref 31.5–35.7)
MCV RBC AUTO: 89.8 FL (ref 79–97)
MONOCYTES # BLD AUTO: 0.55 10*3/MM3 (ref 0.1–0.9)
MONOCYTES NFR BLD AUTO: 9.3 % (ref 5–12)
NEUTROPHILS NFR BLD AUTO: 3.04 10*3/MM3 (ref 1.7–7)
NEUTROPHILS NFR BLD AUTO: 51.7 % (ref 42.7–76)
NRBC BLD AUTO-RTO: 0 /100 WBC (ref 0–0.2)
PLATELET # BLD AUTO: 200 10*3/MM3 (ref 140–450)
PMV BLD AUTO: 10.9 FL (ref 6–12)
POTASSIUM SERPL-SCNC: 4.1 MMOL/L (ref 3.5–5.2)
PROT SERPL-MCNC: 6.3 G/DL (ref 6–8.5)
RBC # BLD AUTO: 4.33 10*6/MM3 (ref 3.77–5.28)
SODIUM SERPL-SCNC: 141 MMOL/L (ref 136–145)
T4 FREE SERPL-MCNC: 1.2 NG/DL (ref 0.93–1.7)
TSH SERPL DL<=0.05 MIU/L-ACNC: 1.64 UIU/ML (ref 0.27–4.2)
WBC NRBC COR # BLD: 5.89 10*3/MM3 (ref 3.4–10.8)

## 2022-10-06 PROCEDURE — 96413 CHEMO IV INFUSION 1 HR: CPT

## 2022-10-06 PROCEDURE — 25010000002 HEPARIN LOCK FLUSH PER 10 UNITS: Performed by: INTERNAL MEDICINE

## 2022-10-06 PROCEDURE — 84439 ASSAY OF FREE THYROXINE: CPT

## 2022-10-06 PROCEDURE — 25010000002 NIVOLUMAB 240 MG/24ML SOLUTION 24 ML VIAL: Performed by: INTERNAL MEDICINE

## 2022-10-06 PROCEDURE — 80053 COMPREHEN METABOLIC PANEL: CPT

## 2022-10-06 PROCEDURE — 84443 ASSAY THYROID STIM HORMONE: CPT

## 2022-10-06 PROCEDURE — 85025 COMPLETE CBC W/AUTO DIFF WBC: CPT

## 2022-10-06 RX ORDER — SODIUM CHLORIDE 0.9 % (FLUSH) 0.9 %
10 SYRINGE (ML) INJECTION AS NEEDED
Status: CANCELLED | OUTPATIENT
Start: 2022-11-03

## 2022-10-06 RX ORDER — HEPARIN SODIUM (PORCINE) LOCK FLUSH IV SOLN 100 UNIT/ML 100 UNIT/ML
500 SOLUTION INTRAVENOUS AS NEEDED
Status: CANCELLED | OUTPATIENT
Start: 2022-11-03

## 2022-10-06 RX ORDER — SODIUM CHLORIDE 0.9 % (FLUSH) 0.9 %
20 SYRINGE (ML) INJECTION AS NEEDED
Status: CANCELLED | OUTPATIENT
Start: 2022-10-06

## 2022-10-06 RX ORDER — SODIUM CHLORIDE 9 MG/ML
250 INJECTION, SOLUTION INTRAVENOUS ONCE
Status: COMPLETED | OUTPATIENT
Start: 2022-10-06 | End: 2022-10-06

## 2022-10-06 RX ORDER — SODIUM CHLORIDE 0.9 % (FLUSH) 0.9 %
10 SYRINGE (ML) INJECTION AS NEEDED
Status: DISCONTINUED | OUTPATIENT
Start: 2022-10-06 | End: 2022-10-06 | Stop reason: HOSPADM

## 2022-10-06 RX ORDER — HEPARIN SODIUM (PORCINE) LOCK FLUSH IV SOLN 100 UNIT/ML 100 UNIT/ML
300 SOLUTION INTRAVENOUS ONCE
Status: CANCELLED | OUTPATIENT
Start: 2022-10-06

## 2022-10-06 RX ORDER — HEPARIN SODIUM (PORCINE) LOCK FLUSH IV SOLN 100 UNIT/ML 100 UNIT/ML
500 SOLUTION INTRAVENOUS AS NEEDED
Status: DISCONTINUED | OUTPATIENT
Start: 2022-10-06 | End: 2022-10-06 | Stop reason: HOSPADM

## 2022-10-06 RX ADMIN — SODIUM CHLORIDE, PRESERVATIVE FREE 500 UNITS: 5 INJECTION INTRAVENOUS at 15:28

## 2022-10-06 RX ADMIN — Medication 10 ML: at 15:28

## 2022-10-06 RX ADMIN — SODIUM CHLORIDE 250 ML: 9 INJECTION, SOLUTION INTRAVENOUS at 14:43

## 2022-10-06 RX ADMIN — SODIUM CHLORIDE 480 MG: 9 INJECTION, SOLUTION INTRAVENOUS at 14:43

## 2022-10-07 RX ORDER — LIDOCAINE AND PRILOCAINE 25; 25 MG/G; MG/G
CREAM TOPICAL
Qty: 30 G | Refills: 3 | Status: SHIPPED | OUTPATIENT
Start: 2022-10-07

## 2022-10-30 DIAGNOSIS — C78.7 METASTASIS TO LIVER: ICD-10-CM

## 2022-10-30 DIAGNOSIS — C78.02 MALIGNANT NEOPLASM METASTATIC TO BOTH LUNGS: ICD-10-CM

## 2022-10-30 DIAGNOSIS — C78.01 MALIGNANT NEOPLASM METASTATIC TO BOTH LUNGS: ICD-10-CM

## 2022-10-30 DIAGNOSIS — C43.59 MALIGNANT MELANOMA OF TORSO EXCLUDING BREAST: Primary | ICD-10-CM

## 2022-10-30 RX ORDER — SODIUM CHLORIDE 9 MG/ML
250 INJECTION, SOLUTION INTRAVENOUS ONCE
Status: CANCELLED | OUTPATIENT
Start: 2022-12-29

## 2022-10-30 RX ORDER — SODIUM CHLORIDE 9 MG/ML
250 INJECTION, SOLUTION INTRAVENOUS ONCE
Status: CANCELLED | OUTPATIENT
Start: 2022-11-03

## 2022-10-30 RX ORDER — SODIUM CHLORIDE 9 MG/ML
250 INJECTION, SOLUTION INTRAVENOUS ONCE
Status: CANCELLED | OUTPATIENT
Start: 2022-12-01

## 2022-10-31 ENCOUNTER — HOSPITAL ENCOUNTER (OUTPATIENT)
Dept: CT IMAGING | Facility: HOSPITAL | Age: 67
Discharge: HOME | End: 2022-10-31

## 2022-10-31 DIAGNOSIS — C78.02 MALIGNANT NEOPLASM METASTATIC TO BOTH LUNGS: ICD-10-CM

## 2022-10-31 DIAGNOSIS — C78.01 MALIGNANT NEOPLASM METASTATIC TO BOTH LUNGS: ICD-10-CM

## 2022-10-31 DIAGNOSIS — C43.59 MALIGNANT MELANOMA OF TORSO EXCLUDING BREAST: ICD-10-CM

## 2022-10-31 DIAGNOSIS — C78.7 METASTASIS TO LIVER: ICD-10-CM

## 2022-10-31 PROCEDURE — 71260 CT THORAX DX C+: CPT

## 2022-10-31 PROCEDURE — 25010000002 IOPAMIDOL 61 % SOLUTION: Performed by: INTERNAL MEDICINE

## 2022-10-31 PROCEDURE — 71260 CT THORAX DX C+: CPT | Performed by: RADIOLOGY

## 2022-10-31 PROCEDURE — 74177 CT ABD & PELVIS W/CONTRAST: CPT

## 2022-10-31 PROCEDURE — 74177 CT ABD & PELVIS W/CONTRAST: CPT | Performed by: RADIOLOGY

## 2022-10-31 RX ADMIN — IOPAMIDOL 80 ML: 612 INJECTION, SOLUTION INTRAVENOUS at 14:46

## 2022-11-02 NOTE — PROGRESS NOTES
"NAME: Yadira Flowers    : 1955    DATE:  11/3/2022    DIAGNOSIS:   1.  Stage IA ([id number]) moderately differentiated invasive ductal carcinoma of the right breast diagnosed in 2015.    2.  H/o malignant Melanoma on her Back    3.  H/o cervical cancer treated with cryotherapy at [facility] in     4.  Recurrent / metastatic malignant Melanoma  -  R axillary LN bx 22.    -  She has R axillary LN mass (2.47x2.16), Solitary cavitary R lobe liver lesion, cavitary RLL lung mass,  and bilateral parenchymal lung nodules      TREATMENT HISTORY:   1.           CHIEF COMPLAINT:  Follow up of metastatic melanoma    HISTORY OF PRESENT ILLNESS:   Yadira Flowers is a very pleasant 66 y.o. female who was referred by Dr. Dilcia Pedro for evaluation and treatment of breast cancer. She was living in [place] in 2015 when her dog brought attention to and \"found\" an abnormal lump in her right breast.  She was treated by Dr. Steve Isbell and underwent R lumpectomy with SLNBx on 10-22-15 as below.  She then had what sounds like accelerated partial breast irradiation.  She was then started on Arimidex which was later switched to Exemestane for a better side effect profile.  She took this for about 5 years, stopping a couple of weeks ago.  She stopped taking Exemestane because she developed some vaginal bleeding and hematuria.  This was evaluated by her PCP and gynecologist and felt to be due to vaginal atrophy.  She was prescribed a hormone pill which she hasn't yet started and was referred here to discuss things further.      Aside from bleeding which has been uncomfortable and distressing for her, Ms. Onur Burgos has generally been well.  The last year has been hard on her emotionally with the loss of her  and then her dog, her home and her insurance, but she is coping well and has good family support in [place].  She says she gained weight with her breast cancer treatment and gained weight " when her  .  She has been working now to lose weight and has lost 20 lbs over the last 2 months with dieting.  She denies chest pain or shortness of breath.  She complains of some RLQ cramping pain and isn't sure what is causing that.  She denies difficulty moving her bowels.  No blood in her stool that she is aware of.  She has had vaginal bleeding and hematuria as above.        INTERVAL HISTORY:  Ms. Burgos presents today for follow up of metastatic melanoma.  Physically she is doing well.  She is happy to report that her house is almost ready to move into.  She is struggling at the recent loss of her brother-in-law due to rapidly progressive esophageal cancer with DIC.  This has been really hard on her and her family.  Otherwise she is doing very well.  She continues to tolerate immunotherapy.  She was happy to discuss results of recent restaging imaging.  She continues to take low-dose Eliquis twice a day and is doing much better on this.    PAST MEDICAL HISTORY:  Past Medical History:   Diagnosis Date   • Back pain    • Basal cell carcinoma (BCC) in situ of skin     Dr. Mohr - Derm   • Breast cancer (HCC)        • Cancer (HCC)     breast, cervical, melanoma   • Cervical cancer (HCC)     Diagnosed in .  Treated by repeated local intervention and ultimately received cryotherapy at [facility] with resolution.   • Elbow fracture    • Foot fracture    • Headache    • Hypertension     TAKEN OFF MEDS   • Melanoma (HCC)     on her back  - treated by Dermatologist Catrachito Amaro in [place], [place] with what sounds like wide local excision.    • Pulmonary embolism (HCC)    • Sinusitis        PAST SURGICAL HISTORY:  Past Surgical History:   Procedure Laterality Date   • AXILLARY LYMPH NODE BIOPSY/EXCISION Right 2022    Procedure: AXILLARY LYMPH NODE BIOPSY/EXCISION;  Surgeon: Dianelys Cummings MD;  [place] [facility] [place]  Service: General;  Laterality: Right;   • BREAST LUMPECTOMY Right    •  BREAST SURGERY  2015    Secondary to cancer   • BRONCHOSCOPY Right 2022    Procedure: BRONCHOSCOPY WITH ENDOBRONCHIAL ULTRASOUND;  Surgeon: Chris Hre MD;  Location:  [facility];  Service: Pulmonary;  Laterality: Right;   • COLONOSCOPY N/A 2020    Procedure: COLONOSCOPY;  Surgeon: Mohsen Maldonado MD;  Location:  [facility];  Service: Gastroenterology;  Laterality: N/A;   • ELBOW FUSION      left   • FOOT SURGERY Right     Broken foot   • HAND SURGERY  2020    right   • RHINOPLASTY     • SKIN CANCER EXCISION      malignant melanoma from back    • US GUIDED LYMPH NODE BIOPSY  2022   • VENOUS ACCESS DEVICE (PORT) INSERTION N/A 2022    Procedure: INSERTION VENOUS ACCESS DEVICE left or right;  Surgeon: Dianelys Cummings MD;  Location:  [facility];  Service: General;  Laterality: N/A;       FAMILY HISTORY:  Family History   Problem Relation Age of Onset   • Other Mother         blood clots   • Hypertension Mother    • Ulcers Father    • Hypertension Sister    • Other Sister         Multiple Sclerosis   • Cancer Paternal Aunt    • Stroke Maternal Grandmother    • Alcohol abuse Paternal Grandmother    • Hypertension Sister    • Multiple sclerosis Sister    • Hypertension Sister    • Coronary artery disease Paternal Grandfather    • Cancer Maternal Aunt 30        colon   • Breast cancer Neg Hx    Many paternal aunts  with malignancy of various types. Many cousins on that side of the family have had cancer as well.  One  of colon cancer in her 30s.  One had a brain tumor.    SOCIAL HISTORY:  Social History     Socioeconomic History   • Marital status:    Tobacco Use   • Smoking status: Never   • Smokeless tobacco: Never   Vaping Use   • Vaping Use: Never used   Substance and Sexual Activity   • Alcohol use: Not Currently   • Drug use: Never   • Sexual activity: Not Currently     Birth control/protection: Post-menopausal         REVIEW OF SYSTEMS:   A comprehensive 14  point review of systems was performed.  Significant findings as mentioned above.  All other systems reviewed and are negative.      MEDICATIONS:  The current medication list was reviewed in the EMR    Current Outpatient Medications:   •  apixaban (ELIQUIS) 2.5 MG tablet tablet, Take 1 tablet by mouth 2 (Two) Times a Day., Disp: 60 tablet, Rfl: 5  •  fluticasone (Flonase) 50 MCG/ACT nasal spray, 2 sprays into the nostril(s) as directed by provider Daily., Disp: 18.2 mL, Rfl: 3  •  lidocaine-prilocaine (EMLA) 2.5-2.5 % cream, Apply to port a cath ~30 min -1 hour prior to chemotherapy, Disp: 30 g, Rfl: 3  •  ondansetron ODT (Zofran ODT) 8 MG disintegrating tablet, Place 1 tablet on the tongue Every 8 (Eight) Hours As Needed for Nausea or Vomiting., Disp: 30 tablet, Rfl: 5  •  prochlorperazine (COMPAZINE) 10 MG tablet, TAKE 1 TABLET BY MOUTH EVERY 6 HOURS AS NEEDED FOR NAUSEA, Disp: 360 tablet, Rfl: 3  •  sennosides-docusate (senna-docusate sodium) 8.6-50 MG per tablet, Take 2 tablets by mouth 2 (Two) Times a Day., Disp: 120 tablet, Rfl: 2    ALLERGIES:    No Known Allergies    PHYSICAL EXAM:  Vitals:    11/03/22 1300   BP: 124/78   Pulse: 72   Resp: 18   Temp: 97.1 °F (36.2 °C)   SpO2: 98%     Pain Score    11/03/22 1300   PainSc: 0-No pain     ECOG score: 0   General:  Awake, alert and oriented, appears well.  HEENT:  Pupils are equal, round and reactive to light and accommodation, Extra-ocular movements full, Oropharyx clear, mucous membranes moist  Neck:  No JVD, thyromegaly or lymphadenopathy  CV:  Regular rate and rhythm, no murmurs, rubs or gallops  Resp:  Lungs are clear to auscultation bilaterally, no wheezing  Breast: Deferred today. Previously: S/p R lumpectomy. There is a somewhat firm area at the site of surgery but there are no palpable masses.  She is s/p excision of R axillary LN, with some post-surgical swelling.  Left breast is without mass lesions.  No L axillary adenopathy.  Abd:  Soft, non-tender,  non-distended, bowel sounds present, no organomegaly or masses.    Ext:  No clubbing, cyanosis or edema.    Lymph:  No cervical, supraclavicular, axillary adenopathy  Skin: Clean dry and intact  Neuro:  MS as above, CN II-XII intact, grossly non-focal exam          PATHOLOGY:  10-22-15            12-16-21          01-13-22          ENDOSCOPY:  Colonoscopy 12-09-20     Findings: 8 hyperplastic appearing polyps of the distal rectum, diverticulosis moderately throughout the sigmoid colon      IMAGING:  CTCAP 12-15-20  On the lung windows there are several calcified  granulomas in the lungs. No noncalcified pulmonary parenchymal lung  nodules were identified. On the soft tissue windows there were no  enlarged lymph nodes in the supraclavicular or axillary regions. No  mediastinal or hilar lymphadenopathy was seen. The heart was not  enlarged. There were no pericardial effusions.     IMPRESSION:  No CT findings of metastatic disease in the chest. There are  calcified granulomas in the lungs.     CT FINDINGS: The liver and spleen were normal in size and shape. The  gallbladder contains several stones. The wall was not thickened. The  bile ducts in the liver are not dilated. There is nothing seen to  suggest metastatic disease in the liver. The pancreas shows no evidence  of mass or inflammation. No adrenal or renal lesions are demonstrated.  The aorta is normal in caliber. There were no enlarged lymph nodes in  the retroperitoneum or in the mesentery. The bowel shows no evidence of  obstruction. In the pelvis there were no masses or fluid collections.  There were no ventral hernias.     IMPRESSION:  No CT findings of metastatic disease in the abdomen or  pelvis. This study does demonstrate several stones in the lumen of the  gallbladder.         Bilateral diagnostic mammogram 01-31-21  FINDINGS: There are scattered areas of fibroglandular density.     The bilateral fibroglandular pattern does not appear  significantly  changed compared to the exam from 2020. This includes postoperative  changes in the right 12:00 region. Mild skin thickening is noted  involving the right breast which is likely treatment related. A few  bilateral scattered calcifications are noted.     IMPRESSION:  Incomplete examination as comparison with older outside  prior mammograms is needed.        BI-RADS CATEGORY:   0, INCOMPLETE:  Need prior mammograms for comparison        RECOMMENDATION:  We will attempt to obtain older outside prior  mammograms for comparison.      CTCAP 12-12-21  FINDINGS: Lack of IV contrast hinders parenchymal assessment of the mediastinum, liver, spleen, pancreas, adrenal glands and kidneys.         Significant infiltrate seen in the right lower lobe with interstitial component. There are also lung nodules present in the right lung. Index nodule in the right lung base medially is 5 mm maximal diameter. Other smaller nodules seen throughout the right  lung. Tiny 3 mm lingular nodule seen in series 3 image 30. A benign calcified granuloma seen in the left lower lobe but a noncalcified indeterminant  5 mm left lower lobe nodule seen, series 3 image 42.     Nonenlarged mediastinal adenopathy seen. However there is an enlarged right axillary 1.8 x 2.6 cm lymph node partially seen.     Worrisome hepatic metastasis or primary hepatic lesion seen measuring 4.4 x 4.1 cm in liver segment 8. Margins are ill-defined. Abscess could have a similar appearance. Suggestion of subtle gallstones. Nonobstructing 2 mm right renal calculus seen. No  obstructive uropathy. Gaseous distention of the esophagus and hiatal hernia present consistent with GERD. No enteric contrast but no gross evidence of bowel obstruction.   There is no gross free air, free fluid or focal inflammatory change.  Uterus and  adnexa are not enlarged. Osseous structures are grossly intact.     IMPRESSION:  Constellation of findings with significant consolidation in  the right lower lobe, enlarged right axillary lymph node, indeterminate bilateral lung nodules and ill-defined hepatic lesion concerning for underlying malignancy in this patient .     FINDINGS: Lack of IV contrast hinders parenchymal assessment of the mediastinum, liver, spleen, pancreas, adrenal glands and kidneys.         Significant infiltrate seen in the right lower lobe with interstitial component. There are also lung nodules present in the right lung. Index nodule in the right lung base medially is 5 mm maximal diameter. Other smaller nodules seen throughout the right  lung. Tiny 3 mm lingular nodule seen in series 3 image 30. A benign calcified granuloma seen in the left lower lobe but a noncalcified indeterminant  5 mm left lower lobe nodule seen, series 3 image 42.     Nonenlarged mediastinal adenopathy seen. However there is an enlarged right axillary 1.8 x 2.6 cm lymph node partially seen.     Worrisome hepatic metastasis or primary hepatic lesion seen measuring 4.4 x 4.1 cm in liver segment 8. Margins are ill-defined. Abscess could have a similar appearance. Suggestion of subtle gallstones. Nonobstructing 2 mm right renal calculus seen. No  obstructive uropathy. Gaseous distention of the esophagus and hiatal hernia present consistent with GERD. No enteric contrast but no gross evidence of bowel obstruction.   There is no gross free air, free fluid or focal inflammatory change.  Uterus and  adnexa are not enlarged. Osseous structures are grossly intact.     IMPRESSION:  Constellation of findings with significant consolidation in the right lower lobe, enlarged right axillary lymph node, indeterminate bilateral lung nodules and ill-defined hepatic lesion concerning for underlying malignancy in this patient .       US Abdomen Complete 12-12-21  FINDINGS:  The visualized portions of the pancreas, IVC and aorta appear normal.        The liver is moderately coarsened in echotexture. Ill-defined hypodensity in  the liver measuring 3 x 3.2 x 2.6 cm corresponds to recent CT. Unclear if this is a malignant lesion. Abscesses within the differential but prior CT was performed without any  contrast.  The common bile duct is not dilated.. Gallbladder wall is thickened at 3.4 mm with trace pericholecystic fluid and subtle hyperemia. Gallstones and shadowing present.     The kidneys are normal in size and echogenicity. There is no   hydronephrosis or focal solid lesion.  The spleen is normal in size and echotexture.     IMPRESSION:  1. Nonspecific gallbladder wall thickening and questionable pericholecystic fluid. Gallstones are better seen on CT than ultrasound.  2.  Heterogeneous liver with lesion in the liver as seen on CT. Ultrasound is not helpful in determining etiology. This could be an abscess but given the other findings on CT,  malignancy is not excluded.      CTCAP 12-15-21  FINDINGS:    LUNGS:  Increased density of the now right lower lobe mixed  groundglass and more solid-appearing consolidation with internal  cavitary component identified. Tiny right and left lung pulmonary  nodules. Grossly stable.    PLEURAL SPACE:  Small bilateral pleural effusions.  No pneumothorax.    HEART:  Unremarkable.  No cardiomegaly.  No significant pericardial  effusion.    BONES/JOINTS:  Unremarkable.  No acute fracture.  No dislocation.    SOFT TISSUES:  Unremarkable.    VASCULATURE:  Unremarkable.  No thoracic aortic aneurysm.    LYMPH NODES:  Unremarkable.  No enlarged lymph nodes.     IMPRESSION:  1.  Increased density of the now right lower lobe mixed groundglass and  more solid-appearing consolidation with internal cavitary component  identified. Tiny right and left lung pulmonary nodules. Grossly stable.  2.  Small bilateral pleural effusions.    FINDINGS:    LUNG BASES:  Unremarkable.  No mass.  No consolidation.      ABDOMEN:    LIVER:  Right lobe of liver lesion is again identified now measuring  about 4.7 cm and was about 4.7  cm.    GALLBLADDER AND BILE DUCTS:  Gallstones noted in the gallbladder.  No  ductal dilation.    PANCREAS:  Unremarkable.  No mass.  No ductal dilation.    SPLEEN:  Unremarkable.  No splenomegaly.    ADRENALS:  Unremarkable.  No mass.    KIDNEYS AND URETERS:  Unremarkable.  No solid mass.  No  hydronephrosis.    STOMACH AND BOWEL:  Unremarkable.  No obstruction.  No mucosal  thickening.      PELVIS:    APPENDIX:  No findings to suggest acute appendicitis.    BLADDER:  Unremarkable.  No mass.    REPRODUCTIVE:  Unremarkable as visualized.      ABDOMEN and PELVIS:    INTRAPERITONEAL SPACE:  Unremarkable.  No free air.  No significant  fluid collection.    BONES/JOINTS:  No acute fracture.  No dislocation.    SOFT TISSUES:  Unremarkable.    VASCULATURE:  Unremarkable.  No abdominal aortic aneurysm.    LYMPH NODES:  Unremarkable.  No enlarged lymph nodes.     IMPRESSION:    Right lobe of liver lesion is again identified now measuring about 4.7  cm and was about 4.7 cm.      NM Bone Scan Whole Body 12-15-21  FINDINGS:    SKULL/FACIAL BONES:  No focal increased or decreased uptake.    SPINE:  Unremarkable.  No abnormal increased or decreased uptake.    RIBS:  Unremarkable.  No abnormal uptake.    LONG BONES:  Unremarkable.  No abnormal uptake.    PELVIS:  Unremarkable.  No focal increased or decreased uptake.    JOINTS:  Unremarkable.  No focal increased or decreased uptake.    SOFT TISSUES:  Unremarkable.    RENAL/BLADDER:  Within normal limits.     IMPRESSION:    Normal bone scan.      CT Abdomen With Contrast  12-18-21  FINDINGS:  Partially imaged thick-walled cavitary lesion in the right lower lobe with right basilar atelectasis/infiltrate and small right pleural effusion. There is also left basilar atelectasis/infiltrate and trace left pleural effusion. Multiple noncalcified  pulmonary nodules are scattered throughout the visualized lower lung fields, concerning for pulmonary metastatic disease.     Ill-defined right  hepatic mass again noted. The spleen, pancreas, and both adrenal glands are within expected limits. Cholelithiasis. Punctate nonobstructing right intrarenal stone. Tiny left renal cyst. Kidneys are otherwise unremarkable without  hydronephrosis. Abdominal aorta normal in course and caliber without dissection. No pathologic retroperitoneal or mesenteric lymphadenopathy by size criteria. Visualized small bowel and colon are unremarkable. No bowel obstruction. No free fluid or free  air.     No aggressive osseous lesions.     IMPRESSION:     1. Ill-defined right hepatic mass again noted. This has been previously biopsied and correlation with pathology results is recommended.  2. Cholelithiasis.  3. Punctate nonobstructing right intrarenal stone.  4. No threshold abdominal lymphadenopathy or other suspicious abdominal masses.  5. Partially imaged thick-walled cavitary lesion in the right lower lobe. This may be of infectious or neoplastic etiology and continued follow-up is recommended.  6. Small bilateral pleural effusions with bibasilar atelectasis/infiltrate, worse on the right than left.  7. Numerous small noncalcified pulmonary nodules throughout the visualized lower lung fields, concerning for pulmonary metastatic disease.       Mammo Diagnostic Digital Tomosynthesis Bilateral With CAD w/ US Axilla Right 01-13-22  FINDINGS:  There are scattered areas of fibroglandular density.     Incompletely imaged is a very prominent axillary lymph node. The  bilateral fibroglandular pattern itself is stable in appearance  including postoperative changes in the superior aspect of the right  breast. There are stable bilateral calcifications. Mild skin thickening  is again noted involving the right breast which is likely treatment  related.     ULTRASOUND: Ultrasound evaluation of the right axilla demonstrates 2  markedly enlarged lymph nodes the largest of which measures 3.2 cm in  size and has no demonstrable fatty hilum.  Recommend ultrasound-guided  biopsy.      IMPRESSION:  1. Stable mammographic appearance of the left breast with no findings  suspicious for malignancy.        2. Marked right axillary lymphadenopathy. Recommend ultrasound-guided  biopsy. The fibroglandular pattern of the right breast is however  stable.        BI-RADS CATEGORY:  4, SUSPICIOUS        RECOMMENDATION:  Recommend ultrasound-guided biopsy of the dominant  abnormal appearing right axillary lymph node.      DEXA Bone Density Axial 01-13-22  IMPRESSION:  Impression:  1. According to the World Health Organization definitions of  osteoporosis based on bone density, this patient's bone mineral density  is compatible with osteoporosis and the fracture risk is high.      Mammo Diagnostic Digital Tomosynthesis Bilateral With CAD w/ US Axilla Right 01-13-22  FINDINGS:  There are scattered areas of fibroglandular density.     Incompletely imaged is a very prominent axillary lymph node. The  bilateral fibroglandular pattern itself is stable in appearance  including postoperative changes in the superior aspect of the right  breast. There are stable bilateral calcifications. Mild skin thickening  is again noted involving the right breast which is likely treatment  related.     ULTRASOUND: Ultrasound evaluation of the right axilla demonstrates 2  markedly enlarged lymph nodes the largest of which measures 3.2 cm in  size and has no demonstrable fatty hilum. Recommend ultrasound-guided  biopsy.      IMPRESSION:  1. Stable mammographic appearance of the left breast with no findings  suspicious for malignancy.        2. Marked right axillary lymphadenopathy. Recommend ultrasound-guided  biopsy. The fibroglandular pattern of the right breast is however  stable.        BI-RADS CATEGORY:  4, SUSPICIOUS        RECOMMENDATION:  Recommend ultrasound-guided biopsy of the dominant  abnormal appearing right axillary lymph node.      CT Chest With Contrast Diagnostic  01-18-22  FINDINGS:     LUNGS: Solitary mass in the right lower lobe with cavitation.  Significantly smaller today than on the prior study. 6 mm solid nodule  in the right lung posteriorly on image 30 of the axial series. Other  parenchymal nodules are seen in both lungs and are similar to the  previous study. These are concerning for metastatic nodules.     HEART: Unremarkable.     MEDIASTINUM: No masses. No enlarged lymph nodes.  No fluid collections.     PLEURA: No pleural effusion. No pleural mass or abnormal calcification.  No pneumothorax.     VASCULATURE: No evidence of aneurysm. Filling defect in the right  pulmonary artery and possibly left lower lobe pulmonary artery. This  study was not performed to evaluate for pulmonary embolus, but the  findings are concerning for bilateral pulmonary emboli.     BONES: No acute bony abnormality.     VISUALIZED UPPER ABDOMEN:Please see the CT report for the abdomen and  pelvis.     Other: Right axillary soft tissue mass is present and shows slight  interval growth. It measures 2.47 x 2.16 cm today and previously at the  same level measured approximately 2.7 x 1.89 cm.     IMPRESSION:  1. Study was not performed as a PE protocol, but there appear to be  filling defects in pulmonary arteries bilaterally concerning for  pulmonary emboli.  2. Interval decrease in size of cavitary right lower lobe mass.  3. Stable parenchymal nodules bilaterally.  4. Very slight interval growth of right axillary soft tissue mass.     Results are being relayed to the referring physician.      US Liver 01-18-22  FINDINGS: Sonographic imaging of the liver does not show the mass  previously identified in the liver. I would suggest a follow-up CT scan  for better delineation.     Hepatic flow was hepatopedal.     There is shadowing from the gallbladder fossa.     IMPRESSION:  1. Previously identified mass is not seen on this exam in the liver.  Consider follow-up CT.  2. Shadowing from the  gallbladder fossa. In the absence of  cholecystectomy, appearance is concerning for cholelithiasis.      US Venous Doppler Lower Extremity Bilateral (duplex) 01-20-22  FINDINGS:   There is patent spontaneous flow from the common femoral vein through  the posterior tibial veins.  There was no internal clot or area of noncompressibility.  Normal augmentation was elicited where applicable.     IMPRESSION:  No DVT in the lower extremities on today's exam.       CT Chest Pulmonary Embolism 01-20-22  FINDINGS: Today's study demonstrates opacification of the central  pulmonary vessels.  There are filling defects in the pulmonary arteries bilaterally. First  order arteries. This is most compatible with bilateral pulmonary  emboli..     Soft tissue mass in the right lower lobe with somewhat cavitary center  is again noted and unchanged..     There is no mediastinal lymph node enlargement     No pericardial or pleural effusion.        IMPRESSION:  1. Bilateral pulmonary emboli.  2. Parenchymal nodules bilaterally with dominant mass in the right lower  Lobe..      NM PET/CT Skull Base to Mid Thigh 01-28-22  FINDINGS:      HEAD/NECK:  Area of uptake in the posterior right paraspinal space and to lesser  degree the left paraspinal space appears related to muscle uptake.  No FDG hypermetabolic neck adenopathy.  No FDG hypermetabolic masses.     CHEST:   Numerous bilateral pulmonary nodules appear stable from the previous  chest CT and show no FDG hypermetabolism. This is likely due to nodules  being below size threshold for PET sensitivity.  Cavitary lung mass in the right lung localizing to the superior segment  of the lower lobe shows FDG hypermetabolism with maximum SUV: 2.4. This  is at the lower range for malignancy.  Enlarged right lower axillary region lymph node which is 3.0 cm shows  mild FDG hypermetabolism that is approximately with maximum SUV: 2.4.  This is at the lower range for malignancy.  Low-dose CT  demonstrating no change in additional scattered pulmonary  nodules from the previous scan. Coronary artery calcifications are  stable.     ABDOMEN/PELVIS:   Faint area of low attenuation involving the right lobe of liver is  poorly evaluated with low-dose noncontrast CT but shows no focal FDG  hypermetabolism.  Physiologic FDG hypermetabolism seen throughout the solid abdominal  organs.  Physiologic FDG hypermetabolism seen throughout the GI tract.  Physiologic FDG hypermetabolism seen throughout the mesentery,  retroperitoneum and pelvis.  Low dose CT redemonstrates nonobstructing kidney stones. Cholelithiasis  again noted.     BONES: Nonspecific FDG tracer activity. No intense areas of tracer  activity noted.     IMPRESSION:     1. Cavitary mass in the right lung that shows very low-grade FDG  hypermetabolism which is at the lower range for malignancy with maximum  SUV: 2.4.  2. Enlarged right axillary region lymph node with low-grade FDG  hypermetabolism also at the lower range for malignancy with maximum SUV:  2.4.  3. Numerous bilateral pulmonary nodules compatible with metastatic  disease but show no significant FDG hypermetabolism. This may be in part  due to size of nodules being below PET sensitivity threshold.  4. Faint area of low-attenuation right lobe liver poorly evaluated with  noncontrast low-dose CT that shows no obvious intense FDG  hypermetabolism.  5. Other nonacute findings as above on low dose CT.        CTCAP 05-03-22  FINDINGS:    Lungs:  Cavitary mass in the right lower lobe has nearly resolved.  A  right lower lobe pulmonary nodule is 7.4 mm and was previously 7.5 mm.  No change. Image #[id number].  A left lower lobe pulmonary nodule, image #52, is  9.8 mm and was previously 9.8 mm. No change.    Pleural space:  Unremarkable.  No pneumothorax.  No significant  effusion.    Heart:  Unremarkable.  No cardiomegaly.  No significant pericardial  effusion.  No significant coronary artery  calcifications.    Bones/joints:  The bony structures appear stable. No acute bony  findings identified.  No dislocation.    Soft tissues:  Unremarkable.    Vasculature:  Please note, pulmonary emboli noted on the prior exam  are not well evaluated. The previously described filling defects of the  main pulmonary arteries are not evident.  No thoracic aortic aneurysm.    Lymph nodes:  No mediastinal adenopathy by CT size criteria.    Gallbladder and bile ducts:  Cholelithiasis noted.    Other findings:  Other smaller nodules appear stable in size and  configuration.  No new nodules identified.     IMPRESSION:  1.  Near-complete resolution of previously described cavitary mass in  the right lower lobe periphery now only with linear scarring noted.  2.  Index nodules of both lungs are stable from previous exam with no  size increase identified. No new nodules are noted.  3.  Cholelithiasis.  4.  Due to timing of contrast on this study, assessment of pulmonary  emboli not performed. There appears to be significant improvement in  clot burden however in the more central vessels.    FINDINGS:    Lung bases:  Pulmonary nodules of the lung bases.    Mediastinum:  Small hiatal hernia.      ABDOMEN:    Liver:  Low attenuation lesions of the right lobe liver appear of  decreased prominence from the previous exam. Segment 8 liver lesion is  1.1 cm. Segment 7 liver lesion is approximately 0.8 cm.    Gallbladder and bile ducts:  Cholelithiasis.  No ductal dilation.    Pancreas:  Unremarkable.  No mass.  No ductal dilation.    Spleen:  Unremarkable.  No splenomegaly.    Adrenals:  No adrenal masses identified.    Kidneys and ureters:  Unremarkable.  No solid mass.  No  hydronephrosis.    Stomach and bowel:  Sigmoid diverticulosis without evidence of  diverticulitis.  No obstruction.      PELVIS:    Appendix:  Normal appendix.    Bladder:  Unremarkable.  No mass.    Reproductive:  Unremarkable as visualized.      ABDOMEN and  PELVIS:    Intraperitoneal space:  Unremarkable.  No free air.  No significant  fluid collection.    Bones/joints:  No acute fracture.  No dislocation.    Soft tissues:  Unremarkable.    Vasculature:  Unremarkable.  No abdominal aortic aneurysm.    Lymph nodes:  No iliac or retroperitoneal adenopathy.     IMPRESSION:  1.  Small low-attenuation lesions of the right lobe liver appears  decreased prominence of the previous exam. No new no focal liver lesions  identified  2.  Cholelithiasis.  3.  Otherwise stable appearance of the abdomen and pelvis.    CT Abdomen Pelvis With Contrast (08/08/2022 14:36)  FINDINGS:    LUNG BASES:  Unremarkable.  No mass.  No consolidation.      ABDOMEN:    LIVER:  Stable 1 cm right lobe of liver lesion and more posterior 7 mm  lesion.    GALLBLADDER AND BILE DUCTS:  Gallstones.  Gallbladder otherwise  unremarkable.  No ductal dilation.    PANCREAS:  Unremarkable.  No mass.  No ductal dilation.    SPLEEN:  Unremarkable.  No splenomegaly.    ADRENALS:  Unremarkable.  No mass.    KIDNEYS AND URETERS:  Unremarkable.  No solid mass.  No  hydronephrosis.    STOMACH AND BOWEL:  Unremarkable.  No obstruction.  No mucosal  thickening.      PELVIS:    APPENDIX:  No findings to suggest acute appendicitis.    BLADDER:  Unremarkable.  No mass.    REPRODUCTIVE:  Unremarkable as visualized.      ABDOMEN and PELVIS:    INTRAPERITONEAL SPACE:  Unremarkable.  No free air.  No significant  fluid collection.    BONES/JOINTS:  No acute fracture.  No dislocation.    SOFT TISSUES:  Unremarkable.    VASCULATURE:  Unremarkable.  No abdominal aortic aneurysm.    LYMPH NODES:  Unremarkable.  No enlarged lymph nodes.     IMPRESSION:  1.  Gallstones.  Gallbladder otherwise unremarkable.  2.  Stable 1 cm right lobe of liver lesion and more posterior 7 mm  Lesion.    CT Chest With Contrast Diagnostic (08/08/2022 14:40)  FINDINGS:    LUNGS:  Multiple pulmonary nodules again noted including a right lower  lobe pulmonary  nodule measuring 5 mm that was 5 mm. Another right lower  lobe pulmonary nodule is 7 mm and was 7 mm. A left lower lobe pulmonary  nodule is 5 mm and was 5 mm. Other nodules appear stable.    PLEURAL SPACE:  Unremarkable.  No pneumothorax.  No significant  effusion.    HEART:  Unremarkable.  No cardiomegaly.  No significant pericardial  effusion.  No significant coronary artery calcifications.    BONES/JOINTS:  Unremarkable.  No acute fracture.  No dislocation.    SOFT TISSUES:  Unremarkable.    VASCULATURE:  Unremarkable.  No thoracic aortic aneurysm.    LYMPH NODES:  Unremarkable.  No enlarged lymph nodes.     IMPRESSION:    Multiple pulmonary nodules again noted including a right lower lobe  pulmonary nodule measuring 5 mm that was 5 mm. Another right lower lobe  pulmonary nodule is 7 mm and was 7 mm. A left lower lobe pulmonary  nodule is 5 mm and was 5 mm. Other nodules appear stable.    CT Abdomen Pelvis With Contrast (10/31/2022 14:46)  FINDINGS:    LUNG BASES:  Unremarkable.  No mass.  No consolidation.      ABDOMEN:    LIVER:  Stable right lobe of liver low-attenuation lesion measuring  about a centimeter. Stable more posterior right lobe of liver lesion  measuring about 7 mm.    GALLBLADDER AND BILE DUCTS:  Gallstones.  Gallbladder otherwise  unremarkable.  No ductal dilation.    PANCREAS:  Unremarkable.  No mass.  No ductal dilation.    SPLEEN:  Unremarkable.  No splenomegaly.    ADRENALS:  Unremarkable.  No mass.    KIDNEYS AND URETERS:  Unremarkable.  No solid mass.  No  hydronephrosis.    STOMACH AND BOWEL:  Scattered diverticula in the colon.  No  diverticulitis.  No obstruction.      PELVIS:    APPENDIX:  No findings to suggest acute appendicitis.    BLADDER:  Unremarkable.  No mass.    REPRODUCTIVE:  Unremarkable as visualized.      ABDOMEN and PELVIS:    INTRAPERITONEAL SPACE:  Unremarkable.  No free air.  No significant  fluid collection.    BONES/JOINTS:  No acute fracture.  No dislocation.     SOFT TISSUES:  Unremarkable.    VASCULATURE:  Unremarkable.  No abdominal aortic aneurysm.    LYMPH NODES:  Unremarkable.  No enlarged lymph nodes.     IMPRESSION:  1.  Gallstones.  Gallbladder otherwise unremarkable.  2.  Stable right lobe of liver low-attenuation lesion measuring about a  centimeter. Stable more posterior right lobe of liver lesion measuring  about 7 mm.    CT Chest With Contrast Diagnostic (10/31/2022 14:47)  FINDINGS:    LUNGS:  Stable subpleural right middle lobe pulmonary nodule measuring  5 mm. Stable right lower lobe pulmonary nodule measuring 6 mm. Other  bilateral pulmonary nodules appear stable in size and number.    PLEURAL SPACE:  Unremarkable.  No pneumothorax.  No significant  effusion.    HEART:  Unremarkable.  No cardiomegaly.  No significant pericardial  effusion.  No significant coronary artery calcifications.    BONES/JOINTS:  Unremarkable.  No acute fracture.  No dislocation.    SOFT TISSUES:  Right upper breast density again noted.    VASCULATURE:  Unremarkable.  No thoracic aortic aneurysm.    LYMPH NODES:  Unremarkable.  No enlarged lymph nodes.     IMPRESSION:    Stable subpleural right middle lobe pulmonary nodule measuring 5 mm.  Stable right lower lobe pulmonary nodule measuring 6 mm. Other bilateral  pulmonary nodules appear stable in size and number.      RECENT LABS:  Lab Results   Component Value Date    WBC 5.89 11/03/2022    HGB 13.4 11/03/2022    HCT 41.3 11/03/2022    MCV 90.8 11/03/2022    RDW 13.8 11/03/2022     11/03/2022    NEUTRORELPCT 46.1 11/03/2022    LYMPHORELPCT 31.9 11/03/2022    MONORELPCT 8.0 11/03/2022    EOSRELPCT 12.6 (H) 11/03/2022    BASORELPCT 1.2 11/03/2022    NEUTROABS 2.72 11/03/2022    LYMPHSABS 1.88 11/03/2022       Lab Results   Component Value Date     11/03/2022    K 4.1 11/03/2022    CO2 25.0 11/03/2022     11/03/2022    BUN 12 11/03/2022    CREATININE 0.71 11/03/2022    EGFRIFNONA 87 02/24/2022    GLUCOSE 84  11/03/2022    CALCIUM 9.2 11/03/2022    ALKPHOS 72 11/03/2022    AST 21 11/03/2022    ALT 21 11/03/2022    BILITOT 0.3 11/03/2022    ALBUMIN 3.97 11/03/2022    PROTEINTOT 6.3 11/03/2022    MG 2.0 12/17/2021     Lab Results   Component Value Date    TSH 3.070 11/03/2022     Lab Results   Component Value Date    FERRITIN 169.00 (H) 01/20/2022    IRON 102 01/20/2022    TIBC 264 (L) 01/20/2022    LABIRON 39 01/20/2022    XIVCRDDC48 437 01/20/2022    FOLATE 16.40 01/20/2022     Lab Results   Component Value Date     (H) 01/20/2022       ASSESSMENT & PLAN:  Yadira Flowers is a very pleasant 66 y.o. female with a history of cervical cancer, breast cancer and malignant melanoma.    1. History of Breast Cancer:  - Ms. Onur Burgos had Stage IA ([id number]) moderately differentiated invasive ductal carcinoma of the right breast diagnosed in October 2015.  - She underwent R lympectomy and SLNBx performed by Dr. Steve Isbell 10-22-15 and then received adjuvant radiation in [place].  - After radiation, she was started on Arimidex which was then switched to Exemestane.  She took Exemestane for almost 5 years. Unfortunately, she developed vaginal bleeding and hematuria related to vaginal atrophy related to hormonal blockade.  Given this, stopped Exemestane.  She is doing much better in this regard since stopping hormonal therapy.  - Mammogram 1-13-22 without cause for concern aside from R axillary LAD.  No palpable breast masses.  Repeat exam will be due in January 2023.  We will order this today.    2.  Metastatic malignant melanoma:  -  S/p resection of a primary lesion on her back performed by Dr. Catrachito Amaro of Dermatology in [place] in 2004.  -  S/p FNA R axillary LN which was concerning for melanoma.  Excisional biopsy confirmed metastatic malignant melanoma.  - Suspect her lung and liver lesions are also related to her melanoma.  - She has seen Dr. Her and he performed bronchoscopy on 02/23/2022.  Pathology was negative for malignant cells.  - Liver aspiration showed abscess which grew Klebsiella, suspect this was a secondary infection but this isn't completely certain at this time.    -  Plan to watch all of these areas as we proceed with treatment.  -  Sent  excised LN tissue for CARIS testing.  -  PET-CT showed no significant FDG uptake but re-demonstrated abnormalities in the R axilla, RLL cavitary mass, Vargas lung nodules and liver.    -  MRI Brain showed no acute findings in the head/brain.  -  Recommended initial therapy with immunotherapy.  We discussed different options for immunotherapy including single agent PD-L1 treatment or combination with Yervoy.  We recommended combination with Yervoy for better RR, PFS and OS even with increased risk of immune-mediated side effects.  We discussed the initially approved FDA dosing v. Altered dosing with lower dose IPI/higher dosed Nivo.  The latter has been shown to have fewer side effects and is thought to likely have similar efficacy (though trials weren't really powered to definitively show that).  After a lengthy discussion, we decided to proceed with 4 cycles of Ipi 1 mg/kg / Nivo 3 mg/kg q 21d followed by Nivolumab single agent.  We discussed potential risks, benefits and side effects extensively and she decided to proceed.  -  She completed 4 cycles of Ipi / Nivo and tolerated treatment very well.  -  Repeat CT imaging done 5-3-22 after 4 cycles Ipi/Nivo showed an excellent partial response to treatment.  Have continued with single agent Nivolumab as planned.  Aside from rash (which may well have been related to combination immunotherapy), which has now resolved, she denies side effects.  Will continue.  -  Encouraged her to f/u with dermatology.  -  Plan to have her return with CT CAP with contrast in 3 months.    3. Mild Erythema/Pruritus of Chest and Bilateral Arms  - Suspect this may have been a side effect of dual immunotherapy.  - This has now  completely resolved and has not recurred.    4. Constipation  -  Continue Senna 2 tabs BID. Also advised to use Miralax as needed.     5. Bilateral pulmonary emboli:  -  CTPE protocol 1-20-22  confirmed bilateral pulmonary emboli  -  BLE dopplers negative for DVT.  -  Continue Eliquis 5 mg BID.    -  We previously discussed length of therapy.  She watched her  die with PE and her mother has h/o PEs as well.  Presumably the cancer was the risk factor for development of PE and since her cancer is incurable, I have recommended she continue Eliquis indefinately as long as she continues without significant side effects.  She started complaining of difficulty with easy bleeding with minor trauma.  It had been over 6 months that she has had full anticoagulation.  Given worsening side effects, decided to continue Eliquis, but reduced dose to 2.5 mg twice a day.  She is doing much better on this dose.  We will continue.    6.  H/o Colon polyps:  -  Dr. Maldonado performed c-scope 12-09-20 with discovery of 8 hyperplastic polyps in the distal rectum as well as diverticulosis.  He recommended repeat c-scope after 5 years.    7.  H/o cervical cancer treated with cryotherapy at [facility] in 1975:  -  Followed up with Dr. Manav Real of gynecology.  She says she had exam including pap smear and then pelvic U/S which was unrevealing.    8. Prophylaxis:  -  Had COVID vaccine x 2 (Pfizer).  She received Evusheld on 4-7-22..  Recommended 2022 influenza vaccine as well as bivalent COVID booster..    9. ACO / DWAYNE/Other  Quality measures  -  Yadira Flowers did not receive 2022 flu vaccine.  This is been recommended to her.  -  Yadira Flowers reports a pain score of 0.  Given her pain assessment as noted, treatment options were discussed and the following options were decided upon as a follow-up plan to address the patient's pain: No intervention needed at this time.  -  Current outpatient and discharge medications have been  reconciled for the patient.  Reviewed by: Loreto Stone MD      10.  Follow up:  - Continue Nivolumab as planned  - Continue Eliquis at reduced dose of 2.5 mg twice a day  - Recommended 2022 influenza vaccine as well as bivalent COVID booster.  -  Repeat CT CAP with contrast in 3 months.  I will see her back p imaging with CBCD, CMP, TSH.  - Check bilateral diagnostic mammogram after 1/13/2023.    I spent 30 minutes with Yadira Flowers today.  In the office today, more than 50% of this time was spent face-to-face with her  in counseling / coordination of care, reviewing her medical history and counseling on the current treatment plan.  All questions were answered to her satisfaction      Electronically Signed by:  Loreto Stone MD       CC:     MD Manav Cotto MD Aaron House, MD

## 2022-11-03 ENCOUNTER — OFFICE VISIT (OUTPATIENT)
Dept: ONCOLOGY | Facility: CLINIC | Age: 67
End: 2022-11-03

## 2022-11-03 ENCOUNTER — INFUSION (OUTPATIENT)
Dept: ONCOLOGY | Facility: HOSPITAL | Age: 67
End: 2022-11-03

## 2022-11-03 VITALS
WEIGHT: 194.6 LBS | OXYGEN SATURATION: 98 % | RESPIRATION RATE: 18 BRPM | HEART RATE: 72 BPM | SYSTOLIC BLOOD PRESSURE: 124 MMHG | DIASTOLIC BLOOD PRESSURE: 78 MMHG | BODY MASS INDEX: 196.64 KG/M2 | TEMPERATURE: 97.1 F

## 2022-11-03 VITALS
DIASTOLIC BLOOD PRESSURE: 78 MMHG | OXYGEN SATURATION: 98 % | HEART RATE: 72 BPM | WEIGHT: 194.67 LBS | BODY MASS INDEX: 196.7 KG/M2 | TEMPERATURE: 97.1 F | SYSTOLIC BLOOD PRESSURE: 124 MMHG | RESPIRATION RATE: 18 BRPM

## 2022-11-03 DIAGNOSIS — T82.868A THROMBOSIS INVOLVING VASCULAR DEVICE, INITIAL ENCOUNTER: ICD-10-CM

## 2022-11-03 DIAGNOSIS — C78.02 MALIGNANT NEOPLASM METASTATIC TO BOTH LUNGS: ICD-10-CM

## 2022-11-03 DIAGNOSIS — C78.01 MALIGNANT NEOPLASM METASTATIC TO BOTH LUNGS: ICD-10-CM

## 2022-11-03 DIAGNOSIS — C43.59 MALIGNANT MELANOMA OF TORSO EXCLUDING BREAST: ICD-10-CM

## 2022-11-03 DIAGNOSIS — C78.7 METASTASIS TO LIVER: ICD-10-CM

## 2022-11-03 DIAGNOSIS — C43.59 MALIGNANT MELANOMA OF TORSO EXCLUDING BREAST: Primary | ICD-10-CM

## 2022-11-03 DIAGNOSIS — C50.911 MALIGNANT NEOPLASM OF RIGHT BREAST IN FEMALE, ESTROGEN RECEPTOR POSITIVE, UNSPECIFIED SITE OF BREAST: ICD-10-CM

## 2022-11-03 DIAGNOSIS — I27.82 OTHER CHRONIC PULMONARY EMBOLISM WITHOUT ACUTE COR PULMONALE: ICD-10-CM

## 2022-11-03 DIAGNOSIS — C43.9 METASTATIC MELANOMA: Primary | ICD-10-CM

## 2022-11-03 DIAGNOSIS — Z17.0 MALIGNANT NEOPLASM OF RIGHT BREAST IN FEMALE, ESTROGEN RECEPTOR POSITIVE, UNSPECIFIED SITE OF BREAST: ICD-10-CM

## 2022-11-03 DIAGNOSIS — C78.7 METASTASIS TO LIVER: Primary | ICD-10-CM

## 2022-11-03 DIAGNOSIS — Z95.828 PORT-A-CATH IN PLACE: ICD-10-CM

## 2022-11-03 LAB
ALBUMIN SERPL-MCNC: 3.97 G/DL (ref 3.5–5.2)
ALBUMIN/GLOB SERPL: 1.7 G/DL
ALP SERPL-CCNC: 72 U/L (ref 39–117)
ALT SERPL W P-5'-P-CCNC: 21 U/L (ref 1–33)
ANION GAP SERPL CALCULATED.3IONS-SCNC: 12 MMOL/L (ref 5–15)
AST SERPL-CCNC: 21 U/L (ref 1–32)
BASOPHILS # BLD AUTO: 0.07 10*3/MM3 (ref 0–0.2)
BASOPHILS NFR BLD AUTO: 1.2 % (ref 0–1.5)
BILIRUB SERPL-MCNC: 0.3 MG/DL (ref 0–1.2)
BUN SERPL-MCNC: 12 MG/DL (ref 8–23)
BUN/CREAT SERPL: 16.9 (ref 7–25)
CALCIUM SPEC-SCNC: 9.2 MG/DL (ref 8.6–10.5)
CHLORIDE SERPL-SCNC: 104 MMOL/L (ref 98–107)
CO2 SERPL-SCNC: 25 MMOL/L (ref 22–29)
CREAT SERPL-MCNC: 0.71 MG/DL (ref 0.57–1)
DEPRECATED RDW RBC AUTO: 46 FL (ref 37–54)
EGFRCR SERPLBLD CKD-EPI 2021: 94.5 ML/MIN/1.73
EOSINOPHIL # BLD AUTO: 0.74 10*3/MM3 (ref 0–0.4)
EOSINOPHIL NFR BLD AUTO: 12.6 % (ref 0.3–6.2)
ERYTHROCYTE [DISTWIDTH] IN BLOOD BY AUTOMATED COUNT: 13.8 % (ref 12.3–15.4)
GLOBULIN UR ELPH-MCNC: 2.3 GM/DL
GLUCOSE SERPL-MCNC: 84 MG/DL (ref 65–99)
HCT VFR BLD AUTO: 41.3 % (ref 34–46.6)
HGB BLD-MCNC: 13.4 G/DL (ref 12–15.9)
IMM GRANULOCYTES # BLD AUTO: 0.01 10*3/MM3 (ref 0–0.05)
IMM GRANULOCYTES NFR BLD AUTO: 0.2 % (ref 0–0.5)
LYMPHOCYTES # BLD AUTO: 1.88 10*3/MM3 (ref 0.7–3.1)
LYMPHOCYTES NFR BLD AUTO: 31.9 % (ref 19.6–45.3)
MCH RBC QN AUTO: 29.5 PG (ref 26.6–33)
MCHC RBC AUTO-ENTMCNC: 32.4 G/DL (ref 31.5–35.7)
MCV RBC AUTO: 90.8 FL (ref 79–97)
MONOCYTES # BLD AUTO: 0.47 10*3/MM3 (ref 0.1–0.9)
MONOCYTES NFR BLD AUTO: 8 % (ref 5–12)
NEUTROPHILS NFR BLD AUTO: 2.72 10*3/MM3 (ref 1.7–7)
NEUTROPHILS NFR BLD AUTO: 46.1 % (ref 42.7–76)
NRBC BLD AUTO-RTO: 0 /100 WBC (ref 0–0.2)
PLATELET # BLD AUTO: 208 10*3/MM3 (ref 140–450)
PMV BLD AUTO: 10.3 FL (ref 6–12)
POTASSIUM SERPL-SCNC: 4.1 MMOL/L (ref 3.5–5.2)
PROT SERPL-MCNC: 6.3 G/DL (ref 6–8.5)
RBC # BLD AUTO: 4.55 10*6/MM3 (ref 3.77–5.28)
SODIUM SERPL-SCNC: 141 MMOL/L (ref 136–145)
T4 FREE SERPL-MCNC: 1.04 NG/DL (ref 0.93–1.7)
TSH SERPL DL<=0.05 MIU/L-ACNC: 3.07 UIU/ML (ref 0.27–4.2)
WBC NRBC COR # BLD: 5.89 10*3/MM3 (ref 3.4–10.8)

## 2022-11-03 PROCEDURE — 25010000002 NIVOLUMAB 240 MG/24ML SOLUTION 24 ML VIAL: Performed by: INTERNAL MEDICINE

## 2022-11-03 PROCEDURE — 80053 COMPREHEN METABOLIC PANEL: CPT | Performed by: INTERNAL MEDICINE

## 2022-11-03 PROCEDURE — 84439 ASSAY OF FREE THYROXINE: CPT | Performed by: INTERNAL MEDICINE

## 2022-11-03 PROCEDURE — 99214 OFFICE O/P EST MOD 30 MIN: CPT | Performed by: INTERNAL MEDICINE

## 2022-11-03 PROCEDURE — 84443 ASSAY THYROID STIM HORMONE: CPT | Performed by: INTERNAL MEDICINE

## 2022-11-03 PROCEDURE — 36415 COLL VENOUS BLD VENIPUNCTURE: CPT | Performed by: INTERNAL MEDICINE

## 2022-11-03 PROCEDURE — 96413 CHEMO IV INFUSION 1 HR: CPT

## 2022-11-03 PROCEDURE — 25010000002 HEPARIN LOCK FLUSH PER 10 UNITS: Performed by: INTERNAL MEDICINE

## 2022-11-03 PROCEDURE — 85025 COMPLETE CBC W/AUTO DIFF WBC: CPT | Performed by: INTERNAL MEDICINE

## 2022-11-03 RX ORDER — SODIUM CHLORIDE 0.9 % (FLUSH) 0.9 %
10 SYRINGE (ML) INJECTION AS NEEDED
Status: DISCONTINUED | OUTPATIENT
Start: 2022-11-03 | End: 2022-11-03 | Stop reason: HOSPADM

## 2022-11-03 RX ORDER — SODIUM CHLORIDE 0.9 % (FLUSH) 0.9 %
10 SYRINGE (ML) INJECTION AS NEEDED
Status: CANCELLED | OUTPATIENT
Start: 2022-12-01

## 2022-11-03 RX ORDER — SODIUM CHLORIDE 0.9 % (FLUSH) 0.9 %
20 SYRINGE (ML) INJECTION AS NEEDED
Status: CANCELLED | OUTPATIENT
Start: 2022-12-02

## 2022-11-03 RX ORDER — HEPARIN SODIUM (PORCINE) LOCK FLUSH IV SOLN 100 UNIT/ML 100 UNIT/ML
300 SOLUTION INTRAVENOUS ONCE
Status: CANCELLED | OUTPATIENT
Start: 2022-12-02

## 2022-11-03 RX ORDER — SODIUM CHLORIDE 9 MG/ML
250 INJECTION, SOLUTION INTRAVENOUS ONCE
Status: COMPLETED | OUTPATIENT
Start: 2022-11-03 | End: 2022-11-03

## 2022-11-03 RX ORDER — HEPARIN SODIUM (PORCINE) LOCK FLUSH IV SOLN 100 UNIT/ML 100 UNIT/ML
500 SOLUTION INTRAVENOUS AS NEEDED
Status: DISCONTINUED | OUTPATIENT
Start: 2022-11-03 | End: 2022-11-03 | Stop reason: HOSPADM

## 2022-11-03 RX ORDER — HEPARIN SODIUM (PORCINE) LOCK FLUSH IV SOLN 100 UNIT/ML 100 UNIT/ML
500 SOLUTION INTRAVENOUS AS NEEDED
Status: CANCELLED | OUTPATIENT
Start: 2022-12-01

## 2022-11-03 RX ADMIN — SODIUM CHLORIDE 250 ML: 9 INJECTION, SOLUTION INTRAVENOUS at 14:51

## 2022-11-03 RX ADMIN — SODIUM CHLORIDE, PRESERVATIVE FREE 500 UNITS: 5 INJECTION INTRAVENOUS at 15:43

## 2022-11-03 RX ADMIN — Medication 10 ML: at 15:43

## 2022-11-03 RX ADMIN — SODIUM CHLORIDE 480 MG: 9 INJECTION, SOLUTION INTRAVENOUS at 14:50

## 2022-12-01 ENCOUNTER — APPOINTMENT (OUTPATIENT)
Dept: ONCOLOGY | Facility: HOSPITAL | Age: 67
End: 2022-12-01
Payer: COMMERCIAL

## 2022-12-02 ENCOUNTER — INFUSION (OUTPATIENT)
Dept: ONCOLOGY | Facility: HOSPITAL | Age: 67
End: 2022-12-02
Payer: COMMERCIAL

## 2022-12-02 ENCOUNTER — APPOINTMENT (OUTPATIENT)
Dept: ONCOLOGY | Facility: HOSPITAL | Age: 67
End: 2022-12-02
Payer: COMMERCIAL

## 2022-12-02 VITALS
RESPIRATION RATE: 18 BRPM | SYSTOLIC BLOOD PRESSURE: 144 MMHG | WEIGHT: 200.3 LBS | TEMPERATURE: 98 F | HEART RATE: 88 BPM | BODY MASS INDEX: 202.4 KG/M2 | OXYGEN SATURATION: 96 % | DIASTOLIC BLOOD PRESSURE: 86 MMHG

## 2022-12-02 DIAGNOSIS — C78.02 MALIGNANT NEOPLASM METASTATIC TO BOTH LUNGS: Primary | ICD-10-CM

## 2022-12-02 DIAGNOSIS — C43.59 MALIGNANT MELANOMA OF TORSO EXCLUDING BREAST: ICD-10-CM

## 2022-12-02 DIAGNOSIS — C78.7 METASTASIS TO LIVER: ICD-10-CM

## 2022-12-02 DIAGNOSIS — C78.01 MALIGNANT NEOPLASM METASTATIC TO BOTH LUNGS: Primary | ICD-10-CM

## 2022-12-02 DIAGNOSIS — Z95.828 PORT-A-CATH IN PLACE: ICD-10-CM

## 2022-12-02 DIAGNOSIS — Z79.899 LONG-TERM USE OF HIGH-RISK MEDICATION: ICD-10-CM

## 2022-12-02 LAB
ALBUMIN SERPL-MCNC: 4.07 G/DL (ref 3.5–5.2)
ALBUMIN/GLOB SERPL: 1.6 G/DL
ALP SERPL-CCNC: 74 U/L (ref 39–117)
ALT SERPL W P-5'-P-CCNC: 31 U/L (ref 1–33)
ANION GAP SERPL CALCULATED.3IONS-SCNC: 10.9 MMOL/L (ref 5–15)
AST SERPL-CCNC: 27 U/L (ref 1–32)
BASOPHILS # BLD AUTO: 0.07 10*3/MM3 (ref 0–0.2)
BASOPHILS NFR BLD AUTO: 1.3 % (ref 0–1.5)
BILIRUB SERPL-MCNC: 0.3 MG/DL (ref 0–1.2)
BUN SERPL-MCNC: 16 MG/DL (ref 8–23)
BUN/CREAT SERPL: 23.9 (ref 7–25)
CALCIUM SPEC-SCNC: 9.4 MG/DL (ref 8.6–10.5)
CHLORIDE SERPL-SCNC: 106 MMOL/L (ref 98–107)
CO2 SERPL-SCNC: 25.1 MMOL/L (ref 22–29)
CREAT SERPL-MCNC: 0.67 MG/DL (ref 0.57–1)
DEPRECATED RDW RBC AUTO: 46.6 FL (ref 37–54)
EGFRCR SERPLBLD CKD-EPI 2021: 96.5 ML/MIN/1.73
EOSINOPHIL # BLD AUTO: 0.63 10*3/MM3 (ref 0–0.4)
EOSINOPHIL NFR BLD AUTO: 11.9 % (ref 0.3–6.2)
ERYTHROCYTE [DISTWIDTH] IN BLOOD BY AUTOMATED COUNT: 13.7 % (ref 12.3–15.4)
GLOBULIN UR ELPH-MCNC: 2.5 GM/DL
GLUCOSE SERPL-MCNC: 93 MG/DL (ref 65–99)
HCT VFR BLD AUTO: 43 % (ref 34–46.6)
HGB BLD-MCNC: 13.6 G/DL (ref 12–15.9)
IMM GRANULOCYTES # BLD AUTO: 0.01 10*3/MM3 (ref 0–0.05)
IMM GRANULOCYTES NFR BLD AUTO: 0.2 % (ref 0–0.5)
LYMPHOCYTES # BLD AUTO: 1.81 10*3/MM3 (ref 0.7–3.1)
LYMPHOCYTES NFR BLD AUTO: 34.1 % (ref 19.6–45.3)
MCH RBC QN AUTO: 29.2 PG (ref 26.6–33)
MCHC RBC AUTO-ENTMCNC: 31.6 G/DL (ref 31.5–35.7)
MCV RBC AUTO: 92.3 FL (ref 79–97)
MONOCYTES # BLD AUTO: 0.42 10*3/MM3 (ref 0.1–0.9)
MONOCYTES NFR BLD AUTO: 7.9 % (ref 5–12)
NEUTROPHILS NFR BLD AUTO: 2.37 10*3/MM3 (ref 1.7–7)
NEUTROPHILS NFR BLD AUTO: 44.6 % (ref 42.7–76)
NRBC BLD AUTO-RTO: 0 /100 WBC (ref 0–0.2)
PLATELET # BLD AUTO: 208 10*3/MM3 (ref 140–450)
PMV BLD AUTO: 10.2 FL (ref 6–12)
POTASSIUM SERPL-SCNC: 4 MMOL/L (ref 3.5–5.2)
PROT SERPL-MCNC: 6.6 G/DL (ref 6–8.5)
RBC # BLD AUTO: 4.66 10*6/MM3 (ref 3.77–5.28)
SODIUM SERPL-SCNC: 142 MMOL/L (ref 136–145)
T4 FREE SERPL-MCNC: 1.13 NG/DL (ref 0.93–1.7)
TSH SERPL DL<=0.05 MIU/L-ACNC: 2.79 UIU/ML (ref 0.27–4.2)
WBC NRBC COR # BLD: 5.31 10*3/MM3 (ref 3.4–10.8)

## 2022-12-02 PROCEDURE — 96413 CHEMO IV INFUSION 1 HR: CPT

## 2022-12-02 PROCEDURE — 25010000002 HEPARIN LOCK FLUSH PER 10 UNITS: Performed by: INTERNAL MEDICINE

## 2022-12-02 PROCEDURE — 80050 GENERAL HEALTH PANEL: CPT

## 2022-12-02 PROCEDURE — 84439 ASSAY OF FREE THYROXINE: CPT

## 2022-12-02 PROCEDURE — 25010000002 NIVOLUMAB 240 MG/24ML SOLUTION 24 ML VIAL: Performed by: INTERNAL MEDICINE

## 2022-12-02 RX ORDER — SODIUM CHLORIDE 0.9 % (FLUSH) 0.9 %
10 SYRINGE (ML) INJECTION AS NEEDED
Status: CANCELLED | OUTPATIENT
Start: 2022-12-29

## 2022-12-02 RX ORDER — HEPARIN SODIUM (PORCINE) LOCK FLUSH IV SOLN 100 UNIT/ML 100 UNIT/ML
500 SOLUTION INTRAVENOUS AS NEEDED
Status: CANCELLED | OUTPATIENT
Start: 2022-12-29

## 2022-12-02 RX ORDER — SODIUM CHLORIDE 9 MG/ML
250 INJECTION, SOLUTION INTRAVENOUS ONCE
Status: COMPLETED | OUTPATIENT
Start: 2022-12-02 | End: 2022-12-02

## 2022-12-02 RX ORDER — HEPARIN SODIUM (PORCINE) LOCK FLUSH IV SOLN 100 UNIT/ML 100 UNIT/ML
500 SOLUTION INTRAVENOUS AS NEEDED
Status: DISCONTINUED | OUTPATIENT
Start: 2022-12-02 | End: 2022-12-02 | Stop reason: HOSPADM

## 2022-12-02 RX ORDER — SODIUM CHLORIDE 0.9 % (FLUSH) 0.9 %
10 SYRINGE (ML) INJECTION AS NEEDED
Status: DISCONTINUED | OUTPATIENT
Start: 2022-12-02 | End: 2022-12-02 | Stop reason: HOSPADM

## 2022-12-02 RX ORDER — SODIUM CHLORIDE 0.9 % (FLUSH) 0.9 %
20 SYRINGE (ML) INJECTION AS NEEDED
Status: CANCELLED | OUTPATIENT
Start: 2022-12-02

## 2022-12-02 RX ORDER — HEPARIN SODIUM (PORCINE) LOCK FLUSH IV SOLN 100 UNIT/ML 100 UNIT/ML
300 SOLUTION INTRAVENOUS ONCE
Status: CANCELLED | OUTPATIENT
Start: 2022-12-02

## 2022-12-02 RX ADMIN — Medication 500 UNITS: at 15:31

## 2022-12-02 RX ADMIN — SODIUM CHLORIDE 250 ML: 9 INJECTION, SOLUTION INTRAVENOUS at 14:59

## 2022-12-02 RX ADMIN — SODIUM CHLORIDE 480 MG: 9 INJECTION, SOLUTION INTRAVENOUS at 14:59

## 2022-12-02 RX ADMIN — Medication 10 ML: at 15:31

## 2022-12-29 ENCOUNTER — LAB (OUTPATIENT)
Dept: ONCOLOGY | Facility: HOSPITAL | Age: 67
End: 2022-12-29
Payer: COMMERCIAL

## 2022-12-29 ENCOUNTER — INFUSION (OUTPATIENT)
Dept: ONCOLOGY | Facility: HOSPITAL | Age: 67
End: 2022-12-29
Payer: COMMERCIAL

## 2022-12-29 VITALS
RESPIRATION RATE: 18 BRPM | WEIGHT: 203.4 LBS | OXYGEN SATURATION: 96 % | BODY MASS INDEX: 205.53 KG/M2 | SYSTOLIC BLOOD PRESSURE: 155 MMHG | TEMPERATURE: 98.2 F | DIASTOLIC BLOOD PRESSURE: 82 MMHG | HEART RATE: 69 BPM

## 2022-12-29 DIAGNOSIS — C78.01 MALIGNANT NEOPLASM METASTATIC TO BOTH LUNGS: ICD-10-CM

## 2022-12-29 DIAGNOSIS — C78.7 METASTASIS TO LIVER: ICD-10-CM

## 2022-12-29 DIAGNOSIS — C78.01 MALIGNANT NEOPLASM METASTATIC TO BOTH LUNGS: Primary | ICD-10-CM

## 2022-12-29 DIAGNOSIS — C43.59 MALIGNANT MELANOMA OF TORSO EXCLUDING BREAST: ICD-10-CM

## 2022-12-29 DIAGNOSIS — C78.02 MALIGNANT NEOPLASM METASTATIC TO BOTH LUNGS: Primary | ICD-10-CM

## 2022-12-29 DIAGNOSIS — Z95.828 PORT-A-CATH IN PLACE: Primary | ICD-10-CM

## 2022-12-29 DIAGNOSIS — C78.02 MALIGNANT NEOPLASM METASTATIC TO BOTH LUNGS: ICD-10-CM

## 2022-12-29 LAB
ALBUMIN SERPL-MCNC: 4 G/DL (ref 3.5–5.2)
ALBUMIN/GLOB SERPL: 1.6 G/DL
ALP SERPL-CCNC: 67 U/L (ref 39–117)
ALT SERPL W P-5'-P-CCNC: 23 U/L (ref 1–33)
ANION GAP SERPL CALCULATED.3IONS-SCNC: 12.2 MMOL/L (ref 5–15)
AST SERPL-CCNC: 20 U/L (ref 1–32)
BASOPHILS # BLD AUTO: 0.04 10*3/MM3 (ref 0–0.2)
BASOPHILS NFR BLD AUTO: 0.7 % (ref 0–1.5)
BILIRUB SERPL-MCNC: 0.5 MG/DL (ref 0–1.2)
BUN SERPL-MCNC: 11 MG/DL (ref 8–23)
BUN/CREAT SERPL: 8 (ref 7–25)
CALCIUM SPEC-SCNC: 9.4 MG/DL (ref 8.6–10.5)
CHLORIDE SERPL-SCNC: 106 MMOL/L (ref 98–107)
CO2 SERPL-SCNC: 23.8 MMOL/L (ref 22–29)
CREAT SERPL-MCNC: 1.37 MG/DL (ref 0.57–1)
DEPRECATED RDW RBC AUTO: 44.4 FL (ref 37–54)
EGFRCR SERPLBLD CKD-EPI 2021: 42.7 ML/MIN/1.73
EOSINOPHIL # BLD AUTO: 0.44 10*3/MM3 (ref 0–0.4)
EOSINOPHIL NFR BLD AUTO: 7.2 % (ref 0.3–6.2)
ERYTHROCYTE [DISTWIDTH] IN BLOOD BY AUTOMATED COUNT: 13.3 % (ref 12.3–15.4)
GLOBULIN UR ELPH-MCNC: 2.5 GM/DL
GLUCOSE SERPL-MCNC: 81 MG/DL (ref 65–99)
HCT VFR BLD AUTO: 41.9 % (ref 34–46.6)
HGB BLD-MCNC: 13.4 G/DL (ref 12–15.9)
IMM GRANULOCYTES # BLD AUTO: 0.02 10*3/MM3 (ref 0–0.05)
IMM GRANULOCYTES NFR BLD AUTO: 0.3 % (ref 0–0.5)
LYMPHOCYTES # BLD AUTO: 2.33 10*3/MM3 (ref 0.7–3.1)
LYMPHOCYTES NFR BLD AUTO: 38.3 % (ref 19.6–45.3)
MCH RBC QN AUTO: 29 PG (ref 26.6–33)
MCHC RBC AUTO-ENTMCNC: 32 G/DL (ref 31.5–35.7)
MCV RBC AUTO: 90.7 FL (ref 79–97)
MONOCYTES # BLD AUTO: 0.64 10*3/MM3 (ref 0.1–0.9)
MONOCYTES NFR BLD AUTO: 10.5 % (ref 5–12)
NEUTROPHILS NFR BLD AUTO: 2.62 10*3/MM3 (ref 1.7–7)
NEUTROPHILS NFR BLD AUTO: 43 % (ref 42.7–76)
NRBC BLD AUTO-RTO: 0 /100 WBC (ref 0–0.2)
PLATELET # BLD AUTO: 208 10*3/MM3 (ref 140–450)
PMV BLD AUTO: 10 FL (ref 6–12)
POTASSIUM SERPL-SCNC: 3.9 MMOL/L (ref 3.5–5.2)
PROT SERPL-MCNC: 6.5 G/DL (ref 6–8.5)
RBC # BLD AUTO: 4.62 10*6/MM3 (ref 3.77–5.28)
SODIUM SERPL-SCNC: 142 MMOL/L (ref 136–145)
T4 FREE SERPL-MCNC: 1.14 NG/DL (ref 0.93–1.7)
TSH SERPL DL<=0.05 MIU/L-ACNC: 1.81 UIU/ML (ref 0.27–4.2)
WBC NRBC COR # BLD: 6.09 10*3/MM3 (ref 3.4–10.8)

## 2022-12-29 PROCEDURE — 25010000002 HEPARIN LOCK FLUSH PER 10 UNITS: Performed by: INTERNAL MEDICINE

## 2022-12-29 PROCEDURE — 80050 GENERAL HEALTH PANEL: CPT

## 2022-12-29 PROCEDURE — 96413 CHEMO IV INFUSION 1 HR: CPT

## 2022-12-29 PROCEDURE — 25010000002 NIVOLUMAB 240 MG/24ML SOLUTION 24 ML VIAL: Performed by: INTERNAL MEDICINE

## 2022-12-29 PROCEDURE — 84439 ASSAY OF FREE THYROXINE: CPT

## 2022-12-29 RX ORDER — HEPARIN SODIUM (PORCINE) LOCK FLUSH IV SOLN 100 UNIT/ML 100 UNIT/ML
300 SOLUTION INTRAVENOUS ONCE
Status: CANCELLED | OUTPATIENT
Start: 2022-12-29

## 2022-12-29 RX ORDER — HEPARIN SODIUM (PORCINE) LOCK FLUSH IV SOLN 100 UNIT/ML 100 UNIT/ML
500 SOLUTION INTRAVENOUS AS NEEDED
Status: CANCELLED | OUTPATIENT
Start: 2023-01-26

## 2022-12-29 RX ORDER — SODIUM CHLORIDE 0.9 % (FLUSH) 0.9 %
10 SYRINGE (ML) INJECTION AS NEEDED
Status: CANCELLED | OUTPATIENT
Start: 2023-01-26

## 2022-12-29 RX ORDER — HEPARIN SODIUM (PORCINE) LOCK FLUSH IV SOLN 100 UNIT/ML 100 UNIT/ML
500 SOLUTION INTRAVENOUS AS NEEDED
Status: DISCONTINUED | OUTPATIENT
Start: 2022-12-29 | End: 2022-12-29 | Stop reason: HOSPADM

## 2022-12-29 RX ORDER — SODIUM CHLORIDE 0.9 % (FLUSH) 0.9 %
10 SYRINGE (ML) INJECTION AS NEEDED
Status: DISCONTINUED | OUTPATIENT
Start: 2022-12-29 | End: 2022-12-29 | Stop reason: HOSPADM

## 2022-12-29 RX ORDER — SODIUM CHLORIDE 9 MG/ML
250 INJECTION, SOLUTION INTRAVENOUS ONCE
Status: COMPLETED | OUTPATIENT
Start: 2022-12-29 | End: 2022-12-29

## 2022-12-29 RX ORDER — SODIUM CHLORIDE 0.9 % (FLUSH) 0.9 %
20 SYRINGE (ML) INJECTION AS NEEDED
Status: CANCELLED | OUTPATIENT
Start: 2022-12-29

## 2022-12-29 RX ADMIN — HEPARIN 500 UNITS: 100 SYRINGE at 14:56

## 2022-12-29 RX ADMIN — Medication 10 ML: at 14:56

## 2022-12-29 RX ADMIN — SODIUM CHLORIDE 480 MG: 9 INJECTION, SOLUTION INTRAVENOUS at 14:25

## 2022-12-29 RX ADMIN — SODIUM CHLORIDE 250 ML: 9 INJECTION, SOLUTION INTRAVENOUS at 14:25

## 2023-01-20 DIAGNOSIS — C78.7 METASTASIS TO LIVER: ICD-10-CM

## 2023-01-20 DIAGNOSIS — C43.59 MALIGNANT MELANOMA OF TORSO EXCLUDING BREAST: Primary | ICD-10-CM

## 2023-01-20 DIAGNOSIS — C78.02 MALIGNANT NEOPLASM METASTATIC TO BOTH LUNGS: ICD-10-CM

## 2023-01-20 DIAGNOSIS — C78.01 MALIGNANT NEOPLASM METASTATIC TO BOTH LUNGS: ICD-10-CM

## 2023-01-20 RX ORDER — SODIUM CHLORIDE 9 MG/ML
250 INJECTION, SOLUTION INTRAVENOUS ONCE
Status: CANCELLED | OUTPATIENT
Start: 2023-01-26

## 2023-01-25 ENCOUNTER — HOSPITAL ENCOUNTER (OUTPATIENT)
Dept: CT IMAGING | Facility: HOSPITAL | Age: 68
Discharge: HOME | End: 2023-01-25
Payer: COMMERCIAL

## 2023-01-25 DIAGNOSIS — Z17.0 MALIGNANT NEOPLASM OF RIGHT BREAST IN FEMALE, ESTROGEN RECEPTOR POSITIVE, UNSPECIFIED SITE OF BREAST: ICD-10-CM

## 2023-01-25 DIAGNOSIS — C78.7 METASTASIS TO LIVER: ICD-10-CM

## 2023-01-25 DIAGNOSIS — C78.02 MALIGNANT NEOPLASM METASTATIC TO BOTH LUNGS: ICD-10-CM

## 2023-01-25 DIAGNOSIS — C78.01 MALIGNANT NEOPLASM METASTATIC TO BOTH LUNGS: ICD-10-CM

## 2023-01-25 DIAGNOSIS — C50.911 MALIGNANT NEOPLASM OF RIGHT BREAST IN FEMALE, ESTROGEN RECEPTOR POSITIVE, UNSPECIFIED SITE OF BREAST: ICD-10-CM

## 2023-01-25 PROCEDURE — 74177 CT ABD & PELVIS W/CONTRAST: CPT | Performed by: RADIOLOGY

## 2023-01-25 PROCEDURE — 71260 CT THORAX DX C+: CPT

## 2023-01-25 PROCEDURE — 74177 CT ABD & PELVIS W/CONTRAST: CPT

## 2023-01-25 PROCEDURE — 71260 CT THORAX DX C+: CPT | Performed by: RADIOLOGY

## 2023-01-25 PROCEDURE — 25010000002 IOPAMIDOL 61 % SOLUTION: Performed by: INTERNAL MEDICINE

## 2023-01-25 RX ADMIN — IOPAMIDOL 85 ML: 612 INJECTION, SOLUTION INTRAVENOUS at 21:19

## 2023-01-26 ENCOUNTER — OFFICE VISIT (OUTPATIENT)
Dept: ONCOLOGY | Facility: CLINIC | Age: 68
End: 2023-01-26
Payer: COMMERCIAL

## 2023-01-26 ENCOUNTER — INFUSION (OUTPATIENT)
Dept: ONCOLOGY | Facility: HOSPITAL | Age: 68
End: 2023-01-26
Payer: COMMERCIAL

## 2023-01-26 ENCOUNTER — LAB (OUTPATIENT)
Dept: ONCOLOGY | Facility: CLINIC | Age: 68
End: 2023-01-26
Payer: MEDICARE

## 2023-01-26 VITALS
TEMPERATURE: 97.1 F | OXYGEN SATURATION: 97 % | SYSTOLIC BLOOD PRESSURE: 144 MMHG | RESPIRATION RATE: 18 BRPM | DIASTOLIC BLOOD PRESSURE: 83 MMHG | HEART RATE: 76 BPM

## 2023-01-26 VITALS
RESPIRATION RATE: 18 BRPM | DIASTOLIC BLOOD PRESSURE: 76 MMHG | BODY MASS INDEX: 207.55 KG/M2 | OXYGEN SATURATION: 97 % | TEMPERATURE: 97.3 F | WEIGHT: 205.4 LBS | HEART RATE: 89 BPM | SYSTOLIC BLOOD PRESSURE: 122 MMHG

## 2023-01-26 DIAGNOSIS — C78.7 METASTASIS TO LIVER: ICD-10-CM

## 2023-01-26 DIAGNOSIS — Z95.828 PORT-A-CATH IN PLACE: ICD-10-CM

## 2023-01-26 DIAGNOSIS — C78.02 MALIGNANT NEOPLASM METASTATIC TO BOTH LUNGS: ICD-10-CM

## 2023-01-26 DIAGNOSIS — C43.9 METASTATIC MELANOMA: Primary | ICD-10-CM

## 2023-01-26 DIAGNOSIS — C43.59 MALIGNANT MELANOMA OF TORSO EXCLUDING BREAST: Primary | ICD-10-CM

## 2023-01-26 DIAGNOSIS — C43.59 MALIGNANT MELANOMA OF TORSO EXCLUDING BREAST: ICD-10-CM

## 2023-01-26 DIAGNOSIS — Z86.010 HISTORY OF COLON POLYPS: ICD-10-CM

## 2023-01-26 DIAGNOSIS — C78.01 MALIGNANT NEOPLASM METASTATIC TO BOTH LUNGS: ICD-10-CM

## 2023-01-26 DIAGNOSIS — Z85.41 HISTORY OF CERVICAL CANCER: ICD-10-CM

## 2023-01-26 DIAGNOSIS — K59.00 CONSTIPATION, UNSPECIFIED CONSTIPATION TYPE: ICD-10-CM

## 2023-01-26 DIAGNOSIS — R53.83 OTHER FATIGUE: ICD-10-CM

## 2023-01-26 DIAGNOSIS — Z85.3 HISTORY OF BREAST CANCER: ICD-10-CM

## 2023-01-26 LAB
ALBUMIN SERPL-MCNC: 4 G/DL (ref 3.5–5.2)
ALBUMIN/GLOB SERPL: 1.5 G/DL
ALP SERPL-CCNC: 75 U/L (ref 39–117)
ALT SERPL W P-5'-P-CCNC: 21 U/L (ref 1–33)
ANION GAP SERPL CALCULATED.3IONS-SCNC: 8.4 MMOL/L (ref 5–15)
AST SERPL-CCNC: 20 U/L (ref 1–32)
BASOPHILS # BLD AUTO: 0.07 10*3/MM3 (ref 0–0.2)
BASOPHILS NFR BLD AUTO: 1.4 % (ref 0–1.5)
BILIRUB SERPL-MCNC: 0.3 MG/DL (ref 0–1.2)
BUN SERPL-MCNC: 10 MG/DL (ref 8–23)
BUN/CREAT SERPL: 13.3 (ref 7–25)
CALCIUM SPEC-SCNC: 9.1 MG/DL (ref 8.6–10.5)
CHLORIDE SERPL-SCNC: 107 MMOL/L (ref 98–107)
CO2 SERPL-SCNC: 24.6 MMOL/L (ref 22–29)
CREAT SERPL-MCNC: 0.75 MG/DL (ref 0.57–1)
DEPRECATED RDW RBC AUTO: 47.5 FL (ref 37–54)
EGFRCR SERPLBLD CKD-EPI 2021: 87.9 ML/MIN/1.73
EOSINOPHIL # BLD AUTO: 0.99 10*3/MM3 (ref 0–0.4)
EOSINOPHIL NFR BLD AUTO: 20.4 % (ref 0.3–6.2)
ERYTHROCYTE [DISTWIDTH] IN BLOOD BY AUTOMATED COUNT: 13.6 % (ref 12.3–15.4)
GLOBULIN UR ELPH-MCNC: 2.6 GM/DL
GLUCOSE SERPL-MCNC: 105 MG/DL (ref 65–99)
HCT VFR BLD AUTO: 43.8 % (ref 34–46.6)
HGB BLD-MCNC: 13.5 G/DL (ref 12–15.9)
IMM GRANULOCYTES # BLD AUTO: 0 10*3/MM3 (ref 0–0.05)
IMM GRANULOCYTES NFR BLD AUTO: 0 % (ref 0–0.5)
LYMPHOCYTES # BLD AUTO: 1.57 10*3/MM3 (ref 0.7–3.1)
LYMPHOCYTES NFR BLD AUTO: 32.4 % (ref 19.6–45.3)
MCH RBC QN AUTO: 29.1 PG (ref 26.6–33)
MCHC RBC AUTO-ENTMCNC: 30.8 G/DL (ref 31.5–35.7)
MCV RBC AUTO: 94.4 FL (ref 79–97)
MONOCYTES # BLD AUTO: 0.33 10*3/MM3 (ref 0.1–0.9)
MONOCYTES NFR BLD AUTO: 6.8 % (ref 5–12)
NEUTROPHILS NFR BLD AUTO: 1.89 10*3/MM3 (ref 1.7–7)
NEUTROPHILS NFR BLD AUTO: 39 % (ref 42.7–76)
NRBC BLD AUTO-RTO: 0 /100 WBC (ref 0–0.2)
PLATELET # BLD AUTO: 173 10*3/MM3 (ref 140–450)
PMV BLD AUTO: 10.1 FL (ref 6–12)
POTASSIUM SERPL-SCNC: 4.4 MMOL/L (ref 3.5–5.2)
PROT SERPL-MCNC: 6.6 G/DL (ref 6–8.5)
RBC # BLD AUTO: 4.64 10*6/MM3 (ref 3.77–5.28)
SODIUM SERPL-SCNC: 140 MMOL/L (ref 136–145)
T4 FREE SERPL-MCNC: 1.24 NG/DL (ref 0.93–1.7)
TSH SERPL DL<=0.05 MIU/L-ACNC: 2.59 UIU/ML (ref 0.27–4.2)
WBC NRBC COR # BLD: 4.85 10*3/MM3 (ref 3.4–10.8)

## 2023-01-26 PROCEDURE — 84439 ASSAY OF FREE THYROXINE: CPT | Performed by: INTERNAL MEDICINE

## 2023-01-26 PROCEDURE — 36415 COLL VENOUS BLD VENIPUNCTURE: CPT | Performed by: INTERNAL MEDICINE

## 2023-01-26 PROCEDURE — G0008 ADMIN INFLUENZA VIRUS VAC: HCPCS | Performed by: NURSE PRACTITIONER

## 2023-01-26 PROCEDURE — 25010000002 NIVOLUMAB 240 MG/24ML SOLUTION 24 ML VIAL: Performed by: INTERNAL MEDICINE

## 2023-01-26 PROCEDURE — 85025 COMPLETE CBC W/AUTO DIFF WBC: CPT | Performed by: INTERNAL MEDICINE

## 2023-01-26 PROCEDURE — 90686 IIV4 VACC NO PRSV 0.5 ML IM: CPT | Performed by: NURSE PRACTITIONER

## 2023-01-26 PROCEDURE — 96413 CHEMO IV INFUSION 1 HR: CPT

## 2023-01-26 PROCEDURE — 84443 ASSAY THYROID STIM HORMONE: CPT | Performed by: INTERNAL MEDICINE

## 2023-01-26 PROCEDURE — 25010000002 HEPARIN LOCK FLUSH PER 10 UNITS: Performed by: INTERNAL MEDICINE

## 2023-01-26 PROCEDURE — 99214 OFFICE O/P EST MOD 30 MIN: CPT | Performed by: NURSE PRACTITIONER

## 2023-01-26 PROCEDURE — 80053 COMPREHEN METABOLIC PANEL: CPT | Performed by: INTERNAL MEDICINE

## 2023-01-26 RX ORDER — SODIUM CHLORIDE 0.9 % (FLUSH) 0.9 %
10 SYRINGE (ML) INJECTION AS NEEDED
Status: DISCONTINUED | OUTPATIENT
Start: 2023-01-26 | End: 2023-01-26 | Stop reason: HOSPADM

## 2023-01-26 RX ORDER — SODIUM CHLORIDE 0.9 % (FLUSH) 0.9 %
20 SYRINGE (ML) INJECTION AS NEEDED
Status: CANCELLED | OUTPATIENT
Start: 2023-01-26

## 2023-01-26 RX ORDER — HEPARIN SODIUM (PORCINE) LOCK FLUSH IV SOLN 100 UNIT/ML 100 UNIT/ML
300 SOLUTION INTRAVENOUS ONCE
Status: CANCELLED | OUTPATIENT
Start: 2023-01-26

## 2023-01-26 RX ORDER — HEPARIN SODIUM (PORCINE) LOCK FLUSH IV SOLN 100 UNIT/ML 100 UNIT/ML
500 SOLUTION INTRAVENOUS AS NEEDED
Status: DISCONTINUED | OUTPATIENT
Start: 2023-01-26 | End: 2023-01-26 | Stop reason: HOSPADM

## 2023-01-26 RX ORDER — SODIUM CHLORIDE 0.9 % (FLUSH) 0.9 %
10 SYRINGE (ML) INJECTION AS NEEDED
Status: CANCELLED | OUTPATIENT
Start: 2023-02-23

## 2023-01-26 RX ORDER — HEPARIN SODIUM (PORCINE) LOCK FLUSH IV SOLN 100 UNIT/ML 100 UNIT/ML
500 SOLUTION INTRAVENOUS AS NEEDED
Status: CANCELLED | OUTPATIENT
Start: 2023-02-23

## 2023-01-26 RX ORDER — SODIUM CHLORIDE 9 MG/ML
250 INJECTION, SOLUTION INTRAVENOUS ONCE
Status: COMPLETED | OUTPATIENT
Start: 2023-01-26 | End: 2023-01-26

## 2023-01-26 RX ADMIN — SODIUM CHLORIDE 250 ML: 9 INJECTION, SOLUTION INTRAVENOUS at 11:54

## 2023-01-26 RX ADMIN — SODIUM CHLORIDE 480 MG: 9 INJECTION, SOLUTION INTRAVENOUS at 11:55

## 2023-01-26 RX ADMIN — Medication 10 ML: at 12:38

## 2023-01-26 RX ADMIN — HEPARIN 500 UNITS: 100 SYRINGE at 12:38

## 2023-01-26 NOTE — PROGRESS NOTES
Venipuncture Blood Specimen Collection  Venipuncture performed in left arm by Rula Blas MA with good hemostasis. Patient tolerated the procedure well without complications.   01/26/23   Rula Blas MA

## 2023-01-26 NOTE — PROGRESS NOTES
"NAME: Yadira Flowers    : 1955    DATE:  2023    DIAGNOSIS:   1.  Stage IA ([id number]) moderately differentiated invasive ductal carcinoma of the right breast diagnosed in 2015.    2.  H/o malignant Melanoma on her Back    3.  H/o cervical cancer treated with cryotherapy at [facility] in     4.  Recurrent / metastatic malignant Melanoma  -  R axillary LN bx 22.    -  She has R axillary LN mass (2.47x2.16), Solitary cavitary R lobe liver lesion, cavitary RLL lung mass,  and bilateral parenchymal lung nodules      TREATMENT HISTORY:   1.         CHIEF COMPLAINT:  Follow up of metastatic melanoma/toxicity check and recent imaging    HISTORY OF PRESENT ILLNESS:   Yadira Flowers is a very pleasant 67 y.o. female who was referred by Dr. Dilcia Pedro for evaluation and treatment of breast cancer. She was living in [place] in 2015 when her dog brought attention to and \"found\" an abnormal lump in her right breast.  She was treated by Dr. Steve Isbell and underwent R lumpectomy with SLNBx on 10-22-15 as below.  She then had what sounds like accelerated partial breast irradiation.  She was then started on Arimidex which was later switched to Exemestane for a better side effect profile.  She took this for about 5 years, stopping a couple of weeks ago.  She stopped taking Exemestane because she developed some vaginal bleeding and hematuria.  This was evaluated by her PCP and gynecologist and felt to be due to vaginal atrophy.  She was prescribed a hormone pill which she hasn't yet started and was referred here to discuss things further.      Aside from bleeding which has been uncomfortable and distressing for her, Ms. Onur Burgos has generally been well.  The last year has been hard on her emotionally with the loss of her  and then her dog, her home and her insurance, but she is coping well and has good family support in [place].  She says she gained weight with her breast " cancer treatment and gained weight when her  .  She has been working now to lose weight and has lost 20 lbs over the last 2 months with dieting.  She denies chest pain or shortness of breath.  She complains of some RLQ cramping pain and isn't sure what is causing that.  She denies difficulty moving her bowels.  No blood in her stool that she is aware of.  She has had vaginal bleeding and hematuria as above.        INTERVAL HISTORY:  Ms. Burgos presents today for follow up of metastatic melanoma/toxicity check and recent imaging. She continues to receive qmonthly Nivolumab which she is tolerating very well with no noticeable side effects. She is anxious to hear recent imaging results. She otherwise has no other specific complaints.      PAST MEDICAL HISTORY:  Past Medical History:   Diagnosis Date   • Back pain    • Basal cell carcinoma (BCC) in situ of skin     Dr. Mohr - Derm   • Breast cancer (HCC)        • Cancer (HCC)     breast, cervical, melanoma   • Cervical cancer (HCC)     Diagnosed in .  Treated by repeated local intervention and ultimately received cryotherapy at [facility] with resolution.   • Elbow fracture    • Foot fracture    • Headache    • Hypertension     TAKEN OFF MEDS   • Melanoma (HCC)     on her back  - treated by Dermatologist Catrachito Amaro in [place], [place] with what sounds like wide local excision.    • Pulmonary embolism (HCC)    • Sinusitis        PAST SURGICAL HISTORY:  Past Surgical History:   Procedure Laterality Date   • AXILLARY LYMPH NODE BIOPSY/EXCISION Right 2022    Procedure: AXILLARY LYMPH NODE BIOPSY/EXCISION;  Surgeon: Dianelys Cummings MD;  Location: [facility] OR;  Service: General;  Laterality: Right;   • BREAST LUMPECTOMY Right    • BREAST SURGERY      Secondary to cancer   • BRONCHOSCOPY Right 2022    Procedure: BRONCHOSCOPY WITH ENDOBRONCHIAL ULTRASOUND;  Surgeon: Chris Her MD;  Location: [facility] OR;  Service: Pulmonary;   Laterality: Right;   • COLONOSCOPY N/A 2020    Procedure: COLONOSCOPY;  Surgeon: Mohsen Maldonado MD;  Location:  [facility] OR;  Service: Gastroenterology;  Laterality: N/A;   • ELBOW FUSION      left   • FOOT SURGERY Right     Broken foot   • HAND SURGERY  2020    right   • RHINOPLASTY  2002   • SKIN CANCER EXCISION      malignant melanoma from back    • US GUIDED LYMPH NODE BIOPSY  2022   • VENOUS ACCESS DEVICE (PORT) INSERTION N/A 2022    Procedure: INSERTION VENOUS ACCESS DEVICE left or right;  Surgeon: Dianelys Cummings MD;  Location:  [facility] OR;  Service: General;  Laterality: N/A;       FAMILY HISTORY:  Family History   Problem Relation Age of Onset   • Other Mother         blood clots   • Hypertension Mother    • Ulcers Father    • Hypertension Sister    • Other Sister         Multiple Sclerosis   • Cancer Paternal Aunt    • Stroke Maternal Grandmother    • Alcohol abuse Paternal Grandmother    • Hypertension Sister    • Multiple sclerosis Sister    • Hypertension Sister    • Coronary artery disease Paternal Grandfather    • Cancer Maternal Aunt 30        colon   • Breast cancer Neg Hx    Many paternal aunts  with malignancy of various types. Many cousins on that side of the family have had cancer as well.  One  of colon cancer in her 30s.  One had a brain tumor.    SOCIAL HISTORY:  Social History     Socioeconomic History   • Marital status:    Tobacco Use   • Smoking status: Never   • Smokeless tobacco: Never   Vaping Use   • Vaping Use: Never used   Substance and Sexual Activity   • Alcohol use: Not Currently   • Drug use: Never   • Sexual activity: Not Currently     Birth control/protection: Post-menopausal       REVIEW OF SYSTEMS:   A comprehensive 14 point review of systems was performed.  Significant findings as mentioned above.  All other systems reviewed and are negative.      MEDICATIONS:  The current medication list was reviewed in the EMR    Current  Outpatient Medications:   •  apixaban (ELIQUIS) 2.5 MG tablet tablet, Take 1 tablet by mouth 2 (Two) Times a Day., Disp: 60 tablet, Rfl: 5  •  fluticasone (Flonase) 50 MCG/ACT nasal spray, 2 sprays into the nostril(s) as directed by provider Daily., Disp: 18.2 mL, Rfl: 3  •  lidocaine-prilocaine (EMLA) 2.5-2.5 % cream, Apply to port a cath ~30 min -1 hour prior to chemotherapy, Disp: 30 g, Rfl: 3  •  ondansetron ODT (Zofran ODT) 8 MG disintegrating tablet, Place 1 tablet on the tongue Every 8 (Eight) Hours As Needed for Nausea or Vomiting., Disp: 30 tablet, Rfl: 5  •  prochlorperazine (COMPAZINE) 10 MG tablet, TAKE 1 TABLET BY MOUTH EVERY 6 HOURS AS NEEDED FOR NAUSEA, Disp: 360 tablet, Rfl: 3  •  sennosides-docusate (senna-docusate sodium) 8.6-50 MG per tablet, Take 2 tablets by mouth 2 (Two) Times a Day., Disp: 120 tablet, Rfl: 2  No current facility-administered medications for this visit.    ALLERGIES:    No Known Allergies    PHYSICAL EXAM:  Vitals:    01/26/23 0939   BP: 122/76   Pulse: 89   Resp: 18   Temp: 97.3 °F (36.3 °C)   SpO2: 97%     Pain Score    01/26/23 0939   PainSc:   2   PainLoc: Back     ECOG score: 0   General:  Awake, alert and oriented, appears well.  HEENT:  Pupils are equal, round and reactive to light and accommodation, Extra-ocular movements full, Oropharyx clear, mucous membranes moist  Neck:  No JVD, thyromegaly or lymphadenopathy  CV:  RRR, no murmurs, rubs or gallops  Resp:  Lungs are clear to auscultation bilaterally  Breast: Deferred today. Previously: S/p R lumpectomy. There is a somewhat firm area at the site of surgery but there are no palpable masses.  She is s/p excision of R axillary LN, with some post-surgical swelling.  Left breast is without mass lesions.  No L axillary adenopathy.  Abd:  Soft, non-tender, non-distended, bowel sounds present, no organomegaly or masses.    Ext:  No clubbing, cyanosis or edema.    Lymph:  No cervical, supraclavicular, axillary  adenopathy  Skin: Clean dry and intact  Neuro:  MS as above, grossly non-focal exam    PATHOLOGY:  10-22-15            12-16-21          01-13-22          ENDOSCOPY:  Colonoscopy 12-09-20     Findings: 8 hyperplastic appearing polyps of the distal rectum, diverticulosis moderately throughout the sigmoid colon      IMAGING:  CTCAP 12-15-20  On the lung windows there are several calcified  granulomas in the lungs. No noncalcified pulmonary parenchymal lung  nodules were identified. On the soft tissue windows there were no  enlarged lymph nodes in the supraclavicular or axillary regions. No  mediastinal or hilar lymphadenopathy was seen. The heart was not  enlarged. There were no pericardial effusions.     IMPRESSION:  No CT findings of metastatic disease in the chest. There are  calcified granulomas in the lungs.     CT FINDINGS: The liver and spleen were normal in size and shape. The  gallbladder contains several stones. The wall was not thickened. The  bile ducts in the liver are not dilated. There is nothing seen to  suggest metastatic disease in the liver. The pancreas shows no evidence  of mass or inflammation. No adrenal or renal lesions are demonstrated.  The aorta is normal in caliber. There were no enlarged lymph nodes in  the retroperitoneum or in the mesentery. The bowel shows no evidence of  obstruction. In the pelvis there were no masses or fluid collections.  There were no ventral hernias.     IMPRESSION:  No CT findings of metastatic disease in the abdomen or  pelvis. This study does demonstrate several stones in the lumen of the  gallbladder.         Bilateral diagnostic mammogram 01-31-21  FINDINGS: There are scattered areas of fibroglandular density.     The bilateral fibroglandular pattern does not appear significantly  changed compared to the exam from 2020. This includes postoperative  changes in the right 12:00 region. Mild skin thickening is noted  involving the right breast which is likely  treatment related. A few  bilateral scattered calcifications are noted.     IMPRESSION:  Incomplete examination as comparison with older outside  prior mammograms is needed.        BI-RADS CATEGORY:   0, INCOMPLETE:  Need prior mammograms for comparison        RECOMMENDATION:  We will attempt to obtain older outside prior  mammograms for comparison.      CTCAP 12-12-21  FINDINGS: Lack of IV contrast hinders parenchymal assessment of the mediastinum, liver, spleen, pancreas, adrenal glands and kidneys.         Significant infiltrate seen in the right lower lobe with interstitial component. There are also lung nodules present in the right lung. Index nodule in the right lung base medially is 5 mm maximal diameter. Other smaller nodules seen throughout the right  lung. Tiny 3 mm lingular nodule seen in series 3 image 30. A benign calcified granuloma seen in the left lower lobe but a noncalcified indeterminant  5 mm left lower lobe nodule seen, series 3 image 42.     Nonenlarged mediastinal adenopathy seen. However there is an enlarged right axillary 1.8 x 2.6 cm lymph node partially seen.     Worrisome hepatic metastasis or primary hepatic lesion seen measuring 4.4 x 4.1 cm in liver segment 8. Margins are ill-defined. Abscess could have a similar appearance. Suggestion of subtle gallstones. Nonobstructing 2 mm right renal calculus seen. No  obstructive uropathy. Gaseous distention of the esophagus and hiatal hernia present consistent with GERD. No enteric contrast but no gross evidence of bowel obstruction.   There is no gross free air, free fluid or focal inflammatory change.  Uterus and  adnexa are not enlarged. Osseous structures are grossly intact.     IMPRESSION:  Constellation of findings with significant consolidation in the right lower lobe, enlarged right axillary lymph node, indeterminate bilateral lung nodules and ill-defined hepatic lesion concerning for underlying malignancy in this patient .     FINDINGS:  Lack of IV contrast hinders parenchymal assessment of the mediastinum, liver, spleen, pancreas, adrenal glands and kidneys.         Significant infiltrate seen in the right lower lobe with interstitial component. There are also lung nodules present in the right lung. Index nodule in the right lung base medially is 5 mm maximal diameter. Other smaller nodules seen throughout the right  lung. Tiny 3 mm lingular nodule seen in series 3 image 30. A benign calcified granuloma seen in the left lower lobe but a noncalcified indeterminant  5 mm left lower lobe nodule seen, series 3 image 42.     Nonenlarged mediastinal adenopathy seen. However there is an enlarged right axillary 1.8 x 2.6 cm lymph node partially seen.     Worrisome hepatic metastasis or primary hepatic lesion seen measuring 4.4 x 4.1 cm in liver segment 8. Margins are ill-defined. Abscess could have a similar appearance. Suggestion of subtle gallstones. Nonobstructing 2 mm right renal calculus seen. No  obstructive uropathy. Gaseous distention of the esophagus and hiatal hernia present consistent with GERD. No enteric contrast but no gross evidence of bowel obstruction.   There is no gross free air, free fluid or focal inflammatory change.  Uterus and  adnexa are not enlarged. Osseous structures are grossly intact.     IMPRESSION:  Constellation of findings with significant consolidation in the right lower lobe, enlarged right axillary lymph node, indeterminate bilateral lung nodules and ill-defined hepatic lesion concerning for underlying malignancy in this patient .       US Abdomen Complete 12-12-21  FINDINGS:  The visualized portions of the pancreas, IVC and aorta appear normal.        The liver is moderately coarsened in echotexture. Ill-defined hypodensity in the liver measuring 3 x 3.2 x 2.6 cm corresponds to recent CT. Unclear if this is a malignant lesion. Abscesses within the differential but prior CT was performed without any  contrast.  The  common bile duct is not dilated.. Gallbladder wall is thickened at 3.4 mm with trace pericholecystic fluid and subtle hyperemia. Gallstones and shadowing present.     The kidneys are normal in size and echogenicity. There is no   hydronephrosis or focal solid lesion.  The spleen is normal in size and echotexture.     IMPRESSION:  1. Nonspecific gallbladder wall thickening and questionable pericholecystic fluid. Gallstones are better seen on CT than ultrasound.  2.  Heterogeneous liver with lesion in the liver as seen on CT. Ultrasound is not helpful in determining etiology. This could be an abscess but given the other findings on CT,  malignancy is not excluded.      CTCAP 12-15-21  FINDINGS:    LUNGS:  Increased density of the now right lower lobe mixed  groundglass and more solid-appearing consolidation with internal  cavitary component identified. Tiny right and left lung pulmonary  nodules. Grossly stable.    PLEURAL SPACE:  Small bilateral pleural effusions.  No pneumothorax.    HEART:  Unremarkable.  No cardiomegaly.  No significant pericardial  effusion.    BONES/JOINTS:  Unremarkable.  No acute fracture.  No dislocation.    SOFT TISSUES:  Unremarkable.    VASCULATURE:  Unremarkable.  No thoracic aortic aneurysm.    LYMPH NODES:  Unremarkable.  No enlarged lymph nodes.     IMPRESSION:  1.  Increased density of the now right lower lobe mixed groundglass and  more solid-appearing consolidation with internal cavitary component  identified. Tiny right and left lung pulmonary nodules. Grossly stable.  2.  Small bilateral pleural effusions.    FINDINGS:    LUNG BASES:  Unremarkable.  No mass.  No consolidation.      ABDOMEN:    LIVER:  Right lobe of liver lesion is again identified now measuring  about 4.7 cm and was about 4.7 cm.    GALLBLADDER AND BILE DUCTS:  Gallstones noted in the gallbladder.  No  ductal dilation.    PANCREAS:  Unremarkable.  No mass.  No ductal dilation.    SPLEEN:  Unremarkable.  No  splenomegaly.    ADRENALS:  Unremarkable.  No mass.    KIDNEYS AND URETERS:  Unremarkable.  No solid mass.  No  hydronephrosis.    STOMACH AND BOWEL:  Unremarkable.  No obstruction.  No mucosal  thickening.      PELVIS:    APPENDIX:  No findings to suggest acute appendicitis.    BLADDER:  Unremarkable.  No mass.    REPRODUCTIVE:  Unremarkable as visualized.      ABDOMEN and PELVIS:    INTRAPERITONEAL SPACE:  Unremarkable.  No free air.  No significant  fluid collection.    BONES/JOINTS:  No acute fracture.  No dislocation.    SOFT TISSUES:  Unremarkable.    VASCULATURE:  Unremarkable.  No abdominal aortic aneurysm.    LYMPH NODES:  Unremarkable.  No enlarged lymph nodes.     IMPRESSION:    Right lobe of liver lesion is again identified now measuring about 4.7  cm and was about 4.7 cm.      NM Bone Scan Whole Body 12-15-21  FINDINGS:    SKULL/FACIAL BONES:  No focal increased or decreased uptake.    SPINE:  Unremarkable.  No abnormal increased or decreased uptake.    RIBS:  Unremarkable.  No abnormal uptake.    LONG BONES:  Unremarkable.  No abnormal uptake.    PELVIS:  Unremarkable.  No focal increased or decreased uptake.    JOINTS:  Unremarkable.  No focal increased or decreased uptake.    SOFT TISSUES:  Unremarkable.    RENAL/BLADDER:  Within normal limits.     IMPRESSION:    Normal bone scan.      CT Abdomen With Contrast  12-18-21  FINDINGS:  Partially imaged thick-walled cavitary lesion in the right lower lobe with right basilar atelectasis/infiltrate and small right pleural effusion. There is also left basilar atelectasis/infiltrate and trace left pleural effusion. Multiple noncalcified  pulmonary nodules are scattered throughout the visualized lower lung fields, concerning for pulmonary metastatic disease.     Ill-defined right hepatic mass again noted. The spleen, pancreas, and both adrenal glands are within expected limits. Cholelithiasis. Punctate nonobstructing right intrarenal stone. Tiny left renal  cyst. Kidneys are otherwise unremarkable without  hydronephrosis. Abdominal aorta normal in course and caliber without dissection. No pathologic retroperitoneal or mesenteric lymphadenopathy by size criteria. Visualized small bowel and colon are unremarkable. No bowel obstruction. No free fluid or free  air.     No aggressive osseous lesions.     IMPRESSION:     1. Ill-defined right hepatic mass again noted. This has been previously biopsied and correlation with pathology results is recommended.  2. Cholelithiasis.  3. Punctate nonobstructing right intrarenal stone.  4. No threshold abdominal lymphadenopathy or other suspicious abdominal masses.  5. Partially imaged thick-walled cavitary lesion in the right lower lobe. This may be of infectious or neoplastic etiology and continued follow-up is recommended.  6. Small bilateral pleural effusions with bibasilar atelectasis/infiltrate, worse on the right than left.  7. Numerous small noncalcified pulmonary nodules throughout the visualized lower lung fields, concerning for pulmonary metastatic disease.       Mammo Diagnostic Digital Tomosynthesis Bilateral With CAD w/ US Axilla Right 01-13-22  FINDINGS:  There are scattered areas of fibroglandular density.     Incompletely imaged is a very prominent axillary lymph node. The  bilateral fibroglandular pattern itself is stable in appearance  including postoperative changes in the superior aspect of the right  breast. There are stable bilateral calcifications. Mild skin thickening  is again noted involving the right breast which is likely treatment  related.     ULTRASOUND: Ultrasound evaluation of the right axilla demonstrates 2  markedly enlarged lymph nodes the largest of which measures 3.2 cm in  size and has no demonstrable fatty hilum. Recommend ultrasound-guided  biopsy.      IMPRESSION:  1. Stable mammographic appearance of the left breast with no findings  suspicious for malignancy.        2. Marked right axillary  lymphadenopathy. Recommend ultrasound-guided  biopsy. The fibroglandular pattern of the right breast is however  stable.        BI-RADS CATEGORY:  4, SUSPICIOUS        RECOMMENDATION:  Recommend ultrasound-guided biopsy of the dominant  abnormal appearing right axillary lymph node.      DEXA Bone Density Axial 01-13-22  IMPRESSION:  Impression:  1. According to the World Health Organization definitions of  osteoporosis based on bone density, this patient's bone mineral density  is compatible with osteoporosis and the fracture risk is high.      Mammo Diagnostic Digital Tomosynthesis Bilateral With CAD w/ US Axilla Right 01-13-22  FINDINGS:  There are scattered areas of fibroglandular density.     Incompletely imaged is a very prominent axillary lymph node. The  bilateral fibroglandular pattern itself is stable in appearance  including postoperative changes in the superior aspect of the right  breast. There are stable bilateral calcifications. Mild skin thickening  is again noted involving the right breast which is likely treatment  related.     ULTRASOUND: Ultrasound evaluation of the right axilla demonstrates 2  markedly enlarged lymph nodes the largest of which measures 3.2 cm in  size and has no demonstrable fatty hilum. Recommend ultrasound-guided  biopsy.      IMPRESSION:  1. Stable mammographic appearance of the left breast with no findings  suspicious for malignancy.        2. Marked right axillary lymphadenopathy. Recommend ultrasound-guided  biopsy. The fibroglandular pattern of the right breast is however  stable.        BI-RADS CATEGORY:  4, SUSPICIOUS        RECOMMENDATION:  Recommend ultrasound-guided biopsy of the dominant  abnormal appearing right axillary lymph node.      CT Chest With Contrast Diagnostic 01-18-22  FINDINGS:     LUNGS: Solitary mass in the right lower lobe with cavitation.  Significantly smaller today than on the prior study. 6 mm solid nodule  in the right lung posteriorly on image  30 of the axial series. Other  parenchymal nodules are seen in both lungs and are similar to the  previous study. These are concerning for metastatic nodules.     HEART: Unremarkable.     MEDIASTINUM: No masses. No enlarged lymph nodes.  No fluid collections.     PLEURA: No pleural effusion. No pleural mass or abnormal calcification.  No pneumothorax.     VASCULATURE: No evidence of aneurysm. Filling defect in the right  pulmonary artery and possibly left lower lobe pulmonary artery. This  study was not performed to evaluate for pulmonary embolus, but the  findings are concerning for bilateral pulmonary emboli.     BONES: No acute bony abnormality.     VISUALIZED UPPER ABDOMEN:Please see the CT report for the abdomen and  pelvis.     Other: Right axillary soft tissue mass is present and shows slight  interval growth. It measures 2.47 x 2.16 cm today and previously at the  same level measured approximately 2.7 x 1.89 cm.     IMPRESSION:  1. Study was not performed as a PE protocol, but there appear to be  filling defects in pulmonary arteries bilaterally concerning for  pulmonary emboli.  2. Interval decrease in size of cavitary right lower lobe mass.  3. Stable parenchymal nodules bilaterally.  4. Very slight interval growth of right axillary soft tissue mass.     Results are being relayed to the referring physician.      US Liver 01-18-22  FINDINGS: Sonographic imaging of the liver does not show the mass  previously identified in the liver. I would suggest a follow-up CT scan  for better delineation.     Hepatic flow was hepatopedal.     There is shadowing from the gallbladder fossa.     IMPRESSION:  1. Previously identified mass is not seen on this exam in the liver.  Consider follow-up CT.  2. Shadowing from the gallbladder fossa. In the absence of  cholecystectomy, appearance is concerning for cholelithiasis.      US Venous Doppler Lower Extremity Bilateral (duplex) 01-20-22  FINDINGS:   There is patent  spontaneous flow from the common femoral vein through  the posterior tibial veins.  There was no internal clot or area of noncompressibility.  Normal augmentation was elicited where applicable.     IMPRESSION:  No DVT in the lower extremities on today's exam.       CT Chest Pulmonary Embolism 01-20-22  FINDINGS: Today's study demonstrates opacification of the central  pulmonary vessels.  There are filling defects in the pulmonary arteries bilaterally. First  order arteries. This is most compatible with bilateral pulmonary  emboli..     Soft tissue mass in the right lower lobe with somewhat cavitary center  is again noted and unchanged..     There is no mediastinal lymph node enlargement     No pericardial or pleural effusion.        IMPRESSION:  1. Bilateral pulmonary emboli.  2. Parenchymal nodules bilaterally with dominant mass in the right lower  Lobe..      NM PET/CT Skull Base to Mid Thigh 01-28-22  FINDINGS:      HEAD/NECK:  Area of uptake in the posterior right paraspinal space and to lesser  degree the left paraspinal space appears related to muscle uptake.  No FDG hypermetabolic neck adenopathy.  No FDG hypermetabolic masses.     CHEST:   Numerous bilateral pulmonary nodules appear stable from the previous  chest CT and show no FDG hypermetabolism. This is likely due to nodules  being below size threshold for PET sensitivity.  Cavitary lung mass in the right lung localizing to the superior segment  of the lower lobe shows FDG hypermetabolism with maximum SUV: 2.4. This  is at the lower range for malignancy.  Enlarged right lower axillary region lymph node which is 3.0 cm shows  mild FDG hypermetabolism that is approximately with maximum SUV: 2.4.  This is at the lower range for malignancy.  Low-dose CT demonstrating no change in additional scattered pulmonary  nodules from the previous scan. Coronary artery calcifications are  stable.     ABDOMEN/PELVIS:   Faint area of low attenuation involving the right  lobe of liver is  poorly evaluated with low-dose noncontrast CT but shows no focal FDG  hypermetabolism.  Physiologic FDG hypermetabolism seen throughout the solid abdominal  organs.  Physiologic FDG hypermetabolism seen throughout the GI tract.  Physiologic FDG hypermetabolism seen throughout the mesentery,  retroperitoneum and pelvis.  Low dose CT redemonstrates nonobstructing kidney stones. Cholelithiasis  again noted.     BONES: Nonspecific FDG tracer activity. No intense areas of tracer  activity noted.     IMPRESSION:     1. Cavitary mass in the right lung that shows very low-grade FDG  hypermetabolism which is at the lower range for malignancy with maximum  SUV: 2.4.  2. Enlarged right axillary region lymph node with low-grade FDG  hypermetabolism also at the lower range for malignancy with maximum SUV:  2.4.  3. Numerous bilateral pulmonary nodules compatible with metastatic  disease but show no significant FDG hypermetabolism. This may be in part  due to size of nodules being below PET sensitivity threshold.  4. Faint area of low-attenuation right lobe liver poorly evaluated with  noncontrast low-dose CT that shows no obvious intense FDG  hypermetabolism.  5. Other nonacute findings as above on low dose CT.        CTCAP 05-03-22  FINDINGS:    Lungs:  Cavitary mass in the right lower lobe has nearly resolved.  A  right lower lobe pulmonary nodule is 7.4 mm and was previously 7.5 mm.  No change. Image #[id number].  A left lower lobe pulmonary nodule, image #52, is  9.8 mm and was previously 9.8 mm. No change.    Pleural space:  Unremarkable.  No pneumothorax.  No significant  effusion.    Heart:  Unremarkable.  No cardiomegaly.  No significant pericardial  effusion.  No significant coronary artery calcifications.    Bones/joints:  The bony structures appear stable. No acute bony  findings identified.  No dislocation.    Soft tissues:  Unremarkable.    Vasculature:  Please note, pulmonary emboli noted on the prior  exam  are not well evaluated. The previously described filling defects of the  main pulmonary arteries are not evident.  No thoracic aortic aneurysm.    Lymph nodes:  No mediastinal adenopathy by CT size criteria.    Gallbladder and bile ducts:  Cholelithiasis noted.    Other findings:  Other smaller nodules appear stable in size and  configuration.  No new nodules identified.     IMPRESSION:  1.  Near-complete resolution of previously described cavitary mass in  the right lower lobe periphery now only with linear scarring noted.  2.  Index nodules of both lungs are stable from previous exam with no  size increase identified. No new nodules are noted.  3.  Cholelithiasis.  4.  Due to timing of contrast on this study, assessment of pulmonary  emboli not performed. There appears to be significant improvement in  clot burden however in the more central vessels.    FINDINGS:    Lung bases:  Pulmonary nodules of the lung bases.    Mediastinum:  Small hiatal hernia.      ABDOMEN:    Liver:  Low attenuation lesions of the right lobe liver appear of  decreased prominence from the previous exam. Segment 8 liver lesion is  1.1 cm. Segment 7 liver lesion is approximately 0.8 cm.    Gallbladder and bile ducts:  Cholelithiasis.  No ductal dilation.    Pancreas:  Unremarkable.  No mass.  No ductal dilation.    Spleen:  Unremarkable.  No splenomegaly.    Adrenals:  No adrenal masses identified.    Kidneys and ureters:  Unremarkable.  No solid mass.  No  hydronephrosis.    Stomach and bowel:  Sigmoid diverticulosis without evidence of  diverticulitis.  No obstruction.      PELVIS:    Appendix:  Normal appendix.    Bladder:  Unremarkable.  No mass.    Reproductive:  Unremarkable as visualized.      ABDOMEN and PELVIS:    Intraperitoneal space:  Unremarkable.  No free air.  No significant  fluid collection.    Bones/joints:  No acute fracture.  No dislocation.    Soft tissues:  Unremarkable.    Vasculature:  Unremarkable.  No  abdominal aortic aneurysm.    Lymph nodes:  No iliac or retroperitoneal adenopathy.     IMPRESSION:  1.  Small low-attenuation lesions of the right lobe liver appears  decreased prominence of the previous exam. No new no focal liver lesions  identified  2.  Cholelithiasis.  3.  Otherwise stable appearance of the abdomen and pelvis.    CT Abdomen Pelvis With Contrast (08/08/2022 14:36)  FINDINGS:    LUNG BASES:  Unremarkable.  No mass.  No consolidation.      ABDOMEN:    LIVER:  Stable 1 cm right lobe of liver lesion and more posterior 7 mm  lesion.    GALLBLADDER AND BILE DUCTS:  Gallstones.  Gallbladder otherwise  unremarkable.  No ductal dilation.    PANCREAS:  Unremarkable.  No mass.  No ductal dilation.    SPLEEN:  Unremarkable.  No splenomegaly.    ADRENALS:  Unremarkable.  No mass.    KIDNEYS AND URETERS:  Unremarkable.  No solid mass.  No  hydronephrosis.    STOMACH AND BOWEL:  Unremarkable.  No obstruction.  No mucosal  thickening.      PELVIS:    APPENDIX:  No findings to suggest acute appendicitis.    BLADDER:  Unremarkable.  No mass.    REPRODUCTIVE:  Unremarkable as visualized.      ABDOMEN and PELVIS:    INTRAPERITONEAL SPACE:  Unremarkable.  No free air.  No significant  fluid collection.    BONES/JOINTS:  No acute fracture.  No dislocation.    SOFT TISSUES:  Unremarkable.    VASCULATURE:  Unremarkable.  No abdominal aortic aneurysm.    LYMPH NODES:  Unremarkable.  No enlarged lymph nodes.     IMPRESSION:  1.  Gallstones.  Gallbladder otherwise unremarkable.  2.  Stable 1 cm right lobe of liver lesion and more posterior 7 mm  Lesion.    CT Chest With Contrast Diagnostic (08/08/2022 14:40)  FINDINGS:    LUNGS:  Multiple pulmonary nodules again noted including a right lower  lobe pulmonary nodule measuring 5 mm that was 5 mm. Another right lower  lobe pulmonary nodule is 7 mm and was 7 mm. A left lower lobe pulmonary  nodule is 5 mm and was 5 mm. Other nodules appear stable.    PLEURAL SPACE:   Unremarkable.  No pneumothorax.  No significant  effusion.    HEART:  Unremarkable.  No cardiomegaly.  No significant pericardial  effusion.  No significant coronary artery calcifications.    BONES/JOINTS:  Unremarkable.  No acute fracture.  No dislocation.    SOFT TISSUES:  Unremarkable.    VASCULATURE:  Unremarkable.  No thoracic aortic aneurysm.    LYMPH NODES:  Unremarkable.  No enlarged lymph nodes.     IMPRESSION:    Multiple pulmonary nodules again noted including a right lower lobe  pulmonary nodule measuring 5 mm that was 5 mm. Another right lower lobe  pulmonary nodule is 7 mm and was 7 mm. A left lower lobe pulmonary  nodule is 5 mm and was 5 mm. Other nodules appear stable.    CT Abdomen Pelvis With Contrast (10/31/2022 14:46)  FINDINGS:    LUNG BASES:  Unremarkable.  No mass.  No consolidation.      ABDOMEN:    LIVER:  Stable right lobe of liver low-attenuation lesion measuring  about a centimeter. Stable more posterior right lobe of liver lesion  measuring about 7 mm.    GALLBLADDER AND BILE DUCTS:  Gallstones.  Gallbladder otherwise  unremarkable.  No ductal dilation.    PANCREAS:  Unremarkable.  No mass.  No ductal dilation.    SPLEEN:  Unremarkable.  No splenomegaly.    ADRENALS:  Unremarkable.  No mass.    KIDNEYS AND URETERS:  Unremarkable.  No solid mass.  No  hydronephrosis.    STOMACH AND BOWEL:  Scattered diverticula in the colon.  No  diverticulitis.  No obstruction.      PELVIS:    APPENDIX:  No findings to suggest acute appendicitis.    BLADDER:  Unremarkable.  No mass.    REPRODUCTIVE:  Unremarkable as visualized.      ABDOMEN and PELVIS:    INTRAPERITONEAL SPACE:  Unremarkable.  No free air.  No significant  fluid collection.    BONES/JOINTS:  No acute fracture.  No dislocation.    SOFT TISSUES:  Unremarkable.    VASCULATURE:  Unremarkable.  No abdominal aortic aneurysm.    LYMPH NODES:  Unremarkable.  No enlarged lymph nodes.     IMPRESSION:  1.  Gallstones.  Gallbladder otherwise  unremarkable.  2.  Stable right lobe of liver low-attenuation lesion measuring about a  centimeter. Stable more posterior right lobe of liver lesion measuring  about 7 mm.    CT Chest With Contrast Diagnostic (10/31/2022 14:47)  FINDINGS:    LUNGS:  Stable subpleural right middle lobe pulmonary nodule measuring  5 mm. Stable right lower lobe pulmonary nodule measuring 6 mm. Other  bilateral pulmonary nodules appear stable in size and number.    PLEURAL SPACE:  Unremarkable.  No pneumothorax.  No significant  effusion.    HEART:  Unremarkable.  No cardiomegaly.  No significant pericardial  effusion.  No significant coronary artery calcifications.    BONES/JOINTS:  Unremarkable.  No acute fracture.  No dislocation.    SOFT TISSUES:  Right upper breast density again noted.    VASCULATURE:  Unremarkable.  No thoracic aortic aneurysm.    LYMPH NODES:  Unremarkable.  No enlarged lymph nodes.     IMPRESSION:    Stable subpleural right middle lobe pulmonary nodule measuring 5 mm.  Stable right lower lobe pulmonary nodule measuring 6 mm. Other bilateral  pulmonary nodules appear stable in size and number.    CT chest with contrast 1/25/23  FINDINGS:     LUNGS: Multiple parenchymal nodules are present bilaterally and are  stable in size, number, and appearance. Largest nodule measuring  approximately 6 mm.     HEART: Mild coronary artery calcifications.     MEDIASTINUM: No masses. No enlarged lymph nodes.  No fluid collections.     PLEURA: No pleural effusions.     VASCULATURE: No evidence of aneurysm.     BONES: No acute bony abnormality.     VISUALIZED UPPER ABDOMEN:Please see the report for the CT of the abdomen  and pelvis.     Other: 8.5 mm left axillary lymph node stable.     IMPRESSION:     1. Multiple parenchymal nodules in both lungs are stable.  2. 8.5 mm lymph node in the left axilla, stable.    CT abdomen pelvis with contrast 1/25/23  FINDINGS:     Lower thorax: Parenchymal nodules in both lungs similar to the  previous  exam.     Abdomen:     Liver: Previous identified area of decreased attenuation in the right  lobe of the liver is not localized on today's exam.     Gallbladder: Cholelithiasis.     Pancreas: Unremarkable. No mass or ductal dilatation.     Spleen: Homogeneous. No splenomegaly.     Adrenals: No mass.     Kidneys/ureters: Nonobstructing right intrarenal stone.     GI tract: Moderate stool. Sigmoid diverticuli without diverticulitis.     MESENTERY: No free fluid, walled off fluid collections, mesenteric  stranding, or enlarged lymph nodes        Vasculature: No evidence of aneurysm.     Abdominal wall: No focal hernia or mass.        Bladder: No focal mass or significant wall thickening     Reproductive: Unremarkable as visualized     Bones: No acute bony abnormality.     IMPRESSION:     1. No evidence of new interval development of metastatic disease.  Previously identified hepatic lesion is not seen on today's exam.     2.Cholelithiasis.     3. Nonobstructing right intrarenal stone.    RECENT LABS:  Lab Results   Component Value Date    WBC 4.85 01/26/2023    HGB 13.5 01/26/2023    HCT 43.8 01/26/2023    MCV 94.4 01/26/2023    RDW 13.6 01/26/2023     01/26/2023    NEUTRORELPCT 39.0 (L) 01/26/2023    LYMPHORELPCT 32.4 01/26/2023    MONORELPCT 6.8 01/26/2023    EOSRELPCT 20.4 (H) 01/26/2023    BASORELPCT 1.4 01/26/2023    NEUTROABS 1.89 01/26/2023    LYMPHSABS 1.57 01/26/2023       Lab Results   Component Value Date     01/26/2023    K 4.4 01/26/2023    CO2 24.6 01/26/2023     01/26/2023    BUN 10 01/26/2023    CREATININE 0.75 01/26/2023    EGFRIFNONA 87 02/24/2022    GLUCOSE 105 (H) 01/26/2023    CALCIUM 9.1 01/26/2023    ALKPHOS 75 01/26/2023    AST 20 01/26/2023    ALT 21 01/26/2023    BILITOT 0.3 01/26/2023    ALBUMIN 4.0 01/26/2023    PROTEINTOT 6.6 01/26/2023    MG 2.0 12/17/2021     Lab Results   Component Value Date    TSH 2.590 01/26/2023     Lab Results   Component Value Date     FERRITIN 169.00 (H) 01/20/2022    IRON 102 01/20/2022    TIBC 264 (L) 01/20/2022    LABIRON 39 01/20/2022    LOCUAEVA14 437 01/20/2022    FOLATE 16.40 01/20/2022     Lab Results   Component Value Date     (H) 01/20/2022     ASSESSMENT & PLAN:  Yadira Flowers is a very pleasant 67 y.o. female with a history of cervical cancer, breast cancer and malignant melanoma.    1. History of Breast Cancer:  - Ms. Onur Burgos had Stage IA ([id number]) moderately differentiated invasive ductal carcinoma of the right breast diagnosed in October 2015.  - She underwent R lympectomy and SLNBx performed by Dr. Steve Isbell 10-22-15 and then received adjuvant radiation in [place].  - After radiation, she was started on Arimidex which was then switched to Exemestane.  She took Exemestane for almost 5 years. Unfortunately, she developed vaginal bleeding and hematuria related to vaginal atrophy related to hormonal blockade.  Given this, stopped Exemestane.  She is doing much better in this regard since stopping hormonal therapy.  - Mammogram 1-13-22 without cause for concern aside from R axillary LAD.   -Repeat bilateral diagnostic mammogram scheduled on 2/8/23.     2.  Metastatic malignant melanoma:  -  S/p resection of a primary lesion on her back performed by Dr. Catrachito Amaro of Dermatology in [place] in 2004.  -  S/p FNA R axillary LN which was concerning for melanoma.  Excisional biopsy confirmed metastatic malignant melanoma.  - Suspect her lung and liver lesions are also related to her melanoma.  - She has seen Dr. Her and he performed bronchoscopy on 02/23/2022. Pathology was negative for malignant cells.  - Liver aspiration showed abscess which grew Klebsiella, suspect this was a secondary infection but this isn't completely certain at this time.    -  Plan to watch all of these areas as we proceed with treatment.  -  Sent  excised LN tissue for CARIS testing.  -  PET-CT showed no significant FDG uptake but  re-demonstrated abnormalities in the R axilla, RLL cavitary mass, Vargas lung nodules and liver.    -  MRI Brain showed no acute findings in the head/brain.  -  Recommended initial therapy with immunotherapy.  We discussed different options for immunotherapy including single agent PD-L1 treatment or combination with Yervoy.  We recommended combination with Yervoy for better RR, PFS and OS even with increased risk of immune-mediated side effects.  We discussed the initially approved FDA dosing v. Altered dosing with lower dose IPI/higher dosed Nivo.  The latter has been shown to have fewer side effects and is thought to likely have similar efficacy (though trials weren't really powered to definitively show that).  After a lengthy discussion, we decided to proceed with 4 cycles of Ipi 1 mg/kg / Nivo 3 mg/kg q 21d followed by Nivolumab single agent.  We discussed potential risks, benefits and side effects extensively and she decided to proceed.  -  She completed 4 cycles of Ipi / Nivo and tolerated treatment very well.  -  Repeat CT imaging done 5-3-22 after 4 cycles Ipi/Nivo showed an excellent partial response to treatment.  Have continued with single agent Nivolumab as planned which she is tolerating well.   -Repeat CT CAP from 1/25/23 (summarized above) again showing stable disease with no evidence of new interval development of metastatic disease.   -Therefore, will continue with Nivolumab and proceed with treatment today.     3. Constipation  - Currently denies.  Continue Senna 2 tabs BID. Also advised to use Miralax as needed.     4. Bilateral pulmonary emboli:  -  CTPE protocol 1-20-22  confirmed bilateral pulmonary emboli  -  BLE dopplers negative for DVT.  -  Continue Eliquis 5 mg BID.    -  We previously discussed length of therapy.  She watched her  die with PE and her mother has h/o PEs as well.  Presumably the cancer was the risk factor for development of PE and since her cancer is incurable,  have  recommended she continue Eliquis indefinately as long as she continues without significant side effects.  She started complaining of difficulty with easy bleeding with minor trauma.  It had been over 6 months that she has had full anticoagulation.  Given worsening side effects, decided to continue Eliquis, but reduced dose to 2.5 mg twice a day.  She is doing much better on this dose. Will refill today.     5.  H/o Colon polyps:  -  Dr. Maldonado performed c-scope 12-09-20 with discovery of 8 hyperplastic polyps in the distal rectum as well as diverticulosis.  He recommended repeat c-scope after 5 years.    6.  H/o cervical cancer treated with cryotherapy at [facility] in 1975:  -  Followed up with Dr. Manav Real of gynecology.  She says she had exam including pap smear and then pelvic U/S which was unrevealing.    7. Prophylaxis:  -  Had COVID vaccine x 2 (Pfizer).  She received Evusheld on 4-7-22.. Recommended booster. Will give 2022 flu vaccine today.     ACO / DWAYNE/Other  Quality measures  -  Yadira Flowers did not receive 2022 flu vaccine. Will give in clinic today.   -  Yadira Flowers reports a pain score of 2.  Given her pain assessment as noted, treatment options were discussed and the following options were decided upon as a follow-up plan to address the patient's pain: continuation of current treatment plan for pain.  -  Current outpatient and discharge medications have been reconciled for the patient.  Reviewed by: Estefania Ellsworth, APRN    8.  Follow up:  - Continue Nivolumab as planned, proceed with treatment today.   -Bilateral mammogram scheduled on 2/8/23.  -MD follow up in 3 months with CT CAP prior along with  CBCD, CMP, TSH.    I spent 30 minutes with Yadira Flowers today.  In the office today, more than 50% of this time was spent face-to-face with her  in counseling / coordination of care, reviewing her medical history and counseling on the current treatment plan.  All questions were  answered to her satisfaction      Electronically Signed by:  TENNILLE Johnson      CC:     MD Manav Cotto MD Aaron House, MD

## 2023-02-02 ENCOUNTER — OFFICE VISIT (OUTPATIENT)
Dept: FAMILY MEDICINE CLINIC | Facility: CLINIC | Age: 68
End: 2023-02-02
Payer: COMMERCIAL

## 2023-02-02 VITALS
OXYGEN SATURATION: 97 % | SYSTOLIC BLOOD PRESSURE: 128 MMHG | DIASTOLIC BLOOD PRESSURE: 84 MMHG | TEMPERATURE: 96.4 F | HEART RATE: 85 BPM | WEIGHT: 204.6 LBS | BODY MASS INDEX: 206.74 KG/M2

## 2023-02-02 DIAGNOSIS — E66.01 OBESITY, MORBID: ICD-10-CM

## 2023-02-02 DIAGNOSIS — I26.99 OTHER PULMONARY EMBOLISM WITHOUT ACUTE COR PULMONALE, UNSPECIFIED CHRONICITY: ICD-10-CM

## 2023-02-02 DIAGNOSIS — C78.02 MALIGNANT NEOPLASM METASTATIC TO BOTH LUNGS: ICD-10-CM

## 2023-02-02 DIAGNOSIS — Z00.00 ENCOUNTER FOR SUBSEQUENT ANNUAL WELLNESS VISIT IN MEDICARE PATIENT: Primary | ICD-10-CM

## 2023-02-02 DIAGNOSIS — C78.01 MALIGNANT NEOPLASM METASTATIC TO BOTH LUNGS: ICD-10-CM

## 2023-02-02 DIAGNOSIS — E78.2 MIXED HYPERLIPIDEMIA: ICD-10-CM

## 2023-02-02 DIAGNOSIS — D84.9 IMMUNODEFICIENCY: ICD-10-CM

## 2023-02-02 PROCEDURE — 1160F RVW MEDS BY RX/DR IN RCRD: CPT | Performed by: FAMILY MEDICINE

## 2023-02-02 PROCEDURE — 1126F AMNT PAIN NOTED NONE PRSNT: CPT | Performed by: FAMILY MEDICINE

## 2023-02-02 PROCEDURE — 1170F FXNL STATUS ASSESSED: CPT | Performed by: FAMILY MEDICINE

## 2023-02-02 PROCEDURE — G0439 PPPS, SUBSEQ VISIT: HCPCS | Performed by: FAMILY MEDICINE

## 2023-02-02 RX ORDER — FLUTICASONE PROPIONATE 50 MCG
2 SPRAY, SUSPENSION (ML) NASAL DAILY
Qty: 18.2 ML | Refills: 3 | Status: SHIPPED | OUTPATIENT
Start: 2023-02-02

## 2023-02-02 NOTE — PROGRESS NOTES
The ABCs of the Annual Wellness Visit  Subsequent Medicare Wellness Visit    Chief Complaint   Patient presents with   • Medicare Wellness-subsequent      Subjective    History of Present Illness:  Yadira Flowers is a 67 y.o. femalewith medical conditions significant for cancer with metastases, and allergic rhinitis who presents for a Subsequent Medicare Wellness Visit.     The patient is doing better than expected. She has a CT scan performed every 3 months. She reports the last CT scan revealed mild calcified arteries. Her blood pressure today, 02/02/2023, was 128/84 mmHg stating that her blood pressure was low for quite some time. She reports that in 01/2022 when she was going through cancer treatments, she was taken off all of her medications, except Eliquis. She was kept on Eliquis due to being at a higher risk for blood clots secondary to cancer. The patient has not had her cholesterol checked in over 1 year. Her last blood work was performed in 10/2021, which was excellent. She had laboratory testing performed twice last week.     She reports that the treatments have been working well for her. She reports that they were not sure if some of the metastases may have been from infection, but they have all shrunk down to be very small in size. She notes that the 1 in her liver is totally gone but reports that there is a new 1 that was not noted on her previous CT scan 3 months ago. She went to be seen by Dr. Stone for evaluation of her CT scan, but Dr. Stone was sick with COVID-19, and the patient was seen by her nurse practitioner. The patient was inquiring about the new 8.5 lymph node that is reported as stable. She also mentions that multiple nodes, which are shrinking in size, were also noted on the CT scan. She is not sure if the lymph node is  cancerous or not. Her last diagnostic mammogram was in 01/2022. She is having a mammogram performed the week of 02/06/2023. The patient had influenza and is  inquiring if this could have brought on new nodules.     The patient has been taking fiber every morning. She reports that she does not have any problems with bowel movements. The patient had influenza and then had the influenza vaccine. She denies having the new COVID-19 booster vaccine but plans to return to have the COVID-19 vaccine performed at a later date.       The following portions of the patient's history were reviewed and   updated as appropriate: allergies, current medications, past family history, past medical history, past social history, past surgical history and problem list.    Compared to one year ago, the patient feels her physical   health is better.    Compared to one year ago, the patient feels her mental   health is better.    Recent Hospitalizations:  She was not admitted to the hospital during the last year.       Current Medical Providers:  Patient Care Team:  Dilcia Pedro MD as PCP - General (Family Medicine)  Loreto Stone MD as Consulting Physician (Oncology)  Manav Real MD (Inactive) as Consulting Physician (Gynecology)  Jack Mclaughlin MD as Consulting Physician (Gastroenterology)    Outpatient Medications Prior to Visit   Medication Sig Dispense Refill   • apixaban (ELIQUIS) 2.5 MG tablet tablet Take 1 tablet by mouth 2 (Two) Times a Day. 60 tablet 5   • lidocaine-prilocaine (EMLA) 2.5-2.5 % cream Apply to port a cath ~30 min -1 hour prior to chemotherapy 30 g 3   • ondansetron ODT (Zofran ODT) 8 MG disintegrating tablet Place 1 tablet on the tongue Every 8 (Eight) Hours As Needed for Nausea or Vomiting. 30 tablet 5   • prochlorperazine (COMPAZINE) 10 MG tablet TAKE 1 TABLET BY MOUTH EVERY 6 HOURS AS NEEDED FOR NAUSEA 360 tablet 3   • sennosides-docusate (senna-docusate sodium) 8.6-50 MG per tablet Take 2 tablets by mouth 2 (Two) Times a Day. 120 tablet 2   • fluticasone (Flonase) 50 MCG/ACT nasal spray 2 sprays into the nostril(s) as directed by  "provider Daily. 18.2 mL 3     No facility-administered medications prior to visit.       No opioid medication identified on active medication list. I have reviewed chart for other potential  high risk medication/s and harmful drug interactions in the elderly.          Aspirin is not on active medication list.  Aspirin use is not indicated based on review of current medical condition/s. Risk of harm outweighs potential benefits.  .    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  ACP discussion was held with the patient during this visit. Patient does not have an advance directive, information provided.          Objective    Vitals:    02/02/23 1619   BP: 128/84   BP Location: Right arm   Patient Position: Sitting   Cuff Size: Adult   Pulse: 85   Temp: 96.4 °F (35.8 °C)   SpO2: 97%   Weight: 92.8 kg (204 lb 9.6 oz)   PainSc: 0-No pain     Estimated body mass index is 206.74 kg/m² as calculated from the following:    Height as of 8/11/22: 67 cm (26.38\").    Weight as of this encounter: 92.8 kg (204 lb 9.6 oz).    Class 3 Severe Obesity (BMI >=40). Obesity-related health conditions include the following: hypertension. Obesity is unchanged. BMI is is above average; BMI management plan is completed. We discussed portion control and increasing exercise.      Does the patient have evidence of cognitive impairment? No    Physical Exam        Gen: " Patient in NAD. Pleasant and answers appropriately. A&Ox3.    Skin: Warm and dry with normal turgor. No purpura, rashes, or unusual pigmentation noted. Hair is normal in appearance and distribution.    HEENT: NC/AT. No lesions noted. Conjunctiva clear, sclera nonicteric. PERRL. EOMI without nystagmus or strabismus. Fundi appear benign. No hemorrhages or exudates of eyes. Auditory canals are patent bilaterally without lesions. TMs intact,  nonerythematous, nonbulging without lesions. Nasal mucosa erythematous, and nonedematous. Frontal and maxillary sinuses are nontender. O/P erythematous and moist without exudate.    Neck: Supple without lymph nodes palpated. FROM.     Lungs: Slightly decreased B/L without rales, rhonchi, crackles, or wheezes.    Heart: RRR. S1 and S2 normal. No S3 or S4. No MRGT.    Abd: Soft, nontender,nondistended. (+)BSx4 quadrants.     Extrem: No CCE. Radial pulses 2+/4 and equal B/L. FROMx4. No bone, joint, or muscle tenderness noted.    Neuro: No focal motor/sensory deficits.      HEALTH RISK ASSESSMENT    Smoking Status:  Social History     Tobacco Use   Smoking Status Never   Smokeless Tobacco Never     Alcohol Consumption:  Social History     Substance and Sexual Activity   Alcohol Use Not Currently     Fall Risk Screen:    [facility] Fall Risk Assessment was completed, and patient is at LOW risk for falls.Assessment completed on:2/2/2023    Depression Screening:  PHQ-2/PHQ-9 Depression Screening 2/2/2023   Retired PHQ-9 Total Score -   Retired Total Score -   Little Interest or Pleasure in Doing Things 0-->not at all   Feeling Down, Depressed or Hopeless 0-->not at all   PHQ-9: Brief Depression Severity Measure Score 0       Health Habits and Functional and Cognitive Screening:  Functional & Cognitive Status 2/2/2023   Do you have difficulty preparing food and eating? No   Do you have difficulty bathing yourself, getting dressed or grooming yourself? No   Do you have difficulty using the  toilet? No   Do you have difficulty moving around from place to place? No   Do you have trouble with steps or getting out of a bed or a chair? No   Current Diet Well Balanced Diet   Dental Exam Up to date   Eye Exam Up to date   Exercise (times per week) 0 times per week   Current Exercises Include Walking;No Regular Exercise   Do you need help using the phone?  No   Are you deaf or do you have serious difficulty hearing?  Yes   Do you need help with transportation? No   Do you need help shopping? No   Do you need help preparing meals?  No   Do you need help with housework?  No   Do you need help with laundry? No   Do you need help taking your medications? No   Do you need help managing money? No   Do you ever drive or ride in a car without wearing a seat belt? Yes   Have you felt unusual stress, anger or loneliness in the last month? No   Who do you live with? Other   If you need help, do you have trouble finding someone available to you? No   Do you have difficulty concentrating, remembering or making decisions? No       Age-appropriate Screening Schedule:  Refer to the list below for future screening recommendations based on patient's age, sex and/or medical conditions. Orders for these recommended tests are listed in the plan section. The patient has been provided with a written plan.    Health Maintenance   Topic Date Due   • TDAP/TD VACCINES (1 - Tdap) Never done   • ZOSTER VACCINE (2 of 2) 04/17/2021   • LIPID PANEL  10/11/2022   • MAMMOGRAM  01/13/2023   • DXA SCAN  01/13/2024   • PAP SMEAR  10/20/2024   • INFLUENZA VACCINE  Completed              Assessment & Plan   CMS Preventative Services Quick Reference  Risk Factors Identified During Encounter  Chronic Pain: Home exercise plan outlined.  Depression/Dysphoria: Continue to monitor.  Fall Risk-High or Moderate: Discussed Fall Prevention in the home  Immunizations Discussed/Encouraged: Influenza and Shingrix  The above risks/problems have been discussed  with the patient.  Follow up actions/plans if indicated are seen below in the Assessment/Plan Section.  Pertinent information has been shared with the patient in the After Visit Summary.    Diagnoses and all orders for this visit:    1. Mixed hyperlipidemia (Primary)  The patient will have fasting laboratory testing performed in 3 months with Dr. Stone.  -     Lipid Panel; Future    2. Malignant neoplasm metastatic to both lungs (HCC)  We will contact Dr. Raven Rao, radiologist, for comparison of the patient's previous 2 CT scans.    3. Immunodeficiency (HCC)    4. Other pulmonary embolism without acute cor pulmonale, unspecified chronicity (HCC)    5. Obesity, morbid (HCC)    6. Health maintenance  The patient will return for her COVID-19 booster vaccine.     Other orders  -     fluticasone (Flonase) 50 MCG/ACT nasal spray; 2 sprays into the nostril(s) as directed by provider Daily.  Dispense: 18.2 mL; Refill: 3        Follow Up:   Return in about 6 months (around 8/2/2023).     An After Visit Summary and PPPS were made available to the patient.               Dilcia Pedro MD    Transcribed from ambient dictation for Dilcia Pedro MD by Lanny Bermudez.  02/02/23   19:02 EST    Patient or patient representative verbalized consent to the visit recording.  I have personally performed the services described in this document as transcribed by the above individual, and it is both accurate and complete.

## 2023-02-03 ENCOUNTER — PATIENT ROUNDING (BHMG ONLY) (OUTPATIENT)
Dept: FAMILY MEDICINE CLINIC | Facility: CLINIC | Age: 68
End: 2023-02-03
Payer: COMMERCIAL

## 2023-02-03 NOTE — PROGRESS NOTES
February 3, 2023    Hello, may I speak with Yadira Flowers?    My name is Kat    I am  with [facility] [place] [facility]  [facility]  [address] [place] [address] [phone].    Before we get started may I verify your date of birth? 1955     Is this a good time to talk? yes    Tell me about your visit with us. What things went well? Everything! I love Dr. Pedro, she did an excellent job today despite being sick.         We're always looking for ways to make our patients' experiences even better. Do you have recommendations on ways we may improve?  No way    Overall were you satisfied with your visit to our practice?  Absolutely        I appreciate you taking the time to speak with me today. Is there anything else I can do for you? No      Thank you, and have a great day.

## 2023-02-08 ENCOUNTER — HOSPITAL ENCOUNTER (OUTPATIENT)
Dept: MAMMOGRAPHY | Facility: HOSPITAL | Age: 68
Discharge: HOME | End: 2023-02-08
Admitting: INTERNAL MEDICINE
Payer: COMMERCIAL

## 2023-02-08 DIAGNOSIS — C78.02 MALIGNANT NEOPLASM METASTATIC TO BOTH LUNGS: ICD-10-CM

## 2023-02-08 DIAGNOSIS — C78.01 MALIGNANT NEOPLASM METASTATIC TO BOTH LUNGS: ICD-10-CM

## 2023-02-08 DIAGNOSIS — Z17.0 MALIGNANT NEOPLASM OF RIGHT BREAST IN FEMALE, ESTROGEN RECEPTOR POSITIVE, UNSPECIFIED SITE OF BREAST: ICD-10-CM

## 2023-02-08 DIAGNOSIS — C50.911 MALIGNANT NEOPLASM OF RIGHT BREAST IN FEMALE, ESTROGEN RECEPTOR POSITIVE, UNSPECIFIED SITE OF BREAST: ICD-10-CM

## 2023-02-08 DIAGNOSIS — C78.7 METASTASIS TO LIVER: ICD-10-CM

## 2023-02-08 PROCEDURE — 77066 DX MAMMO INCL CAD BI: CPT | Performed by: RADIOLOGY

## 2023-02-08 PROCEDURE — G0279 TOMOSYNTHESIS, MAMMO: HCPCS

## 2023-02-08 PROCEDURE — 77062 BREAST TOMOSYNTHESIS BI: CPT | Performed by: RADIOLOGY

## 2023-02-08 PROCEDURE — 77066 DX MAMMO INCL CAD BI: CPT

## 2023-02-16 DIAGNOSIS — C43.59 MALIGNANT MELANOMA OF TORSO EXCLUDING BREAST: Primary | ICD-10-CM

## 2023-02-16 DIAGNOSIS — C78.01 MALIGNANT NEOPLASM METASTATIC TO BOTH LUNGS: ICD-10-CM

## 2023-02-16 DIAGNOSIS — C78.02 MALIGNANT NEOPLASM METASTATIC TO BOTH LUNGS: ICD-10-CM

## 2023-02-16 DIAGNOSIS — C78.7 METASTASIS TO LIVER: ICD-10-CM

## 2023-02-16 RX ORDER — SODIUM CHLORIDE 9 MG/ML
250 INJECTION, SOLUTION INTRAVENOUS ONCE
Status: CANCELLED | OUTPATIENT
Start: 2023-02-23

## 2023-02-21 ENCOUNTER — TELEPHONE (OUTPATIENT)
Dept: FAMILY MEDICINE CLINIC | Facility: CLINIC | Age: 68
End: 2023-02-21
Payer: COMMERCIAL

## 2023-02-23 ENCOUNTER — INFUSION (OUTPATIENT)
Dept: ONCOLOGY | Facility: HOSPITAL | Age: 68
End: 2023-02-23
Payer: COMMERCIAL

## 2023-02-23 ENCOUNTER — LAB (OUTPATIENT)
Dept: ONCOLOGY | Facility: HOSPITAL | Age: 68
End: 2023-02-23
Payer: COMMERCIAL

## 2023-02-23 VITALS
BODY MASS INDEX: 208.56 KG/M2 | HEART RATE: 78 BPM | WEIGHT: 206.4 LBS | DIASTOLIC BLOOD PRESSURE: 86 MMHG | TEMPERATURE: 97.8 F | OXYGEN SATURATION: 94 % | SYSTOLIC BLOOD PRESSURE: 130 MMHG | RESPIRATION RATE: 18 BRPM

## 2023-02-23 DIAGNOSIS — C78.01 MALIGNANT NEOPLASM METASTATIC TO BOTH LUNGS: ICD-10-CM

## 2023-02-23 DIAGNOSIS — C78.7 METASTASIS TO LIVER: ICD-10-CM

## 2023-02-23 DIAGNOSIS — C43.59 MALIGNANT MELANOMA OF TORSO EXCLUDING BREAST: ICD-10-CM

## 2023-02-23 DIAGNOSIS — Z79.899 LONG-TERM USE OF HIGH-RISK MEDICATION: Primary | ICD-10-CM

## 2023-02-23 DIAGNOSIS — Z95.828 PORT-A-CATH IN PLACE: ICD-10-CM

## 2023-02-23 DIAGNOSIS — C78.02 MALIGNANT NEOPLASM METASTATIC TO BOTH LUNGS: ICD-10-CM

## 2023-02-23 DIAGNOSIS — C43.59 MALIGNANT MELANOMA OF TORSO EXCLUDING BREAST: Primary | ICD-10-CM

## 2023-02-23 LAB
ALBUMIN SERPL-MCNC: 4.1 G/DL (ref 3.5–5.2)
ALBUMIN/GLOB SERPL: 1.6 G/DL
ALP SERPL-CCNC: 69 U/L (ref 39–117)
ALT SERPL W P-5'-P-CCNC: 19 U/L (ref 1–33)
ANION GAP SERPL CALCULATED.3IONS-SCNC: 11.6 MMOL/L (ref 5–15)
AST SERPL-CCNC: 18 U/L (ref 1–32)
BASOPHILS # BLD AUTO: 0.07 10*3/MM3 (ref 0–0.2)
BASOPHILS NFR BLD AUTO: 1.2 % (ref 0–1.5)
BILIRUB SERPL-MCNC: 0.4 MG/DL (ref 0–1.2)
BUN SERPL-MCNC: 13 MG/DL (ref 8–23)
BUN/CREAT SERPL: 14.9 (ref 7–25)
CALCIUM SPEC-SCNC: 9.5 MG/DL (ref 8.6–10.5)
CHLORIDE SERPL-SCNC: 105 MMOL/L (ref 98–107)
CO2 SERPL-SCNC: 25.4 MMOL/L (ref 22–29)
CREAT SERPL-MCNC: 0.87 MG/DL (ref 0.57–1)
DEPRECATED RDW RBC AUTO: 44.1 FL (ref 37–54)
EGFRCR SERPLBLD CKD-EPI 2021: 73.6 ML/MIN/1.73
EOSINOPHIL # BLD AUTO: 0.88 10*3/MM3 (ref 0–0.4)
EOSINOPHIL NFR BLD AUTO: 15.6 % (ref 0.3–6.2)
ERYTHROCYTE [DISTWIDTH] IN BLOOD BY AUTOMATED COUNT: 13.3 % (ref 12.3–15.4)
GLOBULIN UR ELPH-MCNC: 2.5 GM/DL
GLUCOSE SERPL-MCNC: 100 MG/DL (ref 65–99)
HCT VFR BLD AUTO: 41.9 % (ref 34–46.6)
HGB BLD-MCNC: 13.9 G/DL (ref 12–15.9)
IMM GRANULOCYTES # BLD AUTO: 0.01 10*3/MM3 (ref 0–0.05)
IMM GRANULOCYTES NFR BLD AUTO: 0.2 % (ref 0–0.5)
LYMPHOCYTES # BLD AUTO: 1.8 10*3/MM3 (ref 0.7–3.1)
LYMPHOCYTES NFR BLD AUTO: 32 % (ref 19.6–45.3)
MCH RBC QN AUTO: 29.7 PG (ref 26.6–33)
MCHC RBC AUTO-ENTMCNC: 33.2 G/DL (ref 31.5–35.7)
MCV RBC AUTO: 89.5 FL (ref 79–97)
MONOCYTES # BLD AUTO: 0.39 10*3/MM3 (ref 0.1–0.9)
MONOCYTES NFR BLD AUTO: 6.9 % (ref 5–12)
NEUTROPHILS NFR BLD AUTO: 2.48 10*3/MM3 (ref 1.7–7)
NEUTROPHILS NFR BLD AUTO: 44.1 % (ref 42.7–76)
NRBC BLD AUTO-RTO: 0 /100 WBC (ref 0–0.2)
PLATELET # BLD AUTO: 192 10*3/MM3 (ref 140–450)
PMV BLD AUTO: 10.2 FL (ref 6–12)
POTASSIUM SERPL-SCNC: 4 MMOL/L (ref 3.5–5.2)
PROT SERPL-MCNC: 6.6 G/DL (ref 6–8.5)
RBC # BLD AUTO: 4.68 10*6/MM3 (ref 3.77–5.28)
SODIUM SERPL-SCNC: 142 MMOL/L (ref 136–145)
T4 FREE SERPL-MCNC: 1.23 NG/DL (ref 0.93–1.7)
TSH SERPL DL<=0.05 MIU/L-ACNC: 1.6 UIU/ML (ref 0.27–4.2)
WBC NRBC COR # BLD: 5.63 10*3/MM3 (ref 3.4–10.8)

## 2023-02-23 PROCEDURE — 84439 ASSAY OF FREE THYROXINE: CPT

## 2023-02-23 PROCEDURE — 25010000002 HEPARIN LOCK FLUSH PER 10 UNITS: Performed by: INTERNAL MEDICINE

## 2023-02-23 PROCEDURE — 96413 CHEMO IV INFUSION 1 HR: CPT

## 2023-02-23 PROCEDURE — 80050 GENERAL HEALTH PANEL: CPT

## 2023-02-23 PROCEDURE — 25010000002 NIVOLUMAB 240 MG/24ML SOLUTION 24 ML VIAL: Performed by: INTERNAL MEDICINE

## 2023-02-23 RX ORDER — SODIUM CHLORIDE 0.9 % (FLUSH) 0.9 %
20 SYRINGE (ML) INJECTION AS NEEDED
Status: CANCELLED | OUTPATIENT
Start: 2023-02-23

## 2023-02-23 RX ORDER — SODIUM CHLORIDE 0.9 % (FLUSH) 0.9 %
10 SYRINGE (ML) INJECTION AS NEEDED
Status: CANCELLED | OUTPATIENT
Start: 2023-03-23

## 2023-02-23 RX ORDER — HEPARIN SODIUM (PORCINE) LOCK FLUSH IV SOLN 100 UNIT/ML 100 UNIT/ML
300 SOLUTION INTRAVENOUS ONCE
Status: CANCELLED | OUTPATIENT
Start: 2023-02-23

## 2023-02-23 RX ORDER — HEPARIN SODIUM (PORCINE) LOCK FLUSH IV SOLN 100 UNIT/ML 100 UNIT/ML
500 SOLUTION INTRAVENOUS AS NEEDED
Status: DISCONTINUED | OUTPATIENT
Start: 2023-02-23 | End: 2023-02-23 | Stop reason: HOSPADM

## 2023-02-23 RX ORDER — SODIUM CHLORIDE 0.9 % (FLUSH) 0.9 %
10 SYRINGE (ML) INJECTION AS NEEDED
Status: DISCONTINUED | OUTPATIENT
Start: 2023-02-23 | End: 2023-02-23 | Stop reason: HOSPADM

## 2023-02-23 RX ORDER — SODIUM CHLORIDE 9 MG/ML
250 INJECTION, SOLUTION INTRAVENOUS ONCE
Status: COMPLETED | OUTPATIENT
Start: 2023-02-23 | End: 2023-02-23

## 2023-02-23 RX ORDER — HEPARIN SODIUM (PORCINE) LOCK FLUSH IV SOLN 100 UNIT/ML 100 UNIT/ML
500 SOLUTION INTRAVENOUS AS NEEDED
Status: CANCELLED | OUTPATIENT
Start: 2023-03-23

## 2023-02-23 RX ORDER — PANTOPRAZOLE SODIUM 20 MG/1
20 TABLET, DELAYED RELEASE ORAL DAILY
Qty: 30 TABLET | Refills: 2 | Status: SHIPPED | OUTPATIENT
Start: 2023-02-23

## 2023-02-23 RX ADMIN — SODIUM CHLORIDE 480 MG: 9 INJECTION, SOLUTION INTRAVENOUS at 15:21

## 2023-02-23 RX ADMIN — Medication 10 ML: at 16:06

## 2023-02-23 RX ADMIN — SODIUM CHLORIDE 250 ML: 9 INJECTION, SOLUTION INTRAVENOUS at 15:21

## 2023-02-23 RX ADMIN — Medication 500 UNITS: at 16:06

## 2023-03-20 DIAGNOSIS — C78.02 MALIGNANT NEOPLASM METASTATIC TO BOTH LUNGS: ICD-10-CM

## 2023-03-20 DIAGNOSIS — C78.01 MALIGNANT NEOPLASM METASTATIC TO BOTH LUNGS: ICD-10-CM

## 2023-03-20 DIAGNOSIS — C43.59 MALIGNANT MELANOMA OF TORSO EXCLUDING BREAST: Primary | ICD-10-CM

## 2023-03-20 DIAGNOSIS — C78.7 METASTASIS TO LIVER: ICD-10-CM

## 2023-03-20 RX ORDER — SODIUM CHLORIDE 9 MG/ML
250 INJECTION, SOLUTION INTRAVENOUS ONCE
Status: CANCELLED | OUTPATIENT
Start: 2023-03-23

## 2023-03-23 ENCOUNTER — INFUSION (OUTPATIENT)
Dept: ONCOLOGY | Facility: HOSPITAL | Age: 68
End: 2023-03-23
Payer: MEDICARE

## 2023-03-23 ENCOUNTER — LAB (OUTPATIENT)
Dept: ONCOLOGY | Facility: HOSPITAL | Age: 68
End: 2023-03-23
Payer: MEDICARE

## 2023-03-23 VITALS
RESPIRATION RATE: 18 BRPM | SYSTOLIC BLOOD PRESSURE: 140 MMHG | WEIGHT: 210.4 LBS | OXYGEN SATURATION: 97 % | HEART RATE: 84 BPM | TEMPERATURE: 97.9 F | DIASTOLIC BLOOD PRESSURE: 78 MMHG | BODY MASS INDEX: 212.6 KG/M2

## 2023-03-23 DIAGNOSIS — C78.7 METASTASIS TO LIVER: ICD-10-CM

## 2023-03-23 DIAGNOSIS — Z95.828 PORT-A-CATH IN PLACE: ICD-10-CM

## 2023-03-23 DIAGNOSIS — R53.83 OTHER FATIGUE: ICD-10-CM

## 2023-03-23 DIAGNOSIS — C78.02 MALIGNANT NEOPLASM METASTATIC TO BOTH LUNGS: ICD-10-CM

## 2023-03-23 DIAGNOSIS — C43.9 METASTATIC MELANOMA: ICD-10-CM

## 2023-03-23 DIAGNOSIS — C43.59 MALIGNANT MELANOMA OF TORSO EXCLUDING BREAST: Primary | ICD-10-CM

## 2023-03-23 DIAGNOSIS — C78.01 MALIGNANT NEOPLASM METASTATIC TO BOTH LUNGS: ICD-10-CM

## 2023-03-23 LAB
ALBUMIN SERPL-MCNC: 4.2 G/DL (ref 3.5–5.2)
ALBUMIN/GLOB SERPL: 1.6 G/DL
ALP SERPL-CCNC: 67 U/L (ref 39–117)
ALT SERPL W P-5'-P-CCNC: 17 U/L (ref 1–33)
ANION GAP SERPL CALCULATED.3IONS-SCNC: 10.5 MMOL/L (ref 5–15)
AST SERPL-CCNC: 19 U/L (ref 1–32)
BASOPHILS # BLD AUTO: 0.06 10*3/MM3 (ref 0–0.2)
BASOPHILS NFR BLD AUTO: 1.1 % (ref 0–1.5)
BILIRUB SERPL-MCNC: 0.3 MG/DL (ref 0–1.2)
BUN SERPL-MCNC: 10 MG/DL (ref 8–23)
BUN/CREAT SERPL: 12 (ref 7–25)
CALCIUM SPEC-SCNC: 9.4 MG/DL (ref 8.6–10.5)
CHLORIDE SERPL-SCNC: 106 MMOL/L (ref 98–107)
CO2 SERPL-SCNC: 24.5 MMOL/L (ref 22–29)
CREAT SERPL-MCNC: 0.83 MG/DL (ref 0.57–1)
DEPRECATED RDW RBC AUTO: 44.3 FL (ref 37–54)
EGFRCR SERPLBLD CKD-EPI 2021: 77.9 ML/MIN/1.73
EOSINOPHIL # BLD AUTO: 0.66 10*3/MM3 (ref 0–0.4)
EOSINOPHIL NFR BLD AUTO: 12.3 % (ref 0.3–6.2)
ERYTHROCYTE [DISTWIDTH] IN BLOOD BY AUTOMATED COUNT: 13.5 % (ref 12.3–15.4)
GLOBULIN UR ELPH-MCNC: 2.6 GM/DL
GLUCOSE SERPL-MCNC: 94 MG/DL (ref 65–99)
HCT VFR BLD AUTO: 41.9 % (ref 34–46.6)
HGB BLD-MCNC: 13.3 G/DL (ref 12–15.9)
IMM GRANULOCYTES # BLD AUTO: 0.01 10*3/MM3 (ref 0–0.05)
IMM GRANULOCYTES NFR BLD AUTO: 0.2 % (ref 0–0.5)
LYMPHOCYTES # BLD AUTO: 1.82 10*3/MM3 (ref 0.7–3.1)
LYMPHOCYTES NFR BLD AUTO: 33.9 % (ref 19.6–45.3)
MCH RBC QN AUTO: 28.5 PG (ref 26.6–33)
MCHC RBC AUTO-ENTMCNC: 31.7 G/DL (ref 31.5–35.7)
MCV RBC AUTO: 89.9 FL (ref 79–97)
MONOCYTES # BLD AUTO: 0.51 10*3/MM3 (ref 0.1–0.9)
MONOCYTES NFR BLD AUTO: 9.5 % (ref 5–12)
NEUTROPHILS NFR BLD AUTO: 2.31 10*3/MM3 (ref 1.7–7)
NEUTROPHILS NFR BLD AUTO: 43 % (ref 42.7–76)
NRBC BLD AUTO-RTO: 0 /100 WBC (ref 0–0.2)
PLATELET # BLD AUTO: 200 10*3/MM3 (ref 140–450)
PMV BLD AUTO: 10.4 FL (ref 6–12)
POTASSIUM SERPL-SCNC: 4.1 MMOL/L (ref 3.5–5.2)
PROT SERPL-MCNC: 6.8 G/DL (ref 6–8.5)
RBC # BLD AUTO: 4.66 10*6/MM3 (ref 3.77–5.28)
SODIUM SERPL-SCNC: 141 MMOL/L (ref 136–145)
T4 FREE SERPL-MCNC: 1.29 NG/DL (ref 0.93–1.7)
TSH SERPL DL<=0.05 MIU/L-ACNC: 1.48 UIU/ML (ref 0.27–4.2)
WBC NRBC COR # BLD: 5.37 10*3/MM3 (ref 3.4–10.8)

## 2023-03-23 PROCEDURE — 80053 COMPREHEN METABOLIC PANEL: CPT

## 2023-03-23 PROCEDURE — 25010000002 NIVOLUMAB 240 MG/24ML SOLUTION 24 ML VIAL: Performed by: INTERNAL MEDICINE

## 2023-03-23 PROCEDURE — 85025 COMPLETE CBC W/AUTO DIFF WBC: CPT

## 2023-03-23 PROCEDURE — 84443 ASSAY THYROID STIM HORMONE: CPT

## 2023-03-23 PROCEDURE — 25010000002 HEPARIN LOCK FLUSH PER 10 UNITS: Performed by: NURSE PRACTITIONER

## 2023-03-23 PROCEDURE — 84439 ASSAY OF FREE THYROXINE: CPT

## 2023-03-23 PROCEDURE — 96413 CHEMO IV INFUSION 1 HR: CPT

## 2023-03-23 RX ORDER — SODIUM CHLORIDE 0.9 % (FLUSH) 0.9 %
10 SYRINGE (ML) INJECTION AS NEEDED
Status: DISCONTINUED | OUTPATIENT
Start: 2023-03-23 | End: 2023-03-23 | Stop reason: HOSPADM

## 2023-03-23 RX ORDER — HEPARIN SODIUM (PORCINE) LOCK FLUSH IV SOLN 100 UNIT/ML 100 UNIT/ML
500 SOLUTION INTRAVENOUS AS NEEDED
Status: DISCONTINUED | OUTPATIENT
Start: 2023-03-23 | End: 2023-03-23 | Stop reason: HOSPADM

## 2023-03-23 RX ORDER — SODIUM CHLORIDE 9 MG/ML
250 INJECTION, SOLUTION INTRAVENOUS ONCE
Status: COMPLETED | OUTPATIENT
Start: 2023-03-23 | End: 2023-03-23

## 2023-03-23 RX ADMIN — SODIUM CHLORIDE 250 ML: 9 INJECTION, SOLUTION INTRAVENOUS at 14:20

## 2023-03-23 RX ADMIN — SODIUM CHLORIDE 480 MG: 9 INJECTION, SOLUTION INTRAVENOUS at 14:20

## 2023-03-23 RX ADMIN — Medication 10 ML: at 14:55

## 2023-03-23 RX ADMIN — Medication 500 UNITS: at 14:55

## 2023-04-05 ENCOUNTER — TELEPHONE (OUTPATIENT)
Dept: ONCOLOGY | Facility: CLINIC | Age: 68
End: 2023-04-05

## 2023-04-05 NOTE — TELEPHONE ENCOUNTER
Caller: Mohr-LorettaWhitney boggs    Relationship: Self    Best call back number: [phone]  What is the best time to reach you: ANYTIME. LEAVE VM    Who are you requesting to speak with (clinical staff, provider,  specific staff member): SCHEDULING      What was the call regarding: PATIENT WHITNEY CALLED IN AND CANNOT DO HER APPOINTMENTS IN THE MORNINGS. WHITNEY REQUESTS TO HAVE ALL THREE OF HER APPTS CHANGED TO LATER AFTERNOONS. HER APPTS THAT SHE NEEDS TO R/S ARE ON 4/20/23, 5/1823, 6/15/23  Do you require a callback: YES

## 2023-04-13 ENCOUNTER — HOSPITAL ENCOUNTER (OUTPATIENT)
Dept: CT IMAGING | Facility: HOSPITAL | Age: 68
Discharge: HOME | End: 2023-04-13
Payer: COMMERCIAL

## 2023-04-13 DIAGNOSIS — C43.9 METASTATIC MELANOMA: ICD-10-CM

## 2023-04-13 PROCEDURE — 25510000001 IOPAMIDOL 61 % SOLUTION: Performed by: NURSE PRACTITIONER

## 2023-04-13 PROCEDURE — 74177 CT ABD & PELVIS W/CONTRAST: CPT | Performed by: RADIOLOGY

## 2023-04-13 PROCEDURE — 71260 CT THORAX DX C+: CPT | Performed by: RADIOLOGY

## 2023-04-13 PROCEDURE — 71260 CT THORAX DX C+: CPT

## 2023-04-13 PROCEDURE — 74177 CT ABD & PELVIS W/CONTRAST: CPT

## 2023-04-13 RX ADMIN — IOPAMIDOL 89 ML: 612 INJECTION, SOLUTION INTRAVENOUS at 14:51

## 2023-04-14 DIAGNOSIS — C43.59 MALIGNANT MELANOMA OF TORSO EXCLUDING BREAST: Primary | ICD-10-CM

## 2023-04-14 DIAGNOSIS — C78.01 MALIGNANT NEOPLASM METASTATIC TO BOTH LUNGS: ICD-10-CM

## 2023-04-14 DIAGNOSIS — C78.02 MALIGNANT NEOPLASM METASTATIC TO BOTH LUNGS: ICD-10-CM

## 2023-04-14 DIAGNOSIS — C78.7 METASTASIS TO LIVER: ICD-10-CM

## 2023-04-14 RX ORDER — SODIUM CHLORIDE 9 MG/ML
250 INJECTION, SOLUTION INTRAVENOUS ONCE
Status: CANCELLED | OUTPATIENT
Start: 2023-04-20

## 2023-04-14 RX ORDER — SODIUM CHLORIDE 9 MG/ML
250 INJECTION, SOLUTION INTRAVENOUS ONCE
OUTPATIENT
Start: 2023-05-18

## 2023-04-14 RX ORDER — SODIUM CHLORIDE 9 MG/ML
250 INJECTION, SOLUTION INTRAVENOUS ONCE
OUTPATIENT
Start: 2023-06-15

## 2023-04-20 ENCOUNTER — INFUSION (OUTPATIENT)
Dept: ONCOLOGY | Facility: HOSPITAL | Age: 68
End: 2023-04-20
Payer: MEDICARE

## 2023-04-20 ENCOUNTER — LAB (OUTPATIENT)
Dept: ONCOLOGY | Facility: CLINIC | Age: 68
End: 2023-04-20
Payer: MEDICARE

## 2023-04-20 ENCOUNTER — OFFICE VISIT (OUTPATIENT)
Dept: ONCOLOGY | Facility: CLINIC | Age: 68
End: 2023-04-20
Payer: MEDICARE

## 2023-04-20 VITALS
DIASTOLIC BLOOD PRESSURE: 78 MMHG | BODY MASS INDEX: 207.75 KG/M2 | TEMPERATURE: 97.5 F | WEIGHT: 205.6 LBS | SYSTOLIC BLOOD PRESSURE: 127 MMHG | RESPIRATION RATE: 18 BRPM | OXYGEN SATURATION: 97 % | HEART RATE: 78 BPM

## 2023-04-20 VITALS
RESPIRATION RATE: 18 BRPM | SYSTOLIC BLOOD PRESSURE: 154 MMHG | TEMPERATURE: 97.3 F | HEART RATE: 74 BPM | OXYGEN SATURATION: 99 % | DIASTOLIC BLOOD PRESSURE: 90 MMHG

## 2023-04-20 DIAGNOSIS — C78.7 METASTASIS TO LIVER: Primary | ICD-10-CM

## 2023-04-20 DIAGNOSIS — K21.9 GASTROESOPHAGEAL REFLUX DISEASE WITHOUT ESOPHAGITIS: ICD-10-CM

## 2023-04-20 DIAGNOSIS — C78.02 MALIGNANT NEOPLASM METASTATIC TO BOTH LUNGS: ICD-10-CM

## 2023-04-20 DIAGNOSIS — C43.59 MALIGNANT MELANOMA OF TORSO EXCLUDING BREAST: ICD-10-CM

## 2023-04-20 DIAGNOSIS — C78.7 METASTASIS TO LIVER: ICD-10-CM

## 2023-04-20 DIAGNOSIS — C78.01 MALIGNANT NEOPLASM METASTATIC TO BOTH LUNGS: ICD-10-CM

## 2023-04-20 DIAGNOSIS — Z86.010 HISTORY OF COLON POLYPS: ICD-10-CM

## 2023-04-20 DIAGNOSIS — C43.59 MALIGNANT MELANOMA OF TORSO EXCLUDING BREAST: Primary | ICD-10-CM

## 2023-04-20 DIAGNOSIS — T82.868A THROMBOSIS INVOLVING VASCULAR DEVICE, INITIAL ENCOUNTER: ICD-10-CM

## 2023-04-20 DIAGNOSIS — Z95.828 PORT-A-CATH IN PLACE: ICD-10-CM

## 2023-04-20 DIAGNOSIS — Z85.3 HISTORY OF BREAST CANCER: ICD-10-CM

## 2023-04-20 LAB
ALBUMIN SERPL-MCNC: 4.2 G/DL (ref 3.5–5.2)
ALBUMIN/GLOB SERPL: 1.8 G/DL
ALP SERPL-CCNC: 70 U/L (ref 39–117)
ALT SERPL W P-5'-P-CCNC: 15 U/L (ref 1–33)
ANION GAP SERPL CALCULATED.3IONS-SCNC: 12.8 MMOL/L (ref 5–15)
AST SERPL-CCNC: 17 U/L (ref 1–32)
BASOPHILS # BLD AUTO: 0.06 10*3/MM3 (ref 0–0.2)
BASOPHILS NFR BLD AUTO: 1.1 % (ref 0–1.5)
BILIRUB SERPL-MCNC: 0.4 MG/DL (ref 0–1.2)
BUN SERPL-MCNC: 11 MG/DL (ref 8–23)
BUN/CREAT SERPL: 13.4 (ref 7–25)
CALCIUM SPEC-SCNC: 9.4 MG/DL (ref 8.6–10.5)
CHLORIDE SERPL-SCNC: 108 MMOL/L (ref 98–107)
CO2 SERPL-SCNC: 23.2 MMOL/L (ref 22–29)
CREAT SERPL-MCNC: 0.82 MG/DL (ref 0.57–1)
DEPRECATED RDW RBC AUTO: 44 FL (ref 37–54)
EGFRCR SERPLBLD CKD-EPI 2021: 79 ML/MIN/1.73
EOSINOPHIL # BLD AUTO: 0.82 10*3/MM3 (ref 0–0.4)
EOSINOPHIL NFR BLD AUTO: 14.4 % (ref 0.3–6.2)
ERYTHROCYTE [DISTWIDTH] IN BLOOD BY AUTOMATED COUNT: 13.4 % (ref 12.3–15.4)
GLOBULIN UR ELPH-MCNC: 2.3 GM/DL
GLUCOSE SERPL-MCNC: 80 MG/DL (ref 65–99)
HCT VFR BLD AUTO: 43.5 % (ref 34–46.6)
HGB BLD-MCNC: 13.7 G/DL (ref 12–15.9)
IMM GRANULOCYTES # BLD AUTO: 0.01 10*3/MM3 (ref 0–0.05)
IMM GRANULOCYTES NFR BLD AUTO: 0.2 % (ref 0–0.5)
LYMPHOCYTES # BLD AUTO: 1.89 10*3/MM3 (ref 0.7–3.1)
LYMPHOCYTES NFR BLD AUTO: 33.2 % (ref 19.6–45.3)
MCH RBC QN AUTO: 28.4 PG (ref 26.6–33)
MCHC RBC AUTO-ENTMCNC: 31.5 G/DL (ref 31.5–35.7)
MCV RBC AUTO: 90.1 FL (ref 79–97)
MONOCYTES # BLD AUTO: 0.45 10*3/MM3 (ref 0.1–0.9)
MONOCYTES NFR BLD AUTO: 7.9 % (ref 5–12)
NEUTROPHILS NFR BLD AUTO: 2.46 10*3/MM3 (ref 1.7–7)
NEUTROPHILS NFR BLD AUTO: 43.2 % (ref 42.7–76)
NRBC BLD AUTO-RTO: 0 /100 WBC (ref 0–0.2)
PLATELET # BLD AUTO: 209 10*3/MM3 (ref 140–450)
PMV BLD AUTO: 10.2 FL (ref 6–12)
POTASSIUM SERPL-SCNC: 4.1 MMOL/L (ref 3.5–5.2)
PROT SERPL-MCNC: 6.5 G/DL (ref 6–8.5)
RBC # BLD AUTO: 4.83 10*6/MM3 (ref 3.77–5.28)
SODIUM SERPL-SCNC: 144 MMOL/L (ref 136–145)
T4 FREE SERPL-MCNC: 1.15 NG/DL (ref 0.93–1.7)
TSH SERPL DL<=0.05 MIU/L-ACNC: 3.5 UIU/ML (ref 0.27–4.2)
WBC NRBC COR # BLD: 5.69 10*3/MM3 (ref 3.4–10.8)

## 2023-04-20 PROCEDURE — 96413 CHEMO IV INFUSION 1 HR: CPT

## 2023-04-20 PROCEDURE — 85025 COMPLETE CBC W/AUTO DIFF WBC: CPT | Performed by: INTERNAL MEDICINE

## 2023-04-20 PROCEDURE — 80053 COMPREHEN METABOLIC PANEL: CPT | Performed by: INTERNAL MEDICINE

## 2023-04-20 PROCEDURE — 84439 ASSAY OF FREE THYROXINE: CPT | Performed by: INTERNAL MEDICINE

## 2023-04-20 PROCEDURE — 25010000002 NIVOLUMAB 240 MG/24ML SOLUTION 24 ML VIAL: Performed by: INTERNAL MEDICINE

## 2023-04-20 PROCEDURE — 25010000002 HEPARIN LOCK FLUSH PER 10 UNITS: Performed by: INTERNAL MEDICINE

## 2023-04-20 PROCEDURE — 84443 ASSAY THYROID STIM HORMONE: CPT | Performed by: INTERNAL MEDICINE

## 2023-04-20 RX ORDER — SODIUM CHLORIDE 0.9 % (FLUSH) 0.9 %
10 SYRINGE (ML) INJECTION AS NEEDED
Status: DISCONTINUED | OUTPATIENT
Start: 2023-04-20 | End: 2023-04-20 | Stop reason: HOSPADM

## 2023-04-20 RX ORDER — SODIUM CHLORIDE 9 MG/ML
250 INJECTION, SOLUTION INTRAVENOUS ONCE
Status: COMPLETED | OUTPATIENT
Start: 2023-04-20 | End: 2023-04-20

## 2023-04-20 RX ORDER — HEPARIN SODIUM (PORCINE) LOCK FLUSH IV SOLN 100 UNIT/ML 100 UNIT/ML
500 SOLUTION INTRAVENOUS AS NEEDED
Status: DISCONTINUED | OUTPATIENT
Start: 2023-04-20 | End: 2023-04-20 | Stop reason: HOSPADM

## 2023-04-20 RX ADMIN — SODIUM CHLORIDE 480 MG: 9 INJECTION, SOLUTION INTRAVENOUS at 14:57

## 2023-04-20 RX ADMIN — Medication 500 UNITS: at 15:35

## 2023-04-20 RX ADMIN — SODIUM CHLORIDE 250 ML: 9 INJECTION, SOLUTION INTRAVENOUS at 14:56

## 2023-04-20 RX ADMIN — Medication 10 ML: at 15:35

## 2023-04-20 NOTE — PROGRESS NOTES
Venipuncture Blood Specimen Collection  Venipuncture performed in left arm by Lesvia Regalado MA with good hemostasis. Patient tolerated the procedure well without complications.   04/20/23   Lesvia Regalado MA

## 2023-05-03 RX ORDER — PANTOPRAZOLE SODIUM 20 MG/1
20 TABLET, DELAYED RELEASE ORAL DAILY
Qty: 30 TABLET | Refills: 2 | Status: SHIPPED | OUTPATIENT
Start: 2023-05-03

## 2023-05-18 ENCOUNTER — INFUSION (OUTPATIENT)
Dept: ONCOLOGY | Facility: HOSPITAL | Age: 68
End: 2023-05-18
Payer: COMMERCIAL

## 2023-05-18 ENCOUNTER — APPOINTMENT (OUTPATIENT)
Dept: ONCOLOGY | Facility: HOSPITAL | Age: 68
End: 2023-05-18
Payer: COMMERCIAL

## 2023-05-18 VITALS
WEIGHT: 200.8 LBS | OXYGEN SATURATION: 97 % | SYSTOLIC BLOOD PRESSURE: 143 MMHG | HEART RATE: 77 BPM | TEMPERATURE: 97.1 F | DIASTOLIC BLOOD PRESSURE: 76 MMHG | BODY MASS INDEX: 202.9 KG/M2 | RESPIRATION RATE: 18 BRPM

## 2023-05-18 DIAGNOSIS — C78.7 METASTASIS TO LIVER: ICD-10-CM

## 2023-05-18 DIAGNOSIS — Z79.899 LONG-TERM USE OF HIGH-RISK MEDICATION: Primary | ICD-10-CM

## 2023-05-18 DIAGNOSIS — C78.02 MALIGNANT NEOPLASM METASTATIC TO BOTH LUNGS: ICD-10-CM

## 2023-05-18 DIAGNOSIS — C78.01 MALIGNANT NEOPLASM METASTATIC TO BOTH LUNGS: ICD-10-CM

## 2023-05-18 DIAGNOSIS — C43.59 MALIGNANT MELANOMA OF TORSO EXCLUDING BREAST: ICD-10-CM

## 2023-05-18 DIAGNOSIS — Z95.828 PORT-A-CATH IN PLACE: ICD-10-CM

## 2023-05-18 LAB
ALBUMIN SERPL-MCNC: 4.4 G/DL (ref 3.5–5.2)
ALBUMIN/GLOB SERPL: 1.8 G/DL
ALP SERPL-CCNC: 74 U/L (ref 39–117)
ALT SERPL W P-5'-P-CCNC: 16 U/L (ref 1–33)
ANION GAP SERPL CALCULATED.3IONS-SCNC: 11.7 MMOL/L (ref 5–15)
AST SERPL-CCNC: 17 U/L (ref 1–32)
BASOPHILS # BLD AUTO: 0.06 10*3/MM3 (ref 0–0.2)
BASOPHILS NFR BLD AUTO: 1.1 % (ref 0–1.5)
BILIRUB SERPL-MCNC: 0.4 MG/DL (ref 0–1.2)
BUN SERPL-MCNC: 12 MG/DL (ref 8–23)
BUN/CREAT SERPL: 14.3 (ref 7–25)
CALCIUM SPEC-SCNC: 9.8 MG/DL (ref 8.6–10.5)
CHLORIDE SERPL-SCNC: 106 MMOL/L (ref 98–107)
CO2 SERPL-SCNC: 24.3 MMOL/L (ref 22–29)
CREAT SERPL-MCNC: 0.84 MG/DL (ref 0.57–1)
DEPRECATED RDW RBC AUTO: 46.8 FL (ref 37–54)
EGFRCR SERPLBLD CKD-EPI 2021: 76.8 ML/MIN/1.73
EOSINOPHIL # BLD AUTO: 0.41 10*3/MM3 (ref 0–0.4)
EOSINOPHIL NFR BLD AUTO: 7.7 % (ref 0.3–6.2)
ERYTHROCYTE [DISTWIDTH] IN BLOOD BY AUTOMATED COUNT: 13.7 % (ref 12.3–15.4)
GLOBULIN UR ELPH-MCNC: 2.5 GM/DL
GLUCOSE SERPL-MCNC: 95 MG/DL (ref 65–99)
HCT VFR BLD AUTO: 44 % (ref 34–46.6)
HGB BLD-MCNC: 14.3 G/DL (ref 12–15.9)
IMM GRANULOCYTES # BLD AUTO: 0.01 10*3/MM3 (ref 0–0.05)
IMM GRANULOCYTES NFR BLD AUTO: 0.2 % (ref 0–0.5)
LYMPHOCYTES # BLD AUTO: 1.63 10*3/MM3 (ref 0.7–3.1)
LYMPHOCYTES NFR BLD AUTO: 30.6 % (ref 19.6–45.3)
MCH RBC QN AUTO: 29.9 PG (ref 26.6–33)
MCHC RBC AUTO-ENTMCNC: 32.5 G/DL (ref 31.5–35.7)
MCV RBC AUTO: 91.9 FL (ref 79–97)
MONOCYTES # BLD AUTO: 0.52 10*3/MM3 (ref 0.1–0.9)
MONOCYTES NFR BLD AUTO: 9.8 % (ref 5–12)
NEUTROPHILS NFR BLD AUTO: 2.69 10*3/MM3 (ref 1.7–7)
NEUTROPHILS NFR BLD AUTO: 50.6 % (ref 42.7–76)
NRBC BLD AUTO-RTO: 0 /100 WBC (ref 0–0.2)
PLATELET # BLD AUTO: 205 10*3/MM3 (ref 140–450)
PMV BLD AUTO: 10.1 FL (ref 6–12)
POTASSIUM SERPL-SCNC: 4.1 MMOL/L (ref 3.5–5.2)
PROT SERPL-MCNC: 6.9 G/DL (ref 6–8.5)
RBC # BLD AUTO: 4.79 10*6/MM3 (ref 3.77–5.28)
SODIUM SERPL-SCNC: 142 MMOL/L (ref 136–145)
T4 FREE SERPL-MCNC: 1.55 NG/DL (ref 0.93–1.7)
TSH SERPL DL<=0.05 MIU/L-ACNC: 1.57 UIU/ML (ref 0.27–4.2)
WBC NRBC COR # BLD: 5.32 10*3/MM3 (ref 3.4–10.8)

## 2023-05-18 PROCEDURE — 25010000002 NIVOLUMAB 240 MG/24ML SOLUTION 24 ML VIAL: Performed by: INTERNAL MEDICINE

## 2023-05-18 PROCEDURE — 25010000002 HEPARIN LOCK FLUSH PER 10 UNITS: Performed by: NURSE PRACTITIONER

## 2023-05-18 PROCEDURE — 84439 ASSAY OF FREE THYROXINE: CPT

## 2023-05-18 PROCEDURE — 96413 CHEMO IV INFUSION 1 HR: CPT

## 2023-05-18 PROCEDURE — 80050 GENERAL HEALTH PANEL: CPT

## 2023-05-18 RX ORDER — SODIUM CHLORIDE 9 MG/ML
250 INJECTION, SOLUTION INTRAVENOUS ONCE
Status: COMPLETED | OUTPATIENT
Start: 2023-05-18 | End: 2023-05-18

## 2023-05-18 RX ORDER — SODIUM CHLORIDE 0.9 % (FLUSH) 0.9 %
10 SYRINGE (ML) INJECTION AS NEEDED
Status: DISCONTINUED | OUTPATIENT
Start: 2023-05-18 | End: 2023-05-18 | Stop reason: HOSPADM

## 2023-05-18 RX ORDER — HEPARIN SODIUM (PORCINE) LOCK FLUSH IV SOLN 100 UNIT/ML 100 UNIT/ML
500 SOLUTION INTRAVENOUS AS NEEDED
Status: DISCONTINUED | OUTPATIENT
Start: 2023-05-18 | End: 2023-05-18 | Stop reason: HOSPADM

## 2023-05-18 RX ADMIN — SODIUM CHLORIDE 480 MG: 9 INJECTION, SOLUTION INTRAVENOUS at 15:09

## 2023-05-18 RX ADMIN — Medication 10 ML: at 15:41

## 2023-05-18 RX ADMIN — SODIUM CHLORIDE 250 ML: 9 INJECTION, SOLUTION INTRAVENOUS at 15:09

## 2023-05-18 RX ADMIN — HEPARIN 500 UNITS: 100 SYRINGE at 15:41

## 2023-05-30 RX ORDER — FLUTICASONE PROPIONATE 50 MCG
SPRAY, SUSPENSION (ML) NASAL
Qty: 16 G | Refills: 0 | Status: SHIPPED | OUTPATIENT
Start: 2023-05-30

## 2023-06-15 ENCOUNTER — APPOINTMENT (OUTPATIENT)
Dept: ONCOLOGY | Facility: HOSPITAL | Age: 68
End: 2023-06-15
Payer: MEDICARE

## 2023-06-15 ENCOUNTER — INFUSION (OUTPATIENT)
Dept: ONCOLOGY | Facility: HOSPITAL | Age: 68
End: 2023-06-15
Payer: MEDICARE

## 2023-06-15 VITALS
DIASTOLIC BLOOD PRESSURE: 89 MMHG | HEART RATE: 82 BPM | SYSTOLIC BLOOD PRESSURE: 152 MMHG | BODY MASS INDEX: 206.03 KG/M2 | RESPIRATION RATE: 18 BRPM | TEMPERATURE: 98.2 F | WEIGHT: 203.9 LBS | OXYGEN SATURATION: 96 %

## 2023-06-15 DIAGNOSIS — C78.7 METASTASIS TO LIVER: ICD-10-CM

## 2023-06-15 DIAGNOSIS — Z95.828 PORT-A-CATH IN PLACE: ICD-10-CM

## 2023-06-15 DIAGNOSIS — C43.59 MALIGNANT MELANOMA OF TORSO EXCLUDING BREAST: Primary | ICD-10-CM

## 2023-06-15 DIAGNOSIS — C78.01 MALIGNANT NEOPLASM METASTATIC TO BOTH LUNGS: ICD-10-CM

## 2023-06-15 DIAGNOSIS — C78.02 MALIGNANT NEOPLASM METASTATIC TO BOTH LUNGS: ICD-10-CM

## 2023-06-15 LAB
ALBUMIN SERPL-MCNC: 4.1 G/DL (ref 3.5–5.2)
ALBUMIN/GLOB SERPL: 1.6 G/DL
ALP SERPL-CCNC: 74 U/L (ref 39–117)
ALT SERPL W P-5'-P-CCNC: 17 U/L (ref 1–33)
ANION GAP SERPL CALCULATED.3IONS-SCNC: 13.3 MMOL/L (ref 5–15)
AST SERPL-CCNC: 16 U/L (ref 1–32)
BASOPHILS # BLD AUTO: 0.1 10*3/MM3 (ref 0–0.2)
BASOPHILS NFR BLD AUTO: 1.7 % (ref 0–1.5)
BILIRUB SERPL-MCNC: 0.3 MG/DL (ref 0–1.2)
BUN SERPL-MCNC: 11 MG/DL (ref 8–23)
BUN/CREAT SERPL: 13.3 (ref 7–25)
CALCIUM SPEC-SCNC: 9.7 MG/DL (ref 8.6–10.5)
CHLORIDE SERPL-SCNC: 107 MMOL/L (ref 98–107)
CO2 SERPL-SCNC: 23.7 MMOL/L (ref 22–29)
CREAT SERPL-MCNC: 0.83 MG/DL (ref 0.57–1)
DEPRECATED RDW RBC AUTO: 44.8 FL (ref 37–54)
EGFRCR SERPLBLD CKD-EPI 2021: 77.9 ML/MIN/1.73
EOSINOPHIL # BLD AUTO: 0.77 10*3/MM3 (ref 0–0.4)
EOSINOPHIL NFR BLD AUTO: 13.4 % (ref 0.3–6.2)
ERYTHROCYTE [DISTWIDTH] IN BLOOD BY AUTOMATED COUNT: 13.6 % (ref 12.3–15.4)
GLOBULIN UR ELPH-MCNC: 2.5 GM/DL
GLUCOSE SERPL-MCNC: 97 MG/DL (ref 65–99)
HCT VFR BLD AUTO: 43.6 % (ref 34–46.6)
HGB BLD-MCNC: 14.1 G/DL (ref 12–15.9)
IMM GRANULOCYTES # BLD AUTO: 0.01 10*3/MM3 (ref 0–0.05)
IMM GRANULOCYTES NFR BLD AUTO: 0.2 % (ref 0–0.5)
LYMPHOCYTES # BLD AUTO: 1.92 10*3/MM3 (ref 0.7–3.1)
LYMPHOCYTES NFR BLD AUTO: 33.4 % (ref 19.6–45.3)
MCH RBC QN AUTO: 29.1 PG (ref 26.6–33)
MCHC RBC AUTO-ENTMCNC: 32.3 G/DL (ref 31.5–35.7)
MCV RBC AUTO: 90.1 FL (ref 79–97)
MONOCYTES # BLD AUTO: 0.43 10*3/MM3 (ref 0.1–0.9)
MONOCYTES NFR BLD AUTO: 7.5 % (ref 5–12)
NEUTROPHILS NFR BLD AUTO: 2.52 10*3/MM3 (ref 1.7–7)
NEUTROPHILS NFR BLD AUTO: 43.8 % (ref 42.7–76)
NRBC BLD AUTO-RTO: 0 /100 WBC (ref 0–0.2)
PLATELET # BLD AUTO: 208 10*3/MM3 (ref 140–450)
PMV BLD AUTO: 10.4 FL (ref 6–12)
POTASSIUM SERPL-SCNC: 4.1 MMOL/L (ref 3.5–5.2)
PROT SERPL-MCNC: 6.6 G/DL (ref 6–8.5)
RBC # BLD AUTO: 4.84 10*6/MM3 (ref 3.77–5.28)
SODIUM SERPL-SCNC: 144 MMOL/L (ref 136–145)
T4 FREE SERPL-MCNC: 1.25 NG/DL (ref 0.93–1.7)
TSH SERPL DL<=0.05 MIU/L-ACNC: 1.79 UIU/ML (ref 0.27–4.2)
WBC NRBC COR # BLD: 5.75 10*3/MM3 (ref 3.4–10.8)

## 2023-06-15 PROCEDURE — 96413 CHEMO IV INFUSION 1 HR: CPT

## 2023-06-15 PROCEDURE — 84443 ASSAY THYROID STIM HORMONE: CPT

## 2023-06-15 PROCEDURE — 25010000002 NIVOLUMAB 240 MG/24ML SOLUTION 24 ML VIAL: Performed by: INTERNAL MEDICINE

## 2023-06-15 PROCEDURE — 84439 ASSAY OF FREE THYROXINE: CPT

## 2023-06-15 PROCEDURE — 25010000002 HEPARIN LOCK FLUSH PER 10 UNITS: Performed by: NURSE PRACTITIONER

## 2023-06-15 PROCEDURE — 80053 COMPREHEN METABOLIC PANEL: CPT

## 2023-06-15 PROCEDURE — 85025 COMPLETE CBC W/AUTO DIFF WBC: CPT

## 2023-06-15 RX ORDER — SODIUM CHLORIDE 9 MG/ML
250 INJECTION, SOLUTION INTRAVENOUS ONCE
Status: COMPLETED | OUTPATIENT
Start: 2023-06-15 | End: 2023-06-15

## 2023-06-15 RX ORDER — SODIUM CHLORIDE 0.9 % (FLUSH) 0.9 %
10 SYRINGE (ML) INJECTION AS NEEDED
Status: DISCONTINUED | OUTPATIENT
Start: 2023-06-15 | End: 2023-06-15 | Stop reason: HOSPADM

## 2023-06-15 RX ORDER — HEPARIN SODIUM (PORCINE) LOCK FLUSH IV SOLN 100 UNIT/ML 100 UNIT/ML
500 SOLUTION INTRAVENOUS AS NEEDED
Status: DISCONTINUED | OUTPATIENT
Start: 2023-06-15 | End: 2023-06-15 | Stop reason: HOSPADM

## 2023-06-15 RX ADMIN — SODIUM CHLORIDE 480 MG: 9 INJECTION, SOLUTION INTRAVENOUS at 15:25

## 2023-06-15 RX ADMIN — Medication 10 ML: at 16:00

## 2023-06-15 RX ADMIN — Medication 500 UNITS: at 16:00

## 2023-06-15 RX ADMIN — SODIUM CHLORIDE 250 ML: 9 INJECTION, SOLUTION INTRAVENOUS at 15:25

## 2023-06-26 ENCOUNTER — TELEPHONE (OUTPATIENT)
Dept: SURGERY | Facility: CLINIC | Age: 68
End: 2023-06-26

## 2023-06-26 NOTE — TELEPHONE ENCOUNTER
Caller: WILLIAM    Relationship: [employer] [facility]    Best call back number: [phone]    What form or medical record are you requestin/09 - ENCOUNTER FOR COLONOSCOPY OP NOTE    Who is requesting this form or medical record from you: [facility] - PCP    How would you like to receive the form or medical records (pick-up, mail, fax): FAX  If fax, what is the fax number: [phone]    Timeframe paperwork needed: AS SOON POSSIBLE    Additional notes: N/A

## 2023-07-28 ENCOUNTER — HOSPITAL ENCOUNTER (OUTPATIENT)
Dept: RADIATION ONCOLOGY | Facility: HOSPITAL | Age: 68
Discharge: HOME | End: 2023-07-28
Payer: COMMERCIAL

## 2023-07-28 DIAGNOSIS — C78.7 METASTASIS TO LIVER: ICD-10-CM

## 2023-07-28 DIAGNOSIS — C43.59 MALIGNANT MELANOMA OF TORSO EXCLUDING BREAST: ICD-10-CM

## 2023-07-28 DIAGNOSIS — C78.02 MALIGNANT NEOPLASM METASTATIC TO BOTH LUNGS: ICD-10-CM

## 2023-07-28 DIAGNOSIS — C78.01 MALIGNANT NEOPLASM METASTATIC TO BOTH LUNGS: ICD-10-CM

## 2023-07-28 PROCEDURE — 74177 CT ABD & PELVIS W/CONTRAST: CPT

## 2023-07-28 PROCEDURE — 71260 CT THORAX DX C+: CPT | Performed by: RADIOLOGY

## 2023-07-28 PROCEDURE — 25510000001 IOPAMIDOL 61 % SOLUTION: Performed by: INTERNAL MEDICINE

## 2023-07-28 PROCEDURE — 71260 CT THORAX DX C+: CPT

## 2023-07-28 PROCEDURE — 74177 CT ABD & PELVIS W/CONTRAST: CPT | Performed by: RADIOLOGY

## 2023-07-28 RX ADMIN — IOPAMIDOL 100 ML: 612 INJECTION, SOLUTION INTRAVENOUS at 15:39

## 2023-08-02 NOTE — PROGRESS NOTES
"NAME: Yadira Flowers    : 1955    DATE:  2023    DIAGNOSIS:   1.  Stage IA ([id number]) moderately differentiated invasive ductal carcinoma of the right breast diagnosed in 2015.    2.  H/o malignant Melanoma on her Back    3.  H/o cervical cancer treated with cryotherapy at [facility] in     4.  Recurrent / metastatic malignant Melanoma  -  R axillary LN bx 22.    -  She has R axillary LN mass (2.47x2.16), Solitary cavitary R lobe liver lesion, cavitary RLL lung mass,  and bilateral parenchymal lung nodules      TREATMENT HISTORY:   1.         CHIEF COMPLAINT:  Follow up of metastatic melanoma/toxicity check and recent imaging    HISTORY OF PRESENT ILLNESS:   Yadira Flowers is a very pleasant 67 y.o. female who was referred by Dr. Dilcia Pedro for evaluation and treatment of breast cancer. She was living in [place] in 2015 when her dog brought attention to and \"found\" an abnormal lump in her right breast.  She was treated by Dr. Steve Isbell and underwent R lumpectomy with SLNBx on 10-22-15 as below.  She then had what sounds like accelerated partial breast irradiation.  She was then started on Arimidex which was later switched to Exemestane for a better side effect profile.  She took this for about 5 years, stopping a couple of weeks ago.  She stopped taking Exemestane because she developed some vaginal bleeding and hematuria.  This was evaluated by her PCP and gynecologist and felt to be due to vaginal atrophy.  She was prescribed a hormone pill which she hasn't yet started and was referred here to discuss things further.      Aside from bleeding which has been uncomfortable and distressing for her, Ms. Onur Burgos has generally been well.  The last year has been hard on her emotionally with the loss of her  and then her dog, her home and her insurance, but she is coping well and has good family support in [place].  She says she gained weight with her breast cancer " treatment and gained weight when her  .  She has been working now to lose weight and has lost 20 lbs over the last 2 months with dieting.  She denies chest pain or shortness of breath.  She complains of some RLQ cramping pain and isn't sure what is causing that.  She denies difficulty moving her bowels.  No blood in her stool that she is aware of.  She has had vaginal bleeding and hematuria as above.        INTERVAL HISTORY:  Ms. Burgos presents today for follow up of metastatic melanoma/toxicity check and recent imaging. Clinically she is doing well and imaging shows continued excellent control of her cancer.  We reviewed images together today.  She is under a good deal of stress b/c her mother has become immobile and so she and her sisters have had to assume 24 hr care for her.  No significant side effects of her treatment.      PAST MEDICAL HISTORY:  Past Medical History:   Diagnosis Date    Back pain     Basal cell carcinoma (BCC) in situ of skin     Dr. Mohr - Derm    Breast cancer         Cancer     breast, cervical, melanoma    Cervical cancer     Diagnosed in .  Treated by repeated local intervention and ultimately received cryotherapy at [facility] with resolution.    Elbow fracture     Foot fracture     Headache     Hypertension     TAKEN OFF MEDS    Melanoma     on her back  - treated by Dermatologist Catrachito Amaro in [place], [place] with what sounds like wide local excision.     Pulmonary embolism     Sinusitis        PAST SURGICAL HISTORY:  Past Surgical History:   Procedure Laterality Date    AXILLARY LYMPH NODE BIOPSY/EXCISION Right 2022    Procedure: AXILLARY LYMPH NODE BIOPSY/EXCISION;  Surgeon: Dianelys Cummings MD;  Location[facility];  Service: General;  Laterality: Right;    BREAST LUMPECTOMY Right     BREAST SURGERY      Secondary to cancer    BRONCHOSCOPY Right 2022    Procedure: BRONCHOSCOPY WITH ENDOBRONCHIAL ULTRASOUND;  Surgeon: Chris Her MD;   Location:  [facility];  Service: Pulmonary;  Laterality: Right;    COLONOSCOPY N/A 2020    Procedure: COLONOSCOPY;  Surgeon: Mohsen Maldonado MD;  Location:  [facility];  Service: Gastroenterology;  Laterality: N/A;    ELBOW FUSION      left    FOOT SURGERY Right 2014    Broken foot    HAND SURGERY  2020    right    RHINOPLASTY  2002    SKIN CANCER EXCISION      malignant melanoma from back     US GUIDED LYMPH NODE BIOPSY  2022    VENOUS ACCESS DEVICE (PORT) INSERTION N/A 2022    Procedure: INSERTION VENOUS ACCESS DEVICE left or right;  Surgeon: Dianelys Cummings MD;  Location:  [facility];  Service: General;  Laterality: N/A;       FAMILY HISTORY:  Family History   Problem Relation Age of Onset    Other Mother         blood clots    Hypertension Mother     Ulcers Father     Hypertension Sister     Other Sister         Multiple Sclerosis    Cancer Paternal Aunt     Stroke Maternal Grandmother     Alcohol abuse Paternal Grandmother     Hypertension Sister     Multiple sclerosis Sister     Hypertension Sister     Coronary artery disease Paternal Grandfather     Cancer Maternal Aunt 30        colon    Breast cancer Neg Hx    Many paternal aunts  with malignancy of various types. Many cousins on that side of the family have had cancer as well.  One  of colon cancer in her 30s.  One had a brain tumor.    SOCIAL HISTORY:  Social History     Socioeconomic History    Marital status:    Tobacco Use    Smoking status: Never    Smokeless tobacco: Never   Vaping Use    Vaping Use: Never used   Substance and Sexual Activity    Alcohol use: Not Currently    Drug use: Never    Sexual activity: Not Currently     Birth control/protection: Post-menopausal       REVIEW OF SYSTEMS:   A comprehensive 14 point review of systems was performed.  Significant findings as mentioned above.  All other systems reviewed and are negative.      MEDICATIONS:  The current medication list was reviewed in the  EMR    Current Outpatient Medications:     apixaban (ELIQUIS) 2.5 MG tablet tablet, Take 1 tablet by mouth 2 (Two) Times a Day., Disp: 60 tablet, Rfl: 5    fluticasone (FLONASE) 50 MCG/ACT nasal spray, 2 sprays by Each Nare route Daily. Shake liquid, Disp: 16 g, Rfl: 5    lidocaine-prilocaine (EMLA) 2.5-2.5 % cream, Apply to port a cath ~30 min -1 hour prior to chemotherapy, Disp: 30 g, Rfl: 3    ondansetron ODT (Zofran ODT) 8 MG disintegrating tablet, Place 1 tablet on the tongue Every 8 (Eight) Hours As Needed for Nausea or Vomiting., Disp: 30 tablet, Rfl: 5    pantoprazole (PROTONIX) 20 MG EC tablet, TAKE 1 TABLET BY MOUTH DAILY, Disp: 30 tablet, Rfl: 2    prochlorperazine (COMPAZINE) 10 MG tablet, TAKE 1 TABLET BY MOUTH EVERY 6 HOURS AS NEEDED FOR NAUSEA, Disp: 360 tablet, Rfl: 3    sennosides-docusate (senna-docusate sodium) 8.6-50 MG per tablet, Take 2 tablets by mouth 2 (Two) Times a Day., Disp: 120 tablet, Rfl: 2    ALLERGIES:    No Known Allergies    PHYSICAL EXAM:  Vitals:    08/04/23 1020   BP: 115/68   Pulse: 81   Resp: 18   Temp: 97.3 øF (36.3 øC)   SpO2: 97%       Pain Score    08/04/23 1020   PainSc: 0-No pain       ECOG score: 0   General:  Awake, alert and oriented, appears well.  HEENT:  Pupils are equal, round and reactive to light and accommodation, Extra-ocular movements full, Oropharyx clear, mucous membranes moist  Neck:  No JVD, thyromegaly or lymphadenopathy  CV:  Regular rate and rhythm, no murmurs, rubs or gallops  Resp:  Lungs are clear to auscultation bilaterally, no crackles  Breast: Deferred today. Previously: S/p R lumpectomy. There is a somewhat firm area at the site of surgery but there are no palpable masses.  She is s/p excision of R axillary LN, with some post-surgical swelling.  Left breast is without mass lesions.  No L axillary adenopathy.  Abd:  Soft, non-tender, non-distended, bowel sounds present, no organomegaly or masses.    Ext:  No clubbing, cyanosis, no LE edema     Lymph:  No cervical, supraclavicular, axillary adenopathy  Neuro:  MS as above, grossly non-focal exam    PATHOLOGY:  10-22-15            12-16-21          01-13-22          ENDOSCOPY:  Colonoscopy 12-09-20     Findings: 8 hyperplastic appearing polyps of the distal rectum, diverticulosis moderately throughout the sigmoid colon      IMAGING:  CTCAP 12-15-20  On the lung windows there are several calcified  granulomas in the lungs. No noncalcified pulmonary parenchymal lung  nodules were identified. On the soft tissue windows there were no  enlarged lymph nodes in the supraclavicular or axillary regions. No  mediastinal or hilar lymphadenopathy was seen. The heart was not  enlarged. There were no pericardial effusions.     IMPRESSION:  No CT findings of metastatic disease in the chest. There are  calcified granulomas in the lungs.     CT FINDINGS: The liver and spleen were normal in size and shape. The  gallbladder contains several stones. The wall was not thickened. The  bile ducts in the liver are not dilated. There is nothing seen to  suggest metastatic disease in the liver. The pancreas shows no evidence  of mass or inflammation. No adrenal or renal lesions are demonstrated.  The aorta is normal in caliber. There were no enlarged lymph nodes in  the retroperitoneum or in the mesentery. The bowel shows no evidence of  obstruction. In the pelvis there were no masses or fluid collections.  There were no ventral hernias.     IMPRESSION:  No CT findings of metastatic disease in the abdomen or  pelvis. This study does demonstrate several stones in the lumen of the  gallbladder.         Bilateral diagnostic mammogram 01-31-21  FINDINGS: There are scattered areas of fibroglandular density.     The bilateral fibroglandular pattern does not appear significantly  changed compared to the exam from 2020. This includes postoperative  changes in the right 12:00 region. Mild skin thickening is noted  involving the right breast  which is likely treatment related. A few  bilateral scattered calcifications are noted.     IMPRESSION:  Incomplete examination as comparison with older outside  prior mammograms is needed.        BI-RADS CATEGORY:   0, INCOMPLETE:  Need prior mammograms for comparison        RECOMMENDATION:  We will attempt to obtain older outside prior  mammograms for comparison.      CTCAP 12-12-21  FINDINGS: Lack of IV contrast hinders parenchymal assessment of the mediastinum, liver, spleen, pancreas, adrenal glands and kidneys.         Significant infiltrate seen in the right lower lobe with interstitial component. There are also lung nodules present in the right lung. Index nodule in the right lung base medially is 5 mm maximal diameter. Other smaller nodules seen throughout the right  lung. Tiny 3 mm lingular nodule seen in series 3 image 30. A benign calcified granuloma seen in the left lower lobe but a noncalcified indeterminant  5 mm left lower lobe nodule seen, series 3 image 42.     Nonenlarged mediastinal adenopathy seen. However there is an enlarged right axillary 1.8 x 2.6 cm lymph node partially seen.     Worrisome hepatic metastasis or primary hepatic lesion seen measuring 4.4 x 4.1 cm in liver segment 8. Margins are ill-defined. Abscess could have a similar appearance. Suggestion of subtle gallstones. Nonobstructing 2 mm right renal calculus seen. No  obstructive uropathy. Gaseous distention of the esophagus and hiatal hernia present consistent with GERD. No enteric contrast but no gross evidence of bowel obstruction.   There is no gross free air, free fluid or focal inflammatory change.  Uterus and  adnexa are not enlarged. Osseous structures are grossly intact.     IMPRESSION:  Constellation of findings with significant consolidation in the right lower lobe, enlarged right axillary lymph node, indeterminate bilateral lung nodules and ill-defined hepatic lesion concerning for underlying malignancy in this patient  .     FINDINGS: Lack of IV contrast hinders parenchymal assessment of the mediastinum, liver, spleen, pancreas, adrenal glands and kidneys.         Significant infiltrate seen in the right lower lobe with interstitial component. There are also lung nodules present in the right lung. Index nodule in the right lung base medially is 5 mm maximal diameter. Other smaller nodules seen throughout the right  lung. Tiny 3 mm lingular nodule seen in series 3 image 30. A benign calcified granuloma seen in the left lower lobe but a noncalcified indeterminant  5 mm left lower lobe nodule seen, series 3 image 42.     Nonenlarged mediastinal adenopathy seen. However there is an enlarged right axillary 1.8 x 2.6 cm lymph node partially seen.     Worrisome hepatic metastasis or primary hepatic lesion seen measuring 4.4 x 4.1 cm in liver segment 8. Margins are ill-defined. Abscess could have a similar appearance. Suggestion of subtle gallstones. Nonobstructing 2 mm right renal calculus seen. No  obstructive uropathy. Gaseous distention of the esophagus and hiatal hernia present consistent with GERD. No enteric contrast but no gross evidence of bowel obstruction.   There is no gross free air, free fluid or focal inflammatory change.  Uterus and  adnexa are not enlarged. Osseous structures are grossly intact.     IMPRESSION:  Constellation of findings with significant consolidation in the right lower lobe, enlarged right axillary lymph node, indeterminate bilateral lung nodules and ill-defined hepatic lesion concerning for underlying malignancy in this patient .       US Abdomen Complete 12-12-21  FINDINGS:  The visualized portions of the pancreas, IVC and aorta appear normal.        The liver is moderately coarsened in echotexture. Ill-defined hypodensity in the liver measuring 3 x 3.2 x 2.6 cm corresponds to recent CT. Unclear if this is a malignant lesion. Abscesses within the differential but prior CT was performed without  any  contrast.  The common bile duct is not dilated.. Gallbladder wall is thickened at 3.4 mm with trace pericholecystic fluid and subtle hyperemia. Gallstones and shadowing present.     The kidneys are normal in size and echogenicity. There is no   hydronephrosis or focal solid lesion.  The spleen is normal in size and echotexture.     IMPRESSION:  1. Nonspecific gallbladder wall thickening and questionable pericholecystic fluid. Gallstones are better seen on CT than ultrasound.  2.  Heterogeneous liver with lesion in the liver as seen on CT. Ultrasound is not helpful in determining etiology. This could be an abscess but given the other findings on CT,  malignancy is not excluded.      CTCAP 12-15-21  FINDINGS:    LUNGS:  Increased density of the now right lower lobe mixed  groundglass and more solid-appearing consolidation with internal  cavitary component identified. Tiny right and left lung pulmonary  nodules. Grossly stable.    PLEURAL SPACE:  Small bilateral pleural effusions.  No pneumothorax.    HEART:  Unremarkable.  No cardiomegaly.  No significant pericardial  effusion.    BONES/JOINTS:  Unremarkable.  No acute fracture.  No dislocation.    SOFT TISSUES:  Unremarkable.    VASCULATURE:  Unremarkable.  No thoracic aortic aneurysm.    LYMPH NODES:  Unremarkable.  No enlarged lymph nodes.     IMPRESSION:  1.  Increased density of the now right lower lobe mixed groundglass and  more solid-appearing consolidation with internal cavitary component  identified. Tiny right and left lung pulmonary nodules. Grossly stable.  2.  Small bilateral pleural effusions.    FINDINGS:    LUNG BASES:  Unremarkable.  No mass.  No consolidation.      ABDOMEN:    LIVER:  Right lobe of liver lesion is again identified now measuring  about 4.7 cm and was about 4.7 cm.    GALLBLADDER AND BILE DUCTS:  Gallstones noted in the gallbladder.  No  ductal dilation.    PANCREAS:  Unremarkable.  No mass.  No ductal dilation.    SPLEEN:   Unremarkable.  No splenomegaly.    ADRENALS:  Unremarkable.  No mass.    KIDNEYS AND URETERS:  Unremarkable.  No solid mass.  No  hydronephrosis.    STOMACH AND BOWEL:  Unremarkable.  No obstruction.  No mucosal  thickening.      PELVIS:    APPENDIX:  No findings to suggest acute appendicitis.    BLADDER:  Unremarkable.  No mass.    REPRODUCTIVE:  Unremarkable as visualized.      ABDOMEN and PELVIS:    INTRAPERITONEAL SPACE:  Unremarkable.  No free air.  No significant  fluid collection.    BONES/JOINTS:  No acute fracture.  No dislocation.    SOFT TISSUES:  Unremarkable.    VASCULATURE:  Unremarkable.  No abdominal aortic aneurysm.    LYMPH NODES:  Unremarkable.  No enlarged lymph nodes.     IMPRESSION:    Right lobe of liver lesion is again identified now measuring about 4.7  cm and was about 4.7 cm.      NM Bone Scan Whole Body 12-15-21  FINDINGS:    SKULL/FACIAL BONES:  No focal increased or decreased uptake.    SPINE:  Unremarkable.  No abnormal increased or decreased uptake.    RIBS:  Unremarkable.  No abnormal uptake.    LONG BONES:  Unremarkable.  No abnormal uptake.    PELVIS:  Unremarkable.  No focal increased or decreased uptake.    JOINTS:  Unremarkable.  No focal increased or decreased uptake.    SOFT TISSUES:  Unremarkable.    RENAL/BLADDER:  Within normal limits.     IMPRESSION:    Normal bone scan.      CT Abdomen With Contrast  12-18-21  FINDINGS:  Partially imaged thick-walled cavitary lesion in the right lower lobe with right basilar atelectasis/infiltrate and small right pleural effusion. There is also left basilar atelectasis/infiltrate and trace left pleural effusion. Multiple noncalcified  pulmonary nodules are scattered throughout the visualized lower lung fields, concerning for pulmonary metastatic disease.     Ill-defined right hepatic mass again noted. The spleen, pancreas, and both adrenal glands are within expected limits. Cholelithiasis. Punctate nonobstructing right intrarenal stone.  Tiny left renal cyst. Kidneys are otherwise unremarkable without  hydronephrosis. Abdominal aorta normal in course and caliber without dissection. No pathologic retroperitoneal or mesenteric lymphadenopathy by size criteria. Visualized small bowel and colon are unremarkable. No bowel obstruction. No free fluid or free  air.     No aggressive osseous lesions.     IMPRESSION:     1. Ill-defined right hepatic mass again noted. This has been previously biopsied and correlation with pathology results is recommended.  2. Cholelithiasis.  3. Punctate nonobstructing right intrarenal stone.  4. No threshold abdominal lymphadenopathy or other suspicious abdominal masses.  5. Partially imaged thick-walled cavitary lesion in the right lower lobe. This may be of infectious or neoplastic etiology and continued follow-up is recommended.  6. Small bilateral pleural effusions with bibasilar atelectasis/infiltrate, worse on the right than left.  7. Numerous small noncalcified pulmonary nodules throughout the visualized lower lung fields, concerning for pulmonary metastatic disease.       Mammo Diagnostic Digital Tomosynthesis Bilateral With CAD w/ US Axilla Right 01-13-22  FINDINGS:  There are scattered areas of fibroglandular density.     Incompletely imaged is a very prominent axillary lymph node. The  bilateral fibroglandular pattern itself is stable in appearance  including postoperative changes in the superior aspect of the right  breast. There are stable bilateral calcifications. Mild skin thickening  is again noted involving the right breast which is likely treatment  related.     ULTRASOUND: Ultrasound evaluation of the right axilla demonstrates 2  markedly enlarged lymph nodes the largest of which measures 3.2 cm in  size and has no demonstrable fatty hilum. Recommend ultrasound-guided  biopsy.      IMPRESSION:  1. Stable mammographic appearance of the left breast with no findings  suspicious for malignancy.        2. Marked  right axillary lymphadenopathy. Recommend ultrasound-guided  biopsy. The fibroglandular pattern of the right breast is however  stable.        BI-RADS CATEGORY:  4, SUSPICIOUS        RECOMMENDATION:  Recommend ultrasound-guided biopsy of the dominant  abnormal appearing right axillary lymph node.      DEXA Bone Density Axial 01-13-22  IMPRESSION:  Impression:  1. According to the World Health Organization definitions of  osteoporosis based on bone density, this patient's bone mineral density  is compatible with osteoporosis and the fracture risk is high.      Mammo Diagnostic Digital Tomosynthesis Bilateral With CAD w/ US Axilla Right 01-13-22  FINDINGS:  There are scattered areas of fibroglandular density.     Incompletely imaged is a very prominent axillary lymph node. The  bilateral fibroglandular pattern itself is stable in appearance  including postoperative changes in the superior aspect of the right  breast. There are stable bilateral calcifications. Mild skin thickening  is again noted involving the right breast which is likely treatment  related.     ULTRASOUND: Ultrasound evaluation of the right axilla demonstrates 2  markedly enlarged lymph nodes the largest of which measures 3.2 cm in  size and has no demonstrable fatty hilum. Recommend ultrasound-guided  biopsy.      IMPRESSION:  1. Stable mammographic appearance of the left breast with no findings  suspicious for malignancy.        2. Marked right axillary lymphadenopathy. Recommend ultrasound-guided  biopsy. The fibroglandular pattern of the right breast is however  stable.        BI-RADS CATEGORY:  4, SUSPICIOUS        RECOMMENDATION:  Recommend ultrasound-guided biopsy of the dominant  abnormal appearing right axillary lymph node.      CT Chest With Contrast Diagnostic 01-18-22  FINDINGS:     LUNGS: Solitary mass in the right lower lobe with cavitation.  Significantly smaller today than on the prior study. 6 mm solid nodule  in the right lung  posteriorly on image 30 of the axial series. Other  parenchymal nodules are seen in both lungs and are similar to the  previous study. These are concerning for metastatic nodules.     HEART: Unremarkable.     MEDIASTINUM: No masses. No enlarged lymph nodes.  No fluid collections.     PLEURA: No pleural effusion. No pleural mass or abnormal calcification.  No pneumothorax.     VASCULATURE: No evidence of aneurysm. Filling defect in the right  pulmonary artery and possibly left lower lobe pulmonary artery. This  study was not performed to evaluate for pulmonary embolus, but the  findings are concerning for bilateral pulmonary emboli.     BONES: No acute bony abnormality.     VISUALIZED UPPER ABDOMEN:Please see the CT report for the abdomen and  pelvis.     Other: Right axillary soft tissue mass is present and shows slight  interval growth. It measures 2.47 x 2.16 cm today and previously at the  same level measured approximately 2.7 x 1.89 cm.     IMPRESSION:  1. Study was not performed as a PE protocol, but there appear to be  filling defects in pulmonary arteries bilaterally concerning for  pulmonary emboli.  2. Interval decrease in size of cavitary right lower lobe mass.  3. Stable parenchymal nodules bilaterally.  4. Very slight interval growth of right axillary soft tissue mass.     Results are being relayed to the referring physician.      US Liver 01-18-22  FINDINGS: Sonographic imaging of the liver does not show the mass  previously identified in the liver. I would suggest a follow-up CT scan  for better delineation.     Hepatic flow was hepatopedal.     There is shadowing from the gallbladder fossa.     IMPRESSION:  1. Previously identified mass is not seen on this exam in the liver.  Consider follow-up CT.  2. Shadowing from the gallbladder fossa. In the absence of  cholecystectomy, appearance is concerning for cholelithiasis.      US Venous Doppler Lower Extremity Bilateral (duplex) 01-20-22  FINDINGS:    There is patent spontaneous flow from the common femoral vein through  the posterior tibial veins.  There was no internal clot or area of noncompressibility.  Normal augmentation was elicited where applicable.     IMPRESSION:  No DVT in the lower extremities on today's exam.       CT Chest Pulmonary Embolism 01-20-22  FINDINGS: Today's study demonstrates opacification of the central  pulmonary vessels.  There are filling defects in the pulmonary arteries bilaterally. First  order arteries. This is most compatible with bilateral pulmonary  emboli..     Soft tissue mass in the right lower lobe with somewhat cavitary center  is again noted and unchanged..     There is no mediastinal lymph node enlargement     No pericardial or pleural effusion.        IMPRESSION:  1. Bilateral pulmonary emboli.  2. Parenchymal nodules bilaterally with dominant mass in the right lower  Lobe..      NM PET/CT Skull Base to Mid Thigh 01-28-22  FINDINGS:      HEAD/NECK:  Area of uptake in the posterior right paraspinal space and to lesser  degree the left paraspinal space appears related to muscle uptake.  No FDG hypermetabolic neck adenopathy.  No FDG hypermetabolic masses.     CHEST:   Numerous bilateral pulmonary nodules appear stable from the previous  chest CT and show no FDG hypermetabolism. This is likely due to nodules  being below size threshold for PET sensitivity.  Cavitary lung mass in the right lung localizing to the superior segment  of the lower lobe shows FDG hypermetabolism with maximum SUV: 2.4. This  is at the lower range for malignancy.  Enlarged right lower axillary region lymph node which is 3.0 cm shows  mild FDG hypermetabolism that is approximately with maximum SUV: 2.4.  This is at the lower range for malignancy.  Low-dose CT demonstrating no change in additional scattered pulmonary  nodules from the previous scan. Coronary artery calcifications are  stable.     ABDOMEN/PELVIS:   Faint area of low attenuation  involving the right lobe of liver is  poorly evaluated with low-dose noncontrast CT but shows no focal FDG  hypermetabolism.  Physiologic FDG hypermetabolism seen throughout the solid abdominal  organs.  Physiologic FDG hypermetabolism seen throughout the GI tract.  Physiologic FDG hypermetabolism seen throughout the mesentery,  retroperitoneum and pelvis.  Low dose CT redemonstrates nonobstructing kidney stones. Cholelithiasis  again noted.     BONES: Nonspecific FDG tracer activity. No intense areas of tracer  activity noted.     IMPRESSION:     1. Cavitary mass in the right lung that shows very low-grade FDG  hypermetabolism which is at the lower range for malignancy with maximum  SUV: 2.4.  2. Enlarged right axillary region lymph node with low-grade FDG  hypermetabolism also at the lower range for malignancy with maximum SUV:  2.4.  3. Numerous bilateral pulmonary nodules compatible with metastatic  disease but show no significant FDG hypermetabolism. This may be in part  due to size of nodules being below PET sensitivity threshold.  4. Faint area of low-attenuation right lobe liver poorly evaluated with  noncontrast low-dose CT that shows no obvious intense FDG  hypermetabolism.  5. Other nonacute findings as above on low dose CT.        CTCAP 05-03-22  FINDINGS:    Lungs:  Cavitary mass in the right lower lobe has nearly resolved.  A  right lower lobe pulmonary nodule is 7.4 mm and was previously 7.5 mm.  No change. Image #38.  A left lower lobe pulmonary nodule, image #52, is  9.8 mm and was previously 9.8 mm. No change.    Pleural space:  Unremarkable.  No pneumothorax.  No significant  effusion.    Heart:  Unremarkable.  No cardiomegaly.  No significant pericardial  effusion.  No significant coronary artery calcifications.    Bones/joints:  The bony structures appear stable. No acute bony  findings identified.  No dislocation.    Soft tissues:  Unremarkable.    Vasculature:  Please note, pulmonary emboli  noted on the prior exam  are not well evaluated. The previously described filling defects of the  main pulmonary arteries are not evident.  No thoracic aortic aneurysm.    Lymph nodes:  No mediastinal adenopathy by CT size criteria.    Gallbladder and bile ducts:  Cholelithiasis noted.    Other findings:  Other smaller nodules appear stable in size and  configuration.  No new nodules identified.     IMPRESSION:  1.  Near-complete resolution of previously described cavitary mass in  the right lower lobe periphery now only with linear scarring noted.  2.  Index nodules of both lungs are stable from previous exam with no  size increase identified. No new nodules are noted.  3.  Cholelithiasis.  4.  Due to timing of contrast on this study, assessment of pulmonary  emboli not performed. There appears to be significant improvement in  clot burden however in the more central vessels.    FINDINGS:    Lung bases:  Pulmonary nodules of the lung bases.    Mediastinum:  Small hiatal hernia.      ABDOMEN:    Liver:  Low attenuation lesions of the right lobe liver appear of  decreased prominence from the previous exam. Segment 8 liver lesion is  1.1 cm. Segment 7 liver lesion is approximately 0.8 cm.    Gallbladder and bile ducts:  Cholelithiasis.  No ductal dilation.    Pancreas:  Unremarkable.  No mass.  No ductal dilation.    Spleen:  Unremarkable.  No splenomegaly.    Adrenals:  No adrenal masses identified.    Kidneys and ureters:  Unremarkable.  No solid mass.  No  hydronephrosis.    Stomach and bowel:  Sigmoid diverticulosis without evidence of  diverticulitis.  No obstruction.      PELVIS:    Appendix:  Normal appendix.    Bladder:  Unremarkable.  No mass.    Reproductive:  Unremarkable as visualized.      ABDOMEN and PELVIS:    Intraperitoneal space:  Unremarkable.  No free air.  No significant  fluid collection.    Bones/joints:  No acute fracture.  No dislocation.    Soft tissues:  Unremarkable.    Vasculature:   Unremarkable.  No abdominal aortic aneurysm.    Lymph nodes:  No iliac or retroperitoneal adenopathy.     IMPRESSION:  1.  Small low-attenuation lesions of the right lobe liver appears  decreased prominence of the previous exam. No new no focal liver lesions  identified  2.  Cholelithiasis.  3.  Otherwise stable appearance of the abdomen and pelvis.    CT Abdomen Pelvis With Contrast (08/08/2022 14:36)  FINDINGS:    LUNG BASES:  Unremarkable.  No mass.  No consolidation.      ABDOMEN:    LIVER:  Stable 1 cm right lobe of liver lesion and more posterior 7 mm  lesion.    GALLBLADDER AND BILE DUCTS:  Gallstones.  Gallbladder otherwise  unremarkable.  No ductal dilation.    PANCREAS:  Unremarkable.  No mass.  No ductal dilation.    SPLEEN:  Unremarkable.  No splenomegaly.    ADRENALS:  Unremarkable.  No mass.    KIDNEYS AND URETERS:  Unremarkable.  No solid mass.  No  hydronephrosis.    STOMACH AND BOWEL:  Unremarkable.  No obstruction.  No mucosal  thickening.      PELVIS:    APPENDIX:  No findings to suggest acute appendicitis.    BLADDER:  Unremarkable.  No mass.    REPRODUCTIVE:  Unremarkable as visualized.      ABDOMEN and PELVIS:    INTRAPERITONEAL SPACE:  Unremarkable.  No free air.  No significant  fluid collection.    BONES/JOINTS:  No acute fracture.  No dislocation.    SOFT TISSUES:  Unremarkable.    VASCULATURE:  Unremarkable.  No abdominal aortic aneurysm.    LYMPH NODES:  Unremarkable.  No enlarged lymph nodes.     IMPRESSION:  1.  Gallstones.  Gallbladder otherwise unremarkable.  2.  Stable 1 cm right lobe of liver lesion and more posterior 7 mm  Lesion.    CT Chest With Contrast Diagnostic (08/08/2022 14:40)  FINDINGS:    LUNGS:  Multiple pulmonary nodules again noted including a right lower  lobe pulmonary nodule measuring 5 mm that was 5 mm. Another right lower  lobe pulmonary nodule is 7 mm and was 7 mm. A left lower lobe pulmonary  nodule is 5 mm and was 5 mm. Other nodules appear stable.    PLEURAL  SPACE:  Unremarkable.  No pneumothorax.  No significant  effusion.    HEART:  Unremarkable.  No cardiomegaly.  No significant pericardial  effusion.  No significant coronary artery calcifications.    BONES/JOINTS:  Unremarkable.  No acute fracture.  No dislocation.    SOFT TISSUES:  Unremarkable.    VASCULATURE:  Unremarkable.  No thoracic aortic aneurysm.    LYMPH NODES:  Unremarkable.  No enlarged lymph nodes.     IMPRESSION:    Multiple pulmonary nodules again noted including a right lower lobe  pulmonary nodule measuring 5 mm that was 5 mm. Another right lower lobe  pulmonary nodule is 7 mm and was 7 mm. A left lower lobe pulmonary  nodule is 5 mm and was 5 mm. Other nodules appear stable.    CT Abdomen Pelvis With Contrast (10/31/2022 14:46)  FINDINGS:    LUNG BASES:  Unremarkable.  No mass.  No consolidation.      ABDOMEN:    LIVER:  Stable right lobe of liver low-attenuation lesion measuring  about a centimeter. Stable more posterior right lobe of liver lesion  measuring about 7 mm.    GALLBLADDER AND BILE DUCTS:  Gallstones.  Gallbladder otherwise  unremarkable.  No ductal dilation.    PANCREAS:  Unremarkable.  No mass.  No ductal dilation.    SPLEEN:  Unremarkable.  No splenomegaly.    ADRENALS:  Unremarkable.  No mass.    KIDNEYS AND URETERS:  Unremarkable.  No solid mass.  No  hydronephrosis.    STOMACH AND BOWEL:  Scattered diverticula in the colon.  No  diverticulitis.  No obstruction.      PELVIS:    APPENDIX:  No findings to suggest acute appendicitis.    BLADDER:  Unremarkable.  No mass.    REPRODUCTIVE:  Unremarkable as visualized.      ABDOMEN and PELVIS:    INTRAPERITONEAL SPACE:  Unremarkable.  No free air.  No significant  fluid collection.    BONES/JOINTS:  No acute fracture.  No dislocation.    SOFT TISSUES:  Unremarkable.    VASCULATURE:  Unremarkable.  No abdominal aortic aneurysm.    LYMPH NODES:  Unremarkable.  No enlarged lymph nodes.     IMPRESSION:  1.  Gallstones.  Gallbladder  otherwise unremarkable.  2.  Stable right lobe of liver low-attenuation lesion measuring about a  centimeter. Stable more posterior right lobe of liver lesion measuring  about 7 mm.    CT Chest With Contrast Diagnostic (10/31/2022 14:47)  FINDINGS:    LUNGS:  Stable subpleural right middle lobe pulmonary nodule measuring  5 mm. Stable right lower lobe pulmonary nodule measuring 6 mm. Other  bilateral pulmonary nodules appear stable in size and number.    PLEURAL SPACE:  Unremarkable.  No pneumothorax.  No significant  effusion.    HEART:  Unremarkable.  No cardiomegaly.  No significant pericardial  effusion.  No significant coronary artery calcifications.    BONES/JOINTS:  Unremarkable.  No acute fracture.  No dislocation.    SOFT TISSUES:  Right upper breast density again noted.    VASCULATURE:  Unremarkable.  No thoracic aortic aneurysm.    LYMPH NODES:  Unremarkable.  No enlarged lymph nodes.     IMPRESSION:    Stable subpleural right middle lobe pulmonary nodule measuring 5 mm.  Stable right lower lobe pulmonary nodule measuring 6 mm. Other bilateral  pulmonary nodules appear stable in size and number.    CT chest with contrast 1/25/23  FINDINGS:     LUNGS: Multiple parenchymal nodules are present bilaterally and are  stable in size, number, and appearance. Largest nodule measuring  approximately 6 mm.     HEART: Mild coronary artery calcifications.     MEDIASTINUM: No masses. No enlarged lymph nodes.  No fluid collections.     PLEURA: No pleural effusions.     VASCULATURE: No evidence of aneurysm.     BONES: No acute bony abnormality.     VISUALIZED UPPER ABDOMEN:Please see the report for the CT of the abdomen  and pelvis.     Other: 8.5 mm left axillary lymph node stable.     IMPRESSION:     1. Multiple parenchymal nodules in both lungs are stable.  2. 8.5 mm lymph node in the left axilla, stable.    CT abdomen pelvis with contrast 1/25/23  FINDINGS:     Lower thorax: Parenchymal nodules in both lungs similar  to the previous  exam.     Abdomen:     Liver: Previous identified area of decreased attenuation in the right  lobe of the liver is not localized on today's exam.     Gallbladder: Cholelithiasis.     Pancreas: Unremarkable. No mass or ductal dilatation.     Spleen: Homogeneous. No splenomegaly.     Adrenals: No mass.     Kidneys/ureters: Nonobstructing right intrarenal stone.     GI tract: Moderate stool. Sigmoid diverticuli without diverticulitis.     MESENTERY: No free fluid, walled off fluid collections, mesenteric  stranding, or enlarged lymph nodes        Vasculature: No evidence of aneurysm.     Abdominal wall: No focal hernia or mass.        Bladder: No focal mass or significant wall thickening     Reproductive: Unremarkable as visualized     Bones: No acute bony abnormality.     IMPRESSION:     1. No evidence of new interval development of metastatic disease.  Previously identified hepatic lesion is not seen on today's exam.     2.Cholelithiasis.     3. Nonobstructing right intrarenal stone.    Mammo Diagnostic Digital Tomosynthesis Bilateral With CAD (02/08/2023 14:55)  FINDINGS: There are scattered areas of fibroglandular density.     The bilateral fibroglandular pattern is stable in appearance including  postoperative changes in the right 12:00 region. Skin thickening  involving the right breast is again noted and likely treatment related.  Multiple surgical clips are now noted in the right axilla. The patient's  Port-A-Cath is incompletely imaged in the posterior superior aspect of  the left breast. No new or suspicious findings are identified in either  breast.     IMPRESSION:  Benign bilateral mammographic findings.        BI-RADS CATEGORY:  2, BENIGN        RECOMMENDATION:  Recommend the patient continue annual bilateral  mammographic evaluation.    CT Abdomen Pelvis With Contrast (04/13/2023 14:51)  FINDINGS:    Lung bases:  Refer to chest CT for characterization of lung nodules in  the partially  imaged lower chest.    Mediastinum:  Moderate hiatal hernia.      ABDOMEN:    Liver:  No definite focal liver lesion identified.    Gallbladder and bile ducts:  Cholelithiasis.  No ductal dilation.    Pancreas:  Unremarkable.  No mass.  No ductal dilation.    Spleen:  Unremarkable.  No splenomegaly.    Adrenals:  No adrenal masses.    Kidneys and ureters:  Tiny nonobstructing right kidney stone. No  hydronephrosis.    Stomach and bowel:  Gastric antrum wall thickening likely due to  incomplete distention.  Small splenule is again noted adjacent to the  splenic flexure of the colon.  Sigmoid diverticulosis. No evidence of  acute diverticulitis.      PELVIS:    Appendix:  Normal appendix.    Bladder:  Unremarkable.  No mass.    Reproductive:  Unremarkable as visualized.      ABDOMEN and PELVIS:    Intraperitoneal space:  No pneumoperitoneum identified.  No  significant fluid collection.    Bones/joints:  Stable appearance of the bony structures. Mild  degenerative changes lumbar spine but no acute osseous findings.  No  dislocation.    Soft tissues:  Unremarkable.    Vasculature:  Unremarkable.  No abdominal aortic aneurysm.    Lymph nodes:  No iliac or retroperitoneal lymphadenopathy.     IMPRESSION:  1.  Cholelithiasis.  2.  No evidence of metastatic disease to abdomen or pelvis.  3.  Other nonacute/incidental findings as detailed above which appear  stable from previous.    CT Chest With Contrast Diagnostic (04/13/2023 14:51)  FINDINGS:    Lungs:  Small nodule along the right major fissure in the right lung,  5.2 mm and was previously 5.3 mm; image #33.  Parenchymal nodule right  lower lobe is 6.7 mm and was previously 6.9 mm, image #33, no change.   5.9 mm nodule right lower lobe was previously 6.5 mm indicating no  interval change.  Other smaller nodules of the right lung appears  stable.  7.5 mm nodule left lower lobe, image #55, was previously 8.2  mm.  A 5.7 mm nodule left lower lobe, image #40, was previously  5.9 mm.  No significant change.    Pleural space:  Unremarkable.  No pneumothorax.  No significant  effusion.    Heart:  Mild cardiomegaly.  No significant pericardial effusion.  No  significant coronary artery calcifications.    Mediastinum:  Moderate hiatal hernia again noted.  No enlarged  mediastinal or hilar lymph nodes identified.    Bones/joints:  Unremarkable.  No acute fracture.  No dislocation.    Soft tissues:  Stable treatment-related skin thickening of the right  breast.    Vasculature:  Unremarkable.  No thoracic aortic aneurysm.    Lymph nodes:  See above.    Gallbladder and bile ducts:  Cholelithiasis.    Tubes, lines and devices:  Left Port-A-Cath noted.    Other findings:  Calcified granulomas are stable.     IMPRESSION:  1.  No interval change in size of multiple bilateral pulmonary  parenchymal nodules. Index nodules as detailed above. Some nodules may  be slightly smaller than previous or could be due to differences in  slice selection.  2.  No new nodules are identified.  3.  No thoracic adenopathy.  4.  Other incidental and nonacute findings detailed above appear stable  from previous.    CT Chest With Contrast Diagnostic (07/28/2023 15:37)   FINDINGS:    LUNGS AND PLEURAL SPACES:  Again there are tiny bilateral pulmonary  nodules with the largest in the right lower lobe measuring about 6 mm  and was about 6 mm. Other nodules including a left lower lobe pulmonary  nodule measuring 6 mm appears stable.  No pneumothorax.  No significant  effusion.    HEART:  Unremarkable.  No cardiomegaly.  No significant pericardial  effusion.  No significant coronary artery calcifications.    BONES/JOINTS:  Unremarkable.  No acute fracture.  No dislocation.    SOFT TISSUES:  Unremarkable.    VASCULATURE:  Unremarkable.  No thoracic aortic aneurysm.    LYMPH NODES:  Unremarkable.  No enlarged lymph nodes.     IMPRESSION:    Again there are tiny bilateral pulmonary nodules with the largest in  the right lower  lobe measuring about 6 mm and was about 6 mm. Other  nodules including a left lower lobe pulmonary nodule measuring 6 mm  appears stable.    CT Abdomen Pelvis With Contrast (07/28/2023 15:40)   FINDINGS:    LUNG BASES:  Unremarkable.  No mass.  No consolidation.      ABDOMEN:    LIVER:  Unremarkable.  No mass.    GALLBLADDER AND BILE DUCTS:  Gallstone.  Gallbladder otherwise  unremarkable.  No ductal dilation.    PANCREAS:  Unremarkable.  No mass.  No ductal dilation.    SPLEEN:  Unremarkable.  No splenomegaly.    ADRENALS:  Unremarkable.  No mass.    KIDNEYS AND URETERS:  Unremarkable.  No solid mass.  No  hydronephrosis.    STOMACH AND BOWEL:  Scattered diverticula in the colon.  No  diverticulitis.  No obstruction.      PELVIS:    APPENDIX:  No findings to suggest acute appendicitis.    BLADDER:  Unremarkable.  No mass.    REPRODUCTIVE:  Unremarkable as visualized.      ABDOMEN and PELVIS:    INTRAPERITONEAL SPACE:  Unremarkable.  No free air.  No significant  fluid collection.    BONES/JOINTS:  No acute fracture.  No dislocation.    SOFT TISSUES:  Unremarkable.    VASCULATURE:  Unremarkable.  No abdominal aortic aneurysm.    LYMPH NODES:  Unremarkable.  No enlarged lymph nodes.     IMPRESSION:    Gallstone.  Gallbladder otherwise unremarkable.    RECENT LABS:  Lab Results   Component Value Date    WBC 6.29 07/13/2023    HGB 13.7 07/13/2023    HCT 43.3 07/13/2023    MCV 90.0 07/13/2023    RDW 13.7 07/13/2023     07/13/2023    NEUTRORELPCT 54.4 07/13/2023    LYMPHORELPCT 29.4 07/13/2023    MONORELPCT 7.6 07/13/2023    EOSRELPCT 7.5 (H) 07/13/2023    BASORELPCT 0.8 07/13/2023    NEUTROABS 3.42 07/13/2023    LYMPHSABS 1.85 07/13/2023       Lab Results   Component Value Date     07/13/2023    K 4.1 07/13/2023    CO2 21.2 (L) 07/13/2023     (H) 07/13/2023    BUN 16 07/13/2023    CREATININE 0.75 07/13/2023    EGFRIFNONA 87 02/24/2022    GLUCOSE 112 (H) 07/13/2023    CALCIUM 9.1 07/13/2023    ALKPHOS  72 07/13/2023    AST 16 07/13/2023    ALT 19 07/13/2023    BILITOT 0.4 07/13/2023    ALBUMIN 4.1 07/13/2023    PROTEINTOT 6.7 07/13/2023    MG 2.0 12/17/2021     Lab Results   Component Value Date    TSH 2.600 07/13/2023     Lab Results   Component Value Date    FERRITIN 169.00 (H) 01/20/2022    IRON 102 01/20/2022    TIBC 264 (L) 01/20/2022    LABIRON 39 01/20/2022    BJHICCZP20 437 01/20/2022    FOLATE 16.40 01/20/2022     Lab Results   Component Value Date     (H) 01/20/2022     ASSESSMENT & PLAN:  Yadira Flowers is a very pleasant 67 y.o. female with a history of cervical cancer, breast cancer and malignant melanoma.    1. History of Breast Cancer:  - Ms. Onur Burgos had Stage IA ([id number]) moderately differentiated invasive ductal carcinoma of the right breast diagnosed in October 2015.  - She underwent R lympectomy and SLNBx performed by Dr. Steve Isbell 10-22-15 and then received adjuvant radiation in [place].  - After radiation, she was started on Arimidex which was then switched to Exemestane.  She took Exemestane for almost 5 years. Unfortunately, she developed vaginal bleeding and hematuria related to vaginal atrophy related to hormonal blockade.  Given this, stopped Exemestane.  She is doing much better in this regard since stopping hormonal therapy.  - Mammogram 2/8/23 without cause for concern aside from R axillary LAD.   -Repeat bilateral diagnostic mammogram due p 2//28/24.    2.  Metastatic malignant melanoma:  -  S/p resection of a primary lesion on her back performed by Dr. Catrachito Amaro of Dermatology in [place] in 2004.  -  S/p FNA R axillary LN which was concerning for melanoma.  Excisional biopsy confirmed metastatic malignant melanoma.  - Suspect her lung and liver lesions are also related to her melanoma.  - She has seen Dr. Her and he performed bronchoscopy on 02/23/2022. Pathology was negative for malignant cells.  - Liver aspiration showed abscess which grew  Klebsiella, suspect this was a secondary infection.    -  Plan to watch all of these areas as we proceed with treatment.  -  Sent  excised LN tissue for CARIS testing.  -  PET-CT showed no significant FDG uptake but re-demonstrated abnormalities in the R axilla, RLL cavitary mass, Vargas lung nodules and liver.    -  MRI Brain showed no acute findings in the head/brain.  -  Recommended initial therapy with immunotherapy.  We discussed different options for immunotherapy including single agent PD-L1 treatment or combination with Yervoy.  We recommended combination with Yervoy for better RR, PFS and OS even with increased risk of immune-mediated side effects.  We discussed the initially approved FDA dosing v. Altered dosing with lower dose IPI/higher dosed Nivo.  The latter has been shown to have fewer side effects and is thought to likely have similar efficacy (though trials weren't really powered to definitively show that).  After a lengthy discussion, we decided to proceed with 4 cycles of Ipi 1 mg/kg / Nivo 3 mg/kg q 21d followed by Nivolumab single agent.  We discussed potential risks, benefits and side effects extensively and she decided to proceed.  -  She completed 4 cycles of Ipi / Nivo and tolerated treatment very well.  -  Repeat CT imaging done 5-3-22 after 4 cycles Ipi/Nivo showed an excellent partial response to treatment.  Have continued with single agent Nivolumab as planned which she is tolerating well.   -Repeat CT CAP from 7/28/23 (summarized above) again showing stable disease with excellent control of her metastatic melanoma.  -Therefore, will continue with Nivolumab therapy as planned.  -  Will repeat CT CAP after a 3 month interval prior to rx planned for 11/2/23.  I will see her at that time.     3. Constipation  - Currently denies.  Continue Senna 2 tabs BID. Also advised to use Miralax as needed.     4. Bilateral pulmonary emboli:  -  CTPE protocol 1-20-22  confirmed bilateral pulmonary  emboli  -  BLE dopplers negative for DVT.  -  Continue Eliquis 5 mg BID.    -  We previously discussed length of therapy.  She watched her  die with PE and her mother has h/o PEs as well.  Presumably the cancer was the risk factor for development of PE and since her cancer is incurable,  have recommended she continue Eliquis indefinately as long as she continues without significant side effects.  She started complaining of difficulty with easy bleeding with minor trauma.  It had been over 6 months that she has had full anticoagulation.  Given worsening side effects, decided to continue Eliquis, but reduced dose to 2.5 mg twice a day.  She is doing well on this dose without bleeding or thrombosis. Will continue.    5.  H/o Colon polyps:  -  Dr. Maldonado performed c-scope 12-09-20 with discovery of 8 hyperplastic polyps in the distal rectum as well as diverticulosis.  He recommended repeat c-scope after 5 years.    6.  H/o cervical cancer treated with cryotherapy at [facility] in [facility]:  -  Followed up with Dr. Manav Real of gynecology.  She says she had exam including pap smear and then pelvic U/S which was unrevealing.    7. Prophylaxis:  -  Had COVID vaccine x 2 (Pfizer).  She received Evusheld on 4-7-22.. Recommended booster. Received 2022 flu vaccine.     ACO / DWAYNE/Other  Quality measures  -  Yadira Flowers received 2022 flu vaccine.    -  Yadira Flowers reports a pain score of 0.  Given her pain assessment as noted, treatment options were discussed and the following options were decided upon as a follow-up plan to address the patient's pain:  No intervention needed at this time .  -  Current outpatient and discharge medications have been reconciled for the patient.  Reviewed by: Loreto Stone MD      8.  Follow up:  - Continue Nivolumab as planned.  -MD follow up in 3 months around 11/2/23 with CT CAP prior along with  CBCD, CMP, TSH.  -  Continue Protonix. If symptoms flare, consider increasing dose to  40 mg QAM.  -  Mammogram due p 2/8/24    I spent 30 minutes with Yadira Flowers today.  In the office today, more than 50% of this time was spent face-to-face with her  in counseling / coordination of care, reviewing her medical history and counseling on the current treatment plan.  All questions were answered to her satisfaction      Electronically Signed by:  Loreto Stone MD      CC:     MD Manav Cotto MD Aaron House, MD

## 2023-08-04 ENCOUNTER — LAB (OUTPATIENT)
Dept: ONCOLOGY | Facility: CLINIC | Age: 68
End: 2023-08-04
Payer: MEDICARE

## 2023-08-04 ENCOUNTER — OFFICE VISIT (OUTPATIENT)
Dept: ONCOLOGY | Facility: CLINIC | Age: 68
End: 2023-08-04
Payer: COMMERCIAL

## 2023-08-04 ENCOUNTER — PATIENT OUTREACH (OUTPATIENT)
Dept: ONCOLOGY | Facility: CLINIC | Age: 68
End: 2023-08-04
Payer: COMMERCIAL

## 2023-08-04 VITALS
WEIGHT: 205 LBS | OXYGEN SATURATION: 97 % | HEART RATE: 81 BPM | HEIGHT: 67 IN | TEMPERATURE: 97.3 F | SYSTOLIC BLOOD PRESSURE: 115 MMHG | BODY MASS INDEX: 32.18 KG/M2 | DIASTOLIC BLOOD PRESSURE: 68 MMHG | RESPIRATION RATE: 18 BRPM

## 2023-08-04 DIAGNOSIS — C78.7 METASTASIS TO LIVER: ICD-10-CM

## 2023-08-04 DIAGNOSIS — C43.9 METASTATIC MELANOMA: ICD-10-CM

## 2023-08-04 DIAGNOSIS — C78.02 MALIGNANT NEOPLASM METASTATIC TO BOTH LUNGS: ICD-10-CM

## 2023-08-04 DIAGNOSIS — C78.02 MALIGNANT NEOPLASM METASTATIC TO BOTH LUNGS: Primary | ICD-10-CM

## 2023-08-04 DIAGNOSIS — C43.59 MALIGNANT MELANOMA OF TORSO EXCLUDING BREAST: ICD-10-CM

## 2023-08-04 DIAGNOSIS — R30.0 DYSURIA: Primary | ICD-10-CM

## 2023-08-04 DIAGNOSIS — C78.01 MALIGNANT NEOPLASM METASTATIC TO BOTH LUNGS: ICD-10-CM

## 2023-08-04 DIAGNOSIS — Z79.899 LONG-TERM USE OF HIGH-RISK MEDICATION: ICD-10-CM

## 2023-08-04 DIAGNOSIS — C78.01 MALIGNANT NEOPLASM METASTATIC TO BOTH LUNGS: Primary | ICD-10-CM

## 2023-08-04 LAB
BACTERIA UR QL AUTO: NORMAL /HPF
BILIRUB UR QL STRIP: NEGATIVE
CLARITY UR: CLEAR
COLOR UR: YELLOW
GLUCOSE UR STRIP-MCNC: NEGATIVE MG/DL
HGB UR QL STRIP.AUTO: NEGATIVE
HYALINE CASTS UR QL AUTO: NORMAL /LPF
KETONES UR QL STRIP: NEGATIVE
LEUKOCYTE ESTERASE UR QL STRIP.AUTO: ABNORMAL
NITRITE UR QL STRIP: NEGATIVE
PH UR STRIP.AUTO: 6.5 [PH] (ref 5–8)
PROT UR QL STRIP: NEGATIVE
RBC # UR STRIP: NORMAL /HPF
REF LAB TEST METHOD: NORMAL
SP GR UR STRIP: <=1.005 (ref 1–1.03)
SQUAMOUS #/AREA URNS HPF: NORMAL /HPF
UROBILINOGEN UR QL STRIP: ABNORMAL
WBC # UR STRIP: NORMAL /HPF

## 2023-08-04 PROCEDURE — 81001 URINALYSIS AUTO W/SCOPE: CPT | Performed by: INTERNAL MEDICINE

## 2023-08-10 ENCOUNTER — INFUSION (OUTPATIENT)
Dept: ONCOLOGY | Facility: HOSPITAL | Age: 68
End: 2023-08-10
Payer: COMMERCIAL

## 2023-08-10 ENCOUNTER — LAB (OUTPATIENT)
Dept: ONCOLOGY | Facility: HOSPITAL | Age: 68
End: 2023-08-10
Payer: COMMERCIAL

## 2023-08-10 VITALS
TEMPERATURE: 97.3 F | RESPIRATION RATE: 18 BRPM | OXYGEN SATURATION: 96 % | HEART RATE: 83 BPM | WEIGHT: 204.4 LBS | BODY MASS INDEX: 32.01 KG/M2 | DIASTOLIC BLOOD PRESSURE: 75 MMHG | SYSTOLIC BLOOD PRESSURE: 139 MMHG

## 2023-08-10 DIAGNOSIS — C43.59 MALIGNANT MELANOMA OF TORSO EXCLUDING BREAST: ICD-10-CM

## 2023-08-10 DIAGNOSIS — C78.7 METASTASIS TO LIVER: ICD-10-CM

## 2023-08-10 DIAGNOSIS — C78.02 MALIGNANT NEOPLASM METASTATIC TO BOTH LUNGS: ICD-10-CM

## 2023-08-10 DIAGNOSIS — C78.01 MALIGNANT NEOPLASM METASTATIC TO BOTH LUNGS: ICD-10-CM

## 2023-08-10 DIAGNOSIS — Z95.828 PORT-A-CATH IN PLACE: ICD-10-CM

## 2023-08-10 DIAGNOSIS — C43.59 MALIGNANT MELANOMA OF TORSO EXCLUDING BREAST: Primary | ICD-10-CM

## 2023-08-10 LAB
ALBUMIN SERPL-MCNC: 4.5 G/DL (ref 3.5–5.2)
ALBUMIN/GLOB SERPL: 2 G/DL
ALP SERPL-CCNC: 84 U/L (ref 39–117)
ALT SERPL W P-5'-P-CCNC: 19 U/L (ref 1–33)
ANION GAP SERPL CALCULATED.3IONS-SCNC: 13.5 MMOL/L (ref 5–15)
AST SERPL-CCNC: 18 U/L (ref 1–32)
BASOPHILS # BLD AUTO: 0.08 10*3/MM3 (ref 0–0.2)
BASOPHILS NFR BLD AUTO: 1.3 % (ref 0–1.5)
BILIRUB SERPL-MCNC: 0.4 MG/DL (ref 0–1.2)
BUN SERPL-MCNC: 15 MG/DL (ref 8–23)
BUN/CREAT SERPL: 16 (ref 7–25)
CALCIUM SPEC-SCNC: 9.8 MG/DL (ref 8.6–10.5)
CHLORIDE SERPL-SCNC: 106 MMOL/L (ref 98–107)
CO2 SERPL-SCNC: 22.5 MMOL/L (ref 22–29)
CREAT SERPL-MCNC: 0.94 MG/DL (ref 0.57–1)
DEPRECATED RDW RBC AUTO: 45.8 FL (ref 37–54)
EGFRCR SERPLBLD CKD-EPI 2021: 67.1 ML/MIN/1.73
EOSINOPHIL # BLD AUTO: 0.59 10*3/MM3 (ref 0–0.4)
EOSINOPHIL NFR BLD AUTO: 9.4 % (ref 0.3–6.2)
ERYTHROCYTE [DISTWIDTH] IN BLOOD BY AUTOMATED COUNT: 13.9 % (ref 12.3–15.4)
GLOBULIN UR ELPH-MCNC: 2.3 GM/DL
GLUCOSE SERPL-MCNC: 97 MG/DL (ref 65–99)
HCT VFR BLD AUTO: 42.2 % (ref 34–46.6)
HGB BLD-MCNC: 13.5 G/DL (ref 12–15.9)
IMM GRANULOCYTES # BLD AUTO: 0.01 10*3/MM3 (ref 0–0.05)
IMM GRANULOCYTES NFR BLD AUTO: 0.2 % (ref 0–0.5)
LYMPHOCYTES # BLD AUTO: 2.05 10*3/MM3 (ref 0.7–3.1)
LYMPHOCYTES NFR BLD AUTO: 32.6 % (ref 19.6–45.3)
MCH RBC QN AUTO: 29 PG (ref 26.6–33)
MCHC RBC AUTO-ENTMCNC: 32 G/DL (ref 31.5–35.7)
MCV RBC AUTO: 90.6 FL (ref 79–97)
MONOCYTES # BLD AUTO: 0.53 10*3/MM3 (ref 0.1–0.9)
MONOCYTES NFR BLD AUTO: 8.4 % (ref 5–12)
NEUTROPHILS NFR BLD AUTO: 3.02 10*3/MM3 (ref 1.7–7)
NEUTROPHILS NFR BLD AUTO: 48.1 % (ref 42.7–76)
NRBC BLD AUTO-RTO: 0 /100 WBC (ref 0–0.2)
PLATELET # BLD AUTO: 188 10*3/MM3 (ref 140–450)
PMV BLD AUTO: 10.1 FL (ref 6–12)
POTASSIUM SERPL-SCNC: 4 MMOL/L (ref 3.5–5.2)
PROT SERPL-MCNC: 6.8 G/DL (ref 6–8.5)
RBC # BLD AUTO: 4.66 10*6/MM3 (ref 3.77–5.28)
SODIUM SERPL-SCNC: 142 MMOL/L (ref 136–145)
T4 FREE SERPL-MCNC: 1.21 NG/DL (ref 0.93–1.7)
TSH SERPL DL<=0.05 MIU/L-ACNC: 2.29 UIU/ML (ref 0.27–4.2)
WBC NRBC COR # BLD: 6.28 10*3/MM3 (ref 3.4–10.8)

## 2023-08-10 PROCEDURE — 84443 ASSAY THYROID STIM HORMONE: CPT | Performed by: INTERNAL MEDICINE

## 2023-08-10 PROCEDURE — 25010000002 NIVOLUMAB 240 MG/24ML SOLUTION 24 ML VIAL: Performed by: INTERNAL MEDICINE

## 2023-08-10 PROCEDURE — 25010000002 HEPARIN LOCK FLUSH PER 10 UNITS: Performed by: INTERNAL MEDICINE

## 2023-08-10 PROCEDURE — 84439 ASSAY OF FREE THYROXINE: CPT | Performed by: INTERNAL MEDICINE

## 2023-08-10 PROCEDURE — 85025 COMPLETE CBC W/AUTO DIFF WBC: CPT | Performed by: INTERNAL MEDICINE

## 2023-08-10 PROCEDURE — 96413 CHEMO IV INFUSION 1 HR: CPT

## 2023-08-10 PROCEDURE — 80053 COMPREHEN METABOLIC PANEL: CPT | Performed by: INTERNAL MEDICINE

## 2023-08-10 RX ORDER — SODIUM CHLORIDE 9 MG/ML
250 INJECTION, SOLUTION INTRAVENOUS ONCE
Status: COMPLETED | OUTPATIENT
Start: 2023-08-10 | End: 2023-08-10

## 2023-08-10 RX ORDER — HEPARIN SODIUM (PORCINE) LOCK FLUSH IV SOLN 100 UNIT/ML 100 UNIT/ML
500 SOLUTION INTRAVENOUS AS NEEDED
Status: DISCONTINUED | OUTPATIENT
Start: 2023-08-10 | End: 2023-08-10 | Stop reason: HOSPADM

## 2023-08-10 RX ORDER — SODIUM CHLORIDE 0.9 % (FLUSH) 0.9 %
10 SYRINGE (ML) INJECTION AS NEEDED
Status: DISCONTINUED | OUTPATIENT
Start: 2023-08-10 | End: 2023-08-10 | Stop reason: HOSPADM

## 2023-08-10 RX ADMIN — Medication 10 ML: at 16:14

## 2023-08-10 RX ADMIN — SODIUM CHLORIDE 480 MG: 9 INJECTION, SOLUTION INTRAVENOUS at 15:41

## 2023-08-10 RX ADMIN — Medication 500 UNITS: at 16:14

## 2023-08-10 RX ADMIN — SODIUM CHLORIDE 250 ML: 9 INJECTION, SOLUTION INTRAVENOUS at 15:40

## 2023-08-18 RX ORDER — APIXABAN 2.5 MG/1
TABLET, FILM COATED ORAL
Qty: 60 TABLET | Refills: 5 | Status: SHIPPED | OUTPATIENT
Start: 2023-08-18

## 2023-08-18 RX ORDER — PANTOPRAZOLE SODIUM 20 MG/1
20 TABLET, DELAYED RELEASE ORAL DAILY
Qty: 30 TABLET | Refills: 2 | Status: SHIPPED | OUTPATIENT
Start: 2023-08-18

## 2023-08-31 ENCOUNTER — TELEPHONE (OUTPATIENT)
Dept: ONCOLOGY | Facility: CLINIC | Age: 68
End: 2023-08-31

## 2023-08-31 NOTE — TELEPHONE ENCOUNTER
Caller: Yadira Flowers    Relationship to patient: Self    Best call back number: [phone]    Chief complaint: PER OFFICE NOTE F/U IS PENG FOR 11/2023    Type of visit: F/U 2     Requested date: [facility] HAS APPT ON 11/2/2023    If rescheduling, when is the original appointment: 9/7/2023

## 2023-09-01 DIAGNOSIS — C78.02 MALIGNANT NEOPLASM METASTATIC TO BOTH LUNGS: ICD-10-CM

## 2023-09-01 DIAGNOSIS — C78.01 MALIGNANT NEOPLASM METASTATIC TO BOTH LUNGS: ICD-10-CM

## 2023-09-01 DIAGNOSIS — C43.59 MALIGNANT MELANOMA OF TORSO EXCLUDING BREAST: ICD-10-CM

## 2023-09-01 DIAGNOSIS — C78.7 METASTASIS TO LIVER: Primary | ICD-10-CM

## 2023-09-01 RX ORDER — SODIUM CHLORIDE 9 MG/ML
250 INJECTION, SOLUTION INTRAVENOUS ONCE
OUTPATIENT
Start: 2023-09-07

## 2023-09-01 NOTE — TELEPHONE ENCOUNTER
PATIENT CALLED BACK AND STATES SHE SPOKE TO SOMEONE AND THE FOLLOW UP APPOINTMENT ON 9-7-23 IS AN ERROR AND NEEDS TO BE CANCELLED.    JUST THE FOLLOW UP NOT THE INFUSION

## 2023-09-07 ENCOUNTER — APPOINTMENT (OUTPATIENT)
Dept: ONCOLOGY | Facility: HOSPITAL | Age: 68
End: 2023-09-07
Payer: COMMERCIAL

## 2023-09-07 ENCOUNTER — LAB (OUTPATIENT)
Dept: ONCOLOGY | Facility: HOSPITAL | Age: 68
End: 2023-09-07
Payer: COMMERCIAL

## 2023-09-07 ENCOUNTER — INFUSION (OUTPATIENT)
Dept: ONCOLOGY | Facility: HOSPITAL | Age: 68
End: 2023-09-07
Payer: COMMERCIAL

## 2023-09-07 VITALS
WEIGHT: 201 LBS | HEART RATE: 71 BPM | TEMPERATURE: 97.8 F | HEIGHT: 67 IN | RESPIRATION RATE: 18 BRPM | SYSTOLIC BLOOD PRESSURE: 137 MMHG | BODY MASS INDEX: 31.55 KG/M2 | DIASTOLIC BLOOD PRESSURE: 75 MMHG | OXYGEN SATURATION: 97 %

## 2023-09-07 DIAGNOSIS — C78.02 MALIGNANT NEOPLASM METASTATIC TO BOTH LUNGS: ICD-10-CM

## 2023-09-07 DIAGNOSIS — C78.01 MALIGNANT NEOPLASM METASTATIC TO BOTH LUNGS: ICD-10-CM

## 2023-09-07 DIAGNOSIS — Z95.828 PORT-A-CATH IN PLACE: ICD-10-CM

## 2023-09-07 DIAGNOSIS — C43.59 MALIGNANT MELANOMA OF TORSO EXCLUDING BREAST: Primary | ICD-10-CM

## 2023-09-07 DIAGNOSIS — C78.7 METASTASIS TO LIVER: ICD-10-CM

## 2023-09-07 DIAGNOSIS — C43.59 MALIGNANT MELANOMA OF TORSO EXCLUDING BREAST: ICD-10-CM

## 2023-09-07 LAB
ALBUMIN SERPL-MCNC: 4.2 G/DL (ref 3.5–5.2)
ALBUMIN/GLOB SERPL: 1.6 G/DL
ALP SERPL-CCNC: 69 U/L (ref 39–117)
ALT SERPL W P-5'-P-CCNC: 19 U/L (ref 1–33)
ANION GAP SERPL CALCULATED.3IONS-SCNC: 14.3 MMOL/L (ref 5–15)
AST SERPL-CCNC: 17 U/L (ref 1–32)
BASOPHILS # BLD AUTO: 0.07 10*3/MM3 (ref 0–0.2)
BASOPHILS NFR BLD AUTO: 1.3 % (ref 0–1.5)
BILIRUB SERPL-MCNC: 0.2 MG/DL (ref 0–1.2)
BUN SERPL-MCNC: 14 MG/DL (ref 8–23)
BUN/CREAT SERPL: 13.6 (ref 7–25)
CALCIUM SPEC-SCNC: 9.4 MG/DL (ref 8.6–10.5)
CHLORIDE SERPL-SCNC: 106 MMOL/L (ref 98–107)
CO2 SERPL-SCNC: 19.7 MMOL/L (ref 22–29)
CREAT SERPL-MCNC: 1.03 MG/DL (ref 0.57–1)
DEPRECATED RDW RBC AUTO: 47.2 FL (ref 37–54)
EGFRCR SERPLBLD CKD-EPI 2021: 60.1 ML/MIN/1.73
EOSINOPHIL # BLD AUTO: 0.31 10*3/MM3 (ref 0–0.4)
EOSINOPHIL NFR BLD AUTO: 5.7 % (ref 0.3–6.2)
ERYTHROCYTE [DISTWIDTH] IN BLOOD BY AUTOMATED COUNT: 14 % (ref 12.3–15.4)
GLOBULIN UR ELPH-MCNC: 2.6 GM/DL
GLUCOSE SERPL-MCNC: 90 MG/DL (ref 65–99)
HCT VFR BLD AUTO: 41.9 % (ref 34–46.6)
HGB BLD-MCNC: 13.6 G/DL (ref 12–15.9)
IMM GRANULOCYTES # BLD AUTO: 0.02 10*3/MM3 (ref 0–0.05)
IMM GRANULOCYTES NFR BLD AUTO: 0.4 % (ref 0–0.5)
LYMPHOCYTES # BLD AUTO: 2.08 10*3/MM3 (ref 0.7–3.1)
LYMPHOCYTES NFR BLD AUTO: 38.1 % (ref 19.6–45.3)
MCH RBC QN AUTO: 29.6 PG (ref 26.6–33)
MCHC RBC AUTO-ENTMCNC: 32.5 G/DL (ref 31.5–35.7)
MCV RBC AUTO: 91.3 FL (ref 79–97)
MONOCYTES # BLD AUTO: 0.45 10*3/MM3 (ref 0.1–0.9)
MONOCYTES NFR BLD AUTO: 8.2 % (ref 5–12)
NEUTROPHILS NFR BLD AUTO: 2.53 10*3/MM3 (ref 1.7–7)
NEUTROPHILS NFR BLD AUTO: 46.3 % (ref 42.7–76)
NRBC BLD AUTO-RTO: 0 /100 WBC (ref 0–0.2)
PLATELET # BLD AUTO: 237 10*3/MM3 (ref 140–450)
PMV BLD AUTO: 10.4 FL (ref 6–12)
POTASSIUM SERPL-SCNC: 4.5 MMOL/L (ref 3.5–5.2)
PROT SERPL-MCNC: 6.8 G/DL (ref 6–8.5)
RBC # BLD AUTO: 4.59 10*6/MM3 (ref 3.77–5.28)
SODIUM SERPL-SCNC: 140 MMOL/L (ref 136–145)
T4 FREE SERPL-MCNC: 1.27 NG/DL (ref 0.93–1.7)
TSH SERPL DL<=0.05 MIU/L-ACNC: 1.99 UIU/ML (ref 0.27–4.2)
WBC NRBC COR # BLD: 5.46 10*3/MM3 (ref 3.4–10.8)

## 2023-09-07 PROCEDURE — 25010000002 NIVOLUMAB 240 MG/24ML SOLUTION 24 ML VIAL: Performed by: INTERNAL MEDICINE

## 2023-09-07 PROCEDURE — 96413 CHEMO IV INFUSION 1 HR: CPT

## 2023-09-07 PROCEDURE — 25010000002 HEPARIN LOCK FLUSH PER 10 UNITS: Performed by: INTERNAL MEDICINE

## 2023-09-07 PROCEDURE — 80050 GENERAL HEALTH PANEL: CPT

## 2023-09-07 PROCEDURE — 84439 ASSAY OF FREE THYROXINE: CPT

## 2023-09-07 RX ORDER — HEPARIN SODIUM (PORCINE) LOCK FLUSH IV SOLN 100 UNIT/ML 100 UNIT/ML
500 SOLUTION INTRAVENOUS AS NEEDED
Status: DISCONTINUED | OUTPATIENT
Start: 2023-09-07 | End: 2023-09-07 | Stop reason: HOSPADM

## 2023-09-07 RX ORDER — SODIUM CHLORIDE 0.9 % (FLUSH) 0.9 %
10 SYRINGE (ML) INJECTION AS NEEDED
Status: DISCONTINUED | OUTPATIENT
Start: 2023-09-07 | End: 2023-09-07 | Stop reason: HOSPADM

## 2023-09-07 RX ORDER — SODIUM CHLORIDE 9 MG/ML
250 INJECTION, SOLUTION INTRAVENOUS ONCE
Status: COMPLETED | OUTPATIENT
Start: 2023-09-07 | End: 2023-09-07

## 2023-09-07 RX ADMIN — SODIUM CHLORIDE 250 ML: 9 INJECTION, SOLUTION INTRAVENOUS at 15:45

## 2023-09-07 RX ADMIN — Medication 500 UNITS: at 16:18

## 2023-09-07 RX ADMIN — Medication 10 ML: at 16:18

## 2023-09-07 RX ADMIN — SODIUM CHLORIDE 480 MG: 9 INJECTION, SOLUTION INTRAVENOUS at 15:45

## 2023-10-02 DIAGNOSIS — C78.7 METASTASIS TO LIVER: Primary | ICD-10-CM

## 2023-10-02 DIAGNOSIS — C78.02 MALIGNANT NEOPLASM METASTATIC TO BOTH LUNGS: ICD-10-CM

## 2023-10-02 DIAGNOSIS — C78.01 MALIGNANT NEOPLASM METASTATIC TO BOTH LUNGS: ICD-10-CM

## 2023-10-02 DIAGNOSIS — C43.59 MALIGNANT MELANOMA OF TORSO EXCLUDING BREAST: ICD-10-CM

## 2023-10-02 RX ORDER — SODIUM CHLORIDE 9 MG/ML
250 INJECTION, SOLUTION INTRAVENOUS ONCE
OUTPATIENT
Start: 2023-12-28

## 2023-10-02 RX ORDER — SODIUM CHLORIDE 9 MG/ML
250 INJECTION, SOLUTION INTRAVENOUS ONCE
OUTPATIENT
Start: 2023-11-02

## 2023-10-02 RX ORDER — SODIUM CHLORIDE 9 MG/ML
250 INJECTION, SOLUTION INTRAVENOUS ONCE
Status: CANCELLED | OUTPATIENT
Start: 2023-10-05

## 2023-10-02 RX ORDER — SODIUM CHLORIDE 9 MG/ML
250 INJECTION, SOLUTION INTRAVENOUS ONCE
OUTPATIENT
Start: 2023-11-30

## 2023-10-05 ENCOUNTER — INFUSION (OUTPATIENT)
Dept: ONCOLOGY | Facility: HOSPITAL | Age: 68
End: 2023-10-05
Payer: MEDICARE

## 2023-10-05 ENCOUNTER — LAB (OUTPATIENT)
Dept: ONCOLOGY | Facility: HOSPITAL | Age: 68
End: 2023-10-05
Payer: MEDICARE

## 2023-10-05 VITALS
HEART RATE: 74 BPM | DIASTOLIC BLOOD PRESSURE: 87 MMHG | RESPIRATION RATE: 18 BRPM | TEMPERATURE: 97.3 F | SYSTOLIC BLOOD PRESSURE: 148 MMHG | BODY MASS INDEX: 31.59 KG/M2 | WEIGHT: 201.7 LBS | OXYGEN SATURATION: 98 %

## 2023-10-05 DIAGNOSIS — C43.59 MALIGNANT MELANOMA OF TORSO EXCLUDING BREAST: Primary | ICD-10-CM

## 2023-10-05 DIAGNOSIS — C78.7 METASTASIS TO LIVER: ICD-10-CM

## 2023-10-05 DIAGNOSIS — C78.01 MALIGNANT NEOPLASM METASTATIC TO BOTH LUNGS: ICD-10-CM

## 2023-10-05 DIAGNOSIS — C43.59 MALIGNANT MELANOMA OF TORSO EXCLUDING BREAST: ICD-10-CM

## 2023-10-05 DIAGNOSIS — C78.02 MALIGNANT NEOPLASM METASTATIC TO BOTH LUNGS: ICD-10-CM

## 2023-10-05 DIAGNOSIS — Z95.828 PORT-A-CATH IN PLACE: ICD-10-CM

## 2023-10-05 LAB
ALBUMIN SERPL-MCNC: 4.3 G/DL (ref 3.5–5.2)
ALBUMIN/GLOB SERPL: 1.7 G/DL
ALP SERPL-CCNC: 79 U/L (ref 39–117)
ALT SERPL W P-5'-P-CCNC: 16 U/L (ref 1–33)
ANION GAP SERPL CALCULATED.3IONS-SCNC: 7.6 MMOL/L (ref 5–15)
AST SERPL-CCNC: 19 U/L (ref 1–32)
BASOPHILS # BLD AUTO: 0.06 10*3/MM3 (ref 0–0.2)
BASOPHILS NFR BLD AUTO: 1 % (ref 0–1.5)
BILIRUB SERPL-MCNC: 0.5 MG/DL (ref 0–1.2)
BUN SERPL-MCNC: 10 MG/DL (ref 8–23)
BUN/CREAT SERPL: 12.3 (ref 7–25)
CALCIUM SPEC-SCNC: 9.8 MG/DL (ref 8.6–10.5)
CHLORIDE SERPL-SCNC: 106 MMOL/L (ref 98–107)
CO2 SERPL-SCNC: 27.4 MMOL/L (ref 22–29)
CREAT SERPL-MCNC: 0.81 MG/DL (ref 0.57–1)
DEPRECATED RDW RBC AUTO: 47.9 FL (ref 37–54)
EGFRCR SERPLBLD CKD-EPI 2021: 80.2 ML/MIN/1.73
EOSINOPHIL # BLD AUTO: 0.31 10*3/MM3 (ref 0–0.4)
EOSINOPHIL NFR BLD AUTO: 5.3 % (ref 0.3–6.2)
ERYTHROCYTE [DISTWIDTH] IN BLOOD BY AUTOMATED COUNT: 14.2 % (ref 12.3–15.4)
GLOBULIN UR ELPH-MCNC: 2.5 GM/DL
GLUCOSE SERPL-MCNC: 116 MG/DL (ref 65–99)
HCT VFR BLD AUTO: 42.9 % (ref 34–46.6)
HGB BLD-MCNC: 13.4 G/DL (ref 12–15.9)
IMM GRANULOCYTES # BLD AUTO: 0.01 10*3/MM3 (ref 0–0.05)
IMM GRANULOCYTES NFR BLD AUTO: 0.2 % (ref 0–0.5)
LYMPHOCYTES # BLD AUTO: 1.9 10*3/MM3 (ref 0.7–3.1)
LYMPHOCYTES NFR BLD AUTO: 32.6 % (ref 19.6–45.3)
MCH RBC QN AUTO: 29 PG (ref 26.6–33)
MCHC RBC AUTO-ENTMCNC: 31.2 G/DL (ref 31.5–35.7)
MCV RBC AUTO: 92.9 FL (ref 79–97)
MONOCYTES # BLD AUTO: 0.45 10*3/MM3 (ref 0.1–0.9)
MONOCYTES NFR BLD AUTO: 7.7 % (ref 5–12)
NEUTROPHILS NFR BLD AUTO: 3.1 10*3/MM3 (ref 1.7–7)
NEUTROPHILS NFR BLD AUTO: 53.2 % (ref 42.7–76)
NRBC BLD AUTO-RTO: 0 /100 WBC (ref 0–0.2)
PLATELET # BLD AUTO: 188 10*3/MM3 (ref 140–450)
PMV BLD AUTO: 10.3 FL (ref 6–12)
POTASSIUM SERPL-SCNC: 4.1 MMOL/L (ref 3.5–5.2)
PROT SERPL-MCNC: 6.8 G/DL (ref 6–8.5)
RBC # BLD AUTO: 4.62 10*6/MM3 (ref 3.77–5.28)
SODIUM SERPL-SCNC: 141 MMOL/L (ref 136–145)
T4 FREE SERPL-MCNC: 1.17 NG/DL (ref 0.93–1.7)
TSH SERPL DL<=0.05 MIU/L-ACNC: 2.02 UIU/ML (ref 0.27–4.2)
WBC NRBC COR # BLD: 5.83 10*3/MM3 (ref 3.4–10.8)

## 2023-10-05 PROCEDURE — 84439 ASSAY OF FREE THYROXINE: CPT

## 2023-10-05 PROCEDURE — 84443 ASSAY THYROID STIM HORMONE: CPT

## 2023-10-05 PROCEDURE — 25010000002 HEPARIN LOCK FLUSH PER 10 UNITS: Performed by: NURSE PRACTITIONER

## 2023-10-05 PROCEDURE — 25010000002 NIVOLUMAB 240 MG/24ML SOLUTION 24 ML VIAL: Performed by: INTERNAL MEDICINE

## 2023-10-05 PROCEDURE — 85025 COMPLETE CBC W/AUTO DIFF WBC: CPT

## 2023-10-05 PROCEDURE — 96413 CHEMO IV INFUSION 1 HR: CPT

## 2023-10-05 PROCEDURE — 80053 COMPREHEN METABOLIC PANEL: CPT

## 2023-10-05 PROCEDURE — 25810000003 SODIUM CHLORIDE 0.9 % SOLUTION: Performed by: INTERNAL MEDICINE

## 2023-10-05 RX ORDER — SODIUM CHLORIDE 9 MG/ML
250 INJECTION, SOLUTION INTRAVENOUS ONCE
Status: COMPLETED | OUTPATIENT
Start: 2023-10-05 | End: 2023-10-05

## 2023-10-05 RX ORDER — HEPARIN SODIUM (PORCINE) LOCK FLUSH IV SOLN 100 UNIT/ML 100 UNIT/ML
500 SOLUTION INTRAVENOUS AS NEEDED
Status: DISCONTINUED | OUTPATIENT
Start: 2023-10-05 | End: 2023-10-05 | Stop reason: HOSPADM

## 2023-10-05 RX ORDER — SODIUM CHLORIDE 0.9 % (FLUSH) 0.9 %
10 SYRINGE (ML) INJECTION AS NEEDED
Status: DISCONTINUED | OUTPATIENT
Start: 2023-10-05 | End: 2023-10-05 | Stop reason: HOSPADM

## 2023-10-05 RX ADMIN — SODIUM CHLORIDE 480 MG: 9 INJECTION, SOLUTION INTRAVENOUS at 15:43

## 2023-10-05 RX ADMIN — Medication 500 UNITS: at 16:15

## 2023-10-05 RX ADMIN — SODIUM CHLORIDE 250 ML: 9 INJECTION, SOLUTION INTRAVENOUS at 15:43

## 2023-10-05 RX ADMIN — Medication 10 ML: at 16:15

## 2023-10-27 ENCOUNTER — HOSPITAL ENCOUNTER (OUTPATIENT)
Dept: RADIATION ONCOLOGY | Facility: HOSPITAL | Age: 68
Discharge: HOME | End: 2023-10-27
Payer: COMMERCIAL

## 2023-10-27 DIAGNOSIS — C78.02 MALIGNANT NEOPLASM METASTATIC TO BOTH LUNGS: ICD-10-CM

## 2023-10-27 DIAGNOSIS — C78.01 MALIGNANT NEOPLASM METASTATIC TO BOTH LUNGS: ICD-10-CM

## 2023-10-27 DIAGNOSIS — C78.7 METASTASIS TO LIVER: ICD-10-CM

## 2023-10-27 DIAGNOSIS — C43.59 MALIGNANT MELANOMA OF TORSO EXCLUDING BREAST: ICD-10-CM

## 2023-10-27 PROCEDURE — 74177 CT ABD & PELVIS W/CONTRAST: CPT

## 2023-10-27 PROCEDURE — 71260 CT THORAX DX C+: CPT

## 2023-10-27 PROCEDURE — 71260 CT THORAX DX C+: CPT | Performed by: RADIOLOGY

## 2023-10-27 PROCEDURE — 25510000001 IOPAMIDOL 61 % SOLUTION: Performed by: INTERNAL MEDICINE

## 2023-10-27 PROCEDURE — 74177 CT ABD & PELVIS W/CONTRAST: CPT | Performed by: RADIOLOGY

## 2023-10-27 RX ADMIN — IOPAMIDOL 83 ML: 612 INJECTION, SOLUTION INTRAVENOUS at 15:28

## 2023-11-01 NOTE — PROGRESS NOTES
"NAME: Yadira Flowers    : 1955    DATE:  2023     DIAGNOSIS:   1.  Stage IA ([id number]) moderately differentiated invasive ductal carcinoma of the right breast diagnosed in 2015.    2.  H/o malignant Melanoma on her Back    3.  H/o cervical cancer treated with cryotherapy at [facility] in     4.  Recurrent / metastatic malignant Melanoma  -  R axillary LN bx 22.    -  She has R axillary LN mass (2.47x2.16), Solitary cavitary R lobe liver lesion, cavitary RLL lung mass,  and bilateral parenchymal lung nodules      TREATMENT HISTORY:   1.         CHIEF COMPLAINT:  Follow up of metastatic melanoma/toxicity check and recent imaging    HISTORY OF PRESENT ILLNESS:   Yadira Flowers is a very pleasant 67 y.o. female who was referred by Dr. Dilcia Pedro for evaluation and treatment of breast cancer. She was living in [place] in 2015 when her dog brought attention to and \"found\" an abnormal lump in her right breast.  She was treated by Dr. Steve Isbell and underwent R lumpectomy with SLNBx on 10-22-15 as below.  She then had what sounds like accelerated partial breast irradiation.  She was then started on Arimidex which was later switched to Exemestane for a better side effect profile.  She took this for about 5 years, stopping a couple of weeks ago.  She stopped taking Exemestane because she developed some vaginal bleeding and hematuria.  This was evaluated by her PCP and gynecologist and felt to be due to vaginal atrophy.  She was prescribed a hormone pill which she hasn't yet started and was referred here to discuss things further.      Aside from bleeding which has been uncomfortable and distressing for her, Ms. Onur Burgos has generally been well.  The last year has been hard on her emotionally with the loss of her  and then her dog, her home and her insurance, but she is coping well and has good family support in [place].  She says she gained weight with her breast " cancer treatment and gained weight when her  .  She has been working now to lose weight and has lost 20 lbs over the last 2 months with dieting.  She denies chest pain or shortness of breath.  She complains of some RLQ cramping pain and isn't sure what is causing that.  She denies difficulty moving her bowels.  No blood in her stool that she is aware of.  She has had vaginal bleeding and hematuria as above.        INTERVAL HISTORY:  Ms. Burgos presents today for follow up of metastatic melanoma/toxicity check and recent imaging. She continues to do really well and says she is afraid to say it because she doesn't want to jinx it.  Her only complaint today is that she says she has been having trouble with her PAC recently.  She says this has been getting harder and harder for the nurses to access.      PAST MEDICAL HISTORY:  Past Medical History:   Diagnosis Date    Back pain     Basal cell carcinoma (BCC) in situ of skin     Dr. Mohr - Derm    Breast cancer         Cancer     breast, cervical, melanoma    Cervical cancer 1976    Diagnosed in .  Treated by repeated local intervention and ultimately received cryotherapy at [facility] with resolution.    Elbow fracture     Foot fracture     Headache     Hypertension     TAKEN OFF MEDS    Melanoma     on her back  - treated by Dermatologist Catrachito Amaro in [place], [place] with what sounds like wide local excision.     Pulmonary embolism     Sinusitis        PAST SURGICAL HISTORY:  Past Surgical History:   Procedure Laterality Date    AXILLARY LYMPH NODE BIOPSY/EXCISION Right 2022    Procedure: AXILLARY LYMPH NODE BIOPSY/EXCISION;  Surgeon: Dianelys Cummings MD;  Location: [facility];  Service: General;  Laterality: Right;    BREAST LUMPECTOMY Right     BREAST SURGERY  2015    Secondary to cancer    BRONCHOSCOPY Right 2022    Procedure: BRONCHOSCOPY WITH ENDOBRONCHIAL ULTRASOUND;  Surgeon: Chris Her MD;  Location: [facility] OR;   Service: Pulmonary;  Laterality: Right;    COLONOSCOPY N/A 2020    Procedure: COLONOSCOPY;  Surgeon: Mohsen Maldonado MD;  Location:  [facility] OR;  Service: Gastroenterology;  Laterality: N/A;    ELBOW FUSION      left    FOOT SURGERY Right 2014    Broken foot    HAND SURGERY  2020    right    RHINOPLASTY  2002    SKIN CANCER EXCISION      malignant melanoma from back     US GUIDED LYMPH NODE BIOPSY  2022    VENOUS ACCESS DEVICE (PORT) INSERTION N/A 2022    Procedure: INSERTION VENOUS ACCESS DEVICE left or right;  Surgeon: Dianelys Cummings MD;  Location:  [facility] OR;  Service: General;  Laterality: N/A;       FAMILY HISTORY:  Family History   Problem Relation Age of Onset    Other Mother         blood clots    Hypertension Mother     Ulcers Father     Hypertension Sister     Other Sister         Multiple Sclerosis    Cancer Paternal Aunt     Stroke Maternal Grandmother     Alcohol abuse Paternal Grandmother     Hypertension Sister     Multiple sclerosis Sister     Hypertension Sister     Coronary artery disease Paternal Grandfather     Cancer Maternal Aunt 30        colon    Breast cancer Neg Hx    Many paternal aunts  with malignancy of various types. Many cousins on that side of the family have had cancer as well.  One  of colon cancer in her 30s.  One had a brain tumor.    SOCIAL HISTORY:  Social History     Socioeconomic History    Marital status:    Tobacco Use    Smoking status: Never    Smokeless tobacco: Never   Vaping Use    Vaping Use: Never used   Substance and Sexual Activity    Alcohol use: Not Currently    Drug use: Never    Sexual activity: Not Currently     Birth control/protection: Post-menopausal       REVIEW OF SYSTEMS:   A comprehensive 14 point review of systems was performed.  Significant findings as mentioned above.  All other systems reviewed and are negative.      MEDICATIONS:  The current medication list was reviewed in the EMR    Current Outpatient  Medications:     Eliquis 2.5 MG tablet tablet, TAKE 1 TABLET BY MOUTH TWICE DAILY, Disp: 60 tablet, Rfl: 5    fluticasone (FLONASE) 50 MCG/ACT nasal spray, 2 sprays by Each Nare route Daily. Shake liquid, Disp: 16 g, Rfl: 5    lidocaine-prilocaine (EMLA) 2.5-2.5 % cream, Apply to port a cath ~30 min -1 hour prior to chemotherapy, Disp: 30 g, Rfl: 3    ondansetron ODT (Zofran ODT) 8 MG disintegrating tablet, Place 1 tablet on the tongue Every 8 (Eight) Hours As Needed for Nausea or Vomiting., Disp: 30 tablet, Rfl: 5    pantoprazole (PROTONIX) 20 MG EC tablet, TAKE 1 TABLET BY MOUTH DAILY, Disp: 30 tablet, Rfl: 2    prochlorperazine (COMPAZINE) 10 MG tablet, TAKE 1 TABLET BY MOUTH EVERY 6 HOURS AS NEEDED FOR NAUSEA, Disp: 360 tablet, Rfl: 3    sennosides-docusate (senna-docusate sodium) 8.6-50 MG per tablet, Take 2 tablets by mouth 2 (Two) Times a Day., Disp: 120 tablet, Rfl: 2    ALLERGIES:    No Known Allergies    PHYSICAL EXAM:  Vitals:    11/02/23 1253   BP: 125/75   Pulse: 69   Resp: 18   Temp: 97.3 °F (36.3 °C)   SpO2: 96%     Pain Score    11/02/23 1253   PainSc: 0-No pain     ECOG score: 0   General:  Awake, alert and oriented, appears well.  HEENT:  Pupils are equal, round and reactive to light and accommodation, Extra-ocular movements full, Oropharyx clear, mucous membranes moist  Neck:  No JVD, thyromegaly or lymphadenopathy  CV:  Regular rate and rhythm, no murmurs, rubs or gallops  Resp:  Lungs are clear to auscultation bilaterally, no wheezing  Breast: Deferred today. Previously: S/p R lumpectomy. There is a somewhat firm area at the site of surgery but there are no palpable masses.  She is s/p excision of R axillary LN, with some post-surgical swelling.  Left breast is without mass lesions.  No L axillary adenopathy.  Abd:  Soft, non-tender, non-distended, bowel sounds present, no organomegaly or masses.    Ext:  No clubbing, cyanosis, or lower extremity edema  Lymph:  No cervical, supraclavicular,  axillary adenopathy  Neuro:  MS as above, grossly non-focal exam    PATHOLOGY:  10-22-15            12-16-21          01-13-22          ENDOSCOPY:  Colonoscopy 12-09-20     Findings: 8 hyperplastic appearing polyps of the distal rectum, diverticulosis moderately throughout the sigmoid colon      IMAGING:  CTCAP 12-15-20  On the lung windows there are several calcified  granulomas in the lungs. No noncalcified pulmonary parenchymal lung  nodules were identified. On the soft tissue windows there were no  enlarged lymph nodes in the supraclavicular or axillary regions. No  mediastinal or hilar lymphadenopathy was seen. The heart was not  enlarged. There were no pericardial effusions.     IMPRESSION:  No CT findings of metastatic disease in the chest. There are  calcified granulomas in the lungs.     CT FINDINGS: The liver and spleen were normal in size and shape. The  gallbladder contains several stones. The wall was not thickened. The  bile ducts in the liver are not dilated. There is nothing seen to  suggest metastatic disease in the liver. The pancreas shows no evidence  of mass or inflammation. No adrenal or renal lesions are demonstrated.  The aorta is normal in caliber. There were no enlarged lymph nodes in  the retroperitoneum or in the mesentery. The bowel shows no evidence of  obstruction. In the pelvis there were no masses or fluid collections.  There were no ventral hernias.     IMPRESSION:  No CT findings of metastatic disease in the abdomen or  pelvis. This study does demonstrate several stones in the lumen of the  gallbladder.         Bilateral diagnostic mammogram 01-31-21  FINDINGS: There are scattered areas of fibroglandular density.     The bilateral fibroglandular pattern does not appear significantly  changed compared to the exam from 2020. This includes postoperative  changes in the right 12:00 region. Mild skin thickening is noted  involving the right breast which is likely treatment related. A  few  bilateral scattered calcifications are noted.     IMPRESSION:  Incomplete examination as comparison with older outside  prior mammograms is needed.        BI-RADS CATEGORY:   0, INCOMPLETE:  Need prior mammograms for comparison        RECOMMENDATION:  We will attempt to obtain older outside prior  mammograms for comparison.      CTCAP 12-12-21  FINDINGS: Lack of IV contrast hinders parenchymal assessment of the mediastinum, liver, spleen, pancreas, adrenal glands and kidneys.         Significant infiltrate seen in the right lower lobe with interstitial component. There are also lung nodules present in the right lung. Index nodule in the right lung base medially is 5 mm maximal diameter. Other smaller nodules seen throughout the right  lung. Tiny 3 mm lingular nodule seen in series 3 image 30. A benign calcified granuloma seen in the left lower lobe but a noncalcified indeterminant  5 mm left lower lobe nodule seen, series 3 image 42.     Nonenlarged mediastinal adenopathy seen. However there is an enlarged right axillary 1.8 x 2.6 cm lymph node partially seen.     Worrisome hepatic metastasis or primary hepatic lesion seen measuring 4.4 x 4.1 cm in liver segment 8. Margins are ill-defined. Abscess could have a similar appearance. Suggestion of subtle gallstones. Nonobstructing 2 mm right renal calculus seen. No  obstructive uropathy. Gaseous distention of the esophagus and hiatal hernia present consistent with GERD. No enteric contrast but no gross evidence of bowel obstruction.   There is no gross free air, free fluid or focal inflammatory change.  Uterus and  adnexa are not enlarged. Osseous structures are grossly intact.     IMPRESSION:  Constellation of findings with significant consolidation in the right lower lobe, enlarged right axillary lymph node, indeterminate bilateral lung nodules and ill-defined hepatic lesion concerning for underlying malignancy in this patient .     FINDINGS: Lack of IV contrast  hinders parenchymal assessment of the mediastinum, liver, spleen, pancreas, adrenal glands and kidneys.         Significant infiltrate seen in the right lower lobe with interstitial component. There are also lung nodules present in the right lung. Index nodule in the right lung base medially is 5 mm maximal diameter. Other smaller nodules seen throughout the right  lung. Tiny 3 mm lingular nodule seen in series 3 image 30. A benign calcified granuloma seen in the left lower lobe but a noncalcified indeterminant  5 mm left lower lobe nodule seen, series 3 image 42.     Nonenlarged mediastinal adenopathy seen. However there is an enlarged right axillary 1.8 x 2.6 cm lymph node partially seen.     Worrisome hepatic metastasis or primary hepatic lesion seen measuring 4.4 x 4.1 cm in liver segment 8. Margins are ill-defined. Abscess could have a similar appearance. Suggestion of subtle gallstones. Nonobstructing 2 mm right renal calculus seen. No  obstructive uropathy. Gaseous distention of the esophagus and hiatal hernia present consistent with GERD. No enteric contrast but no gross evidence of bowel obstruction.   There is no gross free air, free fluid or focal inflammatory change.  Uterus and  adnexa are not enlarged. Osseous structures are grossly intact.     IMPRESSION:  Constellation of findings with significant consolidation in the right lower lobe, enlarged right axillary lymph node, indeterminate bilateral lung nodules and ill-defined hepatic lesion concerning for underlying malignancy in this patient .       US Abdomen Complete 12-12-21  FINDINGS:  The visualized portions of the pancreas, IVC and aorta appear normal.        The liver is moderately coarsened in echotexture. Ill-defined hypodensity in the liver measuring 3 x 3.2 x 2.6 cm corresponds to recent CT. Unclear if this is a malignant lesion. Abscesses within the differential but prior CT was performed without any  contrast.  The common bile duct is not  dilated.. Gallbladder wall is thickened at 3.4 mm with trace pericholecystic fluid and subtle hyperemia. Gallstones and shadowing present.     The kidneys are normal in size and echogenicity. There is no   hydronephrosis or focal solid lesion.  The spleen is normal in size and echotexture.     IMPRESSION:  1. Nonspecific gallbladder wall thickening and questionable pericholecystic fluid. Gallstones are better seen on CT than ultrasound.  2.  Heterogeneous liver with lesion in the liver as seen on CT. Ultrasound is not helpful in determining etiology. This could be an abscess but given the other findings on CT,  malignancy is not excluded.      CTCAP 12-15-21  FINDINGS:    LUNGS:  Increased density of the now right lower lobe mixed  groundglass and more solid-appearing consolidation with internal  cavitary component identified. Tiny right and left lung pulmonary  nodules. Grossly stable.    PLEURAL SPACE:  Small bilateral pleural effusions.  No pneumothorax.    HEART:  Unremarkable.  No cardiomegaly.  No significant pericardial  effusion.    BONES/JOINTS:  Unremarkable.  No acute fracture.  No dislocation.    SOFT TISSUES:  Unremarkable.    VASCULATURE:  Unremarkable.  No thoracic aortic aneurysm.    LYMPH NODES:  Unremarkable.  No enlarged lymph nodes.     IMPRESSION:  1.  Increased density of the now right lower lobe mixed groundglass and  more solid-appearing consolidation with internal cavitary component  identified. Tiny right and left lung pulmonary nodules. Grossly stable.  2.  Small bilateral pleural effusions.    FINDINGS:    LUNG BASES:  Unremarkable.  No mass.  No consolidation.      ABDOMEN:    LIVER:  Right lobe of liver lesion is again identified now measuring  about 4.7 cm and was about 4.7 cm.    GALLBLADDER AND BILE DUCTS:  Gallstones noted in the gallbladder.  No  ductal dilation.    PANCREAS:  Unremarkable.  No mass.  No ductal dilation.    SPLEEN:  Unremarkable.  No splenomegaly.    ADRENALS:   Unremarkable.  No mass.    KIDNEYS AND URETERS:  Unremarkable.  No solid mass.  No  hydronephrosis.    STOMACH AND BOWEL:  Unremarkable.  No obstruction.  No mucosal  thickening.      PELVIS:    APPENDIX:  No findings to suggest acute appendicitis.    BLADDER:  Unremarkable.  No mass.    REPRODUCTIVE:  Unremarkable as visualized.      ABDOMEN and PELVIS:    INTRAPERITONEAL SPACE:  Unremarkable.  No free air.  No significant  fluid collection.    BONES/JOINTS:  No acute fracture.  No dislocation.    SOFT TISSUES:  Unremarkable.    VASCULATURE:  Unremarkable.  No abdominal aortic aneurysm.    LYMPH NODES:  Unremarkable.  No enlarged lymph nodes.     IMPRESSION:    Right lobe of liver lesion is again identified now measuring about 4.7  cm and was about 4.7 cm.      NM Bone Scan Whole Body 12-15-21  FINDINGS:    SKULL/FACIAL BONES:  No focal increased or decreased uptake.    SPINE:  Unremarkable.  No abnormal increased or decreased uptake.    RIBS:  Unremarkable.  No abnormal uptake.    LONG BONES:  Unremarkable.  No abnormal uptake.    PELVIS:  Unremarkable.  No focal increased or decreased uptake.    JOINTS:  Unremarkable.  No focal increased or decreased uptake.    SOFT TISSUES:  Unremarkable.    RENAL/BLADDER:  Within normal limits.     IMPRESSION:    Normal bone scan.      CT Abdomen With Contrast  12-18-21  FINDINGS:  Partially imaged thick-walled cavitary lesion in the right lower lobe with right basilar atelectasis/infiltrate and small right pleural effusion. There is also left basilar atelectasis/infiltrate and trace left pleural effusion. Multiple noncalcified  pulmonary nodules are scattered throughout the visualized lower lung fields, concerning for pulmonary metastatic disease.     Ill-defined right hepatic mass again noted. The spleen, pancreas, and both adrenal glands are within expected limits. Cholelithiasis. Punctate nonobstructing right intrarenal stone. Tiny left renal cyst. Kidneys are otherwise  unremarkable without  hydronephrosis. Abdominal aorta normal in course and caliber without dissection. No pathologic retroperitoneal or mesenteric lymphadenopathy by size criteria. Visualized small bowel and colon are unremarkable. No bowel obstruction. No free fluid or free  air.     No aggressive osseous lesions.     IMPRESSION:     1. Ill-defined right hepatic mass again noted. This has been previously biopsied and correlation with pathology results is recommended.  2. Cholelithiasis.  3. Punctate nonobstructing right intrarenal stone.  4. No threshold abdominal lymphadenopathy or other suspicious abdominal masses.  5. Partially imaged thick-walled cavitary lesion in the right lower lobe. This may be of infectious or neoplastic etiology and continued follow-up is recommended.  6. Small bilateral pleural effusions with bibasilar atelectasis/infiltrate, worse on the right than left.  7. Numerous small noncalcified pulmonary nodules throughout the visualized lower lung fields, concerning for pulmonary metastatic disease.       Mammo Diagnostic Digital Tomosynthesis Bilateral With CAD w/ US Axilla Right 01-13-22  FINDINGS:  There are scattered areas of fibroglandular density.     Incompletely imaged is a very prominent axillary lymph node. The  bilateral fibroglandular pattern itself is stable in appearance  including postoperative changes in the superior aspect of the right  breast. There are stable bilateral calcifications. Mild skin thickening  is again noted involving the right breast which is likely treatment  related.     ULTRASOUND: Ultrasound evaluation of the right axilla demonstrates 2  markedly enlarged lymph nodes the largest of which measures 3.2 cm in  size and has no demonstrable fatty hilum. Recommend ultrasound-guided  biopsy.      IMPRESSION:  1. Stable mammographic appearance of the left breast with no findings  suspicious for malignancy.        2. Marked right axillary lymphadenopathy. Recommend  ultrasound-guided  biopsy. The fibroglandular pattern of the right breast is however  stable.        BI-RADS CATEGORY:  4, SUSPICIOUS        RECOMMENDATION:  Recommend ultrasound-guided biopsy of the dominant  abnormal appearing right axillary lymph node.      DEXA Bone Density Axial 01-13-22  IMPRESSION:  Impression:  1. According to the World Health Organization definitions of  osteoporosis based on bone density, this patient's bone mineral density  is compatible with osteoporosis and the fracture risk is high.      Mammo Diagnostic Digital Tomosynthesis Bilateral With CAD w/ US Axilla Right 01-13-22  FINDINGS:  There are scattered areas of fibroglandular density.     Incompletely imaged is a very prominent axillary lymph node. The  bilateral fibroglandular pattern itself is stable in appearance  including postoperative changes in the superior aspect of the right  breast. There are stable bilateral calcifications. Mild skin thickening  is again noted involving the right breast which is likely treatment  related.     ULTRASOUND: Ultrasound evaluation of the right axilla demonstrates 2  markedly enlarged lymph nodes the largest of which measures 3.2 cm in  size and has no demonstrable fatty hilum. Recommend ultrasound-guided  biopsy.      IMPRESSION:  1. Stable mammographic appearance of the left breast with no findings  suspicious for malignancy.        2. Marked right axillary lymphadenopathy. Recommend ultrasound-guided  biopsy. The fibroglandular pattern of the right breast is however  stable.        BI-RADS CATEGORY:  4, SUSPICIOUS        RECOMMENDATION:  Recommend ultrasound-guided biopsy of the dominant  abnormal appearing right axillary lymph node.      CT Chest With Contrast Diagnostic 01-18-22  FINDINGS:     LUNGS: Solitary mass in the right lower lobe with cavitation.  Significantly smaller today than on the prior study. 6 mm solid nodule  in the right lung posteriorly on image 30 of the axial series.  Other  parenchymal nodules are seen in both lungs and are similar to the  previous study. These are concerning for metastatic nodules.     HEART: Unremarkable.     MEDIASTINUM: No masses. No enlarged lymph nodes.  No fluid collections.     PLEURA: No pleural effusion. No pleural mass or abnormal calcification.  No pneumothorax.     VASCULATURE: No evidence of aneurysm. Filling defect in the right  pulmonary artery and possibly left lower lobe pulmonary artery. This  study was not performed to evaluate for pulmonary embolus, but the  findings are concerning for bilateral pulmonary emboli.     BONES: No acute bony abnormality.     VISUALIZED UPPER ABDOMEN:Please see the CT report for the abdomen and  pelvis.     Other: Right axillary soft tissue mass is present and shows slight  interval growth. It measures 2.47 x 2.16 cm today and previously at the  same level measured approximately 2.7 x 1.89 cm.     IMPRESSION:  1. Study was not performed as a PE protocol, but there appear to be  filling defects in pulmonary arteries bilaterally concerning for  pulmonary emboli.  2. Interval decrease in size of cavitary right lower lobe mass.  3. Stable parenchymal nodules bilaterally.  4. Very slight interval growth of right axillary soft tissue mass.     Results are being relayed to the referring physician.      US Liver 01-18-22  FINDINGS: Sonographic imaging of the liver does not show the mass  previously identified in the liver. I would suggest a follow-up CT scan  for better delineation.     Hepatic flow was hepatopedal.     There is shadowing from the gallbladder fossa.     IMPRESSION:  1. Previously identified mass is not seen on this exam in the liver.  Consider follow-up CT.  2. Shadowing from the gallbladder fossa. In the absence of  cholecystectomy, appearance is concerning for cholelithiasis.      US Venous Doppler Lower Extremity Bilateral (duplex) 01-20-22  FINDINGS:   There is patent spontaneous flow from the common  femoral vein through  the posterior tibial veins.  There was no internal clot or area of noncompressibility.  Normal augmentation was elicited where applicable.     IMPRESSION:  No DVT in the lower extremities on today's exam.       CT Chest Pulmonary Embolism 01-20-22  FINDINGS: Today's study demonstrates opacification of the central  pulmonary vessels.  There are filling defects in the pulmonary arteries bilaterally. First  order arteries. This is most compatible with bilateral pulmonary  emboli..     Soft tissue mass in the right lower lobe with somewhat cavitary center  is again noted and unchanged..     There is no mediastinal lymph node enlargement     No pericardial or pleural effusion.        IMPRESSION:  1. Bilateral pulmonary emboli.  2. Parenchymal nodules bilaterally with dominant mass in the right lower  Lobe..      NM PET/CT Skull Base to Mid Thigh 01-28-22  FINDINGS:      HEAD/NECK:  Area of uptake in the posterior right paraspinal space and to lesser  degree the left paraspinal space appears related to muscle uptake.  No FDG hypermetabolic neck adenopathy.  No FDG hypermetabolic masses.     CHEST:   Numerous bilateral pulmonary nodules appear stable from the previous  chest CT and show no FDG hypermetabolism. This is likely due to nodules  being below size threshold for PET sensitivity.  Cavitary lung mass in the right lung localizing to the superior segment  of the lower lobe shows FDG hypermetabolism with maximum SUV: 2.4. This  is at the lower range for malignancy.  Enlarged right lower axillary region lymph node which is 3.0 cm shows  mild FDG hypermetabolism that is approximately with maximum SUV: 2.4.  This is at the lower range for malignancy.  Low-dose CT demonstrating no change in additional scattered pulmonary  nodules from the previous scan. Coronary artery calcifications are  stable.     ABDOMEN/PELVIS:   Faint area of low attenuation involving the right lobe of liver is  poorly evaluated  with low-dose noncontrast CT but shows no focal FDG  hypermetabolism.  Physiologic FDG hypermetabolism seen throughout the solid abdominal  organs.  Physiologic FDG hypermetabolism seen throughout the GI tract.  Physiologic FDG hypermetabolism seen throughout the mesentery,  retroperitoneum and pelvis.  Low dose CT redemonstrates nonobstructing kidney stones. Cholelithiasis  again noted.     BONES: Nonspecific FDG tracer activity. No intense areas of tracer  activity noted.     IMPRESSION:     1. Cavitary mass in the right lung that shows very low-grade FDG  hypermetabolism which is at the lower range for malignancy with maximum  SUV: 2.4.  2. Enlarged right axillary region lymph node with low-grade FDG  hypermetabolism also at the lower range for malignancy with maximum SUV:  2.4.  3. Numerous bilateral pulmonary nodules compatible with metastatic  disease but show no significant FDG hypermetabolism. This may be in part  due to size of nodules being below PET sensitivity threshold.  4. Faint area of low-attenuation right lobe liver poorly evaluated with  noncontrast low-dose CT that shows no obvious intense FDG  hypermetabolism.  5. Other nonacute findings as above on low dose CT.        [facility] 05-03-22  FINDINGS:    Lungs:  Cavitary mass in the right lower lobe has nearly resolved.  A  right lower lobe pulmonary nodule is 7.4 mm and was previously 7.5 mm.  No change. Image #38.  A left lower lobe pulmonary nodule, image #52, is  9.8 mm and was previously 9.8 mm. No change.    Pleural space:  Unremarkable.  No pneumothorax.  No significant  effusion.    Heart:  Unremarkable.  No cardiomegaly.  No significant pericardial  effusion.  No significant coronary artery calcifications.    Bones/joints:  The bony structures appear stable. No acute bony  findings identified.  No dislocation.    Soft tissues:  Unremarkable.    Vasculature:  Please note, pulmonary emboli noted on the prior exam  are not well evaluated. The  previously described filling defects of the  main pulmonary arteries are not evident.  No thoracic aortic aneurysm.    Lymph nodes:  No mediastinal adenopathy by CT size criteria.    Gallbladder and bile ducts:  Cholelithiasis noted.    Other findings:  Other smaller nodules appear stable in size and  configuration.  No new nodules identified.     IMPRESSION:  1.  Near-complete resolution of previously described cavitary mass in  the right lower lobe periphery now only with linear scarring noted.  2.  Index nodules of both lungs are stable from previous exam with no  size increase identified. No new nodules are noted.  3.  Cholelithiasis.  4.  Due to timing of contrast on this study, assessment of pulmonary  emboli not performed. There appears to be significant improvement in  clot burden however in the more central vessels.    FINDINGS:    Lung bases:  Pulmonary nodules of the lung bases.    Mediastinum:  Small hiatal hernia.      ABDOMEN:    Liver:  Low attenuation lesions of the right lobe liver appear of  decreased prominence from the previous exam. Segment 8 liver lesion is  1.1 cm. Segment 7 liver lesion is approximately 0.8 cm.    Gallbladder and bile ducts:  Cholelithiasis.  No ductal dilation.    Pancreas:  Unremarkable.  No mass.  No ductal dilation.    Spleen:  Unremarkable.  No splenomegaly.    Adrenals:  No adrenal masses identified.    Kidneys and ureters:  Unremarkable.  No solid mass.  No  hydronephrosis.    Stomach and bowel:  Sigmoid diverticulosis without evidence of  diverticulitis.  No obstruction.      PELVIS:    Appendix:  Normal appendix.    Bladder:  Unremarkable.  No mass.    Reproductive:  Unremarkable as visualized.      ABDOMEN and PELVIS:    Intraperitoneal space:  Unremarkable.  No free air.  No significant  fluid collection.    Bones/joints:  No acute fracture.  No dislocation.    Soft tissues:  Unremarkable.    Vasculature:  Unremarkable.  No abdominal aortic aneurysm.    Lymph  nodes:  No iliac or retroperitoneal adenopathy.     IMPRESSION:  1.  Small low-attenuation lesions of the right lobe liver appears  decreased prominence of the previous exam. No new no focal liver lesions  identified  2.  Cholelithiasis.  3.  Otherwise stable appearance of the abdomen and pelvis.    CT Abdomen Pelvis With Contrast (08/08/2022 14:36)  FINDINGS:    LUNG BASES:  Unremarkable.  No mass.  No consolidation.      ABDOMEN:    LIVER:  Stable 1 cm right lobe of liver lesion and more posterior 7 mm  lesion.    GALLBLADDER AND BILE DUCTS:  Gallstones.  Gallbladder otherwise  unremarkable.  No ductal dilation.    PANCREAS:  Unremarkable.  No mass.  No ductal dilation.    SPLEEN:  Unremarkable.  No splenomegaly.    ADRENALS:  Unremarkable.  No mass.    KIDNEYS AND URETERS:  Unremarkable.  No solid mass.  No  hydronephrosis.    STOMACH AND BOWEL:  Unremarkable.  No obstruction.  No mucosal  thickening.      PELVIS:    APPENDIX:  No findings to suggest acute appendicitis.    BLADDER:  Unremarkable.  No mass.    REPRODUCTIVE:  Unremarkable as visualized.      ABDOMEN and PELVIS:    INTRAPERITONEAL SPACE:  Unremarkable.  No free air.  No significant  fluid collection.    BONES/JOINTS:  No acute fracture.  No dislocation.    SOFT TISSUES:  Unremarkable.    VASCULATURE:  Unremarkable.  No abdominal aortic aneurysm.    LYMPH NODES:  Unremarkable.  No enlarged lymph nodes.     IMPRESSION:  1.  Gallstones.  Gallbladder otherwise unremarkable.  2.  Stable 1 cm right lobe of liver lesion and more posterior 7 mm  Lesion.    CT Chest With Contrast Diagnostic (08/08/2022 14:40)  FINDINGS:    LUNGS:  Multiple pulmonary nodules again noted including a right lower  lobe pulmonary nodule measuring 5 mm that was 5 mm. Another right lower  lobe pulmonary nodule is 7 mm and was 7 mm. A left lower lobe pulmonary  nodule is 5 mm and was 5 mm. Other nodules appear stable.    PLEURAL SPACE:  Unremarkable.  No pneumothorax.  No  significant  effusion.    HEART:  Unremarkable.  No cardiomegaly.  No significant pericardial  effusion.  No significant coronary artery calcifications.    BONES/JOINTS:  Unremarkable.  No acute fracture.  No dislocation.    SOFT TISSUES:  Unremarkable.    VASCULATURE:  Unremarkable.  No thoracic aortic aneurysm.    LYMPH NODES:  Unremarkable.  No enlarged lymph nodes.     IMPRESSION:    Multiple pulmonary nodules again noted including a right lower lobe  pulmonary nodule measuring 5 mm that was 5 mm. Another right lower lobe  pulmonary nodule is 7 mm and was 7 mm. A left lower lobe pulmonary  nodule is 5 mm and was 5 mm. Other nodules appear stable.    CT Abdomen Pelvis With Contrast (10/31/2022 14:46)  FINDINGS:    LUNG BASES:  Unremarkable.  No mass.  No consolidation.      ABDOMEN:    LIVER:  Stable right lobe of liver low-attenuation lesion measuring  about a centimeter. Stable more posterior right lobe of liver lesion  measuring about 7 mm.    GALLBLADDER AND BILE DUCTS:  Gallstones.  Gallbladder otherwise  unremarkable.  No ductal dilation.    PANCREAS:  Unremarkable.  No mass.  No ductal dilation.    SPLEEN:  Unremarkable.  No splenomegaly.    ADRENALS:  Unremarkable.  No mass.    KIDNEYS AND URETERS:  Unremarkable.  No solid mass.  No  hydronephrosis.    STOMACH AND BOWEL:  Scattered diverticula in the colon.  No  diverticulitis.  No obstruction.      PELVIS:    APPENDIX:  No findings to suggest acute appendicitis.    BLADDER:  Unremarkable.  No mass.    REPRODUCTIVE:  Unremarkable as visualized.      ABDOMEN and PELVIS:    INTRAPERITONEAL SPACE:  Unremarkable.  No free air.  No significant  fluid collection.    BONES/JOINTS:  No acute fracture.  No dislocation.    SOFT TISSUES:  Unremarkable.    VASCULATURE:  Unremarkable.  No abdominal aortic aneurysm.    LYMPH NODES:  Unremarkable.  No enlarged lymph nodes.     IMPRESSION:  1.  Gallstones.  Gallbladder otherwise unremarkable.  2.  Stable right lobe of  liver low-attenuation lesion measuring about a  centimeter. Stable more posterior right lobe of liver lesion measuring  about 7 mm.    CT Chest With Contrast Diagnostic (10/31/2022 14:47)  FINDINGS:    LUNGS:  Stable subpleural right middle lobe pulmonary nodule measuring  5 mm. Stable right lower lobe pulmonary nodule measuring 6 mm. Other  bilateral pulmonary nodules appear stable in size and number.    PLEURAL SPACE:  Unremarkable.  No pneumothorax.  No significant  effusion.    HEART:  Unremarkable.  No cardiomegaly.  No significant pericardial  effusion.  No significant coronary artery calcifications.    BONES/JOINTS:  Unremarkable.  No acute fracture.  No dislocation.    SOFT TISSUES:  Right upper breast density again noted.    VASCULATURE:  Unremarkable.  No thoracic aortic aneurysm.    LYMPH NODES:  Unremarkable.  No enlarged lymph nodes.     IMPRESSION:    Stable subpleural right middle lobe pulmonary nodule measuring 5 mm.  Stable right lower lobe pulmonary nodule measuring 6 mm. Other bilateral  pulmonary nodules appear stable in size and number.    CT chest with contrast 1/25/23  FINDINGS:     LUNGS: Multiple parenchymal nodules are present bilaterally and are  stable in size, number, and appearance. Largest nodule measuring  approximately 6 mm.     HEART: Mild coronary artery calcifications.     MEDIASTINUM: No masses. No enlarged lymph nodes.  No fluid collections.     PLEURA: No pleural effusions.     VASCULATURE: No evidence of aneurysm.     BONES: No acute bony abnormality.     VISUALIZED UPPER ABDOMEN:Please see the report for the CT of the abdomen  and pelvis.     Other: 8.5 mm left axillary lymph node stable.     IMPRESSION:     1. Multiple parenchymal nodules in both lungs are stable.  2. 8.5 mm lymph node in the left axilla, stable.    CT abdomen pelvis with contrast 1/25/23  FINDINGS:     Lower thorax: Parenchymal nodules in both lungs similar to the previous  exam.     Abdomen:     Liver:  Previous identified area of decreased attenuation in the right  lobe of the liver is not localized on today's exam.     Gallbladder: Cholelithiasis.     Pancreas: Unremarkable. No mass or ductal dilatation.     Spleen: Homogeneous. No splenomegaly.     Adrenals: No mass.     Kidneys/ureters: Nonobstructing right intrarenal stone.     GI tract: Moderate stool. Sigmoid diverticuli without diverticulitis.     MESENTERY: No free fluid, walled off fluid collections, mesenteric  stranding, or enlarged lymph nodes        Vasculature: No evidence of aneurysm.     Abdominal wall: No focal hernia or mass.        Bladder: No focal mass or significant wall thickening     Reproductive: Unremarkable as visualized     Bones: No acute bony abnormality.     IMPRESSION:     1. No evidence of new interval development of metastatic disease.  Previously identified hepatic lesion is not seen on today's exam.     2.Cholelithiasis.     3. Nonobstructing right intrarenal stone.    Mammo Diagnostic Digital Tomosynthesis Bilateral With CAD (02/08/2023 14:55)  FINDINGS: There are scattered areas of fibroglandular density.     The bilateral fibroglandular pattern is stable in appearance including  postoperative changes in the right 12:00 region. Skin thickening  involving the right breast is again noted and likely treatment related.  Multiple surgical clips are now noted in the right axilla. The patient's  Port-A-Cath is incompletely imaged in the posterior superior aspect of  the left breast. No new or suspicious findings are identified in either  breast.     IMPRESSION:  Benign bilateral mammographic findings.        BI-RADS CATEGORY:  2, BENIGN        RECOMMENDATION:  Recommend the patient continue annual bilateral  mammographic evaluation.    CT Abdomen Pelvis With Contrast (04/13/2023 14:51)  FINDINGS:    Lung bases:  Refer to chest CT for characterization of lung nodules in  the partially imaged lower chest.    Mediastinum:  Moderate  hiatal hernia.      ABDOMEN:    Liver:  No definite focal liver lesion identified.    Gallbladder and bile ducts:  Cholelithiasis.  No ductal dilation.    Pancreas:  Unremarkable.  No mass.  No ductal dilation.    Spleen:  Unremarkable.  No splenomegaly.    Adrenals:  No adrenal masses.    Kidneys and ureters:  Tiny nonobstructing right kidney stone. No  hydronephrosis.    Stomach and bowel:  Gastric antrum wall thickening likely due to  incomplete distention.  Small splenule is again noted adjacent to the  splenic flexure of the colon.  Sigmoid diverticulosis. No evidence of  acute diverticulitis.      PELVIS:    Appendix:  Normal appendix.    Bladder:  Unremarkable.  No mass.    Reproductive:  Unremarkable as visualized.      ABDOMEN and PELVIS:    Intraperitoneal space:  No pneumoperitoneum identified.  No  significant fluid collection.    Bones/joints:  Stable appearance of the bony structures. Mild  degenerative changes lumbar spine but no acute osseous findings.  No  dislocation.    Soft tissues:  Unremarkable.    Vasculature:  Unremarkable.  No abdominal aortic aneurysm.    Lymph nodes:  No iliac or retroperitoneal lymphadenopathy.     IMPRESSION:  1.  Cholelithiasis.  2.  No evidence of metastatic disease to abdomen or pelvis.  3.  Other nonacute/incidental findings as detailed above which appear  stable from previous.    CT Chest With Contrast Diagnostic (04/13/2023 14:51)  FINDINGS:    Lungs:  Small nodule along the right major fissure in the right lung,  5.2 mm and was previously 5.3 mm; image #33.  Parenchymal nodule right  lower lobe is 6.7 mm and was previously 6.9 mm, image #33, no change.   5.9 mm nodule right lower lobe was previously 6.5 mm indicating no  interval change.  Other smaller nodules of the right lung appears  stable.  7.5 mm nodule left lower lobe, image #55, was previously 8.2  mm.  A 5.7 mm nodule left lower lobe, image #40, was previously 5.9 mm.  No significant change.    Pleural  space:  Unremarkable.  No pneumothorax.  No significant  effusion.    Heart:  Mild cardiomegaly.  No significant pericardial effusion.  No  significant coronary artery calcifications.    Mediastinum:  Moderate hiatal hernia again noted.  No enlarged  mediastinal or hilar lymph nodes identified.    Bones/joints:  Unremarkable.  No acute fracture.  No dislocation.    Soft tissues:  Stable treatment-related skin thickening of the right  breast.    Vasculature:  Unremarkable.  No thoracic aortic aneurysm.    Lymph nodes:  See above.    Gallbladder and bile ducts:  Cholelithiasis.    Tubes, lines and devices:  Left Port-A-Cath noted.    Other findings:  Calcified granulomas are stable.     IMPRESSION:  1.  No interval change in size of multiple bilateral pulmonary  parenchymal nodules. Index nodules as detailed above. Some nodules may  be slightly smaller than previous or could be due to differences in  slice selection.  2.  No new nodules are identified.  3.  No thoracic adenopathy.  4.  Other incidental and nonacute findings detailed above appear stable  from previous.    CT Chest With Contrast Diagnostic (07/28/2023 15:37)   FINDINGS:    LUNGS AND PLEURAL SPACES:  Again there are tiny bilateral pulmonary  nodules with the largest in the right lower lobe measuring about 6 mm  and was about 6 mm. Other nodules including a left lower lobe pulmonary  nodule measuring 6 mm appears stable.  No pneumothorax.  No significant  effusion.    HEART:  Unremarkable.  No cardiomegaly.  No significant pericardial  effusion.  No significant coronary artery calcifications.    BONES/JOINTS:  Unremarkable.  No acute fracture.  No dislocation.    SOFT TISSUES:  Unremarkable.    VASCULATURE:  Unremarkable.  No thoracic aortic aneurysm.    LYMPH NODES:  Unremarkable.  No enlarged lymph nodes.     IMPRESSION:    Again there are tiny bilateral pulmonary nodules with the largest in  the right lower lobe measuring about 6 mm and was about 6 mm.  Other  nodules including a left lower lobe pulmonary nodule measuring 6 mm  appears stable.    CT Abdomen Pelvis With Contrast (07/28/2023 15:40)   FINDINGS:    LUNG BASES:  Unremarkable.  No mass.  No consolidation.      ABDOMEN:    LIVER:  Unremarkable.  No mass.    GALLBLADDER AND BILE DUCTS:  Gallstone.  Gallbladder otherwise  unremarkable.  No ductal dilation.    PANCREAS:  Unremarkable.  No mass.  No ductal dilation.    SPLEEN:  Unremarkable.  No splenomegaly.    ADRENALS:  Unremarkable.  No mass.    KIDNEYS AND URETERS:  Unremarkable.  No solid mass.  No  hydronephrosis.    STOMACH AND BOWEL:  Scattered diverticula in the colon.  No  diverticulitis.  No obstruction.      PELVIS:    APPENDIX:  No findings to suggest acute appendicitis.    BLADDER:  Unremarkable.  No mass.    REPRODUCTIVE:  Unremarkable as visualized.      ABDOMEN and PELVIS:    INTRAPERITONEAL SPACE:  Unremarkable.  No free air.  No significant  fluid collection.    BONES/JOINTS:  No acute fracture.  No dislocation.    SOFT TISSUES:  Unremarkable.    VASCULATURE:  Unremarkable.  No abdominal aortic aneurysm.    LYMPH NODES:  Unremarkable.  No enlarged lymph nodes.     IMPRESSION:    Gallstone.  Gallbladder otherwise unremarkable.    CT Chest With Contrast Diagnostic (10/27/2023 15:28)   FINDINGS:    LUNGS AND PLEURAL SPACES:  Multiple bilateral pulmonary nodules are  stable in size.  No pneumothorax.  No significant effusion.    HEART:  Unremarkable as visualized.  No cardiomegaly.  No significant  pericardial effusion.  No significant coronary artery calcifications.    MEDIASTINUM:  Small hiatal hernia.    BONES/JOINTS:  Unremarkable as visualized.  No acute fracture.  No  dislocation.    SOFT TISSUES:  Right breast skin thickening noted likely treatment  related again noted.    VASCULATURE:  Unremarkable as visualized.  No thoracic aortic  aneurysm.    LYMPH NODES:  Unremarkable as visualized.  No enlarged lymph nodes.     IMPRESSION:  1.   Small hiatal hernia.  2.  Right breast skin thickening noted likely treatment related again  noted.  3.  Multiple bilateral pulmonary nodules are stable in size.    CT Abdomen Pelvis With Contrast (10/27/2023 15:30)   FINDINGS:    LUNG BASES:  Unremarkable as visualized.  No mass.  No consolidation.    MEDIASTINUM:  Small hiatal hernia.      ABDOMEN:    LIVER:  Unremarkable as visualized.  No mass.    GALLBLADDER AND BILE DUCTS:  Gallstone.  Gallbladder otherwise  unremarkable.  No ductal dilation.    PANCREAS:  Unremarkable as visualized.  No mass.  No ductal dilation.    SPLEEN:  Unremarkable as visualized.  No splenomegaly.    ADRENALS:  Unremarkable as visualized.  No mass.    KIDNEYS AND URETERS:  Unremarkable as visualized.  No solid mass.  No  hydronephrosis.    STOMACH AND BOWEL:  Scattered diverticula in the colon.  No  diverticulitis.  No obstruction.      PELVIS:    APPENDIX:  No findings to suggest acute appendicitis.    BLADDER:  Unremarkable as visualized.  No mass.    REPRODUCTIVE:  Unremarkable as visualized.      ABDOMEN and PELVIS:    INTRAPERITONEAL SPACE:  Unremarkable as visualized.  No free air.  No  significant fluid collection.    BONES/JOINTS:  No acute fracture.  No dislocation.    SOFT TISSUES:  Unremarkable as visualized.    VASCULATURE:  Unremarkable as visualized.  No abdominal aortic  aneurysm.    LYMPH NODES:  Unremarkable as visualized.  No enlarged lymph nodes.    OTHER FINDINGS:  No evidence of metastatic disease.     IMPRESSION:  1.  Gallstone.  Gallbladder otherwise unremarkable.  2.  No evidence of metastatic disease.    RECENT LABS:  Lab Results   Component Value Date    WBC 5.83 10/05/2023    HGB 13.4 10/05/2023    HCT 42.9 10/05/2023    MCV 92.9 10/05/2023    RDW 14.2 10/05/2023     10/05/2023    NEUTRORELPCT 53.2 10/05/2023    LYMPHORELPCT 32.6 10/05/2023    MONORELPCT 7.7 10/05/2023    EOSRELPCT 5.3 10/05/2023    BASORELPCT 1.0 10/05/2023    NEUTROABS 3.10 10/05/2023     LYMPHSABS 1.90 10/05/2023       Lab Results   Component Value Date     10/05/2023    K 4.1 10/05/2023    CO2 27.4 10/05/2023     10/05/2023    BUN 10 10/05/2023    CREATININE 0.81 10/05/2023    EGFRIFNONA 87 02/24/2022    GLUCOSE 116 (H) 10/05/2023    CALCIUM 9.8 10/05/2023    ALKPHOS 79 10/05/2023    AST 19 10/05/2023    ALT 16 10/05/2023    BILITOT 0.5 10/05/2023    ALBUMIN 4.3 10/05/2023    PROTEINTOT 6.8 10/05/2023    MG 2.0 09/01/2023     Lab Results   Component Value Date    TSH 2.020 10/05/2023     Lab Results   Component Value Date    FERRITIN 169.00 (H) 01/20/2022    IRON 102 01/20/2022    TIBC 264 (L) 01/20/2022    LABIRON 39 01/20/2022    GAPCRDBE19 437 01/20/2022    FOLATE 16.40 01/20/2022     Lab Results   Component Value Date     (H) 01/20/2022     ASSESSMENT & PLAN:  Yadira Flowers is a very pleasant 67 y.o. female with a history of cervical cancer, breast cancer and malignant melanoma.    1. History of Breast Cancer:  - Ms. Onur Burgos had Stage IA ([id number]) moderately differentiated invasive ductal carcinoma of the right breast diagnosed in October 2015.  - She underwent R lympectomy and SLNBx performed by Dr. Steve Isbell 10-22-15 and then received adjuvant radiation in [place].  - After radiation, she was started on Arimidex which was then switched to Exemestane.  She took Exemestane for almost 5 years. Unfortunately, she developed vaginal bleeding and hematuria related to vaginal atrophy related to hormonal blockade.  Given this, stopped Exemestane.  She is doing much better in this regard since stopping hormonal therapy.  - Mammogram 2/8/23 without cause for concern aside from R axillary LAD.   -Repeat bilateral diagnostic mammogram due p 2//28/24.    2.  Metastatic malignant melanoma:  -  S/p resection of a primary lesion on her back performed by Dr. Catrachito Amaro of Dermatology in [place] in 2004.  -  S/p FNA R axillary LN which was concerning for melanoma.   Excisional biopsy confirmed metastatic malignant melanoma.  - Suspect her lung and liver lesions are also related to her melanoma.  - She has seen Dr. Her and he performed bronchoscopy on 02/23/2022. Pathology was negative for malignant cells.  - Liver aspiration showed abscess which grew Klebsiella, suspect this was a secondary infection.    -  Sent  excised LN tissue for CARIS testing.  -  PET-CT showed no significant FDG uptake but re-demonstrated abnormalities in the R axilla, RLL cavitary mass, Vargas lung nodules and liver.    -  MRI Brain showed no acute findings in the head/brain.  -  Recommended initial therapy with immunotherapy.  We discussed different options for immunotherapy including single agent PD-L1 treatment or combination with Yervoy.  We recommended combination with Yervoy for better RR, PFS and OS even with increased risk of immune-mediated side effects.  We discussed the initially approved FDA dosing v. Altered dosing with lower dose IPI/higher dosed Nivo.  The latter has been shown to have fewer side effects and is thought to likely have similar efficacy (though trials weren't really powered to definitively show that).  After a lengthy discussion, we decided to proceed with 4 cycles of Ipi 1 mg/kg / Nivo 3 mg/kg q 21d followed by Nivolumab single agent.  We discussed potential risks, benefits and side effects extensively and she decided to proceed.  -  She completed 4 cycles of Ipi / Nivo and tolerated treatment very well.  -  Repeat CT imaging done 5-3-22 after 4 cycles Ipi/Nivo showed an excellent partial response to treatment.  Have continued with single agent Nivolumab as planned which she is tolerating well.   -Repeat CT CAP from 10/27/23 (summarized above) again showing stable disease with excellent control of her metastatic melanoma.  -Therefore, will continue with Nivolumab therapy as planned.  -  Will repeat CT CAP after a 3 month interval prior .    3.  Difficulty w/ PAC access:  -   Will refer to Dr. Cummings for evaluaiton.     4. Constipation  -  Continue Senna 2 tabs BID and Miralax as needed.     5. Bilateral pulmonary emboli:  -  CTPE protocol 1-20-22  confirmed bilateral pulmonary emboli  -  BLE dopplers negative for DVT.  -  Continue Eliquis 5 mg BID.    -  We previously discussed length of therapy.  She watched her  die with PE and her mother has h/o PEs as well.  Presumably the cancer was the risk factor for development of PE and since her cancer is incurable,  have recommended she continue Eliquis indefinately as long as she continues without significant side effects.  She started complaining of difficulty with easy bleeding with minor trauma.  It had been over 6 months that she has had full anticoagulation.  Given worsening side effects, decided to continue Eliquis, but reduced dose to 2.5 mg twice a day.  She is doing well on this dose without bleeding or thrombosis. Will continue.    6.  H/o Colon polyps:  -  Dr. Maldonado performed c-scope 12-09-20 with discovery of 8 hyperplastic polyps in the distal rectum as well as diverticulosis.  He recommended repeat c-scope after 5 years.    7.  H/o cervical cancer treated with cryotherapy at [facility] in 1975:  -  Followed up with Dr. Manav Real of gynecology.  She says she had exam including pap smear and then pelvic U/S which was unrevealing.    8. Prophylaxis:  -  Had COVID vaccine x 2 (Pfizer).  She received Evusheld on 4-7-22.. Recommended booster. Received 2023 flu vaccine. Will give Prevnar 20 vaccine today.    9.  ACO / DWAYNE/Other  Quality measures  -  Yadira Flowers received 2023 flu vaccine.  -  Yadira Flowers reports a pain score of 0.  Given her pain assessment as noted, treatment options were discussed and the following options were decided upon as a follow-up plan to address the patient's pain:  No intervention needed at this time. .  -  Current outpatient and discharge medications have been reconciled for the  patient.  Reviewed by: Loreto Stone MD    10.  Follow up:  - Continue Nivolumab as planned.  -  F/u with me in 3 months CT CAP prior along with  CBCD, CMP, TSH.  -  Refer to Dr. Cummings for evaluation of PAC as this has become difficult for nurses to access.  -  Prevnar 20 vaccine today.  -  Mammogram due p 2/8/24  -  note:  Pt prefers that PAC be accessed for CT imaging.    I spent 30 minutes with Yadira Flowesr today.  In the office today, more than 50% of this time was spent face-to-face with her  in counseling / coordination of care, reviewing her medical history and counseling on the current treatment plan.  All questions were answered to her satisfaction      Electronically Signed by:  Loreto Stone MD      CC:     MD Manav Cotto MD Aaron House, MD

## 2023-11-02 ENCOUNTER — INFUSION (OUTPATIENT)
Dept: ONCOLOGY | Facility: HOSPITAL | Age: 68
End: 2023-11-02
Payer: COMMERCIAL

## 2023-11-02 ENCOUNTER — LAB (OUTPATIENT)
Dept: ONCOLOGY | Facility: CLINIC | Age: 68
End: 2023-11-02
Payer: MEDICARE

## 2023-11-02 ENCOUNTER — OFFICE VISIT (OUTPATIENT)
Dept: ONCOLOGY | Facility: CLINIC | Age: 68
End: 2023-11-02
Payer: MEDICARE

## 2023-11-02 VITALS
OXYGEN SATURATION: 96 % | RESPIRATION RATE: 18 BRPM | TEMPERATURE: 97.3 F | SYSTOLIC BLOOD PRESSURE: 125 MMHG | WEIGHT: 204.4 LBS | DIASTOLIC BLOOD PRESSURE: 75 MMHG | HEART RATE: 69 BPM | HEIGHT: 67 IN | BODY MASS INDEX: 32.08 KG/M2

## 2023-11-02 VITALS
SYSTOLIC BLOOD PRESSURE: 147 MMHG | HEART RATE: 77 BPM | DIASTOLIC BLOOD PRESSURE: 84 MMHG | RESPIRATION RATE: 18 BRPM | OXYGEN SATURATION: 97 % | TEMPERATURE: 97.1 F

## 2023-11-02 DIAGNOSIS — R53.83 OTHER FATIGUE: ICD-10-CM

## 2023-11-02 DIAGNOSIS — C78.02 MALIGNANT NEOPLASM METASTATIC TO BOTH LUNGS: ICD-10-CM

## 2023-11-02 DIAGNOSIS — C78.01 MALIGNANT NEOPLASM METASTATIC TO BOTH LUNGS: ICD-10-CM

## 2023-11-02 DIAGNOSIS — C78.7 METASTASIS TO LIVER: Primary | ICD-10-CM

## 2023-11-02 DIAGNOSIS — C43.59 MALIGNANT MELANOMA OF TORSO EXCLUDING BREAST: ICD-10-CM

## 2023-11-02 DIAGNOSIS — C50.911 MALIGNANT NEOPLASM OF RIGHT BREAST IN FEMALE, ESTROGEN RECEPTOR POSITIVE, UNSPECIFIED SITE OF BREAST: ICD-10-CM

## 2023-11-02 DIAGNOSIS — Z17.0 MALIGNANT NEOPLASM OF RIGHT BREAST IN FEMALE, ESTROGEN RECEPTOR POSITIVE, UNSPECIFIED SITE OF BREAST: ICD-10-CM

## 2023-11-02 DIAGNOSIS — C78.7 METASTASIS TO LIVER: ICD-10-CM

## 2023-11-02 DIAGNOSIS — Z95.828 PORT-A-CATH IN PLACE: ICD-10-CM

## 2023-11-02 DIAGNOSIS — Z86.711 HISTORY OF PULMONARY EMBOLISM: ICD-10-CM

## 2023-11-02 DIAGNOSIS — Z95.828 PORT-A-CATH IN PLACE: Primary | ICD-10-CM

## 2023-11-02 DIAGNOSIS — Z86.010 HISTORY OF COLON POLYPS: ICD-10-CM

## 2023-11-02 DIAGNOSIS — C43.59 MALIGNANT MELANOMA OF TORSO EXCLUDING BREAST: Primary | ICD-10-CM

## 2023-11-02 DIAGNOSIS — Z85.3 HISTORY OF BREAST CANCER: ICD-10-CM

## 2023-11-02 LAB
ALBUMIN SERPL-MCNC: 4.5 G/DL (ref 3.5–5.2)
ALBUMIN/GLOB SERPL: 1.8 G/DL
ALP SERPL-CCNC: 83 U/L (ref 39–117)
ALT SERPL W P-5'-P-CCNC: 20 U/L (ref 1–33)
ANION GAP SERPL CALCULATED.3IONS-SCNC: 9.6 MMOL/L (ref 5–15)
AST SERPL-CCNC: 21 U/L (ref 1–32)
BASOPHILS # BLD AUTO: 0.07 10*3/MM3 (ref 0–0.2)
BASOPHILS NFR BLD AUTO: 1.3 % (ref 0–1.5)
BILIRUB SERPL-MCNC: 0.4 MG/DL (ref 0–1.2)
BUN SERPL-MCNC: 10 MG/DL (ref 8–23)
BUN/CREAT SERPL: 12.3 (ref 7–25)
CALCIUM SPEC-SCNC: 9.6 MG/DL (ref 8.6–10.5)
CHLORIDE SERPL-SCNC: 107 MMOL/L (ref 98–107)
CO2 SERPL-SCNC: 27.4 MMOL/L (ref 22–29)
CREAT SERPL-MCNC: 0.81 MG/DL (ref 0.57–1)
DEPRECATED RDW RBC AUTO: 47.1 FL (ref 37–54)
EGFRCR SERPLBLD CKD-EPI 2021: 80.2 ML/MIN/1.73
EOSINOPHIL # BLD AUTO: 0.3 10*3/MM3 (ref 0–0.4)
EOSINOPHIL NFR BLD AUTO: 5.7 % (ref 0.3–6.2)
ERYTHROCYTE [DISTWIDTH] IN BLOOD BY AUTOMATED COUNT: 13.7 % (ref 12.3–15.4)
GLOBULIN UR ELPH-MCNC: 2.5 GM/DL
GLUCOSE SERPL-MCNC: 96 MG/DL (ref 65–99)
HCT VFR BLD AUTO: 44.3 % (ref 34–46.6)
HGB BLD-MCNC: 14 G/DL (ref 12–15.9)
IMM GRANULOCYTES # BLD AUTO: 0.01 10*3/MM3 (ref 0–0.05)
IMM GRANULOCYTES NFR BLD AUTO: 0.2 % (ref 0–0.5)
LYMPHOCYTES # BLD AUTO: 1.74 10*3/MM3 (ref 0.7–3.1)
LYMPHOCYTES NFR BLD AUTO: 33 % (ref 19.6–45.3)
MCH RBC QN AUTO: 28.9 PG (ref 26.6–33)
MCHC RBC AUTO-ENTMCNC: 31.6 G/DL (ref 31.5–35.7)
MCV RBC AUTO: 91.5 FL (ref 79–97)
MONOCYTES # BLD AUTO: 0.38 10*3/MM3 (ref 0.1–0.9)
MONOCYTES NFR BLD AUTO: 7.2 % (ref 5–12)
NEUTROPHILS NFR BLD AUTO: 2.78 10*3/MM3 (ref 1.7–7)
NEUTROPHILS NFR BLD AUTO: 52.6 % (ref 42.7–76)
NRBC BLD AUTO-RTO: 0 /100 WBC (ref 0–0.2)
PLATELET # BLD AUTO: 184 10*3/MM3 (ref 140–450)
PMV BLD AUTO: 10.1 FL (ref 6–12)
POTASSIUM SERPL-SCNC: 4.1 MMOL/L (ref 3.5–5.2)
PROT SERPL-MCNC: 7 G/DL (ref 6–8.5)
RBC # BLD AUTO: 4.84 10*6/MM3 (ref 3.77–5.28)
SODIUM SERPL-SCNC: 144 MMOL/L (ref 136–145)
T4 FREE SERPL-MCNC: 1.19 NG/DL (ref 0.93–1.7)
TSH SERPL DL<=0.05 MIU/L-ACNC: 3.4 UIU/ML (ref 0.27–4.2)
WBC NRBC COR # BLD: 5.28 10*3/MM3 (ref 3.4–10.8)

## 2023-11-02 PROCEDURE — 85025 COMPLETE CBC W/AUTO DIFF WBC: CPT | Performed by: INTERNAL MEDICINE

## 2023-11-02 PROCEDURE — 80053 COMPREHEN METABOLIC PANEL: CPT | Performed by: INTERNAL MEDICINE

## 2023-11-02 PROCEDURE — 84439 ASSAY OF FREE THYROXINE: CPT | Performed by: INTERNAL MEDICINE

## 2023-11-02 PROCEDURE — 25810000003 SODIUM CHLORIDE 0.9 % SOLUTION: Performed by: INTERNAL MEDICINE

## 2023-11-02 PROCEDURE — 25010000002 NIVOLUMAB 240 MG/24ML SOLUTION 24 ML VIAL: Performed by: INTERNAL MEDICINE

## 2023-11-02 PROCEDURE — 84443 ASSAY THYROID STIM HORMONE: CPT | Performed by: INTERNAL MEDICINE

## 2023-11-02 PROCEDURE — 25010000002 HEPARIN LOCK FLUSH PER 10 UNITS: Performed by: NURSE PRACTITIONER

## 2023-11-02 PROCEDURE — 96413 CHEMO IV INFUSION 1 HR: CPT

## 2023-11-02 RX ORDER — SODIUM CHLORIDE 9 MG/ML
250 INJECTION, SOLUTION INTRAVENOUS ONCE
Status: COMPLETED | OUTPATIENT
Start: 2023-11-02 | End: 2023-11-02

## 2023-11-02 RX ORDER — GABAPENTIN 100 MG/1
100 CAPSULE ORAL AS NEEDED
COMMUNITY

## 2023-11-02 RX ORDER — HEPARIN SODIUM (PORCINE) LOCK FLUSH IV SOLN 100 UNIT/ML 100 UNIT/ML
500 SOLUTION INTRAVENOUS AS NEEDED
Status: DISCONTINUED | OUTPATIENT
Start: 2023-11-02 | End: 2023-11-02 | Stop reason: HOSPADM

## 2023-11-02 RX ORDER — ATORVASTATIN CALCIUM 10 MG/1
10 TABLET, FILM COATED ORAL DAILY
COMMUNITY

## 2023-11-02 RX ORDER — SODIUM CHLORIDE 0.9 % (FLUSH) 0.9 %
10 SYRINGE (ML) INJECTION AS NEEDED
Status: DISCONTINUED | OUTPATIENT
Start: 2023-11-02 | End: 2023-11-02 | Stop reason: HOSPADM

## 2023-11-02 RX ADMIN — SODIUM CHLORIDE 250 ML: 9 INJECTION, SOLUTION INTRAVENOUS at 15:15

## 2023-11-02 RX ADMIN — SODIUM CHLORIDE 480 MG: 9 INJECTION, SOLUTION INTRAVENOUS at 15:15

## 2023-11-02 RX ADMIN — Medication 10 ML: at 15:45

## 2023-11-02 RX ADMIN — SODIUM CHLORIDE, PRESERVATIVE FREE 500 UNITS: 5 INJECTION INTRAVENOUS at 15:45

## 2023-11-02 NOTE — PROGRESS NOTES
Venipuncture Blood Specimen Collection  Venipuncture performed in left arm by Rula Blas MA with good hemostasis. Patient tolerated the procedure well without complications.   11/02/23   Rula Blas MA

## 2023-11-06 ENCOUNTER — OFFICE VISIT (OUTPATIENT)
Dept: SURGERY | Facility: CLINIC | Age: 68
End: 2023-11-06
Payer: COMMERCIAL

## 2023-11-06 VITALS
BODY MASS INDEX: 32.3 KG/M2 | DIASTOLIC BLOOD PRESSURE: 82 MMHG | WEIGHT: 205.8 LBS | SYSTOLIC BLOOD PRESSURE: 116 MMHG | HEIGHT: 67 IN

## 2023-11-06 DIAGNOSIS — Z95.828 PORT-A-CATH IN PLACE: Primary | ICD-10-CM

## 2023-11-06 PROCEDURE — 99212 OFFICE O/P EST SF 10 MIN: CPT | Performed by: SURGERY

## 2023-11-06 NOTE — PROGRESS NOTES
Subjective   Yadira Flowers is a 67 y.o. female is here today for Port Consult.    History of Present Illness  Ms. Osborn was seen in the office today for possible port malfunction.  Patient goes for regular infusions and reports the nurse is having difficulty accessing her port.  Not only does it not provide blood return but that the nurses have a technical problem with cannulating the port and only a couple of the nurses at the infusion clinic apparently are actually able to access it and this causes discomfort for the patient.  She presents now to discuss possible port replacement.  No Known Allergies    Current Outpatient Medications   Medication Sig Dispense Refill    atorvastatin (LIPITOR) 10 MG tablet Take 1 tablet by mouth Daily.      Eliquis 2.5 MG tablet tablet TAKE 1 TABLET BY MOUTH TWICE DAILY 60 tablet 5    fluticasone (FLONASE) 50 MCG/ACT nasal spray 2 sprays by Each Nare route Daily. Shake liquid 16 g 5    gabapentin (NEURONTIN) 100 MG capsule Take 1 capsule by mouth As Needed.      lidocaine-prilocaine (EMLA) 2.5-2.5 % cream Apply to port a cath ~30 min -1 hour prior to chemotherapy 30 g 3    ondansetron ODT (Zofran ODT) 8 MG disintegrating tablet Place 1 tablet on the tongue Every 8 (Eight) Hours As Needed for Nausea or Vomiting. 30 tablet 5    pantoprazole (PROTONIX) 20 MG EC tablet TAKE 1 TABLET BY MOUTH DAILY 30 tablet 2    prochlorperazine (COMPAZINE) 10 MG tablet TAKE 1 TABLET BY MOUTH EVERY 6 HOURS AS NEEDED FOR NAUSEA 360 tablet 3    sennosides-docusate (senna-docusate sodium) 8.6-50 MG per tablet Take 2 tablets by mouth 2 (Two) Times a Day. 120 tablet 2     No current facility-administered medications for this visit.     Past Medical History:   Diagnosis Date    Back pain     Basal cell carcinoma (BCC) in situ of skin 2020    Dr. Mohr - Derm    Breast cancer     2015    Cancer     breast, cervical, melanoma    Cervical cancer 1976    Diagnosed in 1975.  Treated by repeated local  "intervention and ultimately received cryotherapy at [facility] with resolution.    Elbow fracture     Foot fracture     Headache     Hyperlipidemia     Hypertension     TAKEN OFF MEDS    Melanoma     on her back 2004 - treated by Dermatologist Catrachito Amaro in [place], [place] with what sounds like wide local excision.     Pulmonary embolism     Sinusitis      Past Surgical History:   Procedure Laterality Date    AXILLARY LYMPH NODE BIOPSY/EXCISION Right 2/4/2022    Procedure: AXILLARY LYMPH NODE BIOPSY/EXCISION;  Surgeon: Dianelys Cummings MD;  Location:  [facility];  Service: General;  Laterality: Right;    BREAST LUMPECTOMY Right 2015    BREAST SURGERY  2015    Secondary to cancer    BRONCHOSCOPY Right 2/23/2022    Procedure: BRONCHOSCOPY WITH ENDOBRONCHIAL ULTRASOUND;  Surgeon: Chris Her MD;  Location:  [facility];  Service: Pulmonary;  Laterality: Right;    COLONOSCOPY N/A 12/9/2020    Procedure: COLONOSCOPY;  Surgeon: Mohsen Maldonado MD;  Location:  [facility];  Service: Gastroenterology;  Laterality: N/A;    ELBOW FUSION      left    FOOT SURGERY Right 2014    Broken foot    HAND SURGERY  08/2020    right    RHINOPLASTY  2002    SKIN CANCER EXCISION      malignant melanoma from back 2004    US GUIDED LYMPH NODE BIOPSY  1/13/2022    VENOUS ACCESS DEVICE (PORT) INSERTION N/A 2/4/2022    Procedure: INSERTION VENOUS ACCESS DEVICE left or right;  Surgeon: Dianelys Cummings MD;  Location:  [facility];  Service: General;  Laterality: N/A;       Pertinent Review of Systems    Objective   /82   Ht 170.2 cm (67\")   Wt 93.4 kg (205 lb 12.8 oz)   BMI 32.23 kg/m²    Physical Exam  Well-developed well-nourished female  Chest wall: Noninfected [id number]-Wasi in the left anterior chest wall.  No palpable tenderness.  There is no erythema or drainage associated with the incision.  Using a sterile technique the port was accessed without difficulty.  The patient reported minimal discomfort with accessing the port.  " Although there was no blood return but there was no resistance to irrigation.  Procedures     Results/Data:      Assessment & Plan   [id number] in place.    After discussion with the patient and based upon the ease with which the port was able to be accessed the patient is elected not to proceed with port replacement.       Discussion/Summary: I will attempt to be present at the patient's chemotherapy session and provide some education to the nurses how they made better access the port.    Time spent:     BMI is >= 30 and <35. (Class 1 Obesity). The following options were offered after discussion;: weight loss educational material (shared in after visit summary)         Future Appointments   Date Time Provider Department Center   11/30/2023  2:15 PM [facility] OP INFUS CHAIR 27 [facility]   11/30/2023  2:15 PM LAB D[facility] OP INFUSION [facility]   12/28/2023  2:15 PM [facility] OP INFUS CHAIR 27 [facility]   12/28/2023  2:15 PM LAB DRAW COR OP INFUSION [facility]   2/1/2024  1:30 PM [place] NURSE LAB [facility]   2/1/2024  2:00 PM Loreto Stone MD [facility]       Please note that portions of this note were completed with a voice recognition program.

## 2023-11-07 ENCOUNTER — PATIENT ROUNDING (BHMG ONLY) (OUTPATIENT)
Dept: SURGERY | Facility: CLINIC | Age: 68
End: 2023-11-07
Payer: COMMERCIAL

## 2023-11-07 NOTE — PROGRESS NOTES
November 7, 2023    Hello, may I speak with Yadira Flowers?    My name is Anastasia Restrepo      I am  with [facility]  [address] [place] [address], [place] [address]  [place] [address] [phone].    Before we get started may I verify your date of birth? 1955    I am calling to officially welcome you to our practice and ask about your recent visit. Is this a good time to talk? yes    Tell me about your visit with us. What things went well?  She was happy with her visit with us.       We're always looking for ways to make our patients' experiences even better. Do you have recommendations on ways we may improve?  no    Overall were you satisfied with your first visit to our practice? yes       I appreciate you taking the time to speak with me today. Is there anything else I can do for you? no      Thank you, and have a great day.

## 2023-11-30 ENCOUNTER — INFUSION (OUTPATIENT)
Dept: ONCOLOGY | Facility: HOSPITAL | Age: 68
End: 2023-11-30
Payer: MEDICARE

## 2023-11-30 ENCOUNTER — LAB (OUTPATIENT)
Dept: ONCOLOGY | Facility: HOSPITAL | Age: 68
End: 2023-11-30
Payer: MEDICARE

## 2023-11-30 VITALS
SYSTOLIC BLOOD PRESSURE: 158 MMHG | BODY MASS INDEX: 31.94 KG/M2 | DIASTOLIC BLOOD PRESSURE: 80 MMHG | RESPIRATION RATE: 18 BRPM | TEMPERATURE: 98.4 F | OXYGEN SATURATION: 96 % | HEART RATE: 79 BPM | WEIGHT: 203.9 LBS

## 2023-11-30 DIAGNOSIS — C78.7 METASTASIS TO LIVER: ICD-10-CM

## 2023-11-30 DIAGNOSIS — C78.02 MALIGNANT NEOPLASM METASTATIC TO BOTH LUNGS: ICD-10-CM

## 2023-11-30 DIAGNOSIS — C43.59 MALIGNANT MELANOMA OF TORSO EXCLUDING BREAST: ICD-10-CM

## 2023-11-30 DIAGNOSIS — C43.59 MALIGNANT MELANOMA OF TORSO EXCLUDING BREAST: Primary | ICD-10-CM

## 2023-11-30 DIAGNOSIS — C78.01 MALIGNANT NEOPLASM METASTATIC TO BOTH LUNGS: ICD-10-CM

## 2023-11-30 DIAGNOSIS — Z95.828 PORT-A-CATH IN PLACE: ICD-10-CM

## 2023-11-30 LAB
ALBUMIN SERPL-MCNC: 4.4 G/DL (ref 3.5–5.2)
ALBUMIN/GLOB SERPL: 1.8 G/DL
ALP SERPL-CCNC: 76 U/L (ref 39–117)
ALT SERPL W P-5'-P-CCNC: 17 U/L (ref 1–33)
ANION GAP SERPL CALCULATED.3IONS-SCNC: 12 MMOL/L (ref 5–15)
AST SERPL-CCNC: 18 U/L (ref 1–32)
BASOPHILS # BLD AUTO: 0.06 10*3/MM3 (ref 0–0.2)
BASOPHILS NFR BLD AUTO: 1 % (ref 0–1.5)
BILIRUB SERPL-MCNC: 0.4 MG/DL (ref 0–1.2)
BUN SERPL-MCNC: 12 MG/DL (ref 8–23)
BUN/CREAT SERPL: 15.4 (ref 7–25)
CALCIUM SPEC-SCNC: 10 MG/DL (ref 8.6–10.5)
CHLORIDE SERPL-SCNC: 108 MMOL/L (ref 98–107)
CO2 SERPL-SCNC: 24 MMOL/L (ref 22–29)
CREAT SERPL-MCNC: 0.78 MG/DL (ref 0.57–1)
DEPRECATED RDW RBC AUTO: 44.7 FL (ref 37–54)
EGFRCR SERPLBLD CKD-EPI 2021: 83.4 ML/MIN/1.73
EOSINOPHIL # BLD AUTO: 0.19 10*3/MM3 (ref 0–0.4)
EOSINOPHIL NFR BLD AUTO: 3.1 % (ref 0.3–6.2)
ERYTHROCYTE [DISTWIDTH] IN BLOOD BY AUTOMATED COUNT: 13.8 % (ref 12.3–15.4)
GLOBULIN UR ELPH-MCNC: 2.4 GM/DL
GLUCOSE SERPL-MCNC: 117 MG/DL (ref 65–99)
HCT VFR BLD AUTO: 40.8 % (ref 34–46.6)
HGB BLD-MCNC: 13.4 G/DL (ref 12–15.9)
IMM GRANULOCYTES # BLD AUTO: 0.02 10*3/MM3 (ref 0–0.05)
IMM GRANULOCYTES NFR BLD AUTO: 0.3 % (ref 0–0.5)
LYMPHOCYTES # BLD AUTO: 1.87 10*3/MM3 (ref 0.7–3.1)
LYMPHOCYTES NFR BLD AUTO: 30.1 % (ref 19.6–45.3)
MCH RBC QN AUTO: 29.2 PG (ref 26.6–33)
MCHC RBC AUTO-ENTMCNC: 32.8 G/DL (ref 31.5–35.7)
MCV RBC AUTO: 88.9 FL (ref 79–97)
MONOCYTES # BLD AUTO: 0.43 10*3/MM3 (ref 0.1–0.9)
MONOCYTES NFR BLD AUTO: 6.9 % (ref 5–12)
NEUTROPHILS NFR BLD AUTO: 3.64 10*3/MM3 (ref 1.7–7)
NEUTROPHILS NFR BLD AUTO: 58.6 % (ref 42.7–76)
NRBC BLD AUTO-RTO: 0 /100 WBC (ref 0–0.2)
PLATELET # BLD AUTO: 190 10*3/MM3 (ref 140–450)
PMV BLD AUTO: 10.4 FL (ref 6–12)
POTASSIUM SERPL-SCNC: 3.8 MMOL/L (ref 3.5–5.2)
PROT SERPL-MCNC: 6.8 G/DL (ref 6–8.5)
RBC # BLD AUTO: 4.59 10*6/MM3 (ref 3.77–5.28)
SODIUM SERPL-SCNC: 144 MMOL/L (ref 136–145)
T4 FREE SERPL-MCNC: 1.25 NG/DL (ref 0.93–1.7)
TSH SERPL DL<=0.05 MIU/L-ACNC: 1.2 UIU/ML (ref 0.27–4.2)
WBC NRBC COR # BLD AUTO: 6.21 10*3/MM3 (ref 3.4–10.8)

## 2023-11-30 PROCEDURE — 84439 ASSAY OF FREE THYROXINE: CPT

## 2023-11-30 PROCEDURE — 25010000002 HEPARIN LOCK FLUSH PER 10 UNITS: Performed by: INTERNAL MEDICINE

## 2023-11-30 PROCEDURE — 84443 ASSAY THYROID STIM HORMONE: CPT

## 2023-11-30 PROCEDURE — 96413 CHEMO IV INFUSION 1 HR: CPT

## 2023-11-30 PROCEDURE — 25810000003 SODIUM CHLORIDE 0.9 % SOLUTION: Performed by: INTERNAL MEDICINE

## 2023-11-30 PROCEDURE — 25010000002 NIVOLUMAB 240 MG/24ML SOLUTION 24 ML VIAL: Performed by: INTERNAL MEDICINE

## 2023-11-30 PROCEDURE — 80053 COMPREHEN METABOLIC PANEL: CPT

## 2023-11-30 PROCEDURE — 85025 COMPLETE CBC W/AUTO DIFF WBC: CPT

## 2023-11-30 RX ORDER — SODIUM CHLORIDE 0.9 % (FLUSH) 0.9 %
20 SYRINGE (ML) INJECTION AS NEEDED
OUTPATIENT
Start: 2023-11-30

## 2023-11-30 RX ORDER — SODIUM CHLORIDE 0.9 % (FLUSH) 0.9 %
10 SYRINGE (ML) INJECTION AS NEEDED
OUTPATIENT
Start: 2023-11-30

## 2023-11-30 RX ORDER — SODIUM CHLORIDE 0.9 % (FLUSH) 0.9 %
10 SYRINGE (ML) INJECTION AS NEEDED
Status: DISCONTINUED | OUTPATIENT
Start: 2023-11-30 | End: 2023-11-30 | Stop reason: HOSPADM

## 2023-11-30 RX ORDER — HEPARIN SODIUM (PORCINE) LOCK FLUSH IV SOLN 100 UNIT/ML 100 UNIT/ML
500 SOLUTION INTRAVENOUS AS NEEDED
Status: DISCONTINUED | OUTPATIENT
Start: 2023-11-30 | End: 2023-11-30 | Stop reason: HOSPADM

## 2023-11-30 RX ORDER — HEPARIN SODIUM (PORCINE) LOCK FLUSH IV SOLN 100 UNIT/ML 100 UNIT/ML
300 SOLUTION INTRAVENOUS ONCE
Status: CANCELLED | OUTPATIENT
Start: 2023-11-30

## 2023-11-30 RX ORDER — SODIUM CHLORIDE 0.9 % (FLUSH) 0.9 %
20 SYRINGE (ML) INJECTION AS NEEDED
Status: CANCELLED | OUTPATIENT
Start: 2023-11-30

## 2023-11-30 RX ORDER — HEPARIN SODIUM (PORCINE) LOCK FLUSH IV SOLN 100 UNIT/ML 100 UNIT/ML
300 SOLUTION INTRAVENOUS ONCE
OUTPATIENT
Start: 2023-11-30

## 2023-11-30 RX ORDER — SODIUM CHLORIDE 9 MG/ML
250 INJECTION, SOLUTION INTRAVENOUS ONCE
Status: COMPLETED | OUTPATIENT
Start: 2023-11-30 | End: 2023-11-30

## 2023-11-30 RX ORDER — HEPARIN SODIUM (PORCINE) LOCK FLUSH IV SOLN 100 UNIT/ML 100 UNIT/ML
500 SOLUTION INTRAVENOUS AS NEEDED
OUTPATIENT
Start: 2023-11-30

## 2023-11-30 RX ADMIN — SODIUM CHLORIDE 480 MG: 9 INJECTION, SOLUTION INTRAVENOUS at 15:33

## 2023-11-30 RX ADMIN — HEPARIN 500 UNITS: 100 SYRINGE at 16:10

## 2023-11-30 RX ADMIN — SODIUM CHLORIDE 250 ML: 9 INJECTION, SOLUTION INTRAVENOUS at 15:33

## 2023-11-30 RX ADMIN — Medication 10 ML: at 16:10

## 2023-12-28 ENCOUNTER — INFUSION (OUTPATIENT)
Dept: ONCOLOGY | Facility: HOSPITAL | Age: 68
End: 2023-12-28
Payer: COMMERCIAL

## 2023-12-28 ENCOUNTER — LAB (OUTPATIENT)
Dept: ONCOLOGY | Facility: HOSPITAL | Age: 68
End: 2023-12-28
Payer: COMMERCIAL

## 2023-12-28 VITALS
TEMPERATURE: 97.3 F | OXYGEN SATURATION: 98 % | DIASTOLIC BLOOD PRESSURE: 80 MMHG | BODY MASS INDEX: 32.51 KG/M2 | RESPIRATION RATE: 18 BRPM | HEART RATE: 73 BPM | WEIGHT: 207.6 LBS | SYSTOLIC BLOOD PRESSURE: 148 MMHG

## 2023-12-28 DIAGNOSIS — Z95.828 PORT-A-CATH IN PLACE: Primary | ICD-10-CM

## 2023-12-28 DIAGNOSIS — C78.02 MALIGNANT NEOPLASM METASTATIC TO BOTH LUNGS: ICD-10-CM

## 2023-12-28 DIAGNOSIS — C43.59 MALIGNANT MELANOMA OF TORSO EXCLUDING BREAST: ICD-10-CM

## 2023-12-28 DIAGNOSIS — C78.01 MALIGNANT NEOPLASM METASTATIC TO BOTH LUNGS: ICD-10-CM

## 2023-12-28 DIAGNOSIS — C78.7 METASTASIS TO LIVER: ICD-10-CM

## 2023-12-28 DIAGNOSIS — Z79.899 LONG-TERM USE OF HIGH-RISK MEDICATION: ICD-10-CM

## 2023-12-28 LAB
ALBUMIN SERPL-MCNC: 4.1 G/DL (ref 3.5–5.2)
ALBUMIN/GLOB SERPL: 1.6 G/DL
ALP SERPL-CCNC: 82 U/L (ref 39–117)
ALT SERPL W P-5'-P-CCNC: 20 U/L (ref 1–33)
ANION GAP SERPL CALCULATED.3IONS-SCNC: 14.1 MMOL/L (ref 5–15)
AST SERPL-CCNC: 19 U/L (ref 1–32)
BASOPHILS # BLD AUTO: 0.06 10*3/MM3 (ref 0–0.2)
BASOPHILS NFR BLD AUTO: 1.2 % (ref 0–1.5)
BILIRUB SERPL-MCNC: 0.6 MG/DL (ref 0–1.2)
BUN SERPL-MCNC: 11 MG/DL (ref 8–23)
BUN/CREAT SERPL: 12.8 (ref 7–25)
CALCIUM SPEC-SCNC: 9.5 MG/DL (ref 8.6–10.5)
CHLORIDE SERPL-SCNC: 106 MMOL/L (ref 98–107)
CO2 SERPL-SCNC: 23.9 MMOL/L (ref 22–29)
CREAT SERPL-MCNC: 0.86 MG/DL (ref 0.57–1)
DEPRECATED RDW RBC AUTO: 44.7 FL (ref 37–54)
EGFRCR SERPLBLD CKD-EPI 2021: 74.2 ML/MIN/1.73
EOSINOPHIL # BLD AUTO: 0.18 10*3/MM3 (ref 0–0.4)
EOSINOPHIL NFR BLD AUTO: 3.6 % (ref 0.3–6.2)
ERYTHROCYTE [DISTWIDTH] IN BLOOD BY AUTOMATED COUNT: 13.4 % (ref 12.3–15.4)
GLOBULIN UR ELPH-MCNC: 2.6 GM/DL
GLUCOSE SERPL-MCNC: 87 MG/DL (ref 65–99)
HCT VFR BLD AUTO: 44.2 % (ref 34–46.6)
HGB BLD-MCNC: 13.8 G/DL (ref 12–15.9)
IMM GRANULOCYTES # BLD AUTO: 0.01 10*3/MM3 (ref 0–0.05)
IMM GRANULOCYTES NFR BLD AUTO: 0.2 % (ref 0–0.5)
LYMPHOCYTES # BLD AUTO: 1.93 10*3/MM3 (ref 0.7–3.1)
LYMPHOCYTES NFR BLD AUTO: 38.5 % (ref 19.6–45.3)
MCH RBC QN AUTO: 28.4 PG (ref 26.6–33)
MCHC RBC AUTO-ENTMCNC: 31.2 G/DL (ref 31.5–35.7)
MCV RBC AUTO: 90.9 FL (ref 79–97)
MONOCYTES # BLD AUTO: 0.42 10*3/MM3 (ref 0.1–0.9)
MONOCYTES NFR BLD AUTO: 8.4 % (ref 5–12)
NEUTROPHILS NFR BLD AUTO: 2.41 10*3/MM3 (ref 1.7–7)
NEUTROPHILS NFR BLD AUTO: 48.1 % (ref 42.7–76)
NRBC BLD AUTO-RTO: 0 /100 WBC (ref 0–0.2)
PLATELET # BLD AUTO: 199 10*3/MM3 (ref 140–450)
PMV BLD AUTO: 10.3 FL (ref 6–12)
POTASSIUM SERPL-SCNC: 4.2 MMOL/L (ref 3.5–5.2)
PROT SERPL-MCNC: 6.7 G/DL (ref 6–8.5)
RBC # BLD AUTO: 4.86 10*6/MM3 (ref 3.77–5.28)
SODIUM SERPL-SCNC: 144 MMOL/L (ref 136–145)
TSH SERPL DL<=0.05 MIU/L-ACNC: 2.28 UIU/ML (ref 0.27–4.2)
WBC NRBC COR # BLD AUTO: 5.01 10*3/MM3 (ref 3.4–10.8)

## 2023-12-28 PROCEDURE — 25010000002 NIVOLUMAB 240 MG/24ML SOLUTION 24 ML VIAL: Performed by: INTERNAL MEDICINE

## 2023-12-28 PROCEDURE — 25810000003 SODIUM CHLORIDE 0.9 % SOLUTION: Performed by: INTERNAL MEDICINE

## 2023-12-28 PROCEDURE — 25010000002 HEPARIN LOCK FLUSH PER 10 UNITS: Performed by: INTERNAL MEDICINE

## 2023-12-28 PROCEDURE — 80050 GENERAL HEALTH PANEL: CPT

## 2023-12-28 PROCEDURE — 96413 CHEMO IV INFUSION 1 HR: CPT

## 2023-12-28 RX ORDER — HEPARIN SODIUM (PORCINE) LOCK FLUSH IV SOLN 100 UNIT/ML 100 UNIT/ML
300 SOLUTION INTRAVENOUS ONCE
OUTPATIENT
Start: 2023-12-28

## 2023-12-28 RX ORDER — SODIUM CHLORIDE 0.9 % (FLUSH) 0.9 %
10 SYRINGE (ML) INJECTION AS NEEDED
Status: DISCONTINUED | OUTPATIENT
Start: 2023-12-28 | End: 2023-12-28 | Stop reason: HOSPADM

## 2023-12-28 RX ORDER — HEPARIN SODIUM (PORCINE) LOCK FLUSH IV SOLN 100 UNIT/ML 100 UNIT/ML
500 SOLUTION INTRAVENOUS AS NEEDED
Status: DISCONTINUED | OUTPATIENT
Start: 2023-12-28 | End: 2023-12-28 | Stop reason: HOSPADM

## 2023-12-28 RX ORDER — SODIUM CHLORIDE 9 MG/ML
250 INJECTION, SOLUTION INTRAVENOUS ONCE
Status: COMPLETED | OUTPATIENT
Start: 2023-12-28 | End: 2023-12-28

## 2023-12-28 RX ORDER — HEPARIN SODIUM (PORCINE) LOCK FLUSH IV SOLN 100 UNIT/ML 100 UNIT/ML
500 SOLUTION INTRAVENOUS AS NEEDED
OUTPATIENT
Start: 2023-12-28

## 2023-12-28 RX ORDER — SODIUM CHLORIDE 0.9 % (FLUSH) 0.9 %
10 SYRINGE (ML) INJECTION AS NEEDED
OUTPATIENT
Start: 2023-12-28

## 2023-12-28 RX ORDER — SODIUM CHLORIDE 0.9 % (FLUSH) 0.9 %
20 SYRINGE (ML) INJECTION AS NEEDED
OUTPATIENT
Start: 2023-12-28

## 2023-12-28 RX ADMIN — SODIUM CHLORIDE 480 MG: 9 INJECTION, SOLUTION INTRAVENOUS at 14:12

## 2023-12-28 RX ADMIN — SODIUM CHLORIDE 250 ML: 9 INJECTION, SOLUTION INTRAVENOUS at 14:12

## 2023-12-28 RX ADMIN — Medication 10 ML: at 14:55

## 2023-12-28 RX ADMIN — HEPARIN 500 UNITS: 100 SYRINGE at 14:55

## 2023-12-29 RX ORDER — FLUTICASONE PROPIONATE 50 MCG
2 SPRAY, SUSPENSION (ML) NASAL DAILY
Qty: 16 G | Refills: 5 | OUTPATIENT
Start: 2023-12-29

## 2024-01-19 DIAGNOSIS — C78.01 MALIGNANT NEOPLASM METASTATIC TO BOTH LUNGS: ICD-10-CM

## 2024-01-19 DIAGNOSIS — C78.7 METASTASIS TO LIVER: Primary | ICD-10-CM

## 2024-01-19 DIAGNOSIS — C43.59 MALIGNANT MELANOMA OF TORSO EXCLUDING BREAST: ICD-10-CM

## 2024-01-19 DIAGNOSIS — C78.02 MALIGNANT NEOPLASM METASTATIC TO BOTH LUNGS: ICD-10-CM

## 2024-01-19 RX ORDER — SODIUM CHLORIDE 9 MG/ML
250 INJECTION, SOLUTION INTRAVENOUS ONCE
OUTPATIENT
Start: 2024-01-25

## 2024-01-24 DIAGNOSIS — Z17.0 MALIGNANT NEOPLASM OF RIGHT BREAST IN FEMALE, ESTROGEN RECEPTOR POSITIVE, UNSPECIFIED SITE OF BREAST: ICD-10-CM

## 2024-01-24 DIAGNOSIS — C50.911 MALIGNANT NEOPLASM OF RIGHT BREAST IN FEMALE, ESTROGEN RECEPTOR POSITIVE, UNSPECIFIED SITE OF BREAST: ICD-10-CM

## 2024-01-24 DIAGNOSIS — C78.01 MALIGNANT NEOPLASM METASTATIC TO BOTH LUNGS: ICD-10-CM

## 2024-01-24 DIAGNOSIS — Z85.3 HISTORY OF BREAST CANCER: ICD-10-CM

## 2024-01-24 DIAGNOSIS — C43.59 MALIGNANT MELANOMA OF TORSO EXCLUDING BREAST: ICD-10-CM

## 2024-01-24 DIAGNOSIS — C78.02 MALIGNANT NEOPLASM METASTATIC TO BOTH LUNGS: ICD-10-CM

## 2024-01-24 DIAGNOSIS — C78.7 METASTASIS TO LIVER: Primary | ICD-10-CM

## 2024-01-24 NOTE — PROGRESS NOTES
"NAME: Yadira Flowers    : 1955    DATE: 2024      DIAGNOSIS:   1.  Stage IA ([id number]) moderately differentiated invasive ductal carcinoma of the right breast diagnosed in 2015.    2.  H/o malignant Melanoma on her Back    3.  H/o cervical cancer treated with cryotherapy at [facility] in     4.  Recurrent / metastatic malignant Melanoma  -  R axillary LN bx 22.    -  She has R axillary LN mass (2.47x2.16), Solitary cavitary R lobe liver lesion, cavitary RLL lung mass,  and bilateral parenchymal lung nodules      TREATMENT HISTORY:   1.         CHIEF COMPLAINT:  Follow up of metastatic melanoma/toxicity check and recent imaging    HISTORY OF PRESENT ILLNESS:   Yadira Flowers is a very pleasant 68 y.o. female who was referred by Dr. Dilcia Pedro for evaluation and treatment of breast cancer. She was living in [place] in 2015 when her dog brought attention to and \"found\" an abnormal lump in her right breast.  She was treated by Dr. Steve Isbell and underwent R lumpectomy with SLNBx on 10-22-15 as below.  She then had what sounds like accelerated partial breast irradiation.  She was then started on Arimidex which was later switched to Exemestane for a better side effect profile.  She took this for about 5 years, stopping a couple of weeks ago.  She stopped taking Exemestane because she developed some vaginal bleeding and hematuria.  This was evaluated by her PCP and gynecologist and felt to be due to vaginal atrophy.  She was prescribed a hormone pill which she hasn't yet started and was referred here to discuss things further.      Aside from bleeding which has been uncomfortable and distressing for her, Ms. Onur Burgos has generally been well.  The last year has been hard on her emotionally with the loss of her  and then her dog, her home and her insurance, but she is coping well and has good family support in [place].  She says she gained weight with her breast " cancer treatment and gained weight when her  .  She has been working now to lose weight and has lost 20 lbs over the last 2 months with dieting.  She denies chest pain or shortness of breath.  She complains of some RLQ cramping pain and isn't sure what is causing that.  She denies difficulty moving her bowels.  No blood in her stool that she is aware of.  She has had vaginal bleeding and hematuria as above.        INTERVAL HISTORY:  Ms. Burgos presents today for follow up of metastatic melanoma/toxicity check and recent imaging.  Since her last visit, she has overall been well.  She has had some difficulty in her family situation with her mother and a sister which has been stressful but she is doing better now. She has moved out of her mother's home into her own home and her other sister, who has MS, joined her. She is working to make her home handicap accessible.  She continues to tolerate immunotherapy well.  She has a lot of questions today about diet and about expectations as we move forward in terms of risk of cancer progression.      PAST MEDICAL HISTORY:  Past Medical History:   Diagnosis Date    Back pain     Basal cell carcinoma (BCC) in situ of skin     Dr. Mohr - Derm    Breast cancer         Cancer     breast, cervical, melanoma    Cervical cancer     Diagnosed in .  Treated by repeated local intervention and ultimately received cryotherapy at [facility] with resolution.    Elbow fracture     Foot fracture     Headache     Hyperlipidemia     Hypertension     TAKEN OFF MEDS    Melanoma     on her back  - treated by Dermatologist Catrachito Amaro in [place], [place] with what sounds like wide local excision.     Pulmonary embolism     Sinusitis        PAST SURGICAL HISTORY:  Past Surgical History:   Procedure Laterality Date    AXILLARY LYMPH NODE BIOPSY/EXCISION Right 2022    Procedure: AXILLARY LYMPH NODE BIOPSY/EXCISION;  Surgeon: Dianelys Cummings MD;  Location: [facility] OR;   Service: General;  Laterality: Right;    BREAST LUMPECTOMY Right 2015    BREAST SURGERY  2015    Secondary to cancer    BRONCHOSCOPY Right 2022    Procedure: BRONCHOSCOPY WITH ENDOBRONCHIAL ULTRASOUND;  Surgeon: Chris Her MD;  Location:  [facility] OR;  Service: Pulmonary;  Laterality: Right;    COLONOSCOPY N/A 2020    Procedure: COLONOSCOPY;  Surgeon: Mohsen Maldonado MD;  Location:  [facility] OR;  Service: Gastroenterology;  Laterality: N/A;    ELBOW FUSION      left    FOOT SURGERY Right 2014    Broken foot    HAND SURGERY  2020    right    RHINOPLASTY  2002    SKIN CANCER EXCISION      malignant melanoma from back     US GUIDED LYMPH NODE BIOPSY  2022    VENOUS ACCESS DEVICE (PORT) INSERTION N/A 2022    Procedure: INSERTION VENOUS ACCESS DEVICE left or right;  Surgeon: Dianelys Cummings MD;  Location:  [facility] OR;  Service: General;  Laterality: N/A;       FAMILY HISTORY:  Family History   Problem Relation Age of Onset    Other Mother         blood clots    Hypertension Mother     Ulcers Father     Hypertension Sister     Other Sister         Multiple Sclerosis    Cancer Paternal Aunt     Stroke Maternal Grandmother     Alcohol abuse Paternal Grandmother     Hypertension Sister     Multiple sclerosis Sister     Hypertension Sister     Coronary artery disease Paternal Grandfather     Cancer Maternal Aunt 30        colon    Breast cancer Neg Hx    Many paternal aunts  with malignancy of various types. Many cousins on that side of the family have had cancer as well.  One  of colon cancer in her 30s.  One had a brain tumor.    SOCIAL HISTORY:  Social History     Socioeconomic History    Marital status:    Tobacco Use    Smoking status: Never    Smokeless tobacco: Never   Vaping Use    Vaping Use: Never used   Substance and Sexual Activity    Alcohol use: Not Currently    Drug use: Never    Sexual activity: Not Currently     Birth control/protection: Post-menopausal        REVIEW OF SYSTEMS:   A comprehensive 14 point review of systems was performed.  Significant findings as mentioned above.  All other systems reviewed and are negative.      MEDICATIONS:  The current medication list was reviewed in the EMR    Current Outpatient Medications:     atorvastatin (LIPITOR) 10 MG tablet, Take 1 tablet by mouth Daily., Disp: , Rfl:     Eliquis 2.5 MG tablet tablet, TAKE 1 TABLET BY MOUTH TWICE DAILY, Disp: 60 tablet, Rfl: 5    fluticasone (FLONASE) 50 MCG/ACT nasal spray, 2 sprays by Each Nare route Daily. Shake liquid, Disp: 16 g, Rfl: 5    gabapentin (NEURONTIN) 100 MG capsule, Take 1 capsule by mouth As Needed., Disp: , Rfl:     lidocaine-prilocaine (EMLA) 2.5-2.5 % cream, Apply to port a cath ~30 min -1 hour prior to chemotherapy, Disp: 30 g, Rfl: 3    ondansetron ODT (Zofran ODT) 8 MG disintegrating tablet, Place 1 tablet on the tongue Every 8 (Eight) Hours As Needed for Nausea or Vomiting., Disp: 30 tablet, Rfl: 5    pantoprazole (PROTONIX) 20 MG EC tablet, TAKE 1 TABLET BY MOUTH DAILY, Disp: 30 tablet, Rfl: 2    prochlorperazine (COMPAZINE) 10 MG tablet, TAKE 1 TABLET BY MOUTH EVERY 6 HOURS AS NEEDED FOR NAUSEA, Disp: 360 tablet, Rfl: 3    sennosides-docusate (senna-docusate sodium) 8.6-50 MG per tablet, Take 2 tablets by mouth 2 (Two) Times a Day., Disp: 120 tablet, Rfl: 2    Tirzepatide (Mounjaro) 2.5 MG/0.5ML solution pen-injector pen, Inject 0.5 mL under the skin into the appropriate area as directed 1 (One) Time Per Week., Disp: , Rfl:     ALLERGIES:    No Known Allergies    PHYSICAL EXAM:  Vitals:    02/01/24 1356   BP: 135/82   Pulse: 84   Resp: 18   Temp: 97.5 °F (36.4 °C)   TempSrc: Temporal   SpO2: 98%   Weight: 94.4 kg (208 lb 3.2 oz)   PainSc: 0-No pain   Body surface area is 2.06 meters squared.  Body mass index is 32.61 kg/m².  ECOG score: 0   General:  Awake, alert and oriented, appears well.  HEENT:  Pupils are equal, round and reactive to light and accommodation,  Extra-ocular movements full, Oropharyx clear, mucous membranes moist  Neck:  No JVD, thyromegaly or lymphadenopathy  CV:  Regular rate and rhythm, no murmurs, rubs or gallops  Resp:  Lungs are clear to auscultation bilaterally, no crackles  Breast: Deferred today. Previously: S/p R lumpectomy. There is a somewhat firm area at the site of surgery but there are no palpable masses.  She is s/p excision of R axillary LN, with some post-surgical swelling.  Left breast is without mass lesions.  No L axillary adenopathy.  Abd:  Soft, non-tender, non-distended, bowel sounds present, no organomegaly or masses.    Ext:  No clubbing, cyanosis, or lower extremity edema  Lymph:  No cervical, supraclavicular, axillary adenopathy  Neuro:  MS as above, grossly non-focal exam    PATHOLOGY:  10-22-15            12-16-21          01-13-22          ENDOSCOPY:  Colonoscopy 12-09-20     Findings: 8 hyperplastic appearing polyps of the distal rectum, diverticulosis moderately throughout the sigmoid colon      IMAGING:  CTCAP 12-15-20  On the lung windows there are several calcified  granulomas in the lungs. No noncalcified pulmonary parenchymal lung  nodules were identified. On the soft tissue windows there were no  enlarged lymph nodes in the supraclavicular or axillary regions. No  mediastinal or hilar lymphadenopathy was seen. The heart was not  enlarged. There were no pericardial effusions.     IMPRESSION:  No CT findings of metastatic disease in the chest. There are  calcified granulomas in the lungs.     CT FINDINGS: The liver and spleen were normal in size and shape. The  gallbladder contains several stones. The wall was not thickened. The  bile ducts in the liver are not dilated. There is nothing seen to  suggest metastatic disease in the liver. The pancreas shows no evidence  of mass or inflammation. No adrenal or renal lesions are demonstrated.  The aorta is normal in caliber. There were no enlarged lymph nodes in  the  retroperitoneum or in the mesentery. The bowel shows no evidence of  obstruction. In the pelvis there were no masses or fluid collections.  There were no ventral hernias.     IMPRESSION:  No CT findings of metastatic disease in the abdomen or  pelvis. This study does demonstrate several stones in the lumen of the  gallbladder.         Bilateral diagnostic mammogram 01-31-21  FINDINGS: There are scattered areas of fibroglandular density.     The bilateral fibroglandular pattern does not appear significantly  changed compared to the exam from 2020. This includes postoperative  changes in the right 12:00 region. Mild skin thickening is noted  involving the right breast which is likely treatment related. A few  bilateral scattered calcifications are noted.     IMPRESSION:  Incomplete examination as comparison with older outside  prior mammograms is needed.        BI-RADS CATEGORY:   0, INCOMPLETE:  Need prior mammograms for comparison        RECOMMENDATION:  We will attempt to obtain older outside prior  mammograms for comparison.      CTCAP 12-12-21  FINDINGS: Lack of IV contrast hinders parenchymal assessment of the mediastinum, liver, spleen, pancreas, adrenal glands and kidneys.         Significant infiltrate seen in the right lower lobe with interstitial component. There are also lung nodules present in the right lung. Index nodule in the right lung base medially is 5 mm maximal diameter. Other smaller nodules seen throughout the right  lung. Tiny 3 mm lingular nodule seen in series 3 image 30. A benign calcified granuloma seen in the left lower lobe but a noncalcified indeterminant  5 mm left lower lobe nodule seen, series 3 image 42.     Nonenlarged mediastinal adenopathy seen. However there is an enlarged right axillary 1.8 x 2.6 cm lymph node partially seen.     Worrisome hepatic metastasis or primary hepatic lesion seen measuring 4.4 x 4.1 cm in liver segment 8. Margins are ill-defined. Abscess could have a  similar appearance. Suggestion of subtle gallstones. Nonobstructing 2 mm right renal calculus seen. No  obstructive uropathy. Gaseous distention of the esophagus and hiatal hernia present consistent with GERD. No enteric contrast but no gross evidence of bowel obstruction.   There is no gross free air, free fluid or focal inflammatory change.  Uterus and  adnexa are not enlarged. Osseous structures are grossly intact.     IMPRESSION:  Constellation of findings with significant consolidation in the right lower lobe, enlarged right axillary lymph node, indeterminate bilateral lung nodules and ill-defined hepatic lesion concerning for underlying malignancy in this patient .     FINDINGS: Lack of IV contrast hinders parenchymal assessment of the mediastinum, liver, spleen, pancreas, adrenal glands and kidneys.         Significant infiltrate seen in the right lower lobe with interstitial component. There are also lung nodules present in the right lung. Index nodule in the right lung base medially is 5 mm maximal diameter. Other smaller nodules seen throughout the right  lung. Tiny 3 mm lingular nodule seen in series 3 image 30. A benign calcified granuloma seen in the left lower lobe but a noncalcified indeterminant  5 mm left lower lobe nodule seen, series 3 image 42.     Nonenlarged mediastinal adenopathy seen. However there is an enlarged right axillary 1.8 x 2.6 cm lymph node partially seen.     Worrisome hepatic metastasis or primary hepatic lesion seen measuring 4.4 x 4.1 cm in liver segment 8. Margins are ill-defined. Abscess could have a similar appearance. Suggestion of subtle gallstones. Nonobstructing 2 mm right renal calculus seen. No  obstructive uropathy. Gaseous distention of the esophagus and hiatal hernia present consistent with GERD. No enteric contrast but no gross evidence of bowel obstruction.   There is no gross free air, free fluid or focal inflammatory change.  Uterus and  adnexa are not enlarged.  Osseous structures are grossly intact.     IMPRESSION:  Constellation of findings with significant consolidation in the right lower lobe, enlarged right axillary lymph node, indeterminate bilateral lung nodules and ill-defined hepatic lesion concerning for underlying malignancy in this patient .       US Abdomen Complete 12-12-21  FINDINGS:  The visualized portions of the pancreas, IVC and aorta appear normal.        The liver is moderately coarsened in echotexture. Ill-defined hypodensity in the liver measuring 3 x 3.2 x 2.6 cm corresponds to recent CT. Unclear if this is a malignant lesion. Abscesses within the differential but prior CT was performed without any  contrast.  The common bile duct is not dilated.. Gallbladder wall is thickened at 3.4 mm with trace pericholecystic fluid and subtle hyperemia. Gallstones and shadowing present.     The kidneys are normal in size and echogenicity. There is no   hydronephrosis or focal solid lesion.  The spleen is normal in size and echotexture.     IMPRESSION:  1. Nonspecific gallbladder wall thickening and questionable pericholecystic fluid. Gallstones are better seen on CT than ultrasound.  2.  Heterogeneous liver with lesion in the liver as seen on CT. Ultrasound is not helpful in determining etiology. This could be an abscess but given the other findings on CT,  malignancy is not excluded.      CTCAP 12-15-21  FINDINGS:    LUNGS:  Increased density of the now right lower lobe mixed  groundglass and more solid-appearing consolidation with internal  cavitary component identified. Tiny right and left lung pulmonary  nodules. Grossly stable.    PLEURAL SPACE:  Small bilateral pleural effusions.  No pneumothorax.    HEART:  Unremarkable.  No cardiomegaly.  No significant pericardial  effusion.    BONES/JOINTS:  Unremarkable.  No acute fracture.  No dislocation.    SOFT TISSUES:  Unremarkable.    VASCULATURE:  Unremarkable.  No thoracic aortic aneurysm.    LYMPH NODES:   Unremarkable.  No enlarged lymph nodes.     IMPRESSION:  1.  Increased density of the now right lower lobe mixed groundglass and  more solid-appearing consolidation with internal cavitary component  identified. Tiny right and left lung pulmonary nodules. Grossly stable.  2.  Small bilateral pleural effusions.    FINDINGS:    LUNG BASES:  Unremarkable.  No mass.  No consolidation.      ABDOMEN:    LIVER:  Right lobe of liver lesion is again identified now measuring  about 4.7 cm and was about 4.7 cm.    GALLBLADDER AND BILE DUCTS:  Gallstones noted in the gallbladder.  No  ductal dilation.    PANCREAS:  Unremarkable.  No mass.  No ductal dilation.    SPLEEN:  Unremarkable.  No splenomegaly.    ADRENALS:  Unremarkable.  No mass.    KIDNEYS AND URETERS:  Unremarkable.  No solid mass.  No  hydronephrosis.    STOMACH AND BOWEL:  Unremarkable.  No obstruction.  No mucosal  thickening.      PELVIS:    APPENDIX:  No findings to suggest acute appendicitis.    BLADDER:  Unremarkable.  No mass.    REPRODUCTIVE:  Unremarkable as visualized.      ABDOMEN and PELVIS:    INTRAPERITONEAL SPACE:  Unremarkable.  No free air.  No significant  fluid collection.    BONES/JOINTS:  No acute fracture.  No dislocation.    SOFT TISSUES:  Unremarkable.    VASCULATURE:  Unremarkable.  No abdominal aortic aneurysm.    LYMPH NODES:  Unremarkable.  No enlarged lymph nodes.     IMPRESSION:    Right lobe of liver lesion is again identified now measuring about 4.7  cm and was about 4.7 cm.      NM Bone Scan Whole Body 12-15-21  FINDINGS:    SKULL/FACIAL BONES:  No focal increased or decreased uptake.    SPINE:  Unremarkable.  No abnormal increased or decreased uptake.    RIBS:  Unremarkable.  No abnormal uptake.    LONG BONES:  Unremarkable.  No abnormal uptake.    PELVIS:  Unremarkable.  No focal increased or decreased uptake.    JOINTS:  Unremarkable.  No focal increased or decreased uptake.    SOFT TISSUES:  Unremarkable.    RENAL/BLADDER:   Within normal limits.     IMPRESSION:    Normal bone scan.      CT Abdomen With Contrast  12-18-21  FINDINGS:  Partially imaged thick-walled cavitary lesion in the right lower lobe with right basilar atelectasis/infiltrate and small right pleural effusion. There is also left basilar atelectasis/infiltrate and trace left pleural effusion. Multiple noncalcified  pulmonary nodules are scattered throughout the visualized lower lung fields, concerning for pulmonary metastatic disease.     Ill-defined right hepatic mass again noted. The spleen, pancreas, and both adrenal glands are within expected limits. Cholelithiasis. Punctate nonobstructing right intrarenal stone. Tiny left renal cyst. Kidneys are otherwise unremarkable without  hydronephrosis. Abdominal aorta normal in course and caliber without dissection. No pathologic retroperitoneal or mesenteric lymphadenopathy by size criteria. Visualized small bowel and colon are unremarkable. No bowel obstruction. No free fluid or free  air.     No aggressive osseous lesions.     IMPRESSION:     1. Ill-defined right hepatic mass again noted. This has been previously biopsied and correlation with pathology results is recommended.  2. Cholelithiasis.  3. Punctate nonobstructing right intrarenal stone.  4. No threshold abdominal lymphadenopathy or other suspicious abdominal masses.  5. Partially imaged thick-walled cavitary lesion in the right lower lobe. This may be of infectious or neoplastic etiology and continued follow-up is recommended.  6. Small bilateral pleural effusions with bibasilar atelectasis/infiltrate, worse on the right than left.  7. Numerous small noncalcified pulmonary nodules throughout the visualized lower lung fields, concerning for pulmonary metastatic disease.       Mammo Diagnostic Digital Tomosynthesis Bilateral With CAD w/ US Axilla Right 01-13-22  FINDINGS:  There are scattered areas of fibroglandular density.     Incompletely imaged is a very  prominent axillary lymph node. The  bilateral fibroglandular pattern itself is stable in appearance  including postoperative changes in the superior aspect of the right  breast. There are stable bilateral calcifications. Mild skin thickening  is again noted involving the right breast which is likely treatment  related.     ULTRASOUND: Ultrasound evaluation of the right axilla demonstrates 2  markedly enlarged lymph nodes the largest of which measures 3.2 cm in  size and has no demonstrable fatty hilum. Recommend ultrasound-guided  biopsy.      IMPRESSION:  1. Stable mammographic appearance of the left breast with no findings  suspicious for malignancy.        2. Marked right axillary lymphadenopathy. Recommend ultrasound-guided  biopsy. The fibroglandular pattern of the right breast is however  stable.        BI-RADS CATEGORY:  4, SUSPICIOUS        RECOMMENDATION:  Recommend ultrasound-guided biopsy of the dominant  abnormal appearing right axillary lymph node.      DEXA Bone Density Axial 01-13-22  IMPRESSION:  Impression:  1. According to the World Health Organization definitions of  osteoporosis based on bone density, this patient's bone mineral density  is compatible with osteoporosis and the fracture risk is high.      Mammo Diagnostic Digital Tomosynthesis Bilateral With CAD w/ US Axilla Right 01-13-22  FINDINGS:  There are scattered areas of fibroglandular density.     Incompletely imaged is a very prominent axillary lymph node. The  bilateral fibroglandular pattern itself is stable in appearance  including postoperative changes in the superior aspect of the right  breast. There are stable bilateral calcifications. Mild skin thickening  is again noted involving the right breast which is likely treatment  related.     ULTRASOUND: Ultrasound evaluation of the right axilla demonstrates 2  markedly enlarged lymph nodes the largest of which measures 3.2 cm in  size and has no demonstrable fatty hilum. Recommend  ultrasound-guided  biopsy.      IMPRESSION:  1. Stable mammographic appearance of the left breast with no findings  suspicious for malignancy.        2. Marked right axillary lymphadenopathy. Recommend ultrasound-guided  biopsy. The fibroglandular pattern of the right breast is however  stable.        BI-RADS CATEGORY:  4, SUSPICIOUS        RECOMMENDATION:  Recommend ultrasound-guided biopsy of the dominant  abnormal appearing right axillary lymph node.      CT Chest With Contrast Diagnostic 01-18-22  FINDINGS:     LUNGS: Solitary mass in the right lower lobe with cavitation.  Significantly smaller today than on the prior study. 6 mm solid nodule  in the right lung posteriorly on image 30 of the axial series. Other  parenchymal nodules are seen in both lungs and are similar to the  previous study. These are concerning for metastatic nodules.     HEART: Unremarkable.     MEDIASTINUM: No masses. No enlarged lymph nodes.  No fluid collections.     PLEURA: No pleural effusion. No pleural mass or abnormal calcification.  No pneumothorax.     VASCULATURE: No evidence of aneurysm. Filling defect in the right  pulmonary artery and possibly left lower lobe pulmonary artery. This  study was not performed to evaluate for pulmonary embolus, but the  findings are concerning for bilateral pulmonary emboli.     BONES: No acute bony abnormality.     VISUALIZED UPPER ABDOMEN:Please see the CT report for the abdomen and  pelvis.     Other: Right axillary soft tissue mass is present and shows slight  interval growth. It measures 2.47 x 2.16 cm today and previously at the  same level measured approximately 2.7 x 1.89 cm.     IMPRESSION:  1. Study was not performed as a PE protocol, but there appear to be  filling defects in pulmonary arteries bilaterally concerning for  pulmonary emboli.  2. Interval decrease in size of cavitary right lower lobe mass.  3. Stable parenchymal nodules bilaterally.  4. Very slight interval growth of right  axillary soft tissue mass.     Results are being relayed to the referring physician.      US Liver 01-18-22  FINDINGS: Sonographic imaging of the liver does not show the mass  previously identified in the liver. I would suggest a follow-up CT scan  for better delineation.     Hepatic flow was hepatopedal.     There is shadowing from the gallbladder fossa.     IMPRESSION:  1. Previously identified mass is not seen on this exam in the liver.  Consider follow-up CT.  2. Shadowing from the gallbladder fossa. In the absence of  cholecystectomy, appearance is concerning for cholelithiasis.      US Venous Doppler Lower Extremity Bilateral (duplex) 01-20-22  FINDINGS:   There is patent spontaneous flow from the common femoral vein through  the posterior tibial veins.  There was no internal clot or area of noncompressibility.  Normal augmentation was elicited where applicable.     IMPRESSION:  No DVT in the lower extremities on today's exam.       CT Chest Pulmonary Embolism 01-20-22  FINDINGS: Today's study demonstrates opacification of the central  pulmonary vessels.  There are filling defects in the pulmonary arteries bilaterally. First  order arteries. This is most compatible with bilateral pulmonary  emboli..     Soft tissue mass in the right lower lobe with somewhat cavitary center  is again noted and unchanged..     There is no mediastinal lymph node enlargement     No pericardial or pleural effusion.        IMPRESSION:  1. Bilateral pulmonary emboli.  2. Parenchymal nodules bilaterally with dominant mass in the right lower  Lobe..      NM PET/CT Skull Base to Mid Thigh 01-28-22  FINDINGS:      HEAD/NECK:  Area of uptake in the posterior right paraspinal space and to lesser  degree the left paraspinal space appears related to muscle uptake.  No FDG hypermetabolic neck adenopathy.  No FDG hypermetabolic masses.     CHEST:   Numerous bilateral pulmonary nodules appear stable from the previous  chest CT and show no FDG  hypermetabolism. This is likely due to nodules  being below size threshold for PET sensitivity.  Cavitary lung mass in the right lung localizing to the superior segment  of the lower lobe shows FDG hypermetabolism with maximum SUV: 2.4. This  is at the lower range for malignancy.  Enlarged right lower axillary region lymph node which is 3.0 cm shows  mild FDG hypermetabolism that is approximately with maximum SUV: 2.4.  This is at the lower range for malignancy.  Low-dose CT demonstrating no change in additional scattered pulmonary  nodules from the previous scan. Coronary artery calcifications are  stable.     ABDOMEN/PELVIS:   Faint area of low attenuation involving the right lobe of liver is  poorly evaluated with low-dose noncontrast CT but shows no focal FDG  hypermetabolism.  Physiologic FDG hypermetabolism seen throughout the solid abdominal  organs.  Physiologic FDG hypermetabolism seen throughout the GI tract.  Physiologic FDG hypermetabolism seen throughout the mesentery,  retroperitoneum and pelvis.  Low dose CT redemonstrates nonobstructing kidney stones. Cholelithiasis  again noted.     BONES: Nonspecific FDG tracer activity. No intense areas of tracer  activity noted.     IMPRESSION:     1. Cavitary mass in the right lung that shows very low-grade FDG  hypermetabolism which is at the lower range for malignancy with maximum  SUV: 2.4.  2. Enlarged right axillary region lymph node with low-grade FDG  hypermetabolism also at the lower range for malignancy with maximum SUV:  2.4.  3. Numerous bilateral pulmonary nodules compatible with metastatic  disease but show no significant FDG hypermetabolism. This may be in part  due to size of nodules being below PET sensitivity threshold.  4. Faint area of low-attenuation right lobe liver poorly evaluated with  noncontrast low-dose CT that shows no obvious intense FDG  hypermetabolism.  5. Other nonacute findings as above on low dose CT.        [facility]  05-03-22  FINDINGS:    Lungs:  Cavitary mass in the right lower lobe has nearly resolved.  A  right lower lobe pulmonary nodule is 7.4 mm and was previously 7.5 mm.  No change. Image #38.  A left lower lobe pulmonary nodule, image #52, is  9.8 mm and was previously 9.8 mm. No change.    Pleural space:  Unremarkable.  No pneumothorax.  No significant  effusion.    Heart:  Unremarkable.  No cardiomegaly.  No significant pericardial  effusion.  No significant coronary artery calcifications.    Bones/joints:  The bony structures appear stable. No acute bony  findings identified.  No dislocation.    Soft tissues:  Unremarkable.    Vasculature:  Please note, pulmonary emboli noted on the prior exam  are not well evaluated. The previously described filling defects of the  main pulmonary arteries are not evident.  No thoracic aortic aneurysm.    Lymph nodes:  No mediastinal adenopathy by CT size criteria.    Gallbladder and bile ducts:  Cholelithiasis noted.    Other findings:  Other smaller nodules appear stable in size and  configuration.  No new nodules identified.     IMPRESSION:  1.  Near-complete resolution of previously described cavitary mass in  the right lower lobe periphery now only with linear scarring noted.  2.  Index nodules of both lungs are stable from previous exam with no  size increase identified. No new nodules are noted.  3.  Cholelithiasis.  4.  Due to timing of contrast on this study, assessment of pulmonary  emboli not performed. There appears to be significant improvement in  clot burden however in the more central vessels.    FINDINGS:    Lung bases:  Pulmonary nodules of the lung bases.    Mediastinum:  Small hiatal hernia.      ABDOMEN:    Liver:  Low attenuation lesions of the right lobe liver appear of  decreased prominence from the previous exam. Segment 8 liver lesion is  1.1 cm. Segment 7 liver lesion is approximately 0.8 cm.    Gallbladder and bile ducts:  Cholelithiasis.  No ductal  dilation.    Pancreas:  Unremarkable.  No mass.  No ductal dilation.    Spleen:  Unremarkable.  No splenomegaly.    Adrenals:  No adrenal masses identified.    Kidneys and ureters:  Unremarkable.  No solid mass.  No  hydronephrosis.    Stomach and bowel:  Sigmoid diverticulosis without evidence of  diverticulitis.  No obstruction.      PELVIS:    Appendix:  Normal appendix.    Bladder:  Unremarkable.  No mass.    Reproductive:  Unremarkable as visualized.      ABDOMEN and PELVIS:    Intraperitoneal space:  Unremarkable.  No free air.  No significant  fluid collection.    Bones/joints:  No acute fracture.  No dislocation.    Soft tissues:  Unremarkable.    Vasculature:  Unremarkable.  No abdominal aortic aneurysm.    Lymph nodes:  No iliac or retroperitoneal adenopathy.     IMPRESSION:  1.  Small low-attenuation lesions of the right lobe liver appears  decreased prominence of the previous exam. No new no focal liver lesions  identified  2.  Cholelithiasis.  3.  Otherwise stable appearance of the abdomen and pelvis.    CT Abdomen Pelvis With Contrast (08/08/2022 14:36)  FINDINGS:    LUNG BASES:  Unremarkable.  No mass.  No consolidation.      ABDOMEN:    LIVER:  Stable 1 cm right lobe of liver lesion and more posterior 7 mm  lesion.    GALLBLADDER AND BILE DUCTS:  Gallstones.  Gallbladder otherwise  unremarkable.  No ductal dilation.    PANCREAS:  Unremarkable.  No mass.  No ductal dilation.    SPLEEN:  Unremarkable.  No splenomegaly.    ADRENALS:  Unremarkable.  No mass.    KIDNEYS AND URETERS:  Unremarkable.  No solid mass.  No  hydronephrosis.    STOMACH AND BOWEL:  Unremarkable.  No obstruction.  No mucosal  thickening.      PELVIS:    APPENDIX:  No findings to suggest acute appendicitis.    BLADDER:  Unremarkable.  No mass.    REPRODUCTIVE:  Unremarkable as visualized.      ABDOMEN and PELVIS:    INTRAPERITONEAL SPACE:  Unremarkable.  No free air.  No significant  fluid collection.    BONES/JOINTS:  No acute  fracture.  No dislocation.    SOFT TISSUES:  Unremarkable.    VASCULATURE:  Unremarkable.  No abdominal aortic aneurysm.    LYMPH NODES:  Unremarkable.  No enlarged lymph nodes.     IMPRESSION:  1.  Gallstones.  Gallbladder otherwise unremarkable.  2.  Stable 1 cm right lobe of liver lesion and more posterior 7 mm  Lesion.    CT Chest With Contrast Diagnostic (08/08/2022 14:40)  FINDINGS:    LUNGS:  Multiple pulmonary nodules again noted including a right lower  lobe pulmonary nodule measuring 5 mm that was 5 mm. Another right lower  lobe pulmonary nodule is 7 mm and was 7 mm. A left lower lobe pulmonary  nodule is 5 mm and was 5 mm. Other nodules appear stable.    PLEURAL SPACE:  Unremarkable.  No pneumothorax.  No significant  effusion.    HEART:  Unremarkable.  No cardiomegaly.  No significant pericardial  effusion.  No significant coronary artery calcifications.    BONES/JOINTS:  Unremarkable.  No acute fracture.  No dislocation.    SOFT TISSUES:  Unremarkable.    VASCULATURE:  Unremarkable.  No thoracic aortic aneurysm.    LYMPH NODES:  Unremarkable.  No enlarged lymph nodes.     IMPRESSION:    Multiple pulmonary nodules again noted including a right lower lobe  pulmonary nodule measuring 5 mm that was 5 mm. Another right lower lobe  pulmonary nodule is 7 mm and was 7 mm. A left lower lobe pulmonary  nodule is 5 mm and was 5 mm. Other nodules appear stable.    CT Abdomen Pelvis With Contrast (10/31/2022 14:46)  FINDINGS:    LUNG BASES:  Unremarkable.  No mass.  No consolidation.      ABDOMEN:    LIVER:  Stable right lobe of liver low-attenuation lesion measuring  about a centimeter. Stable more posterior right lobe of liver lesion  measuring about 7 mm.    GALLBLADDER AND BILE DUCTS:  Gallstones.  Gallbladder otherwise  unremarkable.  No ductal dilation.    PANCREAS:  Unremarkable.  No mass.  No ductal dilation.    SPLEEN:  Unremarkable.  No splenomegaly.    ADRENALS:  Unremarkable.  No mass.    KIDNEYS AND  URETERS:  Unremarkable.  No solid mass.  No  hydronephrosis.    STOMACH AND BOWEL:  Scattered diverticula in the colon.  No  diverticulitis.  No obstruction.      PELVIS:    APPENDIX:  No findings to suggest acute appendicitis.    BLADDER:  Unremarkable.  No mass.    REPRODUCTIVE:  Unremarkable as visualized.      ABDOMEN and PELVIS:    INTRAPERITONEAL SPACE:  Unremarkable.  No free air.  No significant  fluid collection.    BONES/JOINTS:  No acute fracture.  No dislocation.    SOFT TISSUES:  Unremarkable.    VASCULATURE:  Unremarkable.  No abdominal aortic aneurysm.    LYMPH NODES:  Unremarkable.  No enlarged lymph nodes.     IMPRESSION:  1.  Gallstones.  Gallbladder otherwise unremarkable.  2.  Stable right lobe of liver low-attenuation lesion measuring about a  centimeter. Stable more posterior right lobe of liver lesion measuring  about 7 mm.    CT Chest With Contrast Diagnostic (10/31/2022 14:47)  FINDINGS:    LUNGS:  Stable subpleural right middle lobe pulmonary nodule measuring  5 mm. Stable right lower lobe pulmonary nodule measuring 6 mm. Other  bilateral pulmonary nodules appear stable in size and number.    PLEURAL SPACE:  Unremarkable.  No pneumothorax.  No significant  effusion.    HEART:  Unremarkable.  No cardiomegaly.  No significant pericardial  effusion.  No significant coronary artery calcifications.    BONES/JOINTS:  Unremarkable.  No acute fracture.  No dislocation.    SOFT TISSUES:  Right upper breast density again noted.    VASCULATURE:  Unremarkable.  No thoracic aortic aneurysm.    LYMPH NODES:  Unremarkable.  No enlarged lymph nodes.     IMPRESSION:    Stable subpleural right middle lobe pulmonary nodule measuring 5 mm.  Stable right lower lobe pulmonary nodule measuring 6 mm. Other bilateral  pulmonary nodules appear stable in size and number.    CT chest with contrast 1/25/23  FINDINGS:     LUNGS: Multiple parenchymal nodules are present bilaterally and are  stable in size, number, and  appearance. Largest nodule measuring  approximately 6 mm.     HEART: Mild coronary artery calcifications.     MEDIASTINUM: No masses. No enlarged lymph nodes.  No fluid collections.     PLEURA: No pleural effusions.     VASCULATURE: No evidence of aneurysm.     BONES: No acute bony abnormality.     VISUALIZED UPPER ABDOMEN:Please see the report for the CT of the abdomen  and pelvis.     Other: 8.5 mm left axillary lymph node stable.     IMPRESSION:     1. Multiple parenchymal nodules in both lungs are stable.  2. 8.5 mm lymph node in the left axilla, stable.    CT abdomen pelvis with contrast 1/25/23  FINDINGS:     Lower thorax: Parenchymal nodules in both lungs similar to the previous  exam.     Abdomen:     Liver: Previous identified area of decreased attenuation in the right  lobe of the liver is not localized on today's exam.     Gallbladder: Cholelithiasis.     Pancreas: Unremarkable. No mass or ductal dilatation.     Spleen: Homogeneous. No splenomegaly.     Adrenals: No mass.     Kidneys/ureters: Nonobstructing right intrarenal stone.     GI tract: Moderate stool. Sigmoid diverticuli without diverticulitis.     MESENTERY: No free fluid, walled off fluid collections, mesenteric  stranding, or enlarged lymph nodes        Vasculature: No evidence of aneurysm.     Abdominal wall: No focal hernia or mass.        Bladder: No focal mass or significant wall thickening     Reproductive: Unremarkable as visualized     Bones: No acute bony abnormality.     IMPRESSION:     1. No evidence of new interval development of metastatic disease.  Previously identified hepatic lesion is not seen on today's exam.     2.Cholelithiasis.     3. Nonobstructing right intrarenal stone.    Mammo Diagnostic Digital Tomosynthesis Bilateral With CAD (02/08/2023 14:55)  FINDINGS: There are scattered areas of fibroglandular density.     The bilateral fibroglandular pattern is stable in appearance including  postoperative changes in the right  12:00 region. Skin thickening  involving the right breast is again noted and likely treatment related.  Multiple surgical clips are now noted in the right axilla. The patient's  Port-A-Cath is incompletely imaged in the posterior superior aspect of  the left breast. No new or suspicious findings are identified in either  breast.     IMPRESSION:  Benign bilateral mammographic findings.        BI-RADS CATEGORY:  2, BENIGN        RECOMMENDATION:  Recommend the patient continue annual bilateral  mammographic evaluation.    CT Abdomen Pelvis With Contrast (04/13/2023 14:51)  FINDINGS:    Lung bases:  Refer to chest CT for characterization of lung nodules in  the partially imaged lower chest.    Mediastinum:  Moderate hiatal hernia.      ABDOMEN:    Liver:  No definite focal liver lesion identified.    Gallbladder and bile ducts:  Cholelithiasis.  No ductal dilation.    Pancreas:  Unremarkable.  No mass.  No ductal dilation.    Spleen:  Unremarkable.  No splenomegaly.    Adrenals:  No adrenal masses.    Kidneys and ureters:  Tiny nonobstructing right kidney stone. No  hydronephrosis.    Stomach and bowel:  Gastric antrum wall thickening likely due to  incomplete distention.  Small splenule is again noted adjacent to the  splenic flexure of the colon.  Sigmoid diverticulosis. No evidence of  acute diverticulitis.      PELVIS:    Appendix:  Normal appendix.    Bladder:  Unremarkable.  No mass.    Reproductive:  Unremarkable as visualized.      ABDOMEN and PELVIS:    Intraperitoneal space:  No pneumoperitoneum identified.  No  significant fluid collection.    Bones/joints:  Stable appearance of the bony structures. Mild  degenerative changes lumbar spine but no acute osseous findings.  No  dislocation.    Soft tissues:  Unremarkable.    Vasculature:  Unremarkable.  No abdominal aortic aneurysm.    Lymph nodes:  No iliac or retroperitoneal lymphadenopathy.     IMPRESSION:  1.  Cholelithiasis.  2.  No evidence of metastatic  disease to abdomen or pelvis.  3.  Other nonacute/incidental findings as detailed above which appear  stable from previous.    CT Chest With Contrast Diagnostic (04/13/2023 14:51)  FINDINGS:    Lungs:  Small nodule along the right major fissure in the right lung,  5.2 mm and was previously 5.3 mm; image #33.  Parenchymal nodule right  lower lobe is 6.7 mm and was previously 6.9 mm, image #33, no change.   5.9 mm nodule right lower lobe was previously 6.5 mm indicating no  interval change.  Other smaller nodules of the right lung appears  stable.  7.5 mm nodule left lower lobe, image #55, was previously 8.2  mm.  A 5.7 mm nodule left lower lobe, image #40, was previously 5.9 mm.  No significant change.    Pleural space:  Unremarkable.  No pneumothorax.  No significant  effusion.    Heart:  Mild cardiomegaly.  No significant pericardial effusion.  No  significant coronary artery calcifications.    Mediastinum:  Moderate hiatal hernia again noted.  No enlarged  mediastinal or hilar lymph nodes identified.    Bones/joints:  Unremarkable.  No acute fracture.  No dislocation.    Soft tissues:  Stable treatment-related skin thickening of the right  breast.    Vasculature:  Unremarkable.  No thoracic aortic aneurysm.    Lymph nodes:  See above.    Gallbladder and bile ducts:  Cholelithiasis.    Tubes, lines and devices:  Left Port-A-Cath noted.    Other findings:  Calcified granulomas are stable.     IMPRESSION:  1.  No interval change in size of multiple bilateral pulmonary  parenchymal nodules. Index nodules as detailed above. Some nodules may  be slightly smaller than previous or could be due to differences in  slice selection.  2.  No new nodules are identified.  3.  No thoracic adenopathy.  4.  Other incidental and nonacute findings detailed above appear stable  from previous.    CT Chest With Contrast Diagnostic (07/28/2023 15:37)   FINDINGS:    LUNGS AND PLEURAL SPACES:  Again there are tiny bilateral  pulmonary  nodules with the largest in the right lower lobe measuring about 6 mm  and was about 6 mm. Other nodules including a left lower lobe pulmonary  nodule measuring 6 mm appears stable.  No pneumothorax.  No significant  effusion.    HEART:  Unremarkable.  No cardiomegaly.  No significant pericardial  effusion.  No significant coronary artery calcifications.    BONES/JOINTS:  Unremarkable.  No acute fracture.  No dislocation.    SOFT TISSUES:  Unremarkable.    VASCULATURE:  Unremarkable.  No thoracic aortic aneurysm.    LYMPH NODES:  Unremarkable.  No enlarged lymph nodes.     IMPRESSION:    Again there are tiny bilateral pulmonary nodules with the largest in  the right lower lobe measuring about 6 mm and was about 6 mm. Other  nodules including a left lower lobe pulmonary nodule measuring 6 mm  appears stable.    CT Abdomen Pelvis With Contrast (07/28/2023 15:40)   FINDINGS:    LUNG BASES:  Unremarkable.  No mass.  No consolidation.      ABDOMEN:    LIVER:  Unremarkable.  No mass.    GALLBLADDER AND BILE DUCTS:  Gallstone.  Gallbladder otherwise  unremarkable.  No ductal dilation.    PANCREAS:  Unremarkable.  No mass.  No ductal dilation.    SPLEEN:  Unremarkable.  No splenomegaly.    ADRENALS:  Unremarkable.  No mass.    KIDNEYS AND URETERS:  Unremarkable.  No solid mass.  No  hydronephrosis.    STOMACH AND BOWEL:  Scattered diverticula in the colon.  No  diverticulitis.  No obstruction.      PELVIS:    APPENDIX:  No findings to suggest acute appendicitis.    BLADDER:  Unremarkable.  No mass.    REPRODUCTIVE:  Unremarkable as visualized.      ABDOMEN and PELVIS:    INTRAPERITONEAL SPACE:  Unremarkable.  No free air.  No significant  fluid collection.    BONES/JOINTS:  No acute fracture.  No dislocation.    SOFT TISSUES:  Unremarkable.    VASCULATURE:  Unremarkable.  No abdominal aortic aneurysm.    LYMPH NODES:  Unremarkable.  No enlarged lymph nodes.     IMPRESSION:    Gallstone.  Gallbladder otherwise  unremarkable.    CT Chest With Contrast Diagnostic (10/27/2023 15:28)   FINDINGS:    LUNGS AND PLEURAL SPACES:  Multiple bilateral pulmonary nodules are  stable in size.  No pneumothorax.  No significant effusion.    HEART:  Unremarkable as visualized.  No cardiomegaly.  No significant  pericardial effusion.  No significant coronary artery calcifications.    MEDIASTINUM:  Small hiatal hernia.    BONES/JOINTS:  Unremarkable as visualized.  No acute fracture.  No  dislocation.    SOFT TISSUES:  Right breast skin thickening noted likely treatment  related again noted.    VASCULATURE:  Unremarkable as visualized.  No thoracic aortic  aneurysm.    LYMPH NODES:  Unremarkable as visualized.  No enlarged lymph nodes.     IMPRESSION:  1.  Small hiatal hernia.  2.  Right breast skin thickening noted likely treatment related again  noted.  3.  Multiple bilateral pulmonary nodules are stable in size.    CT Abdomen Pelvis With Contrast (10/27/2023 15:30)   FINDINGS:    LUNG BASES:  Unremarkable as visualized.  No mass.  No consolidation.    MEDIASTINUM:  Small hiatal hernia.      ABDOMEN:    LIVER:  Unremarkable as visualized.  No mass.    GALLBLADDER AND BILE DUCTS:  Gallstone.  Gallbladder otherwise  unremarkable.  No ductal dilation.    PANCREAS:  Unremarkable as visualized.  No mass.  No ductal dilation.    SPLEEN:  Unremarkable as visualized.  No splenomegaly.    ADRENALS:  Unremarkable as visualized.  No mass.    KIDNEYS AND URETERS:  Unremarkable as visualized.  No solid mass.  No  hydronephrosis.    STOMACH AND BOWEL:  Scattered diverticula in the colon.  No  diverticulitis.  No obstruction.      PELVIS:    APPENDIX:  No findings to suggest acute appendicitis.    BLADDER:  Unremarkable as visualized.  No mass.    REPRODUCTIVE:  Unremarkable as visualized.      ABDOMEN and PELVIS:    INTRAPERITONEAL SPACE:  Unremarkable as visualized.  No free air.  No  significant fluid collection.    BONES/JOINTS:  No acute fracture.   No dislocation.    SOFT TISSUES:  Unremarkable as visualized.    VASCULATURE:  Unremarkable as visualized.  No abdominal aortic  aneurysm.    LYMPH NODES:  Unremarkable as visualized.  No enlarged lymph nodes.    OTHER FINDINGS:  No evidence of metastatic disease.     IMPRESSION:  1.  Gallstone.  Gallbladder otherwise unremarkable.  2.  No evidence of metastatic disease.    CT Chest With Contrast Diagnostic (01/26/2024 14:09)   FINDINGS:    LUNGS AND PLEURAL SPACES:  Again small bilateral pulmonary nodules are  stable including a right lower lobe pulmonary nodule measuring 6 mm and  a left lower lobe pulmonary nodule measuring 6 mm. Other nodules are  also stable in size. No new nodules are identified.  No pneumothorax.   No significant effusion.    HEART:  Unremarkable as visualized.  No cardiomegaly.  No significant  pericardial effusion.  No significant coronary artery calcifications.    MEDIASTINUM:  Small hiatal hernia again noted.    BONES/JOINTS:  Unremarkable as visualized.  No acute fracture.  No  dislocation.    SOFT TISSUES:  Unremarkable as visualized.    VASCULATURE:  Unremarkable as visualized.  No thoracic aortic  aneurysm.    LYMPH NODES:  Unremarkable as visualized.  No enlarged lymph nodes.    TUBES, LINES AND DEVICES:  Left Port-A-Cath noted.     IMPRESSION:  1.  Again small bilateral pulmonary nodules are stable including a right  lower lobe pulmonary nodule measuring 6 mm and a left lower lobe  pulmonary nodule measuring 6 mm. Other nodules are also stable in size.  No new nodules are identified.  2.  Small hiatal hernia again noted.    CT Abdomen Pelvis With Contrast (01/26/2024 14:17)   FINDINGS:    LUNG BASES:  Unremarkable as visualized.  No mass.  No consolidation.      ABDOMEN:    LIVER:  Unremarkable as visualized.  No mass.    GALLBLADDER AND BILE DUCTS:  Gallstones.  Gallbladder otherwise  unremarkable.  No ductal dilation.    PANCREAS:  Unremarkable as visualized.  No mass.  No ductal  dilation.    SPLEEN:  Unremarkable as visualized.  No splenomegaly.    ADRENALS:  Unremarkable as visualized.  No mass.    KIDNEYS AND URETERS:  Unremarkable as visualized.  No solid mass.  No  hydronephrosis.    STOMACH AND BOWEL:  Unremarkable as visualized.  No obstruction.  No  mucosal thickening.      PELVIS:    APPENDIX:  No findings to suggest acute appendicitis.    BLADDER:  Unremarkable as visualized.  No mass.    REPRODUCTIVE:  Unremarkable as visualized.      ABDOMEN and PELVIS:    INTRAPERITONEAL SPACE:  Unremarkable as visualized.  No free air.  No  significant fluid collection.    BONES/JOINTS:  No acute fracture.  No dislocation.    SOFT TISSUES:  Unremarkable as visualized.    VASCULATURE:  Atherosclerotic disease.  No abdominal aortic aneurysm.    LYMPH NODES:  Unremarkable as visualized.  No enlarged lymph nodes.    OTHER FINDINGS:  No evidence of metastatic disease.     IMPRESSION:  1.  Gallstones.  Gallbladder otherwise unremarkable.  2.  No evidence of metastatic disease.       RECENT LABS:  Lab Results   Component Value Date    WBC 5.99 02/01/2024    HGB 13.5 02/01/2024    HCT 42.4 02/01/2024    MCV 90.6 02/01/2024    RDW 13.7 02/01/2024     02/01/2024    NEUTRORELPCT 51.1 02/01/2024    LYMPHORELPCT 35.2 02/01/2024    MONORELPCT 9.3 02/01/2024    EOSRELPCT 3.0 02/01/2024    BASORELPCT 1.2 02/01/2024    NEUTROABS 3.06 02/01/2024    LYMPHSABS 2.11 02/01/2024       Lab Results   Component Value Date     02/01/2024    K 4.1 02/01/2024    CO2 25.9 02/01/2024     02/01/2024    BUN 10 02/01/2024    CREATININE 0.70 02/01/2024    EGFRIFNONA 87 02/24/2022    GLUCOSE 86 02/01/2024    CALCIUM 9.5 02/01/2024    ALKPHOS 82 02/01/2024    AST 18 02/01/2024    ALT 22 02/01/2024    BILITOT 0.3 02/01/2024    ALBUMIN 4.3 02/01/2024    PROTEINTOT 6.7 02/01/2024    MG 2.0 09/01/2023     Lab Results   Component Value Date    TSH 3.040 02/01/2024     Lab Results   Component Value Date    FERRITIN  169.00 (H) 01/20/2022    IRON 102 01/20/2022    TIBC 264 (L) 01/20/2022    LABIRON 39 01/20/2022    IBNNRAPD63 437 01/20/2022    FOLATE 16.40 01/20/2022     Lab Results   Component Value Date     (H) 01/20/2022     ASSESSMENT & PLAN:  Yadira Flowers is a very pleasant 68 y.o. female with a history of cervical cancer, breast cancer and malignant melanoma.    1. History of Breast Cancer:  - Ms. Onur Burgos had Stage IA ([id number]) moderately differentiated invasive ductal carcinoma of the right breast diagnosed in October 2015.  - She underwent R lympectomy and SLNBx performed by Dr. Steve Isbell 10-22-15 and then received adjuvant radiation in [place].  - After radiation, she was started on Arimidex which was then switched to Exemestane.  She took Exemestane for almost 5 years. Unfortunately, she developed vaginal bleeding and hematuria related to vaginal atrophy related to hormonal blockade.  Given this, stopped Exemestane.  She is doing much better in this regard since stopping hormonal therapy.  - Mammogram 2/8/23 without cause for concern aside from R axillary LAD.   -Repeat bilateral diagnostic mammogram scheduled for 2/21/24.    2.  Metastatic malignant melanoma:  -  S/p resection of a primary lesion on her back performed by Dr. Catrachito Amaro of Dermatology in [place] in 2004.  -  S/p FNA R axillary LN which was concerning for melanoma.  Excisional biopsy confirmed metastatic malignant melanoma.  - Suspect her lung and liver lesions are also related to her melanoma.  - She has seen Dr. Her and he performed bronchoscopy on 02/23/2022. Pathology was negative for malignant cells.  - Liver aspiration showed abscess which grew Klebsiella, suspect this was a secondary infection.    -  Sent  excised LN tissue for CARIS testing.  -  PET-CT showed no significant FDG uptake but re-demonstrated abnormalities in the R axilla, RLL cavitary mass, Vargas lung nodules and liver.    -  MRI Brain showed no  acute findings in the head/brain.  -  Recommended initial therapy with immunotherapy.  We discussed different options for immunotherapy including single agent PD-L1 treatment or combination with Yervoy.  We recommended combination with Yervoy for better RR, PFS and OS even with increased risk of immune-mediated side effects.  We discussed the initially approved FDA dosing v. Altered dosing with lower dose IPI/higher dosed Nivo.  The latter has been shown to have fewer side effects and is thought to likely have similar efficacy (though trials weren't really powered to definitively show that).  After a lengthy discussion, we decided to proceed with 4 cycles of Ipi 1 mg/kg / Nivo 3 mg/kg q 21d followed by Nivolumab single agent.  We discussed potential risks, benefits and side effects extensively and she decided to proceed.  -  She completed 4 cycles of Ipi / Nivo and tolerated treatment very well.  -  Repeat CT imaging done 5-3-22 after 4 cycles Ipi/Nivo showed an excellent partial response to treatment.  Have continued with single agent Nivolumab as planned which she is tolerating well.   -Repeat CT CAP from 1/26/24 (summarized above) again showing stable disease with excellent control of her metastatic melanoma.  -Therefore, will continue with Nivolumab therapy as planned.  -  Will repeat CT CAP after a 3 month interval.    3.  Difficulty w/ PAC access:  -  She saw Dr. Silverman who was able to access the port without difficulty and showed her a trick for successful access that she says has been working well.    4. Constipation  -  Continue Senna 2 tabs BID and Miralax as needed.     5. Bilateral pulmonary emboli:  -  CTPE protocol 1-20-22  confirmed bilateral pulmonary emboli  -  BLE dopplers negative for DVT.  -  Continue Eliquis 5 mg BID.    -  We previously discussed length of therapy.  She watched her  die with PE and her mother has h/o PEs as well.  Presumably the cancer was the risk factor for development  of PE and since her cancer is incurable,  have recommended she continue Eliquis indefinately as long as she continues without significant side effects.  She started complaining of difficulty with easy bleeding with minor trauma.  It had been over 6 months that she has had full anticoagulation.  Given worsening side effects, decided to continue Eliquis, but reduced dose to 2.5 mg twice a day.  She is doing well on this dose without bleeding or thrombosis. Will continue.    6.  H/o Colon polyps:  -  Dr. Maldonado performed c-scope 12-09-20 with discovery of 8 hyperplastic polyps in the distal rectum as well as diverticulosis.  He recommended repeat c-scope after 5 years.    7.  H/o cervical cancer treated with cryotherapy at [facility] in [facility]:  -  Followed up with Dr. Manav Real of gynecology.  She says she had exam including pap smear and then pelvic U/S which was unrevealing.    8. Prophylaxis:  -  Had COVID vaccine x 2 (Pfizer).  She received Evusheld on 4-7-22.. Recommended booster. Received 2023 flu vaccine. Received  Prevnar 20 vaccine today.    9.  ACO / DWAYNE/Other  Quality measures  -  Yadira Flowers received 2023 flu vaccine.  -  Yadira Flowers reports a pain score of 0.  Given her pain assessment as noted, treatment options were discussed and the following options were decided upon as a follow-up plan to address the patient's pain:  No intervention needed at this time. .  -  Current outpatient and discharge medications have been reconciled for the patient.  Reviewed by: Loreto Stone MD    10.  Follow up:  - Continue Nivolumab as planned.  -  F/u with me in 3 months CT CAP prior along with  CBCD, CMP, TSH.  -  Mammogram as scheduled  -  note:  Pt prefers that PAC be accessed for CT imaging.    I spent 30 minutes with Yadira Flowers today.  In the office today, more than 50% of this time was spent face-to-face with her  in counseling / coordination of care, reviewing her medical history and counseling  on the current treatment plan.  All questions were answered to her satisfaction      Electronically Signed by:  Loreto Stone MD      CC:     MD Manav Cotto MD Aaron House, MD

## 2024-01-26 ENCOUNTER — HOSPITAL ENCOUNTER (OUTPATIENT)
Dept: RADIATION ONCOLOGY | Facility: HOSPITAL | Age: 69
Discharge: HOME | End: 2024-01-26
Payer: COMMERCIAL

## 2024-01-26 ENCOUNTER — LAB (OUTPATIENT)
Dept: ONCOLOGY | Facility: HOSPITAL | Age: 69
End: 2024-01-26
Payer: COMMERCIAL

## 2024-01-26 ENCOUNTER — INFUSION (OUTPATIENT)
Dept: ONCOLOGY | Facility: HOSPITAL | Age: 69
End: 2024-01-26
Payer: COMMERCIAL

## 2024-01-26 VITALS
BODY MASS INDEX: 32.26 KG/M2 | TEMPERATURE: 96.9 F | DIASTOLIC BLOOD PRESSURE: 72 MMHG | RESPIRATION RATE: 18 BRPM | WEIGHT: 206 LBS | SYSTOLIC BLOOD PRESSURE: 137 MMHG | HEART RATE: 78 BPM | OXYGEN SATURATION: 98 %

## 2024-01-26 DIAGNOSIS — C50.911 MALIGNANT NEOPLASM OF RIGHT BREAST IN FEMALE, ESTROGEN RECEPTOR POSITIVE, UNSPECIFIED SITE OF BREAST: ICD-10-CM

## 2024-01-26 DIAGNOSIS — C78.7 METASTASIS TO LIVER: ICD-10-CM

## 2024-01-26 DIAGNOSIS — C78.01 MALIGNANT NEOPLASM METASTATIC TO BOTH LUNGS: ICD-10-CM

## 2024-01-26 DIAGNOSIS — Z95.828 PORT-A-CATH IN PLACE: ICD-10-CM

## 2024-01-26 DIAGNOSIS — C43.59 MALIGNANT MELANOMA OF TORSO EXCLUDING BREAST: ICD-10-CM

## 2024-01-26 DIAGNOSIS — Z17.0 MALIGNANT NEOPLASM OF RIGHT BREAST IN FEMALE, ESTROGEN RECEPTOR POSITIVE, UNSPECIFIED SITE OF BREAST: ICD-10-CM

## 2024-01-26 DIAGNOSIS — C43.59 MALIGNANT MELANOMA OF TORSO EXCLUDING BREAST: Primary | ICD-10-CM

## 2024-01-26 DIAGNOSIS — C78.02 MALIGNANT NEOPLASM METASTATIC TO BOTH LUNGS: ICD-10-CM

## 2024-01-26 LAB
ALBUMIN SERPL-MCNC: 4.2 G/DL (ref 3.5–5.2)
ALBUMIN/GLOB SERPL: 1.6 G/DL
ALP SERPL-CCNC: 81 U/L (ref 39–117)
ALT SERPL W P-5'-P-CCNC: 18 U/L (ref 1–33)
ANION GAP SERPL CALCULATED.3IONS-SCNC: 10.6 MMOL/L (ref 5–15)
AST SERPL-CCNC: 19 U/L (ref 1–32)
BASOPHILS # BLD AUTO: 0.05 10*3/MM3 (ref 0–0.2)
BASOPHILS NFR BLD AUTO: 0.9 % (ref 0–1.5)
BILIRUB SERPL-MCNC: 0.7 MG/DL (ref 0–1.2)
BUN SERPL-MCNC: 11 MG/DL (ref 8–23)
BUN/CREAT SERPL: 14.5 (ref 7–25)
CALCIUM SPEC-SCNC: 9.7 MG/DL (ref 8.6–10.5)
CHLORIDE SERPL-SCNC: 106 MMOL/L (ref 98–107)
CO2 SERPL-SCNC: 25.4 MMOL/L (ref 22–29)
CREAT SERPL-MCNC: 0.76 MG/DL (ref 0.57–1)
DEPRECATED RDW RBC AUTO: 45 FL (ref 37–54)
EGFRCR SERPLBLD CKD-EPI 2021: 86 ML/MIN/1.73
EOSINOPHIL # BLD AUTO: 0.16 10*3/MM3 (ref 0–0.4)
EOSINOPHIL NFR BLD AUTO: 2.8 % (ref 0.3–6.2)
ERYTHROCYTE [DISTWIDTH] IN BLOOD BY AUTOMATED COUNT: 13.6 % (ref 12.3–15.4)
GLOBULIN UR ELPH-MCNC: 2.6 GM/DL
GLUCOSE SERPL-MCNC: 109 MG/DL (ref 65–99)
HCT VFR BLD AUTO: 42.3 % (ref 34–46.6)
HGB BLD-MCNC: 13.6 G/DL (ref 12–15.9)
IMM GRANULOCYTES # BLD AUTO: 0.01 10*3/MM3 (ref 0–0.05)
IMM GRANULOCYTES NFR BLD AUTO: 0.2 % (ref 0–0.5)
LYMPHOCYTES # BLD AUTO: 1.83 10*3/MM3 (ref 0.7–3.1)
LYMPHOCYTES NFR BLD AUTO: 31.8 % (ref 19.6–45.3)
MCH RBC QN AUTO: 29 PG (ref 26.6–33)
MCHC RBC AUTO-ENTMCNC: 32.2 G/DL (ref 31.5–35.7)
MCV RBC AUTO: 90.2 FL (ref 79–97)
MONOCYTES # BLD AUTO: 0.36 10*3/MM3 (ref 0.1–0.9)
MONOCYTES NFR BLD AUTO: 6.3 % (ref 5–12)
NEUTROPHILS NFR BLD AUTO: 3.34 10*3/MM3 (ref 1.7–7)
NEUTROPHILS NFR BLD AUTO: 58 % (ref 42.7–76)
NRBC BLD AUTO-RTO: 0 /100 WBC (ref 0–0.2)
PLATELET # BLD AUTO: 177 10*3/MM3 (ref 140–450)
PMV BLD AUTO: 10.2 FL (ref 6–12)
POTASSIUM SERPL-SCNC: 4 MMOL/L (ref 3.5–5.2)
PROT SERPL-MCNC: 6.8 G/DL (ref 6–8.5)
RBC # BLD AUTO: 4.69 10*6/MM3 (ref 3.77–5.28)
SODIUM SERPL-SCNC: 142 MMOL/L (ref 136–145)
T4 FREE SERPL-MCNC: 1.35 NG/DL (ref 0.93–1.7)
TSH SERPL DL<=0.05 MIU/L-ACNC: 1.56 UIU/ML (ref 0.27–4.2)
WBC NRBC COR # BLD AUTO: 5.75 10*3/MM3 (ref 3.4–10.8)

## 2024-01-26 PROCEDURE — 71260 CT THORAX DX C+: CPT

## 2024-01-26 PROCEDURE — 25810000003 SODIUM CHLORIDE 0.9 % SOLUTION: Performed by: INTERNAL MEDICINE

## 2024-01-26 PROCEDURE — 96413 CHEMO IV INFUSION 1 HR: CPT

## 2024-01-26 PROCEDURE — 25010000002 HEPARIN LOCK FLUSH PER 10 UNITS: Performed by: INTERNAL MEDICINE

## 2024-01-26 PROCEDURE — 84439 ASSAY OF FREE THYROXINE: CPT

## 2024-01-26 PROCEDURE — 80050 GENERAL HEALTH PANEL: CPT

## 2024-01-26 PROCEDURE — 25510000001 IOPAMIDOL 61 % SOLUTION: Performed by: INTERNAL MEDICINE

## 2024-01-26 PROCEDURE — 25010000002 NIVOLUMAB 240 MG/24ML SOLUTION 24 ML VIAL: Performed by: INTERNAL MEDICINE

## 2024-01-26 PROCEDURE — 74177 CT ABD & PELVIS W/CONTRAST: CPT

## 2024-01-26 RX ORDER — SODIUM CHLORIDE 0.9 % (FLUSH) 0.9 %
10 SYRINGE (ML) INJECTION AS NEEDED
Status: DISCONTINUED | OUTPATIENT
Start: 2024-01-26 | End: 2024-01-26 | Stop reason: HOSPADM

## 2024-01-26 RX ORDER — HEPARIN SODIUM (PORCINE) LOCK FLUSH IV SOLN 100 UNIT/ML 100 UNIT/ML
500 SOLUTION INTRAVENOUS AS NEEDED
OUTPATIENT
Start: 2024-01-26

## 2024-01-26 RX ORDER — SODIUM CHLORIDE 9 MG/ML
250 INJECTION, SOLUTION INTRAVENOUS ONCE
Status: COMPLETED | OUTPATIENT
Start: 2024-01-26 | End: 2024-01-26

## 2024-01-26 RX ORDER — HEPARIN SODIUM (PORCINE) LOCK FLUSH IV SOLN 100 UNIT/ML 100 UNIT/ML
300 SOLUTION INTRAVENOUS ONCE
OUTPATIENT
Start: 2024-01-26

## 2024-01-26 RX ORDER — SODIUM CHLORIDE 0.9 % (FLUSH) 0.9 %
20 SYRINGE (ML) INJECTION AS NEEDED
OUTPATIENT
Start: 2024-01-26

## 2024-01-26 RX ORDER — SODIUM CHLORIDE 0.9 % (FLUSH) 0.9 %
10 SYRINGE (ML) INJECTION AS NEEDED
OUTPATIENT
Start: 2024-01-26

## 2024-01-26 RX ORDER — HEPARIN SODIUM (PORCINE) LOCK FLUSH IV SOLN 100 UNIT/ML 100 UNIT/ML
500 SOLUTION INTRAVENOUS AS NEEDED
Status: DISCONTINUED | OUTPATIENT
Start: 2024-01-26 | End: 2024-01-26 | Stop reason: HOSPADM

## 2024-01-26 RX ADMIN — SODIUM CHLORIDE 480 MG: 9 INJECTION, SOLUTION INTRAVENOUS at 14:40

## 2024-01-26 RX ADMIN — SODIUM CHLORIDE 250 ML: 9 INJECTION, SOLUTION INTRAVENOUS at 14:40

## 2024-01-26 RX ADMIN — IOPAMIDOL 85 ML: 612 INJECTION, SOLUTION INTRAVENOUS at 14:16

## 2024-01-26 RX ADMIN — Medication 500 UNITS: at 15:18

## 2024-01-26 RX ADMIN — Medication 10 ML: at 15:17

## 2024-01-28 PROCEDURE — 71260 CT THORAX DX C+: CPT | Performed by: RADIOLOGY

## 2024-01-28 PROCEDURE — 74177 CT ABD & PELVIS W/CONTRAST: CPT | Performed by: RADIOLOGY

## 2024-02-01 ENCOUNTER — OFFICE VISIT (OUTPATIENT)
Dept: ONCOLOGY | Facility: CLINIC | Age: 69
End: 2024-02-01
Payer: MEDICARE

## 2024-02-01 ENCOUNTER — LAB (OUTPATIENT)
Dept: ONCOLOGY | Facility: CLINIC | Age: 69
End: 2024-02-01
Payer: MEDICARE

## 2024-02-01 VITALS
WEIGHT: 208.2 LBS | BODY MASS INDEX: 32.61 KG/M2 | RESPIRATION RATE: 18 BRPM | TEMPERATURE: 97.5 F | DIASTOLIC BLOOD PRESSURE: 82 MMHG | SYSTOLIC BLOOD PRESSURE: 135 MMHG | HEART RATE: 84 BPM | OXYGEN SATURATION: 98 %

## 2024-02-01 DIAGNOSIS — C78.02 MALIGNANT NEOPLASM METASTATIC TO BOTH LUNGS: ICD-10-CM

## 2024-02-01 DIAGNOSIS — C78.02 MALIGNANT NEOPLASM METASTATIC TO BOTH LUNGS: Primary | ICD-10-CM

## 2024-02-01 DIAGNOSIS — C43.59 MALIGNANT MELANOMA OF TORSO EXCLUDING BREAST: ICD-10-CM

## 2024-02-01 DIAGNOSIS — Z17.0 MALIGNANT NEOPLASM OF RIGHT BREAST IN FEMALE, ESTROGEN RECEPTOR POSITIVE, UNSPECIFIED SITE OF BREAST: ICD-10-CM

## 2024-02-01 DIAGNOSIS — C78.7 METASTASIS TO LIVER: Primary | ICD-10-CM

## 2024-02-01 DIAGNOSIS — C78.01 MALIGNANT NEOPLASM METASTATIC TO BOTH LUNGS: Primary | ICD-10-CM

## 2024-02-01 DIAGNOSIS — C78.01 MALIGNANT NEOPLASM METASTATIC TO BOTH LUNGS: ICD-10-CM

## 2024-02-01 DIAGNOSIS — C50.911 MALIGNANT NEOPLASM OF RIGHT BREAST IN FEMALE, ESTROGEN RECEPTOR POSITIVE, UNSPECIFIED SITE OF BREAST: ICD-10-CM

## 2024-02-01 DIAGNOSIS — C78.7 METASTASIS TO LIVER: ICD-10-CM

## 2024-02-01 DIAGNOSIS — R53.83 OTHER FATIGUE: ICD-10-CM

## 2024-02-01 LAB
ALBUMIN SERPL-MCNC: 4.3 G/DL (ref 3.5–5.2)
ALBUMIN/GLOB SERPL: 1.8 G/DL
ALP SERPL-CCNC: 82 U/L (ref 39–117)
ALT SERPL W P-5'-P-CCNC: 22 U/L (ref 1–33)
ANION GAP SERPL CALCULATED.3IONS-SCNC: 12.1 MMOL/L (ref 5–15)
AST SERPL-CCNC: 18 U/L (ref 1–32)
BASOPHILS # BLD AUTO: 0.07 10*3/MM3 (ref 0–0.2)
BASOPHILS NFR BLD AUTO: 1.2 % (ref 0–1.5)
BILIRUB SERPL-MCNC: 0.3 MG/DL (ref 0–1.2)
BUN SERPL-MCNC: 10 MG/DL (ref 8–23)
BUN/CREAT SERPL: 14.3 (ref 7–25)
CALCIUM SPEC-SCNC: 9.5 MG/DL (ref 8.6–10.5)
CHLORIDE SERPL-SCNC: 105 MMOL/L (ref 98–107)
CO2 SERPL-SCNC: 25.9 MMOL/L (ref 22–29)
CREAT SERPL-MCNC: 0.7 MG/DL (ref 0.57–1)
DEPRECATED RDW RBC AUTO: 46 FL (ref 37–54)
EGFRCR SERPLBLD CKD-EPI 2021: 94.9 ML/MIN/1.73
EOSINOPHIL # BLD AUTO: 0.18 10*3/MM3 (ref 0–0.4)
EOSINOPHIL NFR BLD AUTO: 3 % (ref 0.3–6.2)
ERYTHROCYTE [DISTWIDTH] IN BLOOD BY AUTOMATED COUNT: 13.7 % (ref 12.3–15.4)
GLOBULIN UR ELPH-MCNC: 2.4 GM/DL
GLUCOSE SERPL-MCNC: 86 MG/DL (ref 65–99)
HCT VFR BLD AUTO: 42.4 % (ref 34–46.6)
HGB BLD-MCNC: 13.5 G/DL (ref 12–15.9)
IMM GRANULOCYTES # BLD AUTO: 0.01 10*3/MM3 (ref 0–0.05)
IMM GRANULOCYTES NFR BLD AUTO: 0.2 % (ref 0–0.5)
LYMPHOCYTES # BLD AUTO: 2.11 10*3/MM3 (ref 0.7–3.1)
LYMPHOCYTES NFR BLD AUTO: 35.2 % (ref 19.6–45.3)
MCH RBC QN AUTO: 28.8 PG (ref 26.6–33)
MCHC RBC AUTO-ENTMCNC: 31.8 G/DL (ref 31.5–35.7)
MCV RBC AUTO: 90.6 FL (ref 79–97)
MONOCYTES # BLD AUTO: 0.56 10*3/MM3 (ref 0.1–0.9)
MONOCYTES NFR BLD AUTO: 9.3 % (ref 5–12)
NEUTROPHILS NFR BLD AUTO: 3.06 10*3/MM3 (ref 1.7–7)
NEUTROPHILS NFR BLD AUTO: 51.1 % (ref 42.7–76)
NRBC BLD AUTO-RTO: 0 /100 WBC (ref 0–0.2)
PLATELET # BLD AUTO: 204 10*3/MM3 (ref 140–450)
PMV BLD AUTO: 10.1 FL (ref 6–12)
POTASSIUM SERPL-SCNC: 4.1 MMOL/L (ref 3.5–5.2)
PROT SERPL-MCNC: 6.7 G/DL (ref 6–8.5)
RBC # BLD AUTO: 4.68 10*6/MM3 (ref 3.77–5.28)
SODIUM SERPL-SCNC: 143 MMOL/L (ref 136–145)
T4 FREE SERPL-MCNC: 1.23 NG/DL (ref 0.93–1.7)
TSH SERPL DL<=0.05 MIU/L-ACNC: 3.04 UIU/ML (ref 0.27–4.2)
WBC NRBC COR # BLD AUTO: 5.99 10*3/MM3 (ref 3.4–10.8)

## 2024-02-01 PROCEDURE — 85025 COMPLETE CBC W/AUTO DIFF WBC: CPT | Performed by: INTERNAL MEDICINE

## 2024-02-01 PROCEDURE — 1126F AMNT PAIN NOTED NONE PRSNT: CPT | Performed by: INTERNAL MEDICINE

## 2024-02-01 PROCEDURE — 3079F DIAST BP 80-89 MM HG: CPT | Performed by: INTERNAL MEDICINE

## 2024-02-01 PROCEDURE — 80053 COMPREHEN METABOLIC PANEL: CPT | Performed by: INTERNAL MEDICINE

## 2024-02-01 PROCEDURE — 3075F SYST BP GE 130 - 139MM HG: CPT | Performed by: INTERNAL MEDICINE

## 2024-02-01 PROCEDURE — 84439 ASSAY OF FREE THYROXINE: CPT | Performed by: INTERNAL MEDICINE

## 2024-02-01 PROCEDURE — 99214 OFFICE O/P EST MOD 30 MIN: CPT | Performed by: INTERNAL MEDICINE

## 2024-02-01 PROCEDURE — 84443 ASSAY THYROID STIM HORMONE: CPT | Performed by: INTERNAL MEDICINE

## 2024-02-01 NOTE — PROGRESS NOTES
Venipuncture Blood Specimen Collection  Venipuncture performed in left arm by Lesvia Regalado MA with good hemostasis. Patient tolerated the procedure well without complications.   02/01/24   Lesvia Regalado MA

## 2024-02-16 DIAGNOSIS — C78.7 METASTASIS TO LIVER: Primary | ICD-10-CM

## 2024-02-16 DIAGNOSIS — C78.02 MALIGNANT NEOPLASM METASTATIC TO BOTH LUNGS: ICD-10-CM

## 2024-02-16 DIAGNOSIS — C43.59 MALIGNANT MELANOMA OF TORSO EXCLUDING BREAST: ICD-10-CM

## 2024-02-16 DIAGNOSIS — C78.01 MALIGNANT NEOPLASM METASTATIC TO BOTH LUNGS: ICD-10-CM

## 2024-02-16 RX ORDER — SODIUM CHLORIDE 9 MG/ML
250 INJECTION, SOLUTION INTRAVENOUS ONCE
OUTPATIENT
Start: 2024-02-23

## 2024-02-21 ENCOUNTER — HOSPITAL ENCOUNTER (OUTPATIENT)
Dept: MAMMOGRAPHY | Facility: HOSPITAL | Age: 69
Discharge: HOME | End: 2024-02-21
Admitting: INTERNAL MEDICINE
Payer: COMMERCIAL

## 2024-02-21 DIAGNOSIS — Z85.3 HISTORY OF BREAST CANCER: ICD-10-CM

## 2024-02-21 DIAGNOSIS — C78.01 MALIGNANT NEOPLASM METASTATIC TO BOTH LUNGS: ICD-10-CM

## 2024-02-21 DIAGNOSIS — C43.59 MALIGNANT MELANOMA OF TORSO EXCLUDING BREAST: ICD-10-CM

## 2024-02-21 DIAGNOSIS — Z17.0 MALIGNANT NEOPLASM OF RIGHT BREAST IN FEMALE, ESTROGEN RECEPTOR POSITIVE, UNSPECIFIED SITE OF BREAST: ICD-10-CM

## 2024-02-21 DIAGNOSIS — C78.7 METASTASIS TO LIVER: ICD-10-CM

## 2024-02-21 DIAGNOSIS — C50.911 MALIGNANT NEOPLASM OF RIGHT BREAST IN FEMALE, ESTROGEN RECEPTOR POSITIVE, UNSPECIFIED SITE OF BREAST: ICD-10-CM

## 2024-02-21 DIAGNOSIS — C78.02 MALIGNANT NEOPLASM METASTATIC TO BOTH LUNGS: ICD-10-CM

## 2024-02-21 PROCEDURE — 77066 DX MAMMO INCL CAD BI: CPT | Performed by: RADIOLOGY

## 2024-02-21 PROCEDURE — 77066 DX MAMMO INCL CAD BI: CPT

## 2024-02-21 PROCEDURE — G0279 TOMOSYNTHESIS, MAMMO: HCPCS

## 2024-02-21 PROCEDURE — 77062 BREAST TOMOSYNTHESIS BI: CPT | Performed by: RADIOLOGY

## 2024-02-22 ENCOUNTER — LAB (OUTPATIENT)
Dept: ONCOLOGY | Facility: HOSPITAL | Age: 69
End: 2024-02-22
Payer: COMMERCIAL

## 2024-02-22 ENCOUNTER — INFUSION (OUTPATIENT)
Dept: ONCOLOGY | Facility: HOSPITAL | Age: 69
End: 2024-02-22
Payer: COMMERCIAL

## 2024-02-22 VITALS
BODY MASS INDEX: 32.31 KG/M2 | RESPIRATION RATE: 18 BRPM | HEART RATE: 80 BPM | OXYGEN SATURATION: 97 % | WEIGHT: 206.3 LBS | SYSTOLIC BLOOD PRESSURE: 125 MMHG | TEMPERATURE: 97.9 F | DIASTOLIC BLOOD PRESSURE: 75 MMHG

## 2024-02-22 DIAGNOSIS — C43.59 MALIGNANT MELANOMA OF TORSO EXCLUDING BREAST: ICD-10-CM

## 2024-02-22 DIAGNOSIS — C78.7 METASTASIS TO LIVER: Primary | ICD-10-CM

## 2024-02-22 DIAGNOSIS — C78.01 MALIGNANT NEOPLASM METASTATIC TO BOTH LUNGS: ICD-10-CM

## 2024-02-22 DIAGNOSIS — C78.02 MALIGNANT NEOPLASM METASTATIC TO BOTH LUNGS: ICD-10-CM

## 2024-02-22 DIAGNOSIS — C78.7 METASTASIS TO LIVER: ICD-10-CM

## 2024-02-22 DIAGNOSIS — Z95.828 PORT-A-CATH IN PLACE: ICD-10-CM

## 2024-02-22 LAB
ALBUMIN SERPL-MCNC: 4.4 G/DL (ref 3.5–5.2)
ALBUMIN/GLOB SERPL: 1.9 G/DL
ALP SERPL-CCNC: 74 U/L (ref 39–117)
ALT SERPL W P-5'-P-CCNC: 17 U/L (ref 1–33)
ANION GAP SERPL CALCULATED.3IONS-SCNC: 11 MMOL/L (ref 5–15)
AST SERPL-CCNC: 17 U/L (ref 1–32)
BASOPHILS # BLD AUTO: 0.08 10*3/MM3 (ref 0–0.2)
BASOPHILS NFR BLD AUTO: 1.3 % (ref 0–1.5)
BILIRUB SERPL-MCNC: 0.5 MG/DL (ref 0–1.2)
BUN SERPL-MCNC: 12 MG/DL (ref 8–23)
BUN/CREAT SERPL: 16 (ref 7–25)
CALCIUM SPEC-SCNC: 9.5 MG/DL (ref 8.6–10.5)
CHLORIDE SERPL-SCNC: 105 MMOL/L (ref 98–107)
CO2 SERPL-SCNC: 26 MMOL/L (ref 22–29)
CREAT SERPL-MCNC: 0.75 MG/DL (ref 0.57–1)
DEPRECATED RDW RBC AUTO: 45.4 FL (ref 37–54)
EGFRCR SERPLBLD CKD-EPI 2021: 87.4 ML/MIN/1.73
EOSINOPHIL # BLD AUTO: 0.21 10*3/MM3 (ref 0–0.4)
EOSINOPHIL NFR BLD AUTO: 3.4 % (ref 0.3–6.2)
ERYTHROCYTE [DISTWIDTH] IN BLOOD BY AUTOMATED COUNT: 13.6 % (ref 12.3–15.4)
GLOBULIN UR ELPH-MCNC: 2.3 GM/DL
GLUCOSE SERPL-MCNC: 77 MG/DL (ref 65–99)
HCT VFR BLD AUTO: 42.6 % (ref 34–46.6)
HGB BLD-MCNC: 13.6 G/DL (ref 12–15.9)
IMM GRANULOCYTES # BLD AUTO: 0.01 10*3/MM3 (ref 0–0.05)
IMM GRANULOCYTES NFR BLD AUTO: 0.2 % (ref 0–0.5)
LYMPHOCYTES # BLD AUTO: 2.1 10*3/MM3 (ref 0.7–3.1)
LYMPHOCYTES NFR BLD AUTO: 34.4 % (ref 19.6–45.3)
MCH RBC QN AUTO: 29 PG (ref 26.6–33)
MCHC RBC AUTO-ENTMCNC: 31.9 G/DL (ref 31.5–35.7)
MCV RBC AUTO: 90.8 FL (ref 79–97)
MONOCYTES # BLD AUTO: 0.57 10*3/MM3 (ref 0.1–0.9)
MONOCYTES NFR BLD AUTO: 9.3 % (ref 5–12)
NEUTROPHILS NFR BLD AUTO: 3.13 10*3/MM3 (ref 1.7–7)
NEUTROPHILS NFR BLD AUTO: 51.4 % (ref 42.7–76)
NRBC BLD AUTO-RTO: 0 /100 WBC (ref 0–0.2)
PLATELET # BLD AUTO: 183 10*3/MM3 (ref 140–450)
PMV BLD AUTO: 10.1 FL (ref 6–12)
POTASSIUM SERPL-SCNC: 3.8 MMOL/L (ref 3.5–5.2)
PROT SERPL-MCNC: 6.7 G/DL (ref 6–8.5)
RBC # BLD AUTO: 4.69 10*6/MM3 (ref 3.77–5.28)
SODIUM SERPL-SCNC: 142 MMOL/L (ref 136–145)
T4 FREE SERPL-MCNC: 1.44 NG/DL (ref 0.93–1.7)
TSH SERPL DL<=0.05 MIU/L-ACNC: 1.7 UIU/ML (ref 0.27–4.2)
WBC NRBC COR # BLD AUTO: 6.1 10*3/MM3 (ref 3.4–10.8)

## 2024-02-22 PROCEDURE — 84439 ASSAY OF FREE THYROXINE: CPT

## 2024-02-22 PROCEDURE — 25810000003 SODIUM CHLORIDE 0.9 % SOLUTION: Performed by: INTERNAL MEDICINE

## 2024-02-22 PROCEDURE — 25010000002 HEPARIN LOCK FLUSH PER 10 UNITS: Performed by: INTERNAL MEDICINE

## 2024-02-22 PROCEDURE — 80050 GENERAL HEALTH PANEL: CPT

## 2024-02-22 PROCEDURE — 96413 CHEMO IV INFUSION 1 HR: CPT

## 2024-02-22 PROCEDURE — 25010000002 NIVOLUMAB 240 MG/24ML SOLUTION 24 ML VIAL: Performed by: INTERNAL MEDICINE

## 2024-02-22 RX ORDER — HEPARIN SODIUM (PORCINE) LOCK FLUSH IV SOLN 100 UNIT/ML 100 UNIT/ML
500 SOLUTION INTRAVENOUS AS NEEDED
Status: DISCONTINUED | OUTPATIENT
Start: 2024-02-22 | End: 2024-02-22 | Stop reason: HOSPADM

## 2024-02-22 RX ORDER — SODIUM CHLORIDE 0.9 % (FLUSH) 0.9 %
10 SYRINGE (ML) INJECTION AS NEEDED
Status: DISCONTINUED | OUTPATIENT
Start: 2024-02-22 | End: 2024-02-22 | Stop reason: HOSPADM

## 2024-02-22 RX ORDER — SODIUM CHLORIDE 0.9 % (FLUSH) 0.9 %
10 SYRINGE (ML) INJECTION AS NEEDED
OUTPATIENT
Start: 2024-02-22

## 2024-02-22 RX ORDER — HEPARIN SODIUM (PORCINE) LOCK FLUSH IV SOLN 100 UNIT/ML 100 UNIT/ML
500 SOLUTION INTRAVENOUS AS NEEDED
OUTPATIENT
Start: 2024-02-22

## 2024-02-22 RX ORDER — SODIUM CHLORIDE 9 MG/ML
250 INJECTION, SOLUTION INTRAVENOUS ONCE
Status: COMPLETED | OUTPATIENT
Start: 2024-02-22 | End: 2024-02-22

## 2024-02-22 RX ORDER — HEPARIN SODIUM (PORCINE) LOCK FLUSH IV SOLN 100 UNIT/ML 100 UNIT/ML
300 SOLUTION INTRAVENOUS ONCE
OUTPATIENT
Start: 2024-02-22

## 2024-02-22 RX ORDER — SODIUM CHLORIDE 0.9 % (FLUSH) 0.9 %
20 SYRINGE (ML) INJECTION AS NEEDED
OUTPATIENT
Start: 2024-02-22

## 2024-02-22 RX ADMIN — HEPARIN 500 UNITS: 100 SYRINGE at 14:48

## 2024-02-22 RX ADMIN — SODIUM CHLORIDE 480 MG: 9 INJECTION, SOLUTION INTRAVENOUS at 14:12

## 2024-02-22 RX ADMIN — Medication 10 ML: at 14:48

## 2024-02-22 RX ADMIN — SODIUM CHLORIDE 250 ML: 9 INJECTION, SOLUTION INTRAVENOUS at 14:12

## 2024-03-15 DIAGNOSIS — C78.02 MALIGNANT NEOPLASM METASTATIC TO BOTH LUNGS: ICD-10-CM

## 2024-03-15 DIAGNOSIS — C78.7 METASTASIS TO LIVER: Primary | ICD-10-CM

## 2024-03-15 DIAGNOSIS — C43.59 MALIGNANT MELANOMA OF TORSO EXCLUDING BREAST: ICD-10-CM

## 2024-03-15 DIAGNOSIS — C78.01 MALIGNANT NEOPLASM METASTATIC TO BOTH LUNGS: ICD-10-CM

## 2024-03-15 RX ORDER — SODIUM CHLORIDE 9 MG/ML
250 INJECTION, SOLUTION INTRAVENOUS ONCE
OUTPATIENT
Start: 2024-03-21

## 2024-03-21 ENCOUNTER — LAB (OUTPATIENT)
Dept: ONCOLOGY | Facility: HOSPITAL | Age: 69
End: 2024-03-21
Payer: MEDICARE

## 2024-03-21 ENCOUNTER — INFUSION (OUTPATIENT)
Dept: ONCOLOGY | Facility: HOSPITAL | Age: 69
End: 2024-03-21
Payer: MEDICARE

## 2024-03-21 VITALS
SYSTOLIC BLOOD PRESSURE: 157 MMHG | TEMPERATURE: 98 F | RESPIRATION RATE: 18 BRPM | HEART RATE: 79 BPM | DIASTOLIC BLOOD PRESSURE: 86 MMHG | BODY MASS INDEX: 31.79 KG/M2 | WEIGHT: 203 LBS | OXYGEN SATURATION: 98 %

## 2024-03-21 DIAGNOSIS — C78.02 MALIGNANT NEOPLASM METASTATIC TO BOTH LUNGS: ICD-10-CM

## 2024-03-21 DIAGNOSIS — C78.7 METASTASIS TO LIVER: ICD-10-CM

## 2024-03-21 DIAGNOSIS — C78.01 MALIGNANT NEOPLASM METASTATIC TO BOTH LUNGS: ICD-10-CM

## 2024-03-21 DIAGNOSIS — C43.59 MALIGNANT MELANOMA OF TORSO EXCLUDING BREAST: ICD-10-CM

## 2024-03-21 DIAGNOSIS — C43.59 MALIGNANT MELANOMA OF TORSO EXCLUDING BREAST: Primary | ICD-10-CM

## 2024-03-21 DIAGNOSIS — Z95.828 PORT-A-CATH IN PLACE: ICD-10-CM

## 2024-03-21 LAB
ALBUMIN SERPL-MCNC: 4.4 G/DL (ref 3.5–5.2)
ALBUMIN/GLOB SERPL: 2 G/DL
ALP SERPL-CCNC: 79 U/L (ref 39–117)
ALT SERPL W P-5'-P-CCNC: 19 U/L (ref 1–33)
ANION GAP SERPL CALCULATED.3IONS-SCNC: 12.3 MMOL/L (ref 5–15)
AST SERPL-CCNC: 18 U/L (ref 1–32)
BASOPHILS # BLD AUTO: 0.05 10*3/MM3 (ref 0–0.2)
BASOPHILS NFR BLD AUTO: 1 % (ref 0–1.5)
BILIRUB SERPL-MCNC: 0.5 MG/DL (ref 0–1.2)
BUN SERPL-MCNC: 13 MG/DL (ref 8–23)
BUN/CREAT SERPL: 16.7 (ref 7–25)
CALCIUM SPEC-SCNC: 9.5 MG/DL (ref 8.6–10.5)
CHLORIDE SERPL-SCNC: 107 MMOL/L (ref 98–107)
CO2 SERPL-SCNC: 23.7 MMOL/L (ref 22–29)
CREAT SERPL-MCNC: 0.78 MG/DL (ref 0.57–1)
DEPRECATED RDW RBC AUTO: 45.5 FL (ref 37–54)
EGFRCR SERPLBLD CKD-EPI 2021: 83.4 ML/MIN/1.73
EOSINOPHIL # BLD AUTO: 0.11 10*3/MM3 (ref 0–0.4)
EOSINOPHIL NFR BLD AUTO: 2.3 % (ref 0.3–6.2)
ERYTHROCYTE [DISTWIDTH] IN BLOOD BY AUTOMATED COUNT: 13.8 % (ref 12.3–15.4)
GLOBULIN UR ELPH-MCNC: 2.2 GM/DL
GLUCOSE SERPL-MCNC: 88 MG/DL (ref 65–99)
HCT VFR BLD AUTO: 43.1 % (ref 34–46.6)
HGB BLD-MCNC: 13.8 G/DL (ref 12–15.9)
IMM GRANULOCYTES # BLD AUTO: 0 10*3/MM3 (ref 0–0.05)
IMM GRANULOCYTES NFR BLD AUTO: 0 % (ref 0–0.5)
LYMPHOCYTES # BLD AUTO: 1.94 10*3/MM3 (ref 0.7–3.1)
LYMPHOCYTES NFR BLD AUTO: 40.2 % (ref 19.6–45.3)
MCH RBC QN AUTO: 28.7 PG (ref 26.6–33)
MCHC RBC AUTO-ENTMCNC: 32 G/DL (ref 31.5–35.7)
MCV RBC AUTO: 89.6 FL (ref 79–97)
MONOCYTES # BLD AUTO: 0.38 10*3/MM3 (ref 0.1–0.9)
MONOCYTES NFR BLD AUTO: 7.9 % (ref 5–12)
NEUTROPHILS NFR BLD AUTO: 2.34 10*3/MM3 (ref 1.7–7)
NEUTROPHILS NFR BLD AUTO: 48.6 % (ref 42.7–76)
NRBC BLD AUTO-RTO: 0 /100 WBC (ref 0–0.2)
PLATELET # BLD AUTO: 204 10*3/MM3 (ref 140–450)
PMV BLD AUTO: 10.1 FL (ref 6–12)
POTASSIUM SERPL-SCNC: 4 MMOL/L (ref 3.5–5.2)
PROT SERPL-MCNC: 6.6 G/DL (ref 6–8.5)
RBC # BLD AUTO: 4.81 10*6/MM3 (ref 3.77–5.28)
SODIUM SERPL-SCNC: 143 MMOL/L (ref 136–145)
T4 FREE SERPL-MCNC: 1.51 NG/DL (ref 0.93–1.7)
TSH SERPL DL<=0.05 MIU/L-ACNC: 1.22 UIU/ML (ref 0.27–4.2)
WBC NRBC COR # BLD AUTO: 4.82 10*3/MM3 (ref 3.4–10.8)

## 2024-03-21 PROCEDURE — 25010000002 NIVOLUMAB 240 MG/24ML SOLUTION 24 ML VIAL: Performed by: INTERNAL MEDICINE

## 2024-03-21 PROCEDURE — 84443 ASSAY THYROID STIM HORMONE: CPT

## 2024-03-21 PROCEDURE — 85025 COMPLETE CBC W/AUTO DIFF WBC: CPT

## 2024-03-21 PROCEDURE — 96413 CHEMO IV INFUSION 1 HR: CPT

## 2024-03-21 PROCEDURE — 84439 ASSAY OF FREE THYROXINE: CPT

## 2024-03-21 PROCEDURE — 25810000003 SODIUM CHLORIDE 0.9 % SOLUTION: Performed by: INTERNAL MEDICINE

## 2024-03-21 PROCEDURE — 25010000002 HEPARIN LOCK FLUSH PER 10 UNITS: Performed by: INTERNAL MEDICINE

## 2024-03-21 PROCEDURE — 80053 COMPREHEN METABOLIC PANEL: CPT

## 2024-03-21 RX ORDER — HEPARIN SODIUM (PORCINE) LOCK FLUSH IV SOLN 100 UNIT/ML 100 UNIT/ML
500 SOLUTION INTRAVENOUS AS NEEDED
Status: DISCONTINUED | OUTPATIENT
Start: 2024-03-21 | End: 2024-03-21 | Stop reason: HOSPADM

## 2024-03-21 RX ORDER — SODIUM CHLORIDE 0.9 % (FLUSH) 0.9 %
10 SYRINGE (ML) INJECTION AS NEEDED
OUTPATIENT
Start: 2024-03-21

## 2024-03-21 RX ORDER — SODIUM CHLORIDE 0.9 % (FLUSH) 0.9 %
10 SYRINGE (ML) INJECTION AS NEEDED
Status: DISCONTINUED | OUTPATIENT
Start: 2024-03-21 | End: 2024-03-21 | Stop reason: HOSPADM

## 2024-03-21 RX ORDER — SODIUM CHLORIDE 9 MG/ML
250 INJECTION, SOLUTION INTRAVENOUS ONCE
Status: COMPLETED | OUTPATIENT
Start: 2024-03-21 | End: 2024-03-21

## 2024-03-21 RX ORDER — HEPARIN SODIUM (PORCINE) LOCK FLUSH IV SOLN 100 UNIT/ML 100 UNIT/ML
300 SOLUTION INTRAVENOUS ONCE
OUTPATIENT
Start: 2024-03-21

## 2024-03-21 RX ORDER — SODIUM CHLORIDE 0.9 % (FLUSH) 0.9 %
20 SYRINGE (ML) INJECTION AS NEEDED
OUTPATIENT
Start: 2024-03-21

## 2024-03-21 RX ORDER — HEPARIN SODIUM (PORCINE) LOCK FLUSH IV SOLN 100 UNIT/ML 100 UNIT/ML
500 SOLUTION INTRAVENOUS AS NEEDED
OUTPATIENT
Start: 2024-03-21

## 2024-03-21 RX ADMIN — Medication 10 ML: at 14:48

## 2024-03-21 RX ADMIN — SODIUM CHLORIDE 480 MG: 9 INJECTION, SOLUTION INTRAVENOUS at 14:12

## 2024-03-21 RX ADMIN — SODIUM CHLORIDE 250 ML: 9 INJECTION, SOLUTION INTRAVENOUS at 14:11

## 2024-03-21 RX ADMIN — Medication 500 UNITS: at 14:48

## 2024-04-12 DIAGNOSIS — C78.7 METASTASIS TO LIVER: Primary | ICD-10-CM

## 2024-04-12 DIAGNOSIS — C78.01 MALIGNANT NEOPLASM METASTATIC TO BOTH LUNGS: ICD-10-CM

## 2024-04-12 DIAGNOSIS — C43.59 MALIGNANT MELANOMA OF TORSO EXCLUDING BREAST: ICD-10-CM

## 2024-04-12 DIAGNOSIS — C78.02 MALIGNANT NEOPLASM METASTATIC TO BOTH LUNGS: ICD-10-CM

## 2024-04-12 RX ORDER — SODIUM CHLORIDE 9 MG/ML
250 INJECTION, SOLUTION INTRAVENOUS ONCE
OUTPATIENT
Start: 2024-04-18

## 2024-04-18 ENCOUNTER — LAB (OUTPATIENT)
Dept: ONCOLOGY | Facility: HOSPITAL | Age: 69
End: 2024-04-18
Payer: COMMERCIAL

## 2024-04-18 ENCOUNTER — INFUSION (OUTPATIENT)
Dept: ONCOLOGY | Facility: HOSPITAL | Age: 69
End: 2024-04-18
Payer: COMMERCIAL

## 2024-04-18 VITALS
OXYGEN SATURATION: 95 % | SYSTOLIC BLOOD PRESSURE: 119 MMHG | RESPIRATION RATE: 18 BRPM | TEMPERATURE: 97.9 F | BODY MASS INDEX: 31.29 KG/M2 | HEART RATE: 76 BPM | WEIGHT: 199.8 LBS | DIASTOLIC BLOOD PRESSURE: 75 MMHG

## 2024-04-18 DIAGNOSIS — C78.02 MALIGNANT NEOPLASM METASTATIC TO BOTH LUNGS: ICD-10-CM

## 2024-04-18 DIAGNOSIS — C78.7 METASTASIS TO LIVER: ICD-10-CM

## 2024-04-18 DIAGNOSIS — C78.01 MALIGNANT NEOPLASM METASTATIC TO BOTH LUNGS: ICD-10-CM

## 2024-04-18 DIAGNOSIS — C43.59 MALIGNANT MELANOMA OF TORSO EXCLUDING BREAST: Primary | ICD-10-CM

## 2024-04-18 DIAGNOSIS — C43.59 MALIGNANT MELANOMA OF TORSO EXCLUDING BREAST: ICD-10-CM

## 2024-04-18 DIAGNOSIS — Z95.828 PORT-A-CATH IN PLACE: ICD-10-CM

## 2024-04-18 LAB
ALBUMIN SERPL-MCNC: 4.3 G/DL (ref 3.5–5.2)
ALBUMIN/GLOB SERPL: 1.7 G/DL
ALP SERPL-CCNC: 77 U/L (ref 39–117)
ALT SERPL W P-5'-P-CCNC: 19 U/L (ref 1–33)
ANION GAP SERPL CALCULATED.3IONS-SCNC: 10.7 MMOL/L (ref 5–15)
AST SERPL-CCNC: 21 U/L (ref 1–32)
BASOPHILS # BLD AUTO: 0.06 10*3/MM3 (ref 0–0.2)
BASOPHILS NFR BLD AUTO: 1.3 % (ref 0–1.5)
BILIRUB SERPL-MCNC: 0.6 MG/DL (ref 0–1.2)
BUN SERPL-MCNC: 11 MG/DL (ref 8–23)
BUN/CREAT SERPL: 13.3 (ref 7–25)
CALCIUM SPEC-SCNC: 9.6 MG/DL (ref 8.6–10.5)
CHLORIDE SERPL-SCNC: 108 MMOL/L (ref 98–107)
CO2 SERPL-SCNC: 24.3 MMOL/L (ref 22–29)
CREAT SERPL-MCNC: 0.83 MG/DL (ref 0.57–1)
DEPRECATED RDW RBC AUTO: 44.5 FL (ref 37–54)
EGFRCR SERPLBLD CKD-EPI 2021: 77.4 ML/MIN/1.73
EOSINOPHIL # BLD AUTO: 0.17 10*3/MM3 (ref 0–0.4)
EOSINOPHIL NFR BLD AUTO: 3.8 % (ref 0.3–6.2)
ERYTHROCYTE [DISTWIDTH] IN BLOOD BY AUTOMATED COUNT: 13.7 % (ref 12.3–15.4)
GLOBULIN UR ELPH-MCNC: 2.5 GM/DL
GLUCOSE SERPL-MCNC: 103 MG/DL (ref 65–99)
HCT VFR BLD AUTO: 43 % (ref 34–46.6)
HGB BLD-MCNC: 14 G/DL (ref 12–15.9)
IMM GRANULOCYTES # BLD AUTO: 0.01 10*3/MM3 (ref 0–0.05)
IMM GRANULOCYTES NFR BLD AUTO: 0.2 % (ref 0–0.5)
LYMPHOCYTES # BLD AUTO: 1.55 10*3/MM3 (ref 0.7–3.1)
LYMPHOCYTES NFR BLD AUTO: 34.5 % (ref 19.6–45.3)
MCH RBC QN AUTO: 29.2 PG (ref 26.6–33)
MCHC RBC AUTO-ENTMCNC: 32.6 G/DL (ref 31.5–35.7)
MCV RBC AUTO: 89.6 FL (ref 79–97)
MONOCYTES # BLD AUTO: 0.36 10*3/MM3 (ref 0.1–0.9)
MONOCYTES NFR BLD AUTO: 8 % (ref 5–12)
NEUTROPHILS NFR BLD AUTO: 2.34 10*3/MM3 (ref 1.7–7)
NEUTROPHILS NFR BLD AUTO: 52.2 % (ref 42.7–76)
NRBC BLD AUTO-RTO: 0 /100 WBC (ref 0–0.2)
PLATELET # BLD AUTO: 195 10*3/MM3 (ref 140–450)
PMV BLD AUTO: 10.2 FL (ref 6–12)
POTASSIUM SERPL-SCNC: 3.9 MMOL/L (ref 3.5–5.2)
PROT SERPL-MCNC: 6.8 G/DL (ref 6–8.5)
RBC # BLD AUTO: 4.8 10*6/MM3 (ref 3.77–5.28)
SODIUM SERPL-SCNC: 143 MMOL/L (ref 136–145)
T4 FREE SERPL-MCNC: 1.47 NG/DL (ref 0.93–1.7)
TSH SERPL DL<=0.05 MIU/L-ACNC: 1.31 UIU/ML (ref 0.27–4.2)
WBC NRBC COR # BLD AUTO: 4.49 10*3/MM3 (ref 3.4–10.8)

## 2024-04-18 PROCEDURE — 96413 CHEMO IV INFUSION 1 HR: CPT

## 2024-04-18 PROCEDURE — 25010000002 NIVOLUMAB 240 MG/24ML SOLUTION 24 ML VIAL: Performed by: INTERNAL MEDICINE

## 2024-04-18 PROCEDURE — 25810000003 SODIUM CHLORIDE 0.9 % SOLUTION: Performed by: INTERNAL MEDICINE

## 2024-04-18 PROCEDURE — 25010000002 HEPARIN LOCK FLUSH PER 10 UNITS: Performed by: INTERNAL MEDICINE

## 2024-04-18 PROCEDURE — 84439 ASSAY OF FREE THYROXINE: CPT

## 2024-04-18 PROCEDURE — 80050 GENERAL HEALTH PANEL: CPT

## 2024-04-18 RX ORDER — HEPARIN SODIUM (PORCINE) LOCK FLUSH IV SOLN 100 UNIT/ML 100 UNIT/ML
500 SOLUTION INTRAVENOUS AS NEEDED
Status: DISCONTINUED | OUTPATIENT
Start: 2024-04-18 | End: 2024-04-18 | Stop reason: HOSPADM

## 2024-04-18 RX ORDER — HEPARIN SODIUM (PORCINE) LOCK FLUSH IV SOLN 100 UNIT/ML 100 UNIT/ML
300 SOLUTION INTRAVENOUS ONCE
OUTPATIENT
Start: 2024-04-18

## 2024-04-18 RX ORDER — SODIUM CHLORIDE 0.9 % (FLUSH) 0.9 %
20 SYRINGE (ML) INJECTION AS NEEDED
OUTPATIENT
Start: 2024-04-18

## 2024-04-18 RX ORDER — SODIUM CHLORIDE 9 MG/ML
250 INJECTION, SOLUTION INTRAVENOUS ONCE
Status: COMPLETED | OUTPATIENT
Start: 2024-04-18 | End: 2024-04-18

## 2024-04-18 RX ORDER — SODIUM CHLORIDE 0.9 % (FLUSH) 0.9 %
10 SYRINGE (ML) INJECTION AS NEEDED
Status: DISCONTINUED | OUTPATIENT
Start: 2024-04-18 | End: 2024-04-18 | Stop reason: HOSPADM

## 2024-04-18 RX ORDER — HEPARIN SODIUM (PORCINE) LOCK FLUSH IV SOLN 100 UNIT/ML 100 UNIT/ML
500 SOLUTION INTRAVENOUS AS NEEDED
OUTPATIENT
Start: 2024-04-18

## 2024-04-18 RX ORDER — SODIUM CHLORIDE 0.9 % (FLUSH) 0.9 %
10 SYRINGE (ML) INJECTION AS NEEDED
OUTPATIENT
Start: 2024-04-18

## 2024-04-18 RX ADMIN — SODIUM CHLORIDE 250 ML: 9 INJECTION, SOLUTION INTRAVENOUS at 14:53

## 2024-04-18 RX ADMIN — Medication 10 ML: at 15:28

## 2024-04-18 RX ADMIN — Medication 500 UNITS: at 15:28

## 2024-04-18 RX ADMIN — SODIUM CHLORIDE 480 MG: 9 INJECTION, SOLUTION INTRAVENOUS at 14:53

## 2024-05-07 ENCOUNTER — HOSPITAL ENCOUNTER (OUTPATIENT)
Dept: RADIATION ONCOLOGY | Facility: HOSPITAL | Age: 69
Discharge: HOME | End: 2024-05-07
Payer: COMMERCIAL

## 2024-05-07 ENCOUNTER — LAB (OUTPATIENT)
Dept: ONCOLOGY | Facility: HOSPITAL | Age: 69
End: 2024-05-07
Payer: COMMERCIAL

## 2024-05-07 VITALS
DIASTOLIC BLOOD PRESSURE: 77 MMHG | RESPIRATION RATE: 18 BRPM | HEART RATE: 80 BPM | SYSTOLIC BLOOD PRESSURE: 125 MMHG | TEMPERATURE: 98 F | OXYGEN SATURATION: 96 %

## 2024-05-07 DIAGNOSIS — R53.83 OTHER FATIGUE: ICD-10-CM

## 2024-05-07 DIAGNOSIS — C78.02 MALIGNANT NEOPLASM METASTATIC TO BOTH LUNGS: ICD-10-CM

## 2024-05-07 DIAGNOSIS — C43.59 MALIGNANT MELANOMA OF TORSO EXCLUDING BREAST: ICD-10-CM

## 2024-05-07 DIAGNOSIS — Z17.0 MALIGNANT NEOPLASM OF RIGHT BREAST IN FEMALE, ESTROGEN RECEPTOR POSITIVE, UNSPECIFIED SITE OF BREAST: ICD-10-CM

## 2024-05-07 DIAGNOSIS — C50.911 MALIGNANT NEOPLASM OF RIGHT BREAST IN FEMALE, ESTROGEN RECEPTOR POSITIVE, UNSPECIFIED SITE OF BREAST: ICD-10-CM

## 2024-05-07 DIAGNOSIS — C78.7 METASTASIS TO LIVER: ICD-10-CM

## 2024-05-07 DIAGNOSIS — C78.01 MALIGNANT NEOPLASM METASTATIC TO BOTH LUNGS: ICD-10-CM

## 2024-05-07 LAB
ALBUMIN SERPL-MCNC: 4.2 G/DL (ref 3.5–5.2)
ALBUMIN/GLOB SERPL: 1.5 G/DL
ALP SERPL-CCNC: 74 U/L (ref 39–117)
ALT SERPL W P-5'-P-CCNC: 16 U/L (ref 1–33)
ANION GAP SERPL CALCULATED.3IONS-SCNC: 13.6 MMOL/L (ref 5–15)
AST SERPL-CCNC: 19 U/L (ref 1–32)
BASOPHILS # BLD AUTO: 0.02 10*3/MM3 (ref 0–0.2)
BASOPHILS NFR BLD AUTO: 0.4 % (ref 0–1.5)
BILIRUB SERPL-MCNC: 1 MG/DL (ref 0–1.2)
BUN SERPL-MCNC: 12 MG/DL (ref 8–23)
BUN/CREAT SERPL: 14.6 (ref 7–25)
CALCIUM SPEC-SCNC: 9.7 MG/DL (ref 8.6–10.5)
CHLORIDE SERPL-SCNC: 103 MMOL/L (ref 98–107)
CO2 SERPL-SCNC: 22.4 MMOL/L (ref 22–29)
CREAT SERPL-MCNC: 0.82 MG/DL (ref 0.57–1)
DEPRECATED RDW RBC AUTO: 44.3 FL (ref 37–54)
EGFRCR SERPLBLD CKD-EPI 2021: 78.5 ML/MIN/1.73
EOSINOPHIL # BLD AUTO: 0.17 10*3/MM3 (ref 0–0.4)
EOSINOPHIL NFR BLD AUTO: 3.2 % (ref 0.3–6.2)
ERYTHROCYTE [DISTWIDTH] IN BLOOD BY AUTOMATED COUNT: 13.7 % (ref 12.3–15.4)
GLOBULIN UR ELPH-MCNC: 2.8 GM/DL
GLUCOSE SERPL-MCNC: 95 MG/DL (ref 65–99)
HCT VFR BLD AUTO: 41.2 % (ref 34–46.6)
HGB BLD-MCNC: 13.7 G/DL (ref 12–15.9)
IMM GRANULOCYTES # BLD AUTO: 0.01 10*3/MM3 (ref 0–0.05)
IMM GRANULOCYTES NFR BLD AUTO: 0.2 % (ref 0–0.5)
LYMPHOCYTES # BLD AUTO: 1.98 10*3/MM3 (ref 0.7–3.1)
LYMPHOCYTES NFR BLD AUTO: 37.1 % (ref 19.6–45.3)
MCH RBC QN AUTO: 29.5 PG (ref 26.6–33)
MCHC RBC AUTO-ENTMCNC: 33.3 G/DL (ref 31.5–35.7)
MCV RBC AUTO: 88.6 FL (ref 79–97)
MONOCYTES # BLD AUTO: 0.44 10*3/MM3 (ref 0.1–0.9)
MONOCYTES NFR BLD AUTO: 8.3 % (ref 5–12)
NEUTROPHILS NFR BLD AUTO: 2.71 10*3/MM3 (ref 1.7–7)
NEUTROPHILS NFR BLD AUTO: 50.8 % (ref 42.7–76)
NRBC BLD AUTO-RTO: 0 /100 WBC (ref 0–0.2)
PLATELET # BLD AUTO: 198 10*3/MM3 (ref 140–450)
PMV BLD AUTO: 10.2 FL (ref 6–12)
POTASSIUM SERPL-SCNC: 4 MMOL/L (ref 3.5–5.2)
PROT SERPL-MCNC: 7 G/DL (ref 6–8.5)
RBC # BLD AUTO: 4.65 10*6/MM3 (ref 3.77–5.28)
SODIUM SERPL-SCNC: 139 MMOL/L (ref 136–145)
TSH SERPL DL<=0.05 MIU/L-ACNC: 1.39 UIU/ML (ref 0.27–4.2)
WBC NRBC COR # BLD AUTO: 5.33 10*3/MM3 (ref 3.4–10.8)

## 2024-05-07 PROCEDURE — 84443 ASSAY THYROID STIM HORMONE: CPT

## 2024-05-07 PROCEDURE — 85025 COMPLETE CBC W/AUTO DIFF WBC: CPT

## 2024-05-07 PROCEDURE — 74177 CT ABD & PELVIS W/CONTRAST: CPT

## 2024-05-07 PROCEDURE — 74177 CT ABD & PELVIS W/CONTRAST: CPT | Performed by: RADIOLOGY

## 2024-05-07 PROCEDURE — 25510000001 IOPAMIDOL 61 % SOLUTION: Performed by: INTERNAL MEDICINE

## 2024-05-07 PROCEDURE — 80053 COMPREHEN METABOLIC PANEL: CPT

## 2024-05-07 PROCEDURE — 71260 CT THORAX DX C+: CPT | Performed by: RADIOLOGY

## 2024-05-07 PROCEDURE — 71260 CT THORAX DX C+: CPT

## 2024-05-07 RX ADMIN — IOPAMIDOL 80 ML: 612 INJECTION, SOLUTION INTRAVENOUS at 15:53

## 2024-05-09 NOTE — PROGRESS NOTES
"NAME: Yadira Flowers    : 1955    DATE: 2024      DIAGNOSIS:   1.  Stage IA ([id number]) moderately differentiated invasive ductal carcinoma of the right breast diagnosed in 2015.    2.  H/o malignant Melanoma on her Back    3.  H/o cervical cancer treated with cryotherapy at [facility] in     4.  Recurrent / metastatic malignant Melanoma  -  R axillary LN bx 22.    -  She has R axillary LN mass (2.47x2.16), Solitary cavitary R lobe liver lesion, cavitary RLL lung mass,  and bilateral parenchymal lung nodules      TREATMENT HISTORY:   1.         CHIEF COMPLAINT:  Follow up of metastatic melanoma/toxicity check and recent imaging    HISTORY OF PRESENT ILLNESS:   Yadira Flowers is a very pleasant 68 y.o. female who was referred by Dr. Dilcia Pedro for evaluation and treatment of breast cancer. She was living in [place] in 2015 when her dog brought attention to and \"found\" an abnormal lump in her right breast.  She was treated by Dr. Steve Isbell and underwent R lumpectomy with SLNBx on 10-22-15 as below.  She then had what sounds like accelerated partial breast irradiation.  She was then started on Arimidex which was later switched to Exemestane for a better side effect profile.  She took this for about 5 years, stopping a couple of weeks ago.  She stopped taking Exemestane because she developed some vaginal bleeding and hematuria.  This was evaluated by her PCP and gynecologist and felt to be due to vaginal atrophy.  She was prescribed a hormone pill which she hasn't yet started and was referred here to discuss things further.      Aside from bleeding which has been uncomfortable and distressing for her, Ms. Onur Burgos has generally been well.  The last year has been hard on her emotionally with the loss of her  and then her dog, her home and her insurance, but she is coping well and has good family support in [place].  She says she gained weight with her breast " cancer treatment and gained weight when her  .  She has been working now to lose weight and has lost 20 lbs over the last 2 months with dieting.  She denies chest pain or shortness of breath.  She complains of some RLQ cramping pain and isn't sure what is causing that.  She denies difficulty moving her bowels.  No blood in her stool that she is aware of.  She has had vaginal bleeding and hematuria as above.        INTERVAL HISTORY:  Ms. Burgos presents today for follow up of metastatic melanoma/toxicity check and recent imaging.  Since her last visit she has overall been well.  She is feeling well.  She is working on increasing her activity level.  She has been taking Mounjaro and says this has caused some constipation and she thinks it may have also caused her bilirubin to go up (though it is still within normal limits).  Her mother passed away a couple of months ago which was hard on her, but she is glad that she is no longer suffering.  She is otherwise well without complaint.      PAST MEDICAL HISTORY:  Past Medical History:   Diagnosis Date    Back pain     Basal cell carcinoma (BCC) in situ of skin     Dr. Mohr - Derm    Breast cancer     right    Cancer     breast, cervical, melanoma    Cervical cancer 1976    Diagnosed in .  Treated by repeated local intervention and ultimately received cryotherapy at [facility] with resolution.    Elbow fracture     Foot fracture     Headache     Hyperlipidemia     Hypertension     TAKEN OFF MEDS    Melanoma     on her back  - treated by Dermatologist Catrachito Amaro in [place], [place] with what sounds like wide local excision.     Pulmonary embolism     Sinusitis        PAST SURGICAL HISTORY:  Past Surgical History:   Procedure Laterality Date    AXILLARY LYMPH NODE BIOPSY/EXCISION Right 2022    Procedure: AXILLARY LYMPH NODE BIOPSY/EXCISION;  Surgeon: Dianelys Cummings MD;  Location: [facility];  Service: General;  Laterality: Right;    BREAST  LUMPECTOMY Right 2015    ca    BREAST SURGERY  2015    Secondary to cancer    BRONCHOSCOPY Right 2022    Procedure: BRONCHOSCOPY WITH ENDOBRONCHIAL ULTRASOUND;  Surgeon: Chris Her MD;  Location:  [facility];  Service: Pulmonary;  Laterality: Right;    COLONOSCOPY N/A 2020    Procedure: COLONOSCOPY;  Surgeon: Mohsen Maldonado MD;  Location:  [facility];  Service: Gastroenterology;  Laterality: N/A;    ELBOW FUSION      left    FOOT SURGERY Right 2014    Broken foot    HAND SURGERY  2020    right    RHINOPLASTY  2002    SKIN CANCER EXCISION      malignant melanoma from back     US GUIDED LYMPH NODE BIOPSY  2022    VENOUS ACCESS DEVICE (PORT) INSERTION N/A 2022    Procedure: INSERTION VENOUS ACCESS DEVICE left or right;  Surgeon: Dianelys Cummings MD;  Location:  [facility];  Service: General;  Laterality: N/A;       FAMILY HISTORY:  Family History   Problem Relation Age of Onset    Other Mother         blood clots    Hypertension Mother     Ulcers Father     Hypertension Sister     Other Sister         Multiple Sclerosis    Cancer Paternal Aunt     Stroke Maternal Grandmother     Alcohol abuse Paternal Grandmother     Hypertension Sister     Multiple sclerosis Sister     Hypertension Sister     Coronary artery disease Paternal Grandfather     Cancer Maternal Aunt 30        colon    Breast cancer Neg Hx    Many paternal aunts  with malignancy of various types. Many cousins on that side of the family have had cancer as well.  One  of colon cancer in her 30s.  One had a brain tumor.    SOCIAL HISTORY:  Social History     Socioeconomic History    Marital status:    Tobacco Use    Smoking status: Never    Smokeless tobacco: Never   Vaping Use    Vaping status: Never Used   Substance and Sexual Activity    Alcohol use: Not Currently    Drug use: Never    Sexual activity: Not Currently     Birth control/protection: Post-menopausal       REVIEW OF SYSTEMS:   A comprehensive  14 point review of systems was performed.  Significant findings as mentioned above.  All other systems reviewed and are negative.      MEDICATIONS:  The current medication list was reviewed in the EMR    Current Outpatient Medications:     atorvastatin (LIPITOR) 10 MG tablet, Take 1 tablet by mouth Daily., Disp: , Rfl:     Eliquis 2.5 MG tablet tablet, TAKE 1 TABLET BY MOUTH TWICE DAILY, Disp: 60 tablet, Rfl: 5    fluticasone (FLONASE) 50 MCG/ACT nasal spray, 2 sprays by Each Nare route Daily. Shake liquid, Disp: 16 g, Rfl: 5    gabapentin (NEURONTIN) 100 MG capsule, Take 1 capsule by mouth As Needed., Disp: , Rfl:     lidocaine-prilocaine (EMLA) 2.5-2.5 % cream, Apply to port a cath ~30 min -1 hour prior to chemotherapy, Disp: 30 g, Rfl: 3    ondansetron ODT (Zofran ODT) 8 MG disintegrating tablet, Place 1 tablet on the tongue Every 8 (Eight) Hours As Needed for Nausea or Vomiting., Disp: 30 tablet, Rfl: 5    pantoprazole (PROTONIX) 20 MG EC tablet, TAKE 1 TABLET BY MOUTH DAILY, Disp: 30 tablet, Rfl: 2    prochlorperazine (COMPAZINE) 10 MG tablet, TAKE 1 TABLET BY MOUTH EVERY 6 HOURS AS NEEDED FOR NAUSEA, Disp: 360 tablet, Rfl: 3    sennosides-docusate (senna-docusate sodium) 8.6-50 MG per tablet, Take 2 tablets by mouth 2 (Two) Times a Day., Disp: 120 tablet, Rfl: 2    Tirzepatide (Mounjaro) 2.5 MG/0.5ML solution pen-injector pen, Inject 0.5 mL under the skin into the appropriate area as directed 1 (One) Time Per Week., Disp: , Rfl:     ALLERGIES:    No Known Allergies    PHYSICAL EXAM:  Vitals:    05/16/24 1257   BP: 111/71   Pulse: 81   Resp: 18   Temp: 97.5 °F (36.4 °C)   TempSrc: Temporal   SpO2: 95%   Weight: 90 kg (198 lb 6.4 oz)   PainSc: 0-No pain     Body surface area is 2.02 meters squared.  Body mass index is 31.07 kg/m².  ECOG score: 0   General:  Awake, alert and oriented, appears well.  HEENT:  Pupils are equal, round and reactive to light and accommodation, Extra-ocular movements full, Oropharyx  clear, mucous membranes moist  Neck:  No JVD, thyromegaly or lymphadenopathy  CV:  Regular rate and rhythm, no murmurs, rubs or gallops  Resp:  Lungs are clear to auscultation bilaterally, no wheezing  Breast: Deferred today. Previously: S/p R lumpectomy. There is a somewhat firm area at the site of surgery but there are no palpable masses.  She is s/p excision of R axillary LN, with some post-surgical swelling.  Left breast is without mass lesions.  No L axillary adenopathy.  Abd:  Soft, non-tender, non-distended, bowel sounds present, no palpable hepatosplenomegaly or mass lesions  Ext:  No clubbing, cyanosis, or lower extremity edema  Lymph:  No cervical, supraclavicular, axillary adenopathy  Neuro:  MS as above, grossly non-focal exam    PATHOLOGY:  10-22-15            12-16-21          01-13-22          ENDOSCOPY:  Colonoscopy 12-09-20     Findings: 8 hyperplastic appearing polyps of the distal rectum, diverticulosis moderately throughout the sigmoid colon      IMAGING:  CTCAP 12-15-20  On the lung windows there are several calcified  granulomas in the lungs. No noncalcified pulmonary parenchymal lung  nodules were identified. On the soft tissue windows there were no  enlarged lymph nodes in the supraclavicular or axillary regions. No  mediastinal or hilar lymphadenopathy was seen. The heart was not  enlarged. There were no pericardial effusions.     IMPRESSION:  No CT findings of metastatic disease in the chest. There are  calcified granulomas in the lungs.     CT FINDINGS: The liver and spleen were normal in size and shape. The  gallbladder contains several stones. The wall was not thickened. The  bile ducts in the liver are not dilated. There is nothing seen to  suggest metastatic disease in the liver. The pancreas shows no evidence  of mass or inflammation. No adrenal or renal lesions are demonstrated.  The aorta is normal in caliber. There were no enlarged lymph nodes in  the retroperitoneum or in the  mesentery. The bowel shows no evidence of  obstruction. In the pelvis there were no masses or fluid collections.  There were no ventral hernias.     IMPRESSION:  No CT findings of metastatic disease in the abdomen or  pelvis. This study does demonstrate several stones in the lumen of the  gallbladder.         Bilateral diagnostic mammogram 01-31-21  FINDINGS: There are scattered areas of fibroglandular density.     The bilateral fibroglandular pattern does not appear significantly  changed compared to the exam from 2020. This includes postoperative  changes in the right 12:00 region. Mild skin thickening is noted  involving the right breast which is likely treatment related. A few  bilateral scattered calcifications are noted.     IMPRESSION:  Incomplete examination as comparison with older outside  prior mammograms is needed.        BI-RADS CATEGORY:   0, INCOMPLETE:  Need prior mammograms for comparison        RECOMMENDATION:  We will attempt to obtain older outside prior  mammograms for comparison.      CTCAP 12-12-21  FINDINGS: Lack of IV contrast hinders parenchymal assessment of the mediastinum, liver, spleen, pancreas, adrenal glands and kidneys.         Significant infiltrate seen in the right lower lobe with interstitial component. There are also lung nodules present in the right lung. Index nodule in the right lung base medially is 5 mm maximal diameter. Other smaller nodules seen throughout the right  lung. Tiny 3 mm lingular nodule seen in series 3 image 30. A benign calcified granuloma seen in the left lower lobe but a noncalcified indeterminant  5 mm left lower lobe nodule seen, series 3 image 42.     Nonenlarged mediastinal adenopathy seen. However there is an enlarged right axillary 1.8 x 2.6 cm lymph node partially seen.     Worrisome hepatic metastasis or primary hepatic lesion seen measuring 4.4 x 4.1 cm in liver segment 8. Margins are ill-defined. Abscess could have a similar appearance.  Suggestion of subtle gallstones. Nonobstructing 2 mm right renal calculus seen. No  obstructive uropathy. Gaseous distention of the esophagus and hiatal hernia present consistent with GERD. No enteric contrast but no gross evidence of bowel obstruction.   There is no gross free air, free fluid or focal inflammatory change.  Uterus and  adnexa are not enlarged. Osseous structures are grossly intact.     IMPRESSION:  Constellation of findings with significant consolidation in the right lower lobe, enlarged right axillary lymph node, indeterminate bilateral lung nodules and ill-defined hepatic lesion concerning for underlying malignancy in this patient .     FINDINGS: Lack of IV contrast hinders parenchymal assessment of the mediastinum, liver, spleen, pancreas, adrenal glands and kidneys.         Significant infiltrate seen in the right lower lobe with interstitial component. There are also lung nodules present in the right lung. Index nodule in the right lung base medially is 5 mm maximal diameter. Other smaller nodules seen throughout the right  lung. Tiny 3 mm lingular nodule seen in series 3 image 30. A benign calcified granuloma seen in the left lower lobe but a noncalcified indeterminant  5 mm left lower lobe nodule seen, series 3 image 42.     Nonenlarged mediastinal adenopathy seen. However there is an enlarged right axillary 1.8 x 2.6 cm lymph node partially seen.     Worrisome hepatic metastasis or primary hepatic lesion seen measuring 4.4 x 4.1 cm in liver segment 8. Margins are ill-defined. Abscess could have a similar appearance. Suggestion of subtle gallstones. Nonobstructing 2 mm right renal calculus seen. No  obstructive uropathy. Gaseous distention of the esophagus and hiatal hernia present consistent with GERD. No enteric contrast but no gross evidence of bowel obstruction.   There is no gross free air, free fluid or focal inflammatory change.  Uterus and  adnexa are not enlarged. Osseous structures  are grossly intact.     IMPRESSION:  Constellation of findings with significant consolidation in the right lower lobe, enlarged right axillary lymph node, indeterminate bilateral lung nodules and ill-defined hepatic lesion concerning for underlying malignancy in this patient .       US Abdomen Complete 12-12-21  FINDINGS:  The visualized portions of the pancreas, IVC and aorta appear normal.        The liver is moderately coarsened in echotexture. Ill-defined hypodensity in the liver measuring 3 x 3.2 x 2.6 cm corresponds to recent CT. Unclear if this is a malignant lesion. Abscesses within the differential but prior CT was performed without any  contrast.  The common bile duct is not dilated.. Gallbladder wall is thickened at 3.4 mm with trace pericholecystic fluid and subtle hyperemia. Gallstones and shadowing present.     The kidneys are normal in size and echogenicity. There is no   hydronephrosis or focal solid lesion.  The spleen is normal in size and echotexture.     IMPRESSION:  1. Nonspecific gallbladder wall thickening and questionable pericholecystic fluid. Gallstones are better seen on CT than ultrasound.  2.  Heterogeneous liver with lesion in the liver as seen on CT. Ultrasound is not helpful in determining etiology. This could be an abscess but given the other findings on CT,  malignancy is not excluded.      CTCAP 12-15-21  FINDINGS:    LUNGS:  Increased density of the now right lower lobe mixed  groundglass and more solid-appearing consolidation with internal  cavitary component identified. Tiny right and left lung pulmonary  nodules. Grossly stable.    PLEURAL SPACE:  Small bilateral pleural effusions.  No pneumothorax.    HEART:  Unremarkable.  No cardiomegaly.  No significant pericardial  effusion.    BONES/JOINTS:  Unremarkable.  No acute fracture.  No dislocation.    SOFT TISSUES:  Unremarkable.    VASCULATURE:  Unremarkable.  No thoracic aortic aneurysm.    LYMPH NODES:  Unremarkable.  No  enlarged lymph nodes.     IMPRESSION:  1.  Increased density of the now right lower lobe mixed groundglass and  more solid-appearing consolidation with internal cavitary component  identified. Tiny right and left lung pulmonary nodules. Grossly stable.  2.  Small bilateral pleural effusions.    FINDINGS:    LUNG BASES:  Unremarkable.  No mass.  No consolidation.      ABDOMEN:    LIVER:  Right lobe of liver lesion is again identified now measuring  about 4.7 cm and was about 4.7 cm.    GALLBLADDER AND BILE DUCTS:  Gallstones noted in the gallbladder.  No  ductal dilation.    PANCREAS:  Unremarkable.  No mass.  No ductal dilation.    SPLEEN:  Unremarkable.  No splenomegaly.    ADRENALS:  Unremarkable.  No mass.    KIDNEYS AND URETERS:  Unremarkable.  No solid mass.  No  hydronephrosis.    STOMACH AND BOWEL:  Unremarkable.  No obstruction.  No mucosal  thickening.      PELVIS:    APPENDIX:  No findings to suggest acute appendicitis.    BLADDER:  Unremarkable.  No mass.    REPRODUCTIVE:  Unremarkable as visualized.      ABDOMEN and PELVIS:    INTRAPERITONEAL SPACE:  Unremarkable.  No free air.  No significant  fluid collection.    BONES/JOINTS:  No acute fracture.  No dislocation.    SOFT TISSUES:  Unremarkable.    VASCULATURE:  Unremarkable.  No abdominal aortic aneurysm.    LYMPH NODES:  Unremarkable.  No enlarged lymph nodes.     IMPRESSION:    Right lobe of liver lesion is again identified now measuring about 4.7  cm and was about 4.7 cm.      NM Bone Scan Whole Body 12-15-21  FINDINGS:    SKULL/FACIAL BONES:  No focal increased or decreased uptake.    SPINE:  Unremarkable.  No abnormal increased or decreased uptake.    RIBS:  Unremarkable.  No abnormal uptake.    LONG BONES:  Unremarkable.  No abnormal uptake.    PELVIS:  Unremarkable.  No focal increased or decreased uptake.    JOINTS:  Unremarkable.  No focal increased or decreased uptake.    SOFT TISSUES:  Unremarkable.    RENAL/BLADDER:  Within normal limits.      IMPRESSION:    Normal bone scan.      CT Abdomen With Contrast  12-18-21  FINDINGS:  Partially imaged thick-walled cavitary lesion in the right lower lobe with right basilar atelectasis/infiltrate and small right pleural effusion. There is also left basilar atelectasis/infiltrate and trace left pleural effusion. Multiple noncalcified  pulmonary nodules are scattered throughout the visualized lower lung fields, concerning for pulmonary metastatic disease.     Ill-defined right hepatic mass again noted. The spleen, pancreas, and both adrenal glands are within expected limits. Cholelithiasis. Punctate nonobstructing right intrarenal stone. Tiny left renal cyst. Kidneys are otherwise unremarkable without  hydronephrosis. Abdominal aorta normal in course and caliber without dissection. No pathologic retroperitoneal or mesenteric lymphadenopathy by size criteria. Visualized small bowel and colon are unremarkable. No bowel obstruction. No free fluid or free  air.     No aggressive osseous lesions.     IMPRESSION:     1. Ill-defined right hepatic mass again noted. This has been previously biopsied and correlation with pathology results is recommended.  2. Cholelithiasis.  3. Punctate nonobstructing right intrarenal stone.  4. No threshold abdominal lymphadenopathy or other suspicious abdominal masses.  5. Partially imaged thick-walled cavitary lesion in the right lower lobe. This may be of infectious or neoplastic etiology and continued follow-up is recommended.  6. Small bilateral pleural effusions with bibasilar atelectasis/infiltrate, worse on the right than left.  7. Numerous small noncalcified pulmonary nodules throughout the visualized lower lung fields, concerning for pulmonary metastatic disease.       Mammo Diagnostic Digital Tomosynthesis Bilateral With CAD w/ US Axilla Right 01-13-22  FINDINGS:  There are scattered areas of fibroglandular density.     Incompletely imaged is a very prominent axillary lymph node.  The  bilateral fibroglandular pattern itself is stable in appearance  including postoperative changes in the superior aspect of the right  breast. There are stable bilateral calcifications. Mild skin thickening  is again noted involving the right breast which is likely treatment  related.     ULTRASOUND: Ultrasound evaluation of the right axilla demonstrates 2  markedly enlarged lymph nodes the largest of which measures 3.2 cm in  size and has no demonstrable fatty hilum. Recommend ultrasound-guided  biopsy.      IMPRESSION:  1. Stable mammographic appearance of the left breast with no findings  suspicious for malignancy.        2. Marked right axillary lymphadenopathy. Recommend ultrasound-guided  biopsy. The fibroglandular pattern of the right breast is however  stable.        BI-RADS CATEGORY:  4, SUSPICIOUS        RECOMMENDATION:  Recommend ultrasound-guided biopsy of the dominant  abnormal appearing right axillary lymph node.      DEXA Bone Density Axial 01-13-22  IMPRESSION:  Impression:  1. According to the World Health Organization definitions of  osteoporosis based on bone density, this patient's bone mineral density  is compatible with osteoporosis and the fracture risk is high.      Mammo Diagnostic Digital Tomosynthesis Bilateral With CAD w/ US Axilla Right 01-13-22  FINDINGS:  There are scattered areas of fibroglandular density.     Incompletely imaged is a very prominent axillary lymph node. The  bilateral fibroglandular pattern itself is stable in appearance  including postoperative changes in the superior aspect of the right  breast. There are stable bilateral calcifications. Mild skin thickening  is again noted involving the right breast which is likely treatment  related.     ULTRASOUND: Ultrasound evaluation of the right axilla demonstrates 2  markedly enlarged lymph nodes the largest of which measures 3.2 cm in  size and has no demonstrable fatty hilum. Recommend ultrasound-guided  biopsy.       IMPRESSION:  1. Stable mammographic appearance of the left breast with no findings  suspicious for malignancy.        2. Marked right axillary lymphadenopathy. Recommend ultrasound-guided  biopsy. The fibroglandular pattern of the right breast is however  stable.        BI-RADS CATEGORY:  4, SUSPICIOUS        RECOMMENDATION:  Recommend ultrasound-guided biopsy of the dominant  abnormal appearing right axillary lymph node.      CT Chest With Contrast Diagnostic 01-18-22  FINDINGS:     LUNGS: Solitary mass in the right lower lobe with cavitation.  Significantly smaller today than on the prior study. 6 mm solid nodule  in the right lung posteriorly on image 30 of the axial series. Other  parenchymal nodules are seen in both lungs and are similar to the  previous study. These are concerning for metastatic nodules.     HEART: Unremarkable.     MEDIASTINUM: No masses. No enlarged lymph nodes.  No fluid collections.     PLEURA: No pleural effusion. No pleural mass or abnormal calcification.  No pneumothorax.     VASCULATURE: No evidence of aneurysm. Filling defect in the right  pulmonary artery and possibly left lower lobe pulmonary artery. This  study was not performed to evaluate for pulmonary embolus, but the  findings are concerning for bilateral pulmonary emboli.     BONES: No acute bony abnormality.     VISUALIZED UPPER ABDOMEN:Please see the CT report for the abdomen and  pelvis.     Other: Right axillary soft tissue mass is present and shows slight  interval growth. It measures 2.47 x 2.16 cm today and previously at the  same level measured approximately 2.7 x 1.89 cm.     IMPRESSION:  1. Study was not performed as a PE protocol, but there appear to be  filling defects in pulmonary arteries bilaterally concerning for  pulmonary emboli.  2. Interval decrease in size of cavitary right lower lobe mass.  3. Stable parenchymal nodules bilaterally.  4. Very slight interval growth of right axillary soft tissue mass.      Results are being relayed to the referring physician.      US Liver 01-18-22  FINDINGS: Sonographic imaging of the liver does not show the mass  previously identified in the liver. I would suggest a follow-up CT scan  for better delineation.     Hepatic flow was hepatopedal.     There is shadowing from the gallbladder fossa.     IMPRESSION:  1. Previously identified mass is not seen on this exam in the liver.  Consider follow-up CT.  2. Shadowing from the gallbladder fossa. In the absence of  cholecystectomy, appearance is concerning for cholelithiasis.      US Venous Doppler Lower Extremity Bilateral (duplex) 01-20-22  FINDINGS:   There is patent spontaneous flow from the common femoral vein through  the posterior tibial veins.  There was no internal clot or area of noncompressibility.  Normal augmentation was elicited where applicable.     IMPRESSION:  No DVT in the lower extremities on today's exam.       CT Chest Pulmonary Embolism 01-20-22  FINDINGS: Today's study demonstrates opacification of the central  pulmonary vessels.  There are filling defects in the pulmonary arteries bilaterally. First  order arteries. This is most compatible with bilateral pulmonary  emboli..     Soft tissue mass in the right lower lobe with somewhat cavitary center  is again noted and unchanged..     There is no mediastinal lymph node enlargement     No pericardial or pleural effusion.        IMPRESSION:  1. Bilateral pulmonary emboli.  2. Parenchymal nodules bilaterally with dominant mass in the right lower  Lobe..      NM PET/CT Skull Base to Mid Thigh 01-28-22  FINDINGS:      HEAD/NECK:  Area of uptake in the posterior right paraspinal space and to lesser  degree the left paraspinal space appears related to muscle uptake.  No FDG hypermetabolic neck adenopathy.  No FDG hypermetabolic masses.     CHEST:   Numerous bilateral pulmonary nodules appear stable from the previous  chest CT and show no FDG hypermetabolism. This is  likely due to nodules  being below size threshold for PET sensitivity.  Cavitary lung mass in the right lung localizing to the superior segment  of the lower lobe shows FDG hypermetabolism with maximum SUV: 2.4. This  is at the lower range for malignancy.  Enlarged right lower axillary region lymph node which is 3.0 cm shows  mild FDG hypermetabolism that is approximately with maximum SUV: 2.4.  This is at the lower range for malignancy.  Low-dose CT demonstrating no change in additional scattered pulmonary  nodules from the previous scan. Coronary artery calcifications are  stable.     ABDOMEN/PELVIS:   Faint area of low attenuation involving the right lobe of liver is  poorly evaluated with low-dose noncontrast CT but shows no focal FDG  hypermetabolism.  Physiologic FDG hypermetabolism seen throughout the solid abdominal  organs.  Physiologic FDG hypermetabolism seen throughout the GI tract.  Physiologic FDG hypermetabolism seen throughout the mesentery,  retroperitoneum and pelvis.  Low dose CT redemonstrates nonobstructing kidney stones. Cholelithiasis  again noted.     BONES: Nonspecific FDG tracer activity. No intense areas of tracer  activity noted.     IMPRESSION:     1. Cavitary mass in the right lung that shows very low-grade FDG  hypermetabolism which is at the lower range for malignancy with maximum  SUV: 2.4.  2. Enlarged right axillary region lymph node with low-grade FDG  hypermetabolism also at the lower range for malignancy with maximum SUV:  2.4.  3. Numerous bilateral pulmonary nodules compatible with metastatic  disease but show no significant FDG hypermetabolism. This may be in part  due to size of nodules being below PET sensitivity threshold.  4. Faint area of low-attenuation right lobe liver poorly evaluated with  noncontrast low-dose CT that shows no obvious intense FDG  hypermetabolism.  5. Other nonacute findings as above on low dose CT.        [facility]CAP 05-03-22  FINDINGS:    Lungs:   Cavitary mass in the right lower lobe has nearly resolved.  A  right lower lobe pulmonary nodule is 7.4 mm and was previously 7.5 mm.  No change. Image #38.  A left lower lobe pulmonary nodule, image #52, is  9.8 mm and was previously 9.8 mm. No change.    Pleural space:  Unremarkable.  No pneumothorax.  No significant  effusion.    Heart:  Unremarkable.  No cardiomegaly.  No significant pericardial  effusion.  No significant coronary artery calcifications.    Bones/joints:  The bony structures appear stable. No acute bony  findings identified.  No dislocation.    Soft tissues:  Unremarkable.    Vasculature:  Please note, pulmonary emboli noted on the prior exam  are not well evaluated. The previously described filling defects of the  main pulmonary arteries are not evident.  No thoracic aortic aneurysm.    Lymph nodes:  No mediastinal adenopathy by CT size criteria.    Gallbladder and bile ducts:  Cholelithiasis noted.    Other findings:  Other smaller nodules appear stable in size and  configuration.  No new nodules identified.     IMPRESSION:  1.  Near-complete resolution of previously described cavitary mass in  the right lower lobe periphery now only with linear scarring noted.  2.  Index nodules of both lungs are stable from previous exam with no  size increase identified. No new nodules are noted.  3.  Cholelithiasis.  4.  Due to timing of contrast on this study, assessment of pulmonary  emboli not performed. There appears to be significant improvement in  clot burden however in the more central vessels.    FINDINGS:    Lung bases:  Pulmonary nodules of the lung bases.    Mediastinum:  Small hiatal hernia.      ABDOMEN:    Liver:  Low attenuation lesions of the right lobe liver appear of  decreased prominence from the previous exam. Segment 8 liver lesion is  1.1 cm. Segment 7 liver lesion is approximately 0.8 cm.    Gallbladder and bile ducts:  Cholelithiasis.  No ductal dilation.    Pancreas:  Unremarkable.   No mass.  No ductal dilation.    Spleen:  Unremarkable.  No splenomegaly.    Adrenals:  No adrenal masses identified.    Kidneys and ureters:  Unremarkable.  No solid mass.  No  hydronephrosis.    Stomach and bowel:  Sigmoid diverticulosis without evidence of  diverticulitis.  No obstruction.      PELVIS:    Appendix:  Normal appendix.    Bladder:  Unremarkable.  No mass.    Reproductive:  Unremarkable as visualized.      ABDOMEN and PELVIS:    Intraperitoneal space:  Unremarkable.  No free air.  No significant  fluid collection.    Bones/joints:  No acute fracture.  No dislocation.    Soft tissues:  Unremarkable.    Vasculature:  Unremarkable.  No abdominal aortic aneurysm.    Lymph nodes:  No iliac or retroperitoneal adenopathy.     IMPRESSION:  1.  Small low-attenuation lesions of the right lobe liver appears  decreased prominence of the previous exam. No new no focal liver lesions  identified  2.  Cholelithiasis.  3.  Otherwise stable appearance of the abdomen and pelvis.    CT Abdomen Pelvis With Contrast (08/08/2022 14:36)  FINDINGS:    LUNG BASES:  Unremarkable.  No mass.  No consolidation.      ABDOMEN:    LIVER:  Stable 1 cm right lobe of liver lesion and more posterior 7 mm  lesion.    GALLBLADDER AND BILE DUCTS:  Gallstones.  Gallbladder otherwise  unremarkable.  No ductal dilation.    PANCREAS:  Unremarkable.  No mass.  No ductal dilation.    SPLEEN:  Unremarkable.  No splenomegaly.    ADRENALS:  Unremarkable.  No mass.    KIDNEYS AND URETERS:  Unremarkable.  No solid mass.  No  hydronephrosis.    STOMACH AND BOWEL:  Unremarkable.  No obstruction.  No mucosal  thickening.      PELVIS:    APPENDIX:  No findings to suggest acute appendicitis.    BLADDER:  Unremarkable.  No mass.    REPRODUCTIVE:  Unremarkable as visualized.      ABDOMEN and PELVIS:    INTRAPERITONEAL SPACE:  Unremarkable.  No free air.  No significant  fluid collection.    BONES/JOINTS:  No acute fracture.  No dislocation.    SOFT  TISSUES:  Unremarkable.    VASCULATURE:  Unremarkable.  No abdominal aortic aneurysm.    LYMPH NODES:  Unremarkable.  No enlarged lymph nodes.     IMPRESSION:  1.  Gallstones.  Gallbladder otherwise unremarkable.  2.  Stable 1 cm right lobe of liver lesion and more posterior 7 mm  Lesion.    CT Chest With Contrast Diagnostic (08/08/2022 14:40)  FINDINGS:    LUNGS:  Multiple pulmonary nodules again noted including a right lower  lobe pulmonary nodule measuring 5 mm that was 5 mm. Another right lower  lobe pulmonary nodule is 7 mm and was 7 mm. A left lower lobe pulmonary  nodule is 5 mm and was 5 mm. Other nodules appear stable.    PLEURAL SPACE:  Unremarkable.  No pneumothorax.  No significant  effusion.    HEART:  Unremarkable.  No cardiomegaly.  No significant pericardial  effusion.  No significant coronary artery calcifications.    BONES/JOINTS:  Unremarkable.  No acute fracture.  No dislocation.    SOFT TISSUES:  Unremarkable.    VASCULATURE:  Unremarkable.  No thoracic aortic aneurysm.    LYMPH NODES:  Unremarkable.  No enlarged lymph nodes.     IMPRESSION:    Multiple pulmonary nodules again noted including a right lower lobe  pulmonary nodule measuring 5 mm that was 5 mm. Another right lower lobe  pulmonary nodule is 7 mm and was 7 mm. A left lower lobe pulmonary  nodule is 5 mm and was 5 mm. Other nodules appear stable.    CT Abdomen Pelvis With Contrast (10/31/2022 14:46)  FINDINGS:    LUNG BASES:  Unremarkable.  No mass.  No consolidation.      ABDOMEN:    LIVER:  Stable right lobe of liver low-attenuation lesion measuring  about a centimeter. Stable more posterior right lobe of liver lesion  measuring about 7 mm.    GALLBLADDER AND BILE DUCTS:  Gallstones.  Gallbladder otherwise  unremarkable.  No ductal dilation.    PANCREAS:  Unremarkable.  No mass.  No ductal dilation.    SPLEEN:  Unremarkable.  No splenomegaly.    ADRENALS:  Unremarkable.  No mass.    KIDNEYS AND URETERS:  Unremarkable.  No solid  mass.  No  hydronephrosis.    STOMACH AND BOWEL:  Scattered diverticula in the colon.  No  diverticulitis.  No obstruction.      PELVIS:    APPENDIX:  No findings to suggest acute appendicitis.    BLADDER:  Unremarkable.  No mass.    REPRODUCTIVE:  Unremarkable as visualized.      ABDOMEN and PELVIS:    INTRAPERITONEAL SPACE:  Unremarkable.  No free air.  No significant  fluid collection.    BONES/JOINTS:  No acute fracture.  No dislocation.    SOFT TISSUES:  Unremarkable.    VASCULATURE:  Unremarkable.  No abdominal aortic aneurysm.    LYMPH NODES:  Unremarkable.  No enlarged lymph nodes.     IMPRESSION:  1.  Gallstones.  Gallbladder otherwise unremarkable.  2.  Stable right lobe of liver low-attenuation lesion measuring about a  centimeter. Stable more posterior right lobe of liver lesion measuring  about 7 mm.    CT Chest With Contrast Diagnostic (10/31/2022 14:47)  FINDINGS:    LUNGS:  Stable subpleural right middle lobe pulmonary nodule measuring  5 mm. Stable right lower lobe pulmonary nodule measuring 6 mm. Other  bilateral pulmonary nodules appear stable in size and number.    PLEURAL SPACE:  Unremarkable.  No pneumothorax.  No significant  effusion.    HEART:  Unremarkable.  No cardiomegaly.  No significant pericardial  effusion.  No significant coronary artery calcifications.    BONES/JOINTS:  Unremarkable.  No acute fracture.  No dislocation.    SOFT TISSUES:  Right upper breast density again noted.    VASCULATURE:  Unremarkable.  No thoracic aortic aneurysm.    LYMPH NODES:  Unremarkable.  No enlarged lymph nodes.     IMPRESSION:    Stable subpleural right middle lobe pulmonary nodule measuring 5 mm.  Stable right lower lobe pulmonary nodule measuring 6 mm. Other bilateral  pulmonary nodules appear stable in size and number.    CT chest with contrast 1/25/23  FINDINGS:     LUNGS: Multiple parenchymal nodules are present bilaterally and are  stable in size, number, and appearance. Largest nodule  measuring  approximately 6 mm.     HEART: Mild coronary artery calcifications.     MEDIASTINUM: No masses. No enlarged lymph nodes.  No fluid collections.     PLEURA: No pleural effusions.     VASCULATURE: No evidence of aneurysm.     BONES: No acute bony abnormality.     VISUALIZED UPPER ABDOMEN:Please see the report for the CT of the abdomen  and pelvis.     Other: 8.5 mm left axillary lymph node stable.     IMPRESSION:     1. Multiple parenchymal nodules in both lungs are stable.  2. 8.5 mm lymph node in the left axilla, stable.    CT abdomen pelvis with contrast 1/25/23  FINDINGS:     Lower thorax: Parenchymal nodules in both lungs similar to the previous  exam.     Abdomen:     Liver: Previous identified area of decreased attenuation in the right  lobe of the liver is not localized on today's exam.     Gallbladder: Cholelithiasis.     Pancreas: Unremarkable. No mass or ductal dilatation.     Spleen: Homogeneous. No splenomegaly.     Adrenals: No mass.     Kidneys/ureters: Nonobstructing right intrarenal stone.     GI tract: Moderate stool. Sigmoid diverticuli without diverticulitis.     MESENTERY: No free fluid, walled off fluid collections, mesenteric  stranding, or enlarged lymph nodes        Vasculature: No evidence of aneurysm.     Abdominal wall: No focal hernia or mass.        Bladder: No focal mass or significant wall thickening     Reproductive: Unremarkable as visualized     Bones: No acute bony abnormality.     IMPRESSION:     1. No evidence of new interval development of metastatic disease.  Previously identified hepatic lesion is not seen on today's exam.     2.Cholelithiasis.     3. Nonobstructing right intrarenal stone.    Mammo Diagnostic Digital Tomosynthesis Bilateral With CAD (02/08/2023 14:55)  FINDINGS: There are scattered areas of fibroglandular density.     The bilateral fibroglandular pattern is stable in appearance including  postoperative changes in the right 12:00 region. Skin  thickening  involving the right breast is again noted and likely treatment related.  Multiple surgical clips are now noted in the right axilla. The patient's  Port-A-Cath is incompletely imaged in the posterior superior aspect of  the left breast. No new or suspicious findings are identified in either  breast.     IMPRESSION:  Benign bilateral mammographic findings.        BI-RADS CATEGORY:  2, BENIGN        RECOMMENDATION:  Recommend the patient continue annual bilateral  mammographic evaluation.    CT Abdomen Pelvis With Contrast (04/13/2023 14:51)  FINDINGS:    Lung bases:  Refer to chest CT for characterization of lung nodules in  the partially imaged lower chest.    Mediastinum:  Moderate hiatal hernia.      ABDOMEN:    Liver:  No definite focal liver lesion identified.    Gallbladder and bile ducts:  Cholelithiasis.  No ductal dilation.    Pancreas:  Unremarkable.  No mass.  No ductal dilation.    Spleen:  Unremarkable.  No splenomegaly.    Adrenals:  No adrenal masses.    Kidneys and ureters:  Tiny nonobstructing right kidney stone. No  hydronephrosis.    Stomach and bowel:  Gastric antrum wall thickening likely due to  incomplete distention.  Small splenule is again noted adjacent to the  splenic flexure of the colon.  Sigmoid diverticulosis. No evidence of  acute diverticulitis.      PELVIS:    Appendix:  Normal appendix.    Bladder:  Unremarkable.  No mass.    Reproductive:  Unremarkable as visualized.      ABDOMEN and PELVIS:    Intraperitoneal space:  No pneumoperitoneum identified.  No  significant fluid collection.    Bones/joints:  Stable appearance of the bony structures. Mild  degenerative changes lumbar spine but no acute osseous findings.  No  dislocation.    Soft tissues:  Unremarkable.    Vasculature:  Unremarkable.  No abdominal aortic aneurysm.    Lymph nodes:  No iliac or retroperitoneal lymphadenopathy.     IMPRESSION:  1.  Cholelithiasis.  2.  No evidence of metastatic disease to abdomen or  pelvis.  3.  Other nonacute/incidental findings as detailed above which appear  stable from previous.    CT Chest With Contrast Diagnostic (04/13/2023 14:51)  FINDINGS:    Lungs:  Small nodule along the right major fissure in the right lung,  5.2 mm and was previously 5.3 mm; image #33.  Parenchymal nodule right  lower lobe is 6.7 mm and was previously 6.9 mm, image #33, no change.   5.9 mm nodule right lower lobe was previously 6.5 mm indicating no  interval change.  Other smaller nodules of the right lung appears  stable.  7.5 mm nodule left lower lobe, image #55, was previously 8.2  mm.  A 5.7 mm nodule left lower lobe, image #40, was previously 5.9 mm.  No significant change.    Pleural space:  Unremarkable.  No pneumothorax.  No significant  effusion.    Heart:  Mild cardiomegaly.  No significant pericardial effusion.  No  significant coronary artery calcifications.    Mediastinum:  Moderate hiatal hernia again noted.  No enlarged  mediastinal or hilar lymph nodes identified.    Bones/joints:  Unremarkable.  No acute fracture.  No dislocation.    Soft tissues:  Stable treatment-related skin thickening of the right  breast.    Vasculature:  Unremarkable.  No thoracic aortic aneurysm.    Lymph nodes:  See above.    Gallbladder and bile ducts:  Cholelithiasis.    Tubes, lines and devices:  Left Port-A-Cath noted.    Other findings:  Calcified granulomas are stable.     IMPRESSION:  1.  No interval change in size of multiple bilateral pulmonary  parenchymal nodules. Index nodules as detailed above. Some nodules may  be slightly smaller than previous or could be due to differences in  slice selection.  2.  No new nodules are identified.  3.  No thoracic adenopathy.  4.  Other incidental and nonacute findings detailed above appear stable  from previous.    CT Chest With Contrast Diagnostic (07/28/2023 15:37)   FINDINGS:    LUNGS AND PLEURAL SPACES:  Again there are tiny bilateral pulmonary  nodules with the largest  in the right lower lobe measuring about 6 mm  and was about 6 mm. Other nodules including a left lower lobe pulmonary  nodule measuring 6 mm appears stable.  No pneumothorax.  No significant  effusion.    HEART:  Unremarkable.  No cardiomegaly.  No significant pericardial  effusion.  No significant coronary artery calcifications.    BONES/JOINTS:  Unremarkable.  No acute fracture.  No dislocation.    SOFT TISSUES:  Unremarkable.    VASCULATURE:  Unremarkable.  No thoracic aortic aneurysm.    LYMPH NODES:  Unremarkable.  No enlarged lymph nodes.     IMPRESSION:    Again there are tiny bilateral pulmonary nodules with the largest in  the right lower lobe measuring about 6 mm and was about 6 mm. Other  nodules including a left lower lobe pulmonary nodule measuring 6 mm  appears stable.    CT Abdomen Pelvis With Contrast (07/28/2023 15:40)   FINDINGS:    LUNG BASES:  Unremarkable.  No mass.  No consolidation.      ABDOMEN:    LIVER:  Unremarkable.  No mass.    GALLBLADDER AND BILE DUCTS:  Gallstone.  Gallbladder otherwise  unremarkable.  No ductal dilation.    PANCREAS:  Unremarkable.  No mass.  No ductal dilation.    SPLEEN:  Unremarkable.  No splenomegaly.    ADRENALS:  Unremarkable.  No mass.    KIDNEYS AND URETERS:  Unremarkable.  No solid mass.  No  hydronephrosis.    STOMACH AND BOWEL:  Scattered diverticula in the colon.  No  diverticulitis.  No obstruction.      PELVIS:    APPENDIX:  No findings to suggest acute appendicitis.    BLADDER:  Unremarkable.  No mass.    REPRODUCTIVE:  Unremarkable as visualized.      ABDOMEN and PELVIS:    INTRAPERITONEAL SPACE:  Unremarkable.  No free air.  No significant  fluid collection.    BONES/JOINTS:  No acute fracture.  No dislocation.    SOFT TISSUES:  Unremarkable.    VASCULATURE:  Unremarkable.  No abdominal aortic aneurysm.    LYMPH NODES:  Unremarkable.  No enlarged lymph nodes.     IMPRESSION:    Gallstone.  Gallbladder otherwise unremarkable.    CT Chest With Contrast  Diagnostic (10/27/2023 15:28)   FINDINGS:    LUNGS AND PLEURAL SPACES:  Multiple bilateral pulmonary nodules are  stable in size.  No pneumothorax.  No significant effusion.    HEART:  Unremarkable as visualized.  No cardiomegaly.  No significant  pericardial effusion.  No significant coronary artery calcifications.    MEDIASTINUM:  Small hiatal hernia.    BONES/JOINTS:  Unremarkable as visualized.  No acute fracture.  No  dislocation.    SOFT TISSUES:  Right breast skin thickening noted likely treatment  related again noted.    VASCULATURE:  Unremarkable as visualized.  No thoracic aortic  aneurysm.    LYMPH NODES:  Unremarkable as visualized.  No enlarged lymph nodes.     IMPRESSION:  1.  Small hiatal hernia.  2.  Right breast skin thickening noted likely treatment related again  noted.  3.  Multiple bilateral pulmonary nodules are stable in size.    CT Abdomen Pelvis With Contrast (10/27/2023 15:30)   FINDINGS:    LUNG BASES:  Unremarkable as visualized.  No mass.  No consolidation.    MEDIASTINUM:  Small hiatal hernia.      ABDOMEN:    LIVER:  Unremarkable as visualized.  No mass.    GALLBLADDER AND BILE DUCTS:  Gallstone.  Gallbladder otherwise  unremarkable.  No ductal dilation.    PANCREAS:  Unremarkable as visualized.  No mass.  No ductal dilation.    SPLEEN:  Unremarkable as visualized.  No splenomegaly.    ADRENALS:  Unremarkable as visualized.  No mass.    KIDNEYS AND URETERS:  Unremarkable as visualized.  No solid mass.  No  hydronephrosis.    STOMACH AND BOWEL:  Scattered diverticula in the colon.  No  diverticulitis.  No obstruction.      PELVIS:    APPENDIX:  No findings to suggest acute appendicitis.    BLADDER:  Unremarkable as visualized.  No mass.    REPRODUCTIVE:  Unremarkable as visualized.      ABDOMEN and PELVIS:    INTRAPERITONEAL SPACE:  Unremarkable as visualized.  No free air.  No  significant fluid collection.    BONES/JOINTS:  No acute fracture.  No dislocation.    SOFT TISSUES:   Unremarkable as visualized.    VASCULATURE:  Unremarkable as visualized.  No abdominal aortic  aneurysm.    LYMPH NODES:  Unremarkable as visualized.  No enlarged lymph nodes.    OTHER FINDINGS:  No evidence of metastatic disease.     IMPRESSION:  1.  Gallstone.  Gallbladder otherwise unremarkable.  2.  No evidence of metastatic disease.    CT Chest With Contrast Diagnostic (01/26/2024 14:09)   FINDINGS:    LUNGS AND PLEURAL SPACES:  Again small bilateral pulmonary nodules are  stable including a right lower lobe pulmonary nodule measuring 6 mm and  a left lower lobe pulmonary nodule measuring 6 mm. Other nodules are  also stable in size. No new nodules are identified.  No pneumothorax.   No significant effusion.    HEART:  Unremarkable as visualized.  No cardiomegaly.  No significant  pericardial effusion.  No significant coronary artery calcifications.    MEDIASTINUM:  Small hiatal hernia again noted.    BONES/JOINTS:  Unremarkable as visualized.  No acute fracture.  No  dislocation.    SOFT TISSUES:  Unremarkable as visualized.    VASCULATURE:  Unremarkable as visualized.  No thoracic aortic  aneurysm.    LYMPH NODES:  Unremarkable as visualized.  No enlarged lymph nodes.    TUBES, LINES AND DEVICES:  Left Port-A-Cath noted.     IMPRESSION:  1.  Again small bilateral pulmonary nodules are stable including a right  lower lobe pulmonary nodule measuring 6 mm and a left lower lobe  pulmonary nodule measuring 6 mm. Other nodules are also stable in size.  No new nodules are identified.  2.  Small hiatal hernia again noted.    CT Abdomen Pelvis With Contrast (01/26/2024 14:17)   FINDINGS:    LUNG BASES:  Unremarkable as visualized.  No mass.  No consolidation.      ABDOMEN:    LIVER:  Unremarkable as visualized.  No mass.    GALLBLADDER AND BILE DUCTS:  Gallstones.  Gallbladder otherwise  unremarkable.  No ductal dilation.    PANCREAS:  Unremarkable as visualized.  No mass.  No ductal dilation.    SPLEEN:  Unremarkable  as visualized.  No splenomegaly.    ADRENALS:  Unremarkable as visualized.  No mass.    KIDNEYS AND URETERS:  Unremarkable as visualized.  No solid mass.  No  hydronephrosis.    STOMACH AND BOWEL:  Unremarkable as visualized.  No obstruction.  No  mucosal thickening.      PELVIS:    APPENDIX:  No findings to suggest acute appendicitis.    BLADDER:  Unremarkable as visualized.  No mass.    REPRODUCTIVE:  Unremarkable as visualized.      ABDOMEN and PELVIS:    INTRAPERITONEAL SPACE:  Unremarkable as visualized.  No free air.  No  significant fluid collection.    BONES/JOINTS:  No acute fracture.  No dislocation.    SOFT TISSUES:  Unremarkable as visualized.    VASCULATURE:  Atherosclerotic disease.  No abdominal aortic aneurysm.    LYMPH NODES:  Unremarkable as visualized.  No enlarged lymph nodes.    OTHER FINDINGS:  No evidence of metastatic disease.     IMPRESSION:  1.  Gallstones.  Gallbladder otherwise unremarkable.  2.  No evidence of metastatic disease.     Mammo Diagnostic Digital Tomosynthesis Bilateral With CAD (02/21/2024 14:40)   FINDINGS: There are scattered areas of fibroglandular density.     The bilateral fibroglandular pattern is stable in appearance including  postoperative changes in the superior aspect of the right breast. Mild  skin thickening is again noted involving the right breast which is  likely treatment related. There are bilateral scattered benign-appearing  calcifications. No dominant mass, suspicious calcifications, or  nonsurgical areas of architectural distortion are seen.     IMPRESSION:  Benign bilateral mammographic findings.        BI-RADS CATEGORY: 2, BENIGN        RECOMMENDATION: Recommend the patient continue annual bilateral  mammographic evaluation.    CT Chest With Contrast Diagnostic (05/07/2024 15:52)   FINDINGS:    Lungs and pleural spaces:  Multiple bilateral pulmonary nodules which  appear stable from previous exam.  A 5.4 mm nodule right lower lobe,  image #41, is  unchanged from previous; previously 6.3 mm.  A left lower  lobe pulmonary nodule is 5.7 mm and was previously 5.7 mm, image #42.   Left lower lobe basilar pulmonary nodule is 8.7 mm and was previously  8.8 mm, image #59, indicating no interval change.  Other scattered  pulmonary nodules are stable.  Linear scarring versus atelectasis right  lower lobe, stable.  No new pulmonary nodules are identified.  No  pleural effusions.  No pneumothorax.    Heart:  Borderline cardiomegaly.  No significant coronary artery  calcifications.  No pericardial effusion.    Mediastinum:  Hiatal hernia noted.  No hilar or mediastinal  lymphadenopathy is identified.    Bones/joints:  Unremarkable as visualized.  No acute fracture.  No  dislocation.    Soft tissues:  Unremarkable as visualized.    Vasculature:  Unremarkable as visualized.  No thoracic aortic  aneurysm.    Lymph nodes:  Unremarkable as visualized.  No axillary adenopathy is  noted.    Gallbladder and bile ducts:  Cholelithiasis.    Tubes, lines and devices:  Left Port-A-Cath noted.     IMPRESSION:  1.  Multiple bilateral pulmonary nodules appear stable from the previous  exam with no significant change in size of index nodules of both lungs.  2.  No new pulmonary nodules identified.  3.  Otherwise stable exam with other incidental and nonacute findings  described above.    CT Abdomen Pelvis With Contrast (05/07/2024 15:54)   FINDINGS:    Lung bases:  Pulmonary nodules noted. Refer to chest CT.    Mediastinum:  Small hiatal hernia, stable.      ABDOMEN:    Liver:  Unremarkable as visualized.  No mass.    Gallbladder and bile ducts:  Cholelithiasis, stable.  No ductal  dilation.    Pancreas:  Unremarkable as visualized.  No mass.  No ductal dilation.    Spleen:  Unremarkable as visualized.  No splenomegaly.    Adrenals:  Unremarkable as visualized.  No adrenal masses.    Kidneys and ureters:  Unremarkable as visualized.  No solid mass.  No  hydronephrosis.    Stomach and  bowel:  Sigmoid diverticulosis without diverticulitis.  No  obstruction.      PELVIS:    Appendix:  Normal appendix.    Bladder:  Unremarkable as visualized.  No mass.    Reproductive:  Unremarkable as visualized.      ABDOMEN and PELVIS:    Intraperitoneal space:  Unremarkable as visualized.  No significant  fluid collection.  No pneumoperitoneum identified.    Bones/joints:  Stable bony structures. Degenerative changes lumbar  spine.  No acute fracture.  No dislocation.    Soft tissues:  Unremarkable as visualized.    Vasculature:  Unremarkable as visualized.  No abdominal aortic  aneurysm.    Lymph nodes:  Unremarkable as visualized.  No iliac or retroperitoneal  adenopathy.     IMPRESSION:  1.  Stable exam. No evidence of metastatic disease to the abdomen or  pelvis.  2.  Cholelithiasis.  3.  Mild diverticulosis without diverticulitis.  4.  Other incidental/nonacute findings above.    RECENT LABS:  Lab Results   Component Value Date    WBC 5.33 05/07/2024    HGB 13.7 05/07/2024    HCT 41.2 05/07/2024    MCV 88.6 05/07/2024    RDW 13.7 05/07/2024     05/07/2024    NEUTRORELPCT 50.8 05/07/2024    LYMPHORELPCT 37.1 05/07/2024    MONORELPCT 8.3 05/07/2024    EOSRELPCT 3.2 05/07/2024    BASORELPCT 0.4 05/07/2024    NEUTROABS 2.71 05/07/2024    LYMPHSABS 1.98 05/07/2024       Lab Results   Component Value Date     05/07/2024    K 4.0 05/07/2024    CO2 22.4 05/07/2024     05/07/2024    BUN 12 05/07/2024    CREATININE 0.82 05/07/2024    EGFRIFNONA 87 02/24/2022    GLUCOSE 95 05/07/2024    CALCIUM 9.7 05/07/2024    ALKPHOS 74 05/07/2024    AST 19 05/07/2024    ALT 16 05/07/2024    BILITOT 1.0 05/07/2024    ALBUMIN 4.2 05/07/2024    PROTEINTOT 7.0 05/07/2024    MG 2.0 09/01/2023     Lab Results   Component Value Date    TSH 1.390 05/07/2024     Lab Results   Component Value Date    FERRITIN 169.00 (H) 01/20/2022    IRON 102 01/20/2022    TIBC 264 (L) 01/20/2022    LABIRON 39 01/20/2022    [id number] 437  01/20/2022    FOLATE 16.40 01/20/2022     Lab Results   Component Value Date     (H) 01/20/2022     ASSESSMENT & PLAN:  Yadira Flowers is a very pleasant 68 y.o. female with a history of cervical cancer, breast cancer and malignant melanoma.    1. History of Breast Cancer:  - Ms. Onur Burgos had Stage IA ([id number]) moderately differentiated invasive ductal carcinoma of the right breast diagnosed in October 2015.  - She underwent R lympectomy and SLNBx performed by Dr. Steve Isbell 10-22-15 and then received adjuvant radiation in [place].  - After radiation, she was started on Arimidex which was then switched to Exemestane.  She took Exemestane for almost 5 years. Unfortunately, she developed vaginal bleeding and hematuria related to vaginal atrophy related to hormonal blockade.  Given this, stopped Exemestane.  She is doing much better in this regard since stopping hormonal therapy.  - Mammogram 2/8/23 without cause for concern aside from R axillary LAD.   -Repeat bilateral diagnostic mammogram scheduled for 2/21/24.    2.  Metastatic malignant melanoma:  -  S/p resection of a primary lesion on her back performed by Dr. Catrachito Amaro of Dermatology in [place] in 2004.  -  S/p FNA R axillary LN which was concerning for melanoma.  Excisional biopsy confirmed metastatic malignant melanoma.  - Suspect her lung and liver lesions are also related to her melanoma.  - She has seen Dr. Her and he performed bronchoscopy on 02/23/2022. Pathology was negative for malignant cells.  - Liver aspiration showed abscess which grew Klebsiella, suspect this was a secondary infection.    -  Sent  excised LN tissue for CARIS testing.  -  PET-CT showed no significant FDG uptake but re-demonstrated abnormalities in the R axilla, RLL cavitary mass, Vargas lung nodules and liver.    -  MRI Brain showed no acute findings in the head/brain.  -  Recommended initial therapy with immunotherapy.  We discussed different options  for immunotherapy including single agent PD-L1 treatment or combination with Yervoy.  We recommended combination with Yervoy for better RR, PFS and OS even with increased risk of immune-mediated side effects.  We discussed the initially approved FDA dosing v. Altered dosing with lower dose IPI/higher dosed Nivo.  The latter has been shown to have fewer side effects and is thought to likely have similar efficacy (though trials weren't really powered to definitively show that).  After a lengthy discussion, we decided to proceed with 4 cycles of Ipi 1 mg/kg / Nivo 3 mg/kg q 21d followed by Nivolumab single agent.  We discussed potential risks, benefits and side effects extensively and she decided to proceed.  -  She completed 4 cycles of Ipi / Nivo and tolerated treatment very well.  -  Repeat CT imaging done 5-3-22 after 4 cycles Ipi/Nivo showed an excellent partial response to treatment.  Have continued with single agent Nivolumab as planned which she is tolerating well.   -Repeat CT CAP from 5/7/2024(summarized above) again showing stable disease with excellent control of her metastatic melanoma.  -Therefore, will continue with Nivolumab therapy as planned.  -  Will repeat CT CAP after a 3 month interval.    3.  Bilateral pulmonary emboli:  -  CTPE protocol 1-20-22  confirmed bilateral pulmonary emboli  -  BLE dopplers negative for DVT.  -  Continue Eliquis 5 mg BID.    -  We previously discussed length of therapy.  She watched her  die with PE and her mother has h/o PEs as well.  Presumably the cancer was the risk factor for development of PE and since her cancer is incurable,  have recommended she continue Eliquis indefinately as long as she continues without significant side effects.  She started complaining of difficulty with easy bleeding with minor trauma.  It had been over 6 months that she has had full anticoagulation.  Given worsening side effects, decided to continue Eliquis, but reduced dose to 2.5  mg twice a day.  She is doing well on this dose without bleeding or thrombosis. Will continue.    4.  H/o Colon polyps:  -  Dr. Maldonado performed c-scope 12-09-20 with discovery of 8 hyperplastic polyps in the distal rectum as well as diverticulosis.  He recommended repeat c-scope after 5 years.    5.  H/o cervical cancer treated with cryotherapy at [facility] in 1975:  -  Followed up with Dr. Manav Real of gynecology.  She says she had exam including pap smear and then pelvic U/S which was unrevealing.    6. Prophylaxis:  -  Had COVID vaccine x 2 (Pfizer).  She received Evusheld on 4-7-22.. Recommended booster. Received 2023 flu vaccine. Received  Prevnar 20 vaccine today.    7.  ACO / DWAYNE/Other  Quality measures  -  Yadira Flowers received 2023 flu vaccine.  -  Yadira Flowers reports a pain score of 0.  Given her pain assessment as noted, treatment options were discussed and the following options were decided upon as a follow-up plan to address the patient's pain:  No intervention needed at this time. .  -  Current outpatient and discharge medications have been reconciled for the patient.  Reviewed by: Loreto Stone MD    8.  Follow up:  - Continue Nivolumab as planned.  -  F/u with me in 3 months CT CAP prior along with  CBCD, CMP, TSH.  -  Mammogram will be due after 2/21/2025.  Will order this today.  -  note:  Pt prefers that PAC be accessed for CT imaging.    I spent 20 minutes with Yadira Flowers today.  In the office today, more than 50% of this time was spent face-to-face with her  in counseling / coordination of care, reviewing her medical history and counseling on the current treatment plan.  All questions were answered to her satisfaction      Electronically Signed by:  Loreto Stone MD      CC:     MD Manav Cotto MD Aaron House, MD

## 2024-05-10 DIAGNOSIS — C78.7 METASTASIS TO LIVER: Primary | ICD-10-CM

## 2024-05-10 DIAGNOSIS — C78.02 MALIGNANT NEOPLASM METASTATIC TO BOTH LUNGS: ICD-10-CM

## 2024-05-10 DIAGNOSIS — C43.59 MALIGNANT MELANOMA OF TORSO EXCLUDING BREAST: ICD-10-CM

## 2024-05-10 DIAGNOSIS — C78.01 MALIGNANT NEOPLASM METASTATIC TO BOTH LUNGS: ICD-10-CM

## 2024-05-10 RX ORDER — SODIUM CHLORIDE 9 MG/ML
250 INJECTION, SOLUTION INTRAVENOUS ONCE
OUTPATIENT
Start: 2024-05-16

## 2024-05-16 ENCOUNTER — LAB (OUTPATIENT)
Dept: ONCOLOGY | Facility: CLINIC | Age: 69
End: 2024-05-16
Payer: MEDICARE

## 2024-05-16 ENCOUNTER — OFFICE VISIT (OUTPATIENT)
Dept: ONCOLOGY | Facility: CLINIC | Age: 69
End: 2024-05-16
Payer: MEDICARE

## 2024-05-16 ENCOUNTER — INFUSION (OUTPATIENT)
Dept: ONCOLOGY | Facility: HOSPITAL | Age: 69
End: 2024-05-16
Payer: COMMERCIAL

## 2024-05-16 VITALS
DIASTOLIC BLOOD PRESSURE: 71 MMHG | HEART RATE: 81 BPM | RESPIRATION RATE: 18 BRPM | TEMPERATURE: 97.5 F | BODY MASS INDEX: 31.07 KG/M2 | WEIGHT: 198.4 LBS | OXYGEN SATURATION: 95 % | SYSTOLIC BLOOD PRESSURE: 111 MMHG

## 2024-05-16 VITALS
OXYGEN SATURATION: 96 % | SYSTOLIC BLOOD PRESSURE: 115 MMHG | HEART RATE: 65 BPM | RESPIRATION RATE: 18 BRPM | DIASTOLIC BLOOD PRESSURE: 72 MMHG | TEMPERATURE: 97.8 F

## 2024-05-16 DIAGNOSIS — R53.83 OTHER FATIGUE: ICD-10-CM

## 2024-05-16 DIAGNOSIS — C78.7 METASTASIS TO LIVER: ICD-10-CM

## 2024-05-16 DIAGNOSIS — C78.01 MALIGNANT NEOPLASM METASTATIC TO BOTH LUNGS: Primary | ICD-10-CM

## 2024-05-16 DIAGNOSIS — C43.59 MALIGNANT MELANOMA OF TORSO EXCLUDING BREAST: ICD-10-CM

## 2024-05-16 DIAGNOSIS — C43.59 MALIGNANT MELANOMA OF TORSO EXCLUDING BREAST: Primary | ICD-10-CM

## 2024-05-16 DIAGNOSIS — C78.01 MALIGNANT NEOPLASM METASTATIC TO BOTH LUNGS: ICD-10-CM

## 2024-05-16 DIAGNOSIS — C78.02 MALIGNANT NEOPLASM METASTATIC TO BOTH LUNGS: ICD-10-CM

## 2024-05-16 DIAGNOSIS — Z95.828 PORT-A-CATH IN PLACE: ICD-10-CM

## 2024-05-16 DIAGNOSIS — C50.911 MALIGNANT NEOPLASM OF RIGHT BREAST IN FEMALE, ESTROGEN RECEPTOR POSITIVE, UNSPECIFIED SITE OF BREAST: ICD-10-CM

## 2024-05-16 DIAGNOSIS — C78.02 MALIGNANT NEOPLASM METASTATIC TO BOTH LUNGS: Primary | ICD-10-CM

## 2024-05-16 DIAGNOSIS — Z17.0 MALIGNANT NEOPLASM OF RIGHT BREAST IN FEMALE, ESTROGEN RECEPTOR POSITIVE, UNSPECIFIED SITE OF BREAST: ICD-10-CM

## 2024-05-16 PROCEDURE — 1126F AMNT PAIN NOTED NONE PRSNT: CPT | Performed by: INTERNAL MEDICINE

## 2024-05-16 PROCEDURE — 99213 OFFICE O/P EST LOW 20 MIN: CPT | Performed by: INTERNAL MEDICINE

## 2024-05-16 PROCEDURE — 3074F SYST BP LT 130 MM HG: CPT | Performed by: INTERNAL MEDICINE

## 2024-05-16 PROCEDURE — 25010000002 HEPARIN LOCK FLUSH PER 10 UNITS: Performed by: INTERNAL MEDICINE

## 2024-05-16 PROCEDURE — 25010000002 NIVOLUMAB 240 MG/24ML SOLUTION 24 ML VIAL: Performed by: INTERNAL MEDICINE

## 2024-05-16 PROCEDURE — 25810000003 SODIUM CHLORIDE 0.9 % SOLUTION: Performed by: INTERNAL MEDICINE

## 2024-05-16 PROCEDURE — 3078F DIAST BP <80 MM HG: CPT | Performed by: INTERNAL MEDICINE

## 2024-05-16 PROCEDURE — 96413 CHEMO IV INFUSION 1 HR: CPT

## 2024-05-16 RX ORDER — HEPARIN SODIUM (PORCINE) LOCK FLUSH IV SOLN 100 UNIT/ML 100 UNIT/ML
500 SOLUTION INTRAVENOUS AS NEEDED
OUTPATIENT
Start: 2024-05-16

## 2024-05-16 RX ORDER — HEPARIN SODIUM (PORCINE) LOCK FLUSH IV SOLN 100 UNIT/ML 100 UNIT/ML
500 SOLUTION INTRAVENOUS AS NEEDED
Status: DISCONTINUED | OUTPATIENT
Start: 2024-05-16 | End: 2024-05-16 | Stop reason: HOSPADM

## 2024-05-16 RX ORDER — SODIUM CHLORIDE 9 MG/ML
250 INJECTION, SOLUTION INTRAVENOUS ONCE
Status: COMPLETED | OUTPATIENT
Start: 2024-05-16 | End: 2024-05-16

## 2024-05-16 RX ORDER — SODIUM CHLORIDE 0.9 % (FLUSH) 0.9 %
20 SYRINGE (ML) INJECTION AS NEEDED
OUTPATIENT
Start: 2024-05-16

## 2024-05-16 RX ORDER — SODIUM CHLORIDE 0.9 % (FLUSH) 0.9 %
10 SYRINGE (ML) INJECTION AS NEEDED
Status: DISCONTINUED | OUTPATIENT
Start: 2024-05-16 | End: 2024-05-16 | Stop reason: HOSPADM

## 2024-05-16 RX ORDER — SODIUM CHLORIDE 0.9 % (FLUSH) 0.9 %
10 SYRINGE (ML) INJECTION AS NEEDED
OUTPATIENT
Start: 2024-05-16

## 2024-05-16 RX ORDER — LIDOCAINE AND PRILOCAINE 25; 25 MG/G; MG/G
CREAM TOPICAL
Qty: 30 G | Refills: 3 | Status: SHIPPED | OUTPATIENT
Start: 2024-05-16

## 2024-05-16 RX ORDER — HEPARIN SODIUM (PORCINE) LOCK FLUSH IV SOLN 100 UNIT/ML 100 UNIT/ML
300 SOLUTION INTRAVENOUS ONCE
OUTPATIENT
Start: 2024-05-16

## 2024-05-16 RX ADMIN — SODIUM CHLORIDE 480 MG: 9 INJECTION, SOLUTION INTRAVENOUS at 14:37

## 2024-05-16 RX ADMIN — Medication 10 ML: at 15:06

## 2024-05-16 RX ADMIN — HEPARIN 500 UNITS: 100 SYRINGE at 15:06

## 2024-05-16 RX ADMIN — SODIUM CHLORIDE 250 ML: 9 INJECTION, SOLUTION INTRAVENOUS at 14:37

## 2024-06-07 DIAGNOSIS — C78.02 MALIGNANT NEOPLASM METASTATIC TO BOTH LUNGS: ICD-10-CM

## 2024-06-07 DIAGNOSIS — C78.7 METASTASIS TO LIVER: Primary | ICD-10-CM

## 2024-06-07 DIAGNOSIS — C43.59 MALIGNANT MELANOMA OF TORSO EXCLUDING BREAST: ICD-10-CM

## 2024-06-07 DIAGNOSIS — C78.01 MALIGNANT NEOPLASM METASTATIC TO BOTH LUNGS: ICD-10-CM

## 2024-06-07 RX ORDER — SODIUM CHLORIDE 9 MG/ML
250 INJECTION, SOLUTION INTRAVENOUS ONCE
OUTPATIENT
Start: 2024-07-11

## 2024-06-07 RX ORDER — SODIUM CHLORIDE 9 MG/ML
250 INJECTION, SOLUTION INTRAVENOUS ONCE
OUTPATIENT
Start: 2024-06-13

## 2024-06-07 RX ORDER — SODIUM CHLORIDE 9 MG/ML
250 INJECTION, SOLUTION INTRAVENOUS ONCE
OUTPATIENT
Start: 2024-08-08

## 2024-06-13 ENCOUNTER — LAB (OUTPATIENT)
Dept: ONCOLOGY | Facility: HOSPITAL | Age: 69
End: 2024-06-13
Payer: MEDICARE

## 2024-06-13 ENCOUNTER — INFUSION (OUTPATIENT)
Dept: ONCOLOGY | Facility: HOSPITAL | Age: 69
End: 2024-06-13
Payer: MEDICARE

## 2024-06-13 VITALS
OXYGEN SATURATION: 94 % | DIASTOLIC BLOOD PRESSURE: 75 MMHG | TEMPERATURE: 98.1 F | RESPIRATION RATE: 18 BRPM | SYSTOLIC BLOOD PRESSURE: 132 MMHG | WEIGHT: 193.5 LBS | HEART RATE: 64 BPM | BODY MASS INDEX: 30.31 KG/M2

## 2024-06-13 DIAGNOSIS — Z17.0 MALIGNANT NEOPLASM OF RIGHT BREAST IN FEMALE, ESTROGEN RECEPTOR POSITIVE, UNSPECIFIED SITE OF BREAST: ICD-10-CM

## 2024-06-13 DIAGNOSIS — C78.01 MALIGNANT NEOPLASM METASTATIC TO BOTH LUNGS: ICD-10-CM

## 2024-06-13 DIAGNOSIS — C43.59 MALIGNANT MELANOMA OF TORSO EXCLUDING BREAST: Primary | ICD-10-CM

## 2024-06-13 DIAGNOSIS — C50.911 MALIGNANT NEOPLASM OF RIGHT BREAST IN FEMALE, ESTROGEN RECEPTOR POSITIVE, UNSPECIFIED SITE OF BREAST: ICD-10-CM

## 2024-06-13 DIAGNOSIS — R53.83 OTHER FATIGUE: ICD-10-CM

## 2024-06-13 DIAGNOSIS — C78.7 METASTASIS TO LIVER: ICD-10-CM

## 2024-06-13 DIAGNOSIS — C43.59 MALIGNANT MELANOMA OF TORSO EXCLUDING BREAST: ICD-10-CM

## 2024-06-13 DIAGNOSIS — C78.02 MALIGNANT NEOPLASM METASTATIC TO BOTH LUNGS: ICD-10-CM

## 2024-06-13 DIAGNOSIS — Z95.828 PORT-A-CATH IN PLACE: ICD-10-CM

## 2024-06-13 LAB
ALBUMIN SERPL-MCNC: 4.2 G/DL (ref 3.5–5.2)
ALBUMIN/GLOB SERPL: 1.5 G/DL
ALP SERPL-CCNC: 78 U/L (ref 39–117)
ALT SERPL W P-5'-P-CCNC: 26 U/L (ref 1–33)
ANION GAP SERPL CALCULATED.3IONS-SCNC: 10.1 MMOL/L (ref 5–15)
AST SERPL-CCNC: 26 U/L (ref 1–32)
BASOPHILS # BLD AUTO: 0.07 10*3/MM3 (ref 0–0.2)
BASOPHILS NFR BLD AUTO: 1.2 % (ref 0–1.5)
BILIRUB SERPL-MCNC: 0.6 MG/DL (ref 0–1.2)
BUN SERPL-MCNC: 18 MG/DL (ref 8–23)
BUN/CREAT SERPL: 22 (ref 7–25)
CALCIUM SPEC-SCNC: 9.6 MG/DL (ref 8.6–10.5)
CHLORIDE SERPL-SCNC: 107 MMOL/L (ref 98–107)
CO2 SERPL-SCNC: 23.9 MMOL/L (ref 22–29)
CREAT SERPL-MCNC: 0.82 MG/DL (ref 0.57–1)
DEPRECATED RDW RBC AUTO: 46.8 FL (ref 37–54)
EGFRCR SERPLBLD CKD-EPI 2021: 78.5 ML/MIN/1.73
EOSINOPHIL # BLD AUTO: 0.43 10*3/MM3 (ref 0–0.4)
EOSINOPHIL NFR BLD AUTO: 7.2 % (ref 0.3–6.2)
ERYTHROCYTE [DISTWIDTH] IN BLOOD BY AUTOMATED COUNT: 14 % (ref 12.3–15.4)
GLOBULIN UR ELPH-MCNC: 2.8 GM/DL
GLUCOSE SERPL-MCNC: 106 MG/DL (ref 65–99)
HCT VFR BLD AUTO: 42.1 % (ref 34–46.6)
HGB BLD-MCNC: 13.6 G/DL (ref 12–15.9)
IMM GRANULOCYTES # BLD AUTO: 0.01 10*3/MM3 (ref 0–0.05)
IMM GRANULOCYTES NFR BLD AUTO: 0.2 % (ref 0–0.5)
LYMPHOCYTES # BLD AUTO: 1.69 10*3/MM3 (ref 0.7–3.1)
LYMPHOCYTES NFR BLD AUTO: 28.4 % (ref 19.6–45.3)
MCH RBC QN AUTO: 29.4 PG (ref 26.6–33)
MCHC RBC AUTO-ENTMCNC: 32.3 G/DL (ref 31.5–35.7)
MCV RBC AUTO: 90.9 FL (ref 79–97)
MONOCYTES # BLD AUTO: 0.49 10*3/MM3 (ref 0.1–0.9)
MONOCYTES NFR BLD AUTO: 8.2 % (ref 5–12)
NEUTROPHILS NFR BLD AUTO: 3.26 10*3/MM3 (ref 1.7–7)
NEUTROPHILS NFR BLD AUTO: 54.8 % (ref 42.7–76)
NRBC BLD AUTO-RTO: 0 /100 WBC (ref 0–0.2)
PLATELET # BLD AUTO: 183 10*3/MM3 (ref 140–450)
PMV BLD AUTO: 10.7 FL (ref 6–12)
POTASSIUM SERPL-SCNC: 4.4 MMOL/L (ref 3.5–5.2)
PROT SERPL-MCNC: 7 G/DL (ref 6–8.5)
RBC # BLD AUTO: 4.63 10*6/MM3 (ref 3.77–5.28)
SODIUM SERPL-SCNC: 141 MMOL/L (ref 136–145)
T4 FREE SERPL-MCNC: 1.39 NG/DL (ref 0.93–1.7)
TSH SERPL DL<=0.05 MIU/L-ACNC: 1.31 UIU/ML (ref 0.27–4.2)
WBC NRBC COR # BLD AUTO: 5.95 10*3/MM3 (ref 3.4–10.8)

## 2024-06-13 PROCEDURE — 84439 ASSAY OF FREE THYROXINE: CPT

## 2024-06-13 PROCEDURE — 25810000003 SODIUM CHLORIDE 0.9 % SOLUTION: Performed by: INTERNAL MEDICINE

## 2024-06-13 PROCEDURE — 85025 COMPLETE CBC W/AUTO DIFF WBC: CPT

## 2024-06-13 PROCEDURE — 25010000002 NIVOLUMAB 240 MG/24ML SOLUTION 24 ML VIAL: Performed by: INTERNAL MEDICINE

## 2024-06-13 PROCEDURE — 36415 COLL VENOUS BLD VENIPUNCTURE: CPT

## 2024-06-13 PROCEDURE — 84443 ASSAY THYROID STIM HORMONE: CPT

## 2024-06-13 PROCEDURE — 96409 CHEMO IV PUSH SNGL DRUG: CPT

## 2024-06-13 PROCEDURE — 25010000002 HEPARIN LOCK FLUSH PER 10 UNITS: Performed by: INTERNAL MEDICINE

## 2024-06-13 PROCEDURE — 80053 COMPREHEN METABOLIC PANEL: CPT

## 2024-06-13 RX ORDER — SODIUM CHLORIDE 9 MG/ML
250 INJECTION, SOLUTION INTRAVENOUS ONCE
Status: COMPLETED | OUTPATIENT
Start: 2024-06-13 | End: 2024-06-13

## 2024-06-13 RX ORDER — HEPARIN SODIUM (PORCINE) LOCK FLUSH IV SOLN 100 UNIT/ML 100 UNIT/ML
300 SOLUTION INTRAVENOUS ONCE
OUTPATIENT
Start: 2024-06-13

## 2024-06-13 RX ORDER — SODIUM CHLORIDE 0.9 % (FLUSH) 0.9 %
10 SYRINGE (ML) INJECTION AS NEEDED
OUTPATIENT
Start: 2024-06-13

## 2024-06-13 RX ORDER — SODIUM CHLORIDE 0.9 % (FLUSH) 0.9 %
20 SYRINGE (ML) INJECTION AS NEEDED
OUTPATIENT
Start: 2024-06-13

## 2024-06-13 RX ORDER — HEPARIN SODIUM (PORCINE) LOCK FLUSH IV SOLN 100 UNIT/ML 100 UNIT/ML
500 SOLUTION INTRAVENOUS AS NEEDED
Status: DISCONTINUED | OUTPATIENT
Start: 2024-06-13 | End: 2024-06-13 | Stop reason: HOSPADM

## 2024-06-13 RX ORDER — SODIUM CHLORIDE 0.9 % (FLUSH) 0.9 %
10 SYRINGE (ML) INJECTION AS NEEDED
Status: DISCONTINUED | OUTPATIENT
Start: 2024-06-13 | End: 2024-06-13 | Stop reason: HOSPADM

## 2024-06-13 RX ORDER — HEPARIN SODIUM (PORCINE) LOCK FLUSH IV SOLN 100 UNIT/ML 100 UNIT/ML
500 SOLUTION INTRAVENOUS AS NEEDED
OUTPATIENT
Start: 2024-06-13

## 2024-06-13 RX ADMIN — HEPARIN 500 UNITS: 100 SYRINGE at 16:26

## 2024-06-13 RX ADMIN — SODIUM CHLORIDE 480 MG: 9 INJECTION, SOLUTION INTRAVENOUS at 15:53

## 2024-06-13 RX ADMIN — SODIUM CHLORIDE 250 ML: 9 INJECTION, SOLUTION INTRAVENOUS at 15:53

## 2024-06-13 RX ADMIN — Medication 10 ML: at 16:26

## 2024-07-11 ENCOUNTER — LAB (OUTPATIENT)
Dept: ONCOLOGY | Facility: HOSPITAL | Age: 69
End: 2024-07-11
Payer: COMMERCIAL

## 2024-07-11 ENCOUNTER — INFUSION (OUTPATIENT)
Dept: ONCOLOGY | Facility: HOSPITAL | Age: 69
End: 2024-07-11
Payer: COMMERCIAL

## 2024-07-11 VITALS
TEMPERATURE: 97.3 F | OXYGEN SATURATION: 97 % | RESPIRATION RATE: 18 BRPM | SYSTOLIC BLOOD PRESSURE: 125 MMHG | DIASTOLIC BLOOD PRESSURE: 77 MMHG | HEART RATE: 73 BPM | WEIGHT: 188.4 LBS | BODY MASS INDEX: 29.51 KG/M2

## 2024-07-11 DIAGNOSIS — C43.59 MALIGNANT MELANOMA OF TORSO EXCLUDING BREAST: Primary | ICD-10-CM

## 2024-07-11 DIAGNOSIS — C78.01 MALIGNANT NEOPLASM METASTATIC TO BOTH LUNGS: ICD-10-CM

## 2024-07-11 DIAGNOSIS — C78.02 MALIGNANT NEOPLASM METASTATIC TO BOTH LUNGS: ICD-10-CM

## 2024-07-11 DIAGNOSIS — Z95.828 PORT-A-CATH IN PLACE: ICD-10-CM

## 2024-07-11 DIAGNOSIS — C43.59 MALIGNANT MELANOMA OF TORSO EXCLUDING BREAST: ICD-10-CM

## 2024-07-11 DIAGNOSIS — C78.7 METASTASIS TO LIVER: ICD-10-CM

## 2024-07-11 LAB
ALBUMIN SERPL-MCNC: 4 G/DL (ref 3.5–5.2)
ALBUMIN/GLOB SERPL: 1.7 G/DL
ALP SERPL-CCNC: 72 U/L (ref 39–117)
ALT SERPL W P-5'-P-CCNC: 18 U/L (ref 1–33)
ANION GAP SERPL CALCULATED.3IONS-SCNC: 10.6 MMOL/L (ref 5–15)
AST SERPL-CCNC: 19 U/L (ref 1–32)
BASOPHILS # BLD AUTO: 0.06 10*3/MM3 (ref 0–0.2)
BASOPHILS NFR BLD AUTO: 1.1 % (ref 0–1.5)
BILIRUB SERPL-MCNC: 0.5 MG/DL (ref 0–1.2)
BUN SERPL-MCNC: 15 MG/DL (ref 8–23)
BUN/CREAT SERPL: 20 (ref 7–25)
CALCIUM SPEC-SCNC: 9.6 MG/DL (ref 8.6–10.5)
CHLORIDE SERPL-SCNC: 106 MMOL/L (ref 98–107)
CO2 SERPL-SCNC: 24.4 MMOL/L (ref 22–29)
CREAT SERPL-MCNC: 0.75 MG/DL (ref 0.57–1)
DEPRECATED RDW RBC AUTO: 47.8 FL (ref 37–54)
EGFRCR SERPLBLD CKD-EPI 2021: 87.4 ML/MIN/1.73
EOSINOPHIL # BLD AUTO: 0.52 10*3/MM3 (ref 0–0.4)
EOSINOPHIL NFR BLD AUTO: 9.6 % (ref 0.3–6.2)
ERYTHROCYTE [DISTWIDTH] IN BLOOD BY AUTOMATED COUNT: 14.3 % (ref 12.3–15.4)
GLOBULIN UR ELPH-MCNC: 2.4 GM/DL
GLUCOSE SERPL-MCNC: 113 MG/DL (ref 65–99)
HCT VFR BLD AUTO: 43 % (ref 34–46.6)
HGB BLD-MCNC: 13.9 G/DL (ref 12–15.9)
IMM GRANULOCYTES # BLD AUTO: 0.01 10*3/MM3 (ref 0–0.05)
IMM GRANULOCYTES NFR BLD AUTO: 0.2 % (ref 0–0.5)
LYMPHOCYTES # BLD AUTO: 1.73 10*3/MM3 (ref 0.7–3.1)
LYMPHOCYTES NFR BLD AUTO: 31.9 % (ref 19.6–45.3)
MCH RBC QN AUTO: 29.7 PG (ref 26.6–33)
MCHC RBC AUTO-ENTMCNC: 32.3 G/DL (ref 31.5–35.7)
MCV RBC AUTO: 91.9 FL (ref 79–97)
MONOCYTES # BLD AUTO: 0.44 10*3/MM3 (ref 0.1–0.9)
MONOCYTES NFR BLD AUTO: 8.1 % (ref 5–12)
NEUTROPHILS NFR BLD AUTO: 2.66 10*3/MM3 (ref 1.7–7)
NEUTROPHILS NFR BLD AUTO: 49.1 % (ref 42.7–76)
NRBC BLD AUTO-RTO: 0 /100 WBC (ref 0–0.2)
PLATELET # BLD AUTO: 170 10*3/MM3 (ref 140–450)
PMV BLD AUTO: 10.6 FL (ref 6–12)
POTASSIUM SERPL-SCNC: 3.9 MMOL/L (ref 3.5–5.2)
PROT SERPL-MCNC: 6.4 G/DL (ref 6–8.5)
RBC # BLD AUTO: 4.68 10*6/MM3 (ref 3.77–5.28)
SODIUM SERPL-SCNC: 141 MMOL/L (ref 136–145)
T4 FREE SERPL-MCNC: 1.29 NG/DL (ref 0.93–1.7)
TSH SERPL DL<=0.05 MIU/L-ACNC: 1.74 UIU/ML (ref 0.27–4.2)
WBC NRBC COR # BLD AUTO: 5.42 10*3/MM3 (ref 3.4–10.8)

## 2024-07-11 PROCEDURE — 84439 ASSAY OF FREE THYROXINE: CPT

## 2024-07-11 PROCEDURE — 25810000003 SODIUM CHLORIDE 0.9 % SOLUTION: Performed by: INTERNAL MEDICINE

## 2024-07-11 PROCEDURE — 25010000002 HEPARIN LOCK FLUSH PER 10 UNITS: Performed by: INTERNAL MEDICINE

## 2024-07-11 PROCEDURE — 85025 COMPLETE CBC W/AUTO DIFF WBC: CPT

## 2024-07-11 PROCEDURE — 25010000002 NIVOLUMAB 240 MG/24ML SOLUTION 24 ML VIAL: Performed by: INTERNAL MEDICINE

## 2024-07-11 PROCEDURE — 84443 ASSAY THYROID STIM HORMONE: CPT

## 2024-07-11 PROCEDURE — 80053 COMPREHEN METABOLIC PANEL: CPT

## 2024-07-11 PROCEDURE — 96413 CHEMO IV INFUSION 1 HR: CPT

## 2024-07-11 RX ORDER — HEPARIN SODIUM (PORCINE) LOCK FLUSH IV SOLN 100 UNIT/ML 100 UNIT/ML
500 SOLUTION INTRAVENOUS AS NEEDED
OUTPATIENT
Start: 2024-07-11

## 2024-07-11 RX ORDER — HEPARIN SODIUM (PORCINE) LOCK FLUSH IV SOLN 100 UNIT/ML 100 UNIT/ML
500 SOLUTION INTRAVENOUS AS NEEDED
Status: DISCONTINUED | OUTPATIENT
Start: 2024-07-11 | End: 2024-07-11 | Stop reason: HOSPADM

## 2024-07-11 RX ORDER — SODIUM CHLORIDE 0.9 % (FLUSH) 0.9 %
10 SYRINGE (ML) INJECTION AS NEEDED
Status: DISCONTINUED | OUTPATIENT
Start: 2024-07-11 | End: 2024-07-11 | Stop reason: HOSPADM

## 2024-07-11 RX ORDER — SODIUM CHLORIDE 9 MG/ML
250 INJECTION, SOLUTION INTRAVENOUS ONCE
Status: COMPLETED | OUTPATIENT
Start: 2024-07-11 | End: 2024-07-11

## 2024-07-11 RX ORDER — HEPARIN SODIUM (PORCINE) LOCK FLUSH IV SOLN 100 UNIT/ML 100 UNIT/ML
300 SOLUTION INTRAVENOUS ONCE
OUTPATIENT
Start: 2024-07-11

## 2024-07-11 RX ORDER — SODIUM CHLORIDE 0.9 % (FLUSH) 0.9 %
20 SYRINGE (ML) INJECTION AS NEEDED
OUTPATIENT
Start: 2024-07-11

## 2024-07-11 RX ORDER — SODIUM CHLORIDE 0.9 % (FLUSH) 0.9 %
10 SYRINGE (ML) INJECTION AS NEEDED
OUTPATIENT
Start: 2024-07-11

## 2024-07-11 RX ADMIN — SODIUM CHLORIDE 250 ML: 9 INJECTION, SOLUTION INTRAVENOUS at 15:24

## 2024-07-11 RX ADMIN — HEPARIN 500 UNITS: 100 SYRINGE at 15:59

## 2024-07-11 RX ADMIN — SODIUM CHLORIDE 480 MG: 9 INJECTION, SOLUTION INTRAVENOUS at 15:24

## 2024-07-11 RX ADMIN — Medication 10 ML: at 15:59

## 2024-08-02 NOTE — PROGRESS NOTES
"NAME: Yadira Flowers    : 1955    DATE: 2024      DIAGNOSIS:   1.  Stage IA ([id number]) moderately differentiated invasive ductal carcinoma of the right breast diagnosed in 2015.    2.  H/o malignant Melanoma on her Back    3.  H/o cervical cancer treated with cryotherapy at [facility] in     4.  Recurrent / metastatic malignant Melanoma  -  R axillary LN bx 22.    -  She has R axillary LN mass (2.47x2.16), Solitary cavitary R lobe liver lesion, cavitary RLL lung mass,  and bilateral parenchymal lung nodules      TREATMENT HISTORY:   1.         CHIEF COMPLAINT:  Follow up of metastatic melanoma/toxicity check and recent imaging    HISTORY OF PRESENT ILLNESS:   Yadira Flowers is a very pleasant 68 y.o. female who was referred by Dr. Dilcia Pedro for evaluation and treatment of breast cancer. She was living in [place] in 2015 when her dog brought attention to and \"found\" an abnormal lump in her right breast.  She was treated by Dr. Steve Isbell and underwent R lumpectomy with SLNBx on 10-22-15 as below.  She then had what sounds like accelerated partial breast irradiation.  She was then started on Arimidex which was later switched to Exemestane for a better side effect profile.  She took this for about 5 years, stopping a couple of weeks ago.  She stopped taking Exemestane because she developed some vaginal bleeding and hematuria.  This was evaluated by her PCP and gynecologist and felt to be due to vaginal atrophy.  She was prescribed a hormone pill which she hasn't yet started and was referred here to discuss things further.      Aside from bleeding which has been uncomfortable and distressing for her, Ms. Onur Burgos has generally been well.  The last year has been hard on her emotionally with the loss of her  and then her dog, her home and her insurance, but she is coping well and has good family support in [place].  She says she gained weight with her breast " cancer treatment and gained weight when her  .  She has been working now to lose weight and has lost 20 lbs over the last 2 months with dieting.  She denies chest pain or shortness of breath.  She complains of some RLQ cramping pain and isn't sure what is causing that.  She denies difficulty moving her bowels.  No blood in her stool that she is aware of.  She has had vaginal bleeding and hematuria as above.        INTERVAL HISTORY:  Ms. Burgos presents today for follow up of metastatic melanoma/toxicity check and recent imaging. Since her last visit, she has overall been well.  She continues to tolerate immunotherapy without untoward effect.  She is anxious and wonders if the immunotherapy will stop working. She is caring for her sister who has MS and this has been a challenge.  She is otherwise without complaint.  She has questions about recent imaging.      PAST MEDICAL HISTORY:  Past Medical History:   Diagnosis Date    Back pain     Basal cell carcinoma (BCC) in situ of skin     Dr. Mohr - Derm    Breast cancer     right    Cancer     breast, cervical, melanoma    Cervical cancer     Diagnosed in .  Treated by repeated local intervention and ultimately received cryotherapy at [facility] with resolution.    Elbow fracture     Foot fracture     Headache     Hyperlipidemia     Hypertension     TAKEN OFF MEDS    Melanoma     on her back  - treated by Dermatologist Catrachito Amaro in [place], [place] with what sounds like wide local excision.     Pulmonary embolism     Sinusitis        PAST SURGICAL HISTORY:  Past Surgical History:   Procedure Laterality Date    AXILLARY LYMPH NODE BIOPSY/EXCISION Right 2022    Procedure: AXILLARY LYMPH NODE BIOPSY/EXCISION;  Surgeon: Dianelys Cummings MD;  Location: [facility];  Service: General;  Laterality: Right;    BREAST LUMPECTOMY Right     ca    BREAST SURGERY      Secondary to cancer    BRONCHOSCOPY Right 2022    Procedure: BRONCHOSCOPY  WITH ENDOBRONCHIAL ULTRASOUND;  Surgeon: Chris Her MD;  Location:  [facility];  Service: Pulmonary;  Laterality: Right;    COLONOSCOPY N/A 2020    Procedure: COLONOSCOPY;  Surgeon: Mohsen Maldonado MD;  Location:  [facility];  Service: Gastroenterology;  Laterality: N/A;    ELBOW FUSION      left    FOOT SURGERY Right 2014    Broken foot    HAND SURGERY  2020    right    RHINOPLASTY  2002    SKIN CANCER EXCISION      malignant melanoma from back     US GUIDED LYMPH NODE BIOPSY  2022    VENOUS ACCESS DEVICE (PORT) INSERTION N/A 2022    Procedure: INSERTION VENOUS ACCESS DEVICE left or right;  Surgeon: Dianelys Cummings MD;  Location:  [facility];  Service: General;  Laterality: N/A;       FAMILY HISTORY:  Family History   Problem Relation Age of Onset    Other Mother         blood clots    Hypertension Mother     Ulcers Father     Hypertension Sister     Other Sister         Multiple Sclerosis    Cancer Paternal Aunt     Stroke Maternal Grandmother     Alcohol abuse Paternal Grandmother     Hypertension Sister     Multiple sclerosis Sister     Hypertension Sister     Coronary artery disease Paternal Grandfather     Cancer Maternal Aunt 30        colon    Breast cancer Neg Hx    Many paternal aunts  with malignancy of various types. Many cousins on that side of the family have had cancer as well.  One  of colon cancer in her 30s.  One had a brain tumor.    SOCIAL HISTORY:  Social History     Socioeconomic History    Marital status:    Tobacco Use    Smoking status: Never    Smokeless tobacco: Never   Vaping Use    Vaping status: Never Used   Substance and Sexual Activity    Alcohol use: Not Currently    Drug use: Never    Sexual activity: Not Currently     Birth control/protection: Post-menopausal       REVIEW OF SYSTEMS:   A comprehensive 14 point review of systems was performed.  Significant findings as mentioned above.  All other systems reviewed and are negative.       MEDICATIONS:  The current medication list was reviewed in the EMR    Current Outpatient Medications:     atorvastatin (LIPITOR) 10 MG tablet, Take 1 tablet by mouth Daily., Disp: , Rfl:     Eliquis 2.5 MG tablet tablet, TAKE 1 TABLET BY MOUTH TWICE DAILY, Disp: 60 tablet, Rfl: 5    fluticasone (FLONASE) 50 MCG/ACT nasal spray, 2 sprays by Each Nare route Daily. Shake liquid, Disp: 16 g, Rfl: 5    gabapentin (NEURONTIN) 100 MG capsule, Take 1 capsule by mouth As Needed., Disp: , Rfl:     lidocaine-prilocaine (EMLA) 2.5-2.5 % cream, Apply to port a cath ~30 min -1 hour prior to chemotherapy, Disp: 30 g, Rfl: 3    ondansetron ODT (Zofran ODT) 8 MG disintegrating tablet, Place 1 tablet on the tongue Every 8 (Eight) Hours As Needed for Nausea or Vomiting., Disp: 30 tablet, Rfl: 5    pantoprazole (PROTONIX) 20 MG EC tablet, TAKE 1 TABLET BY MOUTH DAILY, Disp: 30 tablet, Rfl: 2    prochlorperazine (COMPAZINE) 10 MG tablet, TAKE 1 TABLET BY MOUTH EVERY 6 HOURS AS NEEDED FOR NAUSEA, Disp: 360 tablet, Rfl: 3    sennosides-docusate (senna-docusate sodium) 8.6-50 MG per tablet, Take 2 tablets by mouth 2 (Two) Times a Day., Disp: 120 tablet, Rfl: 2    Tirzepatide (Mounjaro) 2.5 MG/0.5ML solution pen-injector pen, Inject 0.5 mL under the skin into the appropriate area as directed 1 (One) Time Per Week., Disp: , Rfl:     ALLERGIES:    No Known Allergies    PHYSICAL EXAM:  Vitals:    08/08/24 1322   BP: 119/82   Pulse: 79   Resp: 18   Temp: 96.9 °F (36.1 °C)   TempSrc: Temporal   SpO2: 96%   Weight: 83 kg (183 lb)   PainSc: 0-No pain       Body surface area is 1.95 meters squared.  Body mass index is 28.66 kg/m².  ECOG score: 0   General:  Awake, alert and oriented, appears well.  HEENT:  Pupils are equal, round and reactive to light and accommodation, Extra-ocular movements full, Oropharyx clear, mucous membranes moist  Neck:  No JVD, thyromegaly or lymphadenopathy  CV:  Regular rate and rhythm, no murmurs, rubs or  gallops  Resp:  Lungs are clear to auscultation bilaterally, no crackles  Breast: Deferred today. Previously: S/p R lumpectomy. There is a somewhat firm area at the site of surgery but there are no palpable masses.  She is s/p excision of R axillary LN, with some post-surgical swelling.  Left breast is without mass lesions.  No L axillary adenopathy.  Abd:  Soft, non-tender, non-distended, bowel sounds present, no HSM or masses noted  Ext:  No clubbing, cyanosis, or lower extremity edema  Lymph:  No cervical, supraclavicular, axillary adenopathy  Neuro:  MS as above, grossly non-focal exam    PATHOLOGY:  10-22-15            12-16-21          01-13-22          ENDOSCOPY:  Colonoscopy 12-09-20     Findings: 8 hyperplastic appearing polyps of the distal rectum, diverticulosis moderately throughout the sigmoid colon      IMAGING:  CTCAP 12-15-20  On the lung windows there are several calcified  granulomas in the lungs. No noncalcified pulmonary parenchymal lung  nodules were identified. On the soft tissue windows there were no  enlarged lymph nodes in the supraclavicular or axillary regions. No  mediastinal or hilar lymphadenopathy was seen. The heart was not  enlarged. There were no pericardial effusions.     IMPRESSION:  No CT findings of metastatic disease in the chest. There are  calcified granulomas in the lungs.     CT FINDINGS: The liver and spleen were normal in size and shape. The  gallbladder contains several stones. The wall was not thickened. The  bile ducts in the liver are not dilated. There is nothing seen to  suggest metastatic disease in the liver. The pancreas shows no evidence  of mass or inflammation. No adrenal or renal lesions are demonstrated.  The aorta is normal in caliber. There were no enlarged lymph nodes in  the retroperitoneum or in the mesentery. The bowel shows no evidence of  obstruction. In the pelvis there were no masses or fluid collections.  There were no ventral hernias.      IMPRESSION:  No CT findings of metastatic disease in the abdomen or  pelvis. This study does demonstrate several stones in the lumen of the  gallbladder.         Bilateral diagnostic mammogram 01-31-21  FINDINGS: There are scattered areas of fibroglandular density.     The bilateral fibroglandular pattern does not appear significantly  changed compared to the exam from 2020. This includes postoperative  changes in the right 12:00 region. Mild skin thickening is noted  involving the right breast which is likely treatment related. A few  bilateral scattered calcifications are noted.     IMPRESSION:  Incomplete examination as comparison with older outside  prior mammograms is needed.        BI-RADS CATEGORY:   0, INCOMPLETE:  Need prior mammograms for comparison        RECOMMENDATION:  We will attempt to obtain older outside prior  mammograms for comparison.      CTCAP 12-12-21  FINDINGS: Lack of IV contrast hinders parenchymal assessment of the mediastinum, liver, spleen, pancreas, adrenal glands and kidneys.         Significant infiltrate seen in the right lower lobe with interstitial component. There are also lung nodules present in the right lung. Index nodule in the right lung base medially is 5 mm maximal diameter. Other smaller nodules seen throughout the right  lung. Tiny 3 mm lingular nodule seen in series 3 image 30. A benign calcified granuloma seen in the left lower lobe but a noncalcified indeterminant  5 mm left lower lobe nodule seen, series 3 image 42.     Nonenlarged mediastinal adenopathy seen. However there is an enlarged right axillary 1.8 x 2.6 cm lymph node partially seen.     Worrisome hepatic metastasis or primary hepatic lesion seen measuring 4.4 x 4.1 cm in liver segment 8. Margins are ill-defined. Abscess could have a similar appearance. Suggestion of subtle gallstones. Nonobstructing 2 mm right renal calculus seen. No  obstructive uropathy. Gaseous distention of the esophagus and hiatal  hernia present consistent with GERD. No enteric contrast but no gross evidence of bowel obstruction.   There is no gross free air, free fluid or focal inflammatory change.  Uterus and  adnexa are not enlarged. Osseous structures are grossly intact.     IMPRESSION:  Constellation of findings with significant consolidation in the right lower lobe, enlarged right axillary lymph node, indeterminate bilateral lung nodules and ill-defined hepatic lesion concerning for underlying malignancy in this patient .     FINDINGS: Lack of IV contrast hinders parenchymal assessment of the mediastinum, liver, spleen, pancreas, adrenal glands and kidneys.         Significant infiltrate seen in the right lower lobe with interstitial component. There are also lung nodules present in the right lung. Index nodule in the right lung base medially is 5 mm maximal diameter. Other smaller nodules seen throughout the right  lung. Tiny 3 mm lingular nodule seen in series 3 image 30. A benign calcified granuloma seen in the left lower lobe but a noncalcified indeterminant  5 mm left lower lobe nodule seen, series 3 image 42.     Nonenlarged mediastinal adenopathy seen. However there is an enlarged right axillary 1.8 x 2.6 cm lymph node partially seen.     Worrisome hepatic metastasis or primary hepatic lesion seen measuring 4.4 x 4.1 cm in liver segment 8. Margins are ill-defined. Abscess could have a similar appearance. Suggestion of subtle gallstones. Nonobstructing 2 mm right renal calculus seen. No  obstructive uropathy. Gaseous distention of the esophagus and hiatal hernia present consistent with GERD. No enteric contrast but no gross evidence of bowel obstruction.   There is no gross free air, free fluid or focal inflammatory change.  Uterus and  adnexa are not enlarged. Osseous structures are grossly intact.     IMPRESSION:  Constellation of findings with significant consolidation in the right lower lobe, enlarged right axillary lymph  node, indeterminate bilateral lung nodules and ill-defined hepatic lesion concerning for underlying malignancy in this patient .       US Abdomen Complete 12-12-21  FINDINGS:  The visualized portions of the pancreas, IVC and aorta appear normal.        The liver is moderately coarsened in echotexture. Ill-defined hypodensity in the liver measuring 3 x 3.2 x 2.6 cm corresponds to recent CT. Unclear if this is a malignant lesion. Abscesses within the differential but prior CT was performed without any  contrast.  The common bile duct is not dilated.. Gallbladder wall is thickened at 3.4 mm with trace pericholecystic fluid and subtle hyperemia. Gallstones and shadowing present.     The kidneys are normal in size and echogenicity. There is no   hydronephrosis or focal solid lesion.  The spleen is normal in size and echotexture.     IMPRESSION:  1. Nonspecific gallbladder wall thickening and questionable pericholecystic fluid. Gallstones are better seen on CT than ultrasound.  2.  Heterogeneous liver with lesion in the liver as seen on CT. Ultrasound is not helpful in determining etiology. This could be an abscess but given the other findings on CT,  malignancy is not excluded.      CTCAP 12-15-21  FINDINGS:    LUNGS:  Increased density of the now right lower lobe mixed  groundglass and more solid-appearing consolidation with internal  cavitary component identified. Tiny right and left lung pulmonary  nodules. Grossly stable.    PLEURAL SPACE:  Small bilateral pleural effusions.  No pneumothorax.    HEART:  Unremarkable.  No cardiomegaly.  No significant pericardial  effusion.    BONES/JOINTS:  Unremarkable.  No acute fracture.  No dislocation.    SOFT TISSUES:  Unremarkable.    VASCULATURE:  Unremarkable.  No thoracic aortic aneurysm.    LYMPH NODES:  Unremarkable.  No enlarged lymph nodes.     IMPRESSION:  1.  Increased density of the now right lower lobe mixed groundglass and  more solid-appearing consolidation with  internal cavitary component  identified. Tiny right and left lung pulmonary nodules. Grossly stable.  2.  Small bilateral pleural effusions.    FINDINGS:    LUNG BASES:  Unremarkable.  No mass.  No consolidation.      ABDOMEN:    LIVER:  Right lobe of liver lesion is again identified now measuring  about 4.7 cm and was about 4.7 cm.    GALLBLADDER AND BILE DUCTS:  Gallstones noted in the gallbladder.  No  ductal dilation.    PANCREAS:  Unremarkable.  No mass.  No ductal dilation.    SPLEEN:  Unremarkable.  No splenomegaly.    ADRENALS:  Unremarkable.  No mass.    KIDNEYS AND URETERS:  Unremarkable.  No solid mass.  No  hydronephrosis.    STOMACH AND BOWEL:  Unremarkable.  No obstruction.  No mucosal  thickening.      PELVIS:    APPENDIX:  No findings to suggest acute appendicitis.    BLADDER:  Unremarkable.  No mass.    REPRODUCTIVE:  Unremarkable as visualized.      ABDOMEN and PELVIS:    INTRAPERITONEAL SPACE:  Unremarkable.  No free air.  No significant  fluid collection.    BONES/JOINTS:  No acute fracture.  No dislocation.    SOFT TISSUES:  Unremarkable.    VASCULATURE:  Unremarkable.  No abdominal aortic aneurysm.    LYMPH NODES:  Unremarkable.  No enlarged lymph nodes.     IMPRESSION:    Right lobe of liver lesion is again identified now measuring about 4.7  cm and was about 4.7 cm.      NM Bone Scan Whole Body 12-15-21  FINDINGS:    SKULL/FACIAL BONES:  No focal increased or decreased uptake.    SPINE:  Unremarkable.  No abnormal increased or decreased uptake.    RIBS:  Unremarkable.  No abnormal uptake.    LONG BONES:  Unremarkable.  No abnormal uptake.    PELVIS:  Unremarkable.  No focal increased or decreased uptake.    JOINTS:  Unremarkable.  No focal increased or decreased uptake.    SOFT TISSUES:  Unremarkable.    RENAL/BLADDER:  Within normal limits.     IMPRESSION:    Normal bone scan.      CT Abdomen With Contrast  12-18-21  FINDINGS:  Partially imaged thick-walled cavitary lesion in the right lower  lobe with right basilar atelectasis/infiltrate and small right pleural effusion. There is also left basilar atelectasis/infiltrate and trace left pleural effusion. Multiple noncalcified  pulmonary nodules are scattered throughout the visualized lower lung fields, concerning for pulmonary metastatic disease.     Ill-defined right hepatic mass again noted. The spleen, pancreas, and both adrenal glands are within expected limits. Cholelithiasis. Punctate nonobstructing right intrarenal stone. Tiny left renal cyst. Kidneys are otherwise unremarkable without  hydronephrosis. Abdominal aorta normal in course and caliber without dissection. No pathologic retroperitoneal or mesenteric lymphadenopathy by size criteria. Visualized small bowel and colon are unremarkable. No bowel obstruction. No free fluid or free  air.     No aggressive osseous lesions.     IMPRESSION:     1. Ill-defined right hepatic mass again noted. This has been previously biopsied and correlation with pathology results is recommended.  2. Cholelithiasis.  3. Punctate nonobstructing right intrarenal stone.  4. No threshold abdominal lymphadenopathy or other suspicious abdominal masses.  5. Partially imaged thick-walled cavitary lesion in the right lower lobe. This may be of infectious or neoplastic etiology and continued follow-up is recommended.  6. Small bilateral pleural effusions with bibasilar atelectasis/infiltrate, worse on the right than left.  7. Numerous small noncalcified pulmonary nodules throughout the visualized lower lung fields, concerning for pulmonary metastatic disease.       Mammo Diagnostic Digital Tomosynthesis Bilateral With CAD w/ US Axilla Right 01-13-22  FINDINGS:  There are scattered areas of fibroglandular density.     Incompletely imaged is a very prominent axillary lymph node. The  bilateral fibroglandular pattern itself is stable in appearance  including postoperative changes in the superior aspect of the right  breast.  There are stable bilateral calcifications. Mild skin thickening  is again noted involving the right breast which is likely treatment  related.     ULTRASOUND: Ultrasound evaluation of the right axilla demonstrates 2  markedly enlarged lymph nodes the largest of which measures 3.2 cm in  size and has no demonstrable fatty hilum. Recommend ultrasound-guided  biopsy.      IMPRESSION:  1. Stable mammographic appearance of the left breast with no findings  suspicious for malignancy.        2. Marked right axillary lymphadenopathy. Recommend ultrasound-guided  biopsy. The fibroglandular pattern of the right breast is however  stable.        BI-RADS CATEGORY:  4, SUSPICIOUS        RECOMMENDATION:  Recommend ultrasound-guided biopsy of the dominant  abnormal appearing right axillary lymph node.      DEXA Bone Density Axial 01-13-22  IMPRESSION:  Impression:  1. According to the World Health Organization definitions of  osteoporosis based on bone density, this patient's bone mineral density  is compatible with osteoporosis and the fracture risk is high.      Mammo Diagnostic Digital Tomosynthesis Bilateral With CAD w/ US Axilla Right 01-13-22  FINDINGS:  There are scattered areas of fibroglandular density.     Incompletely imaged is a very prominent axillary lymph node. The  bilateral fibroglandular pattern itself is stable in appearance  including postoperative changes in the superior aspect of the right  breast. There are stable bilateral calcifications. Mild skin thickening  is again noted involving the right breast which is likely treatment  related.     ULTRASOUND: Ultrasound evaluation of the right axilla demonstrates 2  markedly enlarged lymph nodes the largest of which measures 3.2 cm in  size and has no demonstrable fatty hilum. Recommend ultrasound-guided  biopsy.      IMPRESSION:  1. Stable mammographic appearance of the left breast with no findings  suspicious for malignancy.        2. Marked right axillary  lymphadenopathy. Recommend ultrasound-guided  biopsy. The fibroglandular pattern of the right breast is however  stable.        BI-RADS CATEGORY:  4, SUSPICIOUS        RECOMMENDATION:  Recommend ultrasound-guided biopsy of the dominant  abnormal appearing right axillary lymph node.      CT Chest With Contrast Diagnostic 01-18-22  FINDINGS:     LUNGS: Solitary mass in the right lower lobe with cavitation.  Significantly smaller today than on the prior study. 6 mm solid nodule  in the right lung posteriorly on image 30 of the axial series. Other  parenchymal nodules are seen in both lungs and are similar to the  previous study. These are concerning for metastatic nodules.     HEART: Unremarkable.     MEDIASTINUM: No masses. No enlarged lymph nodes.  No fluid collections.     PLEURA: No pleural effusion. No pleural mass or abnormal calcification.  No pneumothorax.     VASCULATURE: No evidence of aneurysm. Filling defect in the right  pulmonary artery and possibly left lower lobe pulmonary artery. This  study was not performed to evaluate for pulmonary embolus, but the  findings are concerning for bilateral pulmonary emboli.     BONES: No acute bony abnormality.     VISUALIZED UPPER ABDOMEN:Please see the CT report for the abdomen and  pelvis.     Other: Right axillary soft tissue mass is present and shows slight  interval growth. It measures 2.47 x 2.16 cm today and previously at the  same level measured approximately 2.7 x 1.89 cm.     IMPRESSION:  1. Study was not performed as a PE protocol, but there appear to be  filling defects in pulmonary arteries bilaterally concerning for  pulmonary emboli.  2. Interval decrease in size of cavitary right lower lobe mass.  3. Stable parenchymal nodules bilaterally.  4. Very slight interval growth of right axillary soft tissue mass.     Results are being relayed to the referring physician.      US Liver 01-18-22  FINDINGS: Sonographic imaging of the liver does not show the  mass  previously identified in the liver. I would suggest a follow-up CT scan  for better delineation.     Hepatic flow was hepatopedal.     There is shadowing from the gallbladder fossa.     IMPRESSION:  1. Previously identified mass is not seen on this exam in the liver.  Consider follow-up CT.  2. Shadowing from the gallbladder fossa. In the absence of  cholecystectomy, appearance is concerning for cholelithiasis.      US Venous Doppler Lower Extremity Bilateral (duplex) 01-20-22  FINDINGS:   There is patent spontaneous flow from the common femoral vein through  the posterior tibial veins.  There was no internal clot or area of noncompressibility.  Normal augmentation was elicited where applicable.     IMPRESSION:  No DVT in the lower extremities on today's exam.       CT Chest Pulmonary Embolism 01-20-22  FINDINGS: Today's study demonstrates opacification of the central  pulmonary vessels.  There are filling defects in the pulmonary arteries bilaterally. First  order arteries. This is most compatible with bilateral pulmonary  emboli..     Soft tissue mass in the right lower lobe with somewhat cavitary center  is again noted and unchanged..     There is no mediastinal lymph node enlargement     No pericardial or pleural effusion.        IMPRESSION:  1. Bilateral pulmonary emboli.  2. Parenchymal nodules bilaterally with dominant mass in the right lower  Lobe..      NM PET/CT Skull Base to Mid Thigh 01-28-22  FINDINGS:      HEAD/NECK:  Area of uptake in the posterior right paraspinal space and to lesser  degree the left paraspinal space appears related to muscle uptake.  No FDG hypermetabolic neck adenopathy.  No FDG hypermetabolic masses.     CHEST:   Numerous bilateral pulmonary nodules appear stable from the previous  chest CT and show no FDG hypermetabolism. This is likely due to nodules  being below size threshold for PET sensitivity.  Cavitary lung mass in the right lung localizing to the superior  segment  of the lower lobe shows FDG hypermetabolism with maximum SUV: 2.4. This  is at the lower range for malignancy.  Enlarged right lower axillary region lymph node which is 3.0 cm shows  mild FDG hypermetabolism that is approximately with maximum SUV: 2.4.  This is at the lower range for malignancy.  Low-dose CT demonstrating no change in additional scattered pulmonary  nodules from the previous scan. Coronary artery calcifications are  stable.     ABDOMEN/PELVIS:   Faint area of low attenuation involving the right lobe of liver is  poorly evaluated with low-dose noncontrast CT but shows no focal FDG  hypermetabolism.  Physiologic FDG hypermetabolism seen throughout the solid abdominal  organs.  Physiologic FDG hypermetabolism seen throughout the GI tract.  Physiologic FDG hypermetabolism seen throughout the mesentery,  retroperitoneum and pelvis.  Low dose CT redemonstrates nonobstructing kidney stones. Cholelithiasis  again noted.     BONES: Nonspecific FDG tracer activity. No intense areas of tracer  activity noted.     IMPRESSION:     1. Cavitary mass in the right lung that shows very low-grade FDG  hypermetabolism which is at the lower range for malignancy with maximum  SUV: 2.4.  2. Enlarged right axillary region lymph node with low-grade FDG  hypermetabolism also at the lower range for malignancy with maximum SUV:  2.4.  3. Numerous bilateral pulmonary nodules compatible with metastatic  disease but show no significant FDG hypermetabolism. This may be in part  due to size of nodules being below PET sensitivity threshold.  4. Faint area of low-attenuation right lobe liver poorly evaluated with  noncontrast low-dose CT that shows no obvious intense FDG  hypermetabolism.  5. Other nonacute findings as above on low dose CT.        [facility] 05-03-22  FINDINGS:    Lungs:  Cavitary mass in the right lower lobe has nearly resolved.  A  right lower lobe pulmonary nodule is 7.4 mm and was previously 7.5 mm.  No  change. Image #38.  A left lower lobe pulmonary nodule, image #52, is  9.8 mm and was previously 9.8 mm. No change.    Pleural space:  Unremarkable.  No pneumothorax.  No significant  effusion.    Heart:  Unremarkable.  No cardiomegaly.  No significant pericardial  effusion.  No significant coronary artery calcifications.    Bones/joints:  The bony structures appear stable. No acute bony  findings identified.  No dislocation.    Soft tissues:  Unremarkable.    Vasculature:  Please note, pulmonary emboli noted on the prior exam  are not well evaluated. The previously described filling defects of the  main pulmonary arteries are not evident.  No thoracic aortic aneurysm.    Lymph nodes:  No mediastinal adenopathy by CT size criteria.    Gallbladder and bile ducts:  Cholelithiasis noted.    Other findings:  Other smaller nodules appear stable in size and  configuration.  No new nodules identified.     IMPRESSION:  1.  Near-complete resolution of previously described cavitary mass in  the right lower lobe periphery now only with linear scarring noted.  2.  Index nodules of both lungs are stable from previous exam with no  size increase identified. No new nodules are noted.  3.  Cholelithiasis.  4.  Due to timing of contrast on this study, assessment of pulmonary  emboli not performed. There appears to be significant improvement in  clot burden however in the more central vessels.    FINDINGS:    Lung bases:  Pulmonary nodules of the lung bases.    Mediastinum:  Small hiatal hernia.      ABDOMEN:    Liver:  Low attenuation lesions of the right lobe liver appear of  decreased prominence from the previous exam. Segment 8 liver lesion is  1.1 cm. Segment 7 liver lesion is approximately 0.8 cm.    Gallbladder and bile ducts:  Cholelithiasis.  No ductal dilation.    Pancreas:  Unremarkable.  No mass.  No ductal dilation.    Spleen:  Unremarkable.  No splenomegaly.    Adrenals:  No adrenal masses identified.    Kidneys and  ureters:  Unremarkable.  No solid mass.  No  hydronephrosis.    Stomach and bowel:  Sigmoid diverticulosis without evidence of  diverticulitis.  No obstruction.      PELVIS:    Appendix:  Normal appendix.    Bladder:  Unremarkable.  No mass.    Reproductive:  Unremarkable as visualized.      ABDOMEN and PELVIS:    Intraperitoneal space:  Unremarkable.  No free air.  No significant  fluid collection.    Bones/joints:  No acute fracture.  No dislocation.    Soft tissues:  Unremarkable.    Vasculature:  Unremarkable.  No abdominal aortic aneurysm.    Lymph nodes:  No iliac or retroperitoneal adenopathy.     IMPRESSION:  1.  Small low-attenuation lesions of the right lobe liver appears  decreased prominence of the previous exam. No new no focal liver lesions  identified  2.  Cholelithiasis.  3.  Otherwise stable appearance of the abdomen and pelvis.    CT Abdomen Pelvis With Contrast (08/08/2022 14:36)  FINDINGS:    LUNG BASES:  Unremarkable.  No mass.  No consolidation.      ABDOMEN:    LIVER:  Stable 1 cm right lobe of liver lesion and more posterior 7 mm  lesion.    GALLBLADDER AND BILE DUCTS:  Gallstones.  Gallbladder otherwise  unremarkable.  No ductal dilation.    PANCREAS:  Unremarkable.  No mass.  No ductal dilation.    SPLEEN:  Unremarkable.  No splenomegaly.    ADRENALS:  Unremarkable.  No mass.    KIDNEYS AND URETERS:  Unremarkable.  No solid mass.  No  hydronephrosis.    STOMACH AND BOWEL:  Unremarkable.  No obstruction.  No mucosal  thickening.      PELVIS:    APPENDIX:  No findings to suggest acute appendicitis.    BLADDER:  Unremarkable.  No mass.    REPRODUCTIVE:  Unremarkable as visualized.      ABDOMEN and PELVIS:    INTRAPERITONEAL SPACE:  Unremarkable.  No free air.  No significant  fluid collection.    BONES/JOINTS:  No acute fracture.  No dislocation.    SOFT TISSUES:  Unremarkable.    VASCULATURE:  Unremarkable.  No abdominal aortic aneurysm.    LYMPH NODES:  Unremarkable.  No enlarged lymph  nodes.     IMPRESSION:  1.  Gallstones.  Gallbladder otherwise unremarkable.  2.  Stable 1 cm right lobe of liver lesion and more posterior 7 mm  Lesion.    CT Chest With Contrast Diagnostic (08/08/2022 14:40)  FINDINGS:    LUNGS:  Multiple pulmonary nodules again noted including a right lower  lobe pulmonary nodule measuring 5 mm that was 5 mm. Another right lower  lobe pulmonary nodule is 7 mm and was 7 mm. A left lower lobe pulmonary  nodule is 5 mm and was 5 mm. Other nodules appear stable.    PLEURAL SPACE:  Unremarkable.  No pneumothorax.  No significant  effusion.    HEART:  Unremarkable.  No cardiomegaly.  No significant pericardial  effusion.  No significant coronary artery calcifications.    BONES/JOINTS:  Unremarkable.  No acute fracture.  No dislocation.    SOFT TISSUES:  Unremarkable.    VASCULATURE:  Unremarkable.  No thoracic aortic aneurysm.    LYMPH NODES:  Unremarkable.  No enlarged lymph nodes.     IMPRESSION:    Multiple pulmonary nodules again noted including a right lower lobe  pulmonary nodule measuring 5 mm that was 5 mm. Another right lower lobe  pulmonary nodule is 7 mm and was 7 mm. A left lower lobe pulmonary  nodule is 5 mm and was 5 mm. Other nodules appear stable.    CT Abdomen Pelvis With Contrast (10/31/2022 14:46)  FINDINGS:    LUNG BASES:  Unremarkable.  No mass.  No consolidation.      ABDOMEN:    LIVER:  Stable right lobe of liver low-attenuation lesion measuring  about a centimeter. Stable more posterior right lobe of liver lesion  measuring about 7 mm.    GALLBLADDER AND BILE DUCTS:  Gallstones.  Gallbladder otherwise  unremarkable.  No ductal dilation.    PANCREAS:  Unremarkable.  No mass.  No ductal dilation.    SPLEEN:  Unremarkable.  No splenomegaly.    ADRENALS:  Unremarkable.  No mass.    KIDNEYS AND URETERS:  Unremarkable.  No solid mass.  No  hydronephrosis.    STOMACH AND BOWEL:  Scattered diverticula in the colon.  No  diverticulitis.  No obstruction.      PELVIS:     APPENDIX:  No findings to suggest acute appendicitis.    BLADDER:  Unremarkable.  No mass.    REPRODUCTIVE:  Unremarkable as visualized.      ABDOMEN and PELVIS:    INTRAPERITONEAL SPACE:  Unremarkable.  No free air.  No significant  fluid collection.    BONES/JOINTS:  No acute fracture.  No dislocation.    SOFT TISSUES:  Unremarkable.    VASCULATURE:  Unremarkable.  No abdominal aortic aneurysm.    LYMPH NODES:  Unremarkable.  No enlarged lymph nodes.     IMPRESSION:  1.  Gallstones.  Gallbladder otherwise unremarkable.  2.  Stable right lobe of liver low-attenuation lesion measuring about a  centimeter. Stable more posterior right lobe of liver lesion measuring  about 7 mm.    CT Chest With Contrast Diagnostic (10/31/2022 14:47)  FINDINGS:    LUNGS:  Stable subpleural right middle lobe pulmonary nodule measuring  5 mm. Stable right lower lobe pulmonary nodule measuring 6 mm. Other  bilateral pulmonary nodules appear stable in size and number.    PLEURAL SPACE:  Unremarkable.  No pneumothorax.  No significant  effusion.    HEART:  Unremarkable.  No cardiomegaly.  No significant pericardial  effusion.  No significant coronary artery calcifications.    BONES/JOINTS:  Unremarkable.  No acute fracture.  No dislocation.    SOFT TISSUES:  Right upper breast density again noted.    VASCULATURE:  Unremarkable.  No thoracic aortic aneurysm.    LYMPH NODES:  Unremarkable.  No enlarged lymph nodes.     IMPRESSION:    Stable subpleural right middle lobe pulmonary nodule measuring 5 mm.  Stable right lower lobe pulmonary nodule measuring 6 mm. Other bilateral  pulmonary nodules appear stable in size and number.    CT chest with contrast 1/25/23  FINDINGS:     LUNGS: Multiple parenchymal nodules are present bilaterally and are  stable in size, number, and appearance. Largest nodule measuring  approximately 6 mm.     HEART: Mild coronary artery calcifications.     MEDIASTINUM: No masses. No enlarged lymph nodes.  No fluid  collections.     PLEURA: No pleural effusions.     VASCULATURE: No evidence of aneurysm.     BONES: No acute bony abnormality.     VISUALIZED UPPER ABDOMEN:Please see the report for the CT of the abdomen  and pelvis.     Other: 8.5 mm left axillary lymph node stable.     IMPRESSION:     1. Multiple parenchymal nodules in both lungs are stable.  2. 8.5 mm lymph node in the left axilla, stable.    CT abdomen pelvis with contrast 1/25/23  FINDINGS:     Lower thorax: Parenchymal nodules in both lungs similar to the previous  exam.     Abdomen:     Liver: Previous identified area of decreased attenuation in the right  lobe of the liver is not localized on today's exam.     Gallbladder: Cholelithiasis.     Pancreas: Unremarkable. No mass or ductal dilatation.     Spleen: Homogeneous. No splenomegaly.     Adrenals: No mass.     Kidneys/ureters: Nonobstructing right intrarenal stone.     GI tract: Moderate stool. Sigmoid diverticuli without diverticulitis.     MESENTERY: No free fluid, walled off fluid collections, mesenteric  stranding, or enlarged lymph nodes        Vasculature: No evidence of aneurysm.     Abdominal wall: No focal hernia or mass.        Bladder: No focal mass or significant wall thickening     Reproductive: Unremarkable as visualized     Bones: No acute bony abnormality.     IMPRESSION:     1. No evidence of new interval development of metastatic disease.  Previously identified hepatic lesion is not seen on today's exam.     2.Cholelithiasis.     3. Nonobstructing right intrarenal stone.    Mammo Diagnostic Digital Tomosynthesis Bilateral With CAD (02/08/2023 14:55)  FINDINGS: There are scattered areas of fibroglandular density.     The bilateral fibroglandular pattern is stable in appearance including  postoperative changes in the right 12:00 region. Skin thickening  involving the right breast is again noted and likely treatment related.  Multiple surgical clips are now noted in the right axilla. The  patient's  Port-A-Cath is incompletely imaged in the posterior superior aspect of  the left breast. No new or suspicious findings are identified in either  breast.     IMPRESSION:  Benign bilateral mammographic findings.        BI-RADS CATEGORY:  2, BENIGN        RECOMMENDATION:  Recommend the patient continue annual bilateral  mammographic evaluation.    CT Abdomen Pelvis With Contrast (04/13/2023 14:51)  FINDINGS:    Lung bases:  Refer to chest CT for characterization of lung nodules in  the partially imaged lower chest.    Mediastinum:  Moderate hiatal hernia.      ABDOMEN:    Liver:  No definite focal liver lesion identified.    Gallbladder and bile ducts:  Cholelithiasis.  No ductal dilation.    Pancreas:  Unremarkable.  No mass.  No ductal dilation.    Spleen:  Unremarkable.  No splenomegaly.    Adrenals:  No adrenal masses.    Kidneys and ureters:  Tiny nonobstructing right kidney stone. No  hydronephrosis.    Stomach and bowel:  Gastric antrum wall thickening likely due to  incomplete distention.  Small splenule is again noted adjacent to the  splenic flexure of the colon.  Sigmoid diverticulosis. No evidence of  acute diverticulitis.      PELVIS:    Appendix:  Normal appendix.    Bladder:  Unremarkable.  No mass.    Reproductive:  Unremarkable as visualized.      ABDOMEN and PELVIS:    Intraperitoneal space:  No pneumoperitoneum identified.  No  significant fluid collection.    Bones/joints:  Stable appearance of the bony structures. Mild  degenerative changes lumbar spine but no acute osseous findings.  No  dislocation.    Soft tissues:  Unremarkable.    Vasculature:  Unremarkable.  No abdominal aortic aneurysm.    Lymph nodes:  No iliac or retroperitoneal lymphadenopathy.     IMPRESSION:  1.  Cholelithiasis.  2.  No evidence of metastatic disease to abdomen or pelvis.  3.  Other nonacute/incidental findings as detailed above which appear  stable from previous.    CT Chest With Contrast Diagnostic  (04/13/2023 14:51)  FINDINGS:    Lungs:  Small nodule along the right major fissure in the right lung,  5.2 mm and was previously 5.3 mm; image #33.  Parenchymal nodule right  lower lobe is 6.7 mm and was previously 6.9 mm, image #33, no change.   5.9 mm nodule right lower lobe was previously 6.5 mm indicating no  interval change.  Other smaller nodules of the right lung appears  stable.  7.5 mm nodule left lower lobe, image #55, was previously 8.2  mm.  A 5.7 mm nodule left lower lobe, image #40, was previously 5.9 mm.  No significant change.    Pleural space:  Unremarkable.  No pneumothorax.  No significant  effusion.    Heart:  Mild cardiomegaly.  No significant pericardial effusion.  No  significant coronary artery calcifications.    Mediastinum:  Moderate hiatal hernia again noted.  No enlarged  mediastinal or hilar lymph nodes identified.    Bones/joints:  Unremarkable.  No acute fracture.  No dislocation.    Soft tissues:  Stable treatment-related skin thickening of the right  breast.    Vasculature:  Unremarkable.  No thoracic aortic aneurysm.    Lymph nodes:  See above.    Gallbladder and bile ducts:  Cholelithiasis.    Tubes, lines and devices:  Left Port-A-Cath noted.    Other findings:  Calcified granulomas are stable.     IMPRESSION:  1.  No interval change in size of multiple bilateral pulmonary  parenchymal nodules. Index nodules as detailed above. Some nodules may  be slightly smaller than previous or could be due to differences in  slice selection.  2.  No new nodules are identified.  3.  No thoracic adenopathy.  4.  Other incidental and nonacute findings detailed above appear stable  from previous.    CT Chest With Contrast Diagnostic (07/28/2023 15:37)   FINDINGS:    LUNGS AND PLEURAL SPACES:  Again there are tiny bilateral pulmonary  nodules with the largest in the right lower lobe measuring about 6 mm  and was about 6 mm. Other nodules including a left lower lobe pulmonary  nodule measuring 6 mm  appears stable.  No pneumothorax.  No significant  effusion.    HEART:  Unremarkable.  No cardiomegaly.  No significant pericardial  effusion.  No significant coronary artery calcifications.    BONES/JOINTS:  Unremarkable.  No acute fracture.  No dislocation.    SOFT TISSUES:  Unremarkable.    VASCULATURE:  Unremarkable.  No thoracic aortic aneurysm.    LYMPH NODES:  Unremarkable.  No enlarged lymph nodes.     IMPRESSION:    Again there are tiny bilateral pulmonary nodules with the largest in  the right lower lobe measuring about 6 mm and was about 6 mm. Other  nodules including a left lower lobe pulmonary nodule measuring 6 mm  appears stable.    CT Abdomen Pelvis With Contrast (07/28/2023 15:40)   FINDINGS:    LUNG BASES:  Unremarkable.  No mass.  No consolidation.      ABDOMEN:    LIVER:  Unremarkable.  No mass.    GALLBLADDER AND BILE DUCTS:  Gallstone.  Gallbladder otherwise  unremarkable.  No ductal dilation.    PANCREAS:  Unremarkable.  No mass.  No ductal dilation.    SPLEEN:  Unremarkable.  No splenomegaly.    ADRENALS:  Unremarkable.  No mass.    KIDNEYS AND URETERS:  Unremarkable.  No solid mass.  No  hydronephrosis.    STOMACH AND BOWEL:  Scattered diverticula in the colon.  No  diverticulitis.  No obstruction.      PELVIS:    APPENDIX:  No findings to suggest acute appendicitis.    BLADDER:  Unremarkable.  No mass.    REPRODUCTIVE:  Unremarkable as visualized.      ABDOMEN and PELVIS:    INTRAPERITONEAL SPACE:  Unremarkable.  No free air.  No significant  fluid collection.    BONES/JOINTS:  No acute fracture.  No dislocation.    SOFT TISSUES:  Unremarkable.    VASCULATURE:  Unremarkable.  No abdominal aortic aneurysm.    LYMPH NODES:  Unremarkable.  No enlarged lymph nodes.     IMPRESSION:    Gallstone.  Gallbladder otherwise unremarkable.    CT Chest With Contrast Diagnostic (10/27/2023 15:28)   FINDINGS:    LUNGS AND PLEURAL SPACES:  Multiple bilateral pulmonary nodules are  stable in size.  No  pneumothorax.  No significant effusion.    HEART:  Unremarkable as visualized.  No cardiomegaly.  No significant  pericardial effusion.  No significant coronary artery calcifications.    MEDIASTINUM:  Small hiatal hernia.    BONES/JOINTS:  Unremarkable as visualized.  No acute fracture.  No  dislocation.    SOFT TISSUES:  Right breast skin thickening noted likely treatment  related again noted.    VASCULATURE:  Unremarkable as visualized.  No thoracic aortic  aneurysm.    LYMPH NODES:  Unremarkable as visualized.  No enlarged lymph nodes.     IMPRESSION:  1.  Small hiatal hernia.  2.  Right breast skin thickening noted likely treatment related again  noted.  3.  Multiple bilateral pulmonary nodules are stable in size.    CT Abdomen Pelvis With Contrast (10/27/2023 15:30)   FINDINGS:    LUNG BASES:  Unremarkable as visualized.  No mass.  No consolidation.    MEDIASTINUM:  Small hiatal hernia.      ABDOMEN:    LIVER:  Unremarkable as visualized.  No mass.    GALLBLADDER AND BILE DUCTS:  Gallstone.  Gallbladder otherwise  unremarkable.  No ductal dilation.    PANCREAS:  Unremarkable as visualized.  No mass.  No ductal dilation.    SPLEEN:  Unremarkable as visualized.  No splenomegaly.    ADRENALS:  Unremarkable as visualized.  No mass.    KIDNEYS AND URETERS:  Unremarkable as visualized.  No solid mass.  No  hydronephrosis.    STOMACH AND BOWEL:  Scattered diverticula in the colon.  No  diverticulitis.  No obstruction.      PELVIS:    APPENDIX:  No findings to suggest acute appendicitis.    BLADDER:  Unremarkable as visualized.  No mass.    REPRODUCTIVE:  Unremarkable as visualized.      ABDOMEN and PELVIS:    INTRAPERITONEAL SPACE:  Unremarkable as visualized.  No free air.  No  significant fluid collection.    BONES/JOINTS:  No acute fracture.  No dislocation.    SOFT TISSUES:  Unremarkable as visualized.    VASCULATURE:  Unremarkable as visualized.  No abdominal aortic  aneurysm.    LYMPH NODES:  Unremarkable as  visualized.  No enlarged lymph nodes.    OTHER FINDINGS:  No evidence of metastatic disease.     IMPRESSION:  1.  Gallstone.  Gallbladder otherwise unremarkable.  2.  No evidence of metastatic disease.    CT Chest With Contrast Diagnostic (01/26/2024 14:09)   FINDINGS:    LUNGS AND PLEURAL SPACES:  Again small bilateral pulmonary nodules are  stable including a right lower lobe pulmonary nodule measuring 6 mm and  a left lower lobe pulmonary nodule measuring 6 mm. Other nodules are  also stable in size. No new nodules are identified.  No pneumothorax.   No significant effusion.    HEART:  Unremarkable as visualized.  No cardiomegaly.  No significant  pericardial effusion.  No significant coronary artery calcifications.    MEDIASTINUM:  Small hiatal hernia again noted.    BONES/JOINTS:  Unremarkable as visualized.  No acute fracture.  No  dislocation.    SOFT TISSUES:  Unremarkable as visualized.    VASCULATURE:  Unremarkable as visualized.  No thoracic aortic  aneurysm.    LYMPH NODES:  Unremarkable as visualized.  No enlarged lymph nodes.    TUBES, LINES AND DEVICES:  Left Port-A-Cath noted.     IMPRESSION:  1.  Again small bilateral pulmonary nodules are stable including a right  lower lobe pulmonary nodule measuring 6 mm and a left lower lobe  pulmonary nodule measuring 6 mm. Other nodules are also stable in size.  No new nodules are identified.  2.  Small hiatal hernia again noted.    CT Abdomen Pelvis With Contrast (01/26/2024 14:17)   FINDINGS:    LUNG BASES:  Unremarkable as visualized.  No mass.  No consolidation.      ABDOMEN:    LIVER:  Unremarkable as visualized.  No mass.    GALLBLADDER AND BILE DUCTS:  Gallstones.  Gallbladder otherwise  unremarkable.  No ductal dilation.    PANCREAS:  Unremarkable as visualized.  No mass.  No ductal dilation.    SPLEEN:  Unremarkable as visualized.  No splenomegaly.    ADRENALS:  Unremarkable as visualized.  No mass.    KIDNEYS AND URETERS:  Unremarkable as visualized.   No solid mass.  No  hydronephrosis.    STOMACH AND BOWEL:  Unremarkable as visualized.  No obstruction.  No  mucosal thickening.      PELVIS:    APPENDIX:  No findings to suggest acute appendicitis.    BLADDER:  Unremarkable as visualized.  No mass.    REPRODUCTIVE:  Unremarkable as visualized.      ABDOMEN and PELVIS:    INTRAPERITONEAL SPACE:  Unremarkable as visualized.  No free air.  No  significant fluid collection.    BONES/JOINTS:  No acute fracture.  No dislocation.    SOFT TISSUES:  Unremarkable as visualized.    VASCULATURE:  Atherosclerotic disease.  No abdominal aortic aneurysm.    LYMPH NODES:  Unremarkable as visualized.  No enlarged lymph nodes.    OTHER FINDINGS:  No evidence of metastatic disease.     IMPRESSION:  1.  Gallstones.  Gallbladder otherwise unremarkable.  2.  No evidence of metastatic disease.     Mammo Diagnostic Digital Tomosynthesis Bilateral With CAD (02/21/2024 14:40)   FINDINGS: There are scattered areas of fibroglandular density.     The bilateral fibroglandular pattern is stable in appearance including  postoperative changes in the superior aspect of the right breast. Mild  skin thickening is again noted involving the right breast which is  likely treatment related. There are bilateral scattered benign-appearing  calcifications. No dominant mass, suspicious calcifications, or  nonsurgical areas of architectural distortion are seen.     IMPRESSION:  Benign bilateral mammographic findings.        BI-RADS CATEGORY: 2, BENIGN        RECOMMENDATION: Recommend the patient continue annual bilateral  mammographic evaluation.    CT Chest With Contrast Diagnostic (05/07/2024 15:52)   FINDINGS:    Lungs and pleural spaces:  Multiple bilateral pulmonary nodules which  appear stable from previous exam.  A 5.4 mm nodule right lower lobe,  image #41, is unchanged from previous; previously 6.3 mm.  A left lower  lobe pulmonary nodule is 5.7 mm and was previously 5.7 mm, image #42.   Left lower  lobe basilar pulmonary nodule is 8.7 mm and was previously  8.8 mm, image #59, indicating no interval change.  Other scattered  pulmonary nodules are stable.  Linear scarring versus atelectasis right  lower lobe, stable.  No new pulmonary nodules are identified.  No  pleural effusions.  No pneumothorax.    Heart:  Borderline cardiomegaly.  No significant coronary artery  calcifications.  No pericardial effusion.    Mediastinum:  Hiatal hernia noted.  No hilar or mediastinal  lymphadenopathy is identified.    Bones/joints:  Unremarkable as visualized.  No acute fracture.  No  dislocation.    Soft tissues:  Unremarkable as visualized.    Vasculature:  Unremarkable as visualized.  No thoracic aortic  aneurysm.    Lymph nodes:  Unremarkable as visualized.  No axillary adenopathy is  noted.    Gallbladder and bile ducts:  Cholelithiasis.    Tubes, lines and devices:  Left Port-A-Cath noted.     IMPRESSION:  1.  Multiple bilateral pulmonary nodules appear stable from the previous  exam with no significant change in size of index nodules of both lungs.  2.  No new pulmonary nodules identified.  3.  Otherwise stable exam with other incidental and nonacute findings  described above.    CT Abdomen Pelvis With Contrast (05/07/2024 15:54)   FINDINGS:    Lung bases:  Pulmonary nodules noted. Refer to chest CT.    Mediastinum:  Small hiatal hernia, stable.      ABDOMEN:    Liver:  Unremarkable as visualized.  No mass.    Gallbladder and bile ducts:  Cholelithiasis, stable.  No ductal  dilation.    Pancreas:  Unremarkable as visualized.  No mass.  No ductal dilation.    Spleen:  Unremarkable as visualized.  No splenomegaly.    Adrenals:  Unremarkable as visualized.  No adrenal masses.    Kidneys and ureters:  Unremarkable as visualized.  No solid mass.  No  hydronephrosis.    Stomach and bowel:  Sigmoid diverticulosis without diverticulitis.  No  obstruction.      PELVIS:    Appendix:  Normal appendix.    Bladder:  Unremarkable as  visualized.  No mass.    Reproductive:  Unremarkable as visualized.      ABDOMEN and PELVIS:    Intraperitoneal space:  Unremarkable as visualized.  No significant  fluid collection.  No pneumoperitoneum identified.    Bones/joints:  Stable bony structures. Degenerative changes lumbar  spine.  No acute fracture.  No dislocation.    Soft tissues:  Unremarkable as visualized.    Vasculature:  Unremarkable as visualized.  No abdominal aortic  aneurysm.    Lymph nodes:  Unremarkable as visualized.  No iliac or retroperitoneal  adenopathy.     IMPRESSION:  1.  Stable exam. No evidence of metastatic disease to the abdomen or  pelvis.  2.  Cholelithiasis.  3.  Mild diverticulosis without diverticulitis.  4.  Other incidental/nonacute findings above.    CT Chest With Contrast Diagnostic (08/06/2024 13:15)   FINDINGS:    Lungs and pleural spaces:  Multiple bilateral pulmonary nodules again  noted.  Index nodule in the left lower lobe, image #54, is 8.3 mm and  was previously 8.7 mm.  Index nodule in the right lower lobe is 5.5 mm  and was previously 5.4 mm.  Index nodule in the left lower lobe is 5.4  mm and was previously 5.7 mm.  No consolidation.  No pneumothorax.  No  significant effusion.    Heart:  Mild cardiomegaly.  No significant pericardial effusion.  No  significant coronary artery calcifications.    Mediastinum:  Small hiatal hernia.  No hilar or mediastinal  lymphadenopathy.    Bones/joints:  Unremarkable as visualized.  No acute fracture.  No  dislocation.  No new bone lesions have developed.    Soft tissues:  Postsurgical changes right breast with treatment  related skin thickening noted stable from previous.    Vasculature:  Unremarkable as visualized.  No thoracic aortic  aneurysm.    Lymph nodes:  No axillary adenopathy.    Tubes, lines and devices:  Left-sided Port-A-Cath noted.    Other findings:  No definite new nodules have developed.     IMPRESSION:  1.  Stable exam. Index nodules of both lungs are  essentially stable from  previous. No new nodules identified.  2.  No thoracic adenopathy.  3.  Other incidental/nonacute findings above stable.    CT Abdomen Pelvis With Contrast (08/06/2024 13:18)   FINDINGS:    Lung bases:  Unremarkable as visualized.  No mass.  No consolidation.    Mediastinum:  Small hiatal hernia.      ABDOMEN:    Liver:  Unremarkable as visualized.  No mass.    Gallbladder and bile ducts:  Cholelithiasis.  No ductal dilation.    Pancreas:  Unremarkable as visualized.  No mass.  No ductal dilation.    Spleen:  Unremarkable as visualized.  No splenomegaly.    Adrenals:  Unremarkable as visualized.  No mass.    Kidneys and ureters:  Tiny nonobstructing right kidney stones.    Stomach and bowel:  Sigmoid diverticulosis without diverticulitis.  No  obstruction.      PELVIS:    Appendix:  Normal appendix.    Bladder:  Unremarkable as visualized.  No mass.    Reproductive:  Unremarkable as visualized.      ABDOMEN and PELVIS:    Intraperitoneal space:  Unremarkable as visualized.  No free air.  No  significant fluid collection.    Bones/joints:  No acute fracture.  No dislocation.    Soft tissues:  Unremarkable as visualized.    Vasculature:  Unremarkable as visualized.  No abdominal aortic  aneurysm.    Lymph nodes:  Unremarkable as visualized.  No enlarged lymph nodes.     IMPRESSION:  1.  No evidence of metastatic disease to abdomen or pelvis.  2.  Cholelithiasis.  3.  Diverticulosis without diverticulitis.  4.  Tiny nonobstructing right kidney stones. No hydronephrosis.  5.  Other incidental/nonacute findings above.      RECENT LABS:  Lab Results   Component Value Date    WBC 5.58 08/08/2024    HGB 14.6 08/08/2024    HCT 45.0 08/08/2024    MCV 91.6 08/08/2024    RDW 13.7 08/08/2024     08/08/2024    NEUTRORELPCT 50.4 08/08/2024    LYMPHORELPCT 33.3 08/08/2024    MONORELPCT 8.4 08/08/2024    EOSRELPCT 6.6 (H) 08/08/2024    BASORELPCT 1.1 08/08/2024    NEUTROABS 2.81 08/08/2024    LYMPHSABS  1.86 08/08/2024       Lab Results   Component Value Date     08/08/2024    K 4.3 08/08/2024    CO2 26.1 08/08/2024     08/08/2024    BUN 14 08/08/2024    CREATININE 0.84 08/08/2024    EGFRIFNONA 87 02/24/2022    GLUCOSE 95 08/08/2024    CALCIUM 9.9 08/08/2024    ALKPHOS 76 08/08/2024    AST 18 08/08/2024    ALT 18 08/08/2024    BILITOT 0.7 08/08/2024    ALBUMIN 4.3 08/08/2024    PROTEINTOT 6.8 08/08/2024    MG 2.0 09/01/2023     Lab Results   Component Value Date    TSH 1.750 08/08/2024     Lab Results   Component Value Date    FERRITIN 169.00 (H) 01/20/2022    IRON 102 01/20/2022    TIBC 264 (L) 01/20/2022    LABIRON 39 01/20/2022    GSUGUVIC08 437 01/20/2022    FOLATE 16.40 01/20/2022     Lab Results   Component Value Date     (H) 01/20/2022     ASSESSMENT & PLAN:  Yadira Flowers is a very pleasant 68 y.o. female with a history of cervical cancer, breast cancer and malignant melanoma.    1. History of Breast Cancer:  - Ms. Onur Burgos had Stage IA ([id number]) moderately differentiated invasive ductal carcinoma of the right breast diagnosed in October 2015.  - She underwent R lympectomy and SLNBx performed by Dr. Steve Isbell 10-22-15 and then received adjuvant radiation in [place].  - After radiation, she was started on Arimidex which was then switched to Exemestane.  She took Exemestane for almost 5 years. Unfortunately, she developed vaginal bleeding and hematuria related to vaginal atrophy related to hormonal blockade.  Given this, stopped Exemestane.  She is doing much better in this regard since stopping hormonal therapy.  - Mammogram 2/8/23 without cause for concern aside from R axillary LAD.   -Repeat bilateral diagnostic mammogram scheduled for 2/21/24.    2.  Metastatic malignant melanoma:  -  S/p resection of a primary lesion on her back performed by Dr. Catrachito Amaro of Dermatology in [place] in 2004.  -  S/p FNA R axillary LN which was concerning for melanoma.   Excisional biopsy confirmed metastatic malignant melanoma.  - Suspect her lung and liver lesions are also related to her melanoma.  - She has seen Dr. Her and he performed bronchoscopy on 02/23/2022. Pathology was negative for malignant cells.  - Liver aspiration showed abscess which grew Klebsiella, suspect this was a secondary infection.    -  Sent  excised LN tissue for CARIS testing.  -  PET-CT showed no significant FDG uptake but re-demonstrated abnormalities in the R axilla, RLL cavitary mass, Vargas lung nodules and liver.    -  MRI Brain showed no acute findings in the head/brain.  -  Recommended initial therapy with immunotherapy.  We discussed different options for immunotherapy including single agent PD-L1 treatment or combination with Yervoy.  We recommended combination with Yervoy for better RR, PFS and OS even with increased risk of immune-mediated side effects.  We discussed the initially approved FDA dosing v. Altered dosing with lower dose IPI/higher dosed Nivo.  The latter has been shown to have fewer side effects and is thought to likely have similar efficacy (though trials weren't really powered to definitively show that).  After a lengthy discussion, we decided to proceed with 4 cycles of Ipi 1 mg/kg / Nivo 3 mg/kg q 21d followed by Nivolumab single agent.  We discussed potential risks, benefits and side effects extensively and she decided to proceed.  -  She completed 4 cycles of Ipi / Nivo and tolerated treatment very well.  -  Repeat CT imaging done 5-3-22 after 4 cycles Ipi/Nivo showed an excellent partial response to treatment.  Have continued with single agent Nivolumab as planned which she is tolerating well.   -Repeat CT CAP from 8/6/24(summarized above) again showing stable disease with excellent control of her metastatic melanoma.  -Therefore, will continue with Nivolumab therapy as planned.  -  Will repeat CT CAP prior to her return 11/28/24.    3.  Bilateral pulmonary emboli:  -   CTPE protocol 1-20-22  confirmed bilateral pulmonary emboli  -  BLE dopplers negative for DVT.  -  Continue Eliquis 5 mg BID.    -  We previously discussed length of therapy.  She watched her  die with PE and her mother has h/o PEs as well.  Presumably the cancer was the risk factor for development of PE and since her cancer is incurable,  have recommended she continue Eliquis indefinately as long as she continues without significant side effects.  She started complaining of difficulty with easy bleeding with minor trauma.  It had been over 6 months that she has had full anticoagulation.  Given worsening side effects, decided to continue Eliquis, but reduced dose to 2.5 mg twice a day.  She is doing well on this dose without bleeding or thrombosis. Will continue.    4.  H/o Colon polyps:  -  Dr. Maldonado performed c-scope 12-09-20 with discovery of 8 hyperplastic polyps in the distal rectum as well as diverticulosis.  He recommended repeat c-scope after 5 years.    5.  H/o cervical cancer treated with cryotherapy at [facility] in 1975:  -  Followed up with Dr. Manav Real of gynecology.  She says she had exam including pap smear and then pelvic U/S which was unrevealing.    6. Prophylaxis:  -  Had COVID vaccine x 2 (Pfizer).  She received Evusheld on 4-7-22.. Recommended booster. Received 2023 flu vaccine. Received  Prevnar 20 vaccine .    7.  ACO / DWAYNE/Other  Quality measures  -  Yadira Flowers received 2023 flu vaccine.  -  Yadira Flowers reports a pain score of 0.  Given her pain assessment as noted, treatment options were discussed and the following options were decided upon as a follow-up plan to address the patient's pain:  No intervention needed at this time. .  -  Current outpatient and discharge medications have been reconciled for the patient.  Reviewed by: Loreto Stone MD    8.  Follow up:  - Continue Nivolumab as planned.  -  F/u with me in 3 months CT CAP prior along with  CBCD, CMP, TSH.  -   Mammogram will be due after 2/21/2025.  This has been ordered.  -  note:  Pt prefers that PAC be accessed for CT imaging.    I spent 30 minutes with Yadira Flowers today.  In the office today, more than 50% of this time was spent face-to-face with her  in counseling / coordination of care, reviewing her medical history and counseling on the current treatment plan.  All questions were answered to her satisfaction        Electronically Signed by:  Loreto Stone MD      CC:     MD Manav Cotto MD Aaron House, MD

## 2024-08-06 ENCOUNTER — HOSPITAL ENCOUNTER (OUTPATIENT)
Dept: CT IMAGING | Facility: HOSPITAL | Age: 69
Discharge: HOME | End: 2024-08-06
Payer: COMMERCIAL

## 2024-08-06 DIAGNOSIS — C78.01 MALIGNANT NEOPLASM METASTATIC TO BOTH LUNGS: ICD-10-CM

## 2024-08-06 DIAGNOSIS — C43.59 MALIGNANT MELANOMA OF TORSO EXCLUDING BREAST: ICD-10-CM

## 2024-08-06 DIAGNOSIS — C78.02 MALIGNANT NEOPLASM METASTATIC TO BOTH LUNGS: ICD-10-CM

## 2024-08-06 DIAGNOSIS — C50.911 MALIGNANT NEOPLASM OF RIGHT BREAST IN FEMALE, ESTROGEN RECEPTOR POSITIVE, UNSPECIFIED SITE OF BREAST: ICD-10-CM

## 2024-08-06 DIAGNOSIS — C78.7 METASTASIS TO LIVER: ICD-10-CM

## 2024-08-06 DIAGNOSIS — Z17.0 MALIGNANT NEOPLASM OF RIGHT BREAST IN FEMALE, ESTROGEN RECEPTOR POSITIVE, UNSPECIFIED SITE OF BREAST: ICD-10-CM

## 2024-08-06 PROCEDURE — 25510000001 IOPAMIDOL 61 % SOLUTION: Performed by: INTERNAL MEDICINE

## 2024-08-06 PROCEDURE — 74177 CT ABD & PELVIS W/CONTRAST: CPT

## 2024-08-06 PROCEDURE — 82565 ASSAY OF CREATININE: CPT

## 2024-08-06 PROCEDURE — 74177 CT ABD & PELVIS W/CONTRAST: CPT | Performed by: RADIOLOGY

## 2024-08-06 PROCEDURE — 71260 CT THORAX DX C+: CPT | Performed by: RADIOLOGY

## 2024-08-06 PROCEDURE — 71260 CT THORAX DX C+: CPT

## 2024-08-06 RX ADMIN — IOPAMIDOL 100 ML: 612 INJECTION, SOLUTION INTRAVENOUS at 13:17

## 2024-08-07 LAB — CREAT BLDA-MCNC: 0.9 MG/DL (ref 0.6–1.3)

## 2024-08-08 ENCOUNTER — INFUSION (OUTPATIENT)
Dept: ONCOLOGY | Facility: HOSPITAL | Age: 69
End: 2024-08-08
Payer: MEDICARE

## 2024-08-08 ENCOUNTER — LAB (OUTPATIENT)
Dept: ONCOLOGY | Facility: CLINIC | Age: 69
End: 2024-08-08
Payer: MEDICARE

## 2024-08-08 ENCOUNTER — OFFICE VISIT (OUTPATIENT)
Dept: ONCOLOGY | Facility: CLINIC | Age: 69
End: 2024-08-08
Payer: COMMERCIAL

## 2024-08-08 VITALS
BODY MASS INDEX: 28.66 KG/M2 | HEART RATE: 79 BPM | OXYGEN SATURATION: 96 % | DIASTOLIC BLOOD PRESSURE: 82 MMHG | RESPIRATION RATE: 18 BRPM | WEIGHT: 183 LBS | TEMPERATURE: 96.9 F | SYSTOLIC BLOOD PRESSURE: 119 MMHG

## 2024-08-08 VITALS
BODY MASS INDEX: 28.66 KG/M2 | SYSTOLIC BLOOD PRESSURE: 139 MMHG | OXYGEN SATURATION: 97 % | TEMPERATURE: 96.4 F | DIASTOLIC BLOOD PRESSURE: 87 MMHG | RESPIRATION RATE: 18 BRPM | HEART RATE: 75 BPM | WEIGHT: 183 LBS

## 2024-08-08 DIAGNOSIS — C78.7 METASTASIS TO LIVER: ICD-10-CM

## 2024-08-08 DIAGNOSIS — C78.01 MALIGNANT NEOPLASM METASTATIC TO BOTH LUNGS: ICD-10-CM

## 2024-08-08 DIAGNOSIS — C78.02 MALIGNANT NEOPLASM METASTATIC TO BOTH LUNGS: ICD-10-CM

## 2024-08-08 DIAGNOSIS — C43.59 MALIGNANT MELANOMA OF TORSO EXCLUDING BREAST: Primary | ICD-10-CM

## 2024-08-08 DIAGNOSIS — Z17.0 MALIGNANT NEOPLASM OF RIGHT BREAST IN FEMALE, ESTROGEN RECEPTOR POSITIVE, UNSPECIFIED SITE OF BREAST: ICD-10-CM

## 2024-08-08 DIAGNOSIS — R53.83 OTHER FATIGUE: ICD-10-CM

## 2024-08-08 DIAGNOSIS — C43.59 MALIGNANT MELANOMA OF TORSO EXCLUDING BREAST: ICD-10-CM

## 2024-08-08 DIAGNOSIS — Z86.711 HISTORY OF PULMONARY EMBOLISM: ICD-10-CM

## 2024-08-08 DIAGNOSIS — Z85.3 HISTORY OF BREAST CANCER: ICD-10-CM

## 2024-08-08 DIAGNOSIS — Z95.828 PORT-A-CATH IN PLACE: Primary | ICD-10-CM

## 2024-08-08 DIAGNOSIS — C50.911 MALIGNANT NEOPLASM OF RIGHT BREAST IN FEMALE, ESTROGEN RECEPTOR POSITIVE, UNSPECIFIED SITE OF BREAST: ICD-10-CM

## 2024-08-08 DIAGNOSIS — Z86.010 HISTORY OF COLON POLYPS: ICD-10-CM

## 2024-08-08 LAB
ALBUMIN SERPL-MCNC: 4.3 G/DL (ref 3.5–5.2)
ALBUMIN/GLOB SERPL: 1.7 G/DL
ALP SERPL-CCNC: 76 U/L (ref 39–117)
ALT SERPL W P-5'-P-CCNC: 18 U/L (ref 1–33)
ANION GAP SERPL CALCULATED.3IONS-SCNC: 9.9 MMOL/L (ref 5–15)
AST SERPL-CCNC: 18 U/L (ref 1–32)
BASOPHILS # BLD AUTO: 0.06 10*3/MM3 (ref 0–0.2)
BASOPHILS NFR BLD AUTO: 1.1 % (ref 0–1.5)
BILIRUB SERPL-MCNC: 0.7 MG/DL (ref 0–1.2)
BUN SERPL-MCNC: 14 MG/DL (ref 8–23)
BUN/CREAT SERPL: 16.7 (ref 7–25)
CALCIUM SPEC-SCNC: 9.9 MG/DL (ref 8.6–10.5)
CHLORIDE SERPL-SCNC: 104 MMOL/L (ref 98–107)
CO2 SERPL-SCNC: 26.1 MMOL/L (ref 22–29)
CREAT SERPL-MCNC: 0.84 MG/DL (ref 0.57–1)
DEPRECATED RDW RBC AUTO: 47 FL (ref 37–54)
EGFRCR SERPLBLD CKD-EPI 2021: 76.3 ML/MIN/1.73
EOSINOPHIL # BLD AUTO: 0.37 10*3/MM3 (ref 0–0.4)
EOSINOPHIL NFR BLD AUTO: 6.6 % (ref 0.3–6.2)
ERYTHROCYTE [DISTWIDTH] IN BLOOD BY AUTOMATED COUNT: 13.7 % (ref 12.3–15.4)
GLOBULIN UR ELPH-MCNC: 2.5 GM/DL
GLUCOSE SERPL-MCNC: 95 MG/DL (ref 65–99)
HCT VFR BLD AUTO: 45 % (ref 34–46.6)
HGB BLD-MCNC: 14.6 G/DL (ref 12–15.9)
IMM GRANULOCYTES # BLD AUTO: 0.01 10*3/MM3 (ref 0–0.05)
IMM GRANULOCYTES NFR BLD AUTO: 0.2 % (ref 0–0.5)
LYMPHOCYTES # BLD AUTO: 1.86 10*3/MM3 (ref 0.7–3.1)
LYMPHOCYTES NFR BLD AUTO: 33.3 % (ref 19.6–45.3)
MCH RBC QN AUTO: 29.7 PG (ref 26.6–33)
MCHC RBC AUTO-ENTMCNC: 32.4 G/DL (ref 31.5–35.7)
MCV RBC AUTO: 91.6 FL (ref 79–97)
MONOCYTES # BLD AUTO: 0.47 10*3/MM3 (ref 0.1–0.9)
MONOCYTES NFR BLD AUTO: 8.4 % (ref 5–12)
NEUTROPHILS NFR BLD AUTO: 2.81 10*3/MM3 (ref 1.7–7)
NEUTROPHILS NFR BLD AUTO: 50.4 % (ref 42.7–76)
NRBC BLD AUTO-RTO: 0 /100 WBC (ref 0–0.2)
PLATELET # BLD AUTO: 202 10*3/MM3 (ref 140–450)
PMV BLD AUTO: 10.6 FL (ref 6–12)
POTASSIUM SERPL-SCNC: 4.3 MMOL/L (ref 3.5–5.2)
PROT SERPL-MCNC: 6.8 G/DL (ref 6–8.5)
RBC # BLD AUTO: 4.91 10*6/MM3 (ref 3.77–5.28)
SODIUM SERPL-SCNC: 140 MMOL/L (ref 136–145)
T4 FREE SERPL-MCNC: 1.45 NG/DL (ref 0.92–1.68)
TSH SERPL DL<=0.05 MIU/L-ACNC: 1.75 UIU/ML (ref 0.27–4.2)
WBC NRBC COR # BLD AUTO: 5.58 10*3/MM3 (ref 3.4–10.8)

## 2024-08-08 PROCEDURE — 25010000002 HEPARIN LOCK FLUSH PER 10 UNITS: Performed by: INTERNAL MEDICINE

## 2024-08-08 PROCEDURE — 84443 ASSAY THYROID STIM HORMONE: CPT | Performed by: INTERNAL MEDICINE

## 2024-08-08 PROCEDURE — 25810000003 SODIUM CHLORIDE 0.9 % SOLUTION: Performed by: INTERNAL MEDICINE

## 2024-08-08 PROCEDURE — 80053 COMPREHEN METABOLIC PANEL: CPT | Performed by: INTERNAL MEDICINE

## 2024-08-08 PROCEDURE — 84439 ASSAY OF FREE THYROXINE: CPT | Performed by: INTERNAL MEDICINE

## 2024-08-08 PROCEDURE — 3079F DIAST BP 80-89 MM HG: CPT | Performed by: INTERNAL MEDICINE

## 2024-08-08 PROCEDURE — 96413 CHEMO IV INFUSION 1 HR: CPT

## 2024-08-08 PROCEDURE — 3074F SYST BP LT 130 MM HG: CPT | Performed by: INTERNAL MEDICINE

## 2024-08-08 PROCEDURE — 85025 COMPLETE CBC W/AUTO DIFF WBC: CPT | Performed by: INTERNAL MEDICINE

## 2024-08-08 PROCEDURE — 99214 OFFICE O/P EST MOD 30 MIN: CPT | Performed by: INTERNAL MEDICINE

## 2024-08-08 PROCEDURE — 25010000002 NIVOLUMAB 240 MG/24ML SOLUTION 24 ML VIAL: Performed by: INTERNAL MEDICINE

## 2024-08-08 PROCEDURE — 1126F AMNT PAIN NOTED NONE PRSNT: CPT | Performed by: INTERNAL MEDICINE

## 2024-08-08 RX ORDER — SODIUM CHLORIDE 9 MG/ML
250 INJECTION, SOLUTION INTRAVENOUS ONCE
Status: COMPLETED | OUTPATIENT
Start: 2024-08-08 | End: 2024-08-08

## 2024-08-08 RX ORDER — HEPARIN SODIUM (PORCINE) LOCK FLUSH IV SOLN 100 UNIT/ML 100 UNIT/ML
300 SOLUTION INTRAVENOUS ONCE
OUTPATIENT
Start: 2024-08-08

## 2024-08-08 RX ORDER — HEPARIN SODIUM (PORCINE) LOCK FLUSH IV SOLN 100 UNIT/ML 100 UNIT/ML
500 SOLUTION INTRAVENOUS AS NEEDED
Status: DISCONTINUED | OUTPATIENT
Start: 2024-08-08 | End: 2024-08-08 | Stop reason: HOSPADM

## 2024-08-08 RX ORDER — SODIUM CHLORIDE 0.9 % (FLUSH) 0.9 %
10 SYRINGE (ML) INJECTION AS NEEDED
Status: DISCONTINUED | OUTPATIENT
Start: 2024-08-08 | End: 2024-08-08 | Stop reason: HOSPADM

## 2024-08-08 RX ORDER — HEPARIN SODIUM (PORCINE) LOCK FLUSH IV SOLN 100 UNIT/ML 100 UNIT/ML
500 SOLUTION INTRAVENOUS AS NEEDED
OUTPATIENT
Start: 2024-08-08

## 2024-08-08 RX ORDER — SODIUM CHLORIDE 0.9 % (FLUSH) 0.9 %
10 SYRINGE (ML) INJECTION AS NEEDED
OUTPATIENT
Start: 2024-08-08

## 2024-08-08 RX ORDER — SODIUM CHLORIDE 0.9 % (FLUSH) 0.9 %
20 SYRINGE (ML) INJECTION AS NEEDED
OUTPATIENT
Start: 2024-08-08

## 2024-08-08 RX ADMIN — SODIUM CHLORIDE 480 MG: 9 INJECTION, SOLUTION INTRAVENOUS at 15:09

## 2024-08-08 RX ADMIN — SODIUM CHLORIDE 250 ML: 9 INJECTION, SOLUTION INTRAVENOUS at 15:09

## 2024-08-08 RX ADMIN — Medication 500 UNITS: at 15:40

## 2024-08-08 RX ADMIN — Medication 10 ML: at 15:40

## 2024-08-08 NOTE — PROGRESS NOTES
Venipuncture Blood Specimen Collection  Venipuncture performed in left arm by Lesvia Regalado MA with good hemostasis. Patient tolerated the procedure well without complications.   08/08/24   Lesvia Regalado MA

## 2024-08-30 DIAGNOSIS — C78.7 METASTASIS TO LIVER: Primary | ICD-10-CM

## 2024-08-30 DIAGNOSIS — C43.59 MALIGNANT MELANOMA OF TORSO EXCLUDING BREAST: ICD-10-CM

## 2024-08-30 DIAGNOSIS — C78.01 MALIGNANT NEOPLASM METASTATIC TO BOTH LUNGS: ICD-10-CM

## 2024-08-30 DIAGNOSIS — C78.02 MALIGNANT NEOPLASM METASTATIC TO BOTH LUNGS: ICD-10-CM

## 2024-08-30 RX ORDER — SODIUM CHLORIDE 9 MG/ML
250 INJECTION, SOLUTION INTRAVENOUS ONCE
OUTPATIENT
Start: 2024-09-05

## 2024-09-05 ENCOUNTER — INFUSION (OUTPATIENT)
Dept: ONCOLOGY | Facility: HOSPITAL | Age: 69
End: 2024-09-05
Payer: COMMERCIAL

## 2024-09-05 ENCOUNTER — LAB (OUTPATIENT)
Dept: ONCOLOGY | Facility: HOSPITAL | Age: 69
End: 2024-09-05
Payer: COMMERCIAL

## 2024-09-05 VITALS
TEMPERATURE: 96.9 F | WEIGHT: 179.8 LBS | HEART RATE: 74 BPM | OXYGEN SATURATION: 97 % | DIASTOLIC BLOOD PRESSURE: 74 MMHG | SYSTOLIC BLOOD PRESSURE: 124 MMHG | BODY MASS INDEX: 28.16 KG/M2 | RESPIRATION RATE: 18 BRPM

## 2024-09-05 DIAGNOSIS — C43.59 MALIGNANT MELANOMA OF TORSO EXCLUDING BREAST: Primary | ICD-10-CM

## 2024-09-05 DIAGNOSIS — C78.01 MALIGNANT NEOPLASM METASTATIC TO BOTH LUNGS: ICD-10-CM

## 2024-09-05 DIAGNOSIS — C43.59 MALIGNANT MELANOMA OF TORSO EXCLUDING BREAST: ICD-10-CM

## 2024-09-05 DIAGNOSIS — C78.7 METASTASIS TO LIVER: ICD-10-CM

## 2024-09-05 DIAGNOSIS — Z95.828 PORT-A-CATH IN PLACE: ICD-10-CM

## 2024-09-05 DIAGNOSIS — C78.02 MALIGNANT NEOPLASM METASTATIC TO BOTH LUNGS: ICD-10-CM

## 2024-09-05 LAB
ALBUMIN SERPL-MCNC: 4.2 G/DL (ref 3.5–5.2)
ALBUMIN/GLOB SERPL: 1.4 G/DL
ALP SERPL-CCNC: 75 U/L (ref 39–117)
ALT SERPL W P-5'-P-CCNC: 20 U/L (ref 1–33)
ANION GAP SERPL CALCULATED.3IONS-SCNC: 11.8 MMOL/L (ref 5–15)
AST SERPL-CCNC: 22 U/L (ref 1–32)
BASOPHILS # BLD AUTO: 0.07 10*3/MM3 (ref 0–0.2)
BASOPHILS NFR BLD AUTO: 1.1 % (ref 0–1.5)
BILIRUB SERPL-MCNC: 0.5 MG/DL (ref 0–1.2)
BUN SERPL-MCNC: 18 MG/DL (ref 8–23)
BUN/CREAT SERPL: 24.7 (ref 7–25)
CALCIUM SPEC-SCNC: 9.5 MG/DL (ref 8.6–10.5)
CHLORIDE SERPL-SCNC: 105 MMOL/L (ref 98–107)
CO2 SERPL-SCNC: 21.2 MMOL/L (ref 22–29)
CREAT SERPL-MCNC: 0.73 MG/DL (ref 0.57–1)
DEPRECATED RDW RBC AUTO: 47.5 FL (ref 37–54)
EGFRCR SERPLBLD CKD-EPI 2021: 90.3 ML/MIN/1.73
EOSINOPHIL # BLD AUTO: 0.48 10*3/MM3 (ref 0–0.4)
EOSINOPHIL NFR BLD AUTO: 7.7 % (ref 0.3–6.2)
ERYTHROCYTE [DISTWIDTH] IN BLOOD BY AUTOMATED COUNT: 14 % (ref 12.3–15.4)
GLOBULIN UR ELPH-MCNC: 2.9 GM/DL
GLUCOSE SERPL-MCNC: 108 MG/DL (ref 65–99)
HCT VFR BLD AUTO: 43 % (ref 34–46.6)
HGB BLD-MCNC: 13.6 G/DL (ref 12–15.9)
IMM GRANULOCYTES # BLD AUTO: 0.01 10*3/MM3 (ref 0–0.05)
IMM GRANULOCYTES NFR BLD AUTO: 0.2 % (ref 0–0.5)
LYMPHOCYTES # BLD AUTO: 1.77 10*3/MM3 (ref 0.7–3.1)
LYMPHOCYTES NFR BLD AUTO: 28.5 % (ref 19.6–45.3)
MCH RBC QN AUTO: 29.4 PG (ref 26.6–33)
MCHC RBC AUTO-ENTMCNC: 31.6 G/DL (ref 31.5–35.7)
MCV RBC AUTO: 93.1 FL (ref 79–97)
MONOCYTES # BLD AUTO: 0.46 10*3/MM3 (ref 0.1–0.9)
MONOCYTES NFR BLD AUTO: 7.4 % (ref 5–12)
NEUTROPHILS NFR BLD AUTO: 3.41 10*3/MM3 (ref 1.7–7)
NEUTROPHILS NFR BLD AUTO: 55.1 % (ref 42.7–76)
NRBC BLD AUTO-RTO: 0 /100 WBC (ref 0–0.2)
PLATELET # BLD AUTO: 179 10*3/MM3 (ref 140–450)
PMV BLD AUTO: 10.2 FL (ref 6–12)
POTASSIUM SERPL-SCNC: 4.4 MMOL/L (ref 3.5–5.2)
PROT SERPL-MCNC: 7.1 G/DL (ref 6–8.5)
RBC # BLD AUTO: 4.62 10*6/MM3 (ref 3.77–5.28)
SODIUM SERPL-SCNC: 138 MMOL/L (ref 136–145)
T4 FREE SERPL-MCNC: 1.3 NG/DL (ref 0.92–1.68)
TSH SERPL DL<=0.05 MIU/L-ACNC: 1.34 UIU/ML (ref 0.27–4.2)
WBC NRBC COR # BLD AUTO: 6.2 10*3/MM3 (ref 3.4–10.8)

## 2024-09-05 PROCEDURE — 25010000002 NIVOLUMAB 240 MG/24ML SOLUTION 24 ML VIAL: Performed by: INTERNAL MEDICINE

## 2024-09-05 PROCEDURE — 96413 CHEMO IV INFUSION 1 HR: CPT

## 2024-09-05 PROCEDURE — 80050 GENERAL HEALTH PANEL: CPT

## 2024-09-05 PROCEDURE — 25810000003 SODIUM CHLORIDE 0.9 % SOLUTION: Performed by: INTERNAL MEDICINE

## 2024-09-05 PROCEDURE — 84439 ASSAY OF FREE THYROXINE: CPT

## 2024-09-05 PROCEDURE — 25010000002 HEPARIN LOCK FLUSH PER 10 UNITS: Performed by: INTERNAL MEDICINE

## 2024-09-05 RX ORDER — HEPARIN SODIUM (PORCINE) LOCK FLUSH IV SOLN 100 UNIT/ML 100 UNIT/ML
500 SOLUTION INTRAVENOUS AS NEEDED
Status: DISCONTINUED | OUTPATIENT
Start: 2024-09-05 | End: 2024-09-05 | Stop reason: HOSPADM

## 2024-09-05 RX ORDER — SODIUM CHLORIDE 9 MG/ML
250 INJECTION, SOLUTION INTRAVENOUS ONCE
Status: COMPLETED | OUTPATIENT
Start: 2024-09-05 | End: 2024-09-05

## 2024-09-05 RX ORDER — HEPARIN SODIUM (PORCINE) LOCK FLUSH IV SOLN 100 UNIT/ML 100 UNIT/ML
500 SOLUTION INTRAVENOUS AS NEEDED
OUTPATIENT
Start: 2024-09-05

## 2024-09-05 RX ORDER — SODIUM CHLORIDE 0.9 % (FLUSH) 0.9 %
10 SYRINGE (ML) INJECTION AS NEEDED
Status: DISCONTINUED | OUTPATIENT
Start: 2024-09-05 | End: 2024-09-05 | Stop reason: HOSPADM

## 2024-09-05 RX ORDER — SODIUM CHLORIDE 0.9 % (FLUSH) 0.9 %
20 SYRINGE (ML) INJECTION AS NEEDED
OUTPATIENT
Start: 2024-09-05

## 2024-09-05 RX ORDER — HEPARIN SODIUM (PORCINE) LOCK FLUSH IV SOLN 100 UNIT/ML 100 UNIT/ML
300 SOLUTION INTRAVENOUS ONCE
OUTPATIENT
Start: 2024-09-05

## 2024-09-05 RX ORDER — SODIUM CHLORIDE 0.9 % (FLUSH) 0.9 %
10 SYRINGE (ML) INJECTION AS NEEDED
OUTPATIENT
Start: 2024-09-05

## 2024-09-05 RX ADMIN — SODIUM CHLORIDE 250 ML: 9 INJECTION, SOLUTION INTRAVENOUS at 15:37

## 2024-09-05 RX ADMIN — SODIUM CHLORIDE 480 MG: 9 INJECTION, SOLUTION INTRAVENOUS at 15:36

## 2024-09-05 RX ADMIN — Medication 10 ML: at 16:10

## 2024-09-05 RX ADMIN — HEPARIN 500 UNITS: 100 SYRINGE at 16:10

## 2024-09-27 DIAGNOSIS — C78.01 MALIGNANT NEOPLASM METASTATIC TO BOTH LUNGS: ICD-10-CM

## 2024-09-27 DIAGNOSIS — C43.59 MALIGNANT MELANOMA OF TORSO EXCLUDING BREAST: ICD-10-CM

## 2024-09-27 DIAGNOSIS — C78.7 METASTASIS TO LIVER: Primary | ICD-10-CM

## 2024-09-27 DIAGNOSIS — C78.02 MALIGNANT NEOPLASM METASTATIC TO BOTH LUNGS: ICD-10-CM

## 2024-09-27 RX ORDER — SODIUM CHLORIDE 9 MG/ML
250 INJECTION, SOLUTION INTRAVENOUS ONCE
OUTPATIENT
Start: 2024-10-03

## 2024-10-03 ENCOUNTER — LAB (OUTPATIENT)
Dept: ONCOLOGY | Facility: HOSPITAL | Age: 69
End: 2024-10-03
Payer: COMMERCIAL

## 2024-10-03 ENCOUNTER — INFUSION (OUTPATIENT)
Dept: ONCOLOGY | Facility: HOSPITAL | Age: 69
End: 2024-10-03
Payer: COMMERCIAL

## 2024-10-03 VITALS
TEMPERATURE: 97.5 F | BODY MASS INDEX: 28.96 KG/M2 | HEART RATE: 71 BPM | RESPIRATION RATE: 18 BRPM | OXYGEN SATURATION: 98 % | SYSTOLIC BLOOD PRESSURE: 143 MMHG | WEIGHT: 184.9 LBS | DIASTOLIC BLOOD PRESSURE: 79 MMHG

## 2024-10-03 DIAGNOSIS — C50.911 MALIGNANT NEOPLASM OF RIGHT BREAST IN FEMALE, ESTROGEN RECEPTOR POSITIVE, UNSPECIFIED SITE OF BREAST: ICD-10-CM

## 2024-10-03 DIAGNOSIS — Z17.0 MALIGNANT NEOPLASM OF RIGHT BREAST IN FEMALE, ESTROGEN RECEPTOR POSITIVE, UNSPECIFIED SITE OF BREAST: ICD-10-CM

## 2024-10-03 DIAGNOSIS — Z95.828 PORT-A-CATH IN PLACE: ICD-10-CM

## 2024-10-03 DIAGNOSIS — C78.02 MALIGNANT NEOPLASM METASTATIC TO BOTH LUNGS: ICD-10-CM

## 2024-10-03 DIAGNOSIS — C78.7 METASTASIS TO LIVER: Primary | ICD-10-CM

## 2024-10-03 DIAGNOSIS — C43.59 MALIGNANT MELANOMA OF TORSO EXCLUDING BREAST: ICD-10-CM

## 2024-10-03 DIAGNOSIS — C78.7 METASTASIS TO LIVER: ICD-10-CM

## 2024-10-03 DIAGNOSIS — C78.01 MALIGNANT NEOPLASM METASTATIC TO BOTH LUNGS: ICD-10-CM

## 2024-10-03 DIAGNOSIS — R53.83 OTHER FATIGUE: ICD-10-CM

## 2024-10-03 LAB
ALBUMIN SERPL-MCNC: 4.4 G/DL (ref 3.5–5.2)
ALBUMIN/GLOB SERPL: 1.6 G/DL
ALP SERPL-CCNC: 87 U/L (ref 39–117)
ALT SERPL W P-5'-P-CCNC: 26 U/L (ref 1–33)
ANION GAP SERPL CALCULATED.3IONS-SCNC: 12.2 MMOL/L (ref 5–15)
AST SERPL-CCNC: 18 U/L (ref 1–32)
BASOPHILS # BLD AUTO: 0.07 10*3/MM3 (ref 0–0.2)
BASOPHILS NFR BLD AUTO: 1.1 % (ref 0–1.5)
BILIRUB SERPL-MCNC: 0.7 MG/DL (ref 0–1.2)
BUN SERPL-MCNC: 15 MG/DL (ref 8–23)
BUN/CREAT SERPL: 20.8 (ref 7–25)
CALCIUM SPEC-SCNC: 9.7 MG/DL (ref 8.6–10.5)
CHLORIDE SERPL-SCNC: 103 MMOL/L (ref 98–107)
CO2 SERPL-SCNC: 24.8 MMOL/L (ref 22–29)
CREAT SERPL-MCNC: 0.72 MG/DL (ref 0.57–1)
DEPRECATED RDW RBC AUTO: 46.8 FL (ref 37–54)
EGFRCR SERPLBLD CKD-EPI 2021: 91.8 ML/MIN/1.73
EOSINOPHIL # BLD AUTO: 0.62 10*3/MM3 (ref 0–0.4)
EOSINOPHIL NFR BLD AUTO: 9.8 % (ref 0.3–6.2)
ERYTHROCYTE [DISTWIDTH] IN BLOOD BY AUTOMATED COUNT: 13.8 % (ref 12.3–15.4)
GLOBULIN UR ELPH-MCNC: 2.8 GM/DL
GLUCOSE SERPL-MCNC: 76 MG/DL (ref 65–99)
HCT VFR BLD AUTO: 43.3 % (ref 34–46.6)
HGB BLD-MCNC: 13.7 G/DL (ref 12–15.9)
IMM GRANULOCYTES # BLD AUTO: 0.01 10*3/MM3 (ref 0–0.05)
IMM GRANULOCYTES NFR BLD AUTO: 0.2 % (ref 0–0.5)
LYMPHOCYTES # BLD AUTO: 1.8 10*3/MM3 (ref 0.7–3.1)
LYMPHOCYTES NFR BLD AUTO: 28.5 % (ref 19.6–45.3)
MCH RBC QN AUTO: 29.3 PG (ref 26.6–33)
MCHC RBC AUTO-ENTMCNC: 31.6 G/DL (ref 31.5–35.7)
MCV RBC AUTO: 92.5 FL (ref 79–97)
MONOCYTES # BLD AUTO: 0.62 10*3/MM3 (ref 0.1–0.9)
MONOCYTES NFR BLD AUTO: 9.8 % (ref 5–12)
NEUTROPHILS NFR BLD AUTO: 3.2 10*3/MM3 (ref 1.7–7)
NEUTROPHILS NFR BLD AUTO: 50.6 % (ref 42.7–76)
NRBC BLD AUTO-RTO: 0 /100 WBC (ref 0–0.2)
PLATELET # BLD AUTO: 200 10*3/MM3 (ref 140–450)
PMV BLD AUTO: 9.9 FL (ref 6–12)
POTASSIUM SERPL-SCNC: 4.1 MMOL/L (ref 3.5–5.2)
PROT SERPL-MCNC: 7.2 G/DL (ref 6–8.5)
RBC # BLD AUTO: 4.68 10*6/MM3 (ref 3.77–5.28)
SODIUM SERPL-SCNC: 140 MMOL/L (ref 136–145)
T4 FREE SERPL-MCNC: 1.28 NG/DL (ref 0.92–1.68)
TSH SERPL DL<=0.05 MIU/L-ACNC: 1.78 UIU/ML (ref 0.27–4.2)
WBC NRBC COR # BLD AUTO: 6.32 10*3/MM3 (ref 3.4–10.8)

## 2024-10-03 PROCEDURE — 85025 COMPLETE CBC W/AUTO DIFF WBC: CPT

## 2024-10-03 PROCEDURE — 84443 ASSAY THYROID STIM HORMONE: CPT

## 2024-10-03 PROCEDURE — 84439 ASSAY OF FREE THYROXINE: CPT

## 2024-10-03 PROCEDURE — 96413 CHEMO IV INFUSION 1 HR: CPT

## 2024-10-03 PROCEDURE — 80053 COMPREHEN METABOLIC PANEL: CPT

## 2024-10-03 PROCEDURE — 25010000002 NIVOLUMAB 240 MG/24ML SOLUTION 24 ML VIAL: Performed by: INTERNAL MEDICINE

## 2024-10-03 PROCEDURE — 25010000002 HEPARIN LOCK FLUSH PER 10 UNITS: Performed by: INTERNAL MEDICINE

## 2024-10-03 RX ORDER — SODIUM CHLORIDE 0.9 % (FLUSH) 0.9 %
10 SYRINGE (ML) INJECTION AS NEEDED
OUTPATIENT
Start: 2024-10-03

## 2024-10-03 RX ORDER — HEPARIN SODIUM (PORCINE) LOCK FLUSH IV SOLN 100 UNIT/ML 100 UNIT/ML
500 SOLUTION INTRAVENOUS AS NEEDED
Status: DISCONTINUED | OUTPATIENT
Start: 2024-10-03 | End: 2024-10-03 | Stop reason: HOSPADM

## 2024-10-03 RX ORDER — HEPARIN SODIUM (PORCINE) LOCK FLUSH IV SOLN 100 UNIT/ML 100 UNIT/ML
300 SOLUTION INTRAVENOUS ONCE
OUTPATIENT
Start: 2024-10-03

## 2024-10-03 RX ORDER — SODIUM CHLORIDE 0.9 % (FLUSH) 0.9 %
10 SYRINGE (ML) INJECTION AS NEEDED
Status: DISCONTINUED | OUTPATIENT
Start: 2024-10-03 | End: 2024-10-03 | Stop reason: HOSPADM

## 2024-10-03 RX ORDER — SODIUM CHLORIDE 0.9 % (FLUSH) 0.9 %
20 SYRINGE (ML) INJECTION AS NEEDED
OUTPATIENT
Start: 2024-10-03

## 2024-10-03 RX ORDER — HEPARIN SODIUM (PORCINE) LOCK FLUSH IV SOLN 100 UNIT/ML 100 UNIT/ML
500 SOLUTION INTRAVENOUS AS NEEDED
OUTPATIENT
Start: 2024-10-03

## 2024-10-03 RX ADMIN — SODIUM CHLORIDE 480 MG: 9 INJECTION, SOLUTION INTRAVENOUS at 15:13

## 2024-10-03 RX ADMIN — HEPARIN 500 UNITS: 100 SYRINGE at 15:45

## 2024-10-03 RX ADMIN — Medication 10 ML: at 15:45

## 2024-10-09 ENCOUNTER — TRANSCRIBE ORDERS (OUTPATIENT)
Dept: ADMINISTRATIVE | Facility: HOSPITAL | Age: 69
End: 2024-10-09
Payer: COMMERCIAL

## 2024-10-09 DIAGNOSIS — M25.511 RIGHT SHOULDER PAIN, UNSPECIFIED CHRONICITY: Primary | ICD-10-CM

## 2024-10-25 DIAGNOSIS — C78.7 METASTASIS TO LIVER: Primary | ICD-10-CM

## 2024-10-25 DIAGNOSIS — C43.59 MALIGNANT MELANOMA OF TORSO EXCLUDING BREAST: ICD-10-CM

## 2024-10-25 DIAGNOSIS — C78.02 MALIGNANT NEOPLASM METASTATIC TO BOTH LUNGS: ICD-10-CM

## 2024-10-25 DIAGNOSIS — C78.01 MALIGNANT NEOPLASM METASTATIC TO BOTH LUNGS: ICD-10-CM

## 2024-10-25 RX ORDER — SODIUM CHLORIDE 9 MG/ML
250 INJECTION, SOLUTION INTRAVENOUS ONCE
OUTPATIENT
Start: 2024-10-31

## 2024-10-31 ENCOUNTER — INFUSION (OUTPATIENT)
Dept: ONCOLOGY | Facility: HOSPITAL | Age: 69
End: 2024-10-31
Payer: COMMERCIAL

## 2024-10-31 ENCOUNTER — LAB (OUTPATIENT)
Dept: ONCOLOGY | Facility: HOSPITAL | Age: 69
End: 2024-10-31
Payer: COMMERCIAL

## 2024-10-31 VITALS
HEART RATE: 74 BPM | BODY MASS INDEX: 28.51 KG/M2 | DIASTOLIC BLOOD PRESSURE: 76 MMHG | OXYGEN SATURATION: 98 % | WEIGHT: 182 LBS | RESPIRATION RATE: 18 BRPM | TEMPERATURE: 97.3 F | SYSTOLIC BLOOD PRESSURE: 134 MMHG

## 2024-10-31 DIAGNOSIS — C78.7 METASTASIS TO LIVER: ICD-10-CM

## 2024-10-31 DIAGNOSIS — Z95.828 PORT-A-CATH IN PLACE: ICD-10-CM

## 2024-10-31 DIAGNOSIS — C78.7 METASTASIS TO LIVER: Primary | ICD-10-CM

## 2024-10-31 DIAGNOSIS — C78.02 MALIGNANT NEOPLASM METASTATIC TO BOTH LUNGS: ICD-10-CM

## 2024-10-31 DIAGNOSIS — C43.59 MALIGNANT MELANOMA OF TORSO EXCLUDING BREAST: ICD-10-CM

## 2024-10-31 DIAGNOSIS — C78.01 MALIGNANT NEOPLASM METASTATIC TO BOTH LUNGS: ICD-10-CM

## 2024-10-31 LAB
ALBUMIN SERPL-MCNC: 4.3 G/DL (ref 3.5–5.2)
ALBUMIN/GLOB SERPL: 1.7 G/DL
ALP SERPL-CCNC: 93 U/L (ref 39–117)
ALT SERPL W P-5'-P-CCNC: 25 U/L (ref 1–33)
ANION GAP SERPL CALCULATED.3IONS-SCNC: 11.9 MMOL/L (ref 5–15)
AST SERPL-CCNC: 20 U/L (ref 1–32)
BASOPHILS # BLD AUTO: 0.06 10*3/MM3 (ref 0–0.2)
BASOPHILS NFR BLD AUTO: 1.1 % (ref 0–1.5)
BILIRUB SERPL-MCNC: 0.7 MG/DL (ref 0–1.2)
BUN SERPL-MCNC: 15 MG/DL (ref 8–23)
BUN/CREAT SERPL: 17.4 (ref 7–25)
CALCIUM SPEC-SCNC: 9.9 MG/DL (ref 8.6–10.5)
CHLORIDE SERPL-SCNC: 101 MMOL/L (ref 98–107)
CO2 SERPL-SCNC: 25.1 MMOL/L (ref 22–29)
CREAT SERPL-MCNC: 0.86 MG/DL (ref 0.57–1)
DEPRECATED RDW RBC AUTO: 46.5 FL (ref 37–54)
EGFRCR SERPLBLD CKD-EPI 2021: 74.2 ML/MIN/1.73
EOSINOPHIL # BLD AUTO: 0.34 10*3/MM3 (ref 0–0.4)
EOSINOPHIL NFR BLD AUTO: 6.3 % (ref 0.3–6.2)
ERYTHROCYTE [DISTWIDTH] IN BLOOD BY AUTOMATED COUNT: 13.6 % (ref 12.3–15.4)
GLOBULIN UR ELPH-MCNC: 2.6 GM/DL
GLUCOSE SERPL-MCNC: 92 MG/DL (ref 65–99)
HCT VFR BLD AUTO: 42.4 % (ref 34–46.6)
HGB BLD-MCNC: 13.5 G/DL (ref 12–15.9)
IMM GRANULOCYTES # BLD AUTO: 0.02 10*3/MM3 (ref 0–0.05)
IMM GRANULOCYTES NFR BLD AUTO: 0.4 % (ref 0–0.5)
LYMPHOCYTES # BLD AUTO: 1.68 10*3/MM3 (ref 0.7–3.1)
LYMPHOCYTES NFR BLD AUTO: 31.1 % (ref 19.6–45.3)
MCH RBC QN AUTO: 29.5 PG (ref 26.6–33)
MCHC RBC AUTO-ENTMCNC: 31.8 G/DL (ref 31.5–35.7)
MCV RBC AUTO: 92.6 FL (ref 79–97)
MONOCYTES # BLD AUTO: 0.45 10*3/MM3 (ref 0.1–0.9)
MONOCYTES NFR BLD AUTO: 8.3 % (ref 5–12)
NEUTROPHILS NFR BLD AUTO: 2.86 10*3/MM3 (ref 1.7–7)
NEUTROPHILS NFR BLD AUTO: 52.8 % (ref 42.7–76)
NRBC BLD AUTO-RTO: 0 /100 WBC (ref 0–0.2)
PLATELET # BLD AUTO: 188 10*3/MM3 (ref 140–450)
PMV BLD AUTO: 10.1 FL (ref 6–12)
POTASSIUM SERPL-SCNC: 3.8 MMOL/L (ref 3.5–5.2)
PROT SERPL-MCNC: 6.9 G/DL (ref 6–8.5)
RBC # BLD AUTO: 4.58 10*6/MM3 (ref 3.77–5.28)
SODIUM SERPL-SCNC: 138 MMOL/L (ref 136–145)
T4 FREE SERPL-MCNC: 1.45 NG/DL (ref 0.92–1.68)
TSH SERPL DL<=0.05 MIU/L-ACNC: 1.51 UIU/ML (ref 0.27–4.2)
WBC NRBC COR # BLD AUTO: 5.41 10*3/MM3 (ref 3.4–10.8)

## 2024-10-31 PROCEDURE — 25010000002 NIVOLUMAB 240 MG/24ML SOLUTION 24 ML VIAL: Performed by: INTERNAL MEDICINE

## 2024-10-31 PROCEDURE — 25010000002 HEPARIN LOCK FLUSH PER 10 UNITS: Performed by: INTERNAL MEDICINE

## 2024-10-31 PROCEDURE — 80050 GENERAL HEALTH PANEL: CPT

## 2024-10-31 PROCEDURE — 84439 ASSAY OF FREE THYROXINE: CPT

## 2024-10-31 PROCEDURE — 96413 CHEMO IV INFUSION 1 HR: CPT

## 2024-10-31 RX ORDER — SODIUM CHLORIDE 0.9 % (FLUSH) 0.9 %
10 SYRINGE (ML) INJECTION AS NEEDED
Status: DISCONTINUED | OUTPATIENT
Start: 2024-10-31 | End: 2024-10-31 | Stop reason: HOSPADM

## 2024-10-31 RX ORDER — HEPARIN SODIUM (PORCINE) LOCK FLUSH IV SOLN 100 UNIT/ML 100 UNIT/ML
500 SOLUTION INTRAVENOUS AS NEEDED
OUTPATIENT
Start: 2024-10-31

## 2024-10-31 RX ORDER — SODIUM CHLORIDE 9 MG/ML
250 INJECTION, SOLUTION INTRAVENOUS ONCE
Status: DISCONTINUED | OUTPATIENT
Start: 2024-10-31 | End: 2024-10-31

## 2024-10-31 RX ORDER — SODIUM CHLORIDE 0.9 % (FLUSH) 0.9 %
10 SYRINGE (ML) INJECTION AS NEEDED
OUTPATIENT
Start: 2024-10-31

## 2024-10-31 RX ORDER — HEPARIN SODIUM (PORCINE) LOCK FLUSH IV SOLN 100 UNIT/ML 100 UNIT/ML
500 SOLUTION INTRAVENOUS AS NEEDED
Status: DISCONTINUED | OUTPATIENT
Start: 2024-10-31 | End: 2024-10-31 | Stop reason: HOSPADM

## 2024-10-31 RX ORDER — HEPARIN SODIUM (PORCINE) LOCK FLUSH IV SOLN 100 UNIT/ML 100 UNIT/ML
300 SOLUTION INTRAVENOUS ONCE
OUTPATIENT
Start: 2024-10-31

## 2024-10-31 RX ORDER — SODIUM CHLORIDE 0.9 % (FLUSH) 0.9 %
20 SYRINGE (ML) INJECTION AS NEEDED
OUTPATIENT
Start: 2024-10-31

## 2024-10-31 RX ADMIN — HEPARIN 500 UNITS: 100 SYRINGE at 15:51

## 2024-10-31 RX ADMIN — SODIUM CHLORIDE 480 MG: 9 INJECTION, SOLUTION INTRAVENOUS at 15:18

## 2024-10-31 RX ADMIN — Medication 10 ML: at 15:51

## 2024-11-03 ENCOUNTER — HOSPITAL ENCOUNTER (OUTPATIENT)
Dept: MRI IMAGING | Facility: HOSPITAL | Age: 69
Discharge: HOME | End: 2024-11-03
Admitting: FAMILY MEDICINE
Payer: COMMERCIAL

## 2024-11-03 DIAGNOSIS — M25.511 RIGHT SHOULDER PAIN, UNSPECIFIED CHRONICITY: ICD-10-CM

## 2024-11-03 PROCEDURE — 73221 MRI JOINT UPR EXTREM W/O DYE: CPT

## 2024-11-03 PROCEDURE — 73221 MRI JOINT UPR EXTREM W/O DYE: CPT | Performed by: RADIOLOGY

## 2024-11-19 ENCOUNTER — HOSPITAL ENCOUNTER (OUTPATIENT)
Dept: CT IMAGING | Facility: HOSPITAL | Age: 69
Discharge: HOME | End: 2024-11-19
Payer: COMMERCIAL

## 2024-11-19 DIAGNOSIS — C78.01 MALIGNANT NEOPLASM METASTATIC TO BOTH LUNGS: ICD-10-CM

## 2024-11-19 DIAGNOSIS — C50.911 MALIGNANT NEOPLASM OF RIGHT BREAST IN FEMALE, ESTROGEN RECEPTOR POSITIVE, UNSPECIFIED SITE OF BREAST: ICD-10-CM

## 2024-11-19 DIAGNOSIS — C43.59 MALIGNANT MELANOMA OF TORSO EXCLUDING BREAST: ICD-10-CM

## 2024-11-19 DIAGNOSIS — C78.02 MALIGNANT NEOPLASM METASTATIC TO BOTH LUNGS: ICD-10-CM

## 2024-11-19 DIAGNOSIS — C78.7 METASTASIS TO LIVER: ICD-10-CM

## 2024-11-19 DIAGNOSIS — Z17.0 MALIGNANT NEOPLASM OF RIGHT BREAST IN FEMALE, ESTROGEN RECEPTOR POSITIVE, UNSPECIFIED SITE OF BREAST: ICD-10-CM

## 2024-11-19 PROCEDURE — 25510000001 IOPAMIDOL 61 % SOLUTION: Performed by: INTERNAL MEDICINE

## 2024-11-19 PROCEDURE — 71260 CT THORAX DX C+: CPT | Performed by: RADIOLOGY

## 2024-11-19 PROCEDURE — 74177 CT ABD & PELVIS W/CONTRAST: CPT | Performed by: RADIOLOGY

## 2024-11-19 PROCEDURE — 74177 CT ABD & PELVIS W/CONTRAST: CPT

## 2024-11-19 PROCEDURE — 71260 CT THORAX DX C+: CPT

## 2024-11-19 RX ORDER — IOPAMIDOL 612 MG/ML
100 INJECTION, SOLUTION INTRAVASCULAR
Status: COMPLETED | OUTPATIENT
Start: 2024-11-19 | End: 2024-11-19

## 2024-11-19 RX ADMIN — IOPAMIDOL 50 ML: 612 INJECTION, SOLUTION INTRAVENOUS at 15:54

## 2024-11-26 ENCOUNTER — INFUSION (OUTPATIENT)
Dept: ONCOLOGY | Facility: HOSPITAL | Age: 69
End: 2024-11-26
Payer: COMMERCIAL

## 2024-11-26 ENCOUNTER — LAB (OUTPATIENT)
Dept: ONCOLOGY | Facility: HOSPITAL | Age: 69
End: 2024-11-26
Payer: COMMERCIAL

## 2024-11-26 VITALS
RESPIRATION RATE: 18 BRPM | OXYGEN SATURATION: 94 % | SYSTOLIC BLOOD PRESSURE: 116 MMHG | BODY MASS INDEX: 27.28 KG/M2 | HEART RATE: 70 BPM | TEMPERATURE: 97.5 F | DIASTOLIC BLOOD PRESSURE: 73 MMHG | WEIGHT: 180 LBS | HEIGHT: 68 IN

## 2024-11-26 DIAGNOSIS — Z95.828 PORT-A-CATH IN PLACE: ICD-10-CM

## 2024-11-26 DIAGNOSIS — C43.59 MALIGNANT MELANOMA OF TORSO EXCLUDING BREAST: ICD-10-CM

## 2024-11-26 DIAGNOSIS — C78.7 METASTASIS TO LIVER: Primary | ICD-10-CM

## 2024-11-26 DIAGNOSIS — C78.02 MALIGNANT NEOPLASM METASTATIC TO BOTH LUNGS: ICD-10-CM

## 2024-11-26 DIAGNOSIS — C78.7 METASTASIS TO LIVER: ICD-10-CM

## 2024-11-26 DIAGNOSIS — C43.59 MALIGNANT MELANOMA OF TORSO EXCLUDING BREAST: Primary | ICD-10-CM

## 2024-11-26 DIAGNOSIS — C78.01 MALIGNANT NEOPLASM METASTATIC TO BOTH LUNGS: ICD-10-CM

## 2024-11-26 LAB
ALBUMIN SERPL-MCNC: 4.4 G/DL (ref 3.5–5.2)
ALBUMIN/GLOB SERPL: 1.4 G/DL
ALP SERPL-CCNC: 83 U/L (ref 39–117)
ALT SERPL W P-5'-P-CCNC: 19 U/L (ref 1–33)
ANION GAP SERPL CALCULATED.3IONS-SCNC: 14.9 MMOL/L (ref 5–15)
AST SERPL-CCNC: 19 U/L (ref 1–32)
BASOPHILS # BLD AUTO: 0.06 10*3/MM3 (ref 0–0.2)
BASOPHILS NFR BLD AUTO: 1.3 % (ref 0–1.5)
BILIRUB SERPL-MCNC: 0.7 MG/DL (ref 0–1.2)
BUN SERPL-MCNC: 13 MG/DL (ref 8–23)
BUN/CREAT SERPL: 16.7 (ref 7–25)
CALCIUM SPEC-SCNC: 9.8 MG/DL (ref 8.6–10.5)
CHLORIDE SERPL-SCNC: 104 MMOL/L (ref 98–107)
CO2 SERPL-SCNC: 23.1 MMOL/L (ref 22–29)
CREAT SERPL-MCNC: 0.78 MG/DL (ref 0.57–1)
DEPRECATED RDW RBC AUTO: 45.3 FL (ref 37–54)
EGFRCR SERPLBLD CKD-EPI 2021: 82.9 ML/MIN/1.73
EOSINOPHIL # BLD AUTO: 0.25 10*3/MM3 (ref 0–0.4)
EOSINOPHIL NFR BLD AUTO: 5.4 % (ref 0.3–6.2)
ERYTHROCYTE [DISTWIDTH] IN BLOOD BY AUTOMATED COUNT: 13.2 % (ref 12.3–15.4)
GLOBULIN UR ELPH-MCNC: 3.1 GM/DL
GLUCOSE SERPL-MCNC: 78 MG/DL (ref 65–99)
HCT VFR BLD AUTO: 45.6 % (ref 34–46.6)
HGB BLD-MCNC: 14.4 G/DL (ref 12–15.9)
IMM GRANULOCYTES # BLD AUTO: 0.04 10*3/MM3 (ref 0–0.05)
IMM GRANULOCYTES NFR BLD AUTO: 0.9 % (ref 0–0.5)
LYMPHOCYTES # BLD AUTO: 1.72 10*3/MM3 (ref 0.7–3.1)
LYMPHOCYTES NFR BLD AUTO: 37.4 % (ref 19.6–45.3)
MCH RBC QN AUTO: 29.1 PG (ref 26.6–33)
MCHC RBC AUTO-ENTMCNC: 31.6 G/DL (ref 31.5–35.7)
MCV RBC AUTO: 92.1 FL (ref 79–97)
MONOCYTES # BLD AUTO: 0.45 10*3/MM3 (ref 0.1–0.9)
MONOCYTES NFR BLD AUTO: 9.8 % (ref 5–12)
NEUTROPHILS NFR BLD AUTO: 2.08 10*3/MM3 (ref 1.7–7)
NEUTROPHILS NFR BLD AUTO: 45.2 % (ref 42.7–76)
NRBC BLD AUTO-RTO: 0 /100 WBC (ref 0–0.2)
PLATELET # BLD AUTO: 179 10*3/MM3 (ref 140–450)
PMV BLD AUTO: 10.2 FL (ref 6–12)
POTASSIUM SERPL-SCNC: 4 MMOL/L (ref 3.5–5.2)
PROT SERPL-MCNC: 7.5 G/DL (ref 6–8.5)
RBC # BLD AUTO: 4.95 10*6/MM3 (ref 3.77–5.28)
SODIUM SERPL-SCNC: 142 MMOL/L (ref 136–145)
T4 FREE SERPL-MCNC: 1.4 NG/DL (ref 0.92–1.68)
TSH SERPL DL<=0.05 MIU/L-ACNC: 3.85 UIU/ML (ref 0.27–4.2)
WBC NRBC COR # BLD AUTO: 4.6 10*3/MM3 (ref 3.4–10.8)

## 2024-11-26 PROCEDURE — 84439 ASSAY OF FREE THYROXINE: CPT

## 2024-11-26 PROCEDURE — 25010000002 HEPARIN LOCK FLUSH PER 10 UNITS: Performed by: INTERNAL MEDICINE

## 2024-11-26 PROCEDURE — 25010000002 NIVOLUMAB 240 MG/24ML SOLUTION 24 ML VIAL: Performed by: INTERNAL MEDICINE

## 2024-11-26 PROCEDURE — 96413 CHEMO IV INFUSION 1 HR: CPT

## 2024-11-26 PROCEDURE — 80050 GENERAL HEALTH PANEL: CPT

## 2024-11-26 RX ORDER — SODIUM CHLORIDE 0.9 % (FLUSH) 0.9 %
20 SYRINGE (ML) INJECTION AS NEEDED
OUTPATIENT
Start: 2024-11-26

## 2024-11-26 RX ORDER — HEPARIN SODIUM (PORCINE) LOCK FLUSH IV SOLN 100 UNIT/ML 100 UNIT/ML
300 SOLUTION INTRAVENOUS ONCE
OUTPATIENT
Start: 2024-11-26

## 2024-11-26 RX ORDER — HEPARIN SODIUM (PORCINE) LOCK FLUSH IV SOLN 100 UNIT/ML 100 UNIT/ML
500 SOLUTION INTRAVENOUS AS NEEDED
Status: DISCONTINUED | OUTPATIENT
Start: 2024-11-26 | End: 2024-11-26 | Stop reason: HOSPADM

## 2024-11-26 RX ORDER — SODIUM CHLORIDE 9 MG/ML
250 INJECTION, SOLUTION INTRAVENOUS ONCE
Status: DISCONTINUED | OUTPATIENT
Start: 2024-11-26 | End: 2024-11-26 | Stop reason: HOSPADM

## 2024-11-26 RX ORDER — SODIUM CHLORIDE 9 MG/ML
250 INJECTION, SOLUTION INTRAVENOUS ONCE
OUTPATIENT
Start: 2024-12-24

## 2024-11-26 RX ORDER — HEPARIN SODIUM (PORCINE) LOCK FLUSH IV SOLN 100 UNIT/ML 100 UNIT/ML
500 SOLUTION INTRAVENOUS AS NEEDED
OUTPATIENT
Start: 2024-11-26

## 2024-11-26 RX ORDER — SODIUM CHLORIDE 0.9 % (FLUSH) 0.9 %
10 SYRINGE (ML) INJECTION AS NEEDED
Status: DISCONTINUED | OUTPATIENT
Start: 2024-11-26 | End: 2024-11-26 | Stop reason: HOSPADM

## 2024-11-26 RX ORDER — SODIUM CHLORIDE 0.9 % (FLUSH) 0.9 %
10 SYRINGE (ML) INJECTION AS NEEDED
OUTPATIENT
Start: 2024-11-26

## 2024-11-26 RX ORDER — SODIUM CHLORIDE 9 MG/ML
250 INJECTION, SOLUTION INTRAVENOUS ONCE
Status: CANCELLED | OUTPATIENT
Start: 2024-11-26

## 2024-11-26 RX ADMIN — HEPARIN 500 UNITS: 100 SYRINGE at 15:21

## 2024-11-26 RX ADMIN — SODIUM CHLORIDE 480 MG: 9 INJECTION, SOLUTION INTRAVENOUS at 14:40

## 2024-11-26 RX ADMIN — Medication 10 ML: at 15:21

## 2024-12-26 ENCOUNTER — INFUSION (OUTPATIENT)
Dept: ONCOLOGY | Facility: HOSPITAL | Age: 69
End: 2024-12-26
Payer: MEDICARE

## 2024-12-26 ENCOUNTER — LAB (OUTPATIENT)
Dept: ONCOLOGY | Facility: HOSPITAL | Age: 69
End: 2024-12-26
Payer: MEDICARE

## 2024-12-26 ENCOUNTER — OFFICE VISIT (OUTPATIENT)
Dept: ONCOLOGY | Facility: CLINIC | Age: 69
End: 2024-12-26
Payer: MEDICARE

## 2024-12-26 VITALS
TEMPERATURE: 96.8 F | RESPIRATION RATE: 18 BRPM | HEART RATE: 72 BPM | BODY MASS INDEX: 27.37 KG/M2 | DIASTOLIC BLOOD PRESSURE: 69 MMHG | OXYGEN SATURATION: 95 % | SYSTOLIC BLOOD PRESSURE: 131 MMHG | WEIGHT: 177.4 LBS

## 2024-12-26 VITALS
DIASTOLIC BLOOD PRESSURE: 69 MMHG | SYSTOLIC BLOOD PRESSURE: 131 MMHG | WEIGHT: 177.4 LBS | TEMPERATURE: 96.8 F | RESPIRATION RATE: 18 BRPM | OXYGEN SATURATION: 95 % | HEART RATE: 72 BPM | BODY MASS INDEX: 27.37 KG/M2

## 2024-12-26 DIAGNOSIS — Z85.3 HISTORY OF BREAST CANCER: ICD-10-CM

## 2024-12-26 DIAGNOSIS — C78.7 METASTASIS TO LIVER: Primary | ICD-10-CM

## 2024-12-26 DIAGNOSIS — Z95.828 PORT-A-CATH IN PLACE: ICD-10-CM

## 2024-12-26 DIAGNOSIS — C78.02 MALIGNANT NEOPLASM METASTATIC TO BOTH LUNGS: ICD-10-CM

## 2024-12-26 DIAGNOSIS — Z17.0 MALIGNANT NEOPLASM OF RIGHT BREAST IN FEMALE, ESTROGEN RECEPTOR POSITIVE, UNSPECIFIED SITE OF BREAST: Primary | ICD-10-CM

## 2024-12-26 DIAGNOSIS — C43.59 MALIGNANT MELANOMA OF TORSO EXCLUDING BREAST: ICD-10-CM

## 2024-12-26 DIAGNOSIS — C50.911 MALIGNANT NEOPLASM OF RIGHT BREAST IN FEMALE, ESTROGEN RECEPTOR POSITIVE, UNSPECIFIED SITE OF BREAST: Primary | ICD-10-CM

## 2024-12-26 DIAGNOSIS — C78.01 MALIGNANT NEOPLASM METASTATIC TO BOTH LUNGS: ICD-10-CM

## 2024-12-26 DIAGNOSIS — C78.7 METASTASIS TO LIVER: ICD-10-CM

## 2024-12-26 DIAGNOSIS — M25.511 RIGHT SHOULDER PAIN, UNSPECIFIED CHRONICITY: Primary | ICD-10-CM

## 2024-12-26 LAB
ALBUMIN SERPL-MCNC: 4.2 G/DL (ref 3.5–5.2)
ALBUMIN/GLOB SERPL: 2 G/DL
ALP SERPL-CCNC: 71 U/L (ref 39–117)
ALT SERPL W P-5'-P-CCNC: 24 U/L (ref 1–33)
ANION GAP SERPL CALCULATED.3IONS-SCNC: 13.4 MMOL/L (ref 5–15)
AST SERPL-CCNC: 23 U/L (ref 1–32)
BASOPHILS # BLD AUTO: 0.06 10*3/MM3 (ref 0–0.2)
BASOPHILS NFR BLD AUTO: 1.3 % (ref 0–1.5)
BILIRUB SERPL-MCNC: 0.7 MG/DL (ref 0–1.2)
BUN SERPL-MCNC: 20 MG/DL (ref 8–23)
BUN/CREAT SERPL: 20.4 (ref 7–25)
CALCIUM SPEC-SCNC: 9.2 MG/DL (ref 8.6–10.5)
CHLORIDE SERPL-SCNC: 104 MMOL/L (ref 98–107)
CO2 SERPL-SCNC: 24.6 MMOL/L (ref 22–29)
CREAT SERPL-MCNC: 0.98 MG/DL (ref 0.57–1)
DEPRECATED RDW RBC AUTO: 45.4 FL (ref 37–54)
EGFRCR SERPLBLD CKD-EPI 2021: 63 ML/MIN/1.73
EOSINOPHIL # BLD AUTO: 0.22 10*3/MM3 (ref 0–0.4)
EOSINOPHIL NFR BLD AUTO: 4.7 % (ref 0.3–6.2)
ERYTHROCYTE [DISTWIDTH] IN BLOOD BY AUTOMATED COUNT: 13.4 % (ref 12.3–15.4)
GLOBULIN UR ELPH-MCNC: 2.1 GM/DL
GLUCOSE SERPL-MCNC: 87 MG/DL (ref 65–99)
HCT VFR BLD AUTO: 39.5 % (ref 34–46.6)
HGB BLD-MCNC: 12.9 G/DL (ref 12–15.9)
IMM GRANULOCYTES # BLD AUTO: 0.01 10*3/MM3 (ref 0–0.05)
IMM GRANULOCYTES NFR BLD AUTO: 0.2 % (ref 0–0.5)
LYMPHOCYTES # BLD AUTO: 1.1 10*3/MM3 (ref 0.7–3.1)
LYMPHOCYTES NFR BLD AUTO: 23.3 % (ref 19.6–45.3)
MCH RBC QN AUTO: 29.7 PG (ref 26.6–33)
MCHC RBC AUTO-ENTMCNC: 32.7 G/DL (ref 31.5–35.7)
MCV RBC AUTO: 91 FL (ref 79–97)
MONOCYTES # BLD AUTO: 0.53 10*3/MM3 (ref 0.1–0.9)
MONOCYTES NFR BLD AUTO: 11.2 % (ref 5–12)
NEUTROPHILS NFR BLD AUTO: 2.8 10*3/MM3 (ref 1.7–7)
NEUTROPHILS NFR BLD AUTO: 59.3 % (ref 42.7–76)
NRBC BLD AUTO-RTO: 0 /100 WBC (ref 0–0.2)
PLATELET # BLD AUTO: 158 10*3/MM3 (ref 140–450)
PMV BLD AUTO: 10.2 FL (ref 6–12)
POTASSIUM SERPL-SCNC: 3.8 MMOL/L (ref 3.5–5.2)
PROT SERPL-MCNC: 6.3 G/DL (ref 6–8.5)
RBC # BLD AUTO: 4.34 10*6/MM3 (ref 3.77–5.28)
SODIUM SERPL-SCNC: 142 MMOL/L (ref 136–145)
T4 FREE SERPL-MCNC: 1.44 NG/DL (ref 0.92–1.68)
TSH SERPL DL<=0.05 MIU/L-ACNC: 0.78 UIU/ML (ref 0.27–4.2)
WBC NRBC COR # BLD AUTO: 4.72 10*3/MM3 (ref 3.4–10.8)

## 2024-12-26 PROCEDURE — 25010000002 HEPARIN LOCK FLUSH PER 10 UNITS

## 2024-12-26 PROCEDURE — 80053 COMPREHEN METABOLIC PANEL: CPT

## 2024-12-26 PROCEDURE — 25010000002 NIVOLUMAB 240 MG/24ML SOLUTION 24 ML VIAL: Performed by: INTERNAL MEDICINE

## 2024-12-26 PROCEDURE — 84443 ASSAY THYROID STIM HORMONE: CPT

## 2024-12-26 PROCEDURE — 96413 CHEMO IV INFUSION 1 HR: CPT

## 2024-12-26 PROCEDURE — 85025 COMPLETE CBC W/AUTO DIFF WBC: CPT

## 2024-12-26 PROCEDURE — 84439 ASSAY OF FREE THYROXINE: CPT

## 2024-12-26 RX ORDER — HEPARIN SODIUM (PORCINE) LOCK FLUSH IV SOLN 100 UNIT/ML 100 UNIT/ML
500 SOLUTION INTRAVENOUS AS NEEDED
OUTPATIENT
Start: 2024-12-26

## 2024-12-26 RX ORDER — SODIUM CHLORIDE 0.9 % (FLUSH) 0.9 %
10 SYRINGE (ML) INJECTION AS NEEDED
OUTPATIENT
Start: 2024-12-26

## 2024-12-26 RX ORDER — SODIUM CHLORIDE 0.9 % (FLUSH) 0.9 %
10 SYRINGE (ML) INJECTION AS NEEDED
Status: DISCONTINUED | OUTPATIENT
Start: 2024-12-26 | End: 2024-12-26 | Stop reason: HOSPADM

## 2024-12-26 RX ORDER — SODIUM CHLORIDE 0.9 % (FLUSH) 0.9 %
20 SYRINGE (ML) INJECTION AS NEEDED
Status: CANCELLED | OUTPATIENT
Start: 2024-12-26

## 2024-12-26 RX ORDER — HEPARIN SODIUM (PORCINE) LOCK FLUSH IV SOLN 100 UNIT/ML 100 UNIT/ML
300 SOLUTION INTRAVENOUS ONCE
Status: CANCELLED | OUTPATIENT
Start: 2024-12-26

## 2024-12-26 RX ORDER — HEPARIN SODIUM (PORCINE) LOCK FLUSH IV SOLN 100 UNIT/ML 100 UNIT/ML
300 SOLUTION INTRAVENOUS ONCE
OUTPATIENT
Start: 2024-12-26

## 2024-12-26 RX ORDER — SODIUM CHLORIDE 0.9 % (FLUSH) 0.9 %
20 SYRINGE (ML) INJECTION AS NEEDED
OUTPATIENT
Start: 2024-12-26

## 2024-12-26 RX ORDER — HEPARIN SODIUM (PORCINE) LOCK FLUSH IV SOLN 100 UNIT/ML 100 UNIT/ML
500 SOLUTION INTRAVENOUS AS NEEDED
Status: DISCONTINUED | OUTPATIENT
Start: 2024-12-26 | End: 2024-12-26 | Stop reason: HOSPADM

## 2024-12-26 RX ORDER — SODIUM CHLORIDE 9 MG/ML
250 INJECTION, SOLUTION INTRAVENOUS ONCE
Status: DISCONTINUED | OUTPATIENT
Start: 2024-12-26 | End: 2024-12-26 | Stop reason: RX

## 2024-12-26 RX ADMIN — SODIUM CHLORIDE 480 MG: 9 INJECTION, SOLUTION INTRAVENOUS at 15:39

## 2024-12-26 RX ADMIN — Medication 500 UNITS: at 16:08

## 2024-12-26 NOTE — PROGRESS NOTES
"NAME: Yadira Flowers    : 1955    DATE: 2024      DIAGNOSIS:   1.  Stage IA ([id number]) moderately differentiated invasive ductal carcinoma of the right breast diagnosed in 2015.    2.  H/o malignant Melanoma on her Back    3.  H/o cervical cancer treated with cryotherapy at [facility] in     4.  Recurrent / metastatic malignant Melanoma  -  R axillary LN bx 22.    -  She has R axillary LN mass (2.47x2.16), Solitary cavitary R lobe liver lesion, cavitary RLL lung mass,  and bilateral parenchymal lung nodules      TREATMENT HISTORY:   1.         CHIEF COMPLAINT:  Follow up of metastatic melanoma/toxicity check and recent imaging    HISTORY OF PRESENT ILLNESS:   Yadira Flowers is a very pleasant 69 y.o. female who was referred by Dr. Dilcia Pedro for evaluation and treatment of breast cancer. She was living in [place] in 2015 when her dog brought attention to and \"found\" an abnormal lump in her right breast.  She was treated by Dr. Steve Isbell and underwent R lumpectomy with SLNBx on 10-22-15 as below.  She then had what sounds like accelerated partial breast irradiation.  She was then started on Arimidex which was later switched to Exemestane for a better side effect profile.  She took this for about 5 years, stopping a couple of weeks ago.  She stopped taking Exemestane because she developed some vaginal bleeding and hematuria.  This was evaluated by her PCP and gynecologist and felt to be due to vaginal atrophy.  She was prescribed a hormone pill which she hasn't yet started and was referred here to discuss things further.      Aside from bleeding which has been uncomfortable and distressing for her, Ms. Onur Burgos has generally been well.  The last year has been hard on her emotionally with the loss of her  and then her dog, her home and her insurance, but she is coping well and has good family support in [place].  She says she gained weight with her breast " cancer treatment and gained weight when her  .  She has been working now to lose weight and has lost 20 lbs over the last 2 months with dieting.  She denies chest pain or shortness of breath.  She complains of some RLQ cramping pain and isn't sure what is causing that.  She denies difficulty moving her bowels.  No blood in her stool that she is aware of.  She has had vaginal bleeding and hematuria as above.        INTERVAL HISTORY:  Ms. Burgos presents today for follow up of metastatic melanoma/toxicity check and recent imaging.  She has been doing well since her last visit with us.  She continues to tolerate Opdivo well.  Denies nausea/vomiting, diarrhea.  Denies fevers or chills.  She has new CT imaging completed on 2024.  Her only complaint today is a right rotator cuff injury which she is following with Ortho for, to see if she is a candidate for surgery.  Otherwise no other new or specific complaints today.      PAST MEDICAL HISTORY:  Past Medical History:   Diagnosis Date    Back pain     Basal cell carcinoma (BCC) in situ of skin     Dr. Mohr - Derm    Breast cancer     right    Cancer     breast, cervical, melanoma    Cervical cancer 1976    Diagnosed in .  Treated by repeated local intervention and ultimately received cryotherapy at [facility] with resolution.    Elbow fracture     Foot fracture     Headache     Hyperlipidemia     Hypertension     TAKEN OFF MEDS    Melanoma     on her back  - treated by Dermatologist Catrachito Amaro in [place], [place] with what sounds like wide local excision.     Pulmonary embolism     Sinusitis        PAST SURGICAL HISTORY:  Past Surgical History:   Procedure Laterality Date    AXILLARY LYMPH NODE BIOPSY/EXCISION Right 2022    Procedure: AXILLARY LYMPH NODE BIOPSY/EXCISION;  Surgeon: Dianelys Cummings MD;  Location: [facility];  Service: General;  Laterality: Right;    BREAST LUMPECTOMY Right     ca    BREAST SURGERY      Secondary to  cancer    BRONCHOSCOPY Right 2022    Procedure: BRONCHOSCOPY WITH ENDOBRONCHIAL ULTRASOUND;  Surgeon: Chris Her MD;  Location:  [facility];  Service: Pulmonary;  Laterality: Right;    COLONOSCOPY N/A 2020    Procedure: COLONOSCOPY;  Surgeon: Mohsen Maldonado MD;  Location:  [facility];  Service: Gastroenterology;  Laterality: N/A;    ELBOW FUSION      left    FOOT SURGERY Right 2014    Broken foot    HAND SURGERY  2020    right    RHINOPLASTY  2002    SKIN CANCER EXCISION      malignant melanoma from back     US GUIDED LYMPH NODE BIOPSY  2022    VENOUS ACCESS DEVICE (PORT) INSERTION N/A 2022    Procedure: INSERTION VENOUS ACCESS DEVICE left or right;  Surgeon: Dianelys Cummings MD;  Location:  [facility];  Service: General;  Laterality: N/A;       FAMILY HISTORY:  Family History   Problem Relation Age of Onset    Other Mother         blood clots    Hypertension Mother     Ulcers Father     Hypertension Sister     Other Sister         Multiple Sclerosis    Cancer Paternal Aunt     Stroke Maternal Grandmother     Alcohol abuse Paternal Grandmother     Hypertension Sister     Multiple sclerosis Sister     Hypertension Sister     Coronary artery disease Paternal Grandfather     Cancer Maternal Aunt 30        colon    Breast cancer Neg Hx    Many paternal aunts  with malignancy of various types. Many cousins on that side of the family have had cancer as well.  One  of colon cancer in her 30s.  One had a brain tumor.    SOCIAL HISTORY:  Social History     Socioeconomic History    Marital status:    Tobacco Use    Smoking status: Never    Smokeless tobacco: Never   Vaping Use    Vaping status: Never Used   Substance and Sexual Activity    Alcohol use: Not Currently    Drug use: Never    Sexual activity: Not Currently     Birth control/protection: Post-menopausal       REVIEW OF SYSTEMS:   A comprehensive 14 point review of systems was performed.  Significant findings as  mentioned above.  All other systems reviewed and are negative.      MEDICATIONS:  The current medication list was reviewed in the EMR    Current Outpatient Medications:     atorvastatin (LIPITOR) 10 MG tablet, Take 1 tablet by mouth Daily., Disp: , Rfl:     Eliquis 2.5 MG tablet tablet, TAKE 1 TABLET BY MOUTH TWICE DAILY, Disp: 60 tablet, Rfl: 5    fluticasone (FLONASE) 50 MCG/ACT nasal spray, 2 sprays by Each Nare route Daily. Shake liquid, Disp: 16 g, Rfl: 5    gabapentin (NEURONTIN) 100 MG capsule, Take 1 capsule by mouth As Needed., Disp: , Rfl:     lidocaine-prilocaine (EMLA) 2.5-2.5 % cream, Apply to port a cath ~30 min -1 hour prior to chemotherapy, Disp: 30 g, Rfl: 3    ondansetron ODT (Zofran ODT) 8 MG disintegrating tablet, Place 1 tablet on the tongue Every 8 (Eight) Hours As Needed for Nausea or Vomiting., Disp: 30 tablet, Rfl: 5    pantoprazole (PROTONIX) 20 MG EC tablet, TAKE 1 TABLET BY MOUTH DAILY, Disp: 30 tablet, Rfl: 2    prochlorperazine (COMPAZINE) 10 MG tablet, TAKE 1 TABLET BY MOUTH EVERY 6 HOURS AS NEEDED FOR NAUSEA, Disp: 360 tablet, Rfl: 3    sennosides-docusate (senna-docusate sodium) 8.6-50 MG per tablet, Take 2 tablets by mouth 2 (Two) Times a Day., Disp: 120 tablet, Rfl: 2    Tirzepatide (Mounjaro) 2.5 MG/0.5ML solution pen-injector pen, Inject 0.5 mL under the skin into the appropriate area as directed 1 (One) Time Per Week., Disp: , Rfl:   No current facility-administered medications for this visit.    Facility-Administered Medications Ordered in Other Visits:     heparin injection 500 Units, 500 Units, Intravenous, PRN, Maria T Jackson, TENNILLE    Nivolumab (OPDIVO) 480 mg in sodium chloride 0.9 % 148 mL chemo IVPB, 480 mg, Intravenous, Once, Loreto Stone MD, 480 mg at 12/26/24 1539    sodium chloride 0.9 % flush 10 mL, 10 mL, Intravenous, PRN, Maria T Jackson, APRN    ALLERGIES:    No Known Allergies    PHYSICAL EXAM:  Vitals:    12/26/24 1520   BP: 131/69   Pulse: 72    Resp: 18   Temp: 96.8 °F (36 °C)   TempSrc: Temporal   SpO2: 95%   Weight: 80.5 kg (177 lb 6.4 oz)   PainSc: 0-No pain         Body surface area is 1.93 meters squared.  Body mass index is 27.37 kg/m².      General:  Awake, alert and oriented, appears well.  HEENT:  Pupils are equal, round and reactive to light and accommodation, Extra-ocular movements full, Oropharyx clear, mucous membranes moist  Neck:  No JVD, thyromegaly or lymphadenopathy  CV:  Regular rate and rhythm, no murmurs, rubs or gallops  Resp:  Lungs are clear to auscultation bilaterally, no wheezing  Breast: Deferred today. Previously: S/p R lumpectomy. There is a somewhat firm area at the site of surgery but there are no palpable masses.  She is s/p excision of R axillary LN, with some post-surgical swelling.  Left breast is without mass lesions.  No L axillary adenopathy.  Abd:  Soft, non-tender, non-distended, bowel sounds present, no HSM or masses noted  Ext:  No clubbing, cyanosis, or lower extremity edema  Lymph:  No cervical, supraclavicular, axillary adenopathy  Neuro:  MS as above, grossly non-focal exam    PATHOLOGY:  10-22-15            12-16-21          01-13-22          ENDOSCOPY:  Colonoscopy 12-09-20     Findings: 8 hyperplastic appearing polyps of the distal rectum, diverticulosis moderately throughout the sigmoid colon      IMAGING:  CTCAP 12-15-20  On the lung windows there are several calcified  granulomas in the lungs. No noncalcified pulmonary parenchymal lung  nodules were identified. On the soft tissue windows there were no  enlarged lymph nodes in the supraclavicular or axillary regions. No  mediastinal or hilar lymphadenopathy was seen. The heart was not  enlarged. There were no pericardial effusions.     IMPRESSION:  No CT findings of metastatic disease in the chest. There are  calcified granulomas in the lungs.     CT FINDINGS: The liver and spleen were normal in size and shape. The  gallbladder contains several stones.  The wall was not thickened. The  bile ducts in the liver are not dilated. There is nothing seen to  suggest metastatic disease in the liver. The pancreas shows no evidence  of mass or inflammation. No adrenal or renal lesions are demonstrated.  The aorta is normal in caliber. There were no enlarged lymph nodes in  the retroperitoneum or in the mesentery. The bowel shows no evidence of  obstruction. In the pelvis there were no masses or fluid collections.  There were no ventral hernias.     IMPRESSION:  No CT findings of metastatic disease in the abdomen or  pelvis. This study does demonstrate several stones in the lumen of the  gallbladder.         Bilateral diagnostic mammogram 01-31-21  FINDINGS: There are scattered areas of fibroglandular density.     The bilateral fibroglandular pattern does not appear significantly  changed compared to the exam from 2020. This includes postoperative  changes in the right 12:00 region. Mild skin thickening is noted  involving the right breast which is likely treatment related. A few  bilateral scattered calcifications are noted.     IMPRESSION:  Incomplete examination as comparison with older outside  prior mammograms is needed.        BI-RADS CATEGORY:   0, INCOMPLETE:  Need prior mammograms for comparison        RECOMMENDATION:  We will attempt to obtain older outside prior  mammograms for comparison.      CTCAP 12-12-21  FINDINGS: Lack of IV contrast hinders parenchymal assessment of the mediastinum, liver, spleen, pancreas, adrenal glands and kidneys.         Significant infiltrate seen in the right lower lobe with interstitial component. There are also lung nodules present in the right lung. Index nodule in the right lung base medially is 5 mm maximal diameter. Other smaller nodules seen throughout the right  lung. Tiny 3 mm lingular nodule seen in series 3 image 30. A benign calcified granuloma seen in the left lower lobe but a noncalcified indeterminant  5 mm left lower  lobe nodule seen, series 3 image 42.     Nonenlarged mediastinal adenopathy seen. However there is an enlarged right axillary 1.8 x 2.6 cm lymph node partially seen.     Worrisome hepatic metastasis or primary hepatic lesion seen measuring 4.4 x 4.1 cm in liver segment 8. Margins are ill-defined. Abscess could have a similar appearance. Suggestion of subtle gallstones. Nonobstructing 2 mm right renal calculus seen. No  obstructive uropathy. Gaseous distention of the esophagus and hiatal hernia present consistent with GERD. No enteric contrast but no gross evidence of bowel obstruction.   There is no gross free air, free fluid or focal inflammatory change.  Uterus and  adnexa are not enlarged. Osseous structures are grossly intact.     IMPRESSION:  Constellation of findings with significant consolidation in the right lower lobe, enlarged right axillary lymph node, indeterminate bilateral lung nodules and ill-defined hepatic lesion concerning for underlying malignancy in this patient .     FINDINGS: Lack of IV contrast hinders parenchymal assessment of the mediastinum, liver, spleen, pancreas, adrenal glands and kidneys.         Significant infiltrate seen in the right lower lobe with interstitial component. There are also lung nodules present in the right lung. Index nodule in the right lung base medially is 5 mm maximal diameter. Other smaller nodules seen throughout the right  lung. Tiny 3 mm lingular nodule seen in series 3 image 30. A benign calcified granuloma seen in the left lower lobe but a noncalcified indeterminant  5 mm left lower lobe nodule seen, series 3 image 42.     Nonenlarged mediastinal adenopathy seen. However there is an enlarged right axillary 1.8 x 2.6 cm lymph node partially seen.     Worrisome hepatic metastasis or primary hepatic lesion seen measuring 4.4 x 4.1 cm in liver segment 8. Margins are ill-defined. Abscess could have a similar appearance. Suggestion of subtle gallstones.  Nonobstructing 2 mm right renal calculus seen. No  obstructive uropathy. Gaseous distention of the esophagus and hiatal hernia present consistent with GERD. No enteric contrast but no gross evidence of bowel obstruction.   There is no gross free air, free fluid or focal inflammatory change.  Uterus and  adnexa are not enlarged. Osseous structures are grossly intact.     IMPRESSION:  Constellation of findings with significant consolidation in the right lower lobe, enlarged right axillary lymph node, indeterminate bilateral lung nodules and ill-defined hepatic lesion concerning for underlying malignancy in this patient .       US Abdomen Complete 12-12-21  FINDINGS:  The visualized portions of the pancreas, IVC and aorta appear normal.        The liver is moderately coarsened in echotexture. Ill-defined hypodensity in the liver measuring 3 x 3.2 x 2.6 cm corresponds to recent CT. Unclear if this is a malignant lesion. Abscesses within the differential but prior CT was performed without any  contrast.  The common bile duct is not dilated.. Gallbladder wall is thickened at 3.4 mm with trace pericholecystic fluid and subtle hyperemia. Gallstones and shadowing present.     The kidneys are normal in size and echogenicity. There is no   hydronephrosis or focal solid lesion.  The spleen is normal in size and echotexture.     IMPRESSION:  1. Nonspecific gallbladder wall thickening and questionable pericholecystic fluid. Gallstones are better seen on CT than ultrasound.  2.  Heterogeneous liver with lesion in the liver as seen on CT. Ultrasound is not helpful in determining etiology. This could be an abscess but given the other findings on CT,  malignancy is not excluded.      CTCAP 12-15-21  FINDINGS:    LUNGS:  Increased density of the now right lower lobe mixed  groundglass and more solid-appearing consolidation with internal  cavitary component identified. Tiny right and left lung pulmonary  nodules. Grossly stable.     PLEURAL SPACE:  Small bilateral pleural effusions.  No pneumothorax.    HEART:  Unremarkable.  No cardiomegaly.  No significant pericardial  effusion.    BONES/JOINTS:  Unremarkable.  No acute fracture.  No dislocation.    SOFT TISSUES:  Unremarkable.    VASCULATURE:  Unremarkable.  No thoracic aortic aneurysm.    LYMPH NODES:  Unremarkable.  No enlarged lymph nodes.     IMPRESSION:  1.  Increased density of the now right lower lobe mixed groundglass and  more solid-appearing consolidation with internal cavitary component  identified. Tiny right and left lung pulmonary nodules. Grossly stable.  2.  Small bilateral pleural effusions.    FINDINGS:    LUNG BASES:  Unremarkable.  No mass.  No consolidation.      ABDOMEN:    LIVER:  Right lobe of liver lesion is again identified now measuring  about 4.7 cm and was about 4.7 cm.    GALLBLADDER AND BILE DUCTS:  Gallstones noted in the gallbladder.  No  ductal dilation.    PANCREAS:  Unremarkable.  No mass.  No ductal dilation.    SPLEEN:  Unremarkable.  No splenomegaly.    ADRENALS:  Unremarkable.  No mass.    KIDNEYS AND URETERS:  Unremarkable.  No solid mass.  No  hydronephrosis.    STOMACH AND BOWEL:  Unremarkable.  No obstruction.  No mucosal  thickening.      PELVIS:    APPENDIX:  No findings to suggest acute appendicitis.    BLADDER:  Unremarkable.  No mass.    REPRODUCTIVE:  Unremarkable as visualized.      ABDOMEN and PELVIS:    INTRAPERITONEAL SPACE:  Unremarkable.  No free air.  No significant  fluid collection.    BONES/JOINTS:  No acute fracture.  No dislocation.    SOFT TISSUES:  Unremarkable.    VASCULATURE:  Unremarkable.  No abdominal aortic aneurysm.    LYMPH NODES:  Unremarkable.  No enlarged lymph nodes.     IMPRESSION:    Right lobe of liver lesion is again identified now measuring about 4.7  cm and was about 4.7 cm.      NM Bone Scan Whole Body 12-15-21  FINDINGS:    SKULL/FACIAL BONES:  No focal increased or decreased uptake.    SPINE:  Unremarkable.   No abnormal increased or decreased uptake.    RIBS:  Unremarkable.  No abnormal uptake.    LONG BONES:  Unremarkable.  No abnormal uptake.    PELVIS:  Unremarkable.  No focal increased or decreased uptake.    JOINTS:  Unremarkable.  No focal increased or decreased uptake.    SOFT TISSUES:  Unremarkable.    RENAL/BLADDER:  Within normal limits.     IMPRESSION:    Normal bone scan.      CT Abdomen With Contrast  12-18-21  FINDINGS:  Partially imaged thick-walled cavitary lesion in the right lower lobe with right basilar atelectasis/infiltrate and small right pleural effusion. There is also left basilar atelectasis/infiltrate and trace left pleural effusion. Multiple noncalcified  pulmonary nodules are scattered throughout the visualized lower lung fields, concerning for pulmonary metastatic disease.     Ill-defined right hepatic mass again noted. The spleen, pancreas, and both adrenal glands are within expected limits. Cholelithiasis. Punctate nonobstructing right intrarenal stone. Tiny left renal cyst. Kidneys are otherwise unremarkable without  hydronephrosis. Abdominal aorta normal in course and caliber without dissection. No pathologic retroperitoneal or mesenteric lymphadenopathy by size criteria. Visualized small bowel and colon are unremarkable. No bowel obstruction. No free fluid or free  air.     No aggressive osseous lesions.     IMPRESSION:     1. Ill-defined right hepatic mass again noted. This has been previously biopsied and correlation with pathology results is recommended.  2. Cholelithiasis.  3. Punctate nonobstructing right intrarenal stone.  4. No threshold abdominal lymphadenopathy or other suspicious abdominal masses.  5. Partially imaged thick-walled cavitary lesion in the right lower lobe. This may be of infectious or neoplastic etiology and continued follow-up is recommended.  6. Small bilateral pleural effusions with bibasilar atelectasis/infiltrate, worse on the right than left.  7. Numerous  small noncalcified pulmonary nodules throughout the visualized lower lung fields, concerning for pulmonary metastatic disease.       Mammo Diagnostic Digital Tomosynthesis Bilateral With CAD w/ US Axilla Right 01-13-22  FINDINGS:  There are scattered areas of fibroglandular density.     Incompletely imaged is a very prominent axillary lymph node. The  bilateral fibroglandular pattern itself is stable in appearance  including postoperative changes in the superior aspect of the right  breast. There are stable bilateral calcifications. Mild skin thickening  is again noted involving the right breast which is likely treatment  related.     ULTRASOUND: Ultrasound evaluation of the right axilla demonstrates 2  markedly enlarged lymph nodes the largest of which measures 3.2 cm in  size and has no demonstrable fatty hilum. Recommend ultrasound-guided  biopsy.      IMPRESSION:  1. Stable mammographic appearance of the left breast with no findings  suspicious for malignancy.        2. Marked right axillary lymphadenopathy. Recommend ultrasound-guided  biopsy. The fibroglandular pattern of the right breast is however  stable.        BI-RADS CATEGORY:  4, SUSPICIOUS        RECOMMENDATION:  Recommend ultrasound-guided biopsy of the dominant  abnormal appearing right axillary lymph node.      DEXA Bone Density Axial 01-13-22  IMPRESSION:  Impression:  1. According to the World Health Organization definitions of  osteoporosis based on bone density, this patient's bone mineral density  is compatible with osteoporosis and the fracture risk is high.      Mammo Diagnostic Digital Tomosynthesis Bilateral With CAD w/ US Axilla Right 01-13-22  FINDINGS:  There are scattered areas of fibroglandular density.     Incompletely imaged is a very prominent axillary lymph node. The  bilateral fibroglandular pattern itself is stable in appearance  including postoperative changes in the superior aspect of the right  breast. There are stable  bilateral calcifications. Mild skin thickening  is again noted involving the right breast which is likely treatment  related.     ULTRASOUND: Ultrasound evaluation of the right axilla demonstrates 2  markedly enlarged lymph nodes the largest of which measures 3.2 cm in  size and has no demonstrable fatty hilum. Recommend ultrasound-guided  biopsy.      IMPRESSION:  1. Stable mammographic appearance of the left breast with no findings  suspicious for malignancy.        2. Marked right axillary lymphadenopathy. Recommend ultrasound-guided  biopsy. The fibroglandular pattern of the right breast is however  stable.        BI-RADS CATEGORY:  4, SUSPICIOUS        RECOMMENDATION:  Recommend ultrasound-guided biopsy of the dominant  abnormal appearing right axillary lymph node.      CT Chest With Contrast Diagnostic 01-18-22  FINDINGS:     LUNGS: Solitary mass in the right lower lobe with cavitation.  Significantly smaller today than on the prior study. 6 mm solid nodule  in the right lung posteriorly on image 30 of the axial series. Other  parenchymal nodules are seen in both lungs and are similar to the  previous study. These are concerning for metastatic nodules.     HEART: Unremarkable.     MEDIASTINUM: No masses. No enlarged lymph nodes.  No fluid collections.     PLEURA: No pleural effusion. No pleural mass or abnormal calcification.  No pneumothorax.     VASCULATURE: No evidence of aneurysm. Filling defect in the right  pulmonary artery and possibly left lower lobe pulmonary artery. This  study was not performed to evaluate for pulmonary embolus, but the  findings are concerning for bilateral pulmonary emboli.     BONES: No acute bony abnormality.     VISUALIZED UPPER ABDOMEN:Please see the CT report for the abdomen and  pelvis.     Other: Right axillary soft tissue mass is present and shows slight  interval growth. It measures 2.47 x 2.16 cm today and previously at the  same level measured approximately 2.7 x 1.89  cm.     IMPRESSION:  1. Study was not performed as a PE protocol, but there appear to be  filling defects in pulmonary arteries bilaterally concerning for  pulmonary emboli.  2. Interval decrease in size of cavitary right lower lobe mass.  3. Stable parenchymal nodules bilaterally.  4. Very slight interval growth of right axillary soft tissue mass.     Results are being relayed to the referring physician.      US Liver 01-18-22  FINDINGS: Sonographic imaging of the liver does not show the mass  previously identified in the liver. I would suggest a follow-up CT scan  for better delineation.     Hepatic flow was hepatopedal.     There is shadowing from the gallbladder fossa.     IMPRESSION:  1. Previously identified mass is not seen on this exam in the liver.  Consider follow-up CT.  2. Shadowing from the gallbladder fossa. In the absence of  cholecystectomy, appearance is concerning for cholelithiasis.      US Venous Doppler Lower Extremity Bilateral (duplex) 01-20-22  FINDINGS:   There is patent spontaneous flow from the common femoral vein through  the posterior tibial veins.  There was no internal clot or area of noncompressibility.  Normal augmentation was elicited where applicable.     IMPRESSION:  No DVT in the lower extremities on today's exam.       CT Chest Pulmonary Embolism 01-20-22  FINDINGS: Today's study demonstrates opacification of the central  pulmonary vessels.  There are filling defects in the pulmonary arteries bilaterally. First  order arteries. This is most compatible with bilateral pulmonary  emboli..     Soft tissue mass in the right lower lobe with somewhat cavitary center  is again noted and unchanged..     There is no mediastinal lymph node enlargement     No pericardial or pleural effusion.        IMPRESSION:  1. Bilateral pulmonary emboli.  2. Parenchymal nodules bilaterally with dominant mass in the right lower  Lobe..      NM PET/CT Skull Base to Mid Thigh 01-28-22  FINDINGS:       HEAD/NECK:  Area of uptake in the posterior right paraspinal space and to lesser  degree the left paraspinal space appears related to muscle uptake.  No FDG hypermetabolic neck adenopathy.  No FDG hypermetabolic masses.     CHEST:   Numerous bilateral pulmonary nodules appear stable from the previous  chest CT and show no FDG hypermetabolism. This is likely due to nodules  being below size threshold for PET sensitivity.  Cavitary lung mass in the right lung localizing to the superior segment  of the lower lobe shows FDG hypermetabolism with maximum SUV: 2.4. This  is at the lower range for malignancy.  Enlarged right lower axillary region lymph node which is 3.0 cm shows  mild FDG hypermetabolism that is approximately with maximum SUV: 2.4.  This is at the lower range for malignancy.  Low-dose CT demonstrating no change in additional scattered pulmonary  nodules from the previous scan. Coronary artery calcifications are  stable.     ABDOMEN/PELVIS:   Faint area of low attenuation involving the right lobe of liver is  poorly evaluated with low-dose noncontrast CT but shows no focal FDG  hypermetabolism.  Physiologic FDG hypermetabolism seen throughout the solid abdominal  organs.  Physiologic FDG hypermetabolism seen throughout the GI tract.  Physiologic FDG hypermetabolism seen throughout the mesentery,  retroperitoneum and pelvis.  Low dose CT redemonstrates nonobstructing kidney stones. Cholelithiasis  again noted.     BONES: Nonspecific FDG tracer activity. No intense areas of tracer  activity noted.     IMPRESSION:     1. Cavitary mass in the right lung that shows very low-grade FDG  hypermetabolism which is at the lower range for malignancy with maximum  SUV: 2.4.  2. Enlarged right axillary region lymph node with low-grade FDG  hypermetabolism also at the lower range for malignancy with maximum SUV:  2.4.  3. Numerous bilateral pulmonary nodules compatible with metastatic  disease but show no significant FDG  hypermetabolism. This may be in part  due to size of nodules being below PET sensitivity threshold.  4. Faint area of low-attenuation right lobe liver poorly evaluated with  noncontrast low-dose CT that shows no obvious intense FDG  hypermetabolism.  5. Other nonacute findings as above on low dose CT.        [facility] 05-03-22  FINDINGS:    Lungs:  Cavitary mass in the right lower lobe has nearly resolved.  A  right lower lobe pulmonary nodule is 7.4 mm and was previously 7.5 mm.  No change. Image #38.  A left lower lobe pulmonary nodule, image #52, is  9.8 mm and was previously 9.8 mm. No change.    Pleural space:  Unremarkable.  No pneumothorax.  No significant  effusion.    Heart:  Unremarkable.  No cardiomegaly.  No significant pericardial  effusion.  No significant coronary artery calcifications.    Bones/joints:  The bony structures appear stable. No acute bony  findings identified.  No dislocation.    Soft tissues:  Unremarkable.    Vasculature:  Please note, pulmonary emboli noted on the prior exam  are not well evaluated. The previously described filling defects of the  main pulmonary arteries are not evident.  No thoracic aortic aneurysm.    Lymph nodes:  No mediastinal adenopathy by CT size criteria.    Gallbladder and bile ducts:  Cholelithiasis noted.    Other findings:  Other smaller nodules appear stable in size and  configuration.  No new nodules identified.     IMPRESSION:  1.  Near-complete resolution of previously described cavitary mass in  the right lower lobe periphery now only with linear scarring noted.  2.  Index nodules of both lungs are stable from previous exam with no  size increase identified. No new nodules are noted.  3.  Cholelithiasis.  4.  Due to timing of contrast on this study, assessment of pulmonary  emboli not performed. There appears to be significant improvement in  clot burden however in the more central vessels.    FINDINGS:    Lung bases:  Pulmonary nodules of the lung  bases.    Mediastinum:  Small hiatal hernia.      ABDOMEN:    Liver:  Low attenuation lesions of the right lobe liver appear of  decreased prominence from the previous exam. Segment 8 liver lesion is  1.1 cm. Segment 7 liver lesion is approximately 0.8 cm.    Gallbladder and bile ducts:  Cholelithiasis.  No ductal dilation.    Pancreas:  Unremarkable.  No mass.  No ductal dilation.    Spleen:  Unremarkable.  No splenomegaly.    Adrenals:  No adrenal masses identified.    Kidneys and ureters:  Unremarkable.  No solid mass.  No  hydronephrosis.    Stomach and bowel:  Sigmoid diverticulosis without evidence of  diverticulitis.  No obstruction.      PELVIS:    Appendix:  Normal appendix.    Bladder:  Unremarkable.  No mass.    Reproductive:  Unremarkable as visualized.      ABDOMEN and PELVIS:    Intraperitoneal space:  Unremarkable.  No free air.  No significant  fluid collection.    Bones/joints:  No acute fracture.  No dislocation.    Soft tissues:  Unremarkable.    Vasculature:  Unremarkable.  No abdominal aortic aneurysm.    Lymph nodes:  No iliac or retroperitoneal adenopathy.     IMPRESSION:  1.  Small low-attenuation lesions of the right lobe liver appears  decreased prominence of the previous exam. No new no focal liver lesions  identified  2.  Cholelithiasis.  3.  Otherwise stable appearance of the abdomen and pelvis.    CT Abdomen Pelvis With Contrast (08/08/2022 14:36)  FINDINGS:    LUNG BASES:  Unremarkable.  No mass.  No consolidation.      ABDOMEN:    LIVER:  Stable 1 cm right lobe of liver lesion and more posterior 7 mm  lesion.    GALLBLADDER AND BILE DUCTS:  Gallstones.  Gallbladder otherwise  unremarkable.  No ductal dilation.    PANCREAS:  Unremarkable.  No mass.  No ductal dilation.    SPLEEN:  Unremarkable.  No splenomegaly.    ADRENALS:  Unremarkable.  No mass.    KIDNEYS AND URETERS:  Unremarkable.  No solid mass.  No  hydronephrosis.    STOMACH AND BOWEL:  Unremarkable.  No obstruction.  No  mucosal  thickening.      PELVIS:    APPENDIX:  No findings to suggest acute appendicitis.    BLADDER:  Unremarkable.  No mass.    REPRODUCTIVE:  Unremarkable as visualized.      ABDOMEN and PELVIS:    INTRAPERITONEAL SPACE:  Unremarkable.  No free air.  No significant  fluid collection.    BONES/JOINTS:  No acute fracture.  No dislocation.    SOFT TISSUES:  Unremarkable.    VASCULATURE:  Unremarkable.  No abdominal aortic aneurysm.    LYMPH NODES:  Unremarkable.  No enlarged lymph nodes.     IMPRESSION:  1.  Gallstones.  Gallbladder otherwise unremarkable.  2.  Stable 1 cm right lobe of liver lesion and more posterior 7 mm  Lesion.    CT Chest With Contrast Diagnostic (08/08/2022 14:40)  FINDINGS:    LUNGS:  Multiple pulmonary nodules again noted including a right lower  lobe pulmonary nodule measuring 5 mm that was 5 mm. Another right lower  lobe pulmonary nodule is 7 mm and was 7 mm. A left lower lobe pulmonary  nodule is 5 mm and was 5 mm. Other nodules appear stable.    PLEURAL SPACE:  Unremarkable.  No pneumothorax.  No significant  effusion.    HEART:  Unremarkable.  No cardiomegaly.  No significant pericardial  effusion.  No significant coronary artery calcifications.    BONES/JOINTS:  Unremarkable.  No acute fracture.  No dislocation.    SOFT TISSUES:  Unremarkable.    VASCULATURE:  Unremarkable.  No thoracic aortic aneurysm.    LYMPH NODES:  Unremarkable.  No enlarged lymph nodes.     IMPRESSION:    Multiple pulmonary nodules again noted including a right lower lobe  pulmonary nodule measuring 5 mm that was 5 mm. Another right lower lobe  pulmonary nodule is 7 mm and was 7 mm. A left lower lobe pulmonary  nodule is 5 mm and was 5 mm. Other nodules appear stable.    CT Abdomen Pelvis With Contrast (10/31/2022 14:46)  FINDINGS:    LUNG BASES:  Unremarkable.  No mass.  No consolidation.      ABDOMEN:    LIVER:  Stable right lobe of liver low-attenuation lesion measuring  about a centimeter. Stable more  posterior right lobe of liver lesion  measuring about 7 mm.    GALLBLADDER AND BILE DUCTS:  Gallstones.  Gallbladder otherwise  unremarkable.  No ductal dilation.    PANCREAS:  Unremarkable.  No mass.  No ductal dilation.    SPLEEN:  Unremarkable.  No splenomegaly.    ADRENALS:  Unremarkable.  No mass.    KIDNEYS AND URETERS:  Unremarkable.  No solid mass.  No  hydronephrosis.    STOMACH AND BOWEL:  Scattered diverticula in the colon.  No  diverticulitis.  No obstruction.      PELVIS:    APPENDIX:  No findings to suggest acute appendicitis.    BLADDER:  Unremarkable.  No mass.    REPRODUCTIVE:  Unremarkable as visualized.      ABDOMEN and PELVIS:    INTRAPERITONEAL SPACE:  Unremarkable.  No free air.  No significant  fluid collection.    BONES/JOINTS:  No acute fracture.  No dislocation.    SOFT TISSUES:  Unremarkable.    VASCULATURE:  Unremarkable.  No abdominal aortic aneurysm.    LYMPH NODES:  Unremarkable.  No enlarged lymph nodes.     IMPRESSION:  1.  Gallstones.  Gallbladder otherwise unremarkable.  2.  Stable right lobe of liver low-attenuation lesion measuring about a  centimeter. Stable more posterior right lobe of liver lesion measuring  about 7 mm.    CT Chest With Contrast Diagnostic (10/31/2022 14:47)  FINDINGS:    LUNGS:  Stable subpleural right middle lobe pulmonary nodule measuring  5 mm. Stable right lower lobe pulmonary nodule measuring 6 mm. Other  bilateral pulmonary nodules appear stable in size and number.    PLEURAL SPACE:  Unremarkable.  No pneumothorax.  No significant  effusion.    HEART:  Unremarkable.  No cardiomegaly.  No significant pericardial  effusion.  No significant coronary artery calcifications.    BONES/JOINTS:  Unremarkable.  No acute fracture.  No dislocation.    SOFT TISSUES:  Right upper breast density again noted.    VASCULATURE:  Unremarkable.  No thoracic aortic aneurysm.    LYMPH NODES:  Unremarkable.  No enlarged lymph nodes.     IMPRESSION:    Stable subpleural right  middle lobe pulmonary nodule measuring 5 mm.  Stable right lower lobe pulmonary nodule measuring 6 mm. Other bilateral  pulmonary nodules appear stable in size and number.    CT chest with contrast 1/25/23  FINDINGS:     LUNGS: Multiple parenchymal nodules are present bilaterally and are  stable in size, number, and appearance. Largest nodule measuring  approximately 6 mm.     HEART: Mild coronary artery calcifications.     MEDIASTINUM: No masses. No enlarged lymph nodes.  No fluid collections.     PLEURA: No pleural effusions.     VASCULATURE: No evidence of aneurysm.     BONES: No acute bony abnormality.     VISUALIZED UPPER ABDOMEN:Please see the report for the CT of the abdomen  and pelvis.     Other: 8.5 mm left axillary lymph node stable.     IMPRESSION:     1. Multiple parenchymal nodules in both lungs are stable.  2. 8.5 mm lymph node in the left axilla, stable.    CT abdomen pelvis with contrast 1/25/23  FINDINGS:     Lower thorax: Parenchymal nodules in both lungs similar to the previous  exam.     Abdomen:     Liver: Previous identified area of decreased attenuation in the right  lobe of the liver is not localized on today's exam.     Gallbladder: Cholelithiasis.     Pancreas: Unremarkable. No mass or ductal dilatation.     Spleen: Homogeneous. No splenomegaly.     Adrenals: No mass.     Kidneys/ureters: Nonobstructing right intrarenal stone.     GI tract: Moderate stool. Sigmoid diverticuli without diverticulitis.     MESENTERY: No free fluid, walled off fluid collections, mesenteric  stranding, or enlarged lymph nodes        Vasculature: No evidence of aneurysm.     Abdominal wall: No focal hernia or mass.        Bladder: No focal mass or significant wall thickening     Reproductive: Unremarkable as visualized     Bones: No acute bony abnormality.     IMPRESSION:     1. No evidence of new interval development of metastatic disease.  Previously identified hepatic lesion is not seen on today's exam.      2.Cholelithiasis.     3. Nonobstructing right intrarenal stone.    Mammo Diagnostic Digital Tomosynthesis Bilateral With CAD (02/08/2023 14:55)  FINDINGS: There are scattered areas of fibroglandular density.     The bilateral fibroglandular pattern is stable in appearance including  postoperative changes in the right 12:00 region. Skin thickening  involving the right breast is again noted and likely treatment related.  Multiple surgical clips are now noted in the right axilla. The patient's  Port-A-Cath is incompletely imaged in the posterior superior aspect of  the left breast. No new or suspicious findings are identified in either  breast.     IMPRESSION:  Benign bilateral mammographic findings.        BI-RADS CATEGORY:  2, BENIGN        RECOMMENDATION:  Recommend the patient continue annual bilateral  mammographic evaluation.    CT Abdomen Pelvis With Contrast (04/13/2023 14:51)  FINDINGS:    Lung bases:  Refer to chest CT for characterization of lung nodules in  the partially imaged lower chest.    Mediastinum:  Moderate hiatal hernia.      ABDOMEN:    Liver:  No definite focal liver lesion identified.    Gallbladder and bile ducts:  Cholelithiasis.  No ductal dilation.    Pancreas:  Unremarkable.  No mass.  No ductal dilation.    Spleen:  Unremarkable.  No splenomegaly.    Adrenals:  No adrenal masses.    Kidneys and ureters:  Tiny nonobstructing right kidney stone. No  hydronephrosis.    Stomach and bowel:  Gastric antrum wall thickening likely due to  incomplete distention.  Small splenule is again noted adjacent to the  splenic flexure of the colon.  Sigmoid diverticulosis. No evidence of  acute diverticulitis.      PELVIS:    Appendix:  Normal appendix.    Bladder:  Unremarkable.  No mass.    Reproductive:  Unremarkable as visualized.      ABDOMEN and PELVIS:    Intraperitoneal space:  No pneumoperitoneum identified.  No  significant fluid collection.    Bones/joints:  Stable appearance of the bony  structures. Mild  degenerative changes lumbar spine but no acute osseous findings.  No  dislocation.    Soft tissues:  Unremarkable.    Vasculature:  Unremarkable.  No abdominal aortic aneurysm.    Lymph nodes:  No iliac or retroperitoneal lymphadenopathy.     IMPRESSION:  1.  Cholelithiasis.  2.  No evidence of metastatic disease to abdomen or pelvis.  3.  Other nonacute/incidental findings as detailed above which appear  stable from previous.    CT Chest With Contrast Diagnostic (04/13/2023 14:51)  FINDINGS:    Lungs:  Small nodule along the right major fissure in the right lung,  5.2 mm and was previously 5.3 mm; image #33.  Parenchymal nodule right  lower lobe is 6.7 mm and was previously 6.9 mm, image #33, no change.   5.9 mm nodule right lower lobe was previously 6.5 mm indicating no  interval change.  Other smaller nodules of the right lung appears  stable.  7.5 mm nodule left lower lobe, image #55, was previously 8.2  mm.  A 5.7 mm nodule left lower lobe, image #40, was previously 5.9 mm.  No significant change.    Pleural space:  Unremarkable.  No pneumothorax.  No significant  effusion.    Heart:  Mild cardiomegaly.  No significant pericardial effusion.  No  significant coronary artery calcifications.    Mediastinum:  Moderate hiatal hernia again noted.  No enlarged  mediastinal or hilar lymph nodes identified.    Bones/joints:  Unremarkable.  No acute fracture.  No dislocation.    Soft tissues:  Stable treatment-related skin thickening of the right  breast.    Vasculature:  Unremarkable.  No thoracic aortic aneurysm.    Lymph nodes:  See above.    Gallbladder and bile ducts:  Cholelithiasis.    Tubes, lines and devices:  Left Port-A-Cath noted.    Other findings:  Calcified granulomas are stable.     IMPRESSION:  1.  No interval change in size of multiple bilateral pulmonary  parenchymal nodules. Index nodules as detailed above. Some nodules may  be slightly smaller than previous or could be due to  differences in  slice selection.  2.  No new nodules are identified.  3.  No thoracic adenopathy.  4.  Other incidental and nonacute findings detailed above appear stable  from previous.    CT Chest With Contrast Diagnostic (07/28/2023 15:37)   FINDINGS:    LUNGS AND PLEURAL SPACES:  Again there are tiny bilateral pulmonary  nodules with the largest in the right lower lobe measuring about 6 mm  and was about 6 mm. Other nodules including a left lower lobe pulmonary  nodule measuring 6 mm appears stable.  No pneumothorax.  No significant  effusion.    HEART:  Unremarkable.  No cardiomegaly.  No significant pericardial  effusion.  No significant coronary artery calcifications.    BONES/JOINTS:  Unremarkable.  No acute fracture.  No dislocation.    SOFT TISSUES:  Unremarkable.    VASCULATURE:  Unremarkable.  No thoracic aortic aneurysm.    LYMPH NODES:  Unremarkable.  No enlarged lymph nodes.     IMPRESSION:    Again there are tiny bilateral pulmonary nodules with the largest in  the right lower lobe measuring about 6 mm and was about 6 mm. Other  nodules including a left lower lobe pulmonary nodule measuring 6 mm  appears stable.    CT Abdomen Pelvis With Contrast (07/28/2023 15:40)   FINDINGS:    LUNG BASES:  Unremarkable.  No mass.  No consolidation.      ABDOMEN:    LIVER:  Unremarkable.  No mass.    GALLBLADDER AND BILE DUCTS:  Gallstone.  Gallbladder otherwise  unremarkable.  No ductal dilation.    PANCREAS:  Unremarkable.  No mass.  No ductal dilation.    SPLEEN:  Unremarkable.  No splenomegaly.    ADRENALS:  Unremarkable.  No mass.    KIDNEYS AND URETERS:  Unremarkable.  No solid mass.  No  hydronephrosis.    STOMACH AND BOWEL:  Scattered diverticula in the colon.  No  diverticulitis.  No obstruction.      PELVIS:    APPENDIX:  No findings to suggest acute appendicitis.    BLADDER:  Unremarkable.  No mass.    REPRODUCTIVE:  Unremarkable as visualized.      ABDOMEN and PELVIS:    INTRAPERITONEAL SPACE:   Unremarkable.  No free air.  No significant  fluid collection.    BONES/JOINTS:  No acute fracture.  No dislocation.    SOFT TISSUES:  Unremarkable.    VASCULATURE:  Unremarkable.  No abdominal aortic aneurysm.    LYMPH NODES:  Unremarkable.  No enlarged lymph nodes.     IMPRESSION:    Gallstone.  Gallbladder otherwise unremarkable.    CT Chest With Contrast Diagnostic (10/27/2023 15:28)   FINDINGS:    LUNGS AND PLEURAL SPACES:  Multiple bilateral pulmonary nodules are  stable in size.  No pneumothorax.  No significant effusion.    HEART:  Unremarkable as visualized.  No cardiomegaly.  No significant  pericardial effusion.  No significant coronary artery calcifications.    MEDIASTINUM:  Small hiatal hernia.    BONES/JOINTS:  Unremarkable as visualized.  No acute fracture.  No  dislocation.    SOFT TISSUES:  Right breast skin thickening noted likely treatment  related again noted.    VASCULATURE:  Unremarkable as visualized.  No thoracic aortic  aneurysm.    LYMPH NODES:  Unremarkable as visualized.  No enlarged lymph nodes.     IMPRESSION:  1.  Small hiatal hernia.  2.  Right breast skin thickening noted likely treatment related again  noted.  3.  Multiple bilateral pulmonary nodules are stable in size.    CT Abdomen Pelvis With Contrast (10/27/2023 15:30)   FINDINGS:    LUNG BASES:  Unremarkable as visualized.  No mass.  No consolidation.    MEDIASTINUM:  Small hiatal hernia.      ABDOMEN:    LIVER:  Unremarkable as visualized.  No mass.    GALLBLADDER AND BILE DUCTS:  Gallstone.  Gallbladder otherwise  unremarkable.  No ductal dilation.    PANCREAS:  Unremarkable as visualized.  No mass.  No ductal dilation.    SPLEEN:  Unremarkable as visualized.  No splenomegaly.    ADRENALS:  Unremarkable as visualized.  No mass.    KIDNEYS AND URETERS:  Unremarkable as visualized.  No solid mass.  No  hydronephrosis.    STOMACH AND BOWEL:  Scattered diverticula in the colon.  No  diverticulitis.  No obstruction.      PELVIS:     APPENDIX:  No findings to suggest acute appendicitis.    BLADDER:  Unremarkable as visualized.  No mass.    REPRODUCTIVE:  Unremarkable as visualized.      ABDOMEN and PELVIS:    INTRAPERITONEAL SPACE:  Unremarkable as visualized.  No free air.  No  significant fluid collection.    BONES/JOINTS:  No acute fracture.  No dislocation.    SOFT TISSUES:  Unremarkable as visualized.    VASCULATURE:  Unremarkable as visualized.  No abdominal aortic  aneurysm.    LYMPH NODES:  Unremarkable as visualized.  No enlarged lymph nodes.    OTHER FINDINGS:  No evidence of metastatic disease.     IMPRESSION:  1.  Gallstone.  Gallbladder otherwise unremarkable.  2.  No evidence of metastatic disease.    CT Chest With Contrast Diagnostic (01/26/2024 14:09)   FINDINGS:    LUNGS AND PLEURAL SPACES:  Again small bilateral pulmonary nodules are  stable including a right lower lobe pulmonary nodule measuring 6 mm and  a left lower lobe pulmonary nodule measuring 6 mm. Other nodules are  also stable in size. No new nodules are identified.  No pneumothorax.   No significant effusion.    HEART:  Unremarkable as visualized.  No cardiomegaly.  No significant  pericardial effusion.  No significant coronary artery calcifications.    MEDIASTINUM:  Small hiatal hernia again noted.    BONES/JOINTS:  Unremarkable as visualized.  No acute fracture.  No  dislocation.    SOFT TISSUES:  Unremarkable as visualized.    VASCULATURE:  Unremarkable as visualized.  No thoracic aortic  aneurysm.    LYMPH NODES:  Unremarkable as visualized.  No enlarged lymph nodes.    TUBES, LINES AND DEVICES:  Left Port-A-Cath noted.     IMPRESSION:  1.  Again small bilateral pulmonary nodules are stable including a right  lower lobe pulmonary nodule measuring 6 mm and a left lower lobe  pulmonary nodule measuring 6 mm. Other nodules are also stable in size.  No new nodules are identified.  2.  Small hiatal hernia again noted.    CT Abdomen Pelvis With Contrast (01/26/2024  14:17)   FINDINGS:    LUNG BASES:  Unremarkable as visualized.  No mass.  No consolidation.      ABDOMEN:    LIVER:  Unremarkable as visualized.  No mass.    GALLBLADDER AND BILE DUCTS:  Gallstones.  Gallbladder otherwise  unremarkable.  No ductal dilation.    PANCREAS:  Unremarkable as visualized.  No mass.  No ductal dilation.    SPLEEN:  Unremarkable as visualized.  No splenomegaly.    ADRENALS:  Unremarkable as visualized.  No mass.    KIDNEYS AND URETERS:  Unremarkable as visualized.  No solid mass.  No  hydronephrosis.    STOMACH AND BOWEL:  Unremarkable as visualized.  No obstruction.  No  mucosal thickening.      PELVIS:    APPENDIX:  No findings to suggest acute appendicitis.    BLADDER:  Unremarkable as visualized.  No mass.    REPRODUCTIVE:  Unremarkable as visualized.      ABDOMEN and PELVIS:    INTRAPERITONEAL SPACE:  Unremarkable as visualized.  No free air.  No  significant fluid collection.    BONES/JOINTS:  No acute fracture.  No dislocation.    SOFT TISSUES:  Unremarkable as visualized.    VASCULATURE:  Atherosclerotic disease.  No abdominal aortic aneurysm.    LYMPH NODES:  Unremarkable as visualized.  No enlarged lymph nodes.    OTHER FINDINGS:  No evidence of metastatic disease.     IMPRESSION:  1.  Gallstones.  Gallbladder otherwise unremarkable.  2.  No evidence of metastatic disease.     Mammo Diagnostic Digital Tomosynthesis Bilateral With CAD (02/21/2024 14:40)   FINDINGS: There are scattered areas of fibroglandular density.     The bilateral fibroglandular pattern is stable in appearance including  postoperative changes in the superior aspect of the right breast. Mild  skin thickening is again noted involving the right breast which is  likely treatment related. There are bilateral scattered benign-appearing  calcifications. No dominant mass, suspicious calcifications, or  nonsurgical areas of architectural distortion are seen.     IMPRESSION:  Benign bilateral mammographic findings.         BI-RADS CATEGORY: 2, BENIGN        RECOMMENDATION: Recommend the patient continue annual bilateral  mammographic evaluation.    CT Chest With Contrast Diagnostic (05/07/2024 15:52)   FINDINGS:    Lungs and pleural spaces:  Multiple bilateral pulmonary nodules which  appear stable from previous exam.  A 5.4 mm nodule right lower lobe,  image #41, is unchanged from previous; previously 6.3 mm.  A left lower  lobe pulmonary nodule is 5.7 mm and was previously 5.7 mm, image #42.   Left lower lobe basilar pulmonary nodule is 8.7 mm and was previously  8.8 mm, image #59, indicating no interval change.  Other scattered  pulmonary nodules are stable.  Linear scarring versus atelectasis right  lower lobe, stable.  No new pulmonary nodules are identified.  No  pleural effusions.  No pneumothorax.    Heart:  Borderline cardiomegaly.  No significant coronary artery  calcifications.  No pericardial effusion.    Mediastinum:  Hiatal hernia noted.  No hilar or mediastinal  lymphadenopathy is identified.    Bones/joints:  Unremarkable as visualized.  No acute fracture.  No  dislocation.    Soft tissues:  Unremarkable as visualized.    Vasculature:  Unremarkable as visualized.  No thoracic aortic  aneurysm.    Lymph nodes:  Unremarkable as visualized.  No axillary adenopathy is  noted.    Gallbladder and bile ducts:  Cholelithiasis.    Tubes, lines and devices:  Left Port-A-Cath noted.     IMPRESSION:  1.  Multiple bilateral pulmonary nodules appear stable from the previous  exam with no significant change in size of index nodules of both lungs.  2.  No new pulmonary nodules identified.  3.  Otherwise stable exam with other incidental and nonacute findings  described above.    CT Abdomen Pelvis With Contrast (05/07/2024 15:54)   FINDINGS:    Lung bases:  Pulmonary nodules noted. Refer to chest CT.    Mediastinum:  Small hiatal hernia, stable.      ABDOMEN:    Liver:  Unremarkable as visualized.  No mass.    Gallbladder and bile  ducts:  Cholelithiasis, stable.  No ductal  dilation.    Pancreas:  Unremarkable as visualized.  No mass.  No ductal dilation.    Spleen:  Unremarkable as visualized.  No splenomegaly.    Adrenals:  Unremarkable as visualized.  No adrenal masses.    Kidneys and ureters:  Unremarkable as visualized.  No solid mass.  No  hydronephrosis.    Stomach and bowel:  Sigmoid diverticulosis without diverticulitis.  No  obstruction.      PELVIS:    Appendix:  Normal appendix.    Bladder:  Unremarkable as visualized.  No mass.    Reproductive:  Unremarkable as visualized.      ABDOMEN and PELVIS:    Intraperitoneal space:  Unremarkable as visualized.  No significant  fluid collection.  No pneumoperitoneum identified.    Bones/joints:  Stable bony structures. Degenerative changes lumbar  spine.  No acute fracture.  No dislocation.    Soft tissues:  Unremarkable as visualized.    Vasculature:  Unremarkable as visualized.  No abdominal aortic  aneurysm.    Lymph nodes:  Unremarkable as visualized.  No iliac or retroperitoneal  adenopathy.     IMPRESSION:  1.  Stable exam. No evidence of metastatic disease to the abdomen or  pelvis.  2.  Cholelithiasis.  3.  Mild diverticulosis without diverticulitis.  4.  Other incidental/nonacute findings above.    CT Chest With Contrast Diagnostic (08/06/2024 13:15)   FINDINGS:    Lungs and pleural spaces:  Multiple bilateral pulmonary nodules again  noted.  Index nodule in the left lower lobe, image #54, is 8.3 mm and  was previously 8.7 mm.  Index nodule in the right lower lobe is 5.5 mm  and was previously 5.4 mm.  Index nodule in the left lower lobe is 5.4  mm and was previously 5.7 mm.  No consolidation.  No pneumothorax.  No  significant effusion.    Heart:  Mild cardiomegaly.  No significant pericardial effusion.  No  significant coronary artery calcifications.    Mediastinum:  Small hiatal hernia.  No hilar or mediastinal  lymphadenopathy.    Bones/joints:  Unremarkable as visualized.  No  acute fracture.  No  dislocation.  No new bone lesions have developed.    Soft tissues:  Postsurgical changes right breast with treatment  related skin thickening noted stable from previous.    Vasculature:  Unremarkable as visualized.  No thoracic aortic  aneurysm.    Lymph nodes:  No axillary adenopathy.    Tubes, lines and devices:  Left-sided Port-A-Cath noted.    Other findings:  No definite new nodules have developed.     IMPRESSION:  1.  Stable exam. Index nodules of both lungs are essentially stable from  previous. No new nodules identified.  2.  No thoracic adenopathy.  3.  Other incidental/nonacute findings above stable.    CT Abdomen Pelvis With Contrast (08/06/2024 13:18)   FINDINGS:    Lung bases:  Unremarkable as visualized.  No mass.  No consolidation.    Mediastinum:  Small hiatal hernia.      ABDOMEN:    Liver:  Unremarkable as visualized.  No mass.    Gallbladder and bile ducts:  Cholelithiasis.  No ductal dilation.    Pancreas:  Unremarkable as visualized.  No mass.  No ductal dilation.    Spleen:  Unremarkable as visualized.  No splenomegaly.    Adrenals:  Unremarkable as visualized.  No mass.    Kidneys and ureters:  Tiny nonobstructing right kidney stones.    Stomach and bowel:  Sigmoid diverticulosis without diverticulitis.  No  obstruction.      PELVIS:    Appendix:  Normal appendix.    Bladder:  Unremarkable as visualized.  No mass.    Reproductive:  Unremarkable as visualized.      ABDOMEN and PELVIS:    Intraperitoneal space:  Unremarkable as visualized.  No free air.  No  significant fluid collection.    Bones/joints:  No acute fracture.  No dislocation.    Soft tissues:  Unremarkable as visualized.    Vasculature:  Unremarkable as visualized.  No abdominal aortic  aneurysm.    Lymph nodes:  Unremarkable as visualized.  No enlarged lymph nodes.     IMPRESSION:  1.  No evidence of metastatic disease to abdomen or pelvis.  2.  Cholelithiasis.  3.  Diverticulosis without diverticulitis.  4.   Tiny nonobstructing right kidney stones. No hydronephrosis.  5.  Other incidental/nonacute findings above.    CT Chest With Contrast Diagnostic (11/19/2024 15:52)   FINDINGS:     LUNGS: Multiple parenchymal nodules bilaterally are stable.     HEART: Coronary artery calcifications     MEDIASTINUM: No masses. No enlarged lymph nodes.  No fluid collections.     PLEURA: No pleural effusion. No pleural mass or abnormal calcification.  No pneumothorax.     VASCULATURE: No evidence of aneurysm.      BONES: No acute bony abnormality.     VISUALIZED UPPER ABDOMEN: Please see the CT report for the abdomen and  pelvis     Other: None.     IMPRESSION:     1. Multiple bilateral parenchymal nodules unchanged from the previous  exam    CT Abdomen Pelvis With Contrast (11/19/2024 15:54)   FINDINGS:     Lower thorax: Clear. No effusions.     Abdomen:     Liver: Homogeneous. No focal hepatic mass or ductal dilatation.     Gallbladder: Cholelithiasis     Pancreas: Unremarkable. No mass or ductal dilatation.     Spleen: Homogeneous. No splenomegaly.     Adrenals: No mass.     Kidneys/ureters: No mass. No obstructive uropathy.  No evidence of  urolithiasis.     GI tract: Sigmoid diverticuli without evidence of diverticulitis. There  is no evidence of appendicitis     MESENTERY: No free fluid, walled off fluid collections, mesenteric  stranding, or enlarged lymph nodes        Vasculature: No evidence of aneurysm.     Abdominal wall: No focal hernia or mass.        Bladder: No focal mass or significant wall thickening     Reproductive: Unremarkable as visualized     Bones: No acute bony abnormality.     IMPRESSION:     1. No evidence of metastatic disease to the abdomen or pelvis     2. Other findings as above          RECENT LABS:  Lab Results   Component Value Date    WBC 4.72 12/26/2024    HGB 12.9 12/26/2024    HCT 39.5 12/26/2024    MCV 91.0 12/26/2024    RDW 13.4 12/26/2024     12/26/2024    NEUTRORELPCT 59.3 12/26/2024     LYMPHORELPCT 23.3 12/26/2024    MONORELPCT 11.2 12/26/2024    EOSRELPCT 4.7 12/26/2024    BASORELPCT 1.3 12/26/2024    NEUTROABS 2.80 12/26/2024    LYMPHSABS 1.10 12/26/2024       Lab Results   Component Value Date     12/26/2024    K 3.8 12/26/2024    CO2 24.6 12/26/2024     12/26/2024    BUN 20 12/26/2024    CREATININE 0.98 12/26/2024    EGFRIFNONA 87 02/24/2022    GLUCOSE 87 12/26/2024    CALCIUM 9.2 12/26/2024    ALKPHOS 71 12/26/2024    AST 23 12/26/2024    ALT 24 12/26/2024    BILITOT 0.7 12/26/2024    ALBUMIN 4.2 12/26/2024    PROTEINTOT 6.3 12/26/2024    MG 2.0 09/01/2023     Lab Results   Component Value Date    TSH 0.776 12/26/2024     Lab Results   Component Value Date    FERRITIN 169.00 (H) 01/20/2022    IRON 102 01/20/2022    TIBC 264 (L) 01/20/2022    LABIRON 39 01/20/2022    JMEYZQGE60 437 01/20/2022    FOLATE 16.40 01/20/2022     Lab Results   Component Value Date     (H) 01/20/2022     ASSESSMENT & PLAN:  Yadira Flowers is a very pleasant 69 y.o. female with a history of cervical cancer, breast cancer and malignant melanoma.    1. History of Breast Cancer:  - Ms. Onur Burgos had Stage IA ([id number]) moderately differentiated invasive ductal carcinoma of the right breast diagnosed in October 2015.  - She underwent R lympectomy and SLNBx performed by Dr. Steve Isbell 10-22-15 and then received adjuvant radiation in [place].  - After radiation, she was started on Arimidex which was then switched to Exemestane.  She took Exemestane for almost 5 years. Unfortunately, she developed vaginal bleeding and hematuria related to vaginal atrophy related to hormonal blockade.  Given this, stopped Exemestane.  She is doing much better in this regard since stopping hormonal therapy.  - Mammogram 2/21/24 without cause for concern, recommended annual diagnostic mammograms.     2.  Metastatic malignant melanoma:  -  S/p resection of a primary lesion on her back performed by Dr. Catrachito Amaro of  Dermatology in [place] in 2004.  -  S/p FNA R axillary LN which was concerning for melanoma.  Excisional biopsy confirmed metastatic malignant melanoma.  - Suspect her lung and liver lesions are also related to her melanoma.  - She has seen Dr. Her and he performed bronchoscopy on 02/23/2022. Pathology was negative for malignant cells.  - Liver aspiration showed abscess which grew Klebsiella, suspect this was a secondary infection.    -  Sent  excised LN tissue for CARIS testing.  -  PET-CT showed no significant FDG uptake but re-demonstrated abnormalities in the R axilla, RLL cavitary mass, Vargas lung nodules and liver.    -  MRI Brain showed no acute findings in the head/brain.  -  Recommended initial therapy with immunotherapy.  We discussed different options for immunotherapy including single agent PD-L1 treatment or combination with Yervoy.  We recommended combination with Yervoy for better RR, PFS and OS even with increased risk of immune-mediated side effects.  We discussed the initially approved FDA dosing v. Altered dosing with lower dose IPI/higher dosed Nivo.  The latter has been shown to have fewer side effects and is thought to likely have similar efficacy (though trials weren't really powered to definitively show that).  After a lengthy discussion, we decided to proceed with 4 cycles of Ipi 1 mg/kg / Nivo 3 mg/kg q 21d followed by Nivolumab single agent.  We discussed potential risks, benefits and side effects extensively and she decided to proceed.  -  She completed 4 cycles of Ipi / Nivo and tolerated treatment very well.  -  Repeat CT imaging done 5-3-22 after 4 cycles Ipi/Nivo showed an excellent partial response to treatment.  Have continued with single agent Nivolumab as planned which she is tolerating well.   -Repeat CT CAP from 11/19/24(summarized above) again showing stable disease with excellent control of her metastatic melanoma.  -Therefore, will continue with Nivolumab  therapy as planned.    3.  Bilateral pulmonary emboli:  -  CTPE protocol 1-20-22  confirmed bilateral pulmonary emboli  -  BLE dopplers negative for DVT.  -  Continue Eliquis 5 mg BID.    -  We previously discussed length of therapy.  She watched her  die with PE and her mother has h/o PEs as well.  Presumably the cancer was the risk factor for development of PE and since her cancer is incurable,  have recommended she continue Eliquis indefinately as long as she continues without significant side effects.  She started complaining of difficulty with easy bleeding with minor trauma.  It had been over 6 months that she has had full anticoagulation.  Given worsening side effects, decided to continue Eliquis, but reduced dose to 2.5 mg twice a day.  She is doing well on this dose without bleeding or thrombosis. Will continue.    4.  H/o Colon polyps:  -  Dr. Maldonado performed c-scope 12-09-20 with discovery of 8 hyperplastic polyps in the distal rectum as well as diverticulosis.  He recommended repeat c-scope after 5 years.    5.  H/o cervical cancer treated with cryotherapy at [facility] in [facility]:  -  Followed up with Dr. Manav Real of gynecology.  She says she had exam including pap smear and then pelvic U/S which was unrevealing.    6. Prophylaxis:  -  Had COVID vaccine x 2 (Pfizer).  She received Evusheld on 4-7-22.. Recommended booster. Received 2023 flu vaccine. Received  Prevnar 20 vaccine .    7. ACO / DWAYNE/Other  Quality measures  -  Yadira Flowers did not receive 2024 flu vaccine.  This was recommended.  -  Yadira Flowers reports a pain score of 0.    -  Current outpatient and discharge medications have been reconciled for the patient.  Reviewed by: Maria T Jackson, APRN      8.  Follow up:  -  Continue Nivolumab as planned.  -  F/u with MD as planned on 1/21/2025 with CBCD, CMP, TSH.  -  Mammogram will be due after 2/21/2025.  This has been ordered.  -  note:  Pt prefers that PAC be accessed for CT  imaging.    I spent 30 minutes with Yadira Lionel today.  In the office today, more than 50% of this time was spent face-to-face with her  in counseling / coordination of care, reviewing her medical history and counseling on the current treatment plan.  All questions were answered to her satisfaction        Electronically Signed by:  TENNILLE Peterson       CC:     MD Manav Cotto MD Aaron House, MD

## 2025-01-08 ENCOUNTER — DOCUMENTATION (OUTPATIENT)
Dept: ONCOLOGY | Facility: CLINIC | Age: 70
End: 2025-01-08
Payer: COMMERCIAL

## 2025-01-08 NOTE — PROGRESS NOTES
SS contacted patient ([phone]) to make aware of date change for support group from January 9 to January 30. SS mailed patient flyer to home residence.    SS will follow.

## 2025-01-17 DIAGNOSIS — C43.59 MALIGNANT MELANOMA OF TORSO EXCLUDING BREAST: ICD-10-CM

## 2025-01-17 DIAGNOSIS — C78.01 MALIGNANT NEOPLASM METASTATIC TO BOTH LUNGS: ICD-10-CM

## 2025-01-17 DIAGNOSIS — C78.02 MALIGNANT NEOPLASM METASTATIC TO BOTH LUNGS: ICD-10-CM

## 2025-01-17 DIAGNOSIS — C78.7 METASTASIS TO LIVER: Primary | ICD-10-CM

## 2025-01-17 RX ORDER — SODIUM CHLORIDE 9 MG/ML
250 INJECTION, SOLUTION INTRAVENOUS ONCE
OUTPATIENT
Start: 2025-01-21

## 2025-01-20 NOTE — PROGRESS NOTES
"NAME: Yadira Flowers    : 1955    DATE: 2025      DIAGNOSIS:   1.  Stage IA ([id number]) moderately differentiated invasive ductal carcinoma of the right breast diagnosed in 2015.    2.  H/o malignant Melanoma on her Back    3.  H/o cervical cancer treated with cryotherapy at [facility] in     4.  Recurrent / metastatic malignant Melanoma  -  R axillary LN bx 22.    -  She has R axillary LN mass (2.47x2.16), Solitary cavitary R lobe liver lesion, cavitary RLL lung mass,  and bilateral parenchymal lung nodules      TREATMENT HISTORY:   1.         CHIEF COMPLAINT:  Follow up of metastatic melanoma/toxicity check and recent imaging    HISTORY OF PRESENT ILLNESS:   Yadira Flowers is a very pleasant 69 y.o. female who was referred by Dr. Dilcia Pedro for evaluation and treatment of breast cancer. She was living in [place] in 2015 when her dog brought attention to and \"found\" an abnormal lump in her right breast.  She was treated by Dr. Steve Isbell and underwent R lumpectomy with SLNBx on 10-22-15 as below.  She then had what sounds like accelerated partial breast irradiation.  She was then started on Arimidex which was later switched to Exemestane for a better side effect profile.  She took this for about 5 years, stopping a couple of weeks ago.  She stopped taking Exemestane because she developed some vaginal bleeding and hematuria.  This was evaluated by her PCP and gynecologist and felt to be due to vaginal atrophy.  She was prescribed a hormone pill which she hasn't yet started and was referred here to discuss things further.      Aside from bleeding which has been uncomfortable and distressing for her, Ms. Onur Burgos has generally been well.  The last year has been hard on her emotionally with the loss of her  and then her dog, her home and her insurance, but she is coping well and has good family support in [place].  She says she gained weight with her breast " cancer treatment and gained weight when her  .  She has been working now to lose weight and has lost 20 lbs over the last 2 months with dieting.  She denies chest pain or shortness of breath.  She complains of some RLQ cramping pain and isn't sure what is causing that.  She denies difficulty moving her bowels.  No blood in her stool that she is aware of.  She has had vaginal bleeding and hematuria as above.        INTERVAL HISTORY:  Ms. Burgos presents today for follow up of metastatic melanoma/toxicity check.  Since her last visit, she has overall been well.  She has struggled with difficulty with her rotator cuff, but was pleased upon meeting the surgeon to learn the conservative treatment with physical therapy and steroids may be an option for her.  She continues to tolerate immunotherapy without difficulty.  She has had some nice projects this winter including renovation of a fireplace.  She is otherwise well without complaint.      PAST MEDICAL HISTORY:  Past Medical History:   Diagnosis Date    Back pain     Basal cell carcinoma (BCC) in situ of skin     Dr. Mohr - Derm    Breast cancer     right    Cancer     breast, cervical, melanoma    Cervical cancer 1976    Diagnosed in .  Treated by repeated local intervention and ultimately received cryotherapy at [facility] with resolution.    Elbow fracture     Foot fracture     Headache     Hyperlipidemia     Hypertension     TAKEN OFF MEDS    Melanoma     on her back  - treated by Dermatologist Catrachito Amaro in [place], [place] with what sounds like wide local excision.     Pulmonary embolism     Sinusitis        PAST SURGICAL HISTORY:  Past Surgical History:   Procedure Laterality Date    AXILLARY LYMPH NODE BIOPSY/EXCISION Right 2022    Procedure: AXILLARY LYMPH NODE BIOPSY/EXCISION;  Surgeon: Dianelys Cummings MD;  Location: [facility];  Service: General;  Laterality: Right;    BREAST LUMPECTOMY Right     ca    BREAST SURGERY       Secondary to cancer    BRONCHOSCOPY Right 2022    Procedure: BRONCHOSCOPY WITH ENDOBRONCHIAL ULTRASOUND;  Surgeon: Chris Her MD;  Location:  [facility];  Service: Pulmonary;  Laterality: Right;    COLONOSCOPY N/A 2020    Procedure: COLONOSCOPY;  Surgeon: Mohsen Maldonado MD;  Location:  [facility];  Service: Gastroenterology;  Laterality: N/A;    ELBOW FUSION      left    FOOT SURGERY Right 2014    Broken foot    HAND SURGERY  2020    right    RHINOPLASTY  2002    SKIN CANCER EXCISION      malignant melanoma from back     US GUIDED LYMPH NODE BIOPSY  2022    VENOUS ACCESS DEVICE (PORT) INSERTION N/A 2022    Procedure: INSERTION VENOUS ACCESS DEVICE left or right;  Surgeon: Dianelys Cummings MD;  Location:  [facility];  Service: General;  Laterality: N/A;       FAMILY HISTORY:  Family History   Problem Relation Age of Onset    Other Mother         blood clots    Hypertension Mother     Ulcers Father     Hypertension Sister     Other Sister         Multiple Sclerosis    Cancer Paternal Aunt     Stroke Maternal Grandmother     Alcohol abuse Paternal Grandmother     Hypertension Sister     Multiple sclerosis Sister     Hypertension Sister     Coronary artery disease Paternal Grandfather     Cancer Maternal Aunt 30        colon    Breast cancer Neg Hx    Many paternal aunts  with malignancy of various types. Many cousins on that side of the family have had cancer as well.  One  of colon cancer in her 30s.  One had a brain tumor.    SOCIAL HISTORY:  Social History     Socioeconomic History    Marital status:    Tobacco Use    Smoking status: Never    Smokeless tobacco: Never   Vaping Use    Vaping status: Never Used   Substance and Sexual Activity    Alcohol use: Not Currently    Drug use: Never    Sexual activity: Not Currently     Birth control/protection: Post-menopausal       REVIEW OF SYSTEMS:   A comprehensive 14 point review of systems was performed.  Significant  findings as mentioned above.  All other systems reviewed and are negative.      MEDICATIONS:  The current medication list was reviewed in the EMR    Current Outpatient Medications:     atorvastatin (LIPITOR) 10 MG tablet, Take 1 tablet by mouth Daily., Disp: , Rfl:     Eliquis 2.5 MG tablet tablet, TAKE 1 TABLET BY MOUTH TWICE DAILY (Patient taking differently: 1 tablet 1 (One) Time.), Disp: 60 tablet, Rfl: 5    fluticasone (FLONASE) 50 MCG/ACT nasal spray, 2 sprays by Each Nare route Daily. Shake liquid, Disp: 16 g, Rfl: 5    gabapentin (NEURONTIN) 100 MG capsule, Take 1 capsule by mouth As Needed., Disp: , Rfl:     lidocaine-prilocaine (EMLA) 2.5-2.5 % cream, Apply to port a cath ~30 min -1 hour prior to chemotherapy, Disp: 30 g, Rfl: 3    ondansetron ODT (Zofran ODT) 8 MG disintegrating tablet, Place 1 tablet on the tongue Every 8 (Eight) Hours As Needed for Nausea or Vomiting., Disp: 30 tablet, Rfl: 5    pantoprazole (PROTONIX) 20 MG EC tablet, TAKE 1 TABLET BY MOUTH DAILY, Disp: 30 tablet, Rfl: 2    prochlorperazine (COMPAZINE) 10 MG tablet, TAKE 1 TABLET BY MOUTH EVERY 6 HOURS AS NEEDED FOR NAUSEA, Disp: 360 tablet, Rfl: 3    sennosides-docusate (senna-docusate sodium) 8.6-50 MG per tablet, Take 2 tablets by mouth 2 (Two) Times a Day., Disp: 120 tablet, Rfl: 2    Tirzepatide (Mounjaro) 2.5 MG/0.5ML solution pen-injector pen, Inject 0.5 mL under the skin into the appropriate area as directed 1 (One) Time Per Week. (Patient taking differently: Inject 7.5 mg under the skin into the appropriate area as directed 1 (One) Time Per Week.), Disp: , Rfl:     ALLERGIES:    No Known Allergies    PHYSICAL EXAM:  Vitals:    01/21/25 1241   BP: 115/76   Pulse: 71   Resp: 18   Temp: 97.3 °F (36.3 °C)   TempSrc: Temporal   SpO2: 99%   Weight: 79.8 kg (176 lb)   PainSc: 0-No pain           Body surface area is 1.93 meters squared.  Body mass index is 27.16 kg/m².  ECOG score: 0   General:  Awake, alert and oriented, appears  well.  HEENT:  Pupils are equal, round and reactive to light and accommodation, Extra-ocular movements full, Oropharyx clear, mucous membranes moist  Neck:  No JVD, thyromegaly or lymphadenopathy  CV:  Regular rate and rhythm, no murmurs, rubs or gallops  Resp:  Lungs are clear to auscultation bilaterally, no crackles  Breast: Deferred today. Previously: S/p R lumpectomy. There is a somewhat firm area at the site of surgery but there are no palpable masses.  She is s/p excision of R axillary LN, with some post-surgical swelling.  Left breast is without mass lesions.  No L axillary adenopathy.  Abd:  Soft, non-tender, non-distended, bowel sounds present, no HSM or masses noted  Ext:  No clubbing, cyanosis, or lower extremity edema  Lymph:  No cervical, supraclavicular, axillary adenopathy  Neuro:  MS as above, grossly non-focal exam    PATHOLOGY:  10-22-15            12-16-21          01-13-22          ENDOSCOPY:  Colonoscopy 12-09-20     Findings: 8 hyperplastic appearing polyps of the distal rectum, diverticulosis moderately throughout the sigmoid colon      IMAGING:  CTCAP 12-15-20  On the lung windows there are several calcified  granulomas in the lungs. No noncalcified pulmonary parenchymal lung  nodules were identified. On the soft tissue windows there were no  enlarged lymph nodes in the supraclavicular or axillary regions. No  mediastinal or hilar lymphadenopathy was seen. The heart was not  enlarged. There were no pericardial effusions.     IMPRESSION:  No CT findings of metastatic disease in the chest. There are  calcified granulomas in the lungs.     CT FINDINGS: The liver and spleen were normal in size and shape. The  gallbladder contains several stones. The wall was not thickened. The  bile ducts in the liver are not dilated. There is nothing seen to  suggest metastatic disease in the liver. The pancreas shows no evidence  of mass or inflammation. No adrenal or renal lesions are demonstrated.  The aorta  is normal in caliber. There were no enlarged lymph nodes in  the retroperitoneum or in the mesentery. The bowel shows no evidence of  obstruction. In the pelvis there were no masses or fluid collections.  There were no ventral hernias.     IMPRESSION:  No CT findings of metastatic disease in the abdomen or  pelvis. This study does demonstrate several stones in the lumen of the  gallbladder.         Bilateral diagnostic mammogram 01-31-21  FINDINGS: There are scattered areas of fibroglandular density.     The bilateral fibroglandular pattern does not appear significantly  changed compared to the exam from 2020. This includes postoperative  changes in the right 12:00 region. Mild skin thickening is noted  involving the right breast which is likely treatment related. A few  bilateral scattered calcifications are noted.     IMPRESSION:  Incomplete examination as comparison with older outside  prior mammograms is needed.        BI-RADS CATEGORY:   0, INCOMPLETE:  Need prior mammograms for comparison        RECOMMENDATION:  We will attempt to obtain older outside prior  mammograms for comparison.      CTCAP 12-12-21  FINDINGS: Lack of IV contrast hinders parenchymal assessment of the mediastinum, liver, spleen, pancreas, adrenal glands and kidneys.         Significant infiltrate seen in the right lower lobe with interstitial component. There are also lung nodules present in the right lung. Index nodule in the right lung base medially is 5 mm maximal diameter. Other smaller nodules seen throughout the right  lung. Tiny 3 mm lingular nodule seen in series 3 image 30. A benign calcified granuloma seen in the left lower lobe but a noncalcified indeterminant  5 mm left lower lobe nodule seen, series 3 image 42.     Nonenlarged mediastinal adenopathy seen. However there is an enlarged right axillary 1.8 x 2.6 cm lymph node partially seen.     Worrisome hepatic metastasis or primary hepatic lesion seen measuring 4.4 x 4.1 cm in  liver segment 8. Margins are ill-defined. Abscess could have a similar appearance. Suggestion of subtle gallstones. Nonobstructing 2 mm right renal calculus seen. No  obstructive uropathy. Gaseous distention of the esophagus and hiatal hernia present consistent with GERD. No enteric contrast but no gross evidence of bowel obstruction.   There is no gross free air, free fluid or focal inflammatory change.  Uterus and  adnexa are not enlarged. Osseous structures are grossly intact.     IMPRESSION:  Constellation of findings with significant consolidation in the right lower lobe, enlarged right axillary lymph node, indeterminate bilateral lung nodules and ill-defined hepatic lesion concerning for underlying malignancy in this patient .     FINDINGS: Lack of IV contrast hinders parenchymal assessment of the mediastinum, liver, spleen, pancreas, adrenal glands and kidneys.         Significant infiltrate seen in the right lower lobe with interstitial component. There are also lung nodules present in the right lung. Index nodule in the right lung base medially is 5 mm maximal diameter. Other smaller nodules seen throughout the right  lung. Tiny 3 mm lingular nodule seen in series 3 image 30. A benign calcified granuloma seen in the left lower lobe but a noncalcified indeterminant  5 mm left lower lobe nodule seen, series 3 image 42.     Nonenlarged mediastinal adenopathy seen. However there is an enlarged right axillary 1.8 x 2.6 cm lymph node partially seen.     Worrisome hepatic metastasis or primary hepatic lesion seen measuring 4.4 x 4.1 cm in liver segment 8. Margins are ill-defined. Abscess could have a similar appearance. Suggestion of subtle gallstones. Nonobstructing 2 mm right renal calculus seen. No  obstructive uropathy. Gaseous distention of the esophagus and hiatal hernia present consistent with GERD. No enteric contrast but no gross evidence of bowel obstruction.   There is no gross free air, free fluid or  focal inflammatory change.  Uterus and  adnexa are not enlarged. Osseous structures are grossly intact.     IMPRESSION:  Constellation of findings with significant consolidation in the right lower lobe, enlarged right axillary lymph node, indeterminate bilateral lung nodules and ill-defined hepatic lesion concerning for underlying malignancy in this patient .       US Abdomen Complete 12-12-21  FINDINGS:  The visualized portions of the pancreas, IVC and aorta appear normal.        The liver is moderately coarsened in echotexture. Ill-defined hypodensity in the liver measuring 3 x 3.2 x 2.6 cm corresponds to recent CT. Unclear if this is a malignant lesion. Abscesses within the differential but prior CT was performed without any  contrast.  The common bile duct is not dilated.. Gallbladder wall is thickened at 3.4 mm with trace pericholecystic fluid and subtle hyperemia. Gallstones and shadowing present.     The kidneys are normal in size and echogenicity. There is no   hydronephrosis or focal solid lesion.  The spleen is normal in size and echotexture.     IMPRESSION:  1. Nonspecific gallbladder wall thickening and questionable pericholecystic fluid. Gallstones are better seen on CT than ultrasound.  2.  Heterogeneous liver with lesion in the liver as seen on CT. Ultrasound is not helpful in determining etiology. This could be an abscess but given the other findings on CT,  malignancy is not excluded.      CTCAP 12-15-21  FINDINGS:    LUNGS:  Increased density of the now right lower lobe mixed  groundglass and more solid-appearing consolidation with internal  cavitary component identified. Tiny right and left lung pulmonary  nodules. Grossly stable.    PLEURAL SPACE:  Small bilateral pleural effusions.  No pneumothorax.    HEART:  Unremarkable.  No cardiomegaly.  No significant pericardial  effusion.    BONES/JOINTS:  Unremarkable.  No acute fracture.  No dislocation.    SOFT TISSUES:  Unremarkable.    VASCULATURE:   Unremarkable.  No thoracic aortic aneurysm.    LYMPH NODES:  Unremarkable.  No enlarged lymph nodes.     IMPRESSION:  1.  Increased density of the now right lower lobe mixed groundglass and  more solid-appearing consolidation with internal cavitary component  identified. Tiny right and left lung pulmonary nodules. Grossly stable.  2.  Small bilateral pleural effusions.    FINDINGS:    LUNG BASES:  Unremarkable.  No mass.  No consolidation.      ABDOMEN:    LIVER:  Right lobe of liver lesion is again identified now measuring  about 4.7 cm and was about 4.7 cm.    GALLBLADDER AND BILE DUCTS:  Gallstones noted in the gallbladder.  No  ductal dilation.    PANCREAS:  Unremarkable.  No mass.  No ductal dilation.    SPLEEN:  Unremarkable.  No splenomegaly.    ADRENALS:  Unremarkable.  No mass.    KIDNEYS AND URETERS:  Unremarkable.  No solid mass.  No  hydronephrosis.    STOMACH AND BOWEL:  Unremarkable.  No obstruction.  No mucosal  thickening.      PELVIS:    APPENDIX:  No findings to suggest acute appendicitis.    BLADDER:  Unremarkable.  No mass.    REPRODUCTIVE:  Unremarkable as visualized.      ABDOMEN and PELVIS:    INTRAPERITONEAL SPACE:  Unremarkable.  No free air.  No significant  fluid collection.    BONES/JOINTS:  No acute fracture.  No dislocation.    SOFT TISSUES:  Unremarkable.    VASCULATURE:  Unremarkable.  No abdominal aortic aneurysm.    LYMPH NODES:  Unremarkable.  No enlarged lymph nodes.     IMPRESSION:    Right lobe of liver lesion is again identified now measuring about 4.7  cm and was about 4.7 cm.      NM Bone Scan Whole Body 12-15-21  FINDINGS:    SKULL/FACIAL BONES:  No focal increased or decreased uptake.    SPINE:  Unremarkable.  No abnormal increased or decreased uptake.    RIBS:  Unremarkable.  No abnormal uptake.    LONG BONES:  Unremarkable.  No abnormal uptake.    PELVIS:  Unremarkable.  No focal increased or decreased uptake.    JOINTS:  Unremarkable.  No focal increased or decreased  uptake.    SOFT TISSUES:  Unremarkable.    RENAL/BLADDER:  Within normal limits.     IMPRESSION:    Normal bone scan.      CT Abdomen With Contrast  12-18-21  FINDINGS:  Partially imaged thick-walled cavitary lesion in the right lower lobe with right basilar atelectasis/infiltrate and small right pleural effusion. There is also left basilar atelectasis/infiltrate and trace left pleural effusion. Multiple noncalcified  pulmonary nodules are scattered throughout the visualized lower lung fields, concerning for pulmonary metastatic disease.     Ill-defined right hepatic mass again noted. The spleen, pancreas, and both adrenal glands are within expected limits. Cholelithiasis. Punctate nonobstructing right intrarenal stone. Tiny left renal cyst. Kidneys are otherwise unremarkable without  hydronephrosis. Abdominal aorta normal in course and caliber without dissection. No pathologic retroperitoneal or mesenteric lymphadenopathy by size criteria. Visualized small bowel and colon are unremarkable. No bowel obstruction. No free fluid or free  air.     No aggressive osseous lesions.     IMPRESSION:     1. Ill-defined right hepatic mass again noted. This has been previously biopsied and correlation with pathology results is recommended.  2. Cholelithiasis.  3. Punctate nonobstructing right intrarenal stone.  4. No threshold abdominal lymphadenopathy or other suspicious abdominal masses.  5. Partially imaged thick-walled cavitary lesion in the right lower lobe. This may be of infectious or neoplastic etiology and continued follow-up is recommended.  6. Small bilateral pleural effusions with bibasilar atelectasis/infiltrate, worse on the right than left.  7. Numerous small noncalcified pulmonary nodules throughout the visualized lower lung fields, concerning for pulmonary metastatic disease.       Mammo Diagnostic Digital Tomosynthesis Bilateral With CAD w/ US Axilla Right 01-13-22  FINDINGS:  There are scattered areas of  fibroglandular density.     Incompletely imaged is a very prominent axillary lymph node. The  bilateral fibroglandular pattern itself is stable in appearance  including postoperative changes in the superior aspect of the right  breast. There are stable bilateral calcifications. Mild skin thickening  is again noted involving the right breast which is likely treatment  related.     ULTRASOUND: Ultrasound evaluation of the right axilla demonstrates 2  markedly enlarged lymph nodes the largest of which measures 3.2 cm in  size and has no demonstrable fatty hilum. Recommend ultrasound-guided  biopsy.      IMPRESSION:  1. Stable mammographic appearance of the left breast with no findings  suspicious for malignancy.        2. Marked right axillary lymphadenopathy. Recommend ultrasound-guided  biopsy. The fibroglandular pattern of the right breast is however  stable.        BI-RADS CATEGORY:  4, SUSPICIOUS        RECOMMENDATION:  Recommend ultrasound-guided biopsy of the dominant  abnormal appearing right axillary lymph node.      DEXA Bone Density Axial 01-13-22  IMPRESSION:  Impression:  1. According to the World Health Organization definitions of  osteoporosis based on bone density, this patient's bone mineral density  is compatible with osteoporosis and the fracture risk is high.      Mammo Diagnostic Digital Tomosynthesis Bilateral With CAD w/ US Axilla Right 01-13-22  FINDINGS:  There are scattered areas of fibroglandular density.     Incompletely imaged is a very prominent axillary lymph node. The  bilateral fibroglandular pattern itself is stable in appearance  including postoperative changes in the superior aspect of the right  breast. There are stable bilateral calcifications. Mild skin thickening  is again noted involving the right breast which is likely treatment  related.     ULTRASOUND: Ultrasound evaluation of the right axilla demonstrates 2  markedly enlarged lymph nodes the largest of which measures 3.2 cm  in  size and has no demonstrable fatty hilum. Recommend ultrasound-guided  biopsy.      IMPRESSION:  1. Stable mammographic appearance of the left breast with no findings  suspicious for malignancy.        2. Marked right axillary lymphadenopathy. Recommend ultrasound-guided  biopsy. The fibroglandular pattern of the right breast is however  stable.        BI-RADS CATEGORY:  4, SUSPICIOUS        RECOMMENDATION:  Recommend ultrasound-guided biopsy of the dominant  abnormal appearing right axillary lymph node.      CT Chest With Contrast Diagnostic 01-18-22  FINDINGS:     LUNGS: Solitary mass in the right lower lobe with cavitation.  Significantly smaller today than on the prior study. 6 mm solid nodule  in the right lung posteriorly on image 30 of the axial series. Other  parenchymal nodules are seen in both lungs and are similar to the  previous study. These are concerning for metastatic nodules.     HEART: Unremarkable.     MEDIASTINUM: No masses. No enlarged lymph nodes.  No fluid collections.     PLEURA: No pleural effusion. No pleural mass or abnormal calcification.  No pneumothorax.     VASCULATURE: No evidence of aneurysm. Filling defect in the right  pulmonary artery and possibly left lower lobe pulmonary artery. This  study was not performed to evaluate for pulmonary embolus, but the  findings are concerning for bilateral pulmonary emboli.     BONES: No acute bony abnormality.     VISUALIZED UPPER ABDOMEN:Please see the CT report for the abdomen and  pelvis.     Other: Right axillary soft tissue mass is present and shows slight  interval growth. It measures 2.47 x 2.16 cm today and previously at the  same level measured approximately 2.7 x 1.89 cm.     IMPRESSION:  1. Study was not performed as a PE protocol, but there appear to be  filling defects in pulmonary arteries bilaterally concerning for  pulmonary emboli.  2. Interval decrease in size of cavitary right lower lobe mass.  3. Stable parenchymal  nodules bilaterally.  4. Very slight interval growth of right axillary soft tissue mass.     Results are being relayed to the referring physician.      US Liver 01-18-22  FINDINGS: Sonographic imaging of the liver does not show the mass  previously identified in the liver. I would suggest a follow-up CT scan  for better delineation.     Hepatic flow was hepatopedal.     There is shadowing from the gallbladder fossa.     IMPRESSION:  1. Previously identified mass is not seen on this exam in the liver.  Consider follow-up CT.  2. Shadowing from the gallbladder fossa. In the absence of  cholecystectomy, appearance is concerning for cholelithiasis.      US Venous Doppler Lower Extremity Bilateral (duplex) 01-20-22  FINDINGS:   There is patent spontaneous flow from the common femoral vein through  the posterior tibial veins.  There was no internal clot or area of noncompressibility.  Normal augmentation was elicited where applicable.     IMPRESSION:  No DVT in the lower extremities on today's exam.       CT Chest Pulmonary Embolism 01-20-22  FINDINGS: Today's study demonstrates opacification of the central  pulmonary vessels.  There are filling defects in the pulmonary arteries bilaterally. First  order arteries. This is most compatible with bilateral pulmonary  emboli..     Soft tissue mass in the right lower lobe with somewhat cavitary center  is again noted and unchanged..     There is no mediastinal lymph node enlargement     No pericardial or pleural effusion.        IMPRESSION:  1. Bilateral pulmonary emboli.  2. Parenchymal nodules bilaterally with dominant mass in the right lower  Lobe..      NM PET/CT Skull Base to Mid Thigh 01-28-22  FINDINGS:      HEAD/NECK:  Area of uptake in the posterior right paraspinal space and to lesser  degree the left paraspinal space appears related to muscle uptake.  No FDG hypermetabolic neck adenopathy.  No FDG hypermetabolic masses.     CHEST:   Numerous bilateral pulmonary  nodules appear stable from the previous  chest CT and show no FDG hypermetabolism. This is likely due to nodules  being below size threshold for PET sensitivity.  Cavitary lung mass in the right lung localizing to the superior segment  of the lower lobe shows FDG hypermetabolism with maximum SUV: 2.4. This  is at the lower range for malignancy.  Enlarged right lower axillary region lymph node which is 3.0 cm shows  mild FDG hypermetabolism that is approximately with maximum SUV: 2.4.  This is at the lower range for malignancy.  Low-dose CT demonstrating no change in additional scattered pulmonary  nodules from the previous scan. Coronary artery calcifications are  stable.     ABDOMEN/PELVIS:   Faint area of low attenuation involving the right lobe of liver is  poorly evaluated with low-dose noncontrast CT but shows no focal FDG  hypermetabolism.  Physiologic FDG hypermetabolism seen throughout the solid abdominal  organs.  Physiologic FDG hypermetabolism seen throughout the GI tract.  Physiologic FDG hypermetabolism seen throughout the mesentery,  retroperitoneum and pelvis.  Low dose CT redemonstrates nonobstructing kidney stones. Cholelithiasis  again noted.     BONES: Nonspecific FDG tracer activity. No intense areas of tracer  activity noted.     IMPRESSION:     1. Cavitary mass in the right lung that shows very low-grade FDG  hypermetabolism which is at the lower range for malignancy with maximum  SUV: 2.4.  2. Enlarged right axillary region lymph node with low-grade FDG  hypermetabolism also at the lower range for malignancy with maximum SUV:  2.4.  3. Numerous bilateral pulmonary nodules compatible with metastatic  disease but show no significant FDG hypermetabolism. This may be in part  due to size of nodules being below PET sensitivity threshold.  4. Faint area of low-attenuation right lobe liver poorly evaluated with  noncontrast low-dose CT that shows no obvious intense FDG  hypermetabolism.  5. Other  nonacute findings as above on low dose CT.        [facility] 05-03-22  FINDINGS:    Lungs:  Cavitary mass in the right lower lobe has nearly resolved.  A  right lower lobe pulmonary nodule is 7.4 mm and was previously 7.5 mm.  No change. Image #38.  A left lower lobe pulmonary nodule, image #52, is  9.8 mm and was previously 9.8 mm. No change.    Pleural space:  Unremarkable.  No pneumothorax.  No significant  effusion.    Heart:  Unremarkable.  No cardiomegaly.  No significant pericardial  effusion.  No significant coronary artery calcifications.    Bones/joints:  The bony structures appear stable. No acute bony  findings identified.  No dislocation.    Soft tissues:  Unremarkable.    Vasculature:  Please note, pulmonary emboli noted on the prior exam  are not well evaluated. The previously described filling defects of the  main pulmonary arteries are not evident.  No thoracic aortic aneurysm.    Lymph nodes:  No mediastinal adenopathy by CT size criteria.    Gallbladder and bile ducts:  Cholelithiasis noted.    Other findings:  Other smaller nodules appear stable in size and  configuration.  No new nodules identified.     IMPRESSION:  1.  Near-complete resolution of previously described cavitary mass in  the right lower lobe periphery now only with linear scarring noted.  2.  Index nodules of both lungs are stable from previous exam with no  size increase identified. No new nodules are noted.  3.  Cholelithiasis.  4.  Due to timing of contrast on this study, assessment of pulmonary  emboli not performed. There appears to be significant improvement in  clot burden however in the more central vessels.    FINDINGS:    Lung bases:  Pulmonary nodules of the lung bases.    Mediastinum:  Small hiatal hernia.      ABDOMEN:    Liver:  Low attenuation lesions of the right lobe liver appear of  decreased prominence from the previous exam. Segment 8 liver lesion is  1.1 cm. Segment 7 liver lesion is approximately 0.8 cm.     Gallbladder and bile ducts:  Cholelithiasis.  No ductal dilation.    Pancreas:  Unremarkable.  No mass.  No ductal dilation.    Spleen:  Unremarkable.  No splenomegaly.    Adrenals:  No adrenal masses identified.    Kidneys and ureters:  Unremarkable.  No solid mass.  No  hydronephrosis.    Stomach and bowel:  Sigmoid diverticulosis without evidence of  diverticulitis.  No obstruction.      PELVIS:    Appendix:  Normal appendix.    Bladder:  Unremarkable.  No mass.    Reproductive:  Unremarkable as visualized.      ABDOMEN and PELVIS:    Intraperitoneal space:  Unremarkable.  No free air.  No significant  fluid collection.    Bones/joints:  No acute fracture.  No dislocation.    Soft tissues:  Unremarkable.    Vasculature:  Unremarkable.  No abdominal aortic aneurysm.    Lymph nodes:  No iliac or retroperitoneal adenopathy.     IMPRESSION:  1.  Small low-attenuation lesions of the right lobe liver appears  decreased prominence of the previous exam. No new no focal liver lesions  identified  2.  Cholelithiasis.  3.  Otherwise stable appearance of the abdomen and pelvis.    CT Abdomen Pelvis With Contrast (08/08/2022 14:36)  FINDINGS:    LUNG BASES:  Unremarkable.  No mass.  No consolidation.      ABDOMEN:    LIVER:  Stable 1 cm right lobe of liver lesion and more posterior 7 mm  lesion.    GALLBLADDER AND BILE DUCTS:  Gallstones.  Gallbladder otherwise  unremarkable.  No ductal dilation.    PANCREAS:  Unremarkable.  No mass.  No ductal dilation.    SPLEEN:  Unremarkable.  No splenomegaly.    ADRENALS:  Unremarkable.  No mass.    KIDNEYS AND URETERS:  Unremarkable.  No solid mass.  No  hydronephrosis.    STOMACH AND BOWEL:  Unremarkable.  No obstruction.  No mucosal  thickening.      PELVIS:    APPENDIX:  No findings to suggest acute appendicitis.    BLADDER:  Unremarkable.  No mass.    REPRODUCTIVE:  Unremarkable as visualized.      ABDOMEN and PELVIS:    INTRAPERITONEAL SPACE:  Unremarkable.  No free air.  No  significant  fluid collection.    BONES/JOINTS:  No acute fracture.  No dislocation.    SOFT TISSUES:  Unremarkable.    VASCULATURE:  Unremarkable.  No abdominal aortic aneurysm.    LYMPH NODES:  Unremarkable.  No enlarged lymph nodes.     IMPRESSION:  1.  Gallstones.  Gallbladder otherwise unremarkable.  2.  Stable 1 cm right lobe of liver lesion and more posterior 7 mm  Lesion.    CT Chest With Contrast Diagnostic (08/08/2022 14:40)  FINDINGS:    LUNGS:  Multiple pulmonary nodules again noted including a right lower  lobe pulmonary nodule measuring 5 mm that was 5 mm. Another right lower  lobe pulmonary nodule is 7 mm and was 7 mm. A left lower lobe pulmonary  nodule is 5 mm and was 5 mm. Other nodules appear stable.    PLEURAL SPACE:  Unremarkable.  No pneumothorax.  No significant  effusion.    HEART:  Unremarkable.  No cardiomegaly.  No significant pericardial  effusion.  No significant coronary artery calcifications.    BONES/JOINTS:  Unremarkable.  No acute fracture.  No dislocation.    SOFT TISSUES:  Unremarkable.    VASCULATURE:  Unremarkable.  No thoracic aortic aneurysm.    LYMPH NODES:  Unremarkable.  No enlarged lymph nodes.     IMPRESSION:    Multiple pulmonary nodules again noted including a right lower lobe  pulmonary nodule measuring 5 mm that was 5 mm. Another right lower lobe  pulmonary nodule is 7 mm and was 7 mm. A left lower lobe pulmonary  nodule is 5 mm and was 5 mm. Other nodules appear stable.    CT Abdomen Pelvis With Contrast (10/31/2022 14:46)  FINDINGS:    LUNG BASES:  Unremarkable.  No mass.  No consolidation.      ABDOMEN:    LIVER:  Stable right lobe of liver low-attenuation lesion measuring  about a centimeter. Stable more posterior right lobe of liver lesion  measuring about 7 mm.    GALLBLADDER AND BILE DUCTS:  Gallstones.  Gallbladder otherwise  unremarkable.  No ductal dilation.    PANCREAS:  Unremarkable.  No mass.  No ductal dilation.    SPLEEN:  Unremarkable.  No  splenomegaly.    ADRENALS:  Unremarkable.  No mass.    KIDNEYS AND URETERS:  Unremarkable.  No solid mass.  No  hydronephrosis.    STOMACH AND BOWEL:  Scattered diverticula in the colon.  No  diverticulitis.  No obstruction.      PELVIS:    APPENDIX:  No findings to suggest acute appendicitis.    BLADDER:  Unremarkable.  No mass.    REPRODUCTIVE:  Unremarkable as visualized.      ABDOMEN and PELVIS:    INTRAPERITONEAL SPACE:  Unremarkable.  No free air.  No significant  fluid collection.    BONES/JOINTS:  No acute fracture.  No dislocation.    SOFT TISSUES:  Unremarkable.    VASCULATURE:  Unremarkable.  No abdominal aortic aneurysm.    LYMPH NODES:  Unremarkable.  No enlarged lymph nodes.     IMPRESSION:  1.  Gallstones.  Gallbladder otherwise unremarkable.  2.  Stable right lobe of liver low-attenuation lesion measuring about a  centimeter. Stable more posterior right lobe of liver lesion measuring  about 7 mm.    CT Chest With Contrast Diagnostic (10/31/2022 14:47)  FINDINGS:    LUNGS:  Stable subpleural right middle lobe pulmonary nodule measuring  5 mm. Stable right lower lobe pulmonary nodule measuring 6 mm. Other  bilateral pulmonary nodules appear stable in size and number.    PLEURAL SPACE:  Unremarkable.  No pneumothorax.  No significant  effusion.    HEART:  Unremarkable.  No cardiomegaly.  No significant pericardial  effusion.  No significant coronary artery calcifications.    BONES/JOINTS:  Unremarkable.  No acute fracture.  No dislocation.    SOFT TISSUES:  Right upper breast density again noted.    VASCULATURE:  Unremarkable.  No thoracic aortic aneurysm.    LYMPH NODES:  Unremarkable.  No enlarged lymph nodes.     IMPRESSION:    Stable subpleural right middle lobe pulmonary nodule measuring 5 mm.  Stable right lower lobe pulmonary nodule measuring 6 mm. Other bilateral  pulmonary nodules appear stable in size and number.    CT chest with contrast 1/25/23  FINDINGS:     LUNGS: Multiple parenchymal  nodules are present bilaterally and are  stable in size, number, and appearance. Largest nodule measuring  approximately 6 mm.     HEART: Mild coronary artery calcifications.     MEDIASTINUM: No masses. No enlarged lymph nodes.  No fluid collections.     PLEURA: No pleural effusions.     VASCULATURE: No evidence of aneurysm.     BONES: No acute bony abnormality.     VISUALIZED UPPER ABDOMEN:Please see the report for the CT of the abdomen  and pelvis.     Other: 8.5 mm left axillary lymph node stable.     IMPRESSION:     1. Multiple parenchymal nodules in both lungs are stable.  2. 8.5 mm lymph node in the left axilla, stable.    CT abdomen pelvis with contrast 1/25/23  FINDINGS:     Lower thorax: Parenchymal nodules in both lungs similar to the previous  exam.     Abdomen:     Liver: Previous identified area of decreased attenuation in the right  lobe of the liver is not localized on today's exam.     Gallbladder: Cholelithiasis.     Pancreas: Unremarkable. No mass or ductal dilatation.     Spleen: Homogeneous. No splenomegaly.     Adrenals: No mass.     Kidneys/ureters: Nonobstructing right intrarenal stone.     GI tract: Moderate stool. Sigmoid diverticuli without diverticulitis.     MESENTERY: No free fluid, walled off fluid collections, mesenteric  stranding, or enlarged lymph nodes        Vasculature: No evidence of aneurysm.     Abdominal wall: No focal hernia or mass.        Bladder: No focal mass or significant wall thickening     Reproductive: Unremarkable as visualized     Bones: No acute bony abnormality.     IMPRESSION:     1. No evidence of new interval development of metastatic disease.  Previously identified hepatic lesion is not seen on today's exam.     2.Cholelithiasis.     3. Nonobstructing right intrarenal stone.    Mammo Diagnostic Digital Tomosynthesis Bilateral With CAD (02/08/2023 14:55)  FINDINGS: There are scattered areas of fibroglandular density.     The bilateral fibroglandular pattern  is stable in appearance including  postoperative changes in the right 12:00 region. Skin thickening  involving the right breast is again noted and likely treatment related.  Multiple surgical clips are now noted in the right axilla. The patient's  Port-A-Cath is incompletely imaged in the posterior superior aspect of  the left breast. No new or suspicious findings are identified in either  breast.     IMPRESSION:  Benign bilateral mammographic findings.        BI-RADS CATEGORY:  2, BENIGN        RECOMMENDATION:  Recommend the patient continue annual bilateral  mammographic evaluation.    CT Abdomen Pelvis With Contrast (04/13/2023 14:51)  FINDINGS:    Lung bases:  Refer to chest CT for characterization of lung nodules in  the partially imaged lower chest.    Mediastinum:  Moderate hiatal hernia.      ABDOMEN:    Liver:  No definite focal liver lesion identified.    Gallbladder and bile ducts:  Cholelithiasis.  No ductal dilation.    Pancreas:  Unremarkable.  No mass.  No ductal dilation.    Spleen:  Unremarkable.  No splenomegaly.    Adrenals:  No adrenal masses.    Kidneys and ureters:  Tiny nonobstructing right kidney stone. No  hydronephrosis.    Stomach and bowel:  Gastric antrum wall thickening likely due to  incomplete distention.  Small splenule is again noted adjacent to the  splenic flexure of the colon.  Sigmoid diverticulosis. No evidence of  acute diverticulitis.      PELVIS:    Appendix:  Normal appendix.    Bladder:  Unremarkable.  No mass.    Reproductive:  Unremarkable as visualized.      ABDOMEN and PELVIS:    Intraperitoneal space:  No pneumoperitoneum identified.  No  significant fluid collection.    Bones/joints:  Stable appearance of the bony structures. Mild  degenerative changes lumbar spine but no acute osseous findings.  No  dislocation.    Soft tissues:  Unremarkable.    Vasculature:  Unremarkable.  No abdominal aortic aneurysm.    Lymph nodes:  No iliac or retroperitoneal  lymphadenopathy.     IMPRESSION:  1.  Cholelithiasis.  2.  No evidence of metastatic disease to abdomen or pelvis.  3.  Other nonacute/incidental findings as detailed above which appear  stable from previous.    CT Chest With Contrast Diagnostic (04/13/2023 14:51)  FINDINGS:    Lungs:  Small nodule along the right major fissure in the right lung,  5.2 mm and was previously 5.3 mm; image #33.  Parenchymal nodule right  lower lobe is 6.7 mm and was previously 6.9 mm, image #33, no change.   5.9 mm nodule right lower lobe was previously 6.5 mm indicating no  interval change.  Other smaller nodules of the right lung appears  stable.  7.5 mm nodule left lower lobe, image #55, was previously 8.2  mm.  A 5.7 mm nodule left lower lobe, image #40, was previously 5.9 mm.  No significant change.    Pleural space:  Unremarkable.  No pneumothorax.  No significant  effusion.    Heart:  Mild cardiomegaly.  No significant pericardial effusion.  No  significant coronary artery calcifications.    Mediastinum:  Moderate hiatal hernia again noted.  No enlarged  mediastinal or hilar lymph nodes identified.    Bones/joints:  Unremarkable.  No acute fracture.  No dislocation.    Soft tissues:  Stable treatment-related skin thickening of the right  breast.    Vasculature:  Unremarkable.  No thoracic aortic aneurysm.    Lymph nodes:  See above.    Gallbladder and bile ducts:  Cholelithiasis.    Tubes, lines and devices:  Left Port-A-Cath noted.    Other findings:  Calcified granulomas are stable.     IMPRESSION:  1.  No interval change in size of multiple bilateral pulmonary  parenchymal nodules. Index nodules as detailed above. Some nodules may  be slightly smaller than previous or could be due to differences in  slice selection.  2.  No new nodules are identified.  3.  No thoracic adenopathy.  4.  Other incidental and nonacute findings detailed above appear stable  from previous.    CT Chest With Contrast Diagnostic (07/28/2023 15:37)    FINDINGS:    LUNGS AND PLEURAL SPACES:  Again there are tiny bilateral pulmonary  nodules with the largest in the right lower lobe measuring about 6 mm  and was about 6 mm. Other nodules including a left lower lobe pulmonary  nodule measuring 6 mm appears stable.  No pneumothorax.  No significant  effusion.    HEART:  Unremarkable.  No cardiomegaly.  No significant pericardial  effusion.  No significant coronary artery calcifications.    BONES/JOINTS:  Unremarkable.  No acute fracture.  No dislocation.    SOFT TISSUES:  Unremarkable.    VASCULATURE:  Unremarkable.  No thoracic aortic aneurysm.    LYMPH NODES:  Unremarkable.  No enlarged lymph nodes.     IMPRESSION:    Again there are tiny bilateral pulmonary nodules with the largest in  the right lower lobe measuring about 6 mm and was about 6 mm. Other  nodules including a left lower lobe pulmonary nodule measuring 6 mm  appears stable.    CT Abdomen Pelvis With Contrast (07/28/2023 15:40)   FINDINGS:    LUNG BASES:  Unremarkable.  No mass.  No consolidation.      ABDOMEN:    LIVER:  Unremarkable.  No mass.    GALLBLADDER AND BILE DUCTS:  Gallstone.  Gallbladder otherwise  unremarkable.  No ductal dilation.    PANCREAS:  Unremarkable.  No mass.  No ductal dilation.    SPLEEN:  Unremarkable.  No splenomegaly.    ADRENALS:  Unremarkable.  No mass.    KIDNEYS AND URETERS:  Unremarkable.  No solid mass.  No  hydronephrosis.    STOMACH AND BOWEL:  Scattered diverticula in the colon.  No  diverticulitis.  No obstruction.      PELVIS:    APPENDIX:  No findings to suggest acute appendicitis.    BLADDER:  Unremarkable.  No mass.    REPRODUCTIVE:  Unremarkable as visualized.      ABDOMEN and PELVIS:    INTRAPERITONEAL SPACE:  Unremarkable.  No free air.  No significant  fluid collection.    BONES/JOINTS:  No acute fracture.  No dislocation.    SOFT TISSUES:  Unremarkable.    VASCULATURE:  Unremarkable.  No abdominal aortic aneurysm.    LYMPH NODES:  Unremarkable.  No  enlarged lymph nodes.     IMPRESSION:    Gallstone.  Gallbladder otherwise unremarkable.    CT Chest With Contrast Diagnostic (10/27/2023 15:28)   FINDINGS:    LUNGS AND PLEURAL SPACES:  Multiple bilateral pulmonary nodules are  stable in size.  No pneumothorax.  No significant effusion.    HEART:  Unremarkable as visualized.  No cardiomegaly.  No significant  pericardial effusion.  No significant coronary artery calcifications.    MEDIASTINUM:  Small hiatal hernia.    BONES/JOINTS:  Unremarkable as visualized.  No acute fracture.  No  dislocation.    SOFT TISSUES:  Right breast skin thickening noted likely treatment  related again noted.    VASCULATURE:  Unremarkable as visualized.  No thoracic aortic  aneurysm.    LYMPH NODES:  Unremarkable as visualized.  No enlarged lymph nodes.     IMPRESSION:  1.  Small hiatal hernia.  2.  Right breast skin thickening noted likely treatment related again  noted.  3.  Multiple bilateral pulmonary nodules are stable in size.    CT Abdomen Pelvis With Contrast (10/27/2023 15:30)   FINDINGS:    LUNG BASES:  Unremarkable as visualized.  No mass.  No consolidation.    MEDIASTINUM:  Small hiatal hernia.      ABDOMEN:    LIVER:  Unremarkable as visualized.  No mass.    GALLBLADDER AND BILE DUCTS:  Gallstone.  Gallbladder otherwise  unremarkable.  No ductal dilation.    PANCREAS:  Unremarkable as visualized.  No mass.  No ductal dilation.    SPLEEN:  Unremarkable as visualized.  No splenomegaly.    ADRENALS:  Unremarkable as visualized.  No mass.    KIDNEYS AND URETERS:  Unremarkable as visualized.  No solid mass.  No  hydronephrosis.    STOMACH AND BOWEL:  Scattered diverticula in the colon.  No  diverticulitis.  No obstruction.      PELVIS:    APPENDIX:  No findings to suggest acute appendicitis.    BLADDER:  Unremarkable as visualized.  No mass.    REPRODUCTIVE:  Unremarkable as visualized.      ABDOMEN and PELVIS:    INTRAPERITONEAL SPACE:  Unremarkable as visualized.  No free air.   No  significant fluid collection.    BONES/JOINTS:  No acute fracture.  No dislocation.    SOFT TISSUES:  Unremarkable as visualized.    VASCULATURE:  Unremarkable as visualized.  No abdominal aortic  aneurysm.    LYMPH NODES:  Unremarkable as visualized.  No enlarged lymph nodes.    OTHER FINDINGS:  No evidence of metastatic disease.     IMPRESSION:  1.  Gallstone.  Gallbladder otherwise unremarkable.  2.  No evidence of metastatic disease.    CT Chest With Contrast Diagnostic (01/26/2024 14:09)   FINDINGS:    LUNGS AND PLEURAL SPACES:  Again small bilateral pulmonary nodules are  stable including a right lower lobe pulmonary nodule measuring 6 mm and  a left lower lobe pulmonary nodule measuring 6 mm. Other nodules are  also stable in size. No new nodules are identified.  No pneumothorax.   No significant effusion.    HEART:  Unremarkable as visualized.  No cardiomegaly.  No significant  pericardial effusion.  No significant coronary artery calcifications.    MEDIASTINUM:  Small hiatal hernia again noted.    BONES/JOINTS:  Unremarkable as visualized.  No acute fracture.  No  dislocation.    SOFT TISSUES:  Unremarkable as visualized.    VASCULATURE:  Unremarkable as visualized.  No thoracic aortic  aneurysm.    LYMPH NODES:  Unremarkable as visualized.  No enlarged lymph nodes.    TUBES, LINES AND DEVICES:  Left Port-A-Cath noted.     IMPRESSION:  1.  Again small bilateral pulmonary nodules are stable including a right  lower lobe pulmonary nodule measuring 6 mm and a left lower lobe  pulmonary nodule measuring 6 mm. Other nodules are also stable in size.  No new nodules are identified.  2.  Small hiatal hernia again noted.    CT Abdomen Pelvis With Contrast (01/26/2024 14:17)   FINDINGS:    LUNG BASES:  Unremarkable as visualized.  No mass.  No consolidation.      ABDOMEN:    LIVER:  Unremarkable as visualized.  No mass.    GALLBLADDER AND BILE DUCTS:  Gallstones.  Gallbladder otherwise  unremarkable.  No ductal  dilation.    PANCREAS:  Unremarkable as visualized.  No mass.  No ductal dilation.    SPLEEN:  Unremarkable as visualized.  No splenomegaly.    ADRENALS:  Unremarkable as visualized.  No mass.    KIDNEYS AND URETERS:  Unremarkable as visualized.  No solid mass.  No  hydronephrosis.    STOMACH AND BOWEL:  Unremarkable as visualized.  No obstruction.  No  mucosal thickening.      PELVIS:    APPENDIX:  No findings to suggest acute appendicitis.    BLADDER:  Unremarkable as visualized.  No mass.    REPRODUCTIVE:  Unremarkable as visualized.      ABDOMEN and PELVIS:    INTRAPERITONEAL SPACE:  Unremarkable as visualized.  No free air.  No  significant fluid collection.    BONES/JOINTS:  No acute fracture.  No dislocation.    SOFT TISSUES:  Unremarkable as visualized.    VASCULATURE:  Atherosclerotic disease.  No abdominal aortic aneurysm.    LYMPH NODES:  Unremarkable as visualized.  No enlarged lymph nodes.    OTHER FINDINGS:  No evidence of metastatic disease.     IMPRESSION:  1.  Gallstones.  Gallbladder otherwise unremarkable.  2.  No evidence of metastatic disease.     Mammo Diagnostic Digital Tomosynthesis Bilateral With CAD (02/21/2024 14:40)   FINDINGS: There are scattered areas of fibroglandular density.     The bilateral fibroglandular pattern is stable in appearance including  postoperative changes in the superior aspect of the right breast. Mild  skin thickening is again noted involving the right breast which is  likely treatment related. There are bilateral scattered benign-appearing  calcifications. No dominant mass, suspicious calcifications, or  nonsurgical areas of architectural distortion are seen.     IMPRESSION:  Benign bilateral mammographic findings.        BI-RADS CATEGORY: 2, BENIGN        RECOMMENDATION: Recommend the patient continue annual bilateral  mammographic evaluation.    CT Chest With Contrast Diagnostic (05/07/2024 15:52)   FINDINGS:    Lungs and pleural spaces:  Multiple bilateral  pulmonary nodules which  appear stable from previous exam.  A 5.4 mm nodule right lower lobe,  image #41, is unchanged from previous; previously 6.3 mm.  A left lower  lobe pulmonary nodule is 5.7 mm and was previously 5.7 mm, image #42.   Left lower lobe basilar pulmonary nodule is 8.7 mm and was previously  8.8 mm, image #59, indicating no interval change.  Other scattered  pulmonary nodules are stable.  Linear scarring versus atelectasis right  lower lobe, stable.  No new pulmonary nodules are identified.  No  pleural effusions.  No pneumothorax.    Heart:  Borderline cardiomegaly.  No significant coronary artery  calcifications.  No pericardial effusion.    Mediastinum:  Hiatal hernia noted.  No hilar or mediastinal  lymphadenopathy is identified.    Bones/joints:  Unremarkable as visualized.  No acute fracture.  No  dislocation.    Soft tissues:  Unremarkable as visualized.    Vasculature:  Unremarkable as visualized.  No thoracic aortic  aneurysm.    Lymph nodes:  Unremarkable as visualized.  No axillary adenopathy is  noted.    Gallbladder and bile ducts:  Cholelithiasis.    Tubes, lines and devices:  Left Port-A-Cath noted.     IMPRESSION:  1.  Multiple bilateral pulmonary nodules appear stable from the previous  exam with no significant change in size of index nodules of both lungs.  2.  No new pulmonary nodules identified.  3.  Otherwise stable exam with other incidental and nonacute findings  described above.    CT Abdomen Pelvis With Contrast (05/07/2024 15:54)   FINDINGS:    Lung bases:  Pulmonary nodules noted. Refer to chest CT.    Mediastinum:  Small hiatal hernia, stable.      ABDOMEN:    Liver:  Unremarkable as visualized.  No mass.    Gallbladder and bile ducts:  Cholelithiasis, stable.  No ductal  dilation.    Pancreas:  Unremarkable as visualized.  No mass.  No ductal dilation.    Spleen:  Unremarkable as visualized.  No splenomegaly.    Adrenals:  Unremarkable as visualized.  No adrenal  masses.    Kidneys and ureters:  Unremarkable as visualized.  No solid mass.  No  hydronephrosis.    Stomach and bowel:  Sigmoid diverticulosis without diverticulitis.  No  obstruction.      PELVIS:    Appendix:  Normal appendix.    Bladder:  Unremarkable as visualized.  No mass.    Reproductive:  Unremarkable as visualized.      ABDOMEN and PELVIS:    Intraperitoneal space:  Unremarkable as visualized.  No significant  fluid collection.  No pneumoperitoneum identified.    Bones/joints:  Stable bony structures. Degenerative changes lumbar  spine.  No acute fracture.  No dislocation.    Soft tissues:  Unremarkable as visualized.    Vasculature:  Unremarkable as visualized.  No abdominal aortic  aneurysm.    Lymph nodes:  Unremarkable as visualized.  No iliac or retroperitoneal  adenopathy.     IMPRESSION:  1.  Stable exam. No evidence of metastatic disease to the abdomen or  pelvis.  2.  Cholelithiasis.  3.  Mild diverticulosis without diverticulitis.  4.  Other incidental/nonacute findings above.    CT Chest With Contrast Diagnostic (08/06/2024 13:15)   FINDINGS:    Lungs and pleural spaces:  Multiple bilateral pulmonary nodules again  noted.  Index nodule in the left lower lobe, image #54, is 8.3 mm and  was previously 8.7 mm.  Index nodule in the right lower lobe is 5.5 mm  and was previously 5.4 mm.  Index nodule in the left lower lobe is 5.4  mm and was previously 5.7 mm.  No consolidation.  No pneumothorax.  No  significant effusion.    Heart:  Mild cardiomegaly.  No significant pericardial effusion.  No  significant coronary artery calcifications.    Mediastinum:  Small hiatal hernia.  No hilar or mediastinal  lymphadenopathy.    Bones/joints:  Unremarkable as visualized.  No acute fracture.  No  dislocation.  No new bone lesions have developed.    Soft tissues:  Postsurgical changes right breast with treatment  related skin thickening noted stable from previous.    Vasculature:  Unremarkable as visualized.   No thoracic aortic  aneurysm.    Lymph nodes:  No axillary adenopathy.    Tubes, lines and devices:  Left-sided Port-A-Cath noted.    Other findings:  No definite new nodules have developed.     IMPRESSION:  1.  Stable exam. Index nodules of both lungs are essentially stable from  previous. No new nodules identified.  2.  No thoracic adenopathy.  3.  Other incidental/nonacute findings above stable.    CT Abdomen Pelvis With Contrast (08/06/2024 13:18)   FINDINGS:    Lung bases:  Unremarkable as visualized.  No mass.  No consolidation.    Mediastinum:  Small hiatal hernia.      ABDOMEN:    Liver:  Unremarkable as visualized.  No mass.    Gallbladder and bile ducts:  Cholelithiasis.  No ductal dilation.    Pancreas:  Unremarkable as visualized.  No mass.  No ductal dilation.    Spleen:  Unremarkable as visualized.  No splenomegaly.    Adrenals:  Unremarkable as visualized.  No mass.    Kidneys and ureters:  Tiny nonobstructing right kidney stones.    Stomach and bowel:  Sigmoid diverticulosis without diverticulitis.  No  obstruction.      PELVIS:    Appendix:  Normal appendix.    Bladder:  Unremarkable as visualized.  No mass.    Reproductive:  Unremarkable as visualized.      ABDOMEN and PELVIS:    Intraperitoneal space:  Unremarkable as visualized.  No free air.  No  significant fluid collection.    Bones/joints:  No acute fracture.  No dislocation.    Soft tissues:  Unremarkable as visualized.    Vasculature:  Unremarkable as visualized.  No abdominal aortic  aneurysm.    Lymph nodes:  Unremarkable as visualized.  No enlarged lymph nodes.     IMPRESSION:  1.  No evidence of metastatic disease to abdomen or pelvis.  2.  Cholelithiasis.  3.  Diverticulosis without diverticulitis.  4.  Tiny nonobstructing right kidney stones. No hydronephrosis.  5.  Other incidental/nonacute findings above.    CT Chest With Contrast Diagnostic (11/19/2024 15:52)   FINDINGS:     LUNGS: Multiple parenchymal nodules bilaterally are  stable.     HEART: Coronary artery calcifications     MEDIASTINUM: No masses. No enlarged lymph nodes.  No fluid collections.     PLEURA: No pleural effusion. No pleural mass or abnormal calcification.  No pneumothorax.     VASCULATURE: No evidence of aneurysm.      BONES: No acute bony abnormality.     VISUALIZED UPPER ABDOMEN: Please see the CT report for the abdomen and  pelvis     Other: None.     IMPRESSION:     1. Multiple bilateral parenchymal nodules unchanged from the previous  exam    CT Abdomen Pelvis With Contrast (11/19/2024 15:54)   FINDINGS:     Lower thorax: Clear. No effusions.     Abdomen:     Liver: Homogeneous. No focal hepatic mass or ductal dilatation.     Gallbladder: Cholelithiasis     Pancreas: Unremarkable. No mass or ductal dilatation.     Spleen: Homogeneous. No splenomegaly.     Adrenals: No mass.     Kidneys/ureters: No mass. No obstructive uropathy.  No evidence of  urolithiasis.     GI tract: Sigmoid diverticuli without evidence of diverticulitis. There  is no evidence of appendicitis     MESENTERY: No free fluid, walled off fluid collections, mesenteric  stranding, or enlarged lymph nodes        Vasculature: No evidence of aneurysm.     Abdominal wall: No focal hernia or mass.        Bladder: No focal mass or significant wall thickening     Reproductive: Unremarkable as visualized     Bones: No acute bony abnormality.     IMPRESSION:     1. No evidence of metastatic disease to the abdomen or pelvis     2. Other findings as above          RECENT LABS:  Lab Results   Component Value Date    WBC 5.74 01/21/2025    HGB 15.0 01/21/2025    HCT 48.8 (H) 01/21/2025    MCV 96.8 01/21/2025    RDW 14.1 01/21/2025     01/21/2025    NEUTRORELPCT 65.8 01/21/2025    LYMPHORELPCT 23.5 01/21/2025    MONORELPCT 8.7 01/21/2025    EOSRELPCT 1.2 01/21/2025    BASORELPCT 0.5 01/21/2025    NEUTROABS 3.77 01/21/2025    LYMPHSABS 1.35 01/21/2025       Lab Results   Component Value Date      01/21/2025    K 4.2 01/21/2025    CO2 22.0 01/21/2025     01/21/2025    BUN 17 01/21/2025    CREATININE 0.73 01/21/2025    EGFRIFNONA 87 02/24/2022    GLUCOSE 88 01/21/2025    CALCIUM 9.6 01/21/2025    ALKPHOS 81 01/21/2025    AST 15 01/21/2025    ALT 16 01/21/2025    BILITOT 1.0 01/21/2025    ALBUMIN 4.3 01/21/2025    PROTEINTOT 7.3 01/21/2025    MG 2.0 09/01/2023     Lab Results   Component Value Date    TSH 1.840 01/21/2025     Lab Results   Component Value Date    FERRITIN 169.00 (H) 01/20/2022    IRON 102 01/20/2022    TIBC 264 (L) 01/20/2022    LABIRON 39 01/20/2022    SUUZYBNV70 437 01/20/2022    FOLATE 16.40 01/20/2022     Lab Results   Component Value Date     (H) 01/20/2022     ASSESSMENT & PLAN:  Yadira Flowers is a very pleasant 69 y.o. female with a history of cervical cancer, breast cancer and malignant melanoma.    1. History of Breast Cancer:  - Ms. Onur Burgos had Stage IA ([id number]) moderately differentiated invasive ductal carcinoma of the right breast diagnosed in October 2015.  - She underwent R lympectomy and SLNBx performed by Dr. Steve Isbell 10-22-15 and then received adjuvant radiation in [place].  - After radiation, she was started on Arimidex which was then switched to Exemestane.  She took Exemestane for almost 5 years. Unfortunately, she developed vaginal bleeding and hematuria related to vaginal atrophy related to hormonal blockade.  Given this, stopped Exemestane.  She is doing much better in this regard since stopping hormonal therapy.  - Mammogram 2/21/24 without cause for concern, recommended annual diagnostic mammograms.  This has been scheduled for 2/25/2025.    2.  Metastatic malignant melanoma:  -  S/p resection of a primary lesion on her back performed by Dr. Catrachito Amaro of Dermatology in [place] in 2004.  -  S/p FNA R axillary LN which was concerning for melanoma.  Excisional biopsy confirmed metastatic malignant melanoma.  - Suspect her lung and  liver lesions are also related to her melanoma.  - She has seen Dr. Her and he performed bronchoscopy on 02/23/2022. Pathology was negative for malignant cells.  - Liver aspiration showed abscess which grew Klebsiella, suspect this was a secondary infection.    -  Sent  excised LN tissue for CARIS testing.  -  PET-CT showed no significant FDG uptake but re-demonstrated abnormalities in the R axilla, RLL cavitary mass, Vargas lung nodules and liver.    -  MRI Brain showed no acute findings in the head/brain.  -  Recommended initial therapy with immunotherapy.  We discussed different options for immunotherapy including single agent PD-L1 treatment or combination with Yervoy.  We recommended combination with Yervoy for better RR, PFS and OS even with increased risk of immune-mediated side effects.  We discussed the initially approved FDA dosing v. Altered dosing with lower dose IPI/higher dosed Nivo.  The latter has been shown to have fewer side effects and is thought to likely have similar efficacy (though trials weren't really powered to definitively show that).  After a lengthy discussion, we decided to proceed with 4 cycles of Ipi 1 mg/kg / Nivo 3 mg/kg q 21d followed by Nivolumab single agent.  We discussed potential risks, benefits and side effects extensively and she decided to proceed.  -  She completed 4 cycles of Ipi / Nivo and tolerated treatment very well.  -  Repeat CT imaging done 5-3-22 after 4 cycles Ipi/Nivo showed an excellent partial response to treatment.  Have continued with single agent Nivolumab as planned which she is tolerating well.   -Repeat CT CAP from 11/19/24(summarized above) again showing stable disease with excellent control of her metastatic melanoma.  -Therefore, will continue with Nivolumab therapy as planned.  - Will plan to repeat CT of the chest abdomen pelvis with contrast prior to her follow-up on 3/18/2025.    3.  Bilateral pulmonary emboli:  -  CTPE protocol 1-20-22  confirmed  bilateral pulmonary emboli  -  BLE dopplers negative for DVT.  -  Continue Eliquis 5 mg BID.    -  We previously discussed length of therapy.  She watched her  die with PE and her mother has h/o PEs as well.  Presumably the cancer was the risk factor for development of PE and since her cancer is incurable,  have recommended she continue Eliquis indefinately as long as she continues without significant side effects.  She started complaining of difficulty with easy bleeding with minor trauma.  It had been over 6 months that she has had full anticoagulation.  Given worsening side effects, decided to continue Eliquis, but reduced dose to 2.5 mg twice a day.  She is doing well on this dose without bleeding or thrombosis. Will continue.    4.  H/o Colon polyps:  -  Dr. Maldonado performed c-scope 12-09-20 with discovery of 8 hyperplastic polyps in the distal rectum as well as diverticulosis.  He recommended repeat c-scope after 5 years.    5.  H/o cervical cancer treated with cryotherapy at [facility] in [facility]:  -  Followed up with Dr. Manav Real of gynecology.  She says she had exam including pap smear and then pelvic U/S which was unrevealing.    6. Prophylaxis:  -  Had COVID vaccine x 2 (Pfizer).  She received Evusheld on 4-7-22.. Recommended booster. Received 2023 flu vaccine. Received  Prevnar 20 vaccine .  Will give 2024 flu vaccine today.    7. ACO / DWAYNE/Other  Quality measures  -  Yadira Flowers did not receive 2024 flu vaccine.  Will administer this today.  -  Yadira Flowers reports a pain score of 0.    -  Current outpatient and discharge medications have been reconciled for the patient.  Reviewed by: Loreto Stone MD      8.  Follow up:  -  Continue Nivolumab as planned.  --  Mammogram scheduled for 2/25/2025.  - Repeat CT of the chest abdomen pelvis with contrast prior to her follow-up on 3/18/2025.  Will obtain lab at that time to include CBC, CMP and TSH  -  Note:  Pt prefers that PAC be accessed  for CT imaging.    I spent 30 minutes with Yadira Lionel today.  In the office today, more than 50% of this time was spent face-to-face with her  in counseling / coordination of care, reviewing her medical history and counseling on the current treatment plan.  All questions were answered to her satisfaction        Electronically Signed by:  Loreto Stone MD        CC:     MD Manav Cotto MD Aaron House, MD

## 2025-01-21 ENCOUNTER — OFFICE VISIT (OUTPATIENT)
Dept: ONCOLOGY | Facility: CLINIC | Age: 70
End: 2025-01-21
Payer: COMMERCIAL

## 2025-01-21 ENCOUNTER — LAB (OUTPATIENT)
Dept: ONCOLOGY | Facility: CLINIC | Age: 70
End: 2025-01-21
Payer: COMMERCIAL

## 2025-01-21 ENCOUNTER — INFUSION (OUTPATIENT)
Dept: ONCOLOGY | Facility: HOSPITAL | Age: 70
End: 2025-01-21
Payer: COMMERCIAL

## 2025-01-21 VITALS
OXYGEN SATURATION: 99 % | DIASTOLIC BLOOD PRESSURE: 76 MMHG | TEMPERATURE: 97.1 F | SYSTOLIC BLOOD PRESSURE: 136 MMHG | HEART RATE: 68 BPM | RESPIRATION RATE: 18 BRPM

## 2025-01-21 VITALS
OXYGEN SATURATION: 99 % | WEIGHT: 176 LBS | SYSTOLIC BLOOD PRESSURE: 115 MMHG | DIASTOLIC BLOOD PRESSURE: 76 MMHG | HEART RATE: 71 BPM | RESPIRATION RATE: 18 BRPM | BODY MASS INDEX: 27.16 KG/M2 | TEMPERATURE: 97.3 F

## 2025-01-21 DIAGNOSIS — C78.7 METASTASIS TO LIVER: ICD-10-CM

## 2025-01-21 DIAGNOSIS — C43.59 MALIGNANT MELANOMA OF TORSO EXCLUDING BREAST: ICD-10-CM

## 2025-01-21 DIAGNOSIS — C43.59 MALIGNANT MELANOMA OF TORSO EXCLUDING BREAST: Primary | ICD-10-CM

## 2025-01-21 DIAGNOSIS — C78.02 MALIGNANT NEOPLASM METASTATIC TO BOTH LUNGS: ICD-10-CM

## 2025-01-21 DIAGNOSIS — C50.911 MALIGNANT NEOPLASM OF RIGHT BREAST IN FEMALE, ESTROGEN RECEPTOR POSITIVE, UNSPECIFIED SITE OF BREAST: ICD-10-CM

## 2025-01-21 DIAGNOSIS — C78.02 MALIGNANT NEOPLASM METASTATIC TO BOTH LUNGS: Primary | ICD-10-CM

## 2025-01-21 DIAGNOSIS — Z17.0 MALIGNANT NEOPLASM OF RIGHT BREAST IN FEMALE, ESTROGEN RECEPTOR POSITIVE, UNSPECIFIED SITE OF BREAST: ICD-10-CM

## 2025-01-21 DIAGNOSIS — Z86.0100 HISTORY OF COLON POLYPS: ICD-10-CM

## 2025-01-21 DIAGNOSIS — C78.01 MALIGNANT NEOPLASM METASTATIC TO BOTH LUNGS: Primary | ICD-10-CM

## 2025-01-21 DIAGNOSIS — Z86.711 HISTORY OF PULMONARY EMBOLISM: ICD-10-CM

## 2025-01-21 DIAGNOSIS — Z95.828 PORT-A-CATH IN PLACE: ICD-10-CM

## 2025-01-21 DIAGNOSIS — C78.01 MALIGNANT NEOPLASM METASTATIC TO BOTH LUNGS: ICD-10-CM

## 2025-01-21 DIAGNOSIS — R53.83 OTHER FATIGUE: ICD-10-CM

## 2025-01-21 LAB
ALBUMIN SERPL-MCNC: 4.3 G/DL (ref 3.5–5.2)
ALBUMIN/GLOB SERPL: 1.4 G/DL
ALP SERPL-CCNC: 81 U/L (ref 39–117)
ALT SERPL W P-5'-P-CCNC: 16 U/L (ref 1–33)
ANION GAP SERPL CALCULATED.3IONS-SCNC: 13 MMOL/L (ref 5–15)
AST SERPL-CCNC: 15 U/L (ref 1–32)
BASOPHILS # BLD AUTO: 0.03 10*3/MM3 (ref 0–0.2)
BASOPHILS NFR BLD AUTO: 0.5 % (ref 0–1.5)
BILIRUB SERPL-MCNC: 1 MG/DL (ref 0–1.2)
BUN SERPL-MCNC: 17 MG/DL (ref 8–23)
BUN/CREAT SERPL: 23.3 (ref 7–25)
CALCIUM SPEC-SCNC: 9.6 MG/DL (ref 8.6–10.5)
CHLORIDE SERPL-SCNC: 104 MMOL/L (ref 98–107)
CO2 SERPL-SCNC: 22 MMOL/L (ref 22–29)
CREAT SERPL-MCNC: 0.73 MG/DL (ref 0.57–1)
DEPRECATED RDW RBC AUTO: 50.2 FL (ref 37–54)
EGFRCR SERPLBLD CKD-EPI 2021: 89.7 ML/MIN/1.73
EOSINOPHIL # BLD AUTO: 0.07 10*3/MM3 (ref 0–0.4)
EOSINOPHIL NFR BLD AUTO: 1.2 % (ref 0.3–6.2)
ERYTHROCYTE [DISTWIDTH] IN BLOOD BY AUTOMATED COUNT: 14.1 % (ref 12.3–15.4)
GLOBULIN UR ELPH-MCNC: 3 GM/DL
GLUCOSE SERPL-MCNC: 88 MG/DL (ref 65–99)
HCT VFR BLD AUTO: 48.8 % (ref 34–46.6)
HGB BLD-MCNC: 15 G/DL (ref 12–15.9)
IMM GRANULOCYTES # BLD AUTO: 0.02 10*3/MM3 (ref 0–0.05)
IMM GRANULOCYTES NFR BLD AUTO: 0.3 % (ref 0–0.5)
LYMPHOCYTES # BLD AUTO: 1.35 10*3/MM3 (ref 0.7–3.1)
LYMPHOCYTES NFR BLD AUTO: 23.5 % (ref 19.6–45.3)
MCH RBC QN AUTO: 29.8 PG (ref 26.6–33)
MCHC RBC AUTO-ENTMCNC: 30.7 G/DL (ref 31.5–35.7)
MCV RBC AUTO: 96.8 FL (ref 79–97)
MONOCYTES # BLD AUTO: 0.5 10*3/MM3 (ref 0.1–0.9)
MONOCYTES NFR BLD AUTO: 8.7 % (ref 5–12)
NEUTROPHILS NFR BLD AUTO: 3.77 10*3/MM3 (ref 1.7–7)
NEUTROPHILS NFR BLD AUTO: 65.8 % (ref 42.7–76)
NRBC BLD AUTO-RTO: 0 /100 WBC (ref 0–0.2)
PLATELET # BLD AUTO: 170 10*3/MM3 (ref 140–450)
PMV BLD AUTO: 10.7 FL (ref 6–12)
POTASSIUM SERPL-SCNC: 4.2 MMOL/L (ref 3.5–5.2)
PROT SERPL-MCNC: 7.3 G/DL (ref 6–8.5)
RBC # BLD AUTO: 5.04 10*6/MM3 (ref 3.77–5.28)
SODIUM SERPL-SCNC: 139 MMOL/L (ref 136–145)
T4 FREE SERPL-MCNC: 1.72 NG/DL (ref 0.92–1.68)
TSH SERPL DL<=0.05 MIU/L-ACNC: 1.84 UIU/ML (ref 0.27–4.2)
WBC NRBC COR # BLD AUTO: 5.74 10*3/MM3 (ref 3.4–10.8)

## 2025-01-21 PROCEDURE — 25010000002 HEPARIN LOCK FLUSH PER 10 UNITS

## 2025-01-21 PROCEDURE — 25010000002 NIVOLUMAB 240 MG/24ML SOLUTION 24 ML VIAL: Performed by: INTERNAL MEDICINE

## 2025-01-21 PROCEDURE — 84439 ASSAY OF FREE THYROXINE: CPT | Performed by: INTERNAL MEDICINE

## 2025-01-21 PROCEDURE — 96413 CHEMO IV INFUSION 1 HR: CPT

## 2025-01-21 PROCEDURE — 80050 GENERAL HEALTH PANEL: CPT | Performed by: INTERNAL MEDICINE

## 2025-01-21 RX ORDER — SODIUM CHLORIDE 9 MG/ML
250 INJECTION, SOLUTION INTRAVENOUS ONCE
Status: DISCONTINUED | OUTPATIENT
Start: 2025-01-21 | End: 2025-01-21

## 2025-01-21 RX ORDER — SODIUM CHLORIDE 0.9 % (FLUSH) 0.9 %
20 SYRINGE (ML) INJECTION AS NEEDED
OUTPATIENT
Start: 2025-01-21

## 2025-01-21 RX ORDER — HEPARIN SODIUM (PORCINE) LOCK FLUSH IV SOLN 100 UNIT/ML 100 UNIT/ML
300 SOLUTION INTRAVENOUS ONCE
OUTPATIENT
Start: 2025-01-21

## 2025-01-21 RX ORDER — SODIUM CHLORIDE 0.9 % (FLUSH) 0.9 %
10 SYRINGE (ML) INJECTION AS NEEDED
OUTPATIENT
Start: 2025-01-21

## 2025-01-21 RX ORDER — HEPARIN SODIUM (PORCINE) LOCK FLUSH IV SOLN 100 UNIT/ML 100 UNIT/ML
500 SOLUTION INTRAVENOUS AS NEEDED
OUTPATIENT
Start: 2025-01-21

## 2025-01-21 RX ORDER — SODIUM CHLORIDE 0.9 % (FLUSH) 0.9 %
10 SYRINGE (ML) INJECTION AS NEEDED
Status: DISCONTINUED | OUTPATIENT
Start: 2025-01-21 | End: 2025-01-21 | Stop reason: HOSPADM

## 2025-01-21 RX ORDER — HEPARIN SODIUM (PORCINE) LOCK FLUSH IV SOLN 100 UNIT/ML 100 UNIT/ML
500 SOLUTION INTRAVENOUS AS NEEDED
Status: DISCONTINUED | OUTPATIENT
Start: 2025-01-21 | End: 2025-01-21 | Stop reason: HOSPADM

## 2025-01-21 RX ADMIN — SODIUM CHLORIDE 480 MG: 9 INJECTION, SOLUTION INTRAVENOUS at 14:10

## 2025-01-21 RX ADMIN — Medication 10 ML: at 14:43

## 2025-01-21 RX ADMIN — HEPARIN 500 UNITS: 100 SYRINGE at 14:43

## 2025-01-21 NOTE — PROGRESS NOTES
Venipuncture Blood Specimen Collection  Venipuncture performed in left hand by Lesvia Regalado MA with good hemostasis. Patient tolerated the procedure well without complications.   01/21/25   Lesvia Regalado MA

## 2025-01-28 ENCOUNTER — DOCUMENTATION (OUTPATIENT)
Dept: ONCOLOGY | Facility: CLINIC | Age: 70
End: 2025-01-28
Payer: COMMERCIAL

## 2025-01-28 NOTE — PROGRESS NOTES
SS contacted patient [phone] as a reminder of Support Group, Thursday, January 30. Patient verbalizes that she would love to come but due to work she is unable to attend due to work schedule.    SS to email information on [employer] for online support group.    SS provided patient with contact information and encouraged patient to call with any questions, concerns, or needs.     Patient verbalized understanding and agreed with resource assistance and plan.    SS will follow and assist as needed.

## 2025-02-06 DIAGNOSIS — C43.59 MALIGNANT MELANOMA OF TORSO EXCLUDING BREAST: ICD-10-CM

## 2025-02-06 DIAGNOSIS — C78.02 MALIGNANT NEOPLASM METASTATIC TO BOTH LUNGS: ICD-10-CM

## 2025-02-06 DIAGNOSIS — C78.01 MALIGNANT NEOPLASM METASTATIC TO BOTH LUNGS: ICD-10-CM

## 2025-02-06 DIAGNOSIS — C78.7 METASTASIS TO LIVER: Primary | ICD-10-CM

## 2025-02-06 RX ORDER — SODIUM CHLORIDE 9 MG/ML
250 INJECTION, SOLUTION INTRAVENOUS ONCE
OUTPATIENT
Start: 2025-02-18

## 2025-02-20 ENCOUNTER — INFUSION (OUTPATIENT)
Dept: ONCOLOGY | Facility: HOSPITAL | Age: 70
End: 2025-02-20
Payer: COMMERCIAL

## 2025-02-20 ENCOUNTER — LAB (OUTPATIENT)
Dept: ONCOLOGY | Facility: HOSPITAL | Age: 70
End: 2025-02-20
Payer: COMMERCIAL

## 2025-02-20 VITALS
TEMPERATURE: 97.7 F | WEIGHT: 179.3 LBS | DIASTOLIC BLOOD PRESSURE: 85 MMHG | RESPIRATION RATE: 16 BRPM | BODY MASS INDEX: 27.67 KG/M2 | OXYGEN SATURATION: 98 % | SYSTOLIC BLOOD PRESSURE: 147 MMHG | HEART RATE: 76 BPM

## 2025-02-20 DIAGNOSIS — C78.02 MALIGNANT NEOPLASM METASTATIC TO BOTH LUNGS: ICD-10-CM

## 2025-02-20 DIAGNOSIS — C78.7 METASTASIS TO LIVER: ICD-10-CM

## 2025-02-20 DIAGNOSIS — Z95.828 PORT-A-CATH IN PLACE: ICD-10-CM

## 2025-02-20 DIAGNOSIS — C78.01 MALIGNANT NEOPLASM METASTATIC TO BOTH LUNGS: ICD-10-CM

## 2025-02-20 DIAGNOSIS — C78.7 METASTASIS TO LIVER: Primary | ICD-10-CM

## 2025-02-20 DIAGNOSIS — C43.59 MALIGNANT MELANOMA OF TORSO EXCLUDING BREAST: ICD-10-CM

## 2025-02-20 LAB
ALBUMIN SERPL-MCNC: 4.4 G/DL (ref 3.5–5.2)
ALBUMIN/GLOB SERPL: 1.8 G/DL
ALP SERPL-CCNC: 87 U/L (ref 39–117)
ALT SERPL W P-5'-P-CCNC: 25 U/L (ref 1–33)
ANION GAP SERPL CALCULATED.3IONS-SCNC: 11.3 MMOL/L (ref 5–15)
AST SERPL-CCNC: 23 U/L (ref 1–32)
BASOPHILS # BLD AUTO: 0.05 10*3/MM3 (ref 0–0.2)
BASOPHILS NFR BLD AUTO: 1 % (ref 0–1.5)
BILIRUB SERPL-MCNC: 0.6 MG/DL (ref 0–1.2)
BUN SERPL-MCNC: 13 MG/DL (ref 8–23)
BUN/CREAT SERPL: 18.1 (ref 7–25)
CALCIUM SPEC-SCNC: 9.5 MG/DL (ref 8.6–10.5)
CHLORIDE SERPL-SCNC: 102 MMOL/L (ref 98–107)
CO2 SERPL-SCNC: 27.7 MMOL/L (ref 22–29)
CREAT SERPL-MCNC: 0.72 MG/DL (ref 0.57–1)
DEPRECATED RDW RBC AUTO: 47.3 FL (ref 37–54)
EGFRCR SERPLBLD CKD-EPI 2021: 91.2 ML/MIN/1.73
EOSINOPHIL # BLD AUTO: 0.14 10*3/MM3 (ref 0–0.4)
EOSINOPHIL NFR BLD AUTO: 2.9 % (ref 0.3–6.2)
ERYTHROCYTE [DISTWIDTH] IN BLOOD BY AUTOMATED COUNT: 13.7 % (ref 12.3–15.4)
GLOBULIN UR ELPH-MCNC: 2.5 GM/DL
GLUCOSE SERPL-MCNC: 88 MG/DL (ref 65–99)
HCT VFR BLD AUTO: 44.3 % (ref 34–46.6)
HGB BLD-MCNC: 14.1 G/DL (ref 12–15.9)
IMM GRANULOCYTES # BLD AUTO: 0.01 10*3/MM3 (ref 0–0.05)
IMM GRANULOCYTES NFR BLD AUTO: 0.2 % (ref 0–0.5)
LYMPHOCYTES # BLD AUTO: 1.48 10*3/MM3 (ref 0.7–3.1)
LYMPHOCYTES NFR BLD AUTO: 31 % (ref 19.6–45.3)
MCH RBC QN AUTO: 29.6 PG (ref 26.6–33)
MCHC RBC AUTO-ENTMCNC: 31.8 G/DL (ref 31.5–35.7)
MCV RBC AUTO: 92.9 FL (ref 79–97)
MONOCYTES # BLD AUTO: 0.45 10*3/MM3 (ref 0.1–0.9)
MONOCYTES NFR BLD AUTO: 9.4 % (ref 5–12)
NEUTROPHILS NFR BLD AUTO: 2.65 10*3/MM3 (ref 1.7–7)
NEUTROPHILS NFR BLD AUTO: 55.5 % (ref 42.7–76)
NRBC BLD AUTO-RTO: 0 /100 WBC (ref 0–0.2)
PLATELET # BLD AUTO: 204 10*3/MM3 (ref 140–450)
PMV BLD AUTO: 10.1 FL (ref 6–12)
POTASSIUM SERPL-SCNC: 3.7 MMOL/L (ref 3.5–5.2)
PROT SERPL-MCNC: 6.9 G/DL (ref 6–8.5)
RBC # BLD AUTO: 4.77 10*6/MM3 (ref 3.77–5.28)
SODIUM SERPL-SCNC: 141 MMOL/L (ref 136–145)
T4 FREE SERPL-MCNC: 1.44 NG/DL (ref 0.92–1.68)
TSH SERPL DL<=0.05 MIU/L-ACNC: 1.65 UIU/ML (ref 0.27–4.2)
WBC NRBC COR # BLD AUTO: 4.78 10*3/MM3 (ref 3.4–10.8)

## 2025-02-20 PROCEDURE — 80050 GENERAL HEALTH PANEL: CPT

## 2025-02-20 PROCEDURE — 84439 ASSAY OF FREE THYROXINE: CPT

## 2025-02-20 PROCEDURE — 96413 CHEMO IV INFUSION 1 HR: CPT

## 2025-02-20 PROCEDURE — 25010000002 NIVOLUMAB 240 MG/24ML SOLUTION 24 ML VIAL: Performed by: INTERNAL MEDICINE

## 2025-02-20 PROCEDURE — 25010000002 HEPARIN LOCK FLUSH PER 10 UNITS

## 2025-02-20 RX ORDER — HEPARIN SODIUM (PORCINE) LOCK FLUSH IV SOLN 100 UNIT/ML 100 UNIT/ML
300 SOLUTION INTRAVENOUS ONCE
OUTPATIENT
Start: 2025-02-20

## 2025-02-20 RX ORDER — SODIUM CHLORIDE 9 MG/ML
250 INJECTION, SOLUTION INTRAVENOUS ONCE
Status: DISCONTINUED | OUTPATIENT
Start: 2025-02-20 | End: 2025-02-20

## 2025-02-20 RX ORDER — HEPARIN SODIUM (PORCINE) LOCK FLUSH IV SOLN 100 UNIT/ML 100 UNIT/ML
500 SOLUTION INTRAVENOUS AS NEEDED
OUTPATIENT
Start: 2025-02-20

## 2025-02-20 RX ORDER — HEPARIN SODIUM (PORCINE) LOCK FLUSH IV SOLN 100 UNIT/ML 100 UNIT/ML
500 SOLUTION INTRAVENOUS AS NEEDED
Status: DISCONTINUED | OUTPATIENT
Start: 2025-02-20 | End: 2025-02-20 | Stop reason: HOSPADM

## 2025-02-20 RX ORDER — SODIUM CHLORIDE 0.9 % (FLUSH) 0.9 %
10 SYRINGE (ML) INJECTION AS NEEDED
Status: DISCONTINUED | OUTPATIENT
Start: 2025-02-20 | End: 2025-02-20 | Stop reason: HOSPADM

## 2025-02-20 RX ORDER — SODIUM CHLORIDE 0.9 % (FLUSH) 0.9 %
20 SYRINGE (ML) INJECTION AS NEEDED
OUTPATIENT
Start: 2025-02-20

## 2025-02-20 RX ORDER — SODIUM CHLORIDE 0.9 % (FLUSH) 0.9 %
10 SYRINGE (ML) INJECTION AS NEEDED
OUTPATIENT
Start: 2025-02-20

## 2025-02-20 RX ADMIN — SODIUM CHLORIDE 480 MG: 9 INJECTION, SOLUTION INTRAVENOUS at 15:24

## 2025-02-20 RX ADMIN — HEPARIN 500 UNITS: 100 SYRINGE at 15:55

## 2025-02-25 ENCOUNTER — HOSPITAL ENCOUNTER (OUTPATIENT)
Dept: MAMMOGRAPHY | Facility: HOSPITAL | Age: 70
Discharge: HOME | End: 2025-02-25
Admitting: INTERNAL MEDICINE
Payer: COMMERCIAL

## 2025-02-25 DIAGNOSIS — C78.01 MALIGNANT NEOPLASM METASTATIC TO BOTH LUNGS: ICD-10-CM

## 2025-02-25 DIAGNOSIS — Z17.0 MALIGNANT NEOPLASM OF RIGHT BREAST IN FEMALE, ESTROGEN RECEPTOR POSITIVE, UNSPECIFIED SITE OF BREAST: ICD-10-CM

## 2025-02-25 DIAGNOSIS — C78.02 MALIGNANT NEOPLASM METASTATIC TO BOTH LUNGS: ICD-10-CM

## 2025-02-25 DIAGNOSIS — C50.911 MALIGNANT NEOPLASM OF RIGHT BREAST IN FEMALE, ESTROGEN RECEPTOR POSITIVE, UNSPECIFIED SITE OF BREAST: ICD-10-CM

## 2025-02-25 DIAGNOSIS — C43.59 MALIGNANT MELANOMA OF TORSO EXCLUDING BREAST: ICD-10-CM

## 2025-02-25 DIAGNOSIS — C78.7 METASTASIS TO LIVER: ICD-10-CM

## 2025-02-25 PROCEDURE — G0279 TOMOSYNTHESIS, MAMMO: HCPCS

## 2025-02-25 PROCEDURE — 77066 DX MAMMO INCL CAD BI: CPT

## 2025-03-05 ENCOUNTER — DOCUMENTATION (OUTPATIENT)
Dept: ONCOLOGY | Facility: CLINIC | Age: 70
End: 2025-03-05
Payer: COMMERCIAL

## 2025-03-14 ENCOUNTER — HOSPITAL ENCOUNTER (OUTPATIENT)
Dept: CT IMAGING | Facility: HOSPITAL | Age: 70
Discharge: HOME | End: 2025-03-14
Payer: COMMERCIAL

## 2025-03-14 DIAGNOSIS — C78.02 MALIGNANT NEOPLASM METASTATIC TO BOTH LUNGS: ICD-10-CM

## 2025-03-14 DIAGNOSIS — C78.01 MALIGNANT NEOPLASM METASTATIC TO BOTH LUNGS: ICD-10-CM

## 2025-03-14 DIAGNOSIS — Z17.0 MALIGNANT NEOPLASM OF RIGHT BREAST IN FEMALE, ESTROGEN RECEPTOR POSITIVE, UNSPECIFIED SITE OF BREAST: ICD-10-CM

## 2025-03-14 DIAGNOSIS — C50.911 MALIGNANT NEOPLASM OF RIGHT BREAST IN FEMALE, ESTROGEN RECEPTOR POSITIVE, UNSPECIFIED SITE OF BREAST: ICD-10-CM

## 2025-03-14 DIAGNOSIS — C78.7 METASTASIS TO LIVER: ICD-10-CM

## 2025-03-14 DIAGNOSIS — C43.59 MALIGNANT MELANOMA OF TORSO EXCLUDING BREAST: ICD-10-CM

## 2025-03-14 DIAGNOSIS — C78.7 METASTASIS TO LIVER: Primary | ICD-10-CM

## 2025-03-14 PROCEDURE — 71260 CT THORAX DX C+: CPT

## 2025-03-14 PROCEDURE — 25510000001 IOPAMIDOL 61 % SOLUTION: Performed by: INTERNAL MEDICINE

## 2025-03-14 PROCEDURE — 74177 CT ABD & PELVIS W/CONTRAST: CPT

## 2025-03-14 PROCEDURE — 25010000002 HEPARIN LOCK FLUSH PER 10 UNITS: Performed by: RADIOLOGY

## 2025-03-14 RX ORDER — IOPAMIDOL 612 MG/ML
100 INJECTION, SOLUTION INTRAVASCULAR
Status: COMPLETED | OUTPATIENT
Start: 2025-03-14 | End: 2025-03-14

## 2025-03-14 RX ORDER — SODIUM CHLORIDE 9 MG/ML
250 INJECTION, SOLUTION INTRAVENOUS ONCE
OUTPATIENT
Start: 2025-03-20

## 2025-03-14 RX ORDER — HEPARIN SODIUM (PORCINE) LOCK FLUSH IV SOLN 100 UNIT/ML 100 UNIT/ML
500 SOLUTION INTRAVENOUS AS NEEDED
Status: DISCONTINUED | OUTPATIENT
Start: 2025-03-14 | End: 2025-03-15 | Stop reason: HOSPADM

## 2025-03-14 RX ADMIN — IOPAMIDOL 80 ML: 612 INJECTION, SOLUTION INTRAVENOUS at 15:00

## 2025-03-14 RX ADMIN — Medication 500 UNITS: at 15:00

## 2025-03-17 NOTE — PROGRESS NOTES
"NAME: Yadira Flowers    : 1955    DATE: 2025      DIAGNOSIS:   1.  Stage IA ([id number]) moderately differentiated invasive ductal carcinoma of the right breast diagnosed in 2015.    2.  H/o malignant Melanoma on her Back    3.  H/o cervical cancer treated with cryotherapy at [facility] in     4.  Recurrent / metastatic malignant Melanoma  -  R axillary LN bx 22.    -  She has R axillary LN mass (2.47x2.16), Solitary cavitary R lobe liver lesion, cavitary RLL lung mass,  and bilateral parenchymal lung nodules      TREATMENT HISTORY:   1.         CHIEF COMPLAINT:  Follow up of metastatic melanoma/toxicity check and recent imaging    HISTORY OF PRESENT ILLNESS:   Yadira Flowers is a very pleasant 69 y.o. female who was referred by Dr. Dilcia Pedro for evaluation and treatment of breast cancer. She was living in [place] in 2015 when her dog brought attention to and \"found\" an abnormal lump in her right breast.  She was treated by Dr. Steve Isbell and underwent R lumpectomy with SLNBx on 10-22-15 as below.  She then had what sounds like accelerated partial breast irradiation.  She was then started on Arimidex which was later switched to Exemestane for a better side effect profile.  She took this for about 5 years, stopping a couple of weeks ago.  She stopped taking Exemestane because she developed some vaginal bleeding and hematuria.  This was evaluated by her PCP and gynecologist and felt to be due to vaginal atrophy.  She was prescribed a hormone pill which she hasn't yet started and was referred here to discuss things further.      Aside from bleeding which has been uncomfortable and distressing for her, Ms. Onur Burgos has generally been well.  The last year has been hard on her emotionally with the loss of her  and then her dog, her home and her insurance, but she is coping well and has good family support in [place].  She says she gained weight with her breast " cancer treatment and gained weight when her  .  She has been working now to lose weight and has lost 20 lbs over the last 2 months with dieting.  She denies chest pain or shortness of breath.  She complains of some RLQ cramping pain and isn't sure what is causing that.  She denies difficulty moving her bowels.  No blood in her stool that she is aware of.  She has had vaginal bleeding and hematuria as above.        INTERVAL HISTORY:  Ms. Burgos presents today for follow up of breast cancer, metastatic melanoma and recent imaging.  Since her last visit, she has been well.  She has continued to do really well with immunotherapy and denies side effects of treatment.  She had CT CAP on 3/14/25 which we reviewed together today which continues to show good control of her cancer.  She also had mammogram last month which showed no cause for concern.  She has had a lot of stress related to her family.        PAST MEDICAL HISTORY:  Past Medical History:   Diagnosis Date    Back pain     Basal cell carcinoma (BCC) in situ of skin     Dr. Mohr - Derm    Breast cancer     right    Cancer     breast, cervical, melanoma    Cervical cancer     Diagnosed in .  Treated by repeated local intervention and ultimately received cryotherapy at [facility] with resolution.    Elbow fracture     Foot fracture     Headache     Hyperlipidemia     Hypertension     TAKEN OFF MEDS    Melanoma     on her back  - treated by Dermatologist Catrachito Amaro in [place], [place] with what sounds like wide local excision.     Pulmonary embolism     Sinusitis        PAST SURGICAL HISTORY:  Past Surgical History:   Procedure Laterality Date    AXILLARY LYMPH NODE BIOPSY/EXCISION Right 2022    Procedure: AXILLARY LYMPH NODE BIOPSY/EXCISION;  Surgeon: Dianelys Cummings MD;  Location: [facility];  Service: General;  Laterality: Right;    BREAST LUMPECTOMY Right     ca    BREAST SURGERY      Secondary to cancer    BRONCHOSCOPY  Right 2022    Procedure: BRONCHOSCOPY WITH ENDOBRONCHIAL ULTRASOUND;  Surgeon: Chris Her MD;  Location:  [facility];  Service: Pulmonary;  Laterality: Right;    COLONOSCOPY N/A 2020    Procedure: COLONOSCOPY;  Surgeon: Mohsen Maldonado MD;  Location:  [facility];  Service: Gastroenterology;  Laterality: N/A;    ELBOW FUSION      left    FOOT SURGERY Right 2014    Broken foot    HAND SURGERY  2020    right    RHINOPLASTY  2002    SKIN CANCER EXCISION      malignant melanoma from back     US GUIDED LYMPH NODE BIOPSY  2022    VENOUS ACCESS DEVICE (PORT) INSERTION N/A 2022    Procedure: INSERTION VENOUS ACCESS DEVICE left or right;  Surgeon: Dianelys Cummings MD;  Location:  [facility];  Service: General;  Laterality: N/A;       FAMILY HISTORY:  Family History   Problem Relation Age of Onset    Other Mother         blood clots    Hypertension Mother     Ulcers Father     Hypertension Sister     Other Sister         Multiple Sclerosis    Cancer Paternal Aunt     Stroke Maternal Grandmother     Alcohol abuse Paternal Grandmother     Hypertension Sister     Multiple sclerosis Sister     Hypertension Sister     Coronary artery disease Paternal Grandfather     Cancer Maternal Aunt 30        colon    Breast cancer Neg Hx    Many paternal aunts  with malignancy of various types. Many cousins on that side of the family have had cancer as well.  One  of colon cancer in her 30s.  One had a brain tumor.    SOCIAL HISTORY:  Social History     Socioeconomic History    Marital status:    Tobacco Use    Smoking status: Never    Smokeless tobacco: Never   Vaping Use    Vaping status: Never Used   Substance and Sexual Activity    Alcohol use: Not Currently    Drug use: Never    Sexual activity: Not Currently     Birth control/protection: Post-menopausal       REVIEW OF SYSTEMS:   A comprehensive 14 point review of systems was performed.  Significant findings as mentioned above.  All  "other systems reviewed and are negative.      MEDICATIONS:  The current medication list was reviewed in the EMR    Current Outpatient Medications:     atorvastatin (LIPITOR) 10 MG tablet, Take 1 tablet by mouth Daily., Disp: , Rfl:     Eliquis 2.5 MG tablet tablet, TAKE 1 TABLET BY MOUTH TWICE DAILY (Patient taking differently: 1 tablet Daily.), Disp: 60 tablet, Rfl: 5    fluticasone (FLONASE) 50 MCG/ACT nasal spray, 2 sprays by Each Nare route Daily. Shake liquid, Disp: 16 g, Rfl: 5    lidocaine-prilocaine (EMLA) 2.5-2.5 % cream, Apply to port a cath ~30 min -1 hour prior to chemotherapy, Disp: 30 g, Rfl: 3    ondansetron ODT (Zofran ODT) 8 MG disintegrating tablet, Place 1 tablet on the tongue Every 8 (Eight) Hours As Needed for Nausea or Vomiting., Disp: 30 tablet, Rfl: 5    pantoprazole (PROTONIX) 20 MG EC tablet, TAKE 1 TABLET BY MOUTH DAILY, Disp: 30 tablet, Rfl: 2    prochlorperazine (COMPAZINE) 10 MG tablet, TAKE 1 TABLET BY MOUTH EVERY 6 HOURS AS NEEDED FOR NAUSEA, Disp: 360 tablet, Rfl: 3    sennosides-docusate (senna-docusate sodium) 8.6-50 MG per tablet, Take 2 tablets by mouth 2 (Two) Times a Day., Disp: 120 tablet, Rfl: 2    Tirzepatide (Mounjaro) 2.5 MG/0.5ML solution pen-injector pen, Inject 0.5 mL under the skin into the appropriate area as directed 1 (One) Time Per Week. (Patient taking differently: Inject 7.5 mg under the skin into the appropriate area as directed 1 (One) Time Per Week.), Disp: , Rfl:     gabapentin (NEURONTIN) 100 MG capsule, Take 1 capsule by mouth As Needed. (Patient not taking: Reported on 3/20/2025), Disp: , Rfl:     ALLERGIES:    No Known Allergies    PHYSICAL EXAM:  Vitals:    03/20/25 1314   BP: 105/75   Pulse: 96   Resp: 18   Temp: 97.3 °F (36.3 °C)   TempSrc: Temporal   SpO2: 97%   Weight: 77.7 kg (171 lb 3.2 oz)   Height: 165.1 cm (65\")   PainSc: 0-No pain       Body surface area is 1.85 meters squared.  Body mass index is 28.49 kg/m².  ECOG score: 0   General:  Awake, " alert and oriented, appears well.  HEENT:  Pupils are equal, round and reactive to light and accommodation, Extra-ocular movements full, Oropharyx clear, mucous membranes moist  Neck:  No JVD, thyromegaly or lymphadenopathy  CV:  Regular rate and rhythm, no murmurs, rubs or gallops  Resp:  Lungs are clear to auscultation bilaterally, without wheezing.  Breast: Deferred today. Previously: S/p R lumpectomy. There is a somewhat firm area at the site of surgery but there are no palpable masses.  She is s/p excision of R axillary LN, with some post-surgical swelling.  Left breast is without mass lesions.  No L axillary adenopathy.  Abd:  Soft, non-tender, non-distended, bowel sounds present, no HSM or masses noted  Ext:  No clubbing, cyanosis, or lower extremity edema  Lymph:  No cervical, supraclavicular, axillary adenopathy  Neuro:  MS as above, grossly non-focal exam    PATHOLOGY:  10-22-15            12-16-21          01-13-22          ENDOSCOPY:  Colonoscopy 12-09-20     Findings: 8 hyperplastic appearing polyps of the distal rectum, diverticulosis moderately throughout the sigmoid colon      IMAGING:  CTCAP 12-15-20  On the lung windows there are several calcified  granulomas in the lungs. No noncalcified pulmonary parenchymal lung  nodules were identified. On the soft tissue windows there were no  enlarged lymph nodes in the supraclavicular or axillary regions. No  mediastinal or hilar lymphadenopathy was seen. The heart was not  enlarged. There were no pericardial effusions.     IMPRESSION:  No CT findings of metastatic disease in the chest. There are  calcified granulomas in the lungs.     CT FINDINGS: The liver and spleen were normal in size and shape. The  gallbladder contains several stones. The wall was not thickened. The  bile ducts in the liver are not dilated. There is nothing seen to  suggest metastatic disease in the liver. The pancreas shows no evidence  of mass or inflammation. No adrenal or renal  lesions are demonstrated.  The aorta is normal in caliber. There were no enlarged lymph nodes in  the retroperitoneum or in the mesentery. The bowel shows no evidence of  obstruction. In the pelvis there were no masses or fluid collections.  There were no ventral hernias.     IMPRESSION:  No CT findings of metastatic disease in the abdomen or  pelvis. This study does demonstrate several stones in the lumen of the  gallbladder.         Bilateral diagnostic mammogram 01-31-21  FINDINGS: There are scattered areas of fibroglandular density.     The bilateral fibroglandular pattern does not appear significantly  changed compared to the exam from 2020. This includes postoperative  changes in the right 12:00 region. Mild skin thickening is noted  involving the right breast which is likely treatment related. A few  bilateral scattered calcifications are noted.     IMPRESSION:  Incomplete examination as comparison with older outside  prior mammograms is needed.        BI-RADS CATEGORY:   0, INCOMPLETE:  Need prior mammograms for comparison        RECOMMENDATION:  We will attempt to obtain older outside prior  mammograms for comparison.      CTCAP 12-12-21  FINDINGS: Lack of IV contrast hinders parenchymal assessment of the mediastinum, liver, spleen, pancreas, adrenal glands and kidneys.         Significant infiltrate seen in the right lower lobe with interstitial component. There are also lung nodules present in the right lung. Index nodule in the right lung base medially is 5 mm maximal diameter. Other smaller nodules seen throughout the right  lung. Tiny 3 mm lingular nodule seen in series 3 image 30. A benign calcified granuloma seen in the left lower lobe but a noncalcified indeterminant  5 mm left lower lobe nodule seen, series 3 image 42.     Nonenlarged mediastinal adenopathy seen. However there is an enlarged right axillary 1.8 x 2.6 cm lymph node partially seen.     Worrisome hepatic metastasis or primary hepatic  lesion seen measuring 4.4 x 4.1 cm in liver segment 8. Margins are ill-defined. Abscess could have a similar appearance. Suggestion of subtle gallstones. Nonobstructing 2 mm right renal calculus seen. No  obstructive uropathy. Gaseous distention of the esophagus and hiatal hernia present consistent with GERD. No enteric contrast but no gross evidence of bowel obstruction.   There is no gross free air, free fluid or focal inflammatory change.  Uterus and  adnexa are not enlarged. Osseous structures are grossly intact.     IMPRESSION:  Constellation of findings with significant consolidation in the right lower lobe, enlarged right axillary lymph node, indeterminate bilateral lung nodules and ill-defined hepatic lesion concerning for underlying malignancy in this patient .     FINDINGS: Lack of IV contrast hinders parenchymal assessment of the mediastinum, liver, spleen, pancreas, adrenal glands and kidneys.         Significant infiltrate seen in the right lower lobe with interstitial component. There are also lung nodules present in the right lung. Index nodule in the right lung base medially is 5 mm maximal diameter. Other smaller nodules seen throughout the right  lung. Tiny 3 mm lingular nodule seen in series 3 image 30. A benign calcified granuloma seen in the left lower lobe but a noncalcified indeterminant  5 mm left lower lobe nodule seen, series 3 image 42.     Nonenlarged mediastinal adenopathy seen. However there is an enlarged right axillary 1.8 x 2.6 cm lymph node partially seen.     Worrisome hepatic metastasis or primary hepatic lesion seen measuring 4.4 x 4.1 cm in liver segment 8. Margins are ill-defined. Abscess could have a similar appearance. Suggestion of subtle gallstones. Nonobstructing 2 mm right renal calculus seen. No  obstructive uropathy. Gaseous distention of the esophagus and hiatal hernia present consistent with GERD. No enteric contrast but no gross evidence of bowel obstruction.   There  is no gross free air, free fluid or focal inflammatory change.  Uterus and  adnexa are not enlarged. Osseous structures are grossly intact.     IMPRESSION:  Constellation of findings with significant consolidation in the right lower lobe, enlarged right axillary lymph node, indeterminate bilateral lung nodules and ill-defined hepatic lesion concerning for underlying malignancy in this patient .       US Abdomen Complete 12-12-21  FINDINGS:  The visualized portions of the pancreas, IVC and aorta appear normal.        The liver is moderately coarsened in echotexture. Ill-defined hypodensity in the liver measuring 3 x 3.2 x 2.6 cm corresponds to recent CT. Unclear if this is a malignant lesion. Abscesses within the differential but prior CT was performed without any  contrast.  The common bile duct is not dilated.. Gallbladder wall is thickened at 3.4 mm with trace pericholecystic fluid and subtle hyperemia. Gallstones and shadowing present.     The kidneys are normal in size and echogenicity. There is no   hydronephrosis or focal solid lesion.  The spleen is normal in size and echotexture.     IMPRESSION:  1. Nonspecific gallbladder wall thickening and questionable pericholecystic fluid. Gallstones are better seen on CT than ultrasound.  2.  Heterogeneous liver with lesion in the liver as seen on CT. Ultrasound is not helpful in determining etiology. This could be an abscess but given the other findings on CT,  malignancy is not excluded.      CTCAP 12-15-21  FINDINGS:    LUNGS:  Increased density of the now right lower lobe mixed  groundglass and more solid-appearing consolidation with internal  cavitary component identified. Tiny right and left lung pulmonary  nodules. Grossly stable.    PLEURAL SPACE:  Small bilateral pleural effusions.  No pneumothorax.    HEART:  Unremarkable.  No cardiomegaly.  No significant pericardial  effusion.    BONES/JOINTS:  Unremarkable.  No acute fracture.  No dislocation.    SOFT  TISSUES:  Unremarkable.    VASCULATURE:  Unremarkable.  No thoracic aortic aneurysm.    LYMPH NODES:  Unremarkable.  No enlarged lymph nodes.     IMPRESSION:  1.  Increased density of the now right lower lobe mixed groundglass and  more solid-appearing consolidation with internal cavitary component  identified. Tiny right and left lung pulmonary nodules. Grossly stable.  2.  Small bilateral pleural effusions.    FINDINGS:    LUNG BASES:  Unremarkable.  No mass.  No consolidation.      ABDOMEN:    LIVER:  Right lobe of liver lesion is again identified now measuring  about 4.7 cm and was about 4.7 cm.    GALLBLADDER AND BILE DUCTS:  Gallstones noted in the gallbladder.  No  ductal dilation.    PANCREAS:  Unremarkable.  No mass.  No ductal dilation.    SPLEEN:  Unremarkable.  No splenomegaly.    ADRENALS:  Unremarkable.  No mass.    KIDNEYS AND URETERS:  Unremarkable.  No solid mass.  No  hydronephrosis.    STOMACH AND BOWEL:  Unremarkable.  No obstruction.  No mucosal  thickening.      PELVIS:    APPENDIX:  No findings to suggest acute appendicitis.    BLADDER:  Unremarkable.  No mass.    REPRODUCTIVE:  Unremarkable as visualized.      ABDOMEN and PELVIS:    INTRAPERITONEAL SPACE:  Unremarkable.  No free air.  No significant  fluid collection.    BONES/JOINTS:  No acute fracture.  No dislocation.    SOFT TISSUES:  Unremarkable.    VASCULATURE:  Unremarkable.  No abdominal aortic aneurysm.    LYMPH NODES:  Unremarkable.  No enlarged lymph nodes.     IMPRESSION:    Right lobe of liver lesion is again identified now measuring about 4.7  cm and was about 4.7 cm.      NM Bone Scan Whole Body 12-15-21  FINDINGS:    SKULL/FACIAL BONES:  No focal increased or decreased uptake.    SPINE:  Unremarkable.  No abnormal increased or decreased uptake.    RIBS:  Unremarkable.  No abnormal uptake.    LONG BONES:  Unremarkable.  No abnormal uptake.    PELVIS:  Unremarkable.  No focal increased or decreased uptake.    JOINTS:   Unremarkable.  No focal increased or decreased uptake.    SOFT TISSUES:  Unremarkable.    RENAL/BLADDER:  Within normal limits.     IMPRESSION:    Normal bone scan.      CT Abdomen With Contrast  12-18-21  FINDINGS:  Partially imaged thick-walled cavitary lesion in the right lower lobe with right basilar atelectasis/infiltrate and small right pleural effusion. There is also left basilar atelectasis/infiltrate and trace left pleural effusion. Multiple noncalcified  pulmonary nodules are scattered throughout the visualized lower lung fields, concerning for pulmonary metastatic disease.     Ill-defined right hepatic mass again noted. The spleen, pancreas, and both adrenal glands are within expected limits. Cholelithiasis. Punctate nonobstructing right intrarenal stone. Tiny left renal cyst. Kidneys are otherwise unremarkable without  hydronephrosis. Abdominal aorta normal in course and caliber without dissection. No pathologic retroperitoneal or mesenteric lymphadenopathy by size criteria. Visualized small bowel and colon are unremarkable. No bowel obstruction. No free fluid or free  air.     No aggressive osseous lesions.     IMPRESSION:     1. Ill-defined right hepatic mass again noted. This has been previously biopsied and correlation with pathology results is recommended.  2. Cholelithiasis.  3. Punctate nonobstructing right intrarenal stone.  4. No threshold abdominal lymphadenopathy or other suspicious abdominal masses.  5. Partially imaged thick-walled cavitary lesion in the right lower lobe. This may be of infectious or neoplastic etiology and continued follow-up is recommended.  6. Small bilateral pleural effusions with bibasilar atelectasis/infiltrate, worse on the right than left.  7. Numerous small noncalcified pulmonary nodules throughout the visualized lower lung fields, concerning for pulmonary metastatic disease.       Mammo Diagnostic Digital Tomosynthesis Bilateral With CAD w/ US Axilla Right  01-13-22  FINDINGS:  There are scattered areas of fibroglandular density.     Incompletely imaged is a very prominent axillary lymph node. The  bilateral fibroglandular pattern itself is stable in appearance  including postoperative changes in the superior aspect of the right  breast. There are stable bilateral calcifications. Mild skin thickening  is again noted involving the right breast which is likely treatment  related.     ULTRASOUND: Ultrasound evaluation of the right axilla demonstrates 2  markedly enlarged lymph nodes the largest of which measures 3.2 cm in  size and has no demonstrable fatty hilum. Recommend ultrasound-guided  biopsy.      IMPRESSION:  1. Stable mammographic appearance of the left breast with no findings  suspicious for malignancy.        2. Marked right axillary lymphadenopathy. Recommend ultrasound-guided  biopsy. The fibroglandular pattern of the right breast is however  stable.        BI-RADS CATEGORY:  4, SUSPICIOUS        RECOMMENDATION:  Recommend ultrasound-guided biopsy of the dominant  abnormal appearing right axillary lymph node.      DEXA Bone Density Axial 01-13-22  IMPRESSION:  Impression:  1. According to the World Health Organization definitions of  osteoporosis based on bone density, this patient's bone mineral density  is compatible with osteoporosis and the fracture risk is high.      Mammo Diagnostic Digital Tomosynthesis Bilateral With CAD w/ US Axilla Right 01-13-22  FINDINGS:  There are scattered areas of fibroglandular density.     Incompletely imaged is a very prominent axillary lymph node. The  bilateral fibroglandular pattern itself is stable in appearance  including postoperative changes in the superior aspect of the right  breast. There are stable bilateral calcifications. Mild skin thickening  is again noted involving the right breast which is likely treatment  related.     ULTRASOUND: Ultrasound evaluation of the right axilla demonstrates 2  markedly enlarged  lymph nodes the largest of which measures 3.2 cm in  size and has no demonstrable fatty hilum. Recommend ultrasound-guided  biopsy.      IMPRESSION:  1. Stable mammographic appearance of the left breast with no findings  suspicious for malignancy.        2. Marked right axillary lymphadenopathy. Recommend ultrasound-guided  biopsy. The fibroglandular pattern of the right breast is however  stable.        BI-RADS CATEGORY:  4, SUSPICIOUS        RECOMMENDATION:  Recommend ultrasound-guided biopsy of the dominant  abnormal appearing right axillary lymph node.      CT Chest With Contrast Diagnostic 01-18-22  FINDINGS:     LUNGS: Solitary mass in the right lower lobe with cavitation.  Significantly smaller today than on the prior study. 6 mm solid nodule  in the right lung posteriorly on image 30 of the axial series. Other  parenchymal nodules are seen in both lungs and are similar to the  previous study. These are concerning for metastatic nodules.     HEART: Unremarkable.     MEDIASTINUM: No masses. No enlarged lymph nodes.  No fluid collections.     PLEURA: No pleural effusion. No pleural mass or abnormal calcification.  No pneumothorax.     VASCULATURE: No evidence of aneurysm. Filling defect in the right  pulmonary artery and possibly left lower lobe pulmonary artery. This  study was not performed to evaluate for pulmonary embolus, but the  findings are concerning for bilateral pulmonary emboli.     BONES: No acute bony abnormality.     VISUALIZED UPPER ABDOMEN:Please see the CT report for the abdomen and  pelvis.     Other: Right axillary soft tissue mass is present and shows slight  interval growth. It measures 2.47 x 2.16 cm today and previously at the  same level measured approximately 2.7 x 1.89 cm.     IMPRESSION:  1. Study was not performed as a PE protocol, but there appear to be  filling defects in pulmonary arteries bilaterally concerning for  pulmonary emboli.  2. Interval decrease in size of cavitary  right lower lobe mass.  3. Stable parenchymal nodules bilaterally.  4. Very slight interval growth of right axillary soft tissue mass.     Results are being relayed to the referring physician.      US Liver 01-18-22  FINDINGS: Sonographic imaging of the liver does not show the mass  previously identified in the liver. I would suggest a follow-up CT scan  for better delineation.     Hepatic flow was hepatopedal.     There is shadowing from the gallbladder fossa.     IMPRESSION:  1. Previously identified mass is not seen on this exam in the liver.  Consider follow-up CT.  2. Shadowing from the gallbladder fossa. In the absence of  cholecystectomy, appearance is concerning for cholelithiasis.      US Venous Doppler Lower Extremity Bilateral (duplex) 01-20-22  FINDINGS:   There is patent spontaneous flow from the common femoral vein through  the posterior tibial veins.  There was no internal clot or area of noncompressibility.  Normal augmentation was elicited where applicable.     IMPRESSION:  No DVT in the lower extremities on today's exam.       CT Chest Pulmonary Embolism 01-20-22  FINDINGS: Today's study demonstrates opacification of the central  pulmonary vessels.  There are filling defects in the pulmonary arteries bilaterally. First  order arteries. This is most compatible with bilateral pulmonary  emboli..     Soft tissue mass in the right lower lobe with somewhat cavitary center  is again noted and unchanged..     There is no mediastinal lymph node enlargement     No pericardial or pleural effusion.        IMPRESSION:  1. Bilateral pulmonary emboli.  2. Parenchymal nodules bilaterally with dominant mass in the right lower  Lobe..      NM PET/CT Skull Base to Mid Thigh 01-28-22  FINDINGS:      HEAD/NECK:  Area of uptake in the posterior right paraspinal space and to lesser  degree the left paraspinal space appears related to muscle uptake.  No FDG hypermetabolic neck adenopathy.  No FDG hypermetabolic masses.      CHEST:   Numerous bilateral pulmonary nodules appear stable from the previous  chest CT and show no FDG hypermetabolism. This is likely due to nodules  being below size threshold for PET sensitivity.  Cavitary lung mass in the right lung localizing to the superior segment  of the lower lobe shows FDG hypermetabolism with maximum SUV: 2.4. This  is at the lower range for malignancy.  Enlarged right lower axillary region lymph node which is 3.0 cm shows  mild FDG hypermetabolism that is approximately with maximum SUV: 2.4.  This is at the lower range for malignancy.  Low-dose CT demonstrating no change in additional scattered pulmonary  nodules from the previous scan. Coronary artery calcifications are  stable.     ABDOMEN/PELVIS:   Faint area of low attenuation involving the right lobe of liver is  poorly evaluated with low-dose noncontrast CT but shows no focal FDG  hypermetabolism.  Physiologic FDG hypermetabolism seen throughout the solid abdominal  organs.  Physiologic FDG hypermetabolism seen throughout the GI tract.  Physiologic FDG hypermetabolism seen throughout the mesentery,  retroperitoneum and pelvis.  Low dose CT redemonstrates nonobstructing kidney stones. Cholelithiasis  again noted.     BONES: Nonspecific FDG tracer activity. No intense areas of tracer  activity noted.     IMPRESSION:     1. Cavitary mass in the right lung that shows very low-grade FDG  hypermetabolism which is at the lower range for malignancy with maximum  SUV: 2.4.  2. Enlarged right axillary region lymph node with low-grade FDG  hypermetabolism also at the lower range for malignancy with maximum SUV:  2.4.  3. Numerous bilateral pulmonary nodules compatible with metastatic  disease but show no significant FDG hypermetabolism. This may be in part  due to size of nodules being below PET sensitivity threshold.  4. Faint area of low-attenuation right lobe liver poorly evaluated with  noncontrast low-dose CT that shows no obvious  intense FDG  hypermetabolism.  5. Other nonacute findings as above on low dose CT.        [facility] 05-03-22  FINDINGS:    Lungs:  Cavitary mass in the right lower lobe has nearly resolved.  A  right lower lobe pulmonary nodule is 7.4 mm and was previously 7.5 mm.  No change. Image #38.  A left lower lobe pulmonary nodule, image #52, is  9.8 mm and was previously 9.8 mm. No change.    Pleural space:  Unremarkable.  No pneumothorax.  No significant  effusion.    Heart:  Unremarkable.  No cardiomegaly.  No significant pericardial  effusion.  No significant coronary artery calcifications.    Bones/joints:  The bony structures appear stable. No acute bony  findings identified.  No dislocation.    Soft tissues:  Unremarkable.    Vasculature:  Please note, pulmonary emboli noted on the prior exam  are not well evaluated. The previously described filling defects of the  main pulmonary arteries are not evident.  No thoracic aortic aneurysm.    Lymph nodes:  No mediastinal adenopathy by CT size criteria.    Gallbladder and bile ducts:  Cholelithiasis noted.    Other findings:  Other smaller nodules appear stable in size and  configuration.  No new nodules identified.     IMPRESSION:  1.  Near-complete resolution of previously described cavitary mass in  the right lower lobe periphery now only with linear scarring noted.  2.  Index nodules of both lungs are stable from previous exam with no  size increase identified. No new nodules are noted.  3.  Cholelithiasis.  4.  Due to timing of contrast on this study, assessment of pulmonary  emboli not performed. There appears to be significant improvement in  clot burden however in the more central vessels.    FINDINGS:    Lung bases:  Pulmonary nodules of the lung bases.    Mediastinum:  Small hiatal hernia.      ABDOMEN:    Liver:  Low attenuation lesions of the right lobe liver appear of  decreased prominence from the previous exam. Segment 8 liver lesion is  1.1 cm. Segment 7 liver  lesion is approximately 0.8 cm.    Gallbladder and bile ducts:  Cholelithiasis.  No ductal dilation.    Pancreas:  Unremarkable.  No mass.  No ductal dilation.    Spleen:  Unremarkable.  No splenomegaly.    Adrenals:  No adrenal masses identified.    Kidneys and ureters:  Unremarkable.  No solid mass.  No  hydronephrosis.    Stomach and bowel:  Sigmoid diverticulosis without evidence of  diverticulitis.  No obstruction.      PELVIS:    Appendix:  Normal appendix.    Bladder:  Unremarkable.  No mass.    Reproductive:  Unremarkable as visualized.      ABDOMEN and PELVIS:    Intraperitoneal space:  Unremarkable.  No free air.  No significant  fluid collection.    Bones/joints:  No acute fracture.  No dislocation.    Soft tissues:  Unremarkable.    Vasculature:  Unremarkable.  No abdominal aortic aneurysm.    Lymph nodes:  No iliac or retroperitoneal adenopathy.     IMPRESSION:  1.  Small low-attenuation lesions of the right lobe liver appears  decreased prominence of the previous exam. No new no focal liver lesions  identified  2.  Cholelithiasis.  3.  Otherwise stable appearance of the abdomen and pelvis.    CT Abdomen Pelvis With Contrast (08/08/2022 14:36)  FINDINGS:    LUNG BASES:  Unremarkable.  No mass.  No consolidation.      ABDOMEN:    LIVER:  Stable 1 cm right lobe of liver lesion and more posterior 7 mm  lesion.    GALLBLADDER AND BILE DUCTS:  Gallstones.  Gallbladder otherwise  unremarkable.  No ductal dilation.    PANCREAS:  Unremarkable.  No mass.  No ductal dilation.    SPLEEN:  Unremarkable.  No splenomegaly.    ADRENALS:  Unremarkable.  No mass.    KIDNEYS AND URETERS:  Unremarkable.  No solid mass.  No  hydronephrosis.    STOMACH AND BOWEL:  Unremarkable.  No obstruction.  No mucosal  thickening.      PELVIS:    APPENDIX:  No findings to suggest acute appendicitis.    BLADDER:  Unremarkable.  No mass.    REPRODUCTIVE:  Unremarkable as visualized.      ABDOMEN and PELVIS:    INTRAPERITONEAL SPACE:   Unremarkable.  No free air.  No significant  fluid collection.    BONES/JOINTS:  No acute fracture.  No dislocation.    SOFT TISSUES:  Unremarkable.    VASCULATURE:  Unremarkable.  No abdominal aortic aneurysm.    LYMPH NODES:  Unremarkable.  No enlarged lymph nodes.     IMPRESSION:  1.  Gallstones.  Gallbladder otherwise unremarkable.  2.  Stable 1 cm right lobe of liver lesion and more posterior 7 mm  Lesion.    CT Chest With Contrast Diagnostic (08/08/2022 14:40)  FINDINGS:    LUNGS:  Multiple pulmonary nodules again noted including a right lower  lobe pulmonary nodule measuring 5 mm that was 5 mm. Another right lower  lobe pulmonary nodule is 7 mm and was 7 mm. A left lower lobe pulmonary  nodule is 5 mm and was 5 mm. Other nodules appear stable.    PLEURAL SPACE:  Unremarkable.  No pneumothorax.  No significant  effusion.    HEART:  Unremarkable.  No cardiomegaly.  No significant pericardial  effusion.  No significant coronary artery calcifications.    BONES/JOINTS:  Unremarkable.  No acute fracture.  No dislocation.    SOFT TISSUES:  Unremarkable.    VASCULATURE:  Unremarkable.  No thoracic aortic aneurysm.    LYMPH NODES:  Unremarkable.  No enlarged lymph nodes.     IMPRESSION:    Multiple pulmonary nodules again noted including a right lower lobe  pulmonary nodule measuring 5 mm that was 5 mm. Another right lower lobe  pulmonary nodule is 7 mm and was 7 mm. A left lower lobe pulmonary  nodule is 5 mm and was 5 mm. Other nodules appear stable.    CT Abdomen Pelvis With Contrast (10/31/2022 14:46)  FINDINGS:    LUNG BASES:  Unremarkable.  No mass.  No consolidation.      ABDOMEN:    LIVER:  Stable right lobe of liver low-attenuation lesion measuring  about a centimeter. Stable more posterior right lobe of liver lesion  measuring about 7 mm.    GALLBLADDER AND BILE DUCTS:  Gallstones.  Gallbladder otherwise  unremarkable.  No ductal dilation.    PANCREAS:  Unremarkable.  No mass.  No ductal dilation.    SPLEEN:   Unremarkable.  No splenomegaly.    ADRENALS:  Unremarkable.  No mass.    KIDNEYS AND URETERS:  Unremarkable.  No solid mass.  No  hydronephrosis.    STOMACH AND BOWEL:  Scattered diverticula in the colon.  No  diverticulitis.  No obstruction.      PELVIS:    APPENDIX:  No findings to suggest acute appendicitis.    BLADDER:  Unremarkable.  No mass.    REPRODUCTIVE:  Unremarkable as visualized.      ABDOMEN and PELVIS:    INTRAPERITONEAL SPACE:  Unremarkable.  No free air.  No significant  fluid collection.    BONES/JOINTS:  No acute fracture.  No dislocation.    SOFT TISSUES:  Unremarkable.    VASCULATURE:  Unremarkable.  No abdominal aortic aneurysm.    LYMPH NODES:  Unremarkable.  No enlarged lymph nodes.     IMPRESSION:  1.  Gallstones.  Gallbladder otherwise unremarkable.  2.  Stable right lobe of liver low-attenuation lesion measuring about a  centimeter. Stable more posterior right lobe of liver lesion measuring  about 7 mm.    CT Chest With Contrast Diagnostic (10/31/2022 14:47)  FINDINGS:    LUNGS:  Stable subpleural right middle lobe pulmonary nodule measuring  5 mm. Stable right lower lobe pulmonary nodule measuring 6 mm. Other  bilateral pulmonary nodules appear stable in size and number.    PLEURAL SPACE:  Unremarkable.  No pneumothorax.  No significant  effusion.    HEART:  Unremarkable.  No cardiomegaly.  No significant pericardial  effusion.  No significant coronary artery calcifications.    BONES/JOINTS:  Unremarkable.  No acute fracture.  No dislocation.    SOFT TISSUES:  Right upper breast density again noted.    VASCULATURE:  Unremarkable.  No thoracic aortic aneurysm.    LYMPH NODES:  Unremarkable.  No enlarged lymph nodes.     IMPRESSION:    Stable subpleural right middle lobe pulmonary nodule measuring 5 mm.  Stable right lower lobe pulmonary nodule measuring 6 mm. Other bilateral  pulmonary nodules appear stable in size and number.    CT chest with contrast 1/25/23  FINDINGS:     LUNGS:  Multiple parenchymal nodules are present bilaterally and are  stable in size, number, and appearance. Largest nodule measuring  approximately 6 mm.     HEART: Mild coronary artery calcifications.     MEDIASTINUM: No masses. No enlarged lymph nodes.  No fluid collections.     PLEURA: No pleural effusions.     VASCULATURE: No evidence of aneurysm.     BONES: No acute bony abnormality.     VISUALIZED UPPER ABDOMEN:Please see the report for the CT of the abdomen  and pelvis.     Other: 8.5 mm left axillary lymph node stable.     IMPRESSION:     1. Multiple parenchymal nodules in both lungs are stable.  2. 8.5 mm lymph node in the left axilla, stable.    CT abdomen pelvis with contrast 1/25/23  FINDINGS:     Lower thorax: Parenchymal nodules in both lungs similar to the previous  exam.     Abdomen:     Liver: Previous identified area of decreased attenuation in the right  lobe of the liver is not localized on today's exam.     Gallbladder: Cholelithiasis.     Pancreas: Unremarkable. No mass or ductal dilatation.     Spleen: Homogeneous. No splenomegaly.     Adrenals: No mass.     Kidneys/ureters: Nonobstructing right intrarenal stone.     GI tract: Moderate stool. Sigmoid diverticuli without diverticulitis.     MESENTERY: No free fluid, walled off fluid collections, mesenteric  stranding, or enlarged lymph nodes        Vasculature: No evidence of aneurysm.     Abdominal wall: No focal hernia or mass.        Bladder: No focal mass or significant wall thickening     Reproductive: Unremarkable as visualized     Bones: No acute bony abnormality.     IMPRESSION:     1. No evidence of new interval development of metastatic disease.  Previously identified hepatic lesion is not seen on today's exam.     2.Cholelithiasis.     3. Nonobstructing right intrarenal stone.    Mammo Diagnostic Digital Tomosynthesis Bilateral With CAD (02/08/2023 14:55)  FINDINGS: There are scattered areas of fibroglandular density.     The bilateral  fibroglandular pattern is stable in appearance including  postoperative changes in the right 12:00 region. Skin thickening  involving the right breast is again noted and likely treatment related.  Multiple surgical clips are now noted in the right axilla. The patient's  Port-A-Cath is incompletely imaged in the posterior superior aspect of  the left breast. No new or suspicious findings are identified in either  breast.     IMPRESSION:  Benign bilateral mammographic findings.        BI-RADS CATEGORY:  2, BENIGN        RECOMMENDATION:  Recommend the patient continue annual bilateral  mammographic evaluation.    CT Abdomen Pelvis With Contrast (04/13/2023 14:51)  FINDINGS:    Lung bases:  Refer to chest CT for characterization of lung nodules in  the partially imaged lower chest.    Mediastinum:  Moderate hiatal hernia.      ABDOMEN:    Liver:  No definite focal liver lesion identified.    Gallbladder and bile ducts:  Cholelithiasis.  No ductal dilation.    Pancreas:  Unremarkable.  No mass.  No ductal dilation.    Spleen:  Unremarkable.  No splenomegaly.    Adrenals:  No adrenal masses.    Kidneys and ureters:  Tiny nonobstructing right kidney stone. No  hydronephrosis.    Stomach and bowel:  Gastric antrum wall thickening likely due to  incomplete distention.  Small splenule is again noted adjacent to the  splenic flexure of the colon.  Sigmoid diverticulosis. No evidence of  acute diverticulitis.      PELVIS:    Appendix:  Normal appendix.    Bladder:  Unremarkable.  No mass.    Reproductive:  Unremarkable as visualized.      ABDOMEN and PELVIS:    Intraperitoneal space:  No pneumoperitoneum identified.  No  significant fluid collection.    Bones/joints:  Stable appearance of the bony structures. Mild  degenerative changes lumbar spine but no acute osseous findings.  No  dislocation.    Soft tissues:  Unremarkable.    Vasculature:  Unremarkable.  No abdominal aortic aneurysm.    Lymph nodes:  No iliac or  retroperitoneal lymphadenopathy.     IMPRESSION:  1.  Cholelithiasis.  2.  No evidence of metastatic disease to abdomen or pelvis.  3.  Other nonacute/incidental findings as detailed above which appear  stable from previous.    CT Chest With Contrast Diagnostic (04/13/2023 14:51)  FINDINGS:    Lungs:  Small nodule along the right major fissure in the right lung,  5.2 mm and was previously 5.3 mm; image #33.  Parenchymal nodule right  lower lobe is 6.7 mm and was previously 6.9 mm, image #33, no change.   5.9 mm nodule right lower lobe was previously 6.5 mm indicating no  interval change.  Other smaller nodules of the right lung appears  stable.  7.5 mm nodule left lower lobe, image #55, was previously 8.2  mm.  A 5.7 mm nodule left lower lobe, image #40, was previously 5.9 mm.  No significant change.    Pleural space:  Unremarkable.  No pneumothorax.  No significant  effusion.    Heart:  Mild cardiomegaly.  No significant pericardial effusion.  No  significant coronary artery calcifications.    Mediastinum:  Moderate hiatal hernia again noted.  No enlarged  mediastinal or hilar lymph nodes identified.    Bones/joints:  Unremarkable.  No acute fracture.  No dislocation.    Soft tissues:  Stable treatment-related skin thickening of the right  breast.    Vasculature:  Unremarkable.  No thoracic aortic aneurysm.    Lymph nodes:  See above.    Gallbladder and bile ducts:  Cholelithiasis.    Tubes, lines and devices:  Left Port-A-Cath noted.    Other findings:  Calcified granulomas are stable.     IMPRESSION:  1.  No interval change in size of multiple bilateral pulmonary  parenchymal nodules. Index nodules as detailed above. Some nodules may  be slightly smaller than previous or could be due to differences in  slice selection.  2.  No new nodules are identified.  3.  No thoracic adenopathy.  4.  Other incidental and nonacute findings detailed above appear stable  from previous.    CT Chest With Contrast Diagnostic  (07/28/2023 15:37)   FINDINGS:    LUNGS AND PLEURAL SPACES:  Again there are tiny bilateral pulmonary  nodules with the largest in the right lower lobe measuring about 6 mm  and was about 6 mm. Other nodules including a left lower lobe pulmonary  nodule measuring 6 mm appears stable.  No pneumothorax.  No significant  effusion.    HEART:  Unremarkable.  No cardiomegaly.  No significant pericardial  effusion.  No significant coronary artery calcifications.    BONES/JOINTS:  Unremarkable.  No acute fracture.  No dislocation.    SOFT TISSUES:  Unremarkable.    VASCULATURE:  Unremarkable.  No thoracic aortic aneurysm.    LYMPH NODES:  Unremarkable.  No enlarged lymph nodes.     IMPRESSION:    Again there are tiny bilateral pulmonary nodules with the largest in  the right lower lobe measuring about 6 mm and was about 6 mm. Other  nodules including a left lower lobe pulmonary nodule measuring 6 mm  appears stable.    CT Abdomen Pelvis With Contrast (07/28/2023 15:40)   FINDINGS:    LUNG BASES:  Unremarkable.  No mass.  No consolidation.      ABDOMEN:    LIVER:  Unremarkable.  No mass.    GALLBLADDER AND BILE DUCTS:  Gallstone.  Gallbladder otherwise  unremarkable.  No ductal dilation.    PANCREAS:  Unremarkable.  No mass.  No ductal dilation.    SPLEEN:  Unremarkable.  No splenomegaly.    ADRENALS:  Unremarkable.  No mass.    KIDNEYS AND URETERS:  Unremarkable.  No solid mass.  No  hydronephrosis.    STOMACH AND BOWEL:  Scattered diverticula in the colon.  No  diverticulitis.  No obstruction.      PELVIS:    APPENDIX:  No findings to suggest acute appendicitis.    BLADDER:  Unremarkable.  No mass.    REPRODUCTIVE:  Unremarkable as visualized.      ABDOMEN and PELVIS:    INTRAPERITONEAL SPACE:  Unremarkable.  No free air.  No significant  fluid collection.    BONES/JOINTS:  No acute fracture.  No dislocation.    SOFT TISSUES:  Unremarkable.    VASCULATURE:  Unremarkable.  No abdominal aortic aneurysm.    LYMPH NODES:   Unremarkable.  No enlarged lymph nodes.     IMPRESSION:    Gallstone.  Gallbladder otherwise unremarkable.    CT Chest With Contrast Diagnostic (10/27/2023 15:28)   FINDINGS:    LUNGS AND PLEURAL SPACES:  Multiple bilateral pulmonary nodules are  stable in size.  No pneumothorax.  No significant effusion.    HEART:  Unremarkable as visualized.  No cardiomegaly.  No significant  pericardial effusion.  No significant coronary artery calcifications.    MEDIASTINUM:  Small hiatal hernia.    BONES/JOINTS:  Unremarkable as visualized.  No acute fracture.  No  dislocation.    SOFT TISSUES:  Right breast skin thickening noted likely treatment  related again noted.    VASCULATURE:  Unremarkable as visualized.  No thoracic aortic  aneurysm.    LYMPH NODES:  Unremarkable as visualized.  No enlarged lymph nodes.     IMPRESSION:  1.  Small hiatal hernia.  2.  Right breast skin thickening noted likely treatment related again  noted.  3.  Multiple bilateral pulmonary nodules are stable in size.    CT Abdomen Pelvis With Contrast (10/27/2023 15:30)   FINDINGS:    LUNG BASES:  Unremarkable as visualized.  No mass.  No consolidation.    MEDIASTINUM:  Small hiatal hernia.      ABDOMEN:    LIVER:  Unremarkable as visualized.  No mass.    GALLBLADDER AND BILE DUCTS:  Gallstone.  Gallbladder otherwise  unremarkable.  No ductal dilation.    PANCREAS:  Unremarkable as visualized.  No mass.  No ductal dilation.    SPLEEN:  Unremarkable as visualized.  No splenomegaly.    ADRENALS:  Unremarkable as visualized.  No mass.    KIDNEYS AND URETERS:  Unremarkable as visualized.  No solid mass.  No  hydronephrosis.    STOMACH AND BOWEL:  Scattered diverticula in the colon.  No  diverticulitis.  No obstruction.      PELVIS:    APPENDIX:  No findings to suggest acute appendicitis.    BLADDER:  Unremarkable as visualized.  No mass.    REPRODUCTIVE:  Unremarkable as visualized.      ABDOMEN and PELVIS:    INTRAPERITONEAL SPACE:  Unremarkable as  visualized.  No free air.  No  significant fluid collection.    BONES/JOINTS:  No acute fracture.  No dislocation.    SOFT TISSUES:  Unremarkable as visualized.    VASCULATURE:  Unremarkable as visualized.  No abdominal aortic  aneurysm.    LYMPH NODES:  Unremarkable as visualized.  No enlarged lymph nodes.    OTHER FINDINGS:  No evidence of metastatic disease.     IMPRESSION:  1.  Gallstone.  Gallbladder otherwise unremarkable.  2.  No evidence of metastatic disease.    CT Chest With Contrast Diagnostic (01/26/2024 14:09)   FINDINGS:    LUNGS AND PLEURAL SPACES:  Again small bilateral pulmonary nodules are  stable including a right lower lobe pulmonary nodule measuring 6 mm and  a left lower lobe pulmonary nodule measuring 6 mm. Other nodules are  also stable in size. No new nodules are identified.  No pneumothorax.   No significant effusion.    HEART:  Unremarkable as visualized.  No cardiomegaly.  No significant  pericardial effusion.  No significant coronary artery calcifications.    MEDIASTINUM:  Small hiatal hernia again noted.    BONES/JOINTS:  Unremarkable as visualized.  No acute fracture.  No  dislocation.    SOFT TISSUES:  Unremarkable as visualized.    VASCULATURE:  Unremarkable as visualized.  No thoracic aortic  aneurysm.    LYMPH NODES:  Unremarkable as visualized.  No enlarged lymph nodes.    TUBES, LINES AND DEVICES:  Left Port-A-Cath noted.     IMPRESSION:  1.  Again small bilateral pulmonary nodules are stable including a right  lower lobe pulmonary nodule measuring 6 mm and a left lower lobe  pulmonary nodule measuring 6 mm. Other nodules are also stable in size.  No new nodules are identified.  2.  Small hiatal hernia again noted.    CT Abdomen Pelvis With Contrast (01/26/2024 14:17)   FINDINGS:    LUNG BASES:  Unremarkable as visualized.  No mass.  No consolidation.      ABDOMEN:    LIVER:  Unremarkable as visualized.  No mass.    GALLBLADDER AND BILE DUCTS:  Gallstones.  Gallbladder  otherwise  unremarkable.  No ductal dilation.    PANCREAS:  Unremarkable as visualized.  No mass.  No ductal dilation.    SPLEEN:  Unremarkable as visualized.  No splenomegaly.    ADRENALS:  Unremarkable as visualized.  No mass.    KIDNEYS AND URETERS:  Unremarkable as visualized.  No solid mass.  No  hydronephrosis.    STOMACH AND BOWEL:  Unremarkable as visualized.  No obstruction.  No  mucosal thickening.      PELVIS:    APPENDIX:  No findings to suggest acute appendicitis.    BLADDER:  Unremarkable as visualized.  No mass.    REPRODUCTIVE:  Unremarkable as visualized.      ABDOMEN and PELVIS:    INTRAPERITONEAL SPACE:  Unremarkable as visualized.  No free air.  No  significant fluid collection.    BONES/JOINTS:  No acute fracture.  No dislocation.    SOFT TISSUES:  Unremarkable as visualized.    VASCULATURE:  Atherosclerotic disease.  No abdominal aortic aneurysm.    LYMPH NODES:  Unremarkable as visualized.  No enlarged lymph nodes.    OTHER FINDINGS:  No evidence of metastatic disease.     IMPRESSION:  1.  Gallstones.  Gallbladder otherwise unremarkable.  2.  No evidence of metastatic disease.     Mammo Diagnostic Digital Tomosynthesis Bilateral With CAD (02/21/2024 14:40)   FINDINGS: There are scattered areas of fibroglandular density.     The bilateral fibroglandular pattern is stable in appearance including  postoperative changes in the superior aspect of the right breast. Mild  skin thickening is again noted involving the right breast which is  likely treatment related. There are bilateral scattered benign-appearing  calcifications. No dominant mass, suspicious calcifications, or  nonsurgical areas of architectural distortion are seen.     IMPRESSION:  Benign bilateral mammographic findings.        BI-RADS CATEGORY: 2, BENIGN        RECOMMENDATION: Recommend the patient continue annual bilateral  mammographic evaluation.    CT Chest With Contrast Diagnostic (05/07/2024 15:52)   FINDINGS:    Lungs and  pleural spaces:  Multiple bilateral pulmonary nodules which  appear stable from previous exam.  A 5.4 mm nodule right lower lobe,  image #41, is unchanged from previous; previously 6.3 mm.  A left lower  lobe pulmonary nodule is 5.7 mm and was previously 5.7 mm, image #42.   Left lower lobe basilar pulmonary nodule is 8.7 mm and was previously  8.8 mm, image #59, indicating no interval change.  Other scattered  pulmonary nodules are stable.  Linear scarring versus atelectasis right  lower lobe, stable.  No new pulmonary nodules are identified.  No  pleural effusions.  No pneumothorax.    Heart:  Borderline cardiomegaly.  No significant coronary artery  calcifications.  No pericardial effusion.    Mediastinum:  Hiatal hernia noted.  No hilar or mediastinal  lymphadenopathy is identified.    Bones/joints:  Unremarkable as visualized.  No acute fracture.  No  dislocation.    Soft tissues:  Unremarkable as visualized.    Vasculature:  Unremarkable as visualized.  No thoracic aortic  aneurysm.    Lymph nodes:  Unremarkable as visualized.  No axillary adenopathy is  noted.    Gallbladder and bile ducts:  Cholelithiasis.    Tubes, lines and devices:  Left Port-A-Cath noted.     IMPRESSION:  1.  Multiple bilateral pulmonary nodules appear stable from the previous  exam with no significant change in size of index nodules of both lungs.  2.  No new pulmonary nodules identified.  3.  Otherwise stable exam with other incidental and nonacute findings  described above.    CT Abdomen Pelvis With Contrast (05/07/2024 15:54)   FINDINGS:    Lung bases:  Pulmonary nodules noted. Refer to chest CT.    Mediastinum:  Small hiatal hernia, stable.      ABDOMEN:    Liver:  Unremarkable as visualized.  No mass.    Gallbladder and bile ducts:  Cholelithiasis, stable.  No ductal  dilation.    Pancreas:  Unremarkable as visualized.  No mass.  No ductal dilation.    Spleen:  Unremarkable as visualized.  No splenomegaly.    Adrenals:   Unremarkable as visualized.  No adrenal masses.    Kidneys and ureters:  Unremarkable as visualized.  No solid mass.  No  hydronephrosis.    Stomach and bowel:  Sigmoid diverticulosis without diverticulitis.  No  obstruction.      PELVIS:    Appendix:  Normal appendix.    Bladder:  Unremarkable as visualized.  No mass.    Reproductive:  Unremarkable as visualized.      ABDOMEN and PELVIS:    Intraperitoneal space:  Unremarkable as visualized.  No significant  fluid collection.  No pneumoperitoneum identified.    Bones/joints:  Stable bony structures. Degenerative changes lumbar  spine.  No acute fracture.  No dislocation.    Soft tissues:  Unremarkable as visualized.    Vasculature:  Unremarkable as visualized.  No abdominal aortic  aneurysm.    Lymph nodes:  Unremarkable as visualized.  No iliac or retroperitoneal  adenopathy.     IMPRESSION:  1.  Stable exam. No evidence of metastatic disease to the abdomen or  pelvis.  2.  Cholelithiasis.  3.  Mild diverticulosis without diverticulitis.  4.  Other incidental/nonacute findings above.    CT Chest With Contrast Diagnostic (08/06/2024 13:15)   FINDINGS:    Lungs and pleural spaces:  Multiple bilateral pulmonary nodules again  noted.  Index nodule in the left lower lobe, image #54, is 8.3 mm and  was previously 8.7 mm.  Index nodule in the right lower lobe is 5.5 mm  and was previously 5.4 mm.  Index nodule in the left lower lobe is 5.4  mm and was previously 5.7 mm.  No consolidation.  No pneumothorax.  No  significant effusion.    Heart:  Mild cardiomegaly.  No significant pericardial effusion.  No  significant coronary artery calcifications.    Mediastinum:  Small hiatal hernia.  No hilar or mediastinal  lymphadenopathy.    Bones/joints:  Unremarkable as visualized.  No acute fracture.  No  dislocation.  No new bone lesions have developed.    Soft tissues:  Postsurgical changes right breast with treatment  related skin thickening noted stable from previous.     Vasculature:  Unremarkable as visualized.  No thoracic aortic  aneurysm.    Lymph nodes:  No axillary adenopathy.    Tubes, lines and devices:  Left-sided Port-A-Cath noted.    Other findings:  No definite new nodules have developed.     IMPRESSION:  1.  Stable exam. Index nodules of both lungs are essentially stable from  previous. No new nodules identified.  2.  No thoracic adenopathy.  3.  Other incidental/nonacute findings above stable.    CT Abdomen Pelvis With Contrast (08/06/2024 13:18)   FINDINGS:    Lung bases:  Unremarkable as visualized.  No mass.  No consolidation.    Mediastinum:  Small hiatal hernia.      ABDOMEN:    Liver:  Unremarkable as visualized.  No mass.    Gallbladder and bile ducts:  Cholelithiasis.  No ductal dilation.    Pancreas:  Unremarkable as visualized.  No mass.  No ductal dilation.    Spleen:  Unremarkable as visualized.  No splenomegaly.    Adrenals:  Unremarkable as visualized.  No mass.    Kidneys and ureters:  Tiny nonobstructing right kidney stones.    Stomach and bowel:  Sigmoid diverticulosis without diverticulitis.  No  obstruction.      PELVIS:    Appendix:  Normal appendix.    Bladder:  Unremarkable as visualized.  No mass.    Reproductive:  Unremarkable as visualized.      ABDOMEN and PELVIS:    Intraperitoneal space:  Unremarkable as visualized.  No free air.  No  significant fluid collection.    Bones/joints:  No acute fracture.  No dislocation.    Soft tissues:  Unremarkable as visualized.    Vasculature:  Unremarkable as visualized.  No abdominal aortic  aneurysm.    Lymph nodes:  Unremarkable as visualized.  No enlarged lymph nodes.     IMPRESSION:  1.  No evidence of metastatic disease to abdomen or pelvis.  2.  Cholelithiasis.  3.  Diverticulosis without diverticulitis.  4.  Tiny nonobstructing right kidney stones. No hydronephrosis.  5.  Other incidental/nonacute findings above.    CT Chest With Contrast Diagnostic (11/19/2024 15:52)   FINDINGS:     LUNGS:  Multiple parenchymal nodules bilaterally are stable.     HEART: Coronary artery calcifications     MEDIASTINUM: No masses. No enlarged lymph nodes.  No fluid collections.     PLEURA: No pleural effusion. No pleural mass or abnormal calcification.  No pneumothorax.     VASCULATURE: No evidence of aneurysm.      BONES: No acute bony abnormality.     VISUALIZED UPPER ABDOMEN: Please see the CT report for the abdomen and  pelvis     Other: None.     IMPRESSION:     1. Multiple bilateral parenchymal nodules unchanged from the previous  exam    CT Abdomen Pelvis With Contrast (11/19/2024 15:54)   FINDINGS:     Lower thorax: Clear. No effusions.     Abdomen:     Liver: Homogeneous. No focal hepatic mass or ductal dilatation.     Gallbladder: Cholelithiasis     Pancreas: Unremarkable. No mass or ductal dilatation.     Spleen: Homogeneous. No splenomegaly.     Adrenals: No mass.     Kidneys/ureters: No mass. No obstructive uropathy.  No evidence of  urolithiasis.     GI tract: Sigmoid diverticuli without evidence of diverticulitis. There  is no evidence of appendicitis     MESENTERY: No free fluid, walled off fluid collections, mesenteric  stranding, or enlarged lymph nodes        Vasculature: No evidence of aneurysm.     Abdominal wall: No focal hernia or mass.        Bladder: No focal mass or significant wall thickening     Reproductive: Unremarkable as visualized     Bones: No acute bony abnormality.     IMPRESSION:     1. No evidence of metastatic disease to the abdomen or pelvis     2. Other findings as above    Mammo Diagnostic Digital Tomosynthesis Bilateral With CAD (02/25/2025 14:42)   FINDINGS: There are scattered areas of fibroglandular density.     There has been a slight generalized increase in the bilateral tissue  density compatible with weight loss. Postoperative changes in the  superior aspect of the right breast are stable. There is right breast  skin thickening which is likely treatment related. There are  scattered  benign-appearing calcifications     IMPRESSION:  Benign bilateral mammographic findings        BI-RADS CATEGORY: 2, BENIGN        RECOMMENDATION: Recommend the patient continue annual bilateral  mammographic evaluation     CAD was utilized.    CT Chest With Contrast Diagnostic (03/14/2025 15:00)   FINDINGS:    Lungs and pleural spaces:  7.6 mm nodule right lower lobe, image #44,  stable from previous.  6.1 mm nodule right lower lobe, image #50, stable  from previous.  7.1 mm nodule right lower lobe, image #56, stable from  previous.  6.5 mm nodule left lower lobe, image #54, stable from  previous.  8.3 mm nodule left lower lobe, image #73, stable from  previous.  No consolidation.  No pneumothorax.  No definite new  pulmonary nodules have developed.  No effusions.    Heart:  Unremarkable.  No cardiomegaly.  No significant pericardial  effusion.  No significant coronary artery calcifications.    Mediastinum:  Small hiatal hernia.  No hilar or mediastinal  adenopathy.    Bones/joints:  Bony structures appear stable.    Soft tissues:  Treatment related skin thickening of the right breast.   No definite body wall lesions.    Vasculature:  Unremarkable.  No thoracic aortic aneurysm.    Lymph nodes:  Right axillary node dissection.  No axillary adenopathy  identified.    Gallbladder and bile ducts:  Cholelithiasis.    Tubes, lines and devices:  Left Port-A-Cath is noted.  Left  Port-A-Cath.     IMPRESSION:  1.  Stable bilateral pulmonary nodules. No new nodules have developed.  2.  No thoracic adenopathy.  3.  Other incidental/nonacute findings above are stable from previous.       CT Abdomen Pelvis With Contrast (03/14/2025 15:01)   FINDINGS:    Lung bases:  Bilateral pulmonary nodules.  No consolidation.    Heart:  Mild cardiac enlargement.    Mediastinum:  Small hiatal hernia.      ABDOMEN:    Liver:  Unremarkable.  No mass.    Gallbladder and bile ducts:  Cholelithiasis.  No ductal dilation.    Pancreas:   Unremarkable.  No mass.  No ductal dilation.    Spleen:  Unremarkable.  No splenomegaly.    Adrenals:  Unremarkable.  No mass.    Kidneys and ureters:  Nonobstructing right kidney stone. No  hydronephrosis.    Stomach and bowel:  Sigmoid diverticulosis but no CT evidence of  diverticulitis.  No obstruction.      PELVIS:    Appendix:  Normal appendix.    Bladder:  Unremarkable.  No mass.    Reproductive:  Unremarkable as visualized.      ABDOMEN and PELVIS:    Intraperitoneal space:  Unremarkable.  No significant fluid  collection.  No pneumoperitoneum identified.    Bones/joints:  Mild degenerative changes lumbar spine.  No  dislocation.  No acute bony findings identified.    Soft tissues:  Unremarkable.    Vasculature:  Unremarkable.  No abdominal aortic aneurysm.    Lymph nodes:  Unremarkable.  No abdominal or pelvic adenopathy.     IMPRESSION:  1. No evidence of metastatic disease to the abdomen or pelvis.  2. Bibasilar pulmonary nodules. Refer to chest CT.  3. Nonobstructing right kidney stone. No hydronephrosis.  4.  Cholelithiasis. Other incidental/nonacute findings above.        RECENT LABS:  Lab Results   Component Value Date    WBC 3.99 03/20/2025    HGB 13.9 03/20/2025    HCT 44.3 03/20/2025    MCV 94.5 03/20/2025    RDW 13.6 03/20/2025     (L) 03/20/2025    NEUTRORELPCT 51.6 03/20/2025    LYMPHORELPCT 28.8 03/20/2025    MONORELPCT 16.0 (H) 03/20/2025    EOSRELPCT 2.0 03/20/2025    BASORELPCT 1.3 03/20/2025    NEUTROABS 2.06 03/20/2025    LYMPHSABS 1.15 03/20/2025       Lab Results   Component Value Date     03/20/2025    K 4.6 03/20/2025    CO2 22.1 03/20/2025     03/20/2025    BUN 10 03/20/2025    CREATININE 0.82 03/20/2025    EGFRIFNONA 87 02/24/2022    GLUCOSE 84 03/20/2025    CALCIUM 9.3 03/20/2025    ALKPHOS 86 03/20/2025    AST 78 (H) 03/20/2025    ALT 72 (H) 03/20/2025    BILITOT 0.6 03/20/2025    ALBUMIN 4.2 03/20/2025    PROTEINTOT 6.8 03/20/2025    MG 2.0 09/01/2023     Lab  Results   Component Value Date    TSH 2.430 03/20/2025     Lab Results   Component Value Date    FERRITIN 169.00 (H) 01/20/2022    IRON 102 01/20/2022    TIBC 264 (L) 01/20/2022    LABIRON 39 01/20/2022    MGVOAUMB56 437 01/20/2022    FOLATE 16.40 01/20/2022     Lab Results   Component Value Date     (H) 01/20/2022     ASSESSMENT & PLAN:  Yadira Flowers is a very pleasant 69 y.o. female with a history of cervical cancer, breast cancer and malignant melanoma.    1. History of Breast Cancer:  - Ms. Onur Burgos had Stage IA ([id number]) moderately differentiated invasive ductal carcinoma of the right breast diagnosed in October 2015.  - She underwent R lympectomy and SLNBx performed by Dr. Steve Isbell 10-22-15 and then received adjuvant radiation in [place].  - After radiation, she was started on Arimidex which was then switched to Exemestane.  She took Exemestane for almost 5 years. Unfortunately, she developed vaginal bleeding and hematuria related to vaginal atrophy related to hormonal blockade.  Given this, stopped Exemestane.  She is doing much better in this regard since stopping hormonal therapy.  - Mammogram 2/25/25 without cause for concern.  Will need repeat huyen mammogram p 2/25/26..    2.  Metastatic malignant melanoma:  -  S/p resection of a primary lesion on her back performed by Dr. Catrachito Amaro of Dermatology in [place] in 2004.  -  S/p FNA R axillary LN which was concerning for melanoma.  Excisional biopsy confirmed metastatic malignant melanoma.  - Suspect her lung and liver lesions are also related to her melanoma.  - She has seen Dr. Her and he performed bronchoscopy on 02/23/2022. Pathology was negative for malignant cells.  - Liver aspiration showed abscess which grew Klebsiella, suspect this was a secondary infection.    -  Sent  excised LN tissue for CARIS testing.  -  PET-CT showed no significant FDG uptake but re-demonstrated abnormalities in the R axilla, RLL cavitary  mass, Vargas lung nodules and liver.    -  MRI Brain showed no acute findings in the head/brain.  -  Recommended initial therapy with immunotherapy.  We discussed different options for immunotherapy including single agent PD-L1 treatment or combination with Yervoy.  We recommended combination with Yervoy for better RR, PFS and OS even with increased risk of immune-mediated side effects.  We discussed the initially approved FDA dosing v. Altered dosing with lower dose IPI/higher dosed Nivo.  The latter has been shown to have fewer side effects and is thought to likely have similar efficacy (though trials weren't really powered to definitively show that).  After a lengthy discussion, we decided to proceed with 4 cycles of Ipi 1 mg/kg / Nivo 3 mg/kg q 21d followed by Nivolumab single agent.  We discussed potential risks, benefits and side effects extensively and she decided to proceed.  -  She completed 4 cycles of Ipi / Nivo and tolerated treatment very well.  -  Repeat CT imaging done 5-3-22 after 4 cycles Ipi/Nivo showed an excellent partial response to treatment.  Have continued with single agent Nivolumab as planned which she is tolerating well.   -Repeat CT CAP from 3/14/25(summarized above) again showing stable disease with excellent control of her metastatic melanoma.  -Therefore, will continue with Nivolumab therapy as planned.  - Will plan to repeat CT CAP with IV contrast only after a 3 month interval.     3.  Bilateral pulmonary emboli:  -  CTPE protocol 1-20-22  confirmed bilateral pulmonary emboli  -  BLE dopplers negative for DVT.  -  Continue Eliquis 5 mg BID.    -  We previously discussed length of therapy.  She watched her  die with PE and her mother has h/o PEs as well.  Presumably the cancer was the risk factor for development of PE and since her cancer is incurable,  have recommended she continue Eliquis indefinately as long as she continues without significant side effects.  She started  complaining of difficulty with easy bleeding with minor trauma.  It had been over 6 months that she has had full anticoagulation.  Given worsening side effects, decided to continue Eliquis, but reduced dose to 2.5 mg twice a day.  She is doing well on this dose without bleeding or thrombosis. Will continue.    4.  H/o Colon polyps:  -  Dr. Maldonado performed c-scope 12-09-20 with discovery of 8 hyperplastic polyps in the distal rectum as well as diverticulosis.  He recommended repeat c-scope after 5 years.    5.  H/o cervical cancer treated with cryotherapy at [facility] in [facility]:  -  Followed up with Dr. Manav Real of gynecology.  She says she had exam including pap smear and then pelvic U/S which was unrevealing.    6. Prophylaxis:  -  Had COVID vaccine x 2 (Pfizer).  She received Evusheld on 4-7-22.. Recommended booster. Received 2023 flu vaccine. Received  Prevnar 20 vaccine .  Received 2024 flu vaccine today.    7. ACO / DWAYNE/Other  Quality measures  -  Yadira Lionel received 2024 flu vaccine.  -  Yadira Flowers reports a pain score of 0.    -  Current outpatient and discharge medications have been reconciled for the patient.  Reviewed by: Loreto Stone MD      8.  Follow up:  -  Continue Nivolumab as planned.  --  Mammogram will be due p 2/25/26  -  Repeat CT CAP with IV contrast only prior to her f/u with me in 3 months.  CBCD, CMP, TSH at that time.  -  Note:  Pt prefers that PAC be accessed for CT imaging.    I spent 30 minutes with Yadira Flowers today.  In the office today, more than 50% of this time was spent face-to-face with her  in counseling / coordination of care, reviewing her medical history and counseling on the current treatment plan.  All questions were answered to her satisfaction        Electronically Signed by:  Loreto Stone MD        CC:     MD Manav Cotto MD Aaron House, MD

## 2025-03-20 ENCOUNTER — INFUSION (OUTPATIENT)
Dept: ONCOLOGY | Facility: HOSPITAL | Age: 70
End: 2025-03-20
Payer: COMMERCIAL

## 2025-03-20 ENCOUNTER — LAB (OUTPATIENT)
Dept: ONCOLOGY | Facility: CLINIC | Age: 70
End: 2025-03-20
Payer: MEDICARE

## 2025-03-20 ENCOUNTER — OFFICE VISIT (OUTPATIENT)
Dept: ONCOLOGY | Facility: CLINIC | Age: 70
End: 2025-03-20
Payer: MEDICARE

## 2025-03-20 VITALS
WEIGHT: 171.2 LBS | RESPIRATION RATE: 18 BRPM | OXYGEN SATURATION: 97 % | BODY MASS INDEX: 28.52 KG/M2 | HEIGHT: 65 IN | HEART RATE: 96 BPM | DIASTOLIC BLOOD PRESSURE: 75 MMHG | SYSTOLIC BLOOD PRESSURE: 105 MMHG | TEMPERATURE: 97.3 F

## 2025-03-20 VITALS
RESPIRATION RATE: 18 BRPM | HEART RATE: 77 BPM | SYSTOLIC BLOOD PRESSURE: 131 MMHG | DIASTOLIC BLOOD PRESSURE: 73 MMHG | TEMPERATURE: 97.8 F | OXYGEN SATURATION: 98 %

## 2025-03-20 DIAGNOSIS — C78.7 METASTASIS TO LIVER: ICD-10-CM

## 2025-03-20 DIAGNOSIS — C78.01 MALIGNANT NEOPLASM METASTATIC TO BOTH LUNGS: ICD-10-CM

## 2025-03-20 DIAGNOSIS — T82.868A THROMBOSIS INVOLVING VASCULAR DEVICE, INITIAL ENCOUNTER: ICD-10-CM

## 2025-03-20 DIAGNOSIS — C78.02 MALIGNANT NEOPLASM METASTATIC TO BOTH LUNGS: ICD-10-CM

## 2025-03-20 DIAGNOSIS — C43.59 MALIGNANT MELANOMA OF TORSO EXCLUDING BREAST: ICD-10-CM

## 2025-03-20 DIAGNOSIS — C43.59 MALIGNANT MELANOMA OF TORSO EXCLUDING BREAST: Primary | ICD-10-CM

## 2025-03-20 DIAGNOSIS — Z85.3 HISTORY OF BREAST CANCER: ICD-10-CM

## 2025-03-20 DIAGNOSIS — D84.9 IMMUNODEFICIENCY: Primary | ICD-10-CM

## 2025-03-20 DIAGNOSIS — Z79.899 LONG-TERM USE OF HIGH-RISK MEDICATION: Primary | ICD-10-CM

## 2025-03-20 DIAGNOSIS — Z95.828 PORT-A-CATH IN PLACE: ICD-10-CM

## 2025-03-20 LAB
ALBUMIN SERPL-MCNC: 4.2 G/DL (ref 3.5–5.2)
ALBUMIN/GLOB SERPL: 1.6 G/DL
ALP SERPL-CCNC: 86 U/L (ref 39–117)
ALT SERPL W P-5'-P-CCNC: 72 U/L (ref 1–33)
ANION GAP SERPL CALCULATED.3IONS-SCNC: 11.9 MMOL/L (ref 5–15)
AST SERPL-CCNC: 78 U/L (ref 1–32)
BASOPHILS # BLD AUTO: 0.05 10*3/MM3 (ref 0–0.2)
BASOPHILS NFR BLD AUTO: 1.3 % (ref 0–1.5)
BILIRUB SERPL-MCNC: 0.6 MG/DL (ref 0–1.2)
BUN SERPL-MCNC: 10 MG/DL (ref 8–23)
BUN/CREAT SERPL: 12.2 (ref 7–25)
CALCIUM SPEC-SCNC: 9.3 MG/DL (ref 8.6–10.5)
CHLORIDE SERPL-SCNC: 105 MMOL/L (ref 98–107)
CO2 SERPL-SCNC: 22.1 MMOL/L (ref 22–29)
CREAT SERPL-MCNC: 0.82 MG/DL (ref 0.57–1)
DEPRECATED RDW RBC AUTO: 47.3 FL (ref 37–54)
EGFRCR SERPLBLD CKD-EPI 2021: 78 ML/MIN/1.73
EOSINOPHIL # BLD AUTO: 0.08 10*3/MM3 (ref 0–0.4)
EOSINOPHIL NFR BLD AUTO: 2 % (ref 0.3–6.2)
ERYTHROCYTE [DISTWIDTH] IN BLOOD BY AUTOMATED COUNT: 13.6 % (ref 12.3–15.4)
GLOBULIN UR ELPH-MCNC: 2.6 GM/DL
GLUCOSE SERPL-MCNC: 84 MG/DL (ref 65–99)
HCT VFR BLD AUTO: 44.3 % (ref 34–46.6)
HGB BLD-MCNC: 13.9 G/DL (ref 12–15.9)
IMM GRANULOCYTES # BLD AUTO: 0.01 10*3/MM3 (ref 0–0.05)
IMM GRANULOCYTES NFR BLD AUTO: 0.3 % (ref 0–0.5)
LYMPHOCYTES # BLD AUTO: 1.15 10*3/MM3 (ref 0.7–3.1)
LYMPHOCYTES NFR BLD AUTO: 28.8 % (ref 19.6–45.3)
MCH RBC QN AUTO: 29.6 PG (ref 26.6–33)
MCHC RBC AUTO-ENTMCNC: 31.4 G/DL (ref 31.5–35.7)
MCV RBC AUTO: 94.5 FL (ref 79–97)
MONOCYTES # BLD AUTO: 0.64 10*3/MM3 (ref 0.1–0.9)
MONOCYTES NFR BLD AUTO: 16 % (ref 5–12)
NEUTROPHILS NFR BLD AUTO: 2.06 10*3/MM3 (ref 1.7–7)
NEUTROPHILS NFR BLD AUTO: 51.6 % (ref 42.7–76)
NRBC BLD AUTO-RTO: 0 /100 WBC (ref 0–0.2)
PLATELET # BLD AUTO: 133 10*3/MM3 (ref 140–450)
PMV BLD AUTO: 11.1 FL (ref 6–12)
POTASSIUM SERPL-SCNC: 4.6 MMOL/L (ref 3.5–5.2)
PROT SERPL-MCNC: 6.8 G/DL (ref 6–8.5)
RBC # BLD AUTO: 4.69 10*6/MM3 (ref 3.77–5.28)
SODIUM SERPL-SCNC: 139 MMOL/L (ref 136–145)
T4 FREE SERPL-MCNC: 1.33 NG/DL (ref 0.92–1.68)
TSH SERPL DL<=0.05 MIU/L-ACNC: 2.43 UIU/ML (ref 0.27–4.2)
WBC NRBC COR # BLD AUTO: 3.99 10*3/MM3 (ref 3.4–10.8)

## 2025-03-20 PROCEDURE — 96413 CHEMO IV INFUSION 1 HR: CPT

## 2025-03-20 PROCEDURE — 25010000002 HEPARIN LOCK FLUSH PER 10 UNITS

## 2025-03-20 PROCEDURE — 3078F DIAST BP <80 MM HG: CPT | Performed by: INTERNAL MEDICINE

## 2025-03-20 PROCEDURE — 1126F AMNT PAIN NOTED NONE PRSNT: CPT | Performed by: INTERNAL MEDICINE

## 2025-03-20 PROCEDURE — 84443 ASSAY THYROID STIM HORMONE: CPT | Performed by: INTERNAL MEDICINE

## 2025-03-20 PROCEDURE — 3074F SYST BP LT 130 MM HG: CPT | Performed by: INTERNAL MEDICINE

## 2025-03-20 PROCEDURE — 99214 OFFICE O/P EST MOD 30 MIN: CPT | Performed by: INTERNAL MEDICINE

## 2025-03-20 PROCEDURE — G2211 COMPLEX E/M VISIT ADD ON: HCPCS | Performed by: INTERNAL MEDICINE

## 2025-03-20 PROCEDURE — 85025 COMPLETE CBC W/AUTO DIFF WBC: CPT | Performed by: INTERNAL MEDICINE

## 2025-03-20 PROCEDURE — 25010000002 NIVOLUMAB 240 MG/24ML SOLUTION 24 ML VIAL: Performed by: INTERNAL MEDICINE

## 2025-03-20 PROCEDURE — 80053 COMPREHEN METABOLIC PANEL: CPT | Performed by: INTERNAL MEDICINE

## 2025-03-20 PROCEDURE — 84439 ASSAY OF FREE THYROXINE: CPT | Performed by: INTERNAL MEDICINE

## 2025-03-20 RX ORDER — HEPARIN SODIUM (PORCINE) LOCK FLUSH IV SOLN 100 UNIT/ML 100 UNIT/ML
300 SOLUTION INTRAVENOUS ONCE
OUTPATIENT
Start: 2025-03-20

## 2025-03-20 RX ORDER — HEPARIN SODIUM (PORCINE) LOCK FLUSH IV SOLN 100 UNIT/ML 100 UNIT/ML
500 SOLUTION INTRAVENOUS AS NEEDED
OUTPATIENT
Start: 2025-03-20

## 2025-03-20 RX ORDER — SODIUM CHLORIDE 9 MG/ML
250 INJECTION, SOLUTION INTRAVENOUS ONCE
Status: DISCONTINUED | OUTPATIENT
Start: 2025-03-20 | End: 2025-03-20 | Stop reason: RX

## 2025-03-20 RX ORDER — SODIUM CHLORIDE 0.9 % (FLUSH) 0.9 %
20 SYRINGE (ML) INJECTION AS NEEDED
OUTPATIENT
Start: 2025-03-20

## 2025-03-20 RX ORDER — SODIUM CHLORIDE 0.9 % (FLUSH) 0.9 %
10 SYRINGE (ML) INJECTION AS NEEDED
Status: DISCONTINUED | OUTPATIENT
Start: 2025-03-20 | End: 2025-03-20 | Stop reason: HOSPADM

## 2025-03-20 RX ORDER — HEPARIN SODIUM (PORCINE) LOCK FLUSH IV SOLN 100 UNIT/ML 100 UNIT/ML
500 SOLUTION INTRAVENOUS AS NEEDED
Status: DISCONTINUED | OUTPATIENT
Start: 2025-03-20 | End: 2025-03-20 | Stop reason: HOSPADM

## 2025-03-20 RX ORDER — SODIUM CHLORIDE 0.9 % (FLUSH) 0.9 %
10 SYRINGE (ML) INJECTION AS NEEDED
OUTPATIENT
Start: 2025-03-20

## 2025-03-20 RX ADMIN — HEPARIN 500 UNITS: 100 SYRINGE at 15:20

## 2025-03-20 RX ADMIN — SODIUM CHLORIDE 480 MG: 9 INJECTION, SOLUTION INTRAVENOUS at 14:41

## 2025-03-20 NOTE — PROGRESS NOTES
Venipuncture Blood Specimen Collection  Venipuncture performed in left arm by Emelia Silva MA with good hemostasis. Patient tolerated the procedure well without complications.   03/20/25   Emelia Silva MA

## 2025-04-11 DIAGNOSIS — C78.7 METASTASIS TO LIVER: Primary | ICD-10-CM

## 2025-04-11 DIAGNOSIS — C78.01 MALIGNANT NEOPLASM METASTATIC TO BOTH LUNGS: ICD-10-CM

## 2025-04-11 DIAGNOSIS — C78.02 MALIGNANT NEOPLASM METASTATIC TO BOTH LUNGS: ICD-10-CM

## 2025-04-11 DIAGNOSIS — C43.59 MALIGNANT MELANOMA OF TORSO EXCLUDING BREAST: ICD-10-CM

## 2025-04-11 RX ORDER — SODIUM CHLORIDE 9 MG/ML
250 INJECTION, SOLUTION INTRAVENOUS ONCE
OUTPATIENT
Start: 2025-04-17

## 2025-04-18 ENCOUNTER — LAB (OUTPATIENT)
Dept: ONCOLOGY | Facility: HOSPITAL | Age: 70
End: 2025-04-18
Payer: COMMERCIAL

## 2025-04-18 ENCOUNTER — INFUSION (OUTPATIENT)
Dept: ONCOLOGY | Facility: HOSPITAL | Age: 70
End: 2025-04-18
Payer: COMMERCIAL

## 2025-04-18 VITALS
WEIGHT: 163.9 LBS | HEART RATE: 83 BPM | RESPIRATION RATE: 20 BRPM | SYSTOLIC BLOOD PRESSURE: 137 MMHG | OXYGEN SATURATION: 95 % | BODY MASS INDEX: 27.27 KG/M2 | DIASTOLIC BLOOD PRESSURE: 84 MMHG

## 2025-04-18 DIAGNOSIS — C78.7 METASTASIS TO LIVER: Primary | ICD-10-CM

## 2025-04-18 DIAGNOSIS — C78.7 METASTASIS TO LIVER: ICD-10-CM

## 2025-04-18 DIAGNOSIS — C78.01 MALIGNANT NEOPLASM METASTATIC TO BOTH LUNGS: ICD-10-CM

## 2025-04-18 DIAGNOSIS — C78.02 MALIGNANT NEOPLASM METASTATIC TO BOTH LUNGS: ICD-10-CM

## 2025-04-18 DIAGNOSIS — C43.59 MALIGNANT MELANOMA OF TORSO EXCLUDING BREAST: ICD-10-CM

## 2025-04-18 DIAGNOSIS — Z95.828 PORT-A-CATH IN PLACE: ICD-10-CM

## 2025-04-18 LAB
ALBUMIN SERPL-MCNC: 4.4 G/DL (ref 3.5–5.2)
ALBUMIN/GLOB SERPL: 1.7 G/DL
ALP SERPL-CCNC: 70 U/L (ref 39–117)
ALT SERPL W P-5'-P-CCNC: 11 U/L (ref 1–33)
ANION GAP SERPL CALCULATED.3IONS-SCNC: 11.8 MMOL/L (ref 5–15)
AST SERPL-CCNC: 12 U/L (ref 1–32)
BASOPHILS # BLD AUTO: 0.03 10*3/MM3 (ref 0–0.2)
BASOPHILS NFR BLD AUTO: 0.5 % (ref 0–1.5)
BILIRUB SERPL-MCNC: 1 MG/DL (ref 0–1.2)
BUN SERPL-MCNC: 15 MG/DL (ref 8–23)
BUN/CREAT SERPL: 17.4 (ref 7–25)
CALCIUM SPEC-SCNC: 10.1 MG/DL (ref 8.6–10.5)
CHLORIDE SERPL-SCNC: 105 MMOL/L (ref 98–107)
CO2 SERPL-SCNC: 26.2 MMOL/L (ref 22–29)
CREAT SERPL-MCNC: 0.86 MG/DL (ref 0.57–1)
DEPRECATED RDW RBC AUTO: 43.9 FL (ref 37–54)
EGFRCR SERPLBLD CKD-EPI 2021: 73.7 ML/MIN/1.73
EOSINOPHIL # BLD AUTO: 0.03 10*3/MM3 (ref 0–0.4)
EOSINOPHIL NFR BLD AUTO: 0.5 % (ref 0.3–6.2)
ERYTHROCYTE [DISTWIDTH] IN BLOOD BY AUTOMATED COUNT: 13.2 % (ref 12.3–15.4)
GLOBULIN UR ELPH-MCNC: 2.6 GM/DL
GLUCOSE SERPL-MCNC: 107 MG/DL (ref 65–99)
HCT VFR BLD AUTO: 44.8 % (ref 34–46.6)
HGB BLD-MCNC: 14.4 G/DL (ref 12–15.9)
IMM GRANULOCYTES # BLD AUTO: 0.02 10*3/MM3 (ref 0–0.05)
IMM GRANULOCYTES NFR BLD AUTO: 0.3 % (ref 0–0.5)
LYMPHOCYTES # BLD AUTO: 1.63 10*3/MM3 (ref 0.7–3.1)
LYMPHOCYTES NFR BLD AUTO: 26 % (ref 19.6–45.3)
MCH RBC QN AUTO: 29.3 PG (ref 26.6–33)
MCHC RBC AUTO-ENTMCNC: 32.1 G/DL (ref 31.5–35.7)
MCV RBC AUTO: 91.2 FL (ref 79–97)
MONOCYTES # BLD AUTO: 0.46 10*3/MM3 (ref 0.1–0.9)
MONOCYTES NFR BLD AUTO: 7.3 % (ref 5–12)
NEUTROPHILS NFR BLD AUTO: 4.1 10*3/MM3 (ref 1.7–7)
NEUTROPHILS NFR BLD AUTO: 65.4 % (ref 42.7–76)
NRBC BLD AUTO-RTO: 0 /100 WBC (ref 0–0.2)
PLATELET # BLD AUTO: 211 10*3/MM3 (ref 140–450)
PMV BLD AUTO: 9.8 FL (ref 6–12)
POTASSIUM SERPL-SCNC: 4 MMOL/L (ref 3.5–5.2)
PROT SERPL-MCNC: 7 G/DL (ref 6–8.5)
RBC # BLD AUTO: 4.91 10*6/MM3 (ref 3.77–5.28)
SODIUM SERPL-SCNC: 143 MMOL/L (ref 136–145)
T4 FREE SERPL-MCNC: 1.85 NG/DL (ref 0.92–1.68)
TSH SERPL DL<=0.05 MIU/L-ACNC: 2.27 UIU/ML (ref 0.27–4.2)
WBC NRBC COR # BLD AUTO: 6.27 10*3/MM3 (ref 3.4–10.8)

## 2025-04-18 PROCEDURE — 84439 ASSAY OF FREE THYROXINE: CPT

## 2025-04-18 PROCEDURE — 96413 CHEMO IV INFUSION 1 HR: CPT

## 2025-04-18 PROCEDURE — 80050 GENERAL HEALTH PANEL: CPT

## 2025-04-18 PROCEDURE — 25010000002 HEPARIN LOCK FLUSH PER 10 UNITS

## 2025-04-18 PROCEDURE — 25010000002 NIVOLUMAB 240 MG/24ML SOLUTION 24 ML VIAL: Performed by: INTERNAL MEDICINE

## 2025-04-18 PROCEDURE — 25810000003 SODIUM CHLORIDE 0.9 % SOLUTION: Performed by: INTERNAL MEDICINE

## 2025-04-18 RX ORDER — HEPARIN SODIUM (PORCINE) LOCK FLUSH IV SOLN 100 UNIT/ML 100 UNIT/ML
500 SOLUTION INTRAVENOUS AS NEEDED
OUTPATIENT
Start: 2025-04-18

## 2025-04-18 RX ORDER — SODIUM CHLORIDE 9 MG/ML
250 INJECTION, SOLUTION INTRAVENOUS ONCE
Status: COMPLETED | OUTPATIENT
Start: 2025-04-18 | End: 2025-04-18

## 2025-04-18 RX ORDER — SODIUM CHLORIDE 0.9 % (FLUSH) 0.9 %
20 SYRINGE (ML) INJECTION AS NEEDED
OUTPATIENT
Start: 2025-04-18

## 2025-04-18 RX ORDER — SODIUM CHLORIDE 0.9 % (FLUSH) 0.9 %
10 SYRINGE (ML) INJECTION AS NEEDED
Status: DISCONTINUED | OUTPATIENT
Start: 2025-04-18 | End: 2025-04-18 | Stop reason: HOSPADM

## 2025-04-18 RX ORDER — HEPARIN SODIUM (PORCINE) LOCK FLUSH IV SOLN 100 UNIT/ML 100 UNIT/ML
500 SOLUTION INTRAVENOUS AS NEEDED
Status: DISCONTINUED | OUTPATIENT
Start: 2025-04-18 | End: 2025-04-18 | Stop reason: HOSPADM

## 2025-04-18 RX ORDER — SODIUM CHLORIDE 0.9 % (FLUSH) 0.9 %
10 SYRINGE (ML) INJECTION AS NEEDED
OUTPATIENT
Start: 2025-04-18

## 2025-04-18 RX ORDER — HEPARIN SODIUM (PORCINE) LOCK FLUSH IV SOLN 100 UNIT/ML 100 UNIT/ML
300 SOLUTION INTRAVENOUS ONCE
OUTPATIENT
Start: 2025-04-18

## 2025-04-18 RX ADMIN — SODIUM CHLORIDE 250 ML: 9 INJECTION, SOLUTION INTRAVENOUS at 12:20

## 2025-04-18 RX ADMIN — Medication 10 ML: at 13:08

## 2025-04-18 RX ADMIN — SODIUM CHLORIDE 480 MG: 9 INJECTION, SOLUTION INTRAVENOUS at 12:21

## 2025-04-18 RX ADMIN — HEPARIN 500 UNITS: 100 SYRINGE at 13:08

## 2025-05-15 DIAGNOSIS — C43.59 MALIGNANT MELANOMA OF TORSO EXCLUDING BREAST: ICD-10-CM

## 2025-05-15 DIAGNOSIS — C78.02 MALIGNANT NEOPLASM METASTATIC TO BOTH LUNGS: ICD-10-CM

## 2025-05-15 DIAGNOSIS — C78.01 MALIGNANT NEOPLASM METASTATIC TO BOTH LUNGS: ICD-10-CM

## 2025-05-15 DIAGNOSIS — C78.7 METASTASIS TO LIVER: Primary | ICD-10-CM

## 2025-05-15 RX ORDER — SODIUM CHLORIDE 9 MG/ML
250 INJECTION, SOLUTION INTRAVENOUS ONCE
Status: CANCELLED | OUTPATIENT
Start: 2025-05-16

## 2025-05-16 ENCOUNTER — LAB (OUTPATIENT)
Dept: ONCOLOGY | Facility: HOSPITAL | Age: 70
End: 2025-05-16
Payer: COMMERCIAL

## 2025-05-16 ENCOUNTER — INFUSION (OUTPATIENT)
Dept: ONCOLOGY | Facility: HOSPITAL | Age: 70
End: 2025-05-16
Payer: COMMERCIAL

## 2025-05-16 VITALS
HEART RATE: 97 BPM | BODY MASS INDEX: 26.36 KG/M2 | DIASTOLIC BLOOD PRESSURE: 74 MMHG | OXYGEN SATURATION: 98 % | WEIGHT: 158.4 LBS | SYSTOLIC BLOOD PRESSURE: 126 MMHG | RESPIRATION RATE: 18 BRPM | TEMPERATURE: 97.7 F

## 2025-05-16 DIAGNOSIS — Z95.828 PORT-A-CATH IN PLACE: ICD-10-CM

## 2025-05-16 DIAGNOSIS — C78.02 MALIGNANT NEOPLASM METASTATIC TO BOTH LUNGS: ICD-10-CM

## 2025-05-16 DIAGNOSIS — C43.59 MALIGNANT MELANOMA OF TORSO EXCLUDING BREAST: ICD-10-CM

## 2025-05-16 DIAGNOSIS — C78.01 MALIGNANT NEOPLASM METASTATIC TO BOTH LUNGS: ICD-10-CM

## 2025-05-16 DIAGNOSIS — C78.7 METASTASIS TO LIVER: ICD-10-CM

## 2025-05-16 DIAGNOSIS — C78.7 METASTASIS TO LIVER: Primary | ICD-10-CM

## 2025-05-16 LAB
ALBUMIN SERPL-MCNC: 3.9 G/DL (ref 3.5–5.2)
ALBUMIN/GLOB SERPL: 1.5 G/DL
ALP SERPL-CCNC: 85 U/L (ref 39–117)
ALT SERPL W P-5'-P-CCNC: 26 U/L (ref 1–33)
ANION GAP SERPL CALCULATED.3IONS-SCNC: 12 MMOL/L (ref 5–15)
AST SERPL-CCNC: 22 U/L (ref 1–32)
BASOPHILS # BLD AUTO: 0.05 10*3/MM3 (ref 0–0.2)
BASOPHILS NFR BLD AUTO: 1 % (ref 0–1.5)
BILIRUB SERPL-MCNC: 0.7 MG/DL (ref 0–1.2)
BUN SERPL-MCNC: 14 MG/DL (ref 8–23)
BUN/CREAT SERPL: 18.2 (ref 7–25)
CALCIUM SPEC-SCNC: 9.6 MG/DL (ref 8.6–10.5)
CHLORIDE SERPL-SCNC: 108 MMOL/L (ref 98–107)
CO2 SERPL-SCNC: 20 MMOL/L (ref 22–29)
CREAT SERPL-MCNC: 0.77 MG/DL (ref 0.57–1)
DEPRECATED RDW RBC AUTO: 47.1 FL (ref 37–54)
EGFRCR SERPLBLD CKD-EPI 2021: 84.1 ML/MIN/1.73
EOSINOPHIL # BLD AUTO: 0.08 10*3/MM3 (ref 0–0.4)
EOSINOPHIL NFR BLD AUTO: 1.6 % (ref 0.3–6.2)
ERYTHROCYTE [DISTWIDTH] IN BLOOD BY AUTOMATED COUNT: 14 % (ref 12.3–15.4)
GLOBULIN UR ELPH-MCNC: 2.6 GM/DL
GLUCOSE SERPL-MCNC: 104 MG/DL (ref 65–99)
HCT VFR BLD AUTO: 42.5 % (ref 34–46.6)
HGB BLD-MCNC: 13.6 G/DL (ref 12–15.9)
IMM GRANULOCYTES # BLD AUTO: 0.01 10*3/MM3 (ref 0–0.05)
IMM GRANULOCYTES NFR BLD AUTO: 0.2 % (ref 0–0.5)
LYMPHOCYTES # BLD AUTO: 1.22 10*3/MM3 (ref 0.7–3.1)
LYMPHOCYTES NFR BLD AUTO: 24.6 % (ref 19.6–45.3)
MCH RBC QN AUTO: 29.2 PG (ref 26.6–33)
MCHC RBC AUTO-ENTMCNC: 32 G/DL (ref 31.5–35.7)
MCV RBC AUTO: 91.4 FL (ref 79–97)
MONOCYTES # BLD AUTO: 0.49 10*3/MM3 (ref 0.1–0.9)
MONOCYTES NFR BLD AUTO: 9.9 % (ref 5–12)
NEUTROPHILS NFR BLD AUTO: 3.11 10*3/MM3 (ref 1.7–7)
NEUTROPHILS NFR BLD AUTO: 62.7 % (ref 42.7–76)
NRBC BLD AUTO-RTO: 0 /100 WBC (ref 0–0.2)
PLATELET # BLD AUTO: 146 10*3/MM3 (ref 140–450)
PMV BLD AUTO: 10.1 FL (ref 6–12)
POTASSIUM SERPL-SCNC: 4.1 MMOL/L (ref 3.5–5.2)
PROT SERPL-MCNC: 6.5 G/DL (ref 6–8.5)
RBC # BLD AUTO: 4.65 10*6/MM3 (ref 3.77–5.28)
SODIUM SERPL-SCNC: 140 MMOL/L (ref 136–145)
T4 FREE SERPL-MCNC: 1.5 NG/DL (ref 0.92–1.68)
TSH SERPL DL<=0.05 MIU/L-ACNC: 1.79 UIU/ML (ref 0.27–4.2)
WBC NRBC COR # BLD AUTO: 4.96 10*3/MM3 (ref 3.4–10.8)

## 2025-05-16 PROCEDURE — 80050 GENERAL HEALTH PANEL: CPT

## 2025-05-16 PROCEDURE — 96413 CHEMO IV INFUSION 1 HR: CPT

## 2025-05-16 PROCEDURE — 25810000003 SODIUM CHLORIDE 0.9 % SOLUTION: Performed by: INTERNAL MEDICINE

## 2025-05-16 PROCEDURE — 25010000002 NIVOLUMAB 240 MG/24ML SOLUTION 24 ML VIAL: Performed by: INTERNAL MEDICINE

## 2025-05-16 PROCEDURE — 84439 ASSAY OF FREE THYROXINE: CPT

## 2025-05-16 PROCEDURE — 25010000002 HEPARIN LOCK FLUSH PER 10 UNITS

## 2025-05-16 RX ORDER — HEPARIN SODIUM (PORCINE) LOCK FLUSH IV SOLN 100 UNIT/ML 100 UNIT/ML
500 SOLUTION INTRAVENOUS AS NEEDED
Status: DISCONTINUED | OUTPATIENT
Start: 2025-05-16 | End: 2025-05-16 | Stop reason: HOSPADM

## 2025-05-16 RX ORDER — HEPARIN SODIUM (PORCINE) LOCK FLUSH IV SOLN 100 UNIT/ML 100 UNIT/ML
300 SOLUTION INTRAVENOUS ONCE
OUTPATIENT
Start: 2025-05-16

## 2025-05-16 RX ORDER — SODIUM CHLORIDE 0.9 % (FLUSH) 0.9 %
10 SYRINGE (ML) INJECTION AS NEEDED
OUTPATIENT
Start: 2025-05-16

## 2025-05-16 RX ORDER — SODIUM CHLORIDE 0.9 % (FLUSH) 0.9 %
10 SYRINGE (ML) INJECTION AS NEEDED
Status: DISCONTINUED | OUTPATIENT
Start: 2025-05-16 | End: 2025-05-16 | Stop reason: HOSPADM

## 2025-05-16 RX ORDER — HEPARIN SODIUM (PORCINE) LOCK FLUSH IV SOLN 100 UNIT/ML 100 UNIT/ML
500 SOLUTION INTRAVENOUS AS NEEDED
OUTPATIENT
Start: 2025-05-16

## 2025-05-16 RX ORDER — SODIUM CHLORIDE 9 MG/ML
250 INJECTION, SOLUTION INTRAVENOUS ONCE
Status: COMPLETED | OUTPATIENT
Start: 2025-05-16 | End: 2025-05-16

## 2025-05-16 RX ORDER — SODIUM CHLORIDE 0.9 % (FLUSH) 0.9 %
20 SYRINGE (ML) INJECTION AS NEEDED
OUTPATIENT
Start: 2025-05-16

## 2025-05-16 RX ADMIN — Medication 10 ML: at 13:08

## 2025-05-16 RX ADMIN — HEPARIN 500 UNITS: 100 SYRINGE at 13:08

## 2025-05-16 RX ADMIN — SODIUM CHLORIDE 480 MG: 9 INJECTION, SOLUTION INTRAVENOUS at 12:32

## 2025-05-16 RX ADMIN — SODIUM CHLORIDE 250 ML: 9 INJECTION, SOLUTION INTRAVENOUS at 12:32

## 2025-05-22 ENCOUNTER — DOCUMENTATION (OUTPATIENT)
Dept: ONCOLOGY | Facility: CLINIC | Age: 70
End: 2025-05-22
Payer: COMMERCIAL

## 2025-06-06 DIAGNOSIS — C43.59 MALIGNANT MELANOMA OF TORSO EXCLUDING BREAST: ICD-10-CM

## 2025-06-06 DIAGNOSIS — C78.7 METASTASIS TO LIVER: Primary | ICD-10-CM

## 2025-06-06 DIAGNOSIS — C78.02 MALIGNANT NEOPLASM METASTATIC TO BOTH LUNGS: ICD-10-CM

## 2025-06-06 DIAGNOSIS — C78.01 MALIGNANT NEOPLASM METASTATIC TO BOTH LUNGS: ICD-10-CM

## 2025-06-06 RX ORDER — SODIUM CHLORIDE 9 MG/ML
250 INJECTION, SOLUTION INTRAVENOUS ONCE
OUTPATIENT
Start: 2025-06-13

## 2025-06-13 ENCOUNTER — INFUSION (OUTPATIENT)
Dept: ONCOLOGY | Facility: HOSPITAL | Age: 70
End: 2025-06-13
Payer: COMMERCIAL

## 2025-06-13 ENCOUNTER — LAB (OUTPATIENT)
Dept: ONCOLOGY | Facility: HOSPITAL | Age: 70
End: 2025-06-13
Payer: COMMERCIAL

## 2025-06-13 VITALS
OXYGEN SATURATION: 97 % | HEART RATE: 78 BPM | SYSTOLIC BLOOD PRESSURE: 107 MMHG | BODY MASS INDEX: 25.26 KG/M2 | WEIGHT: 151.8 LBS | RESPIRATION RATE: 18 BRPM | TEMPERATURE: 97.8 F | DIASTOLIC BLOOD PRESSURE: 63 MMHG

## 2025-06-13 DIAGNOSIS — Z95.828 PORT-A-CATH IN PLACE: Primary | ICD-10-CM

## 2025-06-13 DIAGNOSIS — C43.59 MALIGNANT MELANOMA OF TORSO EXCLUDING BREAST: ICD-10-CM

## 2025-06-13 DIAGNOSIS — C78.02 MALIGNANT NEOPLASM METASTATIC TO BOTH LUNGS: ICD-10-CM

## 2025-06-13 DIAGNOSIS — C78.01 MALIGNANT NEOPLASM METASTATIC TO BOTH LUNGS: ICD-10-CM

## 2025-06-13 DIAGNOSIS — C78.7 METASTASIS TO LIVER: ICD-10-CM

## 2025-06-13 LAB
ALBUMIN SERPL-MCNC: 4.2 G/DL (ref 3.5–5.2)
ALBUMIN/GLOB SERPL: 2.1 G/DL
ALP SERPL-CCNC: 70 U/L (ref 39–117)
ALT SERPL W P-5'-P-CCNC: 32 U/L (ref 1–33)
ANION GAP SERPL CALCULATED.3IONS-SCNC: 12.6 MMOL/L (ref 5–15)
AST SERPL-CCNC: 28 U/L (ref 1–32)
BASOPHILS # BLD AUTO: 0.06 10*3/MM3 (ref 0–0.2)
BASOPHILS NFR BLD AUTO: 1.2 % (ref 0–1.5)
BILIRUB SERPL-MCNC: 0.9 MG/DL (ref 0–1.2)
BUN SERPL-MCNC: 15.7 MG/DL (ref 8–23)
BUN/CREAT SERPL: 22.1 (ref 7–25)
CALCIUM SPEC-SCNC: 9.5 MG/DL (ref 8.6–10.5)
CHLORIDE SERPL-SCNC: 106 MMOL/L (ref 98–107)
CO2 SERPL-SCNC: 20.4 MMOL/L (ref 22–29)
CREAT SERPL-MCNC: 0.71 MG/DL (ref 0.57–1)
DEPRECATED RDW RBC AUTO: 46.8 FL (ref 37–54)
EGFRCR SERPLBLD CKD-EPI 2021: 92.7 ML/MIN/1.73
EOSINOPHIL # BLD AUTO: 0.13 10*3/MM3 (ref 0–0.4)
EOSINOPHIL NFR BLD AUTO: 2.7 % (ref 0.3–6.2)
ERYTHROCYTE [DISTWIDTH] IN BLOOD BY AUTOMATED COUNT: 14.1 % (ref 12.3–15.4)
GLOBULIN UR ELPH-MCNC: 2 GM/DL
GLUCOSE SERPL-MCNC: 99 MG/DL (ref 65–99)
HCT VFR BLD AUTO: 40.6 % (ref 34–46.6)
HGB BLD-MCNC: 13.6 G/DL (ref 12–15.9)
IMM GRANULOCYTES # BLD AUTO: 0.01 10*3/MM3 (ref 0–0.05)
IMM GRANULOCYTES NFR BLD AUTO: 0.2 % (ref 0–0.5)
LYMPHOCYTES # BLD AUTO: 1.48 10*3/MM3 (ref 0.7–3.1)
LYMPHOCYTES NFR BLD AUTO: 30.7 % (ref 19.6–45.3)
MCH RBC QN AUTO: 30.2 PG (ref 26.6–33)
MCHC RBC AUTO-ENTMCNC: 33.5 G/DL (ref 31.5–35.7)
MCV RBC AUTO: 90 FL (ref 79–97)
MONOCYTES # BLD AUTO: 0.49 10*3/MM3 (ref 0.1–0.9)
MONOCYTES NFR BLD AUTO: 10.2 % (ref 5–12)
NEUTROPHILS NFR BLD AUTO: 2.65 10*3/MM3 (ref 1.7–7)
NEUTROPHILS NFR BLD AUTO: 55 % (ref 42.7–76)
NRBC BLD AUTO-RTO: 0 /100 WBC (ref 0–0.2)
PLATELET # BLD AUTO: 184 10*3/MM3 (ref 140–450)
PMV BLD AUTO: 10.5 FL (ref 6–12)
POTASSIUM SERPL-SCNC: 3.8 MMOL/L (ref 3.5–5.2)
PROT SERPL-MCNC: 6.2 G/DL (ref 6–8.5)
RBC # BLD AUTO: 4.51 10*6/MM3 (ref 3.77–5.28)
SODIUM SERPL-SCNC: 139 MMOL/L (ref 136–145)
T4 FREE SERPL-MCNC: 1.49 NG/DL (ref 0.92–1.68)
TSH SERPL DL<=0.05 MIU/L-ACNC: 1.84 UIU/ML (ref 0.27–4.2)
WBC NRBC COR # BLD AUTO: 4.82 10*3/MM3 (ref 3.4–10.8)

## 2025-06-13 PROCEDURE — 96413 CHEMO IV INFUSION 1 HR: CPT

## 2025-06-13 PROCEDURE — 80050 GENERAL HEALTH PANEL: CPT

## 2025-06-13 PROCEDURE — 84439 ASSAY OF FREE THYROXINE: CPT

## 2025-06-13 PROCEDURE — 25010000002 HEPARIN LOCK FLUSH PER 10 UNITS

## 2025-06-13 PROCEDURE — 25810000003 SODIUM CHLORIDE 0.9 % SOLUTION: Performed by: INTERNAL MEDICINE

## 2025-06-13 PROCEDURE — 25010000002 NIVOLUMAB 240 MG/24ML SOLUTION 24 ML VIAL: Performed by: INTERNAL MEDICINE

## 2025-06-13 RX ORDER — SODIUM CHLORIDE 0.9 % (FLUSH) 0.9 %
10 SYRINGE (ML) INJECTION AS NEEDED
Status: DISCONTINUED | OUTPATIENT
Start: 2025-06-13 | End: 2025-06-13 | Stop reason: HOSPADM

## 2025-06-13 RX ORDER — SODIUM CHLORIDE 0.9 % (FLUSH) 0.9 %
20 SYRINGE (ML) INJECTION AS NEEDED
OUTPATIENT
Start: 2025-06-13

## 2025-06-13 RX ORDER — HEPARIN SODIUM (PORCINE) LOCK FLUSH IV SOLN 100 UNIT/ML 100 UNIT/ML
300 SOLUTION INTRAVENOUS ONCE
OUTPATIENT
Start: 2025-06-13

## 2025-06-13 RX ORDER — HEPARIN SODIUM (PORCINE) LOCK FLUSH IV SOLN 100 UNIT/ML 100 UNIT/ML
500 SOLUTION INTRAVENOUS AS NEEDED
Status: DISCONTINUED | OUTPATIENT
Start: 2025-06-13 | End: 2025-06-13 | Stop reason: HOSPADM

## 2025-06-13 RX ORDER — SODIUM CHLORIDE 9 MG/ML
250 INJECTION, SOLUTION INTRAVENOUS ONCE
Status: COMPLETED | OUTPATIENT
Start: 2025-06-13 | End: 2025-06-13

## 2025-06-13 RX ORDER — HEPARIN SODIUM (PORCINE) LOCK FLUSH IV SOLN 100 UNIT/ML 100 UNIT/ML
500 SOLUTION INTRAVENOUS AS NEEDED
OUTPATIENT
Start: 2025-06-13

## 2025-06-13 RX ORDER — SODIUM CHLORIDE 0.9 % (FLUSH) 0.9 %
10 SYRINGE (ML) INJECTION AS NEEDED
OUTPATIENT
Start: 2025-06-13

## 2025-06-13 RX ADMIN — HEPARIN 500 UNITS: 100 SYRINGE at 13:02

## 2025-06-13 RX ADMIN — SODIUM CHLORIDE 250 ML: 9 INJECTION, SOLUTION INTRAVENOUS at 12:28

## 2025-06-13 RX ADMIN — SODIUM CHLORIDE 480 MG: 9 INJECTION, SOLUTION INTRAVENOUS at 12:28

## 2025-06-13 RX ADMIN — Medication 10 ML: at 13:02

## 2025-06-17 ENCOUNTER — HOSPITAL ENCOUNTER (OUTPATIENT)
Dept: CT IMAGING | Facility: HOSPITAL | Age: 70
Discharge: HOME | End: 2025-06-17
Payer: MEDICARE

## 2025-06-17 DIAGNOSIS — C43.59 MALIGNANT MELANOMA OF TORSO EXCLUDING BREAST: ICD-10-CM

## 2025-06-17 DIAGNOSIS — C78.7 METASTASIS TO LIVER: ICD-10-CM

## 2025-06-17 DIAGNOSIS — C78.02 MALIGNANT NEOPLASM METASTATIC TO BOTH LUNGS: ICD-10-CM

## 2025-06-17 DIAGNOSIS — C78.01 MALIGNANT NEOPLASM METASTATIC TO BOTH LUNGS: ICD-10-CM

## 2025-06-17 DIAGNOSIS — Z79.899 LONG-TERM USE OF HIGH-RISK MEDICATION: ICD-10-CM

## 2025-06-17 PROCEDURE — 71260 CT THORAX DX C+: CPT

## 2025-06-17 PROCEDURE — 25510000001 IOPAMIDOL 61 % SOLUTION: Performed by: INTERNAL MEDICINE

## 2025-06-17 PROCEDURE — 74177 CT ABD & PELVIS W/CONTRAST: CPT

## 2025-06-17 RX ORDER — IOPAMIDOL 612 MG/ML
100 INJECTION, SOLUTION INTRAVASCULAR
Status: COMPLETED | OUTPATIENT
Start: 2025-06-17 | End: 2025-06-17

## 2025-06-17 RX ADMIN — IOPAMIDOL 80 ML: 612 INJECTION, SOLUTION INTRAVENOUS at 11:13

## 2025-06-18 NOTE — PROGRESS NOTES
"NAME: Yadira Flowers    : 1955    DATE: 2025      DIAGNOSIS:   1.  Stage IA ([id number]) moderately differentiated invasive ductal carcinoma of the right breast diagnosed in 2015.    2.  H/o malignant Melanoma on her Back    3.  H/o cervical cancer treated with cryotherapy at [facility] in     4.  Recurrent / metastatic malignant Melanoma  -  R axillary LN bx 22.    -  She has R axillary LN mass (2.47x2.16), Solitary cavitary R lobe liver lesion, cavitary RLL lung mass,  and bilateral parenchymal lung nodules      TREATMENT HISTORY:   1.         CHIEF COMPLAINT:  Follow up of metastatic melanoma/toxicity check and recent imaging    HISTORY OF PRESENT ILLNESS:   Yadira Flowers is a very pleasant 69 y.o. female who was referred by Dr. Dilcia Pedro for evaluation and treatment of breast cancer. She was living in [place] in 2015 when her dog brought attention to and \"found\" an abnormal lump in her right breast.  She was treated by Dr. Steve Isbell and underwent R lumpectomy with SLNBx on 10-22-15 as below.  She then had what sounds like accelerated partial breast irradiation.  She was then started on Arimidex which was later switched to Exemestane for a better side effect profile.  She took this for about 5 years, stopping a couple of weeks ago.  She stopped taking Exemestane because she developed some vaginal bleeding and hematuria.  This was evaluated by her PCP and gynecologist and felt to be due to vaginal atrophy.  She was prescribed a hormone pill which she hasn't yet started and was referred here to discuss things further.      Aside from bleeding which has been uncomfortable and distressing for her, Ms. Onur Burgos has generally been well.  The last year has been hard on her emotionally with the loss of her  and then her dog, her home and her insurance, but she is coping well and has good family support in [place].  She says she gained weight with her breast " cancer treatment and gained weight when her  .  She has been working now to lose weight and has lost 20 lbs over the last 2 months with dieting.  She denies chest pain or shortness of breath.  She complains of some RLQ cramping pain and isn't sure what is causing that.  She denies difficulty moving her bowels.  No blood in her stool that she is aware of.  She has had vaginal bleeding and hematuria as above.        INTERVAL HISTORY:  Ms. Burgos presents today for follow up of breast cancer, metastatic melanoma and recent imaging.   Since her last visit, she is overall doing well, but she says she has lost 58 lbs after increasing Mounjaro from 2.5 to 7.5 mg 6 months ago.  Since making this change, she says she is taking in very little.  She often takes in a protein shake and a muffing without other intake.  Asde from this dietary change and subsequent weight loss she has been well. She says she realizes this isn't a good idea and says she doesn't think Dr. Meléndez will be happy with this result either.    She had CT CAP on 25 which showed continued good control of her cancer.  Last mammogram was 25 and showed no cause for concern with repeat exam recommended after 1 year.      PAST MEDICAL HISTORY:  Past Medical History:   Diagnosis Date    Back pain     Basal cell carcinoma (BCC) in situ of skin     Dr. Mohr - Derm    Breast cancer     right    Cancer     breast, cervical, melanoma    Cervical cancer     Diagnosed in .  Treated by repeated local intervention and ultimately received cryotherapy at [facility] with resolution.    Elbow fracture     Foot fracture     Headache     Hyperlipidemia     Hypertension     TAKEN OFF MEDS    Melanoma     on her back  - treated by Dermatologist Catrachito Amaro in [place], [place] with what sounds like wide local excision.     Pulmonary embolism     Sinusitis        PAST SURGICAL HISTORY:  Past Surgical History:   Procedure Laterality Date    AXILLARY  LYMPH NODE BIOPSY/EXCISION Right 2022    Procedure: AXILLARY LYMPH NODE BIOPSY/EXCISION;  Surgeon: Dianelys Cummings MD;  Location:  [facility];  Service: General;  Laterality: Right;    BREAST LUMPECTOMY Right 2015    ca    BREAST SURGERY  2015    Secondary to cancer    BRONCHOSCOPY Right 2022    Procedure: BRONCHOSCOPY WITH ENDOBRONCHIAL ULTRASOUND;  Surgeon: Chris Her MD;  Location:  [facility];  Service: Pulmonary;  Laterality: Right;    COLONOSCOPY N/A 2020    Procedure: COLONOSCOPY;  Surgeon: Mohsen Maldonado MD;  Location:  [facility];  Service: Gastroenterology;  Laterality: N/A;    ELBOW FUSION      left    FOOT SURGERY Right 2014    Broken foot    HAND SURGERY  2020    right    RHINOPLASTY  2002    SKIN CANCER EXCISION      malignant melanoma from back     US GUIDED LYMPH NODE BIOPSY  2022    VENOUS ACCESS DEVICE (PORT) INSERTION N/A 2022    Procedure: INSERTION VENOUS ACCESS DEVICE left or right;  Surgeon: Dianelys Cummings MD;  Location:  [facility];  Service: General;  Laterality: N/A;       FAMILY HISTORY:  Family History   Problem Relation Age of Onset    Other Mother         blood clots    Hypertension Mother     Ulcers Father     Hypertension Sister     Other Sister         Multiple Sclerosis    Cancer Paternal Aunt     Stroke Maternal Grandmother     Alcohol abuse Paternal Grandmother     Hypertension Sister     Multiple sclerosis Sister     Hypertension Sister     Coronary artery disease Paternal Grandfather     Cancer Maternal Aunt 30        colon    Breast cancer Neg Hx    Many paternal aunts  with malignancy of various types. Many cousins on that side of the family have had cancer as well.  One  of colon cancer in her 30s.  One had a brain tumor.    SOCIAL HISTORY:  Social History     Socioeconomic History    Marital status:    Tobacco Use    Smoking status: Never    Smokeless tobacco: Never   Vaping Use    Vaping status: Never Used    Substance and Sexual Activity    Alcohol use: Not Currently    Drug use: Never    Sexual activity: Not Currently     Birth control/protection: Post-menopausal       REVIEW OF SYSTEMS:   A comprehensive 14 point review of systems was performed.  Significant findings as mentioned above.  All other systems reviewed and are negative.      MEDICATIONS:  The current medication list was reviewed in the EMR    Current Outpatient Medications:     atorvastatin (LIPITOR) 10 MG tablet, Take 1 tablet by mouth Daily., Disp: , Rfl:     Eliquis 2.5 MG tablet tablet, TAKE 1 TABLET BY MOUTH TWICE DAILY (Patient taking differently: 1 tablet Daily.), Disp: 60 tablet, Rfl: 5    fluticasone (FLONASE) 50 MCG/ACT nasal spray, 2 sprays by Each Nare route Daily. Shake liquid, Disp: 16 g, Rfl: 5    gabapentin (NEURONTIN) 100 MG capsule, Take 1 capsule by mouth As Needed., Disp: , Rfl:     lidocaine-prilocaine (EMLA) 2.5-2.5 % cream, Apply to port a cath ~30 min -1 hour prior to chemotherapy, Disp: 30 g, Rfl: 3    ondansetron ODT (Zofran ODT) 8 MG disintegrating tablet, Place 1 tablet on the tongue Every 8 (Eight) Hours As Needed for Nausea or Vomiting., Disp: 30 tablet, Rfl: 5    pantoprazole (PROTONIX) 20 MG EC tablet, TAKE 1 TABLET BY MOUTH DAILY, Disp: 30 tablet, Rfl: 2    prochlorperazine (COMPAZINE) 10 MG tablet, TAKE 1 TABLET BY MOUTH EVERY 6 HOURS AS NEEDED FOR NAUSEA, Disp: 360 tablet, Rfl: 3    sennosides-docusate (senna-docusate sodium) 8.6-50 MG per tablet, Take 2 tablets by mouth 2 (Two) Times a Day., Disp: 120 tablet, Rfl: 2    Tirzepatide (Mounjaro) 2.5 MG/0.5ML solution pen-injector pen, Inject 0.5 mL under the skin into the appropriate area as directed 1 (One) Time Per Week. (Patient taking differently: Inject 7.5 mg under the skin into the appropriate area as directed 1 (One) Time Per Week.), Disp: , Rfl:     ALLERGIES:    No Known Allergies    PHYSICAL EXAM:  Vitals:    06/20/25 1246   BP: 101/70   Pulse: 93   Resp: 20  "  Temp: 98 °F (36.7 °C)   TempSrc: Temporal   SpO2: 94%   Weight: 68.3 kg (150 lb 9.6 oz)   Height: 170.2 cm (67\")   PainSc: 0-No pain     Body surface area is 1.79 meters squared.  Body mass index is 23.59 kg/m².  ECOG score: 0   General:  Awake, alert and oriented, appears well.  HEENT:  Pupils are equal, round and reactive to light and accommodation, Extra-ocular movements full, Oropharyx clear, mucous membranes moist  Neck:  No JVD, thyromegaly or lymphadenopathy  CV:  Regular rate and rhythm, no murmurs, rubs or gallops  Resp:  Lungs are clear to auscultation bilaterally, no crackles  Breast:  S/p R lumpectomy. There is a somewhat firm area at the site of surgery but there are no palpable masses.  She is s/p excision of R axillary LN w/ associated surgical change.  Left breast is without mass lesions.  No L axillary adenopathy.  Abd:  Soft, non-tender, non-distended, bowel sounds present, no palpable organomegaly or masses.  Ext:  No clubbing, cyanosis, or lower extremity edema  Lymph:  No cervical, supraclavicular, axillary adenopathy  Neuro:  MS as above, grossly non-focal exam    PATHOLOGY:  10-22-15            12-16-21          01-13-22          ENDOSCOPY:  Colonoscopy 12-09-20     Findings: 8 hyperplastic appearing polyps of the distal rectum, diverticulosis moderately throughout the sigmoid colon      IMAGING:  CTCAP 12-15-20  On the lung windows there are several calcified  granulomas in the lungs. No noncalcified pulmonary parenchymal lung  nodules were identified. On the soft tissue windows there were no  enlarged lymph nodes in the supraclavicular or axillary regions. No  mediastinal or hilar lymphadenopathy was seen. The heart was not  enlarged. There were no pericardial effusions.     IMPRESSION:  No CT findings of metastatic disease in the chest. There are  calcified granulomas in the lungs.     CT FINDINGS: The liver and spleen were normal in size and shape. The  gallbladder contains several " stones. The wall was not thickened. The  bile ducts in the liver are not dilated. There is nothing seen to  suggest metastatic disease in the liver. The pancreas shows no evidence  of mass or inflammation. No adrenal or renal lesions are demonstrated.  The aorta is normal in caliber. There were no enlarged lymph nodes in  the retroperitoneum or in the mesentery. The bowel shows no evidence of  obstruction. In the pelvis there were no masses or fluid collections.  There were no ventral hernias.     IMPRESSION:  No CT findings of metastatic disease in the abdomen or  pelvis. This study does demonstrate several stones in the lumen of the  gallbladder.         Bilateral diagnostic mammogram 01-31-21  FINDINGS: There are scattered areas of fibroglandular density.     The bilateral fibroglandular pattern does not appear significantly  changed compared to the exam from 2020. This includes postoperative  changes in the right 12:00 region. Mild skin thickening is noted  involving the right breast which is likely treatment related. A few  bilateral scattered calcifications are noted.     IMPRESSION:  Incomplete examination as comparison with older outside  prior mammograms is needed.        BI-RADS CATEGORY:   0, INCOMPLETE:  Need prior mammograms for comparison        RECOMMENDATION:  We will attempt to obtain older outside prior  mammograms for comparison.      CTCAP 12-12-21  FINDINGS: Lack of IV contrast hinders parenchymal assessment of the mediastinum, liver, spleen, pancreas, adrenal glands and kidneys.         Significant infiltrate seen in the right lower lobe with interstitial component. There are also lung nodules present in the right lung. Index nodule in the right lung base medially is 5 mm maximal diameter. Other smaller nodules seen throughout the right  lung. Tiny 3 mm lingular nodule seen in series 3 image 30. A benign calcified granuloma seen in the left lower lobe but a noncalcified indeterminant  5 mm left  lower lobe nodule seen, series 3 image 42.     Nonenlarged mediastinal adenopathy seen. However there is an enlarged right axillary 1.8 x 2.6 cm lymph node partially seen.     Worrisome hepatic metastasis or primary hepatic lesion seen measuring 4.4 x 4.1 cm in liver segment 8. Margins are ill-defined. Abscess could have a similar appearance. Suggestion of subtle gallstones. Nonobstructing 2 mm right renal calculus seen. No  obstructive uropathy. Gaseous distention of the esophagus and hiatal hernia present consistent with GERD. No enteric contrast but no gross evidence of bowel obstruction.   There is no gross free air, free fluid or focal inflammatory change.  Uterus and  adnexa are not enlarged. Osseous structures are grossly intact.     IMPRESSION:  Constellation of findings with significant consolidation in the right lower lobe, enlarged right axillary lymph node, indeterminate bilateral lung nodules and ill-defined hepatic lesion concerning for underlying malignancy in this patient .     FINDINGS: Lack of IV contrast hinders parenchymal assessment of the mediastinum, liver, spleen, pancreas, adrenal glands and kidneys.         Significant infiltrate seen in the right lower lobe with interstitial component. There are also lung nodules present in the right lung. Index nodule in the right lung base medially is 5 mm maximal diameter. Other smaller nodules seen throughout the right  lung. Tiny 3 mm lingular nodule seen in series 3 image 30. A benign calcified granuloma seen in the left lower lobe but a noncalcified indeterminant  5 mm left lower lobe nodule seen, series 3 image 42.     Nonenlarged mediastinal adenopathy seen. However there is an enlarged right axillary 1.8 x 2.6 cm lymph node partially seen.     Worrisome hepatic metastasis or primary hepatic lesion seen measuring 4.4 x 4.1 cm in liver segment 8. Margins are ill-defined. Abscess could have a similar appearance. Suggestion of subtle gallstones.  Nonobstructing 2 mm right renal calculus seen. No  obstructive uropathy. Gaseous distention of the esophagus and hiatal hernia present consistent with GERD. No enteric contrast but no gross evidence of bowel obstruction.   There is no gross free air, free fluid or focal inflammatory change.  Uterus and  adnexa are not enlarged. Osseous structures are grossly intact.     IMPRESSION:  Constellation of findings with significant consolidation in the right lower lobe, enlarged right axillary lymph node, indeterminate bilateral lung nodules and ill-defined hepatic lesion concerning for underlying malignancy in this patient .       US Abdomen Complete 12-12-21  FINDINGS:  The visualized portions of the pancreas, IVC and aorta appear normal.        The liver is moderately coarsened in echotexture. Ill-defined hypodensity in the liver measuring 3 x 3.2 x 2.6 cm corresponds to recent CT. Unclear if this is a malignant lesion. Abscesses within the differential but prior CT was performed without any  contrast.  The common bile duct is not dilated.. Gallbladder wall is thickened at 3.4 mm with trace pericholecystic fluid and subtle hyperemia. Gallstones and shadowing present.     The kidneys are normal in size and echogenicity. There is no   hydronephrosis or focal solid lesion.  The spleen is normal in size and echotexture.     IMPRESSION:  1. Nonspecific gallbladder wall thickening and questionable pericholecystic fluid. Gallstones are better seen on CT than ultrasound.  2.  Heterogeneous liver with lesion in the liver as seen on CT. Ultrasound is not helpful in determining etiology. This could be an abscess but given the other findings on CT,  malignancy is not excluded.      CTCAP 12-15-21  FINDINGS:    LUNGS:  Increased density of the now right lower lobe mixed  groundglass and more solid-appearing consolidation with internal  cavitary component identified. Tiny right and left lung pulmonary  nodules. Grossly stable.     PLEURAL SPACE:  Small bilateral pleural effusions.  No pneumothorax.    HEART:  Unremarkable.  No cardiomegaly.  No significant pericardial  effusion.    BONES/JOINTS:  Unremarkable.  No acute fracture.  No dislocation.    SOFT TISSUES:  Unremarkable.    VASCULATURE:  Unremarkable.  No thoracic aortic aneurysm.    LYMPH NODES:  Unremarkable.  No enlarged lymph nodes.     IMPRESSION:  1.  Increased density of the now right lower lobe mixed groundglass and  more solid-appearing consolidation with internal cavitary component  identified. Tiny right and left lung pulmonary nodules. Grossly stable.  2.  Small bilateral pleural effusions.    FINDINGS:    LUNG BASES:  Unremarkable.  No mass.  No consolidation.      ABDOMEN:    LIVER:  Right lobe of liver lesion is again identified now measuring  about 4.7 cm and was about 4.7 cm.    GALLBLADDER AND BILE DUCTS:  Gallstones noted in the gallbladder.  No  ductal dilation.    PANCREAS:  Unremarkable.  No mass.  No ductal dilation.    SPLEEN:  Unremarkable.  No splenomegaly.    ADRENALS:  Unremarkable.  No mass.    KIDNEYS AND URETERS:  Unremarkable.  No solid mass.  No  hydronephrosis.    STOMACH AND BOWEL:  Unremarkable.  No obstruction.  No mucosal  thickening.      PELVIS:    APPENDIX:  No findings to suggest acute appendicitis.    BLADDER:  Unremarkable.  No mass.    REPRODUCTIVE:  Unremarkable as visualized.      ABDOMEN and PELVIS:    INTRAPERITONEAL SPACE:  Unremarkable.  No free air.  No significant  fluid collection.    BONES/JOINTS:  No acute fracture.  No dislocation.    SOFT TISSUES:  Unremarkable.    VASCULATURE:  Unremarkable.  No abdominal aortic aneurysm.    LYMPH NODES:  Unremarkable.  No enlarged lymph nodes.     IMPRESSION:    Right lobe of liver lesion is again identified now measuring about 4.7  cm and was about 4.7 cm.      NM Bone Scan Whole Body 12-15-21  FINDINGS:    SKULL/FACIAL BONES:  No focal increased or decreased uptake.    SPINE:  Unremarkable.   No abnormal increased or decreased uptake.    RIBS:  Unremarkable.  No abnormal uptake.    LONG BONES:  Unremarkable.  No abnormal uptake.    PELVIS:  Unremarkable.  No focal increased or decreased uptake.    JOINTS:  Unremarkable.  No focal increased or decreased uptake.    SOFT TISSUES:  Unremarkable.    RENAL/BLADDER:  Within normal limits.     IMPRESSION:    Normal bone scan.      CT Abdomen With Contrast  12-18-21  FINDINGS:  Partially imaged thick-walled cavitary lesion in the right lower lobe with right basilar atelectasis/infiltrate and small right pleural effusion. There is also left basilar atelectasis/infiltrate and trace left pleural effusion. Multiple noncalcified  pulmonary nodules are scattered throughout the visualized lower lung fields, concerning for pulmonary metastatic disease.     Ill-defined right hepatic mass again noted. The spleen, pancreas, and both adrenal glands are within expected limits. Cholelithiasis. Punctate nonobstructing right intrarenal stone. Tiny left renal cyst. Kidneys are otherwise unremarkable without  hydronephrosis. Abdominal aorta normal in course and caliber without dissection. No pathologic retroperitoneal or mesenteric lymphadenopathy by size criteria. Visualized small bowel and colon are unremarkable. No bowel obstruction. No free fluid or free  air.     No aggressive osseous lesions.     IMPRESSION:     1. Ill-defined right hepatic mass again noted. This has been previously biopsied and correlation with pathology results is recommended.  2. Cholelithiasis.  3. Punctate nonobstructing right intrarenal stone.  4. No threshold abdominal lymphadenopathy or other suspicious abdominal masses.  5. Partially imaged thick-walled cavitary lesion in the right lower lobe. This may be of infectious or neoplastic etiology and continued follow-up is recommended.  6. Small bilateral pleural effusions with bibasilar atelectasis/infiltrate, worse on the right than left.  7. Numerous  small noncalcified pulmonary nodules throughout the visualized lower lung fields, concerning for pulmonary metastatic disease.       Mammo Diagnostic Digital Tomosynthesis Bilateral With CAD w/ US Axilla Right 01-13-22  FINDINGS:  There are scattered areas of fibroglandular density.     Incompletely imaged is a very prominent axillary lymph node. The  bilateral fibroglandular pattern itself is stable in appearance  including postoperative changes in the superior aspect of the right  breast. There are stable bilateral calcifications. Mild skin thickening  is again noted involving the right breast which is likely treatment  related.     ULTRASOUND: Ultrasound evaluation of the right axilla demonstrates 2  markedly enlarged lymph nodes the largest of which measures 3.2 cm in  size and has no demonstrable fatty hilum. Recommend ultrasound-guided  biopsy.      IMPRESSION:  1. Stable mammographic appearance of the left breast with no findings  suspicious for malignancy.        2. Marked right axillary lymphadenopathy. Recommend ultrasound-guided  biopsy. The fibroglandular pattern of the right breast is however  stable.        BI-RADS CATEGORY:  4, SUSPICIOUS        RECOMMENDATION:  Recommend ultrasound-guided biopsy of the dominant  abnormal appearing right axillary lymph node.      DEXA Bone Density Axial 01-13-22  IMPRESSION:  Impression:  1. According to the World Health Organization definitions of  osteoporosis based on bone density, this patient's bone mineral density  is compatible with osteoporosis and the fracture risk is high.      Mammo Diagnostic Digital Tomosynthesis Bilateral With CAD w/ US Axilla Right 01-13-22  FINDINGS:  There are scattered areas of fibroglandular density.     Incompletely imaged is a very prominent axillary lymph node. The  bilateral fibroglandular pattern itself is stable in appearance  including postoperative changes in the superior aspect of the right  breast. There are stable  bilateral calcifications. Mild skin thickening  is again noted involving the right breast which is likely treatment  related.     ULTRASOUND: Ultrasound evaluation of the right axilla demonstrates 2  markedly enlarged lymph nodes the largest of which measures 3.2 cm in  size and has no demonstrable fatty hilum. Recommend ultrasound-guided  biopsy.      IMPRESSION:  1. Stable mammographic appearance of the left breast with no findings  suspicious for malignancy.        2. Marked right axillary lymphadenopathy. Recommend ultrasound-guided  biopsy. The fibroglandular pattern of the right breast is however  stable.        BI-RADS CATEGORY:  4, SUSPICIOUS        RECOMMENDATION:  Recommend ultrasound-guided biopsy of the dominant  abnormal appearing right axillary lymph node.      CT Chest With Contrast Diagnostic 01-18-22  FINDINGS:     LUNGS: Solitary mass in the right lower lobe with cavitation.  Significantly smaller today than on the prior study. 6 mm solid nodule  in the right lung posteriorly on image 30 of the axial series. Other  parenchymal nodules are seen in both lungs and are similar to the  previous study. These are concerning for metastatic nodules.     HEART: Unremarkable.     MEDIASTINUM: No masses. No enlarged lymph nodes.  No fluid collections.     PLEURA: No pleural effusion. No pleural mass or abnormal calcification.  No pneumothorax.     VASCULATURE: No evidence of aneurysm. Filling defect in the right  pulmonary artery and possibly left lower lobe pulmonary artery. This  study was not performed to evaluate for pulmonary embolus, but the  findings are concerning for bilateral pulmonary emboli.     BONES: No acute bony abnormality.     VISUALIZED UPPER ABDOMEN:Please see the CT report for the abdomen and  pelvis.     Other: Right axillary soft tissue mass is present and shows slight  interval growth. It measures 2.47 x 2.16 cm today and previously at the  same level measured approximately 2.7 x 1.89  cm.     IMPRESSION:  1. Study was not performed as a PE protocol, but there appear to be  filling defects in pulmonary arteries bilaterally concerning for  pulmonary emboli.  2. Interval decrease in size of cavitary right lower lobe mass.  3. Stable parenchymal nodules bilaterally.  4. Very slight interval growth of right axillary soft tissue mass.     Results are being relayed to the referring physician.      US Liver 01-18-22  FINDINGS: Sonographic imaging of the liver does not show the mass  previously identified in the liver. I would suggest a follow-up CT scan  for better delineation.     Hepatic flow was hepatopedal.     There is shadowing from the gallbladder fossa.     IMPRESSION:  1. Previously identified mass is not seen on this exam in the liver.  Consider follow-up CT.  2. Shadowing from the gallbladder fossa. In the absence of  cholecystectomy, appearance is concerning for cholelithiasis.      US Venous Doppler Lower Extremity Bilateral (duplex) 01-20-22  FINDINGS:   There is patent spontaneous flow from the common femoral vein through  the posterior tibial veins.  There was no internal clot or area of noncompressibility.  Normal augmentation was elicited where applicable.     IMPRESSION:  No DVT in the lower extremities on today's exam.       CT Chest Pulmonary Embolism 01-20-22  FINDINGS: Today's study demonstrates opacification of the central  pulmonary vessels.  There are filling defects in the pulmonary arteries bilaterally. First  order arteries. This is most compatible with bilateral pulmonary  emboli..     Soft tissue mass in the right lower lobe with somewhat cavitary center  is again noted and unchanged..     There is no mediastinal lymph node enlargement     No pericardial or pleural effusion.        IMPRESSION:  1. Bilateral pulmonary emboli.  2. Parenchymal nodules bilaterally with dominant mass in the right lower  Lobe..      NM PET/CT Skull Base to Mid Thigh 01-28-22  FINDINGS:       HEAD/NECK:  Area of uptake in the posterior right paraspinal space and to lesser  degree the left paraspinal space appears related to muscle uptake.  No FDG hypermetabolic neck adenopathy.  No FDG hypermetabolic masses.     CHEST:   Numerous bilateral pulmonary nodules appear stable from the previous  chest CT and show no FDG hypermetabolism. This is likely due to nodules  being below size threshold for PET sensitivity.  Cavitary lung mass in the right lung localizing to the superior segment  of the lower lobe shows FDG hypermetabolism with maximum SUV: 2.4. This  is at the lower range for malignancy.  Enlarged right lower axillary region lymph node which is 3.0 cm shows  mild FDG hypermetabolism that is approximately with maximum SUV: 2.4.  This is at the lower range for malignancy.  Low-dose CT demonstrating no change in additional scattered pulmonary  nodules from the previous scan. Coronary artery calcifications are  stable.     ABDOMEN/PELVIS:   Faint area of low attenuation involving the right lobe of liver is  poorly evaluated with low-dose noncontrast CT but shows no focal FDG  hypermetabolism.  Physiologic FDG hypermetabolism seen throughout the solid abdominal  organs.  Physiologic FDG hypermetabolism seen throughout the GI tract.  Physiologic FDG hypermetabolism seen throughout the mesentery,  retroperitoneum and pelvis.  Low dose CT redemonstrates nonobstructing kidney stones. Cholelithiasis  again noted.     BONES: Nonspecific FDG tracer activity. No intense areas of tracer  activity noted.     IMPRESSION:     1. Cavitary mass in the right lung that shows very low-grade FDG  hypermetabolism which is at the lower range for malignancy with maximum  SUV: 2.4.  2. Enlarged right axillary region lymph node with low-grade FDG  hypermetabolism also at the lower range for malignancy with maximum SUV:  2.4.  3. Numerous bilateral pulmonary nodules compatible with metastatic  disease but show no significant FDG  hypermetabolism. This may be in part  due to size of nodules being below PET sensitivity threshold.  4. Faint area of low-attenuation right lobe liver poorly evaluated with  noncontrast low-dose CT that shows no obvious intense FDG  hypermetabolism.  5. Other nonacute findings as above on low dose CT.        [facility] 05-03-22  FINDINGS:    Lungs:  Cavitary mass in the right lower lobe has nearly resolved.  A  right lower lobe pulmonary nodule is 7.4 mm and was previously 7.5 mm.  No change. Image #38.  A left lower lobe pulmonary nodule, image #52, is  9.8 mm and was previously 9.8 mm. No change.    Pleural space:  Unremarkable.  No pneumothorax.  No significant  effusion.    Heart:  Unremarkable.  No cardiomegaly.  No significant pericardial  effusion.  No significant coronary artery calcifications.    Bones/joints:  The bony structures appear stable. No acute bony  findings identified.  No dislocation.    Soft tissues:  Unremarkable.    Vasculature:  Please note, pulmonary emboli noted on the prior exam  are not well evaluated. The previously described filling defects of the  main pulmonary arteries are not evident.  No thoracic aortic aneurysm.    Lymph nodes:  No mediastinal adenopathy by CT size criteria.    Gallbladder and bile ducts:  Cholelithiasis noted.    Other findings:  Other smaller nodules appear stable in size and  configuration.  No new nodules identified.     IMPRESSION:  1.  Near-complete resolution of previously described cavitary mass in  the right lower lobe periphery now only with linear scarring noted.  2.  Index nodules of both lungs are stable from previous exam with no  size increase identified. No new nodules are noted.  3.  Cholelithiasis.  4.  Due to timing of contrast on this study, assessment of pulmonary  emboli not performed. There appears to be significant improvement in  clot burden however in the more central vessels.    FINDINGS:    Lung bases:  Pulmonary nodules of the lung  bases.    Mediastinum:  Small hiatal hernia.      ABDOMEN:    Liver:  Low attenuation lesions of the right lobe liver appear of  decreased prominence from the previous exam. Segment 8 liver lesion is  1.1 cm. Segment 7 liver lesion is approximately 0.8 cm.    Gallbladder and bile ducts:  Cholelithiasis.  No ductal dilation.    Pancreas:  Unremarkable.  No mass.  No ductal dilation.    Spleen:  Unremarkable.  No splenomegaly.    Adrenals:  No adrenal masses identified.    Kidneys and ureters:  Unremarkable.  No solid mass.  No  hydronephrosis.    Stomach and bowel:  Sigmoid diverticulosis without evidence of  diverticulitis.  No obstruction.      PELVIS:    Appendix:  Normal appendix.    Bladder:  Unremarkable.  No mass.    Reproductive:  Unremarkable as visualized.      ABDOMEN and PELVIS:    Intraperitoneal space:  Unremarkable.  No free air.  No significant  fluid collection.    Bones/joints:  No acute fracture.  No dislocation.    Soft tissues:  Unremarkable.    Vasculature:  Unremarkable.  No abdominal aortic aneurysm.    Lymph nodes:  No iliac or retroperitoneal adenopathy.     IMPRESSION:  1.  Small low-attenuation lesions of the right lobe liver appears  decreased prominence of the previous exam. No new no focal liver lesions  identified  2.  Cholelithiasis.  3.  Otherwise stable appearance of the abdomen and pelvis.    CT Abdomen Pelvis With Contrast (08/08/2022 14:36)  FINDINGS:    LUNG BASES:  Unremarkable.  No mass.  No consolidation.      ABDOMEN:    LIVER:  Stable 1 cm right lobe of liver lesion and more posterior 7 mm  lesion.    GALLBLADDER AND BILE DUCTS:  Gallstones.  Gallbladder otherwise  unremarkable.  No ductal dilation.    PANCREAS:  Unremarkable.  No mass.  No ductal dilation.    SPLEEN:  Unremarkable.  No splenomegaly.    ADRENALS:  Unremarkable.  No mass.    KIDNEYS AND URETERS:  Unremarkable.  No solid mass.  No  hydronephrosis.    STOMACH AND BOWEL:  Unremarkable.  No obstruction.  No  mucosal  thickening.      PELVIS:    APPENDIX:  No findings to suggest acute appendicitis.    BLADDER:  Unremarkable.  No mass.    REPRODUCTIVE:  Unremarkable as visualized.      ABDOMEN and PELVIS:    INTRAPERITONEAL SPACE:  Unremarkable.  No free air.  No significant  fluid collection.    BONES/JOINTS:  No acute fracture.  No dislocation.    SOFT TISSUES:  Unremarkable.    VASCULATURE:  Unremarkable.  No abdominal aortic aneurysm.    LYMPH NODES:  Unremarkable.  No enlarged lymph nodes.     IMPRESSION:  1.  Gallstones.  Gallbladder otherwise unremarkable.  2.  Stable 1 cm right lobe of liver lesion and more posterior 7 mm  Lesion.    CT Chest With Contrast Diagnostic (08/08/2022 14:40)  FINDINGS:    LUNGS:  Multiple pulmonary nodules again noted including a right lower  lobe pulmonary nodule measuring 5 mm that was 5 mm. Another right lower  lobe pulmonary nodule is 7 mm and was 7 mm. A left lower lobe pulmonary  nodule is 5 mm and was 5 mm. Other nodules appear stable.    PLEURAL SPACE:  Unremarkable.  No pneumothorax.  No significant  effusion.    HEART:  Unremarkable.  No cardiomegaly.  No significant pericardial  effusion.  No significant coronary artery calcifications.    BONES/JOINTS:  Unremarkable.  No acute fracture.  No dislocation.    SOFT TISSUES:  Unremarkable.    VASCULATURE:  Unremarkable.  No thoracic aortic aneurysm.    LYMPH NODES:  Unremarkable.  No enlarged lymph nodes.     IMPRESSION:    Multiple pulmonary nodules again noted including a right lower lobe  pulmonary nodule measuring 5 mm that was 5 mm. Another right lower lobe  pulmonary nodule is 7 mm and was 7 mm. A left lower lobe pulmonary  nodule is 5 mm and was 5 mm. Other nodules appear stable.    CT Abdomen Pelvis With Contrast (10/31/2022 14:46)  FINDINGS:    LUNG BASES:  Unremarkable.  No mass.  No consolidation.      ABDOMEN:    LIVER:  Stable right lobe of liver low-attenuation lesion measuring  about a centimeter. Stable more  posterior right lobe of liver lesion  measuring about 7 mm.    GALLBLADDER AND BILE DUCTS:  Gallstones.  Gallbladder otherwise  unremarkable.  No ductal dilation.    PANCREAS:  Unremarkable.  No mass.  No ductal dilation.    SPLEEN:  Unremarkable.  No splenomegaly.    ADRENALS:  Unremarkable.  No mass.    KIDNEYS AND URETERS:  Unremarkable.  No solid mass.  No  hydronephrosis.    STOMACH AND BOWEL:  Scattered diverticula in the colon.  No  diverticulitis.  No obstruction.      PELVIS:    APPENDIX:  No findings to suggest acute appendicitis.    BLADDER:  Unremarkable.  No mass.    REPRODUCTIVE:  Unremarkable as visualized.      ABDOMEN and PELVIS:    INTRAPERITONEAL SPACE:  Unremarkable.  No free air.  No significant  fluid collection.    BONES/JOINTS:  No acute fracture.  No dislocation.    SOFT TISSUES:  Unremarkable.    VASCULATURE:  Unremarkable.  No abdominal aortic aneurysm.    LYMPH NODES:  Unremarkable.  No enlarged lymph nodes.     IMPRESSION:  1.  Gallstones.  Gallbladder otherwise unremarkable.  2.  Stable right lobe of liver low-attenuation lesion measuring about a  centimeter. Stable more posterior right lobe of liver lesion measuring  about 7 mm.    CT Chest With Contrast Diagnostic (10/31/2022 14:47)  FINDINGS:    LUNGS:  Stable subpleural right middle lobe pulmonary nodule measuring  5 mm. Stable right lower lobe pulmonary nodule measuring 6 mm. Other  bilateral pulmonary nodules appear stable in size and number.    PLEURAL SPACE:  Unremarkable.  No pneumothorax.  No significant  effusion.    HEART:  Unremarkable.  No cardiomegaly.  No significant pericardial  effusion.  No significant coronary artery calcifications.    BONES/JOINTS:  Unremarkable.  No acute fracture.  No dislocation.    SOFT TISSUES:  Right upper breast density again noted.    VASCULATURE:  Unremarkable.  No thoracic aortic aneurysm.    LYMPH NODES:  Unremarkable.  No enlarged lymph nodes.     IMPRESSION:    Stable subpleural right  middle lobe pulmonary nodule measuring 5 mm.  Stable right lower lobe pulmonary nodule measuring 6 mm. Other bilateral  pulmonary nodules appear stable in size and number.    CT chest with contrast 1/25/23  FINDINGS:     LUNGS: Multiple parenchymal nodules are present bilaterally and are  stable in size, number, and appearance. Largest nodule measuring  approximately 6 mm.     HEART: Mild coronary artery calcifications.     MEDIASTINUM: No masses. No enlarged lymph nodes.  No fluid collections.     PLEURA: No pleural effusions.     VASCULATURE: No evidence of aneurysm.     BONES: No acute bony abnormality.     VISUALIZED UPPER ABDOMEN:Please see the report for the CT of the abdomen  and pelvis.     Other: 8.5 mm left axillary lymph node stable.     IMPRESSION:     1. Multiple parenchymal nodules in both lungs are stable.  2. 8.5 mm lymph node in the left axilla, stable.    CT abdomen pelvis with contrast 1/25/23  FINDINGS:     Lower thorax: Parenchymal nodules in both lungs similar to the previous  exam.     Abdomen:     Liver: Previous identified area of decreased attenuation in the right  lobe of the liver is not localized on today's exam.     Gallbladder: Cholelithiasis.     Pancreas: Unremarkable. No mass or ductal dilatation.     Spleen: Homogeneous. No splenomegaly.     Adrenals: No mass.     Kidneys/ureters: Nonobstructing right intrarenal stone.     GI tract: Moderate stool. Sigmoid diverticuli without diverticulitis.     MESENTERY: No free fluid, walled off fluid collections, mesenteric  stranding, or enlarged lymph nodes        Vasculature: No evidence of aneurysm.     Abdominal wall: No focal hernia or mass.        Bladder: No focal mass or significant wall thickening     Reproductive: Unremarkable as visualized     Bones: No acute bony abnormality.     IMPRESSION:     1. No evidence of new interval development of metastatic disease.  Previously identified hepatic lesion is not seen on today's exam.      2.Cholelithiasis.     3. Nonobstructing right intrarenal stone.    Mammo Diagnostic Digital Tomosynthesis Bilateral With CAD (02/08/2023 14:55)  FINDINGS: There are scattered areas of fibroglandular density.     The bilateral fibroglandular pattern is stable in appearance including  postoperative changes in the right 12:00 region. Skin thickening  involving the right breast is again noted and likely treatment related.  Multiple surgical clips are now noted in the right axilla. The patient's  Port-A-Cath is incompletely imaged in the posterior superior aspect of  the left breast. No new or suspicious findings are identified in either  breast.     IMPRESSION:  Benign bilateral mammographic findings.        BI-RADS CATEGORY:  2, BENIGN        RECOMMENDATION:  Recommend the patient continue annual bilateral  mammographic evaluation.    CT Abdomen Pelvis With Contrast (04/13/2023 14:51)  FINDINGS:    Lung bases:  Refer to chest CT for characterization of lung nodules in  the partially imaged lower chest.    Mediastinum:  Moderate hiatal hernia.      ABDOMEN:    Liver:  No definite focal liver lesion identified.    Gallbladder and bile ducts:  Cholelithiasis.  No ductal dilation.    Pancreas:  Unremarkable.  No mass.  No ductal dilation.    Spleen:  Unremarkable.  No splenomegaly.    Adrenals:  No adrenal masses.    Kidneys and ureters:  Tiny nonobstructing right kidney stone. No  hydronephrosis.    Stomach and bowel:  Gastric antrum wall thickening likely due to  incomplete distention.  Small splenule is again noted adjacent to the  splenic flexure of the colon.  Sigmoid diverticulosis. No evidence of  acute diverticulitis.      PELVIS:    Appendix:  Normal appendix.    Bladder:  Unremarkable.  No mass.    Reproductive:  Unremarkable as visualized.      ABDOMEN and PELVIS:    Intraperitoneal space:  No pneumoperitoneum identified.  No  significant fluid collection.    Bones/joints:  Stable appearance of the bony  structures. Mild  degenerative changes lumbar spine but no acute osseous findings.  No  dislocation.    Soft tissues:  Unremarkable.    Vasculature:  Unremarkable.  No abdominal aortic aneurysm.    Lymph nodes:  No iliac or retroperitoneal lymphadenopathy.     IMPRESSION:  1.  Cholelithiasis.  2.  No evidence of metastatic disease to abdomen or pelvis.  3.  Other nonacute/incidental findings as detailed above which appear  stable from previous.    CT Chest With Contrast Diagnostic (04/13/2023 14:51)  FINDINGS:    Lungs:  Small nodule along the right major fissure in the right lung,  5.2 mm and was previously 5.3 mm; image #33.  Parenchymal nodule right  lower lobe is 6.7 mm and was previously 6.9 mm, image #33, no change.   5.9 mm nodule right lower lobe was previously 6.5 mm indicating no  interval change.  Other smaller nodules of the right lung appears  stable.  7.5 mm nodule left lower lobe, image #55, was previously 8.2  mm.  A 5.7 mm nodule left lower lobe, image #40, was previously 5.9 mm.  No significant change.    Pleural space:  Unremarkable.  No pneumothorax.  No significant  effusion.    Heart:  Mild cardiomegaly.  No significant pericardial effusion.  No  significant coronary artery calcifications.    Mediastinum:  Moderate hiatal hernia again noted.  No enlarged  mediastinal or hilar lymph nodes identified.    Bones/joints:  Unremarkable.  No acute fracture.  No dislocation.    Soft tissues:  Stable treatment-related skin thickening of the right  breast.    Vasculature:  Unremarkable.  No thoracic aortic aneurysm.    Lymph nodes:  See above.    Gallbladder and bile ducts:  Cholelithiasis.    Tubes, lines and devices:  Left Port-A-Cath noted.    Other findings:  Calcified granulomas are stable.     IMPRESSION:  1.  No interval change in size of multiple bilateral pulmonary  parenchymal nodules. Index nodules as detailed above. Some nodules may  be slightly smaller than previous or could be due to  differences in  slice selection.  2.  No new nodules are identified.  3.  No thoracic adenopathy.  4.  Other incidental and nonacute findings detailed above appear stable  from previous.    CT Chest With Contrast Diagnostic (07/28/2023 15:37)   FINDINGS:    LUNGS AND PLEURAL SPACES:  Again there are tiny bilateral pulmonary  nodules with the largest in the right lower lobe measuring about 6 mm  and was about 6 mm. Other nodules including a left lower lobe pulmonary  nodule measuring 6 mm appears stable.  No pneumothorax.  No significant  effusion.    HEART:  Unremarkable.  No cardiomegaly.  No significant pericardial  effusion.  No significant coronary artery calcifications.    BONES/JOINTS:  Unremarkable.  No acute fracture.  No dislocation.    SOFT TISSUES:  Unremarkable.    VASCULATURE:  Unremarkable.  No thoracic aortic aneurysm.    LYMPH NODES:  Unremarkable.  No enlarged lymph nodes.     IMPRESSION:    Again there are tiny bilateral pulmonary nodules with the largest in  the right lower lobe measuring about 6 mm and was about 6 mm. Other  nodules including a left lower lobe pulmonary nodule measuring 6 mm  appears stable.    CT Abdomen Pelvis With Contrast (07/28/2023 15:40)   FINDINGS:    LUNG BASES:  Unremarkable.  No mass.  No consolidation.      ABDOMEN:    LIVER:  Unremarkable.  No mass.    GALLBLADDER AND BILE DUCTS:  Gallstone.  Gallbladder otherwise  unremarkable.  No ductal dilation.    PANCREAS:  Unremarkable.  No mass.  No ductal dilation.    SPLEEN:  Unremarkable.  No splenomegaly.    ADRENALS:  Unremarkable.  No mass.    KIDNEYS AND URETERS:  Unremarkable.  No solid mass.  No  hydronephrosis.    STOMACH AND BOWEL:  Scattered diverticula in the colon.  No  diverticulitis.  No obstruction.      PELVIS:    APPENDIX:  No findings to suggest acute appendicitis.    BLADDER:  Unremarkable.  No mass.    REPRODUCTIVE:  Unremarkable as visualized.      ABDOMEN and PELVIS:    INTRAPERITONEAL SPACE:   Unremarkable.  No free air.  No significant  fluid collection.    BONES/JOINTS:  No acute fracture.  No dislocation.    SOFT TISSUES:  Unremarkable.    VASCULATURE:  Unremarkable.  No abdominal aortic aneurysm.    LYMPH NODES:  Unremarkable.  No enlarged lymph nodes.     IMPRESSION:    Gallstone.  Gallbladder otherwise unremarkable.    CT Chest With Contrast Diagnostic (10/27/2023 15:28)   FINDINGS:    LUNGS AND PLEURAL SPACES:  Multiple bilateral pulmonary nodules are  stable in size.  No pneumothorax.  No significant effusion.    HEART:  Unremarkable as visualized.  No cardiomegaly.  No significant  pericardial effusion.  No significant coronary artery calcifications.    MEDIASTINUM:  Small hiatal hernia.    BONES/JOINTS:  Unremarkable as visualized.  No acute fracture.  No  dislocation.    SOFT TISSUES:  Right breast skin thickening noted likely treatment  related again noted.    VASCULATURE:  Unremarkable as visualized.  No thoracic aortic  aneurysm.    LYMPH NODES:  Unremarkable as visualized.  No enlarged lymph nodes.     IMPRESSION:  1.  Small hiatal hernia.  2.  Right breast skin thickening noted likely treatment related again  noted.  3.  Multiple bilateral pulmonary nodules are stable in size.    CT Abdomen Pelvis With Contrast (10/27/2023 15:30)   FINDINGS:    LUNG BASES:  Unremarkable as visualized.  No mass.  No consolidation.    MEDIASTINUM:  Small hiatal hernia.      ABDOMEN:    LIVER:  Unremarkable as visualized.  No mass.    GALLBLADDER AND BILE DUCTS:  Gallstone.  Gallbladder otherwise  unremarkable.  No ductal dilation.    PANCREAS:  Unremarkable as visualized.  No mass.  No ductal dilation.    SPLEEN:  Unremarkable as visualized.  No splenomegaly.    ADRENALS:  Unremarkable as visualized.  No mass.    KIDNEYS AND URETERS:  Unremarkable as visualized.  No solid mass.  No  hydronephrosis.    STOMACH AND BOWEL:  Scattered diverticula in the colon.  No  diverticulitis.  No obstruction.      PELVIS:     APPENDIX:  No findings to suggest acute appendicitis.    BLADDER:  Unremarkable as visualized.  No mass.    REPRODUCTIVE:  Unremarkable as visualized.      ABDOMEN and PELVIS:    INTRAPERITONEAL SPACE:  Unremarkable as visualized.  No free air.  No  significant fluid collection.    BONES/JOINTS:  No acute fracture.  No dislocation.    SOFT TISSUES:  Unremarkable as visualized.    VASCULATURE:  Unremarkable as visualized.  No abdominal aortic  aneurysm.    LYMPH NODES:  Unremarkable as visualized.  No enlarged lymph nodes.    OTHER FINDINGS:  No evidence of metastatic disease.     IMPRESSION:  1.  Gallstone.  Gallbladder otherwise unremarkable.  2.  No evidence of metastatic disease.    CT Chest With Contrast Diagnostic (01/26/2024 14:09)   FINDINGS:    LUNGS AND PLEURAL SPACES:  Again small bilateral pulmonary nodules are  stable including a right lower lobe pulmonary nodule measuring 6 mm and  a left lower lobe pulmonary nodule measuring 6 mm. Other nodules are  also stable in size. No new nodules are identified.  No pneumothorax.   No significant effusion.    HEART:  Unremarkable as visualized.  No cardiomegaly.  No significant  pericardial effusion.  No significant coronary artery calcifications.    MEDIASTINUM:  Small hiatal hernia again noted.    BONES/JOINTS:  Unremarkable as visualized.  No acute fracture.  No  dislocation.    SOFT TISSUES:  Unremarkable as visualized.    VASCULATURE:  Unremarkable as visualized.  No thoracic aortic  aneurysm.    LYMPH NODES:  Unremarkable as visualized.  No enlarged lymph nodes.    TUBES, LINES AND DEVICES:  Left Port-A-Cath noted.     IMPRESSION:  1.  Again small bilateral pulmonary nodules are stable including a right  lower lobe pulmonary nodule measuring 6 mm and a left lower lobe  pulmonary nodule measuring 6 mm. Other nodules are also stable in size.  No new nodules are identified.  2.  Small hiatal hernia again noted.    CT Abdomen Pelvis With Contrast (01/26/2024  14:17)   FINDINGS:    LUNG BASES:  Unremarkable as visualized.  No mass.  No consolidation.      ABDOMEN:    LIVER:  Unremarkable as visualized.  No mass.    GALLBLADDER AND BILE DUCTS:  Gallstones.  Gallbladder otherwise  unremarkable.  No ductal dilation.    PANCREAS:  Unremarkable as visualized.  No mass.  No ductal dilation.    SPLEEN:  Unremarkable as visualized.  No splenomegaly.    ADRENALS:  Unremarkable as visualized.  No mass.    KIDNEYS AND URETERS:  Unremarkable as visualized.  No solid mass.  No  hydronephrosis.    STOMACH AND BOWEL:  Unremarkable as visualized.  No obstruction.  No  mucosal thickening.      PELVIS:    APPENDIX:  No findings to suggest acute appendicitis.    BLADDER:  Unremarkable as visualized.  No mass.    REPRODUCTIVE:  Unremarkable as visualized.      ABDOMEN and PELVIS:    INTRAPERITONEAL SPACE:  Unremarkable as visualized.  No free air.  No  significant fluid collection.    BONES/JOINTS:  No acute fracture.  No dislocation.    SOFT TISSUES:  Unremarkable as visualized.    VASCULATURE:  Atherosclerotic disease.  No abdominal aortic aneurysm.    LYMPH NODES:  Unremarkable as visualized.  No enlarged lymph nodes.    OTHER FINDINGS:  No evidence of metastatic disease.     IMPRESSION:  1.  Gallstones.  Gallbladder otherwise unremarkable.  2.  No evidence of metastatic disease.     Mammo Diagnostic Digital Tomosynthesis Bilateral With CAD (02/21/2024 14:40)   FINDINGS: There are scattered areas of fibroglandular density.     The bilateral fibroglandular pattern is stable in appearance including  postoperative changes in the superior aspect of the right breast. Mild  skin thickening is again noted involving the right breast which is  likely treatment related. There are bilateral scattered benign-appearing  calcifications. No dominant mass, suspicious calcifications, or  nonsurgical areas of architectural distortion are seen.     IMPRESSION:  Benign bilateral mammographic findings.         BI-RADS CATEGORY: 2, BENIGN        RECOMMENDATION: Recommend the patient continue annual bilateral  mammographic evaluation.    CT Chest With Contrast Diagnostic (05/07/2024 15:52)   FINDINGS:    Lungs and pleural spaces:  Multiple bilateral pulmonary nodules which  appear stable from previous exam.  A 5.4 mm nodule right lower lobe,  image #41, is unchanged from previous; previously 6.3 mm.  A left lower  lobe pulmonary nodule is 5.7 mm and was previously 5.7 mm, image #42.   Left lower lobe basilar pulmonary nodule is 8.7 mm and was previously  8.8 mm, image #59, indicating no interval change.  Other scattered  pulmonary nodules are stable.  Linear scarring versus atelectasis right  lower lobe, stable.  No new pulmonary nodules are identified.  No  pleural effusions.  No pneumothorax.    Heart:  Borderline cardiomegaly.  No significant coronary artery  calcifications.  No pericardial effusion.    Mediastinum:  Hiatal hernia noted.  No hilar or mediastinal  lymphadenopathy is identified.    Bones/joints:  Unremarkable as visualized.  No acute fracture.  No  dislocation.    Soft tissues:  Unremarkable as visualized.    Vasculature:  Unremarkable as visualized.  No thoracic aortic  aneurysm.    Lymph nodes:  Unremarkable as visualized.  No axillary adenopathy is  noted.    Gallbladder and bile ducts:  Cholelithiasis.    Tubes, lines and devices:  Left Port-A-Cath noted.     IMPRESSION:  1.  Multiple bilateral pulmonary nodules appear stable from the previous  exam with no significant change in size of index nodules of both lungs.  2.  No new pulmonary nodules identified.  3.  Otherwise stable exam with other incidental and nonacute findings  described above.    CT Abdomen Pelvis With Contrast (05/07/2024 15:54)   FINDINGS:    Lung bases:  Pulmonary nodules noted. Refer to chest CT.    Mediastinum:  Small hiatal hernia, stable.      ABDOMEN:    Liver:  Unremarkable as visualized.  No mass.    Gallbladder and bile  ducts:  Cholelithiasis, stable.  No ductal  dilation.    Pancreas:  Unremarkable as visualized.  No mass.  No ductal dilation.    Spleen:  Unremarkable as visualized.  No splenomegaly.    Adrenals:  Unremarkable as visualized.  No adrenal masses.    Kidneys and ureters:  Unremarkable as visualized.  No solid mass.  No  hydronephrosis.    Stomach and bowel:  Sigmoid diverticulosis without diverticulitis.  No  obstruction.      PELVIS:    Appendix:  Normal appendix.    Bladder:  Unremarkable as visualized.  No mass.    Reproductive:  Unremarkable as visualized.      ABDOMEN and PELVIS:    Intraperitoneal space:  Unremarkable as visualized.  No significant  fluid collection.  No pneumoperitoneum identified.    Bones/joints:  Stable bony structures. Degenerative changes lumbar  spine.  No acute fracture.  No dislocation.    Soft tissues:  Unremarkable as visualized.    Vasculature:  Unremarkable as visualized.  No abdominal aortic  aneurysm.    Lymph nodes:  Unremarkable as visualized.  No iliac or retroperitoneal  adenopathy.     IMPRESSION:  1.  Stable exam. No evidence of metastatic disease to the abdomen or  pelvis.  2.  Cholelithiasis.  3.  Mild diverticulosis without diverticulitis.  4.  Other incidental/nonacute findings above.    CT Chest With Contrast Diagnostic (08/06/2024 13:15)   FINDINGS:    Lungs and pleural spaces:  Multiple bilateral pulmonary nodules again  noted.  Index nodule in the left lower lobe, image #54, is 8.3 mm and  was previously 8.7 mm.  Index nodule in the right lower lobe is 5.5 mm  and was previously 5.4 mm.  Index nodule in the left lower lobe is 5.4  mm and was previously 5.7 mm.  No consolidation.  No pneumothorax.  No  significant effusion.    Heart:  Mild cardiomegaly.  No significant pericardial effusion.  No  significant coronary artery calcifications.    Mediastinum:  Small hiatal hernia.  No hilar or mediastinal  lymphadenopathy.    Bones/joints:  Unremarkable as visualized.  No  acute fracture.  No  dislocation.  No new bone lesions have developed.    Soft tissues:  Postsurgical changes right breast with treatment  related skin thickening noted stable from previous.    Vasculature:  Unremarkable as visualized.  No thoracic aortic  aneurysm.    Lymph nodes:  No axillary adenopathy.    Tubes, lines and devices:  Left-sided Port-A-Cath noted.    Other findings:  No definite new nodules have developed.     IMPRESSION:  1.  Stable exam. Index nodules of both lungs are essentially stable from  previous. No new nodules identified.  2.  No thoracic adenopathy.  3.  Other incidental/nonacute findings above stable.    CT Abdomen Pelvis With Contrast (08/06/2024 13:18)   FINDINGS:    Lung bases:  Unremarkable as visualized.  No mass.  No consolidation.    Mediastinum:  Small hiatal hernia.      ABDOMEN:    Liver:  Unremarkable as visualized.  No mass.    Gallbladder and bile ducts:  Cholelithiasis.  No ductal dilation.    Pancreas:  Unremarkable as visualized.  No mass.  No ductal dilation.    Spleen:  Unremarkable as visualized.  No splenomegaly.    Adrenals:  Unremarkable as visualized.  No mass.    Kidneys and ureters:  Tiny nonobstructing right kidney stones.    Stomach and bowel:  Sigmoid diverticulosis without diverticulitis.  No  obstruction.      PELVIS:    Appendix:  Normal appendix.    Bladder:  Unremarkable as visualized.  No mass.    Reproductive:  Unremarkable as visualized.      ABDOMEN and PELVIS:    Intraperitoneal space:  Unremarkable as visualized.  No free air.  No  significant fluid collection.    Bones/joints:  No acute fracture.  No dislocation.    Soft tissues:  Unremarkable as visualized.    Vasculature:  Unremarkable as visualized.  No abdominal aortic  aneurysm.    Lymph nodes:  Unremarkable as visualized.  No enlarged lymph nodes.     IMPRESSION:  1.  No evidence of metastatic disease to abdomen or pelvis.  2.  Cholelithiasis.  3.  Diverticulosis without diverticulitis.  4.   Tiny nonobstructing right kidney stones. No hydronephrosis.  5.  Other incidental/nonacute findings above.    CT Chest With Contrast Diagnostic (11/19/2024 15:52)   FINDINGS:     LUNGS: Multiple parenchymal nodules bilaterally are stable.     HEART: Coronary artery calcifications     MEDIASTINUM: No masses. No enlarged lymph nodes.  No fluid collections.     PLEURA: No pleural effusion. No pleural mass or abnormal calcification.  No pneumothorax.     VASCULATURE: No evidence of aneurysm.      BONES: No acute bony abnormality.     VISUALIZED UPPER ABDOMEN: Please see the CT report for the abdomen and  pelvis     Other: None.     IMPRESSION:     1. Multiple bilateral parenchymal nodules unchanged from the previous  exam    CT Abdomen Pelvis With Contrast (11/19/2024 15:54)   FINDINGS:     Lower thorax: Clear. No effusions.     Abdomen:     Liver: Homogeneous. No focal hepatic mass or ductal dilatation.     Gallbladder: Cholelithiasis     Pancreas: Unremarkable. No mass or ductal dilatation.     Spleen: Homogeneous. No splenomegaly.     Adrenals: No mass.     Kidneys/ureters: No mass. No obstructive uropathy.  No evidence of  urolithiasis.     GI tract: Sigmoid diverticuli without evidence of diverticulitis. There  is no evidence of appendicitis     MESENTERY: No free fluid, walled off fluid collections, mesenteric  stranding, or enlarged lymph nodes        Vasculature: No evidence of aneurysm.     Abdominal wall: No focal hernia or mass.        Bladder: No focal mass or significant wall thickening     Reproductive: Unremarkable as visualized     Bones: No acute bony abnormality.     IMPRESSION:     1. No evidence of metastatic disease to the abdomen or pelvis     2. Other findings as above    Mammo Diagnostic Digital Tomosynthesis Bilateral With CAD (02/25/2025 14:42)   FINDINGS: There are scattered areas of fibroglandular density.     There has been a slight generalized increase in the bilateral tissue  density  compatible with weight loss. Postoperative changes in the  superior aspect of the right breast are stable. There is right breast  skin thickening which is likely treatment related. There are scattered  benign-appearing calcifications     IMPRESSION:  Benign bilateral mammographic findings        BI-RADS CATEGORY: 2, BENIGN        RECOMMENDATION: Recommend the patient continue annual bilateral  mammographic evaluation     CAD was utilized.    CT Chest With Contrast Diagnostic (03/14/2025 15:00)   FINDINGS:    Lungs and pleural spaces:  7.6 mm nodule right lower lobe, image #44,  stable from previous.  6.1 mm nodule right lower lobe, image #50, stable  from previous.  7.1 mm nodule right lower lobe, image #56, stable from  previous.  6.5 mm nodule left lower lobe, image #54, stable from  previous.  8.3 mm nodule left lower lobe, image #73, stable from  previous.  No consolidation.  No pneumothorax.  No definite new  pulmonary nodules have developed.  No effusions.    Heart:  Unremarkable.  No cardiomegaly.  No significant pericardial  effusion.  No significant coronary artery calcifications.    Mediastinum:  Small hiatal hernia.  No hilar or mediastinal  adenopathy.    Bones/joints:  Bony structures appear stable.    Soft tissues:  Treatment related skin thickening of the right breast.   No definite body wall lesions.    Vasculature:  Unremarkable.  No thoracic aortic aneurysm.    Lymph nodes:  Right axillary node dissection.  No axillary adenopathy  identified.    Gallbladder and bile ducts:  Cholelithiasis.    Tubes, lines and devices:  Left Port-A-Cath is noted.  Left  Port-A-Cath.     IMPRESSION:  1.  Stable bilateral pulmonary nodules. No new nodules have developed.  2.  No thoracic adenopathy.  3.  Other incidental/nonacute findings above are stable from previous.       CT Abdomen Pelvis With Contrast (03/14/2025 15:01)   FINDINGS:    Lung bases:  Bilateral pulmonary nodules.  No consolidation.    Heart:  Mild  cardiac enlargement.    Mediastinum:  Small hiatal hernia.      ABDOMEN:    Liver:  Unremarkable.  No mass.    Gallbladder and bile ducts:  Cholelithiasis.  No ductal dilation.    Pancreas:  Unremarkable.  No mass.  No ductal dilation.    Spleen:  Unremarkable.  No splenomegaly.    Adrenals:  Unremarkable.  No mass.    Kidneys and ureters:  Nonobstructing right kidney stone. No  hydronephrosis.    Stomach and bowel:  Sigmoid diverticulosis but no CT evidence of  diverticulitis.  No obstruction.      PELVIS:    Appendix:  Normal appendix.    Bladder:  Unremarkable.  No mass.    Reproductive:  Unremarkable as visualized.      ABDOMEN and PELVIS:    Intraperitoneal space:  Unremarkable.  No significant fluid  collection.  No pneumoperitoneum identified.    Bones/joints:  Mild degenerative changes lumbar spine.  No  dislocation.  No acute bony findings identified.    Soft tissues:  Unremarkable.    Vasculature:  Unremarkable.  No abdominal aortic aneurysm.    Lymph nodes:  Unremarkable.  No abdominal or pelvic adenopathy.     IMPRESSION:  1. No evidence of metastatic disease to the abdomen or pelvis.  2. Bibasilar pulmonary nodules. Refer to chest CT.  3. Nonobstructing right kidney stone. No hydronephrosis.  4.  Cholelithiasis. Other incidental/nonacute findings above.    CT Chest With Contrast Diagnostic (06/17/2025 11:13)   FINDINGS:     LUNGS: Parenchymal nodules again noted in both lungs and are stable. No  new nodules are seen.     HEART: Unremarkable.     MEDIASTINUM: No masses. No enlarged lymph nodes.  No fluid collections.     PLEURA: No pleural effusion. No pleural mass or abnormal calcification.  No pneumothorax.     VASCULATURE: No evidence of aneurysm.      BONES: No acute bony abnormality.     VISUALIZED UPPER ABDOMEN: Please see the CT report for the abdomen and  pelvis     Other: None.     IMPRESSION:     1. Parenchymal nodules bilaterally are stable  2. No new adenopathy, effusions, or nodules.    CT  Abdomen Pelvis With Contrast (06/17/2025 11:10)   FINDINGS:     Lower thorax: Please see the CT report for the chest     Abdomen:     Liver: Homogeneous. No focal hepatic mass or ductal dilatation.     Gallbladder: Cholelithiasis     Pancreas: Unremarkable. No mass or ductal dilatation.     Spleen: Homogeneous. No splenomegaly.     Adrenals: No mass.     Kidneys/ureters: Nonobstructing right renal stone     GI tract: Non-dilated. No definite wall thickening.. Large stool burden     MESENTERY: No free fluid, walled off fluid collections, mesenteric  stranding, or enlarged lymph nodes        Vasculature: No evidence of aneurysm.     Abdominal wall: No focal hernia or mass.        Bladder: No focal mass or significant wall thickening     Reproductive: Unremarkable as visualized     Bones: No acute bony abnormality.     IMPRESSION:     1. No evidence of interval development of metastatic disease to the  abdomen or pelvis     2. Nonobstructing right renal calcification     3. Large stool burden      RECENT LABS:  Lab Results   Component Value Date    WBC 4.10 06/20/2025    HGB 13.1 06/20/2025    HCT 41.0 06/20/2025    MCV 92.3 06/20/2025    RDW 14.3 06/20/2025     06/20/2025    NEUTRORELPCT 54.9 06/20/2025    LYMPHORELPCT 32.4 06/20/2025    MONORELPCT 8.3 06/20/2025    EOSRELPCT 2.7 06/20/2025    BASORELPCT 1.5 06/20/2025    NEUTROABS 2.25 06/20/2025    LYMPHSABS 1.33 06/20/2025       Lab Results   Component Value Date     06/20/2025    K 3.8 06/20/2025    CO2 19.1 (L) 06/20/2025     (H) 06/20/2025    BUN 11.0 06/20/2025    CREATININE 0.87 06/20/2025    EGFRIFNONA 87 02/24/2022    GLUCOSE 175 (H) 06/20/2025    CALCIUM 9.3 06/20/2025    ALKPHOS 63 06/20/2025    AST 27 06/20/2025    ALT 26 06/20/2025    BILITOT 0.6 06/20/2025    ALBUMIN 4.0 06/20/2025    PROTEINTOT 6.0 06/20/2025    MG 2.0 09/01/2023     Lab Results   Component Value Date    TSH 1.500 06/20/2025     Lab Results   Component Value Date     FERRITIN 169.00 (H) 01/20/2022    IRON 102 01/20/2022    TIBC 264 (L) 01/20/2022    LABIRON 39 01/20/2022    [id number] 437 01/20/2022    FOLATE 16.40 01/20/2022     Lab Results   Component Value Date     (H) 01/20/2022     ASSESSMENT & PLAN:  Yadira Flowers is a very pleasant 69 y.o. female with a history of cervical cancer, breast cancer and malignant melanoma.    1. History of Breast Cancer:  - Ms. Onur Burgos had Stage IA ([id number]) moderately differentiated invasive ductal carcinoma of the right breast diagnosed in October 2015.  - She underwent R lympectomy and SLNBx performed by Dr. Steve Isbell 10-22-15 and then received adjuvant radiation in [place].  - After radiation, she was started on Arimidex which was then switched to Exemestane.  She took Exemestane for almost 5 years. Unfortunately, she developed vaginal bleeding and hematuria related to vaginal atrophy related to hormonal blockade.  Given this, stopped Exemestane.  She is doing much better in this regard since stopping hormonal therapy.  - Mammogram 2/25/25 without cause for concern.  Will need repeat huyen mammogram p 2/25/26..    2.  Metastatic malignant melanoma:  -  S/p resection of a primary lesion on her back performed by Dr. Catrachito Amaro of Dermatology in [place] in 2004.  -  S/p FNA R axillary LN which was concerning for melanoma.  Excisional biopsy confirmed metastatic malignant melanoma.  - Suspect her lung and liver lesions are also related to her melanoma.  - She has seen Dr. Her and he performed bronchoscopy on 02/23/2022. Pathology was negative for malignant cells.  - Liver aspiration showed abscess which grew Klebsiella, suspect this was a secondary infection.    -  Sent  excised LN tissue for CARIS testing.  -  PET-CT showed no significant FDG uptake but re-demonstrated abnormalities in the R axilla, RLL cavitary mass, Huyen lung nodules and liver.    -  MRI Brain showed no acute findings in the head/brain.  -   Recommended initial therapy with immunotherapy.  We discussed different options for immunotherapy including single agent PD-L1 treatment or combination with Yervoy.  We recommended combination with Yervoy for better RR, PFS and OS even with increased risk of immune-mediated side effects.  We discussed the initially approved FDA dosing v. Altered dosing with lower dose IPI/higher dosed Nivo.  The latter has been shown to have fewer side effects and is thought to likely have similar efficacy (though trials weren't really powered to definitively show that).  After a lengthy discussion, we decided to proceed with 4 cycles of Ipi 1 mg/kg / Nivo 3 mg/kg q 21d followed by Nivolumab single agent.  We discussed potential risks, benefits and side effects extensively and she decided to proceed.  -  She completed 4 cycles of Ipi / Nivo and tolerated treatment very well.  -  Repeat CT imaging done 5-3-22 after 4 cycles Ipi/Nivo showed an excellent partial response to treatment.  Have continued with single agent Nivolumab as planned which she is tolerating well.   -Repeat CT CAP from 6/17/25 (summarized above and reviewed with her today) again showing stable disease with excellent control of her metastatic melanoma.  -Therefore, will continue with Nivolumab therapy as planned.  - Will plan to repeat CT CAP with IV contrast only after a 3 month interval.     3.  Bilateral pulmonary emboli:  -  CTPE protocol 1-20-22  confirmed bilateral pulmonary emboli  -  BLE dopplers negative for DVT.  -  Continue Eliquis 5 mg BID.    -  We previously discussed length of therapy.  She watched her  die with PE and her mother has h/o PEs as well.  Presumably the cancer was the risk factor for development of PE and since her cancer is incurable,  have recommended she continue Eliquis indefinately as long as she continues without significant side effects.  She started complaining of difficulty with easy bleeding with minor trauma.  It had  been over 6 months that she has had full anticoagulation.  Given worsening side effects, decided to continue Eliquis, but reduced dose to 2.5 mg twice a day.  She is doing well on this dose without bleeding or thrombosis. Will continue.    4.  H/o Colon polyps:  -  Dr. Maldonado performed c-scope 12-09-20 with discovery of 8 hyperplastic polyps in the distal rectum as well as diverticulosis.  He recommended repeat c-scope after 5 years.    5.  H/o cervical cancer treated with cryotherapy at [facility] in [facility]:  -  Followed up with Dr. Manav Real of gynecology.  She says she had exam including pap smear and then pelvic U/S which was unrevealing.    6. Profound weight loss related to anorexia with increase in Mounjaro from 2.5 to 7.5 mg about 6 months ago:  -  Recommended she d/c Mounjaro and f/uw ith Dr. Meléndez.    -  Lizzette recommended she work ton increase intake of both food and fluids.      7.  Prophylaxis:  -  Had COVID vaccine x 2 (Pfizer).  She received Evusheld on 4-7-22.. Recommended booster. Received 2024 flu vaccine. Received  Prevnar 20 vaccine .   .    8. ACO / DWAYNE/Other  Quality measures  -  Yadira Flowers received 2024 flu vaccine.  -  Aydira Lionel reports a pain score of 0.    -  Current outpatient and discharge medications have been reconciled for the patient.  Reviewed by: Loreto Stone MD      9.  Follow up:  -  Continue Nivolumab as planned.  --  Mammogram will be due p 2/25/26  -  Repeat CT CAP with IV contrast only prior to her f/u with me in 3 months.  CBCD, CMP, TSH at that time.  -  Note:  Pt prefers that PAC be accessed for CT imaging.  -  D/c  Mounjaro and f/u with Dr. Meléndez.  Increase intake of food and fluics    I spent 30 minutes with Yadira Flowers today.  In the office today, more than 50% of this time was spent face-to-face with her  in counseling / coordination of care, reviewing her medical history and counseling on the current treatment plan.  All questions were answered to  her satisfaction        Electronically Signed by:  Loreto Stone MD        CC:     MD Manav Cotto MD Aaron House, MD

## 2025-06-20 ENCOUNTER — OFFICE VISIT (OUTPATIENT)
Dept: ONCOLOGY | Facility: CLINIC | Age: 70
End: 2025-06-20
Payer: MEDICARE

## 2025-06-20 ENCOUNTER — LAB (OUTPATIENT)
Dept: ONCOLOGY | Facility: CLINIC | Age: 70
End: 2025-06-20
Payer: MEDICARE

## 2025-06-20 VITALS
HEART RATE: 93 BPM | WEIGHT: 150.6 LBS | DIASTOLIC BLOOD PRESSURE: 70 MMHG | BODY MASS INDEX: 23.64 KG/M2 | OXYGEN SATURATION: 94 % | SYSTOLIC BLOOD PRESSURE: 101 MMHG | TEMPERATURE: 98 F | HEIGHT: 67 IN | RESPIRATION RATE: 20 BRPM

## 2025-06-20 DIAGNOSIS — C78.02 MALIGNANT NEOPLASM METASTATIC TO BOTH LUNGS: ICD-10-CM

## 2025-06-20 DIAGNOSIS — C43.59 MALIGNANT MELANOMA OF TORSO EXCLUDING BREAST: ICD-10-CM

## 2025-06-20 DIAGNOSIS — C78.01 MALIGNANT NEOPLASM METASTATIC TO BOTH LUNGS: ICD-10-CM

## 2025-06-20 DIAGNOSIS — C78.7 METASTASIS TO LIVER: ICD-10-CM

## 2025-06-20 DIAGNOSIS — R53.83 OTHER FATIGUE: ICD-10-CM

## 2025-06-20 DIAGNOSIS — C43.59 MALIGNANT MELANOMA OF TORSO EXCLUDING BREAST: Primary | ICD-10-CM

## 2025-06-20 DIAGNOSIS — Z17.0 MALIGNANT NEOPLASM OF RIGHT BREAST IN FEMALE, ESTROGEN RECEPTOR POSITIVE, UNSPECIFIED SITE OF BREAST: ICD-10-CM

## 2025-06-20 DIAGNOSIS — Z79.899 LONG-TERM USE OF HIGH-RISK MEDICATION: ICD-10-CM

## 2025-06-20 DIAGNOSIS — C50.911 MALIGNANT NEOPLASM OF RIGHT BREAST IN FEMALE, ESTROGEN RECEPTOR POSITIVE, UNSPECIFIED SITE OF BREAST: ICD-10-CM

## 2025-06-20 DIAGNOSIS — Z85.3 HISTORY OF BREAST CANCER: ICD-10-CM

## 2025-06-20 LAB
ALBUMIN SERPL-MCNC: 4 G/DL (ref 3.5–5.2)
ALBUMIN/GLOB SERPL: 2 G/DL
ALP SERPL-CCNC: 63 U/L (ref 39–117)
ALT SERPL W P-5'-P-CCNC: 26 U/L (ref 1–33)
ANION GAP SERPL CALCULATED.3IONS-SCNC: 14.9 MMOL/L (ref 5–15)
AST SERPL-CCNC: 27 U/L (ref 1–32)
BASOPHILS # BLD AUTO: 0.06 10*3/MM3 (ref 0–0.2)
BASOPHILS NFR BLD AUTO: 1.5 % (ref 0–1.5)
BILIRUB SERPL-MCNC: 0.6 MG/DL (ref 0–1.2)
BUN SERPL-MCNC: 11 MG/DL (ref 8–23)
BUN/CREAT SERPL: 12.6 (ref 7–25)
CALCIUM SPEC-SCNC: 9.3 MG/DL (ref 8.6–10.5)
CHLORIDE SERPL-SCNC: 109 MMOL/L (ref 98–107)
CO2 SERPL-SCNC: 19.1 MMOL/L (ref 22–29)
CREAT SERPL-MCNC: 0.87 MG/DL (ref 0.57–1)
DEPRECATED RDW RBC AUTO: 48.5 FL (ref 37–54)
EGFRCR SERPLBLD CKD-EPI 2021: 72.7 ML/MIN/1.73
EOSINOPHIL # BLD AUTO: 0.11 10*3/MM3 (ref 0–0.4)
EOSINOPHIL NFR BLD AUTO: 2.7 % (ref 0.3–6.2)
ERYTHROCYTE [DISTWIDTH] IN BLOOD BY AUTOMATED COUNT: 14.3 % (ref 12.3–15.4)
GLOBULIN UR ELPH-MCNC: 2 GM/DL
GLUCOSE SERPL-MCNC: 175 MG/DL (ref 65–99)
HCT VFR BLD AUTO: 41 % (ref 34–46.6)
HGB BLD-MCNC: 13.1 G/DL (ref 12–15.9)
IMM GRANULOCYTES # BLD AUTO: 0.01 10*3/MM3 (ref 0–0.05)
IMM GRANULOCYTES NFR BLD AUTO: 0.2 % (ref 0–0.5)
LYMPHOCYTES # BLD AUTO: 1.33 10*3/MM3 (ref 0.7–3.1)
LYMPHOCYTES NFR BLD AUTO: 32.4 % (ref 19.6–45.3)
MCH RBC QN AUTO: 29.5 PG (ref 26.6–33)
MCHC RBC AUTO-ENTMCNC: 32 G/DL (ref 31.5–35.7)
MCV RBC AUTO: 92.3 FL (ref 79–97)
MONOCYTES # BLD AUTO: 0.34 10*3/MM3 (ref 0.1–0.9)
MONOCYTES NFR BLD AUTO: 8.3 % (ref 5–12)
NEUTROPHILS NFR BLD AUTO: 2.25 10*3/MM3 (ref 1.7–7)
NEUTROPHILS NFR BLD AUTO: 54.9 % (ref 42.7–76)
NRBC BLD AUTO-RTO: 0 /100 WBC (ref 0–0.2)
PLATELET # BLD AUTO: 171 10*3/MM3 (ref 140–450)
PMV BLD AUTO: 10.8 FL (ref 6–12)
POTASSIUM SERPL-SCNC: 3.8 MMOL/L (ref 3.5–5.2)
PROT SERPL-MCNC: 6 G/DL (ref 6–8.5)
RBC # BLD AUTO: 4.44 10*6/MM3 (ref 3.77–5.28)
SODIUM SERPL-SCNC: 143 MMOL/L (ref 136–145)
TSH SERPL DL<=0.05 MIU/L-ACNC: 1.5 UIU/ML (ref 0.27–4.2)
WBC NRBC COR # BLD AUTO: 4.1 10*3/MM3 (ref 3.4–10.8)

## 2025-06-20 PROCEDURE — 99214 OFFICE O/P EST MOD 30 MIN: CPT | Performed by: INTERNAL MEDICINE

## 2025-06-20 PROCEDURE — 84443 ASSAY THYROID STIM HORMONE: CPT | Performed by: INTERNAL MEDICINE

## 2025-06-20 PROCEDURE — 1126F AMNT PAIN NOTED NONE PRSNT: CPT | Performed by: INTERNAL MEDICINE

## 2025-06-20 PROCEDURE — 3074F SYST BP LT 130 MM HG: CPT | Performed by: INTERNAL MEDICINE

## 2025-06-20 PROCEDURE — 80053 COMPREHEN METABOLIC PANEL: CPT | Performed by: INTERNAL MEDICINE

## 2025-06-20 PROCEDURE — 85025 COMPLETE CBC W/AUTO DIFF WBC: CPT | Performed by: INTERNAL MEDICINE

## 2025-06-20 PROCEDURE — 3078F DIAST BP <80 MM HG: CPT | Performed by: INTERNAL MEDICINE

## 2025-06-20 NOTE — PROGRESS NOTES
Venipuncture Blood Specimen Collection  Venipuncture performed in right arm by Kathy Dejesus MA with good hemostasis. Patient tolerated the procedure well without complications.   06/20/25   Kathy Dejesus MA

## 2025-06-26 ENCOUNTER — DOCUMENTATION (OUTPATIENT)
Dept: ONCOLOGY | Facility: CLINIC | Age: 70
End: 2025-06-26
Payer: COMMERCIAL

## 2025-07-03 DIAGNOSIS — C78.02 MALIGNANT NEOPLASM METASTATIC TO BOTH LUNGS: ICD-10-CM

## 2025-07-03 DIAGNOSIS — C43.59 MALIGNANT MELANOMA OF TORSO EXCLUDING BREAST: ICD-10-CM

## 2025-07-03 DIAGNOSIS — C78.01 MALIGNANT NEOPLASM METASTATIC TO BOTH LUNGS: ICD-10-CM

## 2025-07-03 DIAGNOSIS — C78.7 METASTASIS TO LIVER: Primary | ICD-10-CM

## 2025-07-03 RX ORDER — SODIUM CHLORIDE 9 MG/ML
250 INJECTION, SOLUTION INTRAVENOUS ONCE
OUTPATIENT
Start: 2025-07-11

## 2025-07-11 ENCOUNTER — APPOINTMENT (OUTPATIENT)
Dept: ONCOLOGY | Facility: HOSPITAL | Age: 70
End: 2025-07-11
Payer: COMMERCIAL

## 2025-07-11 ENCOUNTER — INFUSION (OUTPATIENT)
Dept: ONCOLOGY | Facility: HOSPITAL | Age: 70
End: 2025-07-11
Payer: COMMERCIAL

## 2025-07-11 VITALS
OXYGEN SATURATION: 97 % | SYSTOLIC BLOOD PRESSURE: 108 MMHG | HEART RATE: 96 BPM | TEMPERATURE: 97.8 F | BODY MASS INDEX: 23.43 KG/M2 | RESPIRATION RATE: 18 BRPM | DIASTOLIC BLOOD PRESSURE: 68 MMHG | WEIGHT: 149.6 LBS

## 2025-07-11 DIAGNOSIS — Z95.828 PORT-A-CATH IN PLACE: ICD-10-CM

## 2025-07-11 DIAGNOSIS — C78.01 MALIGNANT NEOPLASM METASTATIC TO BOTH LUNGS: ICD-10-CM

## 2025-07-11 DIAGNOSIS — C78.02 MALIGNANT NEOPLASM METASTATIC TO BOTH LUNGS: ICD-10-CM

## 2025-07-11 DIAGNOSIS — C78.7 METASTASIS TO LIVER: Primary | ICD-10-CM

## 2025-07-11 DIAGNOSIS — C43.59 MALIGNANT MELANOMA OF TORSO EXCLUDING BREAST: ICD-10-CM

## 2025-07-11 LAB
ALBUMIN SERPL-MCNC: 4 G/DL (ref 3.5–5.2)
ALBUMIN/GLOB SERPL: 1.9 G/DL
ALP SERPL-CCNC: 60 U/L (ref 39–117)
ALT SERPL W P-5'-P-CCNC: 23 U/L (ref 1–33)
ANION GAP SERPL CALCULATED.3IONS-SCNC: 12.5 MMOL/L (ref 5–15)
AST SERPL-CCNC: 20 U/L (ref 1–32)
BASOPHILS # BLD AUTO: 0.06 10*3/MM3 (ref 0–0.2)
BASOPHILS NFR BLD AUTO: 1.3 % (ref 0–1.5)
BILIRUB SERPL-MCNC: 0.6 MG/DL (ref 0–1.2)
BUN SERPL-MCNC: 13.2 MG/DL (ref 8–23)
BUN/CREAT SERPL: 18.6 (ref 7–25)
CALCIUM SPEC-SCNC: 9.6 MG/DL (ref 8.6–10.5)
CHLORIDE SERPL-SCNC: 107 MMOL/L (ref 98–107)
CO2 SERPL-SCNC: 22.5 MMOL/L (ref 22–29)
CREAT SERPL-MCNC: 0.71 MG/DL (ref 0.57–1)
DEPRECATED RDW RBC AUTO: 47.2 FL (ref 37–54)
EGFRCR SERPLBLD CKD-EPI 2021: 92.7 ML/MIN/1.73
EOSINOPHIL # BLD AUTO: 0.14 10*3/MM3 (ref 0–0.4)
EOSINOPHIL NFR BLD AUTO: 2.9 % (ref 0.3–6.2)
ERYTHROCYTE [DISTWIDTH] IN BLOOD BY AUTOMATED COUNT: 13.7 % (ref 12.3–15.4)
GLOBULIN UR ELPH-MCNC: 2.1 GM/DL
GLUCOSE SERPL-MCNC: 118 MG/DL (ref 65–99)
HCT VFR BLD AUTO: 39.9 % (ref 34–46.6)
HGB BLD-MCNC: 12.7 G/DL (ref 12–15.9)
IMM GRANULOCYTES # BLD AUTO: 0.01 10*3/MM3 (ref 0–0.05)
IMM GRANULOCYTES NFR BLD AUTO: 0.2 % (ref 0–0.5)
LYMPHOCYTES # BLD AUTO: 1.47 10*3/MM3 (ref 0.7–3.1)
LYMPHOCYTES NFR BLD AUTO: 30.9 % (ref 19.6–45.3)
MCH RBC QN AUTO: 29.5 PG (ref 26.6–33)
MCHC RBC AUTO-ENTMCNC: 31.8 G/DL (ref 31.5–35.7)
MCV RBC AUTO: 92.6 FL (ref 79–97)
MONOCYTES # BLD AUTO: 0.45 10*3/MM3 (ref 0.1–0.9)
MONOCYTES NFR BLD AUTO: 9.5 % (ref 5–12)
NEUTROPHILS NFR BLD AUTO: 2.62 10*3/MM3 (ref 1.7–7)
NEUTROPHILS NFR BLD AUTO: 55.2 % (ref 42.7–76)
NRBC BLD AUTO-RTO: 0 /100 WBC (ref 0–0.2)
PLATELET # BLD AUTO: 185 10*3/MM3 (ref 140–450)
PMV BLD AUTO: 10.2 FL (ref 6–12)
POTASSIUM SERPL-SCNC: 3.7 MMOL/L (ref 3.5–5.2)
PROT SERPL-MCNC: 6.1 G/DL (ref 6–8.5)
RBC # BLD AUTO: 4.31 10*6/MM3 (ref 3.77–5.28)
SODIUM SERPL-SCNC: 142 MMOL/L (ref 136–145)
T4 FREE SERPL-MCNC: 1.28 NG/DL (ref 0.92–1.68)
TSH SERPL DL<=0.05 MIU/L-ACNC: 1.05 UIU/ML (ref 0.27–4.2)
WBC NRBC COR # BLD AUTO: 4.75 10*3/MM3 (ref 3.4–10.8)

## 2025-07-11 PROCEDURE — 84439 ASSAY OF FREE THYROXINE: CPT

## 2025-07-11 PROCEDURE — 25010000002 HEPARIN LOCK FLUSH PER 10 UNITS

## 2025-07-11 PROCEDURE — 25010000002 NIVOLUMAB 240 MG/24ML SOLUTION 24 ML VIAL: Performed by: INTERNAL MEDICINE

## 2025-07-11 PROCEDURE — 25810000003 SODIUM CHLORIDE 0.9 % SOLUTION: Performed by: INTERNAL MEDICINE

## 2025-07-11 PROCEDURE — 80050 GENERAL HEALTH PANEL: CPT

## 2025-07-11 PROCEDURE — 96413 CHEMO IV INFUSION 1 HR: CPT

## 2025-07-11 RX ORDER — HEPARIN SODIUM (PORCINE) LOCK FLUSH IV SOLN 100 UNIT/ML 100 UNIT/ML
500 SOLUTION INTRAVENOUS AS NEEDED
OUTPATIENT
Start: 2025-07-11

## 2025-07-11 RX ORDER — SODIUM CHLORIDE 0.9 % (FLUSH) 0.9 %
10 SYRINGE (ML) INJECTION AS NEEDED
Status: DISCONTINUED | OUTPATIENT
Start: 2025-07-11 | End: 2025-07-11 | Stop reason: HOSPADM

## 2025-07-11 RX ORDER — SODIUM CHLORIDE 0.9 % (FLUSH) 0.9 %
10 SYRINGE (ML) INJECTION AS NEEDED
OUTPATIENT
Start: 2025-07-11

## 2025-07-11 RX ORDER — SODIUM CHLORIDE 0.9 % (FLUSH) 0.9 %
20 SYRINGE (ML) INJECTION AS NEEDED
OUTPATIENT
Start: 2025-07-11

## 2025-07-11 RX ORDER — HEPARIN SODIUM (PORCINE) LOCK FLUSH IV SOLN 100 UNIT/ML 100 UNIT/ML
300 SOLUTION INTRAVENOUS ONCE
OUTPATIENT
Start: 2025-07-11

## 2025-07-11 RX ORDER — HEPARIN SODIUM (PORCINE) LOCK FLUSH IV SOLN 100 UNIT/ML 100 UNIT/ML
500 SOLUTION INTRAVENOUS AS NEEDED
Status: DISCONTINUED | OUTPATIENT
Start: 2025-07-11 | End: 2025-07-11 | Stop reason: HOSPADM

## 2025-07-11 RX ORDER — SODIUM CHLORIDE 9 MG/ML
250 INJECTION, SOLUTION INTRAVENOUS ONCE
Status: COMPLETED | OUTPATIENT
Start: 2025-07-11 | End: 2025-07-11

## 2025-07-11 RX ADMIN — SODIUM CHLORIDE 250 ML: 9 INJECTION, SOLUTION INTRAVENOUS at 14:51

## 2025-07-11 RX ADMIN — SODIUM CHLORIDE 480 MG: 9 INJECTION, SOLUTION INTRAVENOUS at 14:50

## 2025-07-11 RX ADMIN — HEPARIN 500 UNITS: 100 SYRINGE at 15:27

## 2025-08-01 DIAGNOSIS — C78.02 MALIGNANT NEOPLASM METASTATIC TO BOTH LUNGS: ICD-10-CM

## 2025-08-01 DIAGNOSIS — C43.59 MALIGNANT MELANOMA OF TORSO EXCLUDING BREAST: ICD-10-CM

## 2025-08-01 DIAGNOSIS — C78.7 METASTASIS TO LIVER: Primary | ICD-10-CM

## 2025-08-01 DIAGNOSIS — C78.01 MALIGNANT NEOPLASM METASTATIC TO BOTH LUNGS: ICD-10-CM

## 2025-08-01 RX ORDER — SODIUM CHLORIDE 9 MG/ML
250 INJECTION, SOLUTION INTRAVENOUS ONCE
OUTPATIENT
Start: 2025-08-08

## 2025-08-08 ENCOUNTER — INFUSION (OUTPATIENT)
Dept: ONCOLOGY | Facility: HOSPITAL | Age: 70
End: 2025-08-08
Payer: COMMERCIAL

## 2025-08-08 ENCOUNTER — APPOINTMENT (OUTPATIENT)
Dept: ONCOLOGY | Facility: HOSPITAL | Age: 70
End: 2025-08-08
Payer: COMMERCIAL

## 2025-08-08 VITALS
RESPIRATION RATE: 18 BRPM | HEART RATE: 91 BPM | TEMPERATURE: 97.9 F | DIASTOLIC BLOOD PRESSURE: 67 MMHG | WEIGHT: 148.4 LBS | BODY MASS INDEX: 23.24 KG/M2 | SYSTOLIC BLOOD PRESSURE: 104 MMHG | OXYGEN SATURATION: 98 %

## 2025-08-08 DIAGNOSIS — C43.59 MALIGNANT MELANOMA OF TORSO EXCLUDING BREAST: ICD-10-CM

## 2025-08-08 DIAGNOSIS — C78.02 MALIGNANT NEOPLASM METASTATIC TO BOTH LUNGS: ICD-10-CM

## 2025-08-08 DIAGNOSIS — C78.01 MALIGNANT NEOPLASM METASTATIC TO BOTH LUNGS: ICD-10-CM

## 2025-08-08 DIAGNOSIS — Z95.828 PORT-A-CATH IN PLACE: ICD-10-CM

## 2025-08-08 DIAGNOSIS — C78.7 METASTASIS TO LIVER: Primary | ICD-10-CM

## 2025-08-08 LAB
ALBUMIN SERPL-MCNC: 4.3 G/DL (ref 3.5–5.2)
ALBUMIN/GLOB SERPL: 2.2 G/DL
ALP SERPL-CCNC: 66 U/L (ref 39–117)
ALT SERPL W P-5'-P-CCNC: 23 U/L (ref 1–33)
ANION GAP SERPL CALCULATED.3IONS-SCNC: 11.2 MMOL/L (ref 5–15)
AST SERPL-CCNC: 25 U/L (ref 1–32)
BASOPHILS # BLD AUTO: 0.06 10*3/MM3 (ref 0–0.2)
BASOPHILS NFR BLD AUTO: 1.3 % (ref 0–1.5)
BILIRUB SERPL-MCNC: 0.7 MG/DL (ref 0–1.2)
BUN SERPL-MCNC: 15.5 MG/DL (ref 8–23)
BUN/CREAT SERPL: 18 (ref 7–25)
CALCIUM SPEC-SCNC: 9.5 MG/DL (ref 8.6–10.5)
CHLORIDE SERPL-SCNC: 106 MMOL/L (ref 98–107)
CO2 SERPL-SCNC: 24.8 MMOL/L (ref 22–29)
CREAT SERPL-MCNC: 0.86 MG/DL (ref 0.57–1)
DEPRECATED RDW RBC AUTO: 43.5 FL (ref 37–54)
EGFRCR SERPLBLD CKD-EPI 2021: 73.7 ML/MIN/1.73
EOSINOPHIL # BLD AUTO: 0.15 10*3/MM3 (ref 0–0.4)
EOSINOPHIL NFR BLD AUTO: 3.2 % (ref 0.3–6.2)
ERYTHROCYTE [DISTWIDTH] IN BLOOD BY AUTOMATED COUNT: 12.7 % (ref 12.3–15.4)
GLOBULIN UR ELPH-MCNC: 2 GM/DL
GLUCOSE SERPL-MCNC: 89 MG/DL (ref 65–99)
HCT VFR BLD AUTO: 41 % (ref 34–46.6)
HGB BLD-MCNC: 13.1 G/DL (ref 12–15.9)
IMM GRANULOCYTES # BLD AUTO: 0 10*3/MM3 (ref 0–0.05)
IMM GRANULOCYTES NFR BLD AUTO: 0 % (ref 0–0.5)
LYMPHOCYTES # BLD AUTO: 1.8 10*3/MM3 (ref 0.7–3.1)
LYMPHOCYTES NFR BLD AUTO: 38.5 % (ref 19.6–45.3)
MCH RBC QN AUTO: 29.7 PG (ref 26.6–33)
MCHC RBC AUTO-ENTMCNC: 32 G/DL (ref 31.5–35.7)
MCV RBC AUTO: 93 FL (ref 79–97)
MONOCYTES # BLD AUTO: 0.39 10*3/MM3 (ref 0.1–0.9)
MONOCYTES NFR BLD AUTO: 8.3 % (ref 5–12)
NEUTROPHILS NFR BLD AUTO: 2.28 10*3/MM3 (ref 1.7–7)
NEUTROPHILS NFR BLD AUTO: 48.7 % (ref 42.7–76)
NRBC BLD AUTO-RTO: 0 /100 WBC (ref 0–0.2)
PLATELET # BLD AUTO: 192 10*3/MM3 (ref 140–450)
PMV BLD AUTO: 10.1 FL (ref 6–12)
POTASSIUM SERPL-SCNC: 4.3 MMOL/L (ref 3.5–5.2)
PROT SERPL-MCNC: 6.3 G/DL (ref 6–8.5)
RBC # BLD AUTO: 4.41 10*6/MM3 (ref 3.77–5.28)
SODIUM SERPL-SCNC: 142 MMOL/L (ref 136–145)
T4 FREE SERPL-MCNC: 1.44 NG/DL (ref 0.92–1.68)
TSH SERPL DL<=0.05 MIU/L-ACNC: 1.8 UIU/ML (ref 0.27–4.2)
WBC NRBC COR # BLD AUTO: 4.68 10*3/MM3 (ref 3.4–10.8)

## 2025-08-08 PROCEDURE — 96413 CHEMO IV INFUSION 1 HR: CPT

## 2025-08-08 PROCEDURE — 85025 COMPLETE CBC W/AUTO DIFF WBC: CPT

## 2025-08-08 PROCEDURE — 84439 ASSAY OF FREE THYROXINE: CPT

## 2025-08-08 PROCEDURE — 25810000003 SODIUM CHLORIDE 0.9 % SOLUTION: Performed by: INTERNAL MEDICINE

## 2025-08-08 PROCEDURE — 25010000002 NIVOLUMAB 240 MG/24ML SOLUTION 24 ML VIAL: Performed by: INTERNAL MEDICINE

## 2025-08-08 PROCEDURE — 25010000002 HEPARIN LOCK FLUSH PER 10 UNITS

## 2025-08-08 PROCEDURE — 84443 ASSAY THYROID STIM HORMONE: CPT

## 2025-08-08 PROCEDURE — 80053 COMPREHEN METABOLIC PANEL: CPT

## 2025-08-08 RX ORDER — SODIUM CHLORIDE 0.9 % (FLUSH) 0.9 %
20 SYRINGE (ML) INJECTION AS NEEDED
OUTPATIENT
Start: 2025-08-08

## 2025-08-08 RX ORDER — HEPARIN SODIUM (PORCINE) LOCK FLUSH IV SOLN 100 UNIT/ML 100 UNIT/ML
500 SOLUTION INTRAVENOUS AS NEEDED
Status: DISCONTINUED | OUTPATIENT
Start: 2025-08-08 | End: 2025-08-08 | Stop reason: HOSPADM

## 2025-08-08 RX ORDER — SODIUM CHLORIDE 0.9 % (FLUSH) 0.9 %
10 SYRINGE (ML) INJECTION AS NEEDED
OUTPATIENT
Start: 2025-08-08

## 2025-08-08 RX ORDER — HEPARIN SODIUM (PORCINE) LOCK FLUSH IV SOLN 100 UNIT/ML 100 UNIT/ML
300 SOLUTION INTRAVENOUS ONCE
OUTPATIENT
Start: 2025-08-08

## 2025-08-08 RX ORDER — SODIUM CHLORIDE 0.9 % (FLUSH) 0.9 %
10 SYRINGE (ML) INJECTION AS NEEDED
Status: DISCONTINUED | OUTPATIENT
Start: 2025-08-08 | End: 2025-08-08 | Stop reason: HOSPADM

## 2025-08-08 RX ORDER — HEPARIN SODIUM (PORCINE) LOCK FLUSH IV SOLN 100 UNIT/ML 100 UNIT/ML
500 SOLUTION INTRAVENOUS AS NEEDED
OUTPATIENT
Start: 2025-08-08

## 2025-08-08 RX ORDER — SODIUM CHLORIDE 9 MG/ML
250 INJECTION, SOLUTION INTRAVENOUS ONCE
Status: COMPLETED | OUTPATIENT
Start: 2025-08-08 | End: 2025-08-08

## 2025-08-08 RX ADMIN — Medication 500 UNITS: at 16:23

## 2025-08-08 RX ADMIN — SODIUM CHLORIDE 250 ML: 9 INJECTION, SOLUTION INTRAVENOUS at 15:49

## 2025-08-08 RX ADMIN — Medication 10 ML: at 16:23

## 2025-08-08 RX ADMIN — SODIUM CHLORIDE 480 MG: 9 INJECTION, SOLUTION INTRAVENOUS at 15:49

## 2025-08-29 DIAGNOSIS — C78.7 METASTASIS TO LIVER: Primary | ICD-10-CM

## 2025-08-29 DIAGNOSIS — C78.02 MALIGNANT NEOPLASM METASTATIC TO BOTH LUNGS: ICD-10-CM

## 2025-08-29 DIAGNOSIS — C43.59 MALIGNANT MELANOMA OF TORSO EXCLUDING BREAST: ICD-10-CM

## 2025-08-29 DIAGNOSIS — C78.01 MALIGNANT NEOPLASM METASTATIC TO BOTH LUNGS: ICD-10-CM

## 2025-08-29 RX ORDER — SODIUM CHLORIDE 9 MG/ML
250 INJECTION, SOLUTION INTRAVENOUS ONCE
OUTPATIENT
Start: 2025-09-05

## (undated) DEVICE — APPL CHLORAPREP HI/LITE 26ML ORNG

## (undated) DEVICE — DRP SURG UNIV BASIC BILAMINATE 53X77IN DISP

## (undated) DEVICE — DRAPE,T,LAPARO,TRANS,STERILE: Brand: MEDLINE

## (undated) DEVICE — SUT PROLN 3/0 8832H

## (undated) DEVICE — SUT VIC 3/0 SH 27IN J416H

## (undated) DEVICE — FRCP BX RADJAW4 NDL 2.8 240CM LG OG BX40

## (undated) DEVICE — DRSNG SURESITE WNDW 4X4.5

## (undated) DEVICE — AMD ANTIMICROBIAL NON-ADHERENT ISLAND DRESSING,0.2% POLYHEXAMETHYLENE BIGUANIDE HCI (PHMB): Brand: TELFA

## (undated) DEVICE — SUT VIC 4/0 P3 18IN J494G

## (undated) DEVICE — BNDG ELAS CO-FLEX SLF ADHR 4IN5YD LF STRL

## (undated) DEVICE — DECANT BG O JET

## (undated) DEVICE — SINGLE USE BIOPSY VALVE MAJ-210: Brand: SINGLE USE BIOPSY VALVE (STERILE)

## (undated) DEVICE — GOWN,REINF,POLY,ECL,PP SLV,XL: Brand: MEDLINE

## (undated) DEVICE — ADAPT SWVL FIBROPTIC BRONCH

## (undated) DEVICE — ST NDL BRONCH ASP VIZISHOT 2 FLX 19GA

## (undated) DEVICE — SYR LUERLOK 30CC

## (undated) DEVICE — ELECTRD NDL MEGADYNE EZCLEAN NOSE 7CM

## (undated) DEVICE — COVER,MAYO STAND,STERILE: Brand: MEDLINE

## (undated) DEVICE — PK BASIC 70

## (undated) DEVICE — STCKNT IMPERV 9X36IN STRL

## (undated) DEVICE — KT CVR ULTRASND PROB PULL UP LXF 5X8

## (undated) DEVICE — CONN Y IRR DISP 1P/U

## (undated) DEVICE — FRCP BX RADJAW4 PULM WO NDL STD1.8X2 100

## (undated) DEVICE — ENDOGATOR AUXILIARY WATER JET CONNECTOR: Brand: ENDOGATOR

## (undated) DEVICE — SINGLE PORT MANIFOLD: Brand: NEPTUNE 2

## (undated) DEVICE — MARKR SKIN W/RULR AND LBL

## (undated) DEVICE — BRUSH CYTO MULTI APPL

## (undated) DEVICE — ENDOGATOR TUBING FOR ENDOGATOR EGP-100 IRRIGATION PUMP,OLYMPUS OFP PUMP, OLYMPUS AFU-100 PUMP AND ERBE EIP2 PUMP: Brand: ENDOGATOR

## (undated) DEVICE — DRP C/ARM W/BAND W/CLIPS 41X74IN

## (undated) DEVICE — PATIENT RETURN ELECTRODE, SINGLE-USE, CONTACT QUALITY MONITORING, ADULT, WITH 9FT CORD, FOR PATIENTS WEIGING OVER 33LBS. (15KG): Brand: MEGADYNE

## (undated) DEVICE — SUT MNCRYL PLS ANTIB UD 4/0 PS2 18IN

## (undated) DEVICE — SYR LUERLOK 5CC

## (undated) DEVICE — PROBE COVER 5" X 48": Brand: STERIMED

## (undated) DEVICE — SYR LUER SLPTP 50ML

## (undated) DEVICE — HOLDER: Brand: DEROYAL

## (undated) DEVICE — SYR CONTRL LUERLOK 10CC

## (undated) DEVICE — Device: Brand: DEFENDO AIR/WATER/SUCTION AND BIOPSY VALVE

## (undated) DEVICE — DBD-DRAPE,LAP,CHOLE,W/TROUGHS,STERILE: Brand: MEDLINE

## (undated) DEVICE — DRP UTIL 2/LAYR W/TP 15X26IN STRL PK/4

## (undated) DEVICE — TBG PENCL TELESCP MEGADYNE SMOKE EVAC 10FT

## (undated) DEVICE — Device

## (undated) DEVICE — ROSEBUD DISSECTORS: Brand: DEROYAL

## (undated) DEVICE — SINGLE USE SUCTION VALVE MAJ-209: Brand: SINGLE USE SUCTION VALVE (STERILE)

## (undated) DEVICE — GLV SURG PREMIERPRO MIC LTX PF SZ7 BRN

## (undated) DEVICE — INTENDED FOR TISSUE SEPARATION, AND OTHER PROCEDURES THAT REQUIRE A SHARP SURGICAL BLADE TO PUNCTURE OR CUT.: Brand: BARD-PARKER ® STAINLESS STEEL BLADES